# Patient Record
Sex: FEMALE | Race: BLACK OR AFRICAN AMERICAN | Employment: OTHER | URBAN - METROPOLITAN AREA
[De-identification: names, ages, dates, MRNs, and addresses within clinical notes are randomized per-mention and may not be internally consistent; named-entity substitution may affect disease eponyms.]

---

## 2019-01-11 ENCOUNTER — APPOINTMENT (OUTPATIENT)
Dept: GENERAL RADIOLOGY | Age: 49
DRG: 134 | End: 2019-01-11
Attending: EMERGENCY MEDICINE
Payer: MEDICAID

## 2019-01-11 ENCOUNTER — APPOINTMENT (OUTPATIENT)
Dept: CT IMAGING | Age: 49
DRG: 134 | End: 2019-01-11
Attending: EMERGENCY MEDICINE
Payer: MEDICAID

## 2019-01-11 ENCOUNTER — HOSPITAL ENCOUNTER (INPATIENT)
Age: 49
LOS: 6 days | Discharge: HOME HEALTH | DRG: 134 | End: 2019-01-20
Attending: EMERGENCY MEDICINE | Admitting: HOSPITALIST
Payer: MEDICAID

## 2019-01-11 DIAGNOSIS — T81.31XA DEHISCENCE OF INCISION, INITIAL ENCOUNTER: ICD-10-CM

## 2019-01-11 DIAGNOSIS — I26.99 OTHER ACUTE PULMONARY EMBOLISM WITHOUT ACUTE COR PULMONALE (HCC): Primary | ICD-10-CM

## 2019-01-11 DIAGNOSIS — Z87.81 STATUS POST OPEN REDUCTION WITH INTERNAL FIXATION (ORIF) OF FRACTURE OF ANKLE: ICD-10-CM

## 2019-01-11 DIAGNOSIS — Z98.890 STATUS POST OPEN REDUCTION WITH INTERNAL FIXATION (ORIF) OF FRACTURE OF ANKLE: ICD-10-CM

## 2019-01-11 LAB
ALBUMIN SERPL-MCNC: 3.3 G/DL (ref 3.4–5)
ALBUMIN/GLOB SERPL: 0.7 {RATIO} (ref 0.8–1.7)
ALP SERPL-CCNC: 85 U/L (ref 45–117)
ALT SERPL-CCNC: 13 U/L (ref 13–56)
ANION GAP SERPL CALC-SCNC: 7 MMOL/L (ref 3–18)
AST SERPL-CCNC: 7 U/L (ref 15–37)
BASOPHILS # BLD: 0 K/UL (ref 0–0.1)
BASOPHILS NFR BLD: 0 % (ref 0–2)
BILIRUB SERPL-MCNC: 0.3 MG/DL (ref 0.2–1)
BUN SERPL-MCNC: 18 MG/DL (ref 7–18)
BUN/CREAT SERPL: 15 (ref 12–20)
CALCIUM SERPL-MCNC: 8.6 MG/DL (ref 8.5–10.1)
CHLORIDE SERPL-SCNC: 102 MMOL/L (ref 100–108)
CK MB CFR SERPL CALC: ABNORMAL % (ref 0–4)
CK MB SERPL-MCNC: <1 NG/ML (ref 5–25)
CK SERPL-CCNC: 21 U/L (ref 26–192)
CO2 SERPL-SCNC: 27 MMOL/L (ref 21–32)
CREAT SERPL-MCNC: 1.22 MG/DL (ref 0.6–1.3)
D DIMER PPP FEU-MCNC: 1.94 UG/ML(FEU)
DIFFERENTIAL METHOD BLD: ABNORMAL
EOSINOPHIL # BLD: 0 K/UL (ref 0–0.4)
EOSINOPHIL NFR BLD: 0 % (ref 0–5)
ERYTHROCYTE [DISTWIDTH] IN BLOOD BY AUTOMATED COUNT: 19.1 % (ref 11.6–14.5)
GLOBULIN SER CALC-MCNC: 4.9 G/DL (ref 2–4)
GLUCOSE SERPL-MCNC: 447 MG/DL (ref 74–99)
HCT VFR BLD AUTO: 39.1 % (ref 35–45)
HGB BLD-MCNC: 12.5 G/DL (ref 12–16)
INR PPP: 1 (ref 0.8–1.2)
LYMPHOCYTES # BLD: 0.7 K/UL (ref 0.9–3.6)
LYMPHOCYTES NFR BLD: 13 % (ref 21–52)
MAGNESIUM SERPL-MCNC: 2.3 MG/DL (ref 1.6–2.6)
MCH RBC QN AUTO: 27.3 PG (ref 24–34)
MCHC RBC AUTO-ENTMCNC: 32 G/DL (ref 31–37)
MCV RBC AUTO: 85.4 FL (ref 74–97)
MONOCYTES # BLD: 0.1 K/UL (ref 0.05–1.2)
MONOCYTES NFR BLD: 1 % (ref 3–10)
NEUTS SEG # BLD: 4.6 K/UL (ref 1.8–8)
NEUTS SEG NFR BLD: 86 % (ref 40–73)
PLATELET # BLD AUTO: 292 K/UL (ref 135–420)
PMV BLD AUTO: 11.2 FL (ref 9.2–11.8)
POTASSIUM SERPL-SCNC: 4.5 MMOL/L (ref 3.5–5.5)
PROT SERPL-MCNC: 8.2 G/DL (ref 6.4–8.2)
PROTHROMBIN TIME: 13 SEC (ref 11.5–15.2)
RBC # BLD AUTO: 4.58 M/UL (ref 4.2–5.3)
SODIUM SERPL-SCNC: 136 MMOL/L (ref 136–145)
TROPONIN I SERPL-MCNC: <0.02 NG/ML (ref 0–0.04)
WBC # BLD AUTO: 5.4 K/UL (ref 4.6–13.2)

## 2019-01-11 PROCEDURE — 71275 CT ANGIOGRAPHY CHEST: CPT

## 2019-01-11 PROCEDURE — 85610 PROTHROMBIN TIME: CPT

## 2019-01-11 PROCEDURE — 85379 FIBRIN DEGRADATION QUANT: CPT

## 2019-01-11 PROCEDURE — 80053 COMPREHEN METABOLIC PANEL: CPT

## 2019-01-11 PROCEDURE — 85025 COMPLETE CBC W/AUTO DIFF WBC: CPT

## 2019-01-11 PROCEDURE — 74011250636 HC RX REV CODE- 250/636: Performed by: EMERGENCY MEDICINE

## 2019-01-11 PROCEDURE — 93005 ELECTROCARDIOGRAM TRACING: CPT

## 2019-01-11 PROCEDURE — 96374 THER/PROPH/DIAG INJ IV PUSH: CPT

## 2019-01-11 PROCEDURE — 83735 ASSAY OF MAGNESIUM: CPT

## 2019-01-11 PROCEDURE — 82550 ASSAY OF CK (CPK): CPT

## 2019-01-11 PROCEDURE — 99285 EMERGENCY DEPT VISIT HI MDM: CPT

## 2019-01-11 PROCEDURE — 82962 GLUCOSE BLOOD TEST: CPT

## 2019-01-11 PROCEDURE — 71045 X-RAY EXAM CHEST 1 VIEW: CPT

## 2019-01-11 PROCEDURE — 83036 HEMOGLOBIN GLYCOSYLATED A1C: CPT

## 2019-01-11 RX ORDER — MORPHINE SULFATE 4 MG/ML
4 INJECTION INTRAVENOUS
Status: COMPLETED | OUTPATIENT
Start: 2019-01-11 | End: 2019-01-12

## 2019-01-11 RX ORDER — MORPHINE SULFATE 4 MG/ML
4 INJECTION INTRAVENOUS
Status: COMPLETED | OUTPATIENT
Start: 2019-01-11 | End: 2019-01-11

## 2019-01-11 RX ADMIN — MORPHINE SULFATE 4 MG: 4 INJECTION INTRAVENOUS at 22:40

## 2019-01-12 ENCOUNTER — APPOINTMENT (OUTPATIENT)
Dept: GENERAL RADIOLOGY | Age: 49
DRG: 134 | End: 2019-01-12
Attending: FAMILY MEDICINE
Payer: MEDICAID

## 2019-01-12 PROBLEM — K50.10 CROHN'S COLITIS (HCC): Status: ACTIVE | Noted: 2019-01-12

## 2019-01-12 PROBLEM — Z98.890 STATUS POST OPEN REDUCTION WITH INTERNAL FIXATION (ORIF) OF FRACTURE OF ANKLE: Status: ACTIVE | Noted: 2019-01-12

## 2019-01-12 PROBLEM — S91.002A WOUND OF LEFT ANKLE: Status: ACTIVE | Noted: 2019-01-12

## 2019-01-12 PROBLEM — I26.99 PULMONARY EMBOLI (HCC): Status: ACTIVE | Noted: 2019-01-12

## 2019-01-12 PROBLEM — Z87.81 STATUS POST OPEN REDUCTION WITH INTERNAL FIXATION (ORIF) OF FRACTURE OF ANKLE: Status: ACTIVE | Noted: 2019-01-12

## 2019-01-12 PROBLEM — I10 ACCELERATED HYPERTENSION: Status: ACTIVE | Noted: 2019-01-12

## 2019-01-12 LAB
ATRIAL RATE: 92 BPM
CALCULATED P AXIS, ECG09: 63 DEGREES
CALCULATED R AXIS, ECG10: -11 DEGREES
CALCULATED T AXIS, ECG11: 40 DEGREES
DIAGNOSIS, 93000: NORMAL
EST. AVERAGE GLUCOSE BLD GHB EST-MCNC: 183 MG/DL
GLUCOSE BLD STRIP.AUTO-MCNC: 159 MG/DL (ref 70–110)
GLUCOSE BLD STRIP.AUTO-MCNC: 197 MG/DL (ref 70–110)
GLUCOSE BLD STRIP.AUTO-MCNC: 207 MG/DL (ref 70–110)
GLUCOSE BLD STRIP.AUTO-MCNC: 213 MG/DL (ref 70–110)
GLUCOSE BLD STRIP.AUTO-MCNC: 226 MG/DL (ref 70–110)
GLUCOSE BLD STRIP.AUTO-MCNC: 365 MG/DL (ref 70–110)
HBA1C MFR BLD: 8 % (ref 4.2–5.6)
P-R INTERVAL, ECG05: 136 MS
Q-T INTERVAL, ECG07: 380 MS
QRS DURATION, ECG06: 94 MS
QTC CALCULATION (BEZET), ECG08: 469 MS
VENTRICULAR RATE, ECG03: 92 BPM

## 2019-01-12 PROCEDURE — 74011250637 HC RX REV CODE- 250/637: Performed by: HOSPITALIST

## 2019-01-12 PROCEDURE — 99218 HC RM OBSERVATION: CPT

## 2019-01-12 PROCEDURE — 74011636637 HC RX REV CODE- 636/637: Performed by: HOSPITALIST

## 2019-01-12 PROCEDURE — 96374 THER/PROPH/DIAG INJ IV PUSH: CPT

## 2019-01-12 PROCEDURE — 96375 TX/PRO/DX INJ NEW DRUG ADDON: CPT

## 2019-01-12 PROCEDURE — 74011636320 HC RX REV CODE- 636/320: Performed by: EMERGENCY MEDICINE

## 2019-01-12 PROCEDURE — 74011250636 HC RX REV CODE- 250/636: Performed by: HOSPITALIST

## 2019-01-12 PROCEDURE — 96376 TX/PRO/DX INJ SAME DRUG ADON: CPT

## 2019-01-12 PROCEDURE — 73600 X-RAY EXAM OF ANKLE: CPT

## 2019-01-12 PROCEDURE — 74011636637 HC RX REV CODE- 636/637: Performed by: EMERGENCY MEDICINE

## 2019-01-12 PROCEDURE — 74011250637 HC RX REV CODE- 250/637: Performed by: INTERNAL MEDICINE

## 2019-01-12 PROCEDURE — 74011250637 HC RX REV CODE- 250/637: Performed by: FAMILY MEDICINE

## 2019-01-12 PROCEDURE — 74011250636 HC RX REV CODE- 250/636: Performed by: FAMILY MEDICINE

## 2019-01-12 PROCEDURE — 74011250636 HC RX REV CODE- 250/636

## 2019-01-12 PROCEDURE — 74011250636 HC RX REV CODE- 250/636: Performed by: EMERGENCY MEDICINE

## 2019-01-12 PROCEDURE — 74011636637 HC RX REV CODE- 636/637: Performed by: INTERNAL MEDICINE

## 2019-01-12 RX ORDER — MORPHINE SULFATE 10 MG/ML
2 INJECTION, SOLUTION INTRAMUSCULAR; INTRAVENOUS
Status: DISCONTINUED | OUTPATIENT
Start: 2019-01-12 | End: 2019-01-12 | Stop reason: SDUPTHER

## 2019-01-12 RX ORDER — CLONIDINE HYDROCHLORIDE 0.1 MG/1
0.1 TABLET ORAL
Status: DISCONTINUED | OUTPATIENT
Start: 2019-01-12 | End: 2019-01-20 | Stop reason: HOSPADM

## 2019-01-12 RX ORDER — ACETAMINOPHEN 325 MG/1
650 TABLET ORAL
Status: DISCONTINUED | OUTPATIENT
Start: 2019-01-12 | End: 2019-01-20 | Stop reason: HOSPADM

## 2019-01-12 RX ORDER — LOSARTAN POTASSIUM 50 MG/1
100 TABLET ORAL DAILY
Status: DISCONTINUED | OUTPATIENT
Start: 2019-01-13 | End: 2019-01-20 | Stop reason: HOSPADM

## 2019-01-12 RX ORDER — TRAMADOL HYDROCHLORIDE AND ACETAMINOPHEN 37.5; 325 MG/1; MG/1
1 TABLET ORAL
Status: DISCONTINUED | OUTPATIENT
Start: 2019-01-12 | End: 2019-01-17

## 2019-01-12 RX ORDER — INSULIN GLARGINE 100 [IU]/ML
20 INJECTION, SOLUTION SUBCUTANEOUS DAILY
Status: DISCONTINUED | OUTPATIENT
Start: 2019-01-12 | End: 2019-01-12

## 2019-01-12 RX ORDER — HYDROCHLOROTHIAZIDE 25 MG/1
25 TABLET ORAL DAILY
Status: DISCONTINUED | OUTPATIENT
Start: 2019-01-13 | End: 2019-01-20 | Stop reason: HOSPADM

## 2019-01-12 RX ORDER — HYDRALAZINE HYDROCHLORIDE 25 MG/1
25 TABLET, FILM COATED ORAL 3 TIMES DAILY
Status: DISCONTINUED | OUTPATIENT
Start: 2019-01-12 | End: 2019-01-12

## 2019-01-12 RX ORDER — DEXTROSE 50 % IN WATER (D50W) INTRAVENOUS SYRINGE
25-50 AS NEEDED
Status: DISCONTINUED | OUTPATIENT
Start: 2019-01-12 | End: 2019-01-20 | Stop reason: HOSPADM

## 2019-01-12 RX ORDER — METOPROLOL TARTRATE 25 MG/1
25 TABLET, FILM COATED ORAL 2 TIMES DAILY
Status: DISCONTINUED | OUTPATIENT
Start: 2019-01-12 | End: 2019-01-15

## 2019-01-12 RX ORDER — INSULIN GLARGINE 100 [IU]/ML
25 INJECTION, SOLUTION SUBCUTANEOUS DAILY
Status: DISCONTINUED | OUTPATIENT
Start: 2019-01-13 | End: 2019-01-15

## 2019-01-12 RX ORDER — PREDNISONE 20 MG/1
40 TABLET ORAL
Status: DISCONTINUED | OUTPATIENT
Start: 2019-01-13 | End: 2019-01-13

## 2019-01-12 RX ORDER — MORPHINE SULFATE 2 MG/ML
2 INJECTION, SOLUTION INTRAMUSCULAR; INTRAVENOUS
Status: DISCONTINUED | OUTPATIENT
Start: 2019-01-12 | End: 2019-01-12

## 2019-01-12 RX ORDER — ONDANSETRON HYDROCHLORIDE 4 MG/2ML
4 INJECTION, SOLUTION INTRAMUSCULAR; INTRAVENOUS
Status: COMPLETED | OUTPATIENT
Start: 2019-01-12 | End: 2019-01-12

## 2019-01-12 RX ORDER — INSULIN LISPRO 100 [IU]/ML
4 INJECTION, SOLUTION INTRAVENOUS; SUBCUTANEOUS
Status: DISCONTINUED | OUTPATIENT
Start: 2019-01-12 | End: 2019-01-17

## 2019-01-12 RX ORDER — HYDRALAZINE HYDROCHLORIDE 50 MG/1
50 TABLET, FILM COATED ORAL 3 TIMES DAILY
Status: DISCONTINUED | OUTPATIENT
Start: 2019-01-12 | End: 2019-01-13

## 2019-01-12 RX ORDER — ENOXAPARIN SODIUM 150 MG/ML
120 INJECTION SUBCUTANEOUS EVERY 12 HOURS
Status: DISCONTINUED | OUTPATIENT
Start: 2019-01-12 | End: 2019-01-12

## 2019-01-12 RX ORDER — PANTOPRAZOLE SODIUM 40 MG/1
40 TABLET, DELAYED RELEASE ORAL
Status: DISCONTINUED | OUTPATIENT
Start: 2019-01-12 | End: 2019-01-20 | Stop reason: HOSPADM

## 2019-01-12 RX ORDER — IPRATROPIUM BROMIDE AND ALBUTEROL SULFATE 2.5; .5 MG/3ML; MG/3ML
3 SOLUTION RESPIRATORY (INHALATION)
Status: DISCONTINUED | OUTPATIENT
Start: 2019-01-12 | End: 2019-01-20 | Stop reason: HOSPADM

## 2019-01-12 RX ORDER — MAGNESIUM SULFATE 100 %
4 CRYSTALS MISCELLANEOUS AS NEEDED
Status: DISCONTINUED | OUTPATIENT
Start: 2019-01-12 | End: 2019-01-20 | Stop reason: HOSPADM

## 2019-01-12 RX ORDER — ONDANSETRON 2 MG/ML
4 INJECTION INTRAMUSCULAR; INTRAVENOUS
Status: DISCONTINUED | OUTPATIENT
Start: 2019-01-12 | End: 2019-01-20 | Stop reason: HOSPADM

## 2019-01-12 RX ORDER — MORPHINE SULFATE 2 MG/ML
2 INJECTION, SOLUTION INTRAMUSCULAR; INTRAVENOUS
Status: DISCONTINUED | OUTPATIENT
Start: 2019-01-12 | End: 2019-01-13

## 2019-01-12 RX ORDER — INSULIN GLARGINE 100 [IU]/ML
5 INJECTION, SOLUTION SUBCUTANEOUS
Status: COMPLETED | OUTPATIENT
Start: 2019-01-12 | End: 2019-01-12

## 2019-01-12 RX ORDER — ONDANSETRON 2 MG/ML
INJECTION INTRAMUSCULAR; INTRAVENOUS
Status: COMPLETED
Start: 2019-01-12 | End: 2019-01-12

## 2019-01-12 RX ORDER — MORPHINE SULFATE 4 MG/ML
4 INJECTION INTRAVENOUS
Status: COMPLETED | OUTPATIENT
Start: 2019-01-12 | End: 2019-01-12

## 2019-01-12 RX ORDER — INSULIN LISPRO 100 [IU]/ML
INJECTION, SOLUTION INTRAVENOUS; SUBCUTANEOUS
Status: DISCONTINUED | OUTPATIENT
Start: 2019-01-12 | End: 2019-01-15

## 2019-01-12 RX ORDER — MORPHINE SULFATE 4 MG/ML
INJECTION INTRAVENOUS
Status: COMPLETED
Start: 2019-01-12 | End: 2019-01-12

## 2019-01-12 RX ADMIN — ONDANSETRON 4 MG: 2 INJECTION INTRAMUSCULAR; INTRAVENOUS at 04:21

## 2019-01-12 RX ADMIN — ONDANSETRON 4 MG: 2 INJECTION INTRAMUSCULAR; INTRAVENOUS at 22:36

## 2019-01-12 RX ADMIN — INSULIN LISPRO 4 UNITS: 100 INJECTION, SOLUTION INTRAVENOUS; SUBCUTANEOUS at 22:35

## 2019-01-12 RX ADMIN — HYDRALAZINE HYDROCHLORIDE 25 MG: 25 TABLET, FILM COATED ORAL at 15:03

## 2019-01-12 RX ADMIN — MORPHINE SULFATE 2 MG: 2 INJECTION, SOLUTION INTRAMUSCULAR; INTRAVENOUS at 13:05

## 2019-01-12 RX ADMIN — INSULIN LISPRO 2 UNITS: 100 INJECTION, SOLUTION INTRAVENOUS; SUBCUTANEOUS at 09:17

## 2019-01-12 RX ADMIN — ONDANSETRON HYDROCHLORIDE 4 MG: 4 INJECTION, SOLUTION INTRAMUSCULAR; INTRAVENOUS at 04:21

## 2019-01-12 RX ADMIN — INSULIN LISPRO 4 UNITS: 100 INJECTION, SOLUTION INTRAVENOUS; SUBCUTANEOUS at 17:13

## 2019-01-12 RX ADMIN — MORPHINE SULFATE 2 MG: 2 INJECTION, SOLUTION INTRAMUSCULAR; INTRAVENOUS at 21:52

## 2019-01-12 RX ADMIN — INSULIN GLARGINE 20 UNITS: 100 INJECTION, SOLUTION SUBCUTANEOUS at 09:16

## 2019-01-12 RX ADMIN — HYDRALAZINE HYDROCHLORIDE 25 MG: 25 TABLET, FILM COATED ORAL at 09:16

## 2019-01-12 RX ADMIN — INSULIN LISPRO 4 UNITS: 100 INJECTION, SOLUTION INTRAVENOUS; SUBCUTANEOUS at 17:14

## 2019-01-12 RX ADMIN — HUMAN INSULIN 6 UNITS: 100 INJECTION, SOLUTION SUBCUTANEOUS at 00:06

## 2019-01-12 RX ADMIN — PANTOPRAZOLE SODIUM 40 MG: 40 TABLET, DELAYED RELEASE ORAL at 17:13

## 2019-01-12 RX ADMIN — INSULIN LISPRO 4 UNITS: 100 INJECTION, SOLUTION INTRAVENOUS; SUBCUTANEOUS at 12:10

## 2019-01-12 RX ADMIN — TRAMADOL HYDROCHLORIDE AND ACETAMINOPHEN 1 TABLET: 37.5; 325 TABLET, FILM COATED ORAL at 16:26

## 2019-01-12 RX ADMIN — ENOXAPARIN SODIUM 120 MG: 120 INJECTION SUBCUTANEOUS at 09:16

## 2019-01-12 RX ADMIN — MORPHINE SULFATE 4 MG: 4 INJECTION INTRAVENOUS at 04:20

## 2019-01-12 RX ADMIN — INSULIN LISPRO 4 UNITS: 100 INJECTION, SOLUTION INTRAVENOUS; SUBCUTANEOUS at 09:17

## 2019-01-12 RX ADMIN — CLONIDINE HYDROCHLORIDE 0.1 MG: 0.1 TABLET ORAL at 20:49

## 2019-01-12 RX ADMIN — HYDRALAZINE HYDROCHLORIDE 50 MG: 50 TABLET, FILM COATED ORAL at 21:51

## 2019-01-12 RX ADMIN — INSULIN GLARGINE 5 UNITS: 100 INJECTION, SOLUTION SUBCUTANEOUS at 14:59

## 2019-01-12 RX ADMIN — APIXABAN 10 MG: 2.5 TABLET, FILM COATED ORAL at 20:34

## 2019-01-12 RX ADMIN — MORPHINE SULFATE 2 MG: 2 INJECTION, SOLUTION INTRAMUSCULAR; INTRAVENOUS at 09:16

## 2019-01-12 RX ADMIN — METOPROLOL TARTRATE 25 MG: 25 TABLET ORAL at 17:13

## 2019-01-12 RX ADMIN — ONDANSETRON 4 MG: 2 INJECTION INTRAMUSCULAR; INTRAVENOUS at 16:25

## 2019-01-12 RX ADMIN — MORPHINE SULFATE 4 MG: 4 INJECTION INTRAVENOUS at 00:13

## 2019-01-12 RX ADMIN — METOPROLOL TARTRATE 25 MG: 25 TABLET ORAL at 14:59

## 2019-01-12 RX ADMIN — IOPAMIDOL 85 ML: 755 INJECTION, SOLUTION INTRAVENOUS at 00:57

## 2019-01-12 NOTE — ED PROVIDER NOTES
EMERGENCY DEPARTMENT HISTORY AND PHYSICAL EXAM    9:26 PM      Date: 1/11/2019  Patient Name: Landon Khanna    History of Presenting Illness     Chief Complaint   Patient presents with    Chest Pain         History Provided By: Patient    Chief Complaint: CP  Duration:  PTA  Timing:  Acute  Location: Generalized   Quality: Not obtained at this time   Severity: Moderate  Modifying Factors: Not obtained at this time   Associated Symptoms: Not obtained at this time       Additional History (Context): 9:27 PM Landon Khanna is a 46 y.o. female with h/o diabetes, HTN and Crohn's colitis who presents to ED complaining of acute, moderate generalized CP onset PTA. The pt indicated that when she is at rest, she still experiences CP. She also stated that on 12/3 she had an ORF of the left ankle and is currently experiencing intense pain of that ankle. PCP: No primary care provider on file. Past History         Review of Systems       Review of Systems   Cardiovascular: Positive for chest pain (Generalized). Musculoskeletal:        Left ankle pain     All other systems reviewed and are negative. Physical Exam     Physical Exam   Constitutional: She is oriented to person, place, and time. She appears well-developed. HENT:   Head: Normocephalic and atraumatic. Eyes: EOM are normal. Pupils are equal, round, and reactive to light. Neck: Normal range of motion. Neck supple. Cardiovascular: Normal rate, regular rhythm and normal heart sounds. Exam reveals no friction rub. No murmur heard. Pulmonary/Chest: Effort normal and breath sounds normal. No respiratory distress. She has no wheezes. Abdominal: Soft. She exhibits no distension. There is no tenderness. There is no rebound and no guarding. Musculoskeletal: Normal range of motion. Neurological: She is alert and oriented to person, place, and time.    Left foot swelling  Slight would dehiscence laterally ~ 1cm  No erythema or induration Skin: Skin is warm and dry. Psychiatric: She has a normal mood and affect. Her behavior is normal. Thought content normal.         Diagnostic Study Results     cta + PE   EKG shows sinus at 92 with a normal axis. There is no ST elevation or depression and no hypertrophy        Medical Decision Making   I am the first provider for this patient. I reviewed the vital signs, available nursing notes, past medical history, past surgical history, family history and social history. Vital Signs-Reviewed the patient's vital signs. ED Course: Progress Notes, Reevaluation, and Consults:      Provider Notes (Medical Decision Making):    this is a 78-year-old female who had an open reduction and  And internal fixation of left ankle fracture. She recently drove up from [place] where the surgery was done. She presents today with chest pain and some concern of left leg swelling. They are unable to obtain a duplex on the right. A CTA that shows mild to moderate PE. Given her pain and the need for duplex will admit to the hospitalist service    Diagnosis     Clinical Impression: No diagnosis found. _______________________________    Attestations:  Scribe Attestation     I-Mitzy Landry acting as a scribe for and in the presence of Asa Mcgee MD      January 11, 2019 at 9:26 PM       Provider Attestation:      I personally performed the services described in the documentation, reviewed the documentation, as recorded by the scribe in my presence, and it accurately and completely records my words and actions.  January 11, 2019 at 9:26 PM - Asa Mcgee MD    _______________________________

## 2019-01-12 NOTE — PROGRESS NOTES
8715- Bedside and Verbal shift change report given to Lou Fink RN (oncoming nurse) by Yury Molina RN (offgoing nurse). Report included the following information SBAR, Kardex, Intake/Output, MAR and Recent Results.

## 2019-01-12 NOTE — ED NOTES
Pt arrived via EMS, c/o sudden onset chest pain today 7/10, pt was seen recently at [facility] and diagnosed with high d dimer, received dose of lovonox and followed up next day for doppler study, unsure of weather pt has CTA chest, pt did not know results of either study. Pt has diabetes, HTN and crohns disease.  Pt recently had surgery on her left  ankle

## 2019-01-12 NOTE — H&P
History & Physical    Patient: Thuy Angulo MRN: [id number]  CSN: [id number]    YOB: 1970  Age: 46 y.o. Sex: female      DOA: 1/11/2019    Chief Complaint   Patient presents with    Chest Pain          HPI:     Thuy Angulo is a 46 y.o. female who has a PMH of DM, Crohn's disease and HTN. She recently had ORIF of a left ankle # in [place] after sustaining a fall. Of note, patient states she will be staying in the area and is searching for a PCP and orthopedic surgeon for outpatient follow up. She presented to the hospital today for chest pain and SOB on exertion. She had been having left calf pain for which she went to another facility where she states she had an elevated D-Dimer. She was given one dose of lovenox and an appointment for a doppler US the next day. She has had the US done and has not been informed of the results. Meanwhile, the new symptoms developed and she came to ER here. CTA shows evidence of bilateral PE with small to moderate clot burden. LLE US from another facility showed:    Conclusions: No evidence of deep venous thrombosis in the left common femoral, femoral (proximal and mid), deep femoral and popliteal veins. Technically compromised study therefore non-occlusive deep venous thrombosis cannot be excluded in the femoral (distal), posterior tibial and peroneal veins as described in the findings.    Venous valvular reflux in the left as described. Past Medical History:   Diagnosis Date    [facility] [address]     Diabetes ([facility] [address]     HTN (hypertension)        No past surgical history on file. No family history on file.     Social History     Socioeconomic History    Marital status:      Spouse name: Not on file    Number of children: Not on file    Years of education: Not on file    Highest education level: Not on file       Prior to Admission medications    Not on File       No Known Allergies    Review of Systems    Refer to HPI for positive findings. All other systems reviewed and are negative    Physical Exam:     Physical Exam:  Visit Vitals  BP (!) 179/109   Pulse 86   Temp 97.9 °F (36.6 °C)   Resp 19   Wt 144.2 kg (318 lb)   SpO2 97%      O2 Device: Room air    Temp (24hrs), Av.6 °F (36.4 °C), Min:97.2 °F (36.2 °C), Max:97.9 °F (36.6 °C)       No intake/output data recorded. No intake/output data recorded. General:  Alert, cooperative, no distress, appears stated age. obese    Head:  Normocephalic, without obvious abnormality, atraumatic. Eyes:  Conjunctivae/corneas clear. PERRL, EOMs intact. Nose: Nares normal. No drainage or sinus tenderness. Throat: Lips, mucosa, and tongue normal. Teeth and gums normal.   Neck: Supple, symmetrical, trachea midline, no adenopathy, thyroid: no enlargement/tenderness/nodules, no carotid bruit and no JVD. Back:   ROM normal. No CVA tenderness. Lungs:   Clear to auscultation bilaterally. Chest wall:  No tenderness or deformity. Heart:  Regular rate and rhythm, S1, S2 normal, no murmur, click, rub or gallop. Abdomen: Soft, non-tender. Bowel sounds normal. No masses,  No organomegaly. Extremities:  left ankle has surgical scars over both malleoli, small area of open wound with granulation tissue and scant oozing over the lateral malleolus, atraumatic, no cyanosis or edema. Pulses: 2+ and symmetric all extremities. Skin: Skin color, texture, turgor normal. No rashes or lesions   Neurologic: CNII-XII intact. No focal motor or sensory deficit.        Labs Reviewed:    Recent Results (from the past 24 hour(s))   CBC WITH AUTOMATED DIFF    Collection Time: 19 10:03 PM   Result Value Ref Range    WBC 5.4 4.6 - 13.2 K/uL    RBC 4.58 4.20 - 5.30 M/uL    HGB 12.5 12.0 - 16.0 g/dL    HCT 39.1 35.0 - 45.0 %    MCV 85.4 74.0 - 97.0 FL    MCH 27.3 24.0 - 34.0 PG    MCHC 32.0 31.0 - 37.0 g/dL    RDW 19.1 (H) 11.6 - 14.5 %    PLATELET 130 509 - 169 K/uL MPV 11.2 9.2 - 11.8 FL    NEUTROPHILS 86 (H) 40 - 73 %    LYMPHOCYTES 13 (L) 21 - 52 %    MONOCYTES 1 (L) 3 - 10 %    EOSINOPHILS 0 0 - 5 %    BASOPHILS 0 0 - 2 %    ABS. NEUTROPHILS 4.6 1.8 - 8.0 K/UL    ABS. LYMPHOCYTES 0.7 (L) 0.9 - 3.6 K/UL    ABS. MONOCYTES 0.1 0.05 - 1.2 K/UL    ABS. EOSINOPHILS 0.0 0.0 - 0.4 K/UL    ABS. BASOPHILS 0.0 0.0 - 0.1 K/UL    DF AUTOMATED     METABOLIC PANEL, COMPREHENSIVE    Collection Time: 01/11/19 10:03 PM   Result Value Ref Range    Sodium 136 136 - 145 mmol/L    Potassium 4.5 3.5 - 5.5 mmol/L    Chloride 102 100 - 108 mmol/L    CO2 27 21 - 32 mmol/L    Anion gap 7 3.0 - 18 mmol/L    Glucose 447 (HH) 74 - 99 mg/dL    BUN 18 7.0 - 18 MG/DL    Creatinine 1.22 0.6 - 1.3 MG/DL    BUN/Creatinine ratio 15 12 - 20      GFR est AA 57 (L) >60 ml/min/1.73m2    GFR est non-AA 47 (L) >60 ml/min/1.73m2    Calcium 8.6 8.5 - 10.1 MG/DL    Bilirubin, total 0.3 0.2 - 1.0 MG/DL    ALT (SGPT) 13 13 - 56 U/L    AST (SGOT) 7 (L) 15 - 37 U/L    Alk.  phosphatase 85 45 - 117 U/L    Protein, total 8.2 6.4 - 8.2 g/dL    Albumin 3.3 (L) 3.4 - 5.0 g/dL    Globulin 4.9 (H) 2.0 - 4.0 g/dL    A-G Ratio 0.7 (L) 0.8 - 1.7     PROTHROMBIN TIME + INR    Collection Time: 01/11/19 10:03 PM   Result Value Ref Range    Prothrombin time 13.0 11.5 - 15.2 sec    INR 1.0 0.8 - 1.2     MAGNESIUM    Collection Time: 01/11/19 10:03 PM   Result Value Ref Range    Magnesium 2.3 1.6 - 2.6 mg/dL   D DIMER    Collection Time: 01/11/19 10:03 PM   Result Value Ref Range    D DIMER 1.94 (H) <0.46 ug/ml(FEU)   CARDIAC PANEL,(CK, CKMB & TROPONIN)    Collection Time: 01/11/19 10:03 PM   Result Value Ref Range    CK 21 (L) 26 - 192 U/L    CK - MB <1.0 <3.6 ng/ml    CK-MB Index  0.0 - 4.0 %     CALCULATION NOT PERFORMED WHEN RESULT IS BELOW LINEAR LIMIT    Troponin-I, QT <0.02 0.0 - 0.045 NG/ML   EKG, 12 LEAD, INITIAL    Collection Time: 01/11/19 10:58 PM   Result Value Ref Range    Ventricular Rate 92 BPM    Atrial Rate 92 BPM    P-R Interval 136 ms    QRS Duration 94 ms    Q-T Interval 380 ms    QTC Calculation (Bezet) 469 ms    Calculated P Axis 63 degrees    Calculated R Axis -11 degrees    Calculated T Axis 40 degrees    Diagnosis       Normal sinus rhythm  Possible Left atrial enlargement  Left ventricular hypertrophy  Abnormal ECG  No previous ECGs available     GLUCOSE, POC    Collection Time: 01/12/19  4:51 AM   Result Value Ref Range    Glucose (POC) 197 (H) 70 - 110 mg/dL       Procedures/imaging: see electronic medical records for all procedures/Xrays and details which were not copied into this note but were reviewed prior to creation of Plan        Assessment/Plan     1. Bilateral Pulmonary Emboli (R>L)  2. Possible LLE DVT  3. S/p ORIF of left ankle #  4. Diabetes Mellitus with Hyperglycemia  5. Hypertension  6. Crohn's disease    Plan    - Start on Lovenox 1mg/kg SC BID now  - Pulmonary consult regarding choice of oral anticoagulant  - will not pursue further LE imaging as she already had one done a few days ago and it will not change the dose, duration or choice of anticoagulation  - last fingerstick in ED is 197 down from 440+, will put on basal bolus regimen.    - continue hydralazine for HTN  - pain control    DVT ppx - therapeutic anticoagulation    Active Problems:    Pulmonary emboli ([address] (1/12/2019)        Brea Guidry MD  January 12, 2019

## 2019-01-12 NOTE — PROGRESS NOTES
SUBJECTIVE:    Patient states she has some ankle pain since surgery. No abdominal pain currently. Some chest pressure on right side. No cough. No SOB. No nausea or vomiting. Patient went to [facility] ED on 1/7/19 with abdominal pain and was given steroid [for crohn's disease] and Percocet for pain. Patient states she has been in this area for last 2 weeks and moved here from [facility] [place]:    BP (!) 195/94 (BP 1 Location: Right arm, BP Patient Position: At rest)   Pulse 84   Temp 98 °F (36.7 °C)   Resp 16   Wt 144.2 kg (318 lb)   SpO2 98%     CVS: RRR  RS: CTA Bilaterally  GI: NT, BS +  Extremities: no pedal edema  CNS: moves all extremities well  General: NAD, Awake     ASSESSMENT:    1. Bilateral PE  2. Left ankle pain sec to old fracture  3. DM [uncontrolled due to steroid]   4. HTN  5. Crohn's disease    PLAN:    Patient had a thorough work up done at [facility] including doppler of LLE, CT of LLE with iv contrast  And CT abd pelvis. ECHO pending  D/w patient about coumadin vs Eliquis - prefers eliquis  Possible discharge home in 1-2 days and patient is looking for a PCP and agrees to follow with dr. Andres Jeffers. I spoke to dr. Betsy Hoyt  And he will resume care from now onwards. Increase lantus and add BB    CT LLE:  Expected postsurgical changes of the ankle with satisfactory alignment of the known distal left fibular fracture. No acute osseous abnormality. Degenerative arthropathy of the medial femoroacetabular joint.     BMP:   Lab Results   Component Value Date/Time     01/11/2019 10:03 PM    K 4.5 01/11/2019 10:03 PM     01/11/2019 10:03 PM    CO2 27 01/11/2019 10:03 PM    AGAP 7 01/11/2019 10:03 PM     (HH) 01/11/2019 10:03 PM    BUN 18 01/11/2019 10:03 PM    CREA 1.22 01/11/2019 10:03 PM    GFRAA 57 (L) 01/11/2019 10:03 PM    GFRNA 47 (L) 01/11/2019 10:03 PM     CBC:   Lab Results   Component Value Date/Time    WBC 5.4 01/11/2019 10:03 PM    HGB 12.5 01/11/2019 10:03 PM    HCT 39.1 01/11/2019 10:03 PM     01/11/2019 10:03 PM

## 2019-01-12 NOTE — ED NOTES
TRANSFER - OUT REPORT:    Verbal report given to Linn Forman RN (name) on Sophia Alberts  being transferred to  (unit) for routine progression of care       Report consisted of patients Situation, Background, Assessment and   Recommendations(SBAR). Information from the following report(s) SBAR, ED Summary and MAR was reviewed with the receiving nurse. Lines:   Peripheral IV 01/11/19 Right Forearm (Active)   Site Assessment Clean, dry, & intact 1/11/2019 10:36 PM   Phlebitis Assessment 0 1/11/2019 10:36 PM   Infiltration Assessment 0 1/11/2019 10:36 PM   Dressing Status Clean, dry, & intact 1/11/2019 10:36 PM   Dressing Type Transparent 1/11/2019 10:36 PM   Hub Color/Line Status Blue;Patent; Flushed 1/11/2019 10:36 PM   Action Taken Blood drawn 1/11/2019 10:36 PM       Peripheral IV 01/11/19 Left Antecubital (Active)   Site Assessment Clean, dry, & intact 1/11/2019 10:38 PM   Phlebitis Assessment 0 1/11/2019 10:38 PM   Infiltration Assessment 0 1/11/2019 10:38 PM   Dressing Status Clean, dry, & intact 1/11/2019 10:38 PM   Dressing Type Transparent 1/11/2019 10:38 PM   Hub Color/Line Status Pink;Flushed;Patent 1/11/2019 10:38 PM        Opportunity for questions and clarification was provided.       Patient transported with:   Registered Nurse

## 2019-01-12 NOTE — H&P
History & Physical    Patient: Del Castillo MRN: [id number]  CSN: [id number]    YOB: 1970  Age: 46 y.o. Sex: female      DOA: 1/11/2019    Chief Complaint:   Chief Complaint   Patient presents with    Chest Pain          HPI:     Sorin Perez is a 46 y.o. female with h/o diabetes, HTN and Crohn's colitis who presents to ED complaining of acute, moderate generalized CP onset PTA. The pt indicated that when she is at rest, she still experiences CP. She also stated that on 12/3 she had an ORFI of the left ankle after fall and fracture at [facility]. Pt had external fixator at first and then had ORIF later . Pt had open wound lateral Lt ankle for 1-2 weeks had some drainage . Pt had h/o peripheral neuropathy  But she still feeling the pain. Pt was seen at [facility] before admission to [facility] pt had venous duplex ultrasound leg and she was told it was negative     Pt also had CT scan of abdomen at [facility]  And she was told that she had crohn's flare up and started on prednisone. Pt usually she is on Imuran 50 mg daily and Humara 40 mg injection weekly      Pt initially was admitted to the hospitalist  service and switched to me since pt will need PCP in the area . CXR  B/L atelactasis      CTA chest  Positive for pulmonary emboli with a small to moderate overall clot burden which  is more significant in the right lung. Mildly dilated main pulmonary artery, but  no other CT evidence for significant right heart strain.    Left adrenal nodule is low-density and most likely a benign adenoma. Ct abdomen and pelvis at [facility]    1. Mild, long segment thickening of the descending colonic wall suggestive of inflammatory or infectious colitis. 2. Fibroid uterus. 3. Stable left adrenal nodule is indeterminate by imaging.  This can be subsequently evaluated with adrenal protocol CT or MRI on a nonemergent basis.     Past Medical History:   Diagnosis Date    [facility]'s [facility] ([facility]     [facility]     HTN (hypertension)         past surgical history  ORIF Lt ankle     family history : both parents has diabetes , hypertension. Father had multiple heart attacks     Social History     Socioeconomic History    Marital status:      Spouse name: Not on file    Number of children: Not on file    Years of education: Not on file    Highest education level: Not on file     Pt denied smoking , alcohol abuse and drug abuse  Prior to Admission medications    Not on File       No Known Allergies    Review of Systems  GENERAL: Patient alert, awake and oriented times 3, able to communicate full sentences and not in distress. HEENT: No change in vision, no earache, tinnitus, sore throat or sinus congestion. NECK: No pain or stiffness. CARDIOVASCULAR:  chest pain or pressure improving . No palpitations. PULMONARY: shortness of breath improving ,  No cough or wheeze. GASTROINTESTINAL: positive abdominal pain,  Positive nausea, no omiting or diarrhea, melena or       bright red blood per rectum. GENITOURINARY: No urinary frequency, urgency, hesitancy or dysuria. MUSCULOSKELETAL:  Chronic arthritis pain and peripeheral neuropathy and lower back pain   DERMATOLOGIC:  Discoloration Lt ankle open wound Lateral ankle   ENDOCRINE: No polyuria, polydipsia, no heat or cold intolerance. No recent change in    weight. HEMATOLOGICAL: H/O  anemia  No  easy bruising or bleeding. NEUROLOGIC: No headache, seizures, tingling and numbness lower extremities  PSYCHIATRIC: No depression, anxiety, mood disorder, no loss of interest in normal       activity or change in sleep pattern.         Physical Exam:     Physical Exam:  Visit Vitals  BP (!) 195/94 (BP 1 Location: Right arm, BP Patient Position: At rest)   Pulse 84   Temp 98 °F (36.7 °C)   Resp 16   Wt 144.2 kg (318 lb)   SpO2 98%      O2 Device: Room air    Temp (24hrs), Av.8 °F (36.6 °C), Min:97.2 °F (36.2 °C), Max:98 °F (36.7 °C) No intake/output data recorded. No intake/output data recorded. General:  Alert, cooperative, no distress,  Morbid obesity   Head:  Normocephalic, without obvious abnormality, atraumatic. Eyes:  Conjunctivae/corneas clear. PERRL, EOMs intact. Nose: Nares normal. No drainage or sinus tenderness. Throat: Lips, mucosa, and tongue normal.    Neck: Supple, symmetrical, trachea midline, no adenopathy, thyroid: no enlargement/tenderness/nodules, no carotid bruit and no JVD. Back:   ROM normal. No CVA tenderness. Lungs:   Clear to auscultation bilaterally. Chest wall:  No tenderness or deformity. Heart:  Regular rate and rhythm, S1, S2 normal, no murmur, click, rub or gallop. Abdomen: Soft,  Obese tender Lt lower abdomen. No masses,  No organomegaly. Extremities: Extremities normal, atraumatic, no cyanosis or edema. Pulses: 2+ and symmetric all extremities. Skin: Skin color, texture, turgor normal.  Lt lateral ankle open wound no significant draiange   Neurologic: CNII-XII intact. No focal motor or sensory deficit. Labs Reviewed: All lab results for the last 24 hours reviewed. Recent Results (from the past 24 hour(s))   CBC WITH AUTOMATED DIFF    Collection Time: 01/11/19 10:03 PM   Result Value Ref Range    WBC 5.4 4.6 - 13.2 K/uL    RBC 4.58 4.20 - 5.30 M/uL    HGB 12.5 12.0 - 16.0 g/dL    HCT 39.1 35.0 - 45.0 %    MCV 85.4 74.0 - 97.0 FL    MCH 27.3 24.0 - 34.0 PG    MCHC 32.0 31.0 - 37.0 g/dL    RDW 19.1 (H) 11.6 - 14.5 %    PLATELET 796 980 - 710 K/uL    MPV 11.2 9.2 - 11.8 FL    NEUTROPHILS 86 (H) 40 - 73 %    LYMPHOCYTES 13 (L) 21 - 52 %    MONOCYTES 1 (L) 3 - 10 %    EOSINOPHILS 0 0 - 5 %    BASOPHILS 0 0 - 2 %    ABS. NEUTROPHILS 4.6 1.8 - 8.0 K/UL    ABS. LYMPHOCYTES 0.7 (L) 0.9 - 3.6 K/UL    ABS. MONOCYTES 0.1 0.05 - 1.2 K/UL    ABS. EOSINOPHILS 0.0 0.0 - 0.4 K/UL    ABS.  BASOPHILS 0.0 0.0 - 0.1 K/UL    DF AUTOMATED     METABOLIC PANEL, COMPREHENSIVE    Collection Time: 01/11/19 10:03 PM   Result Value Ref Range    Sodium 136 136 - 145 mmol/L    Potassium 4.5 3.5 - 5.5 mmol/L    Chloride 102 100 - 108 mmol/L    CO2 27 21 - 32 mmol/L    Anion gap 7 3.0 - 18 mmol/L    Glucose 447 (HH) 74 - 99 mg/dL    BUN 18 7.0 - 18 MG/DL    Creatinine 1.22 0.6 - 1.3 MG/DL    BUN/Creatinine ratio 15 12 - 20      GFR est AA 57 (L) >60 ml/min/1.73m2    GFR est non-AA 47 (L) >60 ml/min/1.73m2    Calcium 8.6 8.5 - 10.1 MG/DL    Bilirubin, total 0.3 0.2 - 1.0 MG/DL    ALT (SGPT) 13 13 - 56 U/L    AST (SGOT) 7 (L) 15 - 37 U/L    Alk.  phosphatase 85 45 - 117 U/L    Protein, total 8.2 6.4 - 8.2 g/dL    Albumin 3.3 (L) 3.4 - 5.0 g/dL    Globulin 4.9 (H) 2.0 - 4.0 g/dL    A-G Ratio 0.7 (L) 0.8 - 1.7     PROTHROMBIN TIME + INR    Collection Time: 01/11/19 10:03 PM   Result Value Ref Range    Prothrombin time 13.0 11.5 - 15.2 sec    INR 1.0 0.8 - 1.2     MAGNESIUM    Collection Time: 01/11/19 10:03 PM   Result Value Ref Range    Magnesium 2.3 1.6 - 2.6 mg/dL   D DIMER    Collection Time: 01/11/19 10:03 PM   Result Value Ref Range    D DIMER 1.94 (H) <0.46 ug/ml(FEU)   CARDIAC PANEL,(CK, CKMB & TROPONIN)    Collection Time: 01/11/19 10:03 PM   Result Value Ref Range    CK 21 (L) 26 - 192 U/L    CK - MB <1.0 <3.6 ng/ml    CK-MB Index  0.0 - 4.0 %     CALCULATION NOT PERFORMED WHEN RESULT IS BELOW LINEAR LIMIT    Troponin-I, QT <0.02 0.0 - 0.045 NG/ML   HEMOGLOBIN A1C WITH EAG    Collection Time: 01/11/19 10:03 PM   Result Value Ref Range    Hemoglobin A1c 8.0 (H) 4.2 - 5.6 %    Est. average glucose 183 mg/dL   EKG, 12 LEAD, INITIAL    Collection Time: 01/11/19 10:58 PM   Result Value Ref Range    Ventricular Rate 92 BPM    Atrial Rate 92 BPM    P-R Interval 136 ms    QRS Duration 94 ms    Q-T Interval 380 ms    QTC Calculation (Bezet) 469 ms    Calculated P Axis 63 degrees    Calculated R Axis -11 degrees    Calculated T Axis 40 degrees    Diagnosis       Normal sinus rhythm  Possible Left atrial enlargement  Left ventricular hypertrophy  Abnormal ECG  No previous ECGs available     GLUCOSE, POC    Collection Time: 01/11/19 11:51 PM   Result Value Ref Range    Glucose (POC) 365 (H) 70 - 110 mg/dL   GLUCOSE, POC    Collection Time: 01/12/19  4:51 AM   Result Value Ref Range    Glucose (POC) 197 (H) 70 - 110 mg/dL   GLUCOSE, POC    Collection Time: 01/12/19  8:21 AM   Result Value Ref Range    Glucose (POC) 159 (H) 70 - 110 mg/dL   GLUCOSE, POC    Collection Time: 01/12/19 11:54 AM   Result Value Ref Range    Glucose (POC) 213 (H) 70 - 110 mg/dL     CT, CXR and EKG    Procedures/imaging: see electronic medical records for all procedures/Xrays and details which were not copied into this note but were reviewed prior to creation of Plan        Assessment/Plan     Active Problems:    Pulmonary emboli ([facility] (1/12/2019)      Accelerated hypertension (1/12/2019)      Crohn's colitis ([facility] (1/12/2019)         Plan    Pulmonary Embolism/ S/P travel from [place] and S/p surgery Lt ankle  Pt had pulmonary consult with Dr Ana Gonzales switch to Eliquis  Monitor H&H with H/O crohn's  Continue to monitor sx and o2 sat    S/P ORIF  Lt lateral ankle wound  Wound care consult  Check x-ray Lt ankle  Pt will need ortho consult   Pt at higher risk for infection with her immune suppression status    Crohn's disease  Continue prednisone tapering dose   Restart regular med  Protonix 40 mg daily  Continue Zofran prn nausea    Diabetes Mellitus type 2  Check HG A1c  Lipid profile continue Lantus and humalog sliding scle    Uncontrolled / accelerated Hypertension  Increase hydralazine to 50 mg TID  Losartan 100 mg daily   HCTZ 25 mg daily  Pt on clonidine prn  F/u cbc, cmp    DVT/GI Prophylaxis: H2B/PPI and Eliquis      Ingrid Bullard MD  1/12/2019  3:32 PM

## 2019-01-12 NOTE — CONSULTS
[facility]. Pulmonary, Critical Care, and Sleep Medicine    Initial Patient Consult    Name: Arlene Pearce MRN: [id number]   : 1970 Hospital: [address] [place] [address]   Date: 2019        IMPRESSION:   · Small to moderate bilateral PE  · Left ankle fracture with recent surgery    · Chron's disease  · HTN  · DM      RECOMMENDATIONS:   · Agree with therapeutic lovenox  · Eliquis 3-6 months with f/u PVL prior to stopping  · Will cont to follow while in house and f/u in Dr. Hatfield Manual office in three months  · Do not think patient is a candidate for tPa at this time. Subjective: This patient has been seen and evaluated at the request of Dr. Milton Adrian for PE. Patient is a 46 y.o. female hx of chron's disease HTN DM was hospitalized  for approx 28 days in [place] for ankle fracture s/p surgery, patient moved from [place] to  to be closer to family 2 weeks ago. Patient drove from [place] Patient was seen at [facility] c/o diffuse abd pain \"Chron's flair\" left leg swelling pain. No DVT on LE PVL. Last night patient c/p sharp chest pain and sudden onset of SOB COTTRELL and ongoing left calf pain. CT shows bilateral PE. Have been request to consult for PE management     Patient currently c/o COTTRELL and right sharp chest pain decreased since admission no cough fever chills N/V abd pain dizziness HA. + left ankle swelling. Past Medical History:   Diagnosis Date    [facility]'s colitis ([facility]     Diabetes ([facility]     HTN (hypertension)       No past surgical history on file. Prior to Admission medications    Not on File     No Known Allergies   Social History     Tobacco Use    Smoking status: Not on file   Substance Use Topics    Alcohol use: Not on file      No family history on file.      Current Facility-Administered Medications   Medication Dose Route Frequency    hydrALAZINE (APRESOLINE) tablet 25 mg  25 mg Oral TID    insulin lispro (HUMALOG) injection 4 Units  4 Units SubCUTAneous TIDAC    insulin lispro (HUMALOG) injection   SubCUTAneous AC&HS    [START ON 2019] insulin glargine (LANTUS) injection 25 Units  25 Units SubCUTAneous DAILY    insulin glargine (LANTUS) injection 5 Units  5 Units SubCUTAneous NOW    apixaban (ELIQUIS) tablet 10 mg  10 mg Oral BID    metoprolol tartrate (LOPRESSOR) tablet 25 mg  25 mg Oral BID       Review of Systems:  A comprehensive review of systems was negative except for that written in the HPI. Objective:   Vital Signs:    Visit Vitals  BP (!) 195/94 (BP 1 Location: Right arm, BP Patient Position: At rest)   Pulse 84   Temp 98 °F (36.7 °C)   Resp 16   Wt 144.2 kg (318 lb)   SpO2 98%       O2 Device: Room air       Temp (24hrs), Av.8 °F (36.6 °C), Min:97.2 °F (36.2 °C), Max:98 °F (36.7 °C)       Intake/Output:   Last shift:      No intake/output data recorded. Last 3 shifts: No intake/output data recorded. No intake or output data in the 24 hours ending 19 1352     Physical Exam:   General:  Alert, cooperative, no distress, appears stated age. Head:  Normocephalic, without obvious abnormality, atraumatic. Eyes:  Conjunctivae/corneas clear. PERRL, ANicteric   Nose: Nares normal. Mucosa normal. No drainage or sinus tenderness. Throat: Lips, mucosa dry. NO thrush; TEETH normal.   Neck: Supple, symmetrical, trachea midline, no adenopathy, thyroid: no enlargment/tenderness/nodules, no carotid bruit and no JVD. No crepitus   Back:   Symmetric, no curvature, no spine tenderness or flank pain   Lungs:   Bilateral auscultation clear no rhonchi rales wheezing normal resp effort   Chest wall:  No tenderness or deformity. NO CREPITUS   Heart:  Regular rate and rhythm, S1, S2 normal, no murmur, click, rub or gallop. Abdomen:   Obese Soft, non-tender. PROTUBERANT; Bowel sounds normal. No masses,  No organomegaly. No paradox   Extremities: Left ankle with surgical incision small amount of oozing no edema   Pulses: 1-2+ and symmetric all extremities. Skin: Skin color, texture, turgor normal. No rashes or lesions   Lymph nodes: Cervical, supraclavicular, and axillary nodes normal.   Neurologic: Grossly nonfocal          Data review:   Labs:  Recent Results (from the past 24 hour(s))   CBC WITH AUTOMATED DIFF    Collection Time: 01/11/19 10:03 PM   Result Value Ref Range    WBC 5.4 4.6 - 13.2 K/uL    RBC 4.58 4.20 - 5.30 M/uL    HGB 12.5 12.0 - 16.0 g/dL    HCT 39.1 35.0 - 45.0 %    MCV 85.4 74.0 - 97.0 FL    MCH 27.3 24.0 - 34.0 PG    MCHC 32.0 31.0 - 37.0 g/dL    RDW 19.1 (H) 11.6 - 14.5 %    PLATELET 742 126 - 236 K/uL    MPV 11.2 9.2 - 11.8 FL    NEUTROPHILS 86 (H) 40 - 73 %    LYMPHOCYTES 13 (L) 21 - 52 %    MONOCYTES 1 (L) 3 - 10 %    EOSINOPHILS 0 0 - 5 %    BASOPHILS 0 0 - 2 %    ABS. NEUTROPHILS 4.6 1.8 - 8.0 K/UL    ABS. LYMPHOCYTES 0.7 (L) 0.9 - 3.6 K/UL    ABS. MONOCYTES 0.1 0.05 - 1.2 K/UL    ABS. EOSINOPHILS 0.0 0.0 - 0.4 K/UL    ABS. BASOPHILS 0.0 0.0 - 0.1 K/UL    DF AUTOMATED     METABOLIC PANEL, COMPREHENSIVE    Collection Time: 01/11/19 10:03 PM   Result Value Ref Range    Sodium 136 136 - 145 mmol/L    Potassium 4.5 3.5 - 5.5 mmol/L    Chloride 102 100 - 108 mmol/L    CO2 27 21 - 32 mmol/L    Anion gap 7 3.0 - 18 mmol/L    Glucose 447 (HH) 74 - 99 mg/dL    BUN 18 7.0 - 18 MG/DL    Creatinine 1.22 0.6 - 1.3 MG/DL    BUN/Creatinine ratio 15 12 - 20      GFR est AA 57 (L) >60 ml/min/1.73m2    GFR est non-AA 47 (L) >60 ml/min/1.73m2    Calcium 8.6 8.5 - 10.1 MG/DL    Bilirubin, total 0.3 0.2 - 1.0 MG/DL    ALT (SGPT) 13 13 - 56 U/L    AST (SGOT) 7 (L) 15 - 37 U/L    Alk.  phosphatase 85 45 - 117 U/L    Protein, total 8.2 6.4 - 8.2 g/dL    Albumin 3.3 (L) 3.4 - 5.0 g/dL    Globulin 4.9 (H) 2.0 - 4.0 g/dL    A-G Ratio 0.7 (L) 0.8 - 1.7     PROTHROMBIN TIME + INR    Collection Time: 01/11/19 10:03 PM   Result Value Ref Range    Prothrombin time 13.0 11.5 - 15.2 sec    INR 1.0 0.8 - 1.2     MAGNESIUM    Collection Time: 01/11/19 10:03 PM   Result Value Ref Range    Magnesium 2.3 1.6 - 2.6 mg/dL   D DIMER    Collection Time: 01/11/19 10:03 PM   Result Value Ref Range    D DIMER 1.94 (H) <0.46 ug/ml(FEU)   CARDIAC PANEL,(CK, CKMB & TROPONIN)    Collection Time: 01/11/19 10:03 PM   Result Value Ref Range    CK 21 (L) 26 - 192 U/L    CK - MB <1.0 <3.6 ng/ml    CK-MB Index  0.0 - 4.0 %     CALCULATION NOT PERFORMED WHEN RESULT IS BELOW LINEAR LIMIT    Troponin-I, QT <0.02 0.0 - 0.045 NG/ML   HEMOGLOBIN A1C WITH EAG    Collection Time: 01/11/19 10:03 PM   Result Value Ref Range    Hemoglobin A1c 8.0 (H) 4.2 - 5.6 %    Est. average glucose 183 mg/dL   EKG, 12 LEAD, INITIAL    Collection Time: 01/11/19 10:58 PM   Result Value Ref Range    Ventricular Rate 92 BPM    Atrial Rate 92 BPM    P-R Interval 136 ms    QRS Duration 94 ms    Q-T Interval 380 ms    QTC Calculation (Bezet) 469 ms    Calculated P Axis 63 degrees    Calculated R Axis -11 degrees    Calculated T Axis 40 degrees    Diagnosis       Normal sinus rhythm  Possible Left atrial enlargement  Left ventricular hypertrophy  Abnormal ECG  No previous ECGs available     GLUCOSE, POC    Collection Time: 01/11/19 11:51 PM   Result Value Ref Range    Glucose (POC) 365 (H) 70 - 110 mg/dL   GLUCOSE, POC    Collection Time: 01/12/19  4:51 AM   Result Value Ref Range    Glucose (POC) 197 (H) 70 - 110 mg/dL   GLUCOSE, POC    Collection Time: 01/12/19  8:21 AM   Result Value Ref Range    Glucose (POC) 159 (H) 70 - 110 mg/dL   GLUCOSE, POC    Collection Time: 01/12/19 11:54 AM   Result Value Ref Range    Glucose (POC) 213 (H) 70 - 110 mg/dL       Imaging:  I have personally reviewed the patients radiographs and have reviewed the reports:  CT 1/12  IMPRESSION:  Positive for pulmonary emboli with a small to moderate overall clot burden which  is more significant in the right lung.  Mildly dilated main pulmonary artery, but  no other CT evidence for significant right heart strain.       Left adrenal nodule is low-density and most likely a benign adenoma     High complexity decision making was performed in this consultation and evaluation of this patient who is at high risk for decompensation with multiple organ involvement  Total critical care time spent rendering care exclusive of procedures: 55 minutes     Armin Carter PA-C

## 2019-01-12 NOTE — PROGRESS NOTES
Problem: Falls - Risk of  Goal: *Absence of Falls  Document Jill Fall Risk and appropriate interventions in the flowsheet.   Outcome: Progressing Towards Goal  Fall Risk Interventions:            Medication Interventions: Patient to call before getting OOB

## 2019-01-13 LAB
ANION GAP SERPL CALC-SCNC: 9 MMOL/L (ref 3–18)
BUN SERPL-MCNC: 15 MG/DL (ref 7–18)
BUN/CREAT SERPL: 20 (ref 12–20)
CALCIUM SERPL-MCNC: 8.3 MG/DL (ref 8.5–10.1)
CHLORIDE SERPL-SCNC: 105 MMOL/L (ref 100–108)
CHOLEST SERPL-MCNC: 259 MG/DL
CO2 SERPL-SCNC: 26 MMOL/L (ref 21–32)
CREAT SERPL-MCNC: 0.75 MG/DL (ref 0.6–1.3)
GLUCOSE BLD STRIP.AUTO-MCNC: 167 MG/DL (ref 70–110)
GLUCOSE BLD STRIP.AUTO-MCNC: 187 MG/DL (ref 70–110)
GLUCOSE BLD STRIP.AUTO-MCNC: 241 MG/DL (ref 70–110)
GLUCOSE BLD STRIP.AUTO-MCNC: 246 MG/DL (ref 70–110)
GLUCOSE SERPL-MCNC: 184 MG/DL (ref 74–99)
HDLC SERPL-MCNC: 53 MG/DL (ref 40–60)
HDLC SERPL: 4.9 {RATIO} (ref 0–5)
LDLC SERPL CALC-MCNC: 140.2 MG/DL (ref 0–100)
LIPID PROFILE,FLP: ABNORMAL
MAGNESIUM SERPL-MCNC: 2.2 MG/DL (ref 1.6–2.6)
POTASSIUM SERPL-SCNC: 3.6 MMOL/L (ref 3.5–5.5)
SODIUM SERPL-SCNC: 140 MMOL/L (ref 136–145)
TRIGL SERPL-MCNC: 329 MG/DL (ref ?–150)
VLDLC SERPL CALC-MCNC: 65.8 MG/DL

## 2019-01-13 PROCEDURE — 80048 BASIC METABOLIC PNL TOTAL CA: CPT

## 2019-01-13 PROCEDURE — 74011250637 HC RX REV CODE- 250/637: Performed by: ORTHOPAEDIC SURGERY

## 2019-01-13 PROCEDURE — 99218 HC RM OBSERVATION: CPT

## 2019-01-13 PROCEDURE — 74011636637 HC RX REV CODE- 636/637: Performed by: INTERNAL MEDICINE

## 2019-01-13 PROCEDURE — 82962 GLUCOSE BLOOD TEST: CPT

## 2019-01-13 PROCEDURE — 74011250636 HC RX REV CODE- 250/636: Performed by: FAMILY MEDICINE

## 2019-01-13 PROCEDURE — 74011250637 HC RX REV CODE- 250/637: Performed by: INTERNAL MEDICINE

## 2019-01-13 PROCEDURE — 83735 ASSAY OF MAGNESIUM: CPT

## 2019-01-13 PROCEDURE — 74011250637 HC RX REV CODE- 250/637: Performed by: FAMILY MEDICINE

## 2019-01-13 PROCEDURE — 36415 COLL VENOUS BLD VENIPUNCTURE: CPT

## 2019-01-13 PROCEDURE — 74011636637 HC RX REV CODE- 636/637: Performed by: HOSPITALIST

## 2019-01-13 PROCEDURE — 74011636637 HC RX REV CODE- 636/637: Performed by: FAMILY MEDICINE

## 2019-01-13 PROCEDURE — 77030011256 HC DRSG MEPILEX <16IN NO BORD MOLN -A

## 2019-01-13 PROCEDURE — 80061 LIPID PANEL: CPT

## 2019-01-13 RX ORDER — HYDRALAZINE HYDROCHLORIDE 50 MG/1
100 TABLET, FILM COATED ORAL 3 TIMES DAILY
Status: DISCONTINUED | OUTPATIENT
Start: 2019-01-13 | End: 2019-01-20 | Stop reason: HOSPADM

## 2019-01-13 RX ORDER — ATORVASTATIN CALCIUM 20 MG/1
20 TABLET, FILM COATED ORAL
Status: DISCONTINUED | OUTPATIENT
Start: 2019-01-13 | End: 2019-01-20 | Stop reason: HOSPADM

## 2019-01-13 RX ORDER — GABAPENTIN 300 MG/1
300 CAPSULE ORAL 3 TIMES DAILY
Status: DISCONTINUED | OUTPATIENT
Start: 2019-01-13 | End: 2019-01-20 | Stop reason: HOSPADM

## 2019-01-13 RX ORDER — MORPHINE SULFATE 2 MG/ML
2 INJECTION, SOLUTION INTRAMUSCULAR; INTRAVENOUS
Status: DISCONTINUED | OUTPATIENT
Start: 2019-01-13 | End: 2019-01-17

## 2019-01-13 RX ORDER — PREDNISONE 20 MG/1
20 TABLET ORAL
Status: DISCONTINUED | OUTPATIENT
Start: 2019-01-14 | End: 2019-01-14

## 2019-01-13 RX ADMIN — ONDANSETRON 4 MG: 2 INJECTION INTRAMUSCULAR; INTRAVENOUS at 12:33

## 2019-01-13 RX ADMIN — INSULIN LISPRO 4 UNITS: 100 INJECTION, SOLUTION INTRAVENOUS; SUBCUTANEOUS at 08:24

## 2019-01-13 RX ADMIN — INSULIN LISPRO 2 UNITS: 100 INJECTION, SOLUTION INTRAVENOUS; SUBCUTANEOUS at 08:24

## 2019-01-13 RX ADMIN — INSULIN LISPRO 2 UNITS: 100 INJECTION, SOLUTION INTRAVENOUS; SUBCUTANEOUS at 12:34

## 2019-01-13 RX ADMIN — GABAPENTIN 300 MG: 300 CAPSULE ORAL at 12:33

## 2019-01-13 RX ADMIN — CLONIDINE HYDROCHLORIDE 0.1 MG: 0.1 TABLET ORAL at 05:26

## 2019-01-13 RX ADMIN — PANTOPRAZOLE SODIUM 40 MG: 40 TABLET, DELAYED RELEASE ORAL at 17:12

## 2019-01-13 RX ADMIN — ATORVASTATIN CALCIUM 20 MG: 20 TABLET, FILM COATED ORAL at 21:27

## 2019-01-13 RX ADMIN — HYDRALAZINE HYDROCHLORIDE 100 MG: 50 TABLET, FILM COATED ORAL at 17:36

## 2019-01-13 RX ADMIN — GABAPENTIN 300 MG: 300 CAPSULE ORAL at 17:12

## 2019-01-13 RX ADMIN — CLONIDINE HYDROCHLORIDE 0.1 MG: 0.1 TABLET ORAL at 17:12

## 2019-01-13 RX ADMIN — MORPHINE SULFATE 2 MG: 2 INJECTION, SOLUTION INTRAMUSCULAR; INTRAVENOUS at 13:57

## 2019-01-13 RX ADMIN — HYDRALAZINE HYDROCHLORIDE 50 MG: 50 TABLET, FILM COATED ORAL at 08:25

## 2019-01-13 RX ADMIN — PREDNISONE 40 MG: 20 TABLET ORAL at 08:25

## 2019-01-13 RX ADMIN — INSULIN LISPRO 4 UNITS: 100 INJECTION, SOLUTION INTRAVENOUS; SUBCUTANEOUS at 12:33

## 2019-01-13 RX ADMIN — APIXABAN 10 MG: 2.5 TABLET, FILM COATED ORAL at 20:20

## 2019-01-13 RX ADMIN — TRAMADOL HYDROCHLORIDE AND ACETAMINOPHEN 1 TABLET: 37.5; 325 TABLET, FILM COATED ORAL at 06:32

## 2019-01-13 RX ADMIN — MORPHINE SULFATE 2 MG: 2 INJECTION, SOLUTION INTRAMUSCULAR; INTRAVENOUS at 23:18

## 2019-01-13 RX ADMIN — ONDANSETRON 4 MG: 2 INJECTION INTRAMUSCULAR; INTRAVENOUS at 06:20

## 2019-01-13 RX ADMIN — METOPROLOL TARTRATE 25 MG: 25 TABLET ORAL at 08:25

## 2019-01-13 RX ADMIN — MORPHINE SULFATE 2 MG: 2 INJECTION, SOLUTION INTRAMUSCULAR; INTRAVENOUS at 04:03

## 2019-01-13 RX ADMIN — INSULIN LISPRO 4 UNITS: 100 INJECTION, SOLUTION INTRAVENOUS; SUBCUTANEOUS at 21:57

## 2019-01-13 RX ADMIN — COLLAGENASE SANTYL: 250 OINTMENT TOPICAL at 17:11

## 2019-01-13 RX ADMIN — HYDROCHLOROTHIAZIDE 25 MG: 25 TABLET ORAL at 08:25

## 2019-01-13 RX ADMIN — MORPHINE SULFATE 2 MG: 2 INJECTION, SOLUTION INTRAMUSCULAR; INTRAVENOUS at 09:20

## 2019-01-13 RX ADMIN — PANTOPRAZOLE SODIUM 40 MG: 40 TABLET, DELAYED RELEASE ORAL at 08:25

## 2019-01-13 RX ADMIN — LOSARTAN POTASSIUM 100 MG: 50 TABLET, FILM COATED ORAL at 08:25

## 2019-01-13 RX ADMIN — INSULIN LISPRO 4 UNITS: 100 INJECTION, SOLUTION INTRAVENOUS; SUBCUTANEOUS at 17:12

## 2019-01-13 RX ADMIN — ONDANSETRON 4 MG: 2 INJECTION INTRAMUSCULAR; INTRAVENOUS at 23:15

## 2019-01-13 RX ADMIN — INSULIN GLARGINE 25 UNITS: 100 INJECTION, SOLUTION SUBCUTANEOUS at 08:24

## 2019-01-13 RX ADMIN — MORPHINE SULFATE 2 MG: 2 INJECTION, SOLUTION INTRAMUSCULAR; INTRAVENOUS at 18:57

## 2019-01-13 RX ADMIN — METOPROLOL TARTRATE 25 MG: 25 TABLET ORAL at 17:12

## 2019-01-13 RX ADMIN — GABAPENTIN 300 MG: 300 CAPSULE ORAL at 21:26

## 2019-01-13 RX ADMIN — APIXABAN 10 MG: 2.5 TABLET, FILM COATED ORAL at 08:25

## 2019-01-13 NOTE — CONSULTS
[facility]    Consult Note    Patient: Navi Uriarte               Sex: female          DOA: 1/11/2019         YOB: 1970      Age:  46 y.o.        LOS:  LOS: 0 days              HPI:     Navi Uriarte is a 46 y.o. female who has been seen for left leg pain. She states she has moved here from [place] where she had an ORIF of her left ankle on 12/3/18 after she slipped and fell. She notes that she presented to the hospital complaining of calf discomfort and was worked up for DVT and PE. She has constant throbbing pain. Was in a posterior splint and now has nothing on. Past Medical History:   Diagnosis Date    [facility]'s [facility] ([facility]     Diabetes ([facility]     HTN (hypertension)        No past surgical history on file. No family history on file. Social History     Socioeconomic History    Marital status:      Spouse name: Not on file    Number of children: Not on file    Years of education: Not on file    Highest education level: Not on file       Prior to Admission medications    Not on File       No Known Allergies    Review of Systems  Negative for any fever, chills, sweats, headache, blurred vision, cough, congestion, SOB, abdominal pain, bleeding, bruising, numbness, or tingling. 12 point ROS otherwise negative other than noted in the HPI      Physical Exam:      Visit Vitals  BP (!) 179/103 (BP 1 Location: Right arm, BP Patient Position: At rest)   Pulse 84   Temp 98.2 °F (36.8 °C)   Resp 18   Wt 318 lb (144.2 kg)   SpO2 93%       Physical Exam:  General: Well developed, well nourished, 46 y.o. female in  NAD   Vitals: Blood pressure (!) 179/103, pulse 84, temperature 98.2 °F (36.8 °C), resp. rate 18, weight 318 lb (144.2 kg), SpO2 93 %. Skin: No rashs, ulcers, icterus, or other obvious lesions/ lacerations  Eyes: EOM intact, PERRLA   ENM: Without obvious lesions. Nares patent. Moist mucous membranes.    Neck: Supple, FROM   Lungs: CTAB   Heart: RRR, normal S1, S2. No murmurs, rubs, or gallops  Abdomen: Soft, NT, bowel sounds present all 4 quadrants   Musculoskeletal:   Left ankle  Swelling noted  ttp medial and lateral malleoulus  Skin well healed on medial malleolus, slight area of dehiscence noted on lateral incision. Small suture noted. Not deep    Neuro: AAOx3. Without obvious deficits     Labs Reviewed:  BMP:   Lab Results   Component Value Date/Time     01/13/2019 04:19 AM    K 3.6 01/13/2019 04:19 AM     01/13/2019 04:19 AM    CO2 26 01/13/2019 04:19 AM    AGAP 9 01/13/2019 04:19 AM     (H) 01/13/2019 04:19 AM    BUN 15 01/13/2019 04:19 AM    CREA 0.75 01/13/2019 04:19 AM    GFRAA >60 01/13/2019 04:19 AM    GFRNA >60 01/13/2019 04:19 AM     CMP:   Lab Results   Component Value Date/Time     01/13/2019 04:19 AM    K 3.6 01/13/2019 04:19 AM     01/13/2019 04:19 AM    CO2 26 01/13/2019 04:19 AM    AGAP 9 01/13/2019 04:19 AM     (H) 01/13/2019 04:19 AM    BUN 15 01/13/2019 04:19 AM    CREA 0.75 01/13/2019 04:19 AM    GFRAA >60 01/13/2019 04:19 AM    GFRNA >60 01/13/2019 04:19 AM    CA 8.3 (L) 01/13/2019 04:19 AM    MG 2.2 01/13/2019 04:19 AM     CBC: No results found for: WBC, HGB, HGBEXT, HCT, HCTEXT, PLT, PLTEXT, HGBEXT, HCTEXT, PLTEXT     IMAGING: FINDINGS: Frontal and lateral views of the left ankle. Side plate and fixation  screws of the distal left fibula. Hardware appears intact. There are 2 longer  corticated screws traversing the distal fibula and tibia. There are old screw  tracts in the mid left tibia. Distal left fibular fracture minimally displaced  on the lateral view. Diffuse soft tissue swelling. No gross dislocation of the  ankle mortise. Overlying bandaging in the lateral distal soft tissues.      Assessment/Plan   [unfilled]  Active Problems:    Pulmonary emboli ([address] (1/12/2019)      Accelerated hypertension (1/12/2019)      Crohn's colitis ([address] (1/12/2019)      Wound of left ankle (1/12/2019)      Status post open reduction with internal fixation (ORIF) of fracture of ankle (1/12/2019)        Pt. Stable    1. H/o ORIF left ankle - wound superficial at this time. Wound healing compromised by diabetes and location of wound. Daily peroxide to wound and Meriplex dressing. CAM walker ordered. She is NWB at this time. 2. PE and likely DVT - cont anticoagulation per medicine team    3.  No surgical indications at this time from ortho standpoint - will cont to follow      KATHRINE [phone]   [phone]

## 2019-01-13 NOTE — ROUTINE PROCESS
Bedside and Verbal shift change report given to Loly Scott RN (oncoming nurse) by Georgia Jacinto RN (offgoing nurse). Report included the following information SBAR, Kardex, MAR and Recent Results.

## 2019-01-13 NOTE — PROGRESS NOTES
conducted an initial consultation and Spiritual Assessment for Twan Garrett, who is a 46 y.o.,female. Patients Primary Language is: [place]. According to the patients EMR Quaker Affiliation is: Non Jew.     The reason the Patient came to the hospital is:   Patient Active Problem List    Diagnosis Date Noted    Pulmonary emboli ([facility] [facility] 01/12/2019    Accelerated hypertension 01/12/2019    Crohn's colitis ([facility] [address] [facility] 01/12/2019    Wound of left ankle 01/12/2019    Status post open reduction with internal fixation (ORIF) of fracture of ankle 01/12/2019        The  provided the following Interventions:  Initiated a relationship of care and support. Explored issues of nathanael, belief, spirituality and Sikh/ritual needs while hospitalized. Listened empathically. Provided chaplaincy education. Provided information about Spiritual Care Services. Offered assurance of continued prayers on patient's behalf. Chart reviewed. The following outcomes where achieved:  Patient shared limited information about both their medical narrative and spiritual journey/beliefs. Patient processed feeling about current hospitalization. Patient expressed gratitude for 's visit. Assessment:  Patient does not have any Sikh/cultural needs that will affect patients preferences in health care. There are no spiritual or Sikh issues which require intervention at this time. Plan:  Chaplains will continue to follow and will provide pastoral care on an as needed/requested basis.  recommends bedside caregivers page  on duty if patient shows signs of acute spiritual or emotional distress.         [address] [facility].   [phone]

## 2019-01-13 NOTE — PROGRESS NOTES
Progress Note  Pulmonary, Critical Care, and Sleep Medicine    Name: Twan Garrett MRN: [id number]   : 1970 Hospital: [facility]   Date: 2019        IMPRESSION:   · Acute pulmonary embolism moderate burden without hemodynamic instability. Risk factors include L ankle fracture, prolonged travel  · History of Crohn's disease, currently stable  · HTN uncontrolled with possible rebound after missing B blockers  · Obesity  · DM uncontrolled probably aggravated by systemic steroids  · Chronic pain due to peripheral neuropathy      PLAN:   · Continue Eliquis  · Taper steroids rapidly  · Management of fracture per Ortho  · Resume anti hypertensive medications  · Glycemic monitoring and control  · Pt is not a TPA candidate  · Will follow with you     Subjective/Interval History:   I have reviewed the flowsheet and previous days notes. Reviewed interval history. Discussed management with nursing staff. Pt denies chest pain or SOB. Complains of severe frontal HA. BP increased singificantly    ROS:Pertinent items are noted in HPI. Orders reviewed including medications. Changes made if indicated. Telemetry monitor reviewed at the bedside. Objective:   Vital Signs:    Visit Vitals  BP (!) 169/103 (BP 1 Location: Right arm, BP Patient Position: At rest)   Pulse 75   Temp 97.5 °F (36.4 °C)   Resp 16   Wt 144.2 kg (318 lb)   SpO2 96%       O2 Device: Room air       Temp (24hrs), Av °F (36.7 °C), Min:97.5 °F (36.4 °C), Max:98.3 °F (36.8 °C)       Intake/Output:   Last shift:      No intake/output data recorded. Last 3 shifts:  190 -  0700  In: 450 [P.O.:450]  Out: 900 [Urine:900]    Intake/Output Summary (Last 24 hours) at 2019 1158  Last data filed at 2019 0533  Gross per 24 hour   Intake 450 ml   Output 900 ml   Net -450 ml        Physical Exam:    General:  Alert, cooperative, in no distress, appears stated age.    Head:  Normocephalic, without obvious abnormality, atraumatic. Eyes:  ANicteric, PERRL,   Nose: Nares normal.  Mucosa normal. No drainage or sinus tenderness. Throat: Lips, mucosa, and tongue normal. Teeth and gums normal. NO Thrush   Neck: Supple, symmetrical, trachea midline, no adenopathy, thyroid: no enlargment/tenderness/nodules    Back:   Symmetric    Lungs:   Bilateral auscultation fair air entry, no rales or wheezes   Chest wall:  No tenderness or deformity. NO intercostal retractions   Heart:  Regular rate and rhythm, S1, S2 normal, no murmur, click, rub or gallop. Abdomen:   Soft, non-tender. obese; Bowel sounds normal. No masses,  No organomegaly. NO paradoxical motion   Extremities: normal, atraumatic, no cyanosis or edema. Pulses: 2+ and symmetric all extremities. Skin: Skin color, texture, turgor normal. No rashes or lesions. NO clubbing   Lymph nodes: Cervical, supraclavicular nodes normal.   Neurologic: Grossly nonfocal      :        Devices:             Drips:    DATA:  Labs:  Recent Labs     01/11/19  2203   WBC 5.4   HGB 12.5   HCT 39.1        Recent Labs     01/13/19  0419 01/11/19  2203    136   K 3.6 4.5    102   CO2 26 27   * 447*   BUN 15 18   CREA 0.75 1.22   CA 8.3* 8.6   MG 2.2 2.3   ALB  --  3.3*   SGOT  --  7*   ALT  --  13   INR  --  1.0     No results for input(s): PH, PCO2, PO2, HCO3, FIO2 in the last 72 hours. Imaging:  []        I have personally reviewed the patients radiographs and reports  []         []        No change from prior, tubes and lines in adequate position  []        Improved   []        Worsening  High complexity decision making was performed during this consultation and evaluation.      Sobeida Gramajo MD

## 2019-01-13 NOTE — PROGRESS NOTES
Progress Note    Patient: Gabriel Diaz MRN: [id number]  CSN: [id number]    YOB: 1970  Age: 46 y.o. Sex: female    DOA: 1/11/2019 LOS:  LOS: 0 days                    Subjective:   Pt has been c/o pain lower extremities at place of Lt ankle surgery . Pt has h/o peripheral neuropathy she takes  No chest pain no SOB . Pt has been c/o high bp she started on her regular med at home cozaar and HCTZ. Discussed with ortho this morning wound Lt ankle clean might be stiches stuck there . She will continue wound care and f/u as outpatine Cam boat    X-ray Lt ankle  Status post distal left fibular fracture ORIF.     Distal left fibular fracture is in gross anatomic alignment.     Marked soft tissue swelling. DAVID Sánchez is a 46 y.o. female with h/o diabetes, HTN and Crohn's colitis who presents to ED complaining of acute, moderate generalized CP onset PTA. The pt indicated that when she is at rest, she still experiences CP. She also stated that on 12/3 she had an ORFI of the left ankle after fall and fracture at [facility]. Pt had external fixator at first and then had ORIF later . Pt had open wound lateral Lt ankle for 1-2 weeks had some drainage . Pt had h/o peripheral neuropathy  But she still feeling the pain.     Pt was seen at [facility] ER before admission to [facility] pt had venous duplex ultrasound leg and she was told it was negative      Pt also had CT scan of abdomen at [facility]  And she was told that she had crohn's flare up and started on prednisone. Pt usually she is on Imuran 50 mg daily and Humara 40 mg injection weekly       Pt initially was admitted to the hospitalist  service and switched to me since pt will need PCP in the area .     CXR  B/L atelactasis        CTA chest  Positive for pulmonary emboli with a small to moderate overall clot burden which  is more significant in the right lung.  Mildly dilated main pulmonary artery, but  no other CT evidence for significant right heart strain.    Left adrenal nodule is low-density and most likely a benign adenoma.      Ct abdomen and pelvis at [facility]    1. Mild, long segment thickening of the descending colonic wall suggestive of inflammatory or infectious colitis. 2. Fibroid uterus. 3. Stable left adrenal nodule is indeterminate by imaging. This can be subsequently evaluated with adrenal protocol CT or MRI on a nonemergent basis.           Chief Complaint:   Chief Complaint   Patient presents with    Chest Pain       Review of systems  General: No fevers or chills. Cardiovascular: No chest pain or pressure. No palpitations. Pulmonary: No shortness of breath, cough or wheeze. Gastrointestinal: No abdominal pain, nausea, vomiting or diarrhea. Genitourinary: No urinary frequency, urgency, hesitancy or dysuria. Musculoskeletal:  Lt ankle pain and swelling , peripheral neuropathy  Neurologic: No headache,  generalized weakness    Objective:     Physical Exam:  Visit Vitals  BP (!) 179/103 (BP 1 Location: Right arm, BP Patient Position: At rest)   Pulse 84   Temp 98.2 °F (36.8 °C)   Resp 18   Wt 144.2 kg (318 lb)   SpO2 93%        General:         Alert, no acute distress    HEENT: NC, Atraumatic. PERRLA, anicteric sclerae. Lungs: CTA Bilaterally. No Wheezing/Rhonchi/Rales. Heart:  Regular  rhythm,  No murmur, No Rubs, No Gallops  Abdomen: Soft, Non distended, tender Lt lower quadrant  Extremities: No c/c/e  Psych:    Not anxious or agitated. Neurologic:  CN 2-12 grossly intact, Alert and oriented X 3. No acute neurological                                 Deficits,     Intake and Output:  Current Shift:  No intake/output data recorded.   Last three shifts:  01/11 1901 - 01/13 0700  In: 450 [P.O.:450]  Out: 900 [Urine:900]    Labs: Results:       Chemistry Recent Labs     01/13/19  0419 01/11/19  2203   * 447*    136   K 3.6 4.5    102   CO2 26 27   BUN 15 18   CREA 0.75 1.22   CA 8.3* 8.6 AGAP 9 7   BUCR 20 15   AP  --  85   TP  --  8.2   ALB  --  3.3*   GLOB  --  4.9*   AGRAT  --  0.7*      CBC w/Diff Recent Labs     01/11/19 2203   WBC 5.4   RBC 4.58   HGB 12.5   HCT 39.1      GRANS 86*   LYMPH 13*   EOS 0      Cardiac Enzymes Recent Labs     01/11/19 2203   CPK 21*   CKND1 CALCULATION NOT PERFORMED WHEN RESULT IS BELOW LINEAR LIMIT      Coagulation Recent Labs     01/11/19 2203   PTP 13.0   INR 1.0       Lipid Panel Lab Results   Component Value Date/Time    Cholesterol, total PENDING 01/13/2019 04:19 AM    HDL Cholesterol PENDING 01/13/2019 04:19 AM    LDL, calculated PENDING 01/13/2019 04:19 AM    VLDL, calculated PENDING 01/13/2019 04:19 AM    Triglyceride PENDING 01/13/2019 04:19 AM    CHOL/HDL Ratio PENDING 01/13/2019 04:19 AM      BNP No results for input(s): BNPP in the last 72 hours.    Liver Enzymes Recent Labs     01/11/19 2203   TP 8.2   ALB 3.3*   AP 85   SGOT 7*      Thyroid Studies No results found for: T4, T3U, TSH, TSHEXT       Procedures/imaging: see electronic medical records for all procedures/Xrays and details which were not copied into this note but were reviewed prior to creation of Plan    Medications:   Current Facility-Administered Medications   Medication Dose Route Frequency    morphine injection 2 mg  2 mg IntraVENous Q4H PRN    gabapentin (NEURONTIN) capsule 300 mg  300 mg Oral TID    acetaminophen (TYLENOL) tablet 650 mg  650 mg Oral Q4H PRN    insulin lispro (HUMALOG) injection 4 Units  4 Units SubCUTAneous TIDAC    insulin lispro (HUMALOG) injection   SubCUTAneous AC&HS    glucose chewable tablet 16 g  4 Tab Oral PRN    glucagon (GLUCAGEN) injection 1 mg  1 mg IntraMUSCular PRN    dextrose (D50W) injection syrg 12.5-25 g  25-50 mL IntraVENous PRN    traMADol-acetaminophen (ULTRACET) per tablet 1 Tab  1 Tab Oral Q4H PRN    insulin glargine (LANTUS) injection 25 Units  25 Units SubCUTAneous DAILY    apixaban (ELIQUIS) tablet 10 mg  10 mg Oral BID    albuterol-ipratropium (DUO-NEB) 2.5 MG-0.5 MG/3 ML  3 mL Nebulization Q4H PRN    metoprolol tartrate (LOPRESSOR) tablet 25 mg  25 mg Oral BID    cloNIDine HCl (CATAPRES) tablet 0.1 mg  0.1 mg Oral Q8H PRN    ondansetron (ZOFRAN) injection 4 mg  4 mg IntraVENous Q6H PRN    pantoprazole (PROTONIX) tablet 40 mg  40 mg Oral ACB&D    hydrALAZINE (APRESOLINE) tablet 50 mg  50 mg Oral TID    losartan (COZAAR) tablet 100 mg  100 mg Oral DAILY    hydroCHLOROthiazide (HYDRODIURIL) tablet 25 mg  25 mg Oral DAILY    predniSONE (DELTASONE) tablet 40 mg  40 mg Oral DAILY WITH BREAKFAST       Assessment/Plan     Active Problems:    Pulmonary emboli (HCC) (1/12/2019)      Accelerated hypertension (1/12/2019)      Crohn's colitis ([address] (1/12/2019)      Wound of left ankle (1/12/2019)      Status post open reduction with internal fixation (ORIF) of fracture of ankle (1/12/2019)      Plan    Pulmonary Embolism/ S/P travel from [place] and S/p surgery Lt ankle  Pt had pulmonary consult with Dr Pinky Renteria switch to Eliquis  Monitor H&H with H/O crohn's  Continue to monitor sx and o2 sat       S/P ORIF  Lt lateral ankle wound  Wound care consult  Check x-ray Lt ankle stable  Pt seen by ortho  Pt at higher risk for infection with her immune suppression status   Morphine 2 mg IV q 4h prn   Neurontin 300 mg q8h     Crohn's disease  Taper steroids to 20 mg daily  Restart regular med  Protonix 40 mg daily  Continue Zofran prn nausea     Diabetes Mellitus type 2  Check HG A1c 8     Start Lipitor 20 mg qhs     Uncontrolled / accelerated Hypertension  Increase hydralazine to 100  mg TID  Losartan 100 mg daily   HCTZ 25 mg daily  Pt on clonidine prn    Continue to monitor lab   Pt and ot evaluation and treatment  DVT/GI Prophylaxis: H2B/PPI and Eliquis         Viktor Carl MD  1/13/2019 9:53 AM

## 2019-01-13 NOTE — WOUND CARE
Physical Exam   Musculoskeletal:        Legs:  Seen by wound care per consult tissue injury assessment performed at this time. Tissue injury listed above noted during skin assessment. Treatment plan explained to Julieth Mar RN and Pt agreeable to continue current treatment. Nursing to apply Santyl ointment dressings to area daily and use ace wrap compression to assist in incision contraction. Wound care education provided to pt at this time. Plan of care reviewed with nursing. Will turn over care to nursing staff at this time  Quita Miranda, Wound Care Department

## 2019-01-13 NOTE — PROGRESS NOTES
Patient c/o 9/10 pain on her Left ankle s/p surgery and mild pain on her chest. Patient states tramadol is not helping to manage pain. Notified Dr. Deirdre Melendez and orders morphine 2mg iv q6h prn for severe pain.

## 2019-01-14 ENCOUNTER — APPOINTMENT (OUTPATIENT)
Dept: GENERAL RADIOLOGY | Age: 49
DRG: 134 | End: 2019-01-14
Attending: ORTHOPAEDIC SURGERY
Payer: MEDICAID

## 2019-01-14 ENCOUNTER — APPOINTMENT (OUTPATIENT)
Dept: NON INVASIVE DIAGNOSTICS | Age: 49
DRG: 134 | End: 2019-01-14
Attending: INTERNAL MEDICINE
Payer: MEDICAID

## 2019-01-14 PROBLEM — T81.31XA DEHISCENCE OF INCISION, INITIAL ENCOUNTER: Status: ACTIVE | Noted: 2019-01-14

## 2019-01-14 LAB
ALBUMIN SERPL-MCNC: 3.1 G/DL (ref 3.4–5)
ALBUMIN/GLOB SERPL: 0.7 {RATIO} (ref 0.8–1.7)
ALP SERPL-CCNC: 82 U/L (ref 45–117)
ALT SERPL-CCNC: 11 U/L (ref 13–56)
ANION GAP SERPL CALC-SCNC: 7 MMOL/L (ref 3–18)
AST SERPL-CCNC: 6 U/L (ref 15–37)
BASOPHILS # BLD: 0 K/UL (ref 0–0.1)
BASOPHILS NFR BLD: 0 % (ref 0–2)
BILIRUB SERPL-MCNC: 0.7 MG/DL (ref 0.2–1)
BUN SERPL-MCNC: 19 MG/DL (ref 7–18)
BUN/CREAT SERPL: 17 (ref 12–20)
CALCIUM SERPL-MCNC: 8.4 MG/DL (ref 8.5–10.1)
CHLORIDE SERPL-SCNC: 103 MMOL/L (ref 100–108)
CO2 SERPL-SCNC: 27 MMOL/L (ref 21–32)
CREAT SERPL-MCNC: 1.15 MG/DL (ref 0.6–1.3)
DIFFERENTIAL METHOD BLD: ABNORMAL
ECHO AO ROOT DIAM: 3.48 CM
ECHO LA AREA 4C: 19.2 CM2
ECHO LA VOL 2C: 33.68 ML (ref 22–52)
ECHO LA VOL 4C: 54.72 ML (ref 22–52)
ECHO LA VOL BP: 46.74 ML (ref 22–52)
ECHO LA VOL/BSA BIPLANE: 18.02 ML/M2 (ref 16–28)
ECHO LA VOLUME INDEX A2C: 12.98 ML/M2 (ref 16–28)
ECHO LA VOLUME INDEX A4C: 21.09 ML/M2 (ref 16–28)
ECHO LV E' LATERAL VELOCITY: 5.66 CM/S
ECHO LV E' SEPTAL VELOCITY: 6.77 CM/S
ECHO LV INTERNAL DIMENSION DIASTOLIC: 5.28 CM (ref 3.9–5.3)
ECHO LV INTERNAL DIMENSION SYSTOLIC: 3.42 CM
ECHO LV IVSD: 1.19 CM (ref 0.6–0.9)
ECHO LV MASS 2D: 311.1 G (ref 67–162)
ECHO LV MASS INDEX 2D: 119.9 G/M2 (ref 43–95)
ECHO LV POSTERIOR WALL DIASTOLIC: 1.24 CM (ref 0.6–0.9)
ECHO LVOT DIAM: 1.97 CM
ECHO LVOT PEAK GRADIENT: 3.5 MMHG
ECHO LVOT PEAK VELOCITY: 93.54 CM/S
ECHO LVOT VTI: 16.46 CM
ECHO MV A VELOCITY: 59.99 CM/S
ECHO MV E DECELERATION TIME (DT): 164.3 MS
ECHO MV E VELOCITY: 0.67 CM/S
ECHO MV E/A RATIO: 0.01
ECHO MV E/E' LATERAL: 0.12
ECHO MV E/E' RATIO (AVERAGED): 0.11
ECHO MV E/E' SEPTAL: 0.1
ECHO PULMONARY ARTERY SYSTOLIC PRESSURE (PASP): 25 MMHG
ECHO RV TAPSE: 2.51 CM (ref 1.5–2)
ECHO TV REGURGITANT MAX VELOCITY: 204.08 CM/S
ECHO TV REGURGITANT PEAK GRADIENT: 16.7 MMHG
EOSINOPHIL # BLD: 0 K/UL (ref 0–0.4)
EOSINOPHIL NFR BLD: 1 % (ref 0–5)
ERYTHROCYTE [DISTWIDTH] IN BLOOD BY AUTOMATED COUNT: 19.6 % (ref 11.6–14.5)
GLOBULIN SER CALC-MCNC: 4.7 G/DL (ref 2–4)
GLUCOSE BLD STRIP.AUTO-MCNC: 218 MG/DL (ref 70–110)
GLUCOSE BLD STRIP.AUTO-MCNC: 240 MG/DL (ref 70–110)
GLUCOSE BLD STRIP.AUTO-MCNC: 245 MG/DL (ref 70–110)
GLUCOSE BLD STRIP.AUTO-MCNC: 277 MG/DL (ref 70–110)
GLUCOSE SERPL-MCNC: 270 MG/DL (ref 74–99)
HCT VFR BLD AUTO: 39.5 % (ref 35–45)
HGB BLD-MCNC: 12.6 G/DL (ref 12–16)
LYMPHOCYTES # BLD: 2.4 K/UL (ref 0.9–3.6)
LYMPHOCYTES NFR BLD: 28 % (ref 21–52)
MAGNESIUM SERPL-MCNC: 2.4 MG/DL (ref 1.6–2.6)
MCH RBC QN AUTO: 26.8 PG (ref 24–34)
MCHC RBC AUTO-ENTMCNC: 31.9 G/DL (ref 31–37)
MCV RBC AUTO: 83.9 FL (ref 74–97)
MONOCYTES # BLD: 0.7 K/UL (ref 0.05–1.2)
MONOCYTES NFR BLD: 8 % (ref 3–10)
NEUTS SEG # BLD: 5.4 K/UL (ref 1.8–8)
NEUTS SEG NFR BLD: 63 % (ref 40–73)
PLATELET # BLD AUTO: 379 K/UL (ref 135–420)
PMV BLD AUTO: 11.3 FL (ref 9.2–11.8)
POTASSIUM SERPL-SCNC: 3.5 MMOL/L (ref 3.5–5.5)
PROT SERPL-MCNC: 7.8 G/DL (ref 6.4–8.2)
RBC # BLD AUTO: 4.71 M/UL (ref 4.2–5.3)
SODIUM SERPL-SCNC: 137 MMOL/L (ref 136–145)
WBC # BLD AUTO: 8.6 K/UL (ref 4.6–13.2)

## 2019-01-14 PROCEDURE — 87186 SC STD MICRODIL/AGAR DIL: CPT

## 2019-01-14 PROCEDURE — 74011250636 HC RX REV CODE- 250/636: Performed by: FAMILY MEDICINE

## 2019-01-14 PROCEDURE — 74011636637 HC RX REV CODE- 636/637: Performed by: INTERNAL MEDICINE

## 2019-01-14 PROCEDURE — 65660000000 HC RM CCU STEPDOWN

## 2019-01-14 PROCEDURE — 74011636637 HC RX REV CODE- 636/637: Performed by: HOSPITALIST

## 2019-01-14 PROCEDURE — 99218 HC RM OBSERVATION: CPT

## 2019-01-14 PROCEDURE — 87077 CULTURE AEROBIC IDENTIFY: CPT

## 2019-01-14 PROCEDURE — 74011636637 HC RX REV CODE- 636/637: Performed by: FAMILY MEDICINE

## 2019-01-14 PROCEDURE — 82962 GLUCOSE BLOOD TEST: CPT

## 2019-01-14 PROCEDURE — 97530 THERAPEUTIC ACTIVITIES: CPT

## 2019-01-14 PROCEDURE — 74011250637 HC RX REV CODE- 250/637: Performed by: HOSPITALIST

## 2019-01-14 PROCEDURE — 93306 TTE W/DOPPLER COMPLETE: CPT

## 2019-01-14 PROCEDURE — 83735 ASSAY OF MAGNESIUM: CPT

## 2019-01-14 PROCEDURE — 97162 PT EVAL MOD COMPLEX 30 MIN: CPT

## 2019-01-14 PROCEDURE — 36415 COLL VENOUS BLD VENIPUNCTURE: CPT

## 2019-01-14 PROCEDURE — 87075 CULTR BACTERIA EXCEPT BLOOD: CPT

## 2019-01-14 PROCEDURE — 87070 CULTURE OTHR SPECIMN AEROBIC: CPT

## 2019-01-14 PROCEDURE — 80053 COMPREHEN METABOLIC PANEL: CPT

## 2019-01-14 PROCEDURE — 74011250637 HC RX REV CODE- 250/637: Performed by: INTERNAL MEDICINE

## 2019-01-14 PROCEDURE — 74011250637 HC RX REV CODE- 250/637: Performed by: FAMILY MEDICINE

## 2019-01-14 PROCEDURE — 85025 COMPLETE CBC W/AUTO DIFF WBC: CPT

## 2019-01-14 RX ORDER — AZATHIOPRINE 50 MG/1
200 TABLET ORAL
Status: DISCONTINUED | OUTPATIENT
Start: 2019-01-15 | End: 2019-01-20 | Stop reason: HOSPADM

## 2019-01-14 RX ORDER — PREDNISONE 10 MG/1
10 TABLET ORAL
Status: DISCONTINUED | OUTPATIENT
Start: 2019-01-15 | End: 2019-01-16

## 2019-01-14 RX ADMIN — HYDRALAZINE HYDROCHLORIDE 100 MG: 50 TABLET, FILM COATED ORAL at 12:01

## 2019-01-14 RX ADMIN — GABAPENTIN 300 MG: 300 CAPSULE ORAL at 09:46

## 2019-01-14 RX ADMIN — INSULIN LISPRO 4 UNITS: 100 INJECTION, SOLUTION INTRAVENOUS; SUBCUTANEOUS at 17:32

## 2019-01-14 RX ADMIN — PANTOPRAZOLE SODIUM 40 MG: 40 TABLET, DELAYED RELEASE ORAL at 09:46

## 2019-01-14 RX ADMIN — GABAPENTIN 300 MG: 300 CAPSULE ORAL at 22:28

## 2019-01-14 RX ADMIN — ONDANSETRON 4 MG: 2 INJECTION INTRAMUSCULAR; INTRAVENOUS at 06:40

## 2019-01-14 RX ADMIN — INSULIN LISPRO 6 UNITS: 100 INJECTION, SOLUTION INTRAVENOUS; SUBCUTANEOUS at 22:36

## 2019-01-14 RX ADMIN — INSULIN LISPRO 4 UNITS: 100 INJECTION, SOLUTION INTRAVENOUS; SUBCUTANEOUS at 12:49

## 2019-01-14 RX ADMIN — GABAPENTIN 300 MG: 300 CAPSULE ORAL at 17:27

## 2019-01-14 RX ADMIN — HYDRALAZINE HYDROCHLORIDE 100 MG: 50 TABLET, FILM COATED ORAL at 17:26

## 2019-01-14 RX ADMIN — INSULIN LISPRO 4 UNITS: 100 INJECTION, SOLUTION INTRAVENOUS; SUBCUTANEOUS at 10:01

## 2019-01-14 RX ADMIN — APIXABAN 10 MG: 2.5 TABLET, FILM COATED ORAL at 09:46

## 2019-01-14 RX ADMIN — MORPHINE SULFATE 2 MG: 2 INJECTION, SOLUTION INTRAMUSCULAR; INTRAVENOUS at 17:15

## 2019-01-14 RX ADMIN — LOSARTAN POTASSIUM 100 MG: 50 TABLET, FILM COATED ORAL at 09:46

## 2019-01-14 RX ADMIN — HYDROCHLOROTHIAZIDE 25 MG: 25 TABLET ORAL at 09:46

## 2019-01-14 RX ADMIN — METOPROLOL TARTRATE 25 MG: 25 TABLET ORAL at 09:46

## 2019-01-14 RX ADMIN — INSULIN LISPRO 4 UNITS: 100 INJECTION, SOLUTION INTRAVENOUS; SUBCUTANEOUS at 10:04

## 2019-01-14 RX ADMIN — METOPROLOL TARTRATE 25 MG: 25 TABLET ORAL at 17:27

## 2019-01-14 RX ADMIN — MORPHINE SULFATE 2 MG: 2 INJECTION, SOLUTION INTRAMUSCULAR; INTRAVENOUS at 06:44

## 2019-01-14 RX ADMIN — APIXABAN 10 MG: 2.5 TABLET, FILM COATED ORAL at 17:26

## 2019-01-14 RX ADMIN — ATORVASTATIN CALCIUM 20 MG: 20 TABLET, FILM COATED ORAL at 22:28

## 2019-01-14 RX ADMIN — PREDNISONE 20 MG: 20 TABLET ORAL at 09:46

## 2019-01-14 RX ADMIN — PANTOPRAZOLE SODIUM 40 MG: 40 TABLET, DELAYED RELEASE ORAL at 17:27

## 2019-01-14 RX ADMIN — ACETAMINOPHEN 650 MG: 325 TABLET ORAL at 19:52

## 2019-01-14 RX ADMIN — HYDRALAZINE HYDROCHLORIDE 100 MG: 50 TABLET, FILM COATED ORAL at 02:29

## 2019-01-14 RX ADMIN — INSULIN GLARGINE 25 UNITS: 100 INJECTION, SOLUTION SUBCUTANEOUS at 10:00

## 2019-01-14 RX ADMIN — MORPHINE SULFATE 2 MG: 2 INJECTION, SOLUTION INTRAMUSCULAR; INTRAVENOUS at 22:27

## 2019-01-14 RX ADMIN — MORPHINE SULFATE 2 MG: 2 INJECTION, SOLUTION INTRAMUSCULAR; INTRAVENOUS at 11:56

## 2019-01-14 NOTE — WOUND CARE
Spoke with Austen Oneil RN regarding treatment. Patient not sure if Santyl dressing changes are what she feels, appropriate. Nursing to call if orthopedics wishes for wound care to follow. Instructed bedside to use wet to dry dressing if patient refuses Santyl.

## 2019-01-14 NOTE — PROGRESS NOTES
Attempted to meet with patient. Patient is off unit at this time.     Mei Santana [facility]  Care Management  [phone]

## 2019-01-14 NOTE — PROGRESS NOTES
Progress Note  Pulmonary, Critical Care, and Sleep Medicine    Name: Franco Kelly MRN: [id number]   : 1970 Hospital: [address] [place] [address] Dr   Date: 2019        IMPRESSION:   · Acute pulmonary embolism moderate burden without hemodynamic instability. Risk factors include L ankle fracture, prolonged travel. · History of Crohn's disease, currently stable  · HTN uncontrolled with possible rebound after missing B blockers  · Obesity  · DM uncontrolled probably aggravated by systemic steroids  · Chronic pain due to peripheral neuropathy      PLAN:   · Currently on Eliquis  · Rapid steroid taper  · Management of fracture per Ortho  · Anti hypertensive medications resumed  · Glycemic monitoring and control   · Will follow with you     Subjective/Interval History:   I have reviewed the flowsheet and previous days notes. Reviewed interval history. Discussed management with nursing staff. Pt denies chest pain or SOB. Headache has resolved  BP in better control after medications resolved    ROS:Pertinent items are noted in HPI. Orders reviewed including medications. Changes made if indicated. Telemetry monitor reviewed at the bedside. Objective:   Vital Signs:    Visit Vitals  /71 (BP 1 Location: Right arm, BP Patient Position: At rest)   Pulse 74   Temp 97 °F (36.1 °C)   Resp 19   Ht 6' (1.829 m)   Wt 144.2 kg (318 lb)   SpO2 97%   BMI 43.13 kg/m²       O2 Device: Room air       Temp (24hrs), Av.8 °F (36.6 °C), Min:97 °F (36.1 °C), Max:98.4 °F (36.9 °C)       Intake/Output:   Last shift:      No intake/output data recorded. Last 3 shifts:  1901 -  0700  In: 450 [P.O.:450]  Out: 900 [Urine:900]  No intake or output data in the 24 hours ending 19 1557     Physical Exam:    General:  Alert, cooperative, in no distress, appears stated age. Head:  Normocephalic, without obvious abnormality, atraumatic.    Eyes:  ANicteric, PERRL,   Nose: Nares normal.  Mucosa normal. No drainage or sinus tenderness. Throat: Lips, mucosa, and tongue normal. Teeth and gums normal. NO Thrush   Neck: Supple, symmetrical, trachea midline, no adenopathy, thyroid: no enlargment/tenderness/nodules    Back:   Symmetric    Lungs:   Bilateral auscultation fair air entry, no rales or wheezes   Chest wall:  No tenderness or deformity. NO intercostal retractions   Heart:  Regular rate and rhythm, S1, S2 normal, no murmur, click, rub or gallop. Abdomen:   Soft, non-tender. obese; Bowel sounds normal. No masses,  No organomegaly. NO paradoxical motion   Extremities: normal, left ankle post op with mild edema and healing wound, no cyanosis   Pulses: 2+ and symmetric all extremities. Skin: Skin color, texture, turgor normal. No rashes or lesions. NO clubbing   Lymph nodes: Cervical, supraclavicular nodes normal.   Neurologic: Grossly nonfocal      :        Devices:             Drips:    DATA:  Labs:  Recent Labs     01/14/19  0155 01/11/19  2203   WBC 8.6 5.4   HGB 12.6 12.5   HCT 39.5 39.1    292     Recent Labs     01/14/19  0155 01/13/19  0419 01/11/19  2203    140 136   K 3.5 3.6 4.5    105 102   CO2 27 26 27   * 184* 447*   BUN 19* 15 18   CREA 1.15 0.75 1.22   CA 8.4* 8.3* 8.6   MG 2.4 2.2 2.3   ALB 3.1*  --  3.3*   SGOT 6*  --  7*   ALT 11*  --  13   INR  --   --  1.0     No results for input(s): PH, PCO2, PO2, HCO3, FIO2 in the last 72 hours. Imaging:  []        I have personally reviewed the patients radiographs and reports  []         []        No change from prior, tubes and lines in adequate position  []        Improved   []        Worsening  High complexity decision making was performed during this consultation and evaluation.      Marie Reilly MD

## 2019-01-14 NOTE — PROGRESS NOTES
Patient: Maria De Jesus Moreno                MRN:@           SSN: [id number]   YOB: 1970         AGE: 46 y.o. SEX: female        PLAN  HPI //  [address] DICTATED    1. LEFT ANKLE FX: WOUND DEHISCENCE (2CM REGION AT LATERAL ANKLE INCISION) FROM SURGERY Y [facility] 12/3 TO 12/8/18. RECOMMEND: I AND D OF LEFT ANKLE WOUND WOUND// ID CONSULT   LEAVE CAM BOOT OFF FOR NOW    2. IM/HTN/CROHN'S: INTERNAL MEDICINE    3. DVT/PE: ON ELIQUIS        Past Medical History:   Diagnosis Date    Crohn's colitis ([facility]     Diabetes ([facility]     HTN (hypertension)       No past surgical history on file. Social History     Socioeconomic History    Marital status:      Spouse name: Not on file    Number of children: Not on file    Years of education: Not on file    Highest education level: Not on file   Social Needs    Financial resource strain: Not on file    Food insecurity - worry: Not on file    Food insecurity - inability: Not on file    Transportation needs - medical: Not on file   [employer] needs - non-medical: Not on file   Occupational History    Not on file   Tobacco Use    Smoking status: Not on file   Substance and Sexual Activity    Alcohol use: Not on file    Drug use: Not on file    Sexual activity: Not on file   Other Topics Concern    Not on file   Social History Narrative    Not on file      No family history on file.   Prior to Admission medications    Not on File     Current Facility-Administered Medications   Medication Dose Route Frequency    [START ON 1/15/2019] azaTHIOprine (IMURAN) tablet 200 mg  200 mg Oral DAILY AFTER BREAKFAST    [START ON 1/15/2019] predniSONE (DELTASONE) tablet 10 mg  10 mg Oral DAILY WITH BREAKFAST    morphine injection 2 mg  2 mg IntraVENous Q4H PRN    gabapentin (NEURONTIN) capsule 300 mg  300 mg Oral TID    collagenase (SANTYL) 250 unit/gram ointment   Topical DAILY    hydrALAZINE (APRESOLINE) tablet 100 mg  100 mg Oral TID    atorvastatin (LIPITOR) tablet 20 mg  20 mg Oral QHS    acetaminophen (TYLENOL) tablet 650 mg  650 mg Oral Q4H PRN    insulin lispro (HUMALOG) injection 4 Units  4 Units SubCUTAneous TIDAC    insulin lispro (HUMALOG) injection   SubCUTAneous AC&HS    glucose chewable tablet 16 g  4 Tab Oral PRN    glucagon (GLUCAGEN) injection 1 mg  1 mg IntraMUSCular PRN    dextrose (D50W) injection syrg 12.5-25 g  25-50 mL IntraVENous PRN    traMADol-acetaminophen (ULTRACET) per tablet 1 Tab  1 Tab Oral Q4H PRN    insulin glargine (LANTUS) injection 25 Units  25 Units SubCUTAneous DAILY    apixaban (ELIQUIS) tablet 10 mg  10 mg Oral BID    albuterol-ipratropium (DUO-NEB) 2.5 MG-0.5 MG/3 ML  3 mL Nebulization Q4H PRN    metoprolol tartrate (LOPRESSOR) tablet 25 mg  25 mg Oral BID    cloNIDine HCl (CATAPRES) tablet 0.1 mg  0.1 mg Oral Q8H PRN    ondansetron (ZOFRAN) injection 4 mg  4 mg IntraVENous Q6H PRN    pantoprazole (PROTONIX) tablet 40 mg  40 mg Oral ACB&D    losartan (COZAAR) tablet 100 mg  100 mg Oral DAILY    hydroCHLOROthiazide (HYDRODIURIL) tablet 25 mg  25 mg Oral DAILY     No Known Allergies       EXAMINATION        Visit Vitals  /71 (BP 1 Location: Right arm, BP Patient Position: At rest)   Pulse 74   Temp 97 °F (36.1 °C)   Resp 19   Ht 6' (1.829 m)   Wt 318 lb (144.2 kg)   SpO2 97%   BMI 43.13 kg/m²         Kehinde Wylie MD  1/14/2019  6:20 PM

## 2019-01-14 NOTE — PROGRESS NOTES
Problem: Mobility Impaired (Adult and Pediatric)  Goal: *Acute Goals and Plan of Care (Insert Text)  Physical Therapy Goals  Initiated 1/14/2019 and to be accomplished within 7 day(s)  1. Patient will move from supine to sit and sit to supine , scoot up and down and roll side to side in bed with modified independence. 2.  Patient will transfer from bed to chair and chair to bed with minimal assistance/contact guard assist using the least restrictive device. 3.  Patient will perform sit to stand with supervision/set-up. 4.  Patient will ambulate with standby/contact guard assistanct for 10-25 feet with the least restrictive device while maintaining WB precautions. Outcome: Progressing Towards Goal  physical Therapy EVALUATION    Patient: Wally Montoya (46 y.o. female)  Date: 1/14/2019  Primary Diagnosis: Pulmonary emboli (HCC)  Pulmonary emboli (HCC)       Precautions:   Fall, NWB(NEEDS CAM BOOT)    ASSESSMENT :  PT orders received and patient cleared by nursing to participate with therapy. Patient is a 46 y.o. female admitted to the hospital due to chest pain and L ankle pain. Pt is s/p L ankle ORIF 12/3/19 performed in [place]. Pt recently moved from [place] back to [place]. Patient consents to PT evaluation and treatment. Pt has been NWB for L LE since surgery and continues to be NWB. Orthopedics have ordered a CAM boot with nursing informed of need for CAM boot. Pt will continue to be NWB even in CAM boot until changed by orthopedics. Pt has been using a RW but recently purchased a knee scooter (she has not used it yet). Educated pt on WB status and need for CAM boot. Educated pt on use of walker and standing/gait with NWB status. Pt has been using the [facility] with nursing since being in the hospital per pt. Pt educated on elevation of L LE and therex for knee and hip strength. Pt performs bed mobility SBA. She performs sit to stands min/CGA. She ambulates 2 feet bed to [facility] 2 feet with walker CGA/Ca. Pt requires constant cues for NWB L LE with pt attempting to place weight on L LE with transfers. With cues, pt was able to keep L LE off the floor but needs constant cues. Pt will benefit from bariatric WC due to difficulty with WB status. Pt also will benefit from a bariatric BSC for safety at home. Pt ended therapy supine in bed with L LE elevated and all needs met. Recommending Move with Me. Patient will benefit from skilled intervention to address the above impairments and increase functional independence. Patients rehabilitation potential is considered to be Good  Factors which may influence rehabilitation potential include:   []         None noted  []         Mental ability/status  [x]         Medical condition  []         Home/family situation and support systems  []         Safety awareness  []         Pain tolerance/management  []         Other:      PLAN :  Recommendations and Planned Interventions:  [x]           Bed Mobility Training             [x]    Neuromuscular Re-Education  [x]           Transfer Training                   []    Orthotic/Prosthetic Training  [x]           Gait Training                          []    Modalities  [x]           Therapeutic Exercises          []    Edema Management/Control  [x]           Therapeutic Activities            [x]    Patient and Family Training/Education  []           Other (comment):    Frequency/Duration: Patient will be followed by physical therapy 1-2 times per day to address goals. Discharge Recommendations: Home Health  Further Equipment Recommendations for Discharge: bariatric BSC and bariatric WC     SUBJECTIVE:   Patient stated I've been using the commode.     OBJECTIVE DATA SUMMARY:     Past Medical History:   Diagnosis Date    [facility]'s colitis ([facility]     Diabetes ([facility]     HTN (hypertension)    No past surgical history on file.   Barriers to Learning/Limitations: None  Compensate with: N/A  Prior Level of Function/Home Situation: Prior to 12/3/18, pt was independent with all mobility with no AD. After 12/3/18, pt has been NWB L LE and using RW. Home Situation  Home Environment: Private residence  # Steps to Enter: 0  One/Two Story Residence: One story  Living Alone: No  Support Systems: Parent  Patient Expects to be Discharged to[de-identified] Private residence  Current DME Used/Available at Home: [employer], rolling(has knee scooter)  Critical Behavior:  Neurologic State: Alert  Psychosocial  Patient Behaviors: Calm; Cooperative  Strength:    Strength: Generally decreased, functional(B LE)  Tone & Sensation:   Tone: Normal(B LE)  Sensation: Intact(B LE)   Range Of Motion:  AROM: Within functional limits(B LE)  Functional Mobility:  Bed Mobility:  Supine to Sit: Supervision  Sit to Supine: Supervision  Scooting: Supervision  Transfers:  Sit to Stand: Minimum assistance  Stand to Sit: Contact guard assistance  Balance:   Sitting: Intact  Standing: Impaired; With support  Standing - Static: Fair  Standing - Dynamic : Poor  Ambulation/Gait Training:  Distance (ft): 2 Feet (ft)  Assistive Device: Walker  Ambulation - Level of Assistance: Minimal assistance(needs constant cues for WB)  Left Side Weight Bearing: Non-weight bearing  Base of Support: Shift to right  Speed/Blanca: Shuffled; Slow  Therapeutic Exercises:   Reviewed hip flexion, LAQ, and hip abduction in sitting. Pain:  Pre: mild/moderate L ankle  Post: mild/moderate L ankle  Activity Tolerance:   fair  Please refer to the flowsheet for vital signs taken during this treatment.   After treatment:   [] Patient left in no apparent distress sitting up in chair  [] Patient left sitting on EOB  [x] Patient left in no apparent distress in bed  [] Patient declined to be OOB at this time due to    [x] Call bell left within reach  [x] Nursing notified(Lynsey)  [] Caregiver present  [] Bed alarm activated  [x] Personal items in reach     COMMUNICATION/EDUCATION:   [x]         Fall prevention education was provided and the patient/caregiver indicated understanding. [x]         Patient/family have participated as able in goal setting and plan of care. [x]         Patient/family agree to work toward stated goals and plan of care. []         Patient understands intent and goals of therapy, but is neutral about his/her participation. []         Patient is unable to participate in goal setting and plan of care. [x]         Role of physical therapy. [x]         Fall prevention and out of bed with nursing assistance 3-5 times a day.     Thank you for this referral.  Miranda Serrato, PT, DPT   Time Calculation: 28 mins        Eval Complexity: History: MEDIUM  Complexity : 1-2 comorbidities / personal factors will impact the outcome/ POC Exam:HIGH Complexity : 4+ Standardized tests and measures addressing body structure, function, activity limitation and / or participation in recreation  Presentation: MEDIUM Complexity : Evolving with changing characteristics  Clinical Decision Making:Medium Complexity   Overall Complexity:MEDIUM

## 2019-01-14 NOTE — PHYSICIAN ADVISORY
Letter of Admission Status Determination: Upgrade to Inpatient     Samantha Garcia was originally hospitalized as Outpatient Observation status on 1/11/2019. Ongoing hospitalization is warranted for this patient with pulmonary embolism, uncontrolled HTN, pain and nausea requiring IV analgesics, IV antiemetics, further evaluaiton and close clinical monitoring. Based on the documented clinical condition and care plan, we recommend upgrading patient's hospitalization status to INPATIENT.      The final decision regarding the patient's hospitalization status depends on the attending physician's judgment.       Jua nPablo Hickman MD, [place], [address] DE[facility]. [facility], [address] [place] [address]  [phone]

## 2019-01-14 NOTE — PROGRESS NOTES
Discharge Planning:  Reason for Admission:   PLUMONARY EMBOLLISM                RRAT Score:    12              Do you (patient/family) have any concerns for transition/discharge? NONE              Plan for utilizing home health:    IF ORDERED     Likelihood of readmission? MEDIUM            Transition of Care Plan:  [address] AND PERSONAL CARE AIDES          Care Management Interventions  PCP Verified by CM: Yes(DR. GRAHAM)  Mode of Transport at Discharge: ALS  Transition of Care Consult (CM Consult): Discharge Planning  Current Support Network: Relative's Home  Confirm Follow Up Transport: Family  Plan discussed with Pt/Family/Caregiver: Yes(PT WILL RETURN HOME WITH FAMILY SUPPORT, HOME HEALTH, [address] [facility])  [place] of Choice Offered: Yes  Discharge Location  Discharge Placement: Home with home health    Discharge Planning: This pt's treating physician, Dr. Andres Jeffers M.D. Requested assessment of SNF placement. This writer also offered UAI to assist with placement and discharge in the way of support services. This writer spoke with pt who declines SNF placement, who wants to return home with home health and personal care aides, this writer will complete a UAI for this pt prior to discharge to assist with personal care aides. Pt reports that she lives with her elderly parents and a care aide would be very helpful at her discharge.    UAI completed filed in the [facility] portal          Ravindra Kratf  [phone]

## 2019-01-14 NOTE — PROGRESS NOTES
Progress Note    Patient: Ellie Cerrato MRN: [id number]  CSN: [id number]    YOB: 1970  Age: 46 y.o. Sex: female    DOA: 1/11/2019 LOS:  LOS: 0 days                    Subjective:     Pt is feeling better Bp improved no chest pain no SOB . Seen by ortho for Lt ankle pain and wound S/P ORIF in [place]. Wound care recommended santyl for wound care Lt ankle . She would like to discuss with ortho before discharge    Discussed with case manger , will discuss with pt SNF VS home health and personal care at home    Pt has h/o crohn's slight flare up on admission ct scan done at [facility] before admission started  . prednisone usually pt on Imuran 200 mg daily and Humara  Injection q Sunday. Pt had Echo done this morning    HPI  Mimi Campbell is a 46 y.o. female with h/o diabetes, HTN and Crohn's colitis who presents to ED complaining of acute, moderate generalized CP onset PTA. The pt indicated that when she is at rest, she still experiences CP. She also stated that on 12/3 she had an ORFI of the left ankle after fall and fracture at [facility]. Pt had external fixator at first and then had ORIF later . Pt had open wound lateral Lt ankle for 1-2 weeks had some drainage . Pt had h/o peripheral neuropathy  But she still feeling the pain.     Pt was seen at [facility] ER before admission to [facility] pt had venous duplex ultrasound leg and she was told it was negative      Pt also had CT scan of abdomen at [facility]  And she was told that she had crohn's flare up and started on prednisone. Pt usually she is on Imuran 50 mg daily and Humara 40 mg injection weekly       Pt initially was admitted to the hospitalist  service and switched to me since pt will need PCP in the area .     CXR  B/L atelactasis        CTA chest  Positive for pulmonary emboli with a small to moderate overall clot burden which  is more significant in the right lung.  Mildly dilated main pulmonary artery, but  no other CT evidence for significant right heart strain.    Left adrenal nodule is low-density and most likely a benign adenoma.      Ct abdomen and pelvis at [facility]    1. Mild, long segment thickening of the descending colonic wall suggestive of inflammatory or infectious colitis. 2. Fibroid uterus. 3. Stable left adrenal nodule is indeterminate by imaging. This can be subsequently evaluated with adrenal protocol CT or MRI on a nonemergent basis.           Chief Complaint:   Chief Complaint   Patient presents with    Chest Pain       Review of systems  General: No fevers or chills. Cardiovascular: No chest pain or pressure. No palpitations. Pulmonary: No shortness of breath, cough or wheeze. Gastrointestinal: No abdominal pain, nausea, vomiting or diarrhea. Genitourinary: No urinary frequency, urgency, hesitancy or dysuria. Musculoskeletal:  Lt ankle pain and swelling , peripheral neuropathy  Neurologic: No headache,  no seizure peripheral neuropathy        Objective:     Physical Exam:  Visit Vitals  /74 (BP 1 Location: Right arm, BP Patient Position: At rest)   Pulse 68   Temp 97.4 °F (36.3 °C)   Resp 18   Ht 6' (1.829 m)   Wt 144.2 kg (318 lb)   SpO2 92%   BMI 43.13 kg/m²        General:   Alert, no acute distress    HEENT: NC, Atraumatic. PERRLA, anicteric sclerae. Lungs: CTA Bilaterally. No Wheezing/Rhonchi/Rales. Heart: Regular  rhythm,  No murmur, No Rubs, No Gallops  Abdomen:   Soft, Non distended, tender Lt lower quadrant  Extremities:    swelling Lt ankle and discoloration improving dressing Lt lateral ankle  Psych:  Not anxious or agitated. Neurologic:    CN 2-12 grossly intact, Alert and oriented X 3. No acute neurological                                 Deficits,          Intake and Output:  Current Shift:  No intake/output data recorded.   Last three shifts:  01/12 1901 - 01/14 0700  In: 450 [P.O.:450]  Out: 900 [Urine:900]    Labs: Results:       Chemistry Recent Labs     01/14/19  3225 01/13/19  0419 01/11/19 2203   * 184* 447*    140 136   K 3.5 3.6 4.5    105 102   CO2 27 26 27   BUN 19* 15 18   CREA 1.15 0.75 1.22   CA 8.4* 8.3* 8.6   AGAP 7 9 7   BUCR 17 20 15   AP 82  --  85   TP 7.8  --  8.2   ALB 3.1*  --  3.3*   GLOB 4.7*  --  4.9*   AGRAT 0.7*  --  0.7*      CBC w/Diff Recent Labs     01/14/19 0155 01/11/19 2203   WBC 8.6 5.4   RBC 4.71 4.58   HGB 12.6 12.5   HCT 39.5 39.1    292   GRANS 63 86*   LYMPH 28 13*   EOS 1 0      Cardiac Enzymes Recent Labs     01/11/19 2203   CPK 21*   CKND1 CALCULATION NOT PERFORMED WHEN RESULT IS BELOW LINEAR LIMIT      Coagulation Recent Labs     01/11/19 2203   PTP 13.0   INR 1.0       Lipid Panel Lab Results   Component Value Date/Time    Cholesterol, total 259 (H) 01/13/2019 04:19 AM    HDL Cholesterol 53 01/13/2019 04:19 AM    LDL, calculated 140.2 (H) 01/13/2019 04:19 AM    VLDL, calculated 65.8 01/13/2019 04:19 AM    Triglyceride 329 (H) 01/13/2019 04:19 AM    CHOL/HDL Ratio 4.9 01/13/2019 04:19 AM      BNP No results for input(s): BNPP in the last 72 hours. Liver Enzymes Recent Labs     01/14/19 0155   TP 7.8   ALB 3.1*   AP 82   SGOT 6*      Thyroid Studies No results found for: T4, T3U, TSH, TSHEXT       Procedures/imaging: see electronic medical records for all procedures/Xrays and details which were not copied into this note but were reviewed prior to creation of Plan    Medications:   Current Facility-Administered Medications   Medication Dose Route Frequency    . Height Documentation Required  1 Each Other ONCE    morphine injection 2 mg  2 mg IntraVENous Q4H PRN    gabapentin (NEURONTIN) capsule 300 mg  300 mg Oral TID    collagenase (SANTYL) 250 unit/gram ointment   Topical DAILY    hydrALAZINE (APRESOLINE) tablet 100 mg  100 mg Oral TID    predniSONE (DELTASONE) tablet 20 mg  20 mg Oral DAILY WITH BREAKFAST    atorvastatin (LIPITOR) tablet 20 mg  20 mg Oral QHS    acetaminophen (TYLENOL) tablet 650 mg 650 mg Oral Q4H PRN    insulin lispro (HUMALOG) injection 4 Units  4 Units SubCUTAneous TIDAC    insulin lispro (HUMALOG) injection   SubCUTAneous AC&HS    glucose chewable tablet 16 g  4 Tab Oral PRN    glucagon (GLUCAGEN) injection 1 mg  1 mg IntraMUSCular PRN    dextrose (D50W) injection syrg 12.5-25 g  25-50 mL IntraVENous PRN    traMADol-acetaminophen (ULTRACET) per tablet 1 Tab  1 Tab Oral Q4H PRN    insulin glargine (LANTUS) injection 25 Units  25 Units SubCUTAneous DAILY    apixaban (ELIQUIS) tablet 10 mg  10 mg Oral BID    albuterol-ipratropium (DUO-NEB) 2.5 MG-0.5 MG/3 ML  3 mL Nebulization Q4H PRN    metoprolol tartrate (LOPRESSOR) tablet 25 mg  25 mg Oral BID    cloNIDine HCl (CATAPRES) tablet 0.1 mg  0.1 mg Oral Q8H PRN    ondansetron (ZOFRAN) injection 4 mg  4 mg IntraVENous Q6H PRN    pantoprazole (PROTONIX) tablet 40 mg  40 mg Oral ACB&D    losartan (COZAAR) tablet 100 mg  100 mg Oral DAILY    hydroCHLOROthiazide (HYDRODIURIL) tablet 25 mg  25 mg Oral DAILY       Assessment/Plan     Active Problems:    Pulmonary emboli (HCC) (1/12/2019)      Accelerated hypertension (1/12/2019)      Crohn's colitis ([address] (1/12/2019)      Wound of left ankle (1/12/2019)      Status post open reduction with internal fixation (ORIF) of fracture of ankle (1/12/2019)        Plan     Pulmonary Embolism/ S/P travel from [place] and S/p surgery Lt ankle  Pt had pulmonary consult with Dr Dayday Costa switch  Anticoagulation top  Eliquis  H&H stable  Continue to monitor sx and o2 sat        S/P ORIF  Lt lateral ankle wound  Wound care consult , wound care recommended Santyl . Pt would like to see ortho before start treatment discussed with Dr Josefina Wilkinson office .   Check x-ray Lt ankle stable  Pt at higher risk for infection with her immune suppression status   Morphine 2 mg IV q 4h prn   Neurontin 300 mg q8h  Helping her pain     Crohn's disease  Taper steroids to 10 mg daily  Restart Imuran 200 mg daily in AM  Restart regular med  Pt on Imuran and humara she will need GI   Protonix 40 mg daily  Continue Zofran prn nausea     Diabetes Mellitus type 2  Check HG A1c 8.       Start Lipitor 20 mg qhs     Uncontrolled / accelerated Hypertension  Increase hydralazine to 100  mg TID  Losartan 100 mg daily   HCTZ 25 mg daily  Monitor cr level  Pt on clonidine prn     Continue to monitor lab   Monitor cr level  F/u Dr Ayde Melendrez evaluation  F/u echo    Pt and ot evaluation and treatment    DVT/GI Prophylaxis: H2B/PPI and Eliquis         Marsah Gayle MD  1/14/2019 12:29 PM

## 2019-01-14 NOTE — ROUTINE PROCESS
Bedside and Verbal shift change report given to Nikolai Porter RN (oncoming nurse) by Jerald Ramachandran RN (offgoing nurse). Report included the following information SBAR, Kardex, MAR and Recent Results.

## 2019-01-14 NOTE — PROGRESS NOTES
Echocardiogram completed. Patient returned to room with armband in place. Report to follow.     [address] [facility]

## 2019-01-15 ENCOUNTER — APPOINTMENT (OUTPATIENT)
Dept: GENERAL RADIOLOGY | Age: 49
DRG: 134 | End: 2019-01-15
Attending: ORTHOPAEDIC SURGERY
Payer: MEDICAID

## 2019-01-15 LAB
ALBUMIN SERPL-MCNC: 3.1 G/DL (ref 3.4–5)
ALBUMIN/GLOB SERPL: 0.7 {RATIO} (ref 0.8–1.7)
ALP SERPL-CCNC: 81 U/L (ref 45–117)
ALT SERPL-CCNC: 12 U/L (ref 13–56)
ANION GAP SERPL CALC-SCNC: 9 MMOL/L (ref 3–18)
APTT PPP: 29.6 SEC (ref 23–36.4)
APTT PPP: 30.6 SEC (ref 23–36.4)
AST SERPL-CCNC: 4 U/L (ref 15–37)
BASOPHILS # BLD: 0 K/UL (ref 0–0.1)
BASOPHILS # BLD: 0 K/UL (ref 0–0.1)
BASOPHILS NFR BLD: 0 % (ref 0–2)
BASOPHILS NFR BLD: 0 % (ref 0–2)
BILIRUB SERPL-MCNC: 0.3 MG/DL (ref 0.2–1)
BUN SERPL-MCNC: 31 MG/DL (ref 7–18)
BUN/CREAT SERPL: 30 (ref 12–20)
CALCIUM SERPL-MCNC: 8.3 MG/DL (ref 8.5–10.1)
CHLORIDE SERPL-SCNC: 105 MMOL/L (ref 100–108)
CO2 SERPL-SCNC: 25 MMOL/L (ref 21–32)
CREAT SERPL-MCNC: 1.04 MG/DL (ref 0.6–1.3)
DIFFERENTIAL METHOD BLD: ABNORMAL
DIFFERENTIAL METHOD BLD: ABNORMAL
EOSINOPHIL # BLD: 0.1 K/UL (ref 0–0.4)
EOSINOPHIL # BLD: 0.1 K/UL (ref 0–0.4)
EOSINOPHIL NFR BLD: 1 % (ref 0–5)
EOSINOPHIL NFR BLD: 2 % (ref 0–5)
ERYTHROCYTE [DISTWIDTH] IN BLOOD BY AUTOMATED COUNT: 19.7 % (ref 11.6–14.5)
ERYTHROCYTE [DISTWIDTH] IN BLOOD BY AUTOMATED COUNT: 19.9 % (ref 11.6–14.5)
GLOBULIN SER CALC-MCNC: 4.6 G/DL (ref 2–4)
GLUCOSE BLD STRIP.AUTO-MCNC: 184 MG/DL (ref 70–110)
GLUCOSE BLD STRIP.AUTO-MCNC: 213 MG/DL (ref 70–110)
GLUCOSE BLD STRIP.AUTO-MCNC: 242 MG/DL (ref 70–110)
GLUCOSE BLD STRIP.AUTO-MCNC: 283 MG/DL (ref 70–110)
GLUCOSE SERPL-MCNC: 215 MG/DL (ref 74–99)
HCT VFR BLD AUTO: 38.8 % (ref 35–45)
HCT VFR BLD AUTO: 40.9 % (ref 35–45)
HGB BLD-MCNC: 12.5 G/DL (ref 12–16)
HGB BLD-MCNC: 13 G/DL (ref 12–16)
LYMPHOCYTES # BLD: 3 K/UL (ref 0.9–3.6)
LYMPHOCYTES # BLD: 3 K/UL (ref 0.9–3.6)
LYMPHOCYTES NFR BLD: 32 % (ref 21–52)
LYMPHOCYTES NFR BLD: 34 % (ref 21–52)
MAGNESIUM SERPL-MCNC: 2.3 MG/DL (ref 1.6–2.6)
MCH RBC QN AUTO: 26.6 PG (ref 24–34)
MCH RBC QN AUTO: 27 PG (ref 24–34)
MCHC RBC AUTO-ENTMCNC: 31.8 G/DL (ref 31–37)
MCHC RBC AUTO-ENTMCNC: 32.2 G/DL (ref 31–37)
MCV RBC AUTO: 83.6 FL (ref 74–97)
MCV RBC AUTO: 83.8 FL (ref 74–97)
MONOCYTES # BLD: 0.9 K/UL (ref 0.05–1.2)
MONOCYTES # BLD: 0.9 K/UL (ref 0.05–1.2)
MONOCYTES NFR BLD: 10 % (ref 3–10)
MONOCYTES NFR BLD: 9 % (ref 3–10)
NEUTS SEG # BLD: 4.8 K/UL (ref 1.8–8)
NEUTS SEG # BLD: 5.5 K/UL (ref 1.8–8)
NEUTS SEG NFR BLD: 54 % (ref 40–73)
NEUTS SEG NFR BLD: 58 % (ref 40–73)
PLATELET # BLD AUTO: 375 K/UL (ref 135–420)
PLATELET # BLD AUTO: 375 K/UL (ref 135–420)
PMV BLD AUTO: 11.1 FL (ref 9.2–11.8)
PMV BLD AUTO: 11.6 FL (ref 9.2–11.8)
POTASSIUM SERPL-SCNC: 3 MMOL/L (ref 3.5–5.5)
PROT SERPL-MCNC: 7.7 G/DL (ref 6.4–8.2)
RBC # BLD AUTO: 4.63 M/UL (ref 4.2–5.3)
RBC # BLD AUTO: 4.89 M/UL (ref 4.2–5.3)
SODIUM SERPL-SCNC: 139 MMOL/L (ref 136–145)
WBC # BLD AUTO: 8.8 K/UL (ref 4.6–13.2)
WBC # BLD AUTO: 9.4 K/UL (ref 4.6–13.2)

## 2019-01-15 PROCEDURE — 74011636637 HC RX REV CODE- 636/637: Performed by: HOSPITALIST

## 2019-01-15 PROCEDURE — 73610 X-RAY EXAM OF ANKLE: CPT

## 2019-01-15 PROCEDURE — 74011250636 HC RX REV CODE- 250/636: Performed by: INTERNAL MEDICINE

## 2019-01-15 PROCEDURE — 74011250636 HC RX REV CODE- 250/636: Performed by: FAMILY MEDICINE

## 2019-01-15 PROCEDURE — 77030012935 HC DRSG AQUACEL BMS -B

## 2019-01-15 PROCEDURE — 82962 GLUCOSE BLOOD TEST: CPT

## 2019-01-15 PROCEDURE — 85730 THROMBOPLASTIN TIME PARTIAL: CPT

## 2019-01-15 PROCEDURE — 65660000000 HC RM CCU STEPDOWN

## 2019-01-15 PROCEDURE — 85025 COMPLETE CBC W/AUTO DIFF WBC: CPT

## 2019-01-15 PROCEDURE — 74011250637 HC RX REV CODE- 250/637: Performed by: FAMILY MEDICINE

## 2019-01-15 PROCEDURE — 80053 COMPREHEN METABOLIC PANEL: CPT

## 2019-01-15 PROCEDURE — 74011250637 HC RX REV CODE- 250/637: Performed by: INTERNAL MEDICINE

## 2019-01-15 PROCEDURE — 36415 COLL VENOUS BLD VENIPUNCTURE: CPT

## 2019-01-15 PROCEDURE — 83735 ASSAY OF MAGNESIUM: CPT

## 2019-01-15 PROCEDURE — 74011636637 HC RX REV CODE- 636/637: Performed by: FAMILY MEDICINE

## 2019-01-15 PROCEDURE — 77030011256 HC DRSG MEPILEX <16IN NO BORD MOLN -A

## 2019-01-15 RX ORDER — INSULIN GLARGINE 100 [IU]/ML
30 INJECTION, SOLUTION SUBCUTANEOUS DAILY
Status: DISCONTINUED | OUTPATIENT
Start: 2019-01-16 | End: 2019-01-15

## 2019-01-15 RX ORDER — METOPROLOL TARTRATE 25 MG/1
TABLET, FILM COATED ORAL 2 TIMES DAILY
COMMUNITY
End: 2019-01-20

## 2019-01-15 RX ORDER — LOSARTAN POTASSIUM 100 MG/1
100 TABLET ORAL DAILY
COMMUNITY

## 2019-01-15 RX ORDER — METOPROLOL SUCCINATE 25 MG/1
25 TABLET, EXTENDED RELEASE ORAL DAILY
COMMUNITY
End: 2019-01-20

## 2019-01-15 RX ORDER — METOPROLOL TARTRATE 50 MG/1
50 TABLET ORAL 2 TIMES DAILY
Status: DISCONTINUED | OUTPATIENT
Start: 2019-01-15 | End: 2019-01-20 | Stop reason: HOSPADM

## 2019-01-15 RX ORDER — INSULIN LISPRO 100 [IU]/ML
INJECTION, SOLUTION INTRAVENOUS; SUBCUTANEOUS
Status: DISCONTINUED | OUTPATIENT
Start: 2019-01-15 | End: 2019-01-20 | Stop reason: HOSPADM

## 2019-01-15 RX ORDER — INSULIN LISPRO 100 [IU]/ML
INJECTION, SOLUTION INTRAVENOUS; SUBCUTANEOUS
COMMUNITY
End: 2019-09-13

## 2019-01-15 RX ORDER — AZATHIOPRINE 50 MG/1
250 TABLET ORAL DAILY
COMMUNITY

## 2019-01-15 RX ORDER — HEPARIN SODIUM 10000 [USP'U]/100ML
18-36 INJECTION, SOLUTION INTRAVENOUS
Status: DISPENSED | OUTPATIENT
Start: 2019-01-15 | End: 2019-01-19

## 2019-01-15 RX ORDER — POTASSIUM CHLORIDE 20 MEQ/1
40 TABLET, EXTENDED RELEASE ORAL
Status: COMPLETED | OUTPATIENT
Start: 2019-01-15 | End: 2019-01-15

## 2019-01-15 RX ORDER — HYDRALAZINE HYDROCHLORIDE 25 MG/1
25 TABLET, FILM COATED ORAL 4 TIMES DAILY
COMMUNITY
End: 2019-01-20

## 2019-01-15 RX ORDER — INSULIN GLARGINE 100 [IU]/ML
30 INJECTION, SOLUTION SUBCUTANEOUS DAILY
Status: DISCONTINUED | OUTPATIENT
Start: 2019-01-15 | End: 2019-01-17

## 2019-01-15 RX ADMIN — POTASSIUM CHLORIDE 40 MEQ: 20 TABLET, EXTENDED RELEASE ORAL at 10:02

## 2019-01-15 RX ADMIN — PANTOPRAZOLE SODIUM 40 MG: 40 TABLET, DELAYED RELEASE ORAL at 16:45

## 2019-01-15 RX ADMIN — HYDRALAZINE HYDROCHLORIDE 100 MG: 50 TABLET, FILM COATED ORAL at 10:04

## 2019-01-15 RX ADMIN — SITAGLIPTIN 50 MG: 50 TABLET, FILM COATED ORAL at 13:49

## 2019-01-15 RX ADMIN — PANTOPRAZOLE SODIUM 40 MG: 40 TABLET, DELAYED RELEASE ORAL at 10:03

## 2019-01-15 RX ADMIN — TRAMADOL HYDROCHLORIDE AND ACETAMINOPHEN 1 TABLET: 37.5; 325 TABLET, FILM COATED ORAL at 12:58

## 2019-01-15 RX ADMIN — HYDRALAZINE HYDROCHLORIDE 100 MG: 50 TABLET, FILM COATED ORAL at 17:47

## 2019-01-15 RX ADMIN — INSULIN LISPRO 3 UNITS: 100 INJECTION, SOLUTION INTRAVENOUS; SUBCUTANEOUS at 10:08

## 2019-01-15 RX ADMIN — MORPHINE SULFATE 2 MG: 2 INJECTION, SOLUTION INTRAMUSCULAR; INTRAVENOUS at 17:53

## 2019-01-15 RX ADMIN — MORPHINE SULFATE 2 MG: 2 INJECTION, SOLUTION INTRAMUSCULAR; INTRAVENOUS at 10:04

## 2019-01-15 RX ADMIN — METOPROLOL TARTRATE 25 MG: 25 TABLET ORAL at 10:03

## 2019-01-15 RX ADMIN — ONDANSETRON 4 MG: 2 INJECTION INTRAMUSCULAR; INTRAVENOUS at 10:04

## 2019-01-15 RX ADMIN — INSULIN GLARGINE 30 UNITS: 100 INJECTION, SOLUTION SUBCUTANEOUS at 10:06

## 2019-01-15 RX ADMIN — PREDNISONE 10 MG: 10 TABLET ORAL at 10:04

## 2019-01-15 RX ADMIN — MORPHINE SULFATE 2 MG: 2 INJECTION, SOLUTION INTRAMUSCULAR; INTRAVENOUS at 05:19

## 2019-01-15 RX ADMIN — ATORVASTATIN CALCIUM 20 MG: 20 TABLET, FILM COATED ORAL at 23:14

## 2019-01-15 RX ADMIN — AZATHIOPRINE 200 MG: 50 TABLET ORAL at 10:01

## 2019-01-15 RX ADMIN — INSULIN LISPRO 6 UNITS: 100 INJECTION, SOLUTION INTRAVENOUS; SUBCUTANEOUS at 11:30

## 2019-01-15 RX ADMIN — ONDANSETRON 4 MG: 2 INJECTION INTRAMUSCULAR; INTRAVENOUS at 02:21

## 2019-01-15 RX ADMIN — GABAPENTIN 300 MG: 300 CAPSULE ORAL at 23:14

## 2019-01-15 RX ADMIN — INSULIN LISPRO 6 UNITS: 100 INJECTION, SOLUTION INTRAVENOUS; SUBCUTANEOUS at 23:15

## 2019-01-15 RX ADMIN — GABAPENTIN 300 MG: 300 CAPSULE ORAL at 16:45

## 2019-01-15 RX ADMIN — INSULIN LISPRO 9 UNITS: 100 INJECTION, SOLUTION INTRAVENOUS; SUBCUTANEOUS at 16:55

## 2019-01-15 RX ADMIN — INSULIN LISPRO 4 UNITS: 100 INJECTION, SOLUTION INTRAVENOUS; SUBCUTANEOUS at 10:09

## 2019-01-15 RX ADMIN — MORPHINE SULFATE 2 MG: 2 INJECTION, SOLUTION INTRAMUSCULAR; INTRAVENOUS at 13:52

## 2019-01-15 RX ADMIN — INSULIN LISPRO 4 UNITS: 100 INJECTION, SOLUTION INTRAVENOUS; SUBCUTANEOUS at 12:47

## 2019-01-15 RX ADMIN — METOPROLOL TARTRATE 50 MG: 50 TABLET ORAL at 17:46

## 2019-01-15 RX ADMIN — HEPARIN SODIUM AND DEXTROSE 4 UNITS/KG/HR: 10000; 5 INJECTION INTRAVENOUS at 13:31

## 2019-01-15 RX ADMIN — INSULIN LISPRO 4 UNITS: 100 INJECTION, SOLUTION INTRAVENOUS; SUBCUTANEOUS at 16:55

## 2019-01-15 RX ADMIN — GABAPENTIN 300 MG: 300 CAPSULE ORAL at 10:04

## 2019-01-15 RX ADMIN — HYDRALAZINE HYDROCHLORIDE 100 MG: 50 TABLET, FILM COATED ORAL at 02:20

## 2019-01-15 RX ADMIN — LOSARTAN POTASSIUM 100 MG: 50 TABLET, FILM COATED ORAL at 10:02

## 2019-01-15 RX ADMIN — HYDROCHLOROTHIAZIDE 25 MG: 25 TABLET ORAL at 10:02

## 2019-01-15 NOTE — CONSULTS
Infectious Disease Consultation Note    Requested by: dr. Leila Yates    Reason: left ankle wound infection    Current abx Prior abx         Lines:       Assessment :    61-year-old female with h/o type 2 DM (last hgbA1C 8.0 on 1/11/19) admitted to [facility] on 1/14/19 for chest pain, left ankle wound. Clinical picture c/w left ankle wound dehiscence. After detailed history and exam, I don't see definitive evidence of infection of the left ankle ulcer. Dehiscence likely due to mechanical disruption. Patient is at risk for complicated infection due to h/o multiple surgeries, DM, hardware. Hence, recommend periop antibiotics. Recommendations:    1. Hold off antibiotics at this time  2. Recommend cefazolin perioperatively  3. Obtain surgical wound cultures        Thank you for consultation request. Above plan was discussed in details with patient, and dr Julio Robert. Please call me if any further questions or concerns. Will continue to participate in the care of this patient. HPI:    61-year-old female with h/o type 2 DM (last hgbA1C 8.0 on 1/11/19) admitted to [facility] on 1/14/19 for chest pain, left ankle wound. She was seen on 01/11/2019 here at [facility] because she was having chest pain. She had a workup, here, that showed a DVT and PE. Patient drove from [facility] Patient was seen at [facility] about a week ago c/o diffuse abd pain \"Chron's flair\" left leg swelling pain. No DVT on LE PVL. Patient came to [facility] ed on 1/11 and admitted after diagnosed to have PE. Was started on eliquis. She was noted to have left ankle ulcer. Ortho consulted. Plans for I&D in am. Wound cultures sent last pm. I have been consulted for further recommendations. Patient reports slipping and twisting her left ankle, dislocating her left ankle resulting in fractured ankle dislocation which she describes in [place], 12/03/2018. She has had 4 surgeries, since while in [place].   Her first surgery, she describes is for closed reduction under general anesthesia. Her second surgery, she describes was a closed reduction with a temporizing external fixator placement. Her third surgery described was ORIF for the severe left ankle fracture dislocation with a long fibular plate and multiple syndesmotic screws. Her fourth surgery was removal of external fixator, and retention of the external fixator. She denies any h/o infection throughout this time. Hasn't received any antibiotics. She had pain and discomfort in her ankle last week and hence she went to [facility] facility for an assessment of her ankle. She states they did not take her Steri-Strips off, they did ct scan and sent her home after an assessment of her left ankle. While at home, her daughter took off the Steri-Strips and taking out the Steri-Strips her daughter discovered a whole or wound on the lateral portion of her ankle. Patient denies any redness or left ankle, no purulent drainage from left ankle ulcer. Patient denies headaches, visual disturbances, sore throat, runny nose, earaches, sob, chills, cough, abdominal pain, diarrhea, burning micturition, or weakness in extremities. denies back pain/flank pain. denies recent sick contacts. No h/o recent travel. No known h/o MRSA colonization or infection in the past.                   Past Medical History:   Diagnosis Date    Cro[facility] colitis ([facility] [address]     Diabetes ([facility] [address]     HTN (hypertension)        No past surgical history on file.        Medication List      ASK your doctor about these medications    HumaLOG U-100 Insulin 100 unit/mL injection  Generic drug:  insulin lispro     HUMIRA 40 mg/0.8 mL injection  Generic drug:  adalimumab     hydrALAZINE 25 mg tablet  Commonly known as:  APRESOLINE     IMURAN 50 mg tablet  Generic drug:  azaTHIOprine     LEVEMIR U-100 INSULIN 100 unit/mL injection  Generic drug:  insulin detemir U-100     losartan 100 mg tablet  Commonly known as:  COZAAR     metoprolol succinate 25 mg XL tablet  Commonly known as:  TOPROL-XL     metoprolol tartrate 25 mg tablet  Commonly known as:  LOPRESSOR            Current Facility-Administered Medications   Medication Dose Route Frequency    insulin lispro (HUMALOG) injection   SubCUTAneous AC&HS    heparin 25,000 units in D5W 250 ml infusion  18-36 Units/kg/hr IntraVENous TITRATE    SITagliptin (JANUVIA) tablet 50 mg  50 mg Oral DAILY    insulin glargine (LANTUS) injection 30 Units  30 Units SubCUTAneous DAILY    metoprolol tartrate (LOPRESSOR) tablet 50 mg  50 mg Oral BID    azaTHIOprine (IMURAN) tablet 200 mg  200 mg Oral DAILY AFTER BREAKFAST    predniSONE (DELTASONE) tablet 10 mg  10 mg Oral DAILY WITH BREAKFAST    morphine injection 2 mg  2 mg IntraVENous Q4H PRN    gabapentin (NEURONTIN) capsule 300 mg  300 mg Oral TID    hydrALAZINE (APRESOLINE) tablet 100 mg  100 mg Oral TID    atorvastatin (LIPITOR) tablet 20 mg  20 mg Oral QHS    acetaminophen (TYLENOL) tablet 650 mg  650 mg Oral Q4H PRN    insulin lispro (HUMALOG) injection 4 Units  4 Units SubCUTAneous TIDAC    glucose chewable tablet 16 g  4 Tab Oral PRN    glucagon (GLUCAGEN) injection 1 mg  1 mg IntraMUSCular PRN    dextrose (D50W) injection syrg 12.5-25 g  25-50 mL IntraVENous PRN    traMADol-acetaminophen (ULTRACET) per tablet 1 Tab  1 Tab Oral Q4H PRN    albuterol-ipratropium (DUO-NEB) 2.5 MG-0.5 MG/3 ML  3 mL Nebulization Q4H PRN    cloNIDine HCl (CATAPRES) tablet 0.1 mg  0.1 mg Oral Q8H PRN    ondansetron (ZOFRAN) injection 4 mg  4 mg IntraVENous Q6H PRN    pantoprazole (PROTONIX) tablet 40 mg  40 mg Oral ACB&D    losartan (COZAAR) tablet 100 mg  100 mg Oral DAILY    hydroCHLOROthiazide (HYDRODIURIL) tablet 25 mg  25 mg Oral DAILY       Allergies: Patient has no known allergies. No family history on file.   Social History     Socioeconomic History    Marital status:      Spouse name: Not on file    Number of children: Not on file    Years of education: Not on file    Highest education level: Not on file   Social Needs    Financial resource strain: Not on file    Food insecurity - worry: Not on file    Food insecurity - inability: Not on file    Transportation needs - medical: Not on file   [employer] needs - non-medical: Not on file   Occupational History    Not on file   Tobacco Use    Smoking status: Not on file   Substance and Sexual Activity    Alcohol use: Not on file    Drug use: Not on file    Sexual activity: Not on file   Other Topics Concern    Not on file   Social History Narrative    Not on file     Social History     Tobacco Use   Smoking Status Not on file        Temp (24hrs), Av °F (36.7 °C), Min:97 °F (36.1 °C), Max:98.6 °F (37 °C)    Visit Vitals  /78   Pulse 75   Temp 98.4 °F (36.9 °C)   Resp 18   Ht 6' (1.829 m)   Wt (!) 163.5 kg (360 lb 7.2 oz)   SpO2 97%   BMI 48.89 kg/m²       ROS: 12 point ROS obtained in details. Pertinent positives as mentioned in HPI,   otherwise negative    Physical Exam:    General:  Alert, cooperative, no distress, appears stated age. Head:  Normocephalic, without obvious abnormality, atraumatic. Eyes:  Conjunctivae/corneas clear. PERRL, ANicteric   Nose: Nares normal. Mucosa normal. No drainage or sinus tenderness. Throat: Lips, mucosa dry. NO thrush; TEETH normal.   Neck: Supple, symmetrical, trachea midline, no adenopathy, thyroid: no enlargment/tenderness/nodules, no carotid bruit and no JVD. No crepitus   Back:   Symmetric, no curvature, no spine tenderness or flank pain   Lungs:   Bilateral auscultation clear no rhonchi rales wheezing normal resp effort   Chest wall:  No tenderness or deformity. NO CREPITUS   Heart:  Regular rate and rhythm, S1, S2 normal, no murmur, click, rub or gallop. Abdomen:   Obese Soft, non-tender. PROTUBERANT; Bowel sounds normal. No masses,  No organomegaly.  No paradox   Extremities: Left ankle with surgical incision, minimal serous drainage on dressing, no edema/erythema   Pulses: 1-2+ and symmetric all extremities.    Skin: Skin color, texture, turgor normal. No rashes or lesions   Lymph nodes: Cervical, supraclavicular, and axillary nodes normal.   Neurologic: Grossly nonfocal                                      Labs: Results:   Chemistry Recent Labs     01/15/19  0345 01/14/19  0155 01/13/19  0419   * 270* 184*    137 140   K 3.0* 3.5 3.6    103 105   CO2 25 27 26   BUN 31* 19* 15   CREA 1.04 1.15 0.75   CA 8.3* 8.4* 8.3*   AGAP 9 7 9   BUCR 30* 17 20   AP 81 82  --    TP 7.7 7.8  --    ALB 3.1* 3.1*  --    GLOB 4.6* 4.7*  --    AGRAT 0.7* 0.7*  --       CBC w/Diff Recent Labs     01/15/19  0345 01/14/19  0155   WBC 9.4 8.6   RBC 4.89 4.71   HGB 13.0 12.6   HCT 40.9 39.5    379   GRANS 58 63   LYMPH 32 28   EOS 1 1      Microbiology Recent Labs     01/14/19  1900   CULT PENDING          RADIOLOGY:    All available imaging studies/reports in [facility] for this admission were reviewed    Dr. Bunny Castro, Infectious Disease Specialist  [phone]  January 15, 2019  12:20 PM

## 2019-01-15 NOTE — PROGRESS NOTES
[facility]  Pulmonary, Critical Care, and Sleep Medicine    Name: Sophia Alberts MRN: [id number]   : 1970 Hospital: [facility]   Date: 1/15/2019  Room: [facility]     Subjective:   Seen & examined at the bedside. Feels okay today but a little discouraged that she is going to need another surgery-- understands the importance of the procedure. Anticoagulation transitioned in preparation for surgery. Las dose of eliquis 19 @ 1726.        Past Medical History:   Diagnosis Date    Cro[facility]'s colitis ([facility] [address]     Diabetes ([facility] [address]     HTN (hypertension)        Current Facility-Administered Medications   Medication Dose Route Frequency    insulin lispro (HUMALOG) injection   SubCUTAneous AC&HS    potassium chloride (K-DUR, KLOR-CON) SR tablet 40 mEq  40 mEq Oral NOW    heparin 25,000 units in D5W 250 ml infusion  18-36 Units/kg/hr IntraVENous TITRATE    SITagliptin (JANUVIA) tablet 50 mg  50 mg Oral DAILY    insulin glargine (LANTUS) injection 30 Units  30 Units SubCUTAneous DAILY    metoprolol tartrate (LOPRESSOR) tablet 50 mg  50 mg Oral BID    azaTHIOprine (IMURAN) tablet 200 mg  200 mg Oral DAILY AFTER BREAKFAST    predniSONE (DELTASONE) tablet 10 mg  10 mg Oral DAILY WITH BREAKFAST    morphine injection 2 mg  2 mg IntraVENous Q4H PRN    gabapentin (NEURONTIN) capsule 300 mg  300 mg Oral TID    hydrALAZINE (APRESOLINE) tablet 100 mg  100 mg Oral TID    atorvastatin (LIPITOR) tablet 20 mg  20 mg Oral QHS    acetaminophen (TYLENOL) tablet 650 mg  650 mg Oral Q4H PRN    insulin lispro (HUMALOG) injection 4 Units  4 Units SubCUTAneous TIDAC    glucose chewable tablet 16 g  4 Tab Oral PRN    glucagon (GLUCAGEN) injection 1 mg  1 mg IntraMUSCular PRN    dextrose (D50W) injection syrg 12.5-25 g  25-50 mL IntraVENous PRN    traMADol-acetaminophen (ULTRACET) per tablet 1 Tab  1 Tab Oral Q4H PRN    albuterol-ipratropium (DUO-NEB) 2.5 MG-0.5 MG/3 ML  3 mL Nebulization Q4H PRN    cloNIDine HCl (CATAPRES) tablet 0.1 mg  0.1 mg Oral Q8H PRN    ondansetron (ZOFRAN) injection 4 mg  4 mg IntraVENous Q6H PRN    pantoprazole (PROTONIX) tablet 40 mg  40 mg Oral ACB&D    losartan (COZAAR) tablet 100 mg  100 mg Oral DAILY    hydroCHLOROthiazide (HYDRODIURIL) tablet 25 mg  25 mg Oral DAILY       No Known Allergies      Review of Systems:  HEENT: No epistaxis, no nasal drainage, no difficulty in swallowing  Respiratory: as above  Cardiovascular: no chest pain, no palpitations, no chronic leg edema, no syncope  Gastrointestinal: no abd pain, no vomiting, no diarrhea, no bleeding symptoms  Genitourinary: No urinary symptoms  Integument/breast: No ulcers  Musculoskeletal:Neg  Neurological: No focal weakness, no seizures, no headaches  Behvioral/Psych: No anxiety, no depression  Constitutional: No fever, no chills, no weight loss, no night sweats  SLEEP: No snoring, no witnessed apneas, no daytime somnolence, no morning headaches. Sleep refreshed. Objective:   Vital Signs:    Visit Vitals  /81   Pulse 80   Temp 97.9 °F (36.6 °C)   Resp 18   Ht 6' (1.829 m)   Wt (!) 163.5 kg (360 lb 7.2 oz)   SpO2 97%   BMI 48.89 kg/m²       O2 Device: Room air       Temp (24hrs), Av.8 °F (36.6 °C), Min:97 °F (36.1 °C), Max:98.6 °F (37 °C)       Intake/Output:     Intake/Output Summary (Last 24 hours) at 1/15/2019 0952  Last data filed at 2019 1834  Gross per 24 hour   Intake 360 ml   Output --   Net 360 ml           Physical Exam:   General: middle-aged black female, appears younger than stated age. Laying in bed. NAD. Left ankle wrapped with ACE bandage. HEENT: normocephalic, atraumatic, EOM intact. Trachea midline. CVS: S1S2 no murmurs  RS: Clear throughout, no wheezing, rales, or rhonchi. No conversational dyspnea. Abd: soft, non tender, + bowel sounds. Neuro: alert & oriented. CN 2-12 intact.   Speech is clear, spontaneous  Extrm: left ankle with ACE wrap in place  Skin: no rash    Data review:   Labs:  Recent Results (from the past 12 hour(s))   GLUCOSE, POC    Collection Time: 01/14/19 10:25 PM   Result Value Ref Range    Glucose (POC) 277 (H) 70 - 110 mg/dL   CBC WITH AUTOMATED DIFF    Collection Time: 01/15/19  3:45 AM   Result Value Ref Range    WBC 9.4 4.6 - 13.2 K/uL    RBC 4.89 4.20 - 5.30 M/uL    HGB 13.0 12.0 - 16.0 g/dL    HCT 40.9 35.0 - 45.0 %    MCV 83.6 74.0 - 97.0 FL    MCH 26.6 24.0 - 34.0 PG    MCHC 31.8 31.0 - 37.0 g/dL    RDW 19.9 (H) 11.6 - 14.5 %    PLATELET 078 552 - 482 K/uL    MPV 11.6 9.2 - 11.8 FL    NEUTROPHILS 58 40 - 73 %    LYMPHOCYTES 32 21 - 52 %    MONOCYTES 9 3 - 10 %    EOSINOPHILS 1 0 - 5 %    BASOPHILS 0 0 - 2 %    ABS. NEUTROPHILS 5.5 1.8 - 8.0 K/UL    ABS. LYMPHOCYTES 3.0 0.9 - 3.6 K/UL    ABS. MONOCYTES 0.9 0.05 - 1.2 K/UL    ABS. EOSINOPHILS 0.1 0.0 - 0.4 K/UL    ABS. BASOPHILS 0.0 0.0 - 0.1 K/UL    DF AUTOMATED     METABOLIC PANEL, COMPREHENSIVE    Collection Time: 01/15/19  3:45 AM   Result Value Ref Range    Sodium 139 136 - 145 mmol/L    Potassium 3.0 (L) 3.5 - 5.5 mmol/L    Chloride 105 100 - 108 mmol/L    CO2 25 21 - 32 mmol/L    Anion gap 9 3.0 - 18 mmol/L    Glucose 215 (H) 74 - 99 mg/dL    BUN 31 (H) 7.0 - 18 MG/DL    Creatinine 1.04 0.6 - 1.3 MG/DL    BUN/Creatinine ratio 30 (H) 12 - 20      GFR est AA >60 >60 ml/min/1.73m2    GFR est non-AA 56 (L) >60 ml/min/1.73m2    Calcium 8.3 (L) 8.5 - 10.1 MG/DL    Bilirubin, total 0.3 0.2 - 1.0 MG/DL    ALT (SGPT) 12 (L) 13 - 56 U/L    AST (SGOT) 4 (L) 15 - 37 U/L    Alk.  phosphatase 81 45 - 117 U/L    Protein, total 7.7 6.4 - 8.2 g/dL    Albumin 3.1 (L) 3.4 - 5.0 g/dL    Globulin 4.6 (H) 2.0 - 4.0 g/dL    A-G Ratio 0.7 (L) 0.8 - 1.7     MAGNESIUM    Collection Time: 01/15/19  3:45 AM   Result Value Ref Range    Magnesium 2.3 1.6 - 2.6 mg/dL   GLUCOSE, POC    Collection Time: 01/15/19  8:41 AM   Result Value Ref Range    Glucose (POC) 184 (H) 70 - 110 mg/dL     Imaging:  I have personally reviewed the patients radiographs and have reviewed the reports:    IMPRESSION:   · Provoked Submassive Pulmonary Embolism in the Setting of Prolonged Travel & Left Ankle Fracture  · Medical Comorbidities: HTN, DM complicated by peripheral neuropathy, obesity, Crohn's disease      RECOMMENDATIONS:   · Discontinue eliquis given plans for surgical procedure (last dose 1/14/19 @ 1726)  · Start heparin infusion (no initial bolus given recent anticoagulation)   · She will need a 48 hour eliquis washout period prior to proceeding with surgical intervention  · Management of fracture per orthopedics  · Ongoing glycemic control to promote wound healing  · Thank you for allowing us to participate in the care of this patient. We will continue to follow.    · DVT prophylaxis: therapeutic heparin   Thank you for the consultation   Total time spent 50 mins with more than 50% done face to face interview / exam / counseling       Enrique Hess DO

## 2019-01-15 NOTE — PROGRESS NOTES
[facility]    I spoke with Dr Sylvester Peterson this morning. Gabriel Diaz will require I and D of her fibula wound. .     1. Will for I and D and wound re approximation on Friday: 1/18/19  2.  Anticoagulation: management per Pulmonary service (respectfully)      Milo Gallardo MD  1/15/2019  2:11 PM

## 2019-01-15 NOTE — ROUTINE PROCESS
Bedside and Verbal shift change report given to Berhane Zarco RN (oncoming nurse) by JACQUELINE Lamar (offgoing nurse). Report included the following information SBAR, Kardex, ED Summary, Intake/Output, MAR and Recent Results.

## 2019-01-15 NOTE — H&P (VIEW-ONLY)
[facility] Cb Tian 
MR#: [id number] : 1970 ACCOUNT #: [de-identified] DATE OF SERVICE: 2019 REASON FOR CONSULTATION:  Wound, lateral portion of her left ankle, status post ORIF left ankle fracture, surgery done in [place]. IMPRESSION: 
1. Wound dehiscence to a 1.5-2 cm region of her surgical incision with exposed subcutaneous tissue, the wound is about a centimeter deep, is to 2 cm long, with some granulation tissue peripherally, no gross pus, no malodor, no visible hardware at this time. There is visible suture that I can see, small strands that looks like suture. Left ankle Fx dislocation/noncompliance with non weight bearing. 2.  History of Crohn disease, immunosuppression, on steroids. 3.  Immunosuppression on steroids due to above Crohn's disease. 4.  Hypertension. 5.  Obesity. 6.  Diabetes with peripheral neuropathy. 7.  Chronic pain approximately due to peripheral neuropathy. PLAN: 
1.  PE, patient is on Eliquis for PE.   
 
2.  Crohn's disease, she is on a steroid taper. 3.  Left ankle fracture, with a postop wound:  Recommendation in this individual with high risk for wound complications, diabetes and on steroids (immunosuppression). I am recommending a formal I and D of this surgical incision, there with re approximation of the incision. I had a long discussion with her regarding her condition. She has a neuropathy. She has been instructed to be nonweightbearing, but she admits to be weightbearing. This patient has had at least 4 surgeries on her ankle, since 2018 for her severe ankle fracture dislocation. I will need to talk to medical team to see when I can take her to OR. Hope for Wed 19. 
 
4.  Her medical problems, hypertension, diabetes will be followed by the internal medicine doctors.  
 
HISTORY OF PRESENT ILLNESS:  The patient is a 61-year-old female who describes slipping and twisting her left ankle, dislocating her left ankle. So she had a fractured ankle dislocation which she describes in [place], 12/03/2018. She has had 4 surgeries, since while in [place]. Her first surgery, she describes is for closed reduction under general anesthesia. Her second surgery, she describes was a closed reduction with a temporizing external fixator placement. Her third surgery described was ORIF for the severe left ankle fracture dislocation with a long fibular plate and multiple syndesmotic screws, typical construct that we use for diabetics obese patients, but also diabetics of peripheral neuropathy, knowing that it takes twice as long to heal these type of fractures, and knowing that there is a high risk for wound complications and high risk for Charcot neuroarthropathic changes. Her fourth surgery was removal of external fixator, and retention of the external fixator. She arrives here, apparently last week, Tuesday, after a long car ride, with her daughter who is a nurse. 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 She had pain and discomfort in her ankle, as such she went to [facility] facility for an assessment of her ankle. She states they did not take her Steri-Strips off, they have sent her home after an assessment of her left ankle. While at home, her daughter took off the Steri-Strips and taking out the Steri-Strips her daughter discovered a whole or wound on the lateral portion of her ankle. . 
 
So as such, the patient was seen at [facility] 01/07/2019 and records indicate that she had received a prescription for Zofran, prednisone, Percocet was discharged to home. She was admitted on 01/11/2019 here at [facility] because she is having pain and discomfort. She had a workup, here, that showed a DVT and PE.   She was in a posterior splint, at that time, but when I saw her today, she was no longer in any type of splint. She was sitting at the bedside with both feet down. She admits to walking on her ankle, despite having instructions from her physician, in [place] to be nonweightbearing for this type of fracture. She is denying any fever, shakes or chills. PAST MEDICAL HISTORY:  Diabetes, hypertension, Crohn's colitis. ALLERGIES:  NONE. REVIEW OF SYSTEMS:  Denies any fevers, shakes, chills, night sweats, lightheadedness, dizziness, shortness of breath at this time, abdominal pain, wheezing or bruising:  But she does mention having a wound to the lateral portion of her ankle. FAMILY HISTORY:  She has had a daughter and a son. PHYSICAL EXAMINATION:  Exam today, 01/14/2019 at 5:45 p.m. GENERAL:  Alert, follows commands, in no acute distress. EXTREMITIES:  Her left ankle is examined. She has a dressing on her left ankle that was removed. On examining her left ankle, she has to 1-2 cm wound along the lateral portion of her upper fibula surgical incision. This area has not healed. Has pink and yellowish granulation tissue. No gross pus. With verbal consent, I just gently looked at this. I gently probed this with a sterile Q-tip, probe was about a centimeter deep. It is about 2.5 cm long. It looks that there I could see some absorbable sutures, that is visible there. I see no metal. 
 
She can dorsiflex and plantarflexion her ankle quite well. She has good peripheral pulses. She has intact sensation to light touch. I did not perform monofilament testing on her at this current time. I do not have monofilament testing that is available for me currently on my person. Her calf is soft, non tender. She has numerous varicose veins to her left lower extremity. Left calf, foot and ankle has numerous varicose veins. She is non tender to her foot. She can flex and extend her toes quite well.   This is functional range of motion of her ankle as well as a dorsal to plantar flexion. I did not stress with inversion, eversion I did not test for instability. HEENT:  Normocephalic. Eyes are not icteric. NECK:  Non tender to her neck. Normal range of motion. LUNGS:  Clear with no audible wheezing from her mouth. ABDOMEN:  Abdomen is obese, non tender. LABORATORY DATA:  From 01/14/2019, her white count is 8.6, hemoglobin is 12.6, hematocrit is 39.5, platelet count is 670. Chemistry:  Sodium 137, potassium 3.5, chloride 103, bicarb 27, glucose 115 at 1:55 a.m. on 01/14/2019. BUN 19, creatinine 1.5, calcium low at 8.4, albumin low at 3.1. Liver function tests normal. 
 
X-rays of her left ankle, three views of the ankle, AP and oblique obtained on 01/12/2019: These films show postop changes of her left ankle fracture. She has a fibular plate, with 2 tricortical purchase screws syndesmotic screws. The ankle appears to be anatomically aligned. A CTA, 01/11/2019:  Patient has a positive pulmonary emboli, small to moderate overall clot burden which is more significant in the right lung. There is mildly dilated main pulmonary artery, but no other CT evidence of heart strain. I had an at length discussion with her. She is a 18-year-old female who has diabetic neuropathy in her feet, although she did not test for monofilament testing. She described to me that this is what she and has a diagnosis of diabetic neuropathy. She has had a complex ankle fracture dislocation, having had 4 ankle surgeries since 12/03/2018. Her last surgery was a roughly she states on 12/08/2018. So a little over a month from her most recent surgery. She has a wound, incisional dehiscence to a portion of her incision. I see no metal at this point in probing the wound. I did take cultures, aerobic and anaerobic cultures. Sterile dressings were placed consisting of Telfa, 4 x 4s, 4-inch Kerlix and Ace wrap.  
 
Because of this wound, and because it is in close proximity to the plate, I will discuss with the internal medicine team, cardiology team to see whether she can go to the OR for at least a quick I and D or an I and D of this wound and then with re approximation of this wound with some monofilament suture. She understands she must remain nonweightbearing. She is high risk for limb amputation, and by virtue of having diabetes, by virtue of having neuropathy and having complex ankle fracture, fracture dislocation, although the alignment appears to be anatomic, the construct is not stable for her to weightbearing, should she lose her  alignment and then she may having ankle fusion and worst case scenario a limb amputation if she developed severe infection recalcitrant to all surgical measures. I will also request for infectious disease, Dr. My Gil to see her of infectious disease on consultation. MD SUZANNA Bass / KAVYA 
D: 01/14/2019 18:19    
T: 01/14/2019 19:30 
JOB #: [id number]

## 2019-01-15 NOTE — DIABETES MGMT
Glycemic Control Plan of Care    T2DM with current A1c of 8.0% (1/11/2019). See separate notes, 01/15/2019, for partial assessment of home diabetes management. 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 She has no issues, concerns, or questions about diabetes management. Home diabetes medications: Lantus insulin 30 units daily at bedtime and humalog sliding scale insulin . POC BG range on 01/14/2019: 218-277 mg/dL. POC BG report on 01/15/2019 at time of review: 184, 242 mg/dL. Patient is currently on prednisone 10 mg daily. Recommendation(s):  1.) Continue inpatient glycemic monitoring and  orders: basal lantus, prandial insulin TIDAC, very resistant dose of correctional lispro insulin, and Januvia. 2.) Modified diabetic diet order by adding no concentrated sweets. Assessment:  Patient is 46year old with past medical history including type 2 diabetes mellitus, hypertension, Crohn's disease, and recent ORIF left ankle in [place] after sustaining a fall - was admitted on 01/14/2019 with report of chest pain and shortness of breath. Noted:  Left ankle wound infection. Acute pulmonary embolism. Patient travelled from [place] after recent left ankle surgery. Status post ORIF left lateral ankle wound. Crohn's disease. T2DM with current A1c of 8.0% (1/11/2019). Most recent blood glucose values:    Results for Judythe Daily (MRN [id number]) as of 1/15/2019 16:20   Ref. Range 1/14/2019 08:59 1/14/2019 12:21 1/14/2019 17:25 1/14/2019 22:25   GLUCOSE,FAST - POC Latest Ref Range: 70 - 110 mg/dL 245 (H) 218 (H) 240 (H) 277 (H)     Results for Judythe Daily (MRN [id number]) as of 1/15/2019 16:20   Ref. Range 1/15/2019 08:41 1/15/2019 11:13   GLUCOSE,FAST - POC Latest Ref Range: 70 - 110 mg/dL 184 (H) 242 (H)     Current A1C: 8.0% (1/24/2019) which is equivalent to estimated average blood glucose of 183 mg/dL during the past 2-3 months.     Current hospital diabetes medications:  Basal lantus insulin 30 units daily. Prandial lispro insulin 4 units TID AC. Correctional lispro insulin ACHS. Very resistant dose. Januvia 50 mg daily, first dose ordered 01/15/2019. Total daily dose insulin requirement previous day: 01/14/2019:  Lantus: 25 units  Lispro: 30 units  TDD insulin: 55 units     Home diabetes medications: Patient reported on 01/15./2019:  Lantus insulin 30 units daily at bedtime. Humalog sliding scale insulin. Diet: Diabetic consistent carb regular; no concentrated sweets. Goals:  Blood glucose will be within target range of  mg/dL by 01/18/2019    Education:  _X__  Refer to Diabetes Education Record: 01/15/2019.  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             ___  Education not indicated at this time    Haily Kent RN [facility]  Pager: [phone]

## 2019-01-15 NOTE — DIABETES MGMT
Diabetes Patient/Family Education Record  Factors That  May Influence Patients Ability  to Learn or  Comply with Recommendations   []   Language barrier    []   Cultural needs   []   Motivation    []   Cognitive limitation    []   Physical   [x]   Education    []   Physiological factors   []   Hearing/vision/speaking impairment   []   Zoroastrianism beliefs    []   Financial factors   []  Other:   []  No factors identified at this time. Person Instructed:   [x]   Patient   [x]   Family:  Daughter visiting at bedside and received info that she's an LPN. 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 She has no issues, concerns or questions. [x]  Medication management, including how to administer insulin (if appropriate) and potential medication interactions: Yes. Patient reported home diabetes medications:  Lantus insulin 30 units daily at bedtime. Humalog sliding scale insulin. []  Nutritional management -obtain usual meal pattern   []  Exercise   [x]  Signs, symptoms, and treatment of hyperglycemia and hypoglycemia: Yes. 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 [x] Prevention, recognition and treatment of hyperglycemia and hypoglycemia: Yes. 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 [x]  Importance of blood glucose monitoring and how to obtain a blood glucose meter: Yes. Patient stated that she has BG meter and testing supplies at home. She is monitoring her blood sugar at home as recommended by her medical provider.     [] Instruction on use of the blood glucose meter   [x]  Discuss the importance of HbA1C monitoring: Yes. Discussed with patient that her current A1c level is 8.0% (1/11/2019) which is equivalent to estimated average blood glucose of 183 mg/dL. Patient reported that her prior A1c was also 8.0% which is actually down from 11% previously.     []  Sick day guidelines   []  Proper use and disposal of lancets, needles, syringes or insulin pens (if appropriate)   []  Potential long-term complications (retinopathy, kidney disease, neuropathy, foot care)   [] Information about whom to contact in case of emergency or for more information    [x]  Goal:  Patient/family will demonstrate understanding of Diabetes Self Management Skills by: 01/22/2019  Plan for post-discharge education or self-management support:    [x] Outpatient class schedule provided            [] Patient Declined    [] Scheduled for outpatient classes (date) _______  Verify:  Does patient understand how diabetes medications work? ____________________________  Does patient know what their most recent A1c is? ___________________________________  Does patient monitor glucose at home? ___________________________________________  Does patient have a glucometer, testing supplies or difficulty obtaining diabetes medications? _________________     Bharat Singh RN  pgr [phone]

## 2019-01-15 NOTE — ROUTINE PROCESS
Bedside and Verbal shift change report given to Anali Sanchez RN (oncoming nurse) by Harshal Chopra RN (offgoing nurse). Report included the following information SBAR, Kardex, MAR and Recent Results.     SITUATION:  Code Status: Full Code  Reason for Admission: Pulmonary emboli [facility])  Pulmonary emboli [facility])  Hospital day: 1  Problem List:       Hospital Problems  Date Reviewed: 1/12/2019          Codes Class Noted POA    Dehiscence of incision, initial encounter ICD-10-CM: T81.31XA  ICD-9-CM: 998.32  1/14/2019 Unknown        Pulmonary emboli ([facility] [address] ICD-10-CM: I26.99  ICD-9-CM: 415.19  1/12/2019 Unknown        Accelerated hypertension ICD-10-CM: I10  ICD-9-CM: 401.0  1/12/2019 Yes        Crohn's colitis ([facility] [address] ICD-10-CM: K50.10  ICD-9-CM: 555.1  1/12/2019 Yes        Wound of left ankle ICD-10-CM: Z37.743Q  ICD-9-CM: 891.0  1/12/2019 Yes        Status post open reduction with internal fixation (ORIF) of fracture of ankle ICD-10-CM: Z96.7, Z87.81  ICD-9-CM: V45.89, V15.51  1/12/2019 Yes              BACKGROUND:   Past Medical History:   Past Medical History:   Diagnosis Date    Crohn's colitis ([facility] [address]     Diabetes ([facility] [address]     HTN (hypertension)       Patient taking anticoagulants yes    Patient has a defibrillator: no    History of shots YES for example, flu, pneumonia, tetanus   Isolation History NO for example, MRSA, CDiff    ASSESSMENT:  Changes in Assessment Throughout Shift: None  Significant Changes in 24 hours (for example, RR/code, fall)  Patient has Central Line: no Reasons if yes: N/A  Patient has Doss Cath: no Reasons if yes: N/A   Mobility Issues  PT  IV Patency  OR Checklist  Pending Tests    Last Vitals:  Vitals w/ MEWS Score (last day)     Date/Time MEWS Score Pulse Resp Temp BP Level of Consciousness SpO2    01/15/19 0851  1  80  18  97.9 °F (36.6 °C)  140/81  Alert  97 %    01/15/19 0415  1  87  18  97.6 °F (36.4 °C)  125/79  Alert  97 %    01/15/19 0018  1  96  18  98.6 °F (37 °C)  148/103  (Abnormal) Alert  96 %    01/14/19 2025  1  87  20  98.2 °F (36.8 °C)  126/70  Alert  97 %    01/14/19 1540  1  74  19  97 °F (36.1 °C)  119/71  Alert  97 %    01/14/19 1200  1  68  18  97.2 °F (36.2 °C)  118/74  Alert  98 %    01/14/19 1040  --  --  --  --  136/88  --  --    01/14/19 0900  1  81  18  97.4 °F (36.3 °C)  136/88  Alert  92 %    01/14/19 0435  1  79  18  98.2 °F (36.8 °C)  151/85  Alert  97 %    01/14/19 0002  1  78  18  98.1 °F (36.7 °C)  110/72  Alert  95 %            PAIN    Pain Assessment    Pain Intensity 1: 0 (01/15/19 0740)    Pain Location 1: Ankle    Pain Intervention(s) 1: Medication (see MAR)    Patient Stated Pain Goal: 0  Intervention effective: yes  Time of last intervention: 0519 Reassessment Completed: yes   Other actions taken for pain: None    Last 3 Weights:  Last 3 Recorded Weights in this Encounter    01/12/19 0520 01/14/19 1040 01/15/19 0646   Weight: 144.2 kg (318 lb) 144.2 kg (318 lb) (!) 163.5 kg (360 lb 7.2 oz)   Weight change:     INTAKE/OUPUT    Current Shift: No intake/output data recorded. Last three shifts: 01/13 1901 - 01/15 0700  In: 600 [P.O.:600]  Out: -     RECOMMENDATIONS AND DISCHARGE PLANNING  Patient needs and requests: Pain control    Pending tests/procedures: I&D of L ankle surgical wound     Discharge plan for patient: Home    Discharge planning Needs or Barriers: None    Estimated Discharge Date: TBD Posted on Whiteboard in Patients Room: no       \"HEALS\" SAFETY CHECK  A safety check occurred in the patient's room between off going nurse and oncoming nurse listed above. The safety check included the below items:    H  High Alert Medications Verify all high alert medication drips (heparin, PCA, etc.)  E  Equipment Suction is set up for ALL patients (with elliotker)  Red plugs utilized for all equipment (IV pumps, etc.)  WOWs wiped down at end of shift.   Room stocked with oxygen, suction, and other unit-specific supplies  A  Alarms Bed alarm is set for fall risk patients  Ensure chair alarm is in place and activated if patient is up in a chair  L  Lines Check IV for any infiltration  Doss bag is empty if patient has a Doss   Tubing and IV bags are labeled  S  Safety  Room is clean, patient is clean, and equipment is clean. Hallways are clear from equipment besides carts. Fall bracelet on for fall risk patients  Ensure room is clear and free of clutter  Suction is set up for ALL patients (with yanker)  Hallways are clear from equipment besides carts.    Isolation precautions followed, supplies available outside room, sign posted    Naga Power RN

## 2019-01-15 NOTE — PROGRESS NOTES
1230 Received report and assumed pt care from Rl Cali RN.    1300 ID MD Mackay assessment of R ankle. Pain level 7 of 10 to R ankle, prn Ultracet given as ordered, dressing changes as ordered with Aquacel ribbon to 1 cm wound.

## 2019-01-15 NOTE — PROGRESS NOTES
[facility]    Progress Note      Patient: Wally Montoya               Sex: female          DOA: 1/11/2019         YOB: 1970      Age:  46 y.o.        LOS:  LOS: 1 day         S/P  * No surgery found *               Subjective: Moderate pain upset she needs to go back to surgery    Denies cp, sob, abd pain   Objective:      Blood pressure 125/79, pulse 87, temperature 97.6 °F (36.4 °C), resp. rate 18, height 6' (1.829 m), weight (!) 360 lb 7.2 oz (163.5 kg), SpO2 97 %.    Well developed, Well Nourished alert, cooperative, no distress  Incision with 3cm wound, another suture noted  Sensory is intact   Motor is intact  nv intact  2+distal pulses  Neg calf tenderness    Labs:  CBC  @  CBC:   Lab Results   Component Value Date/Time    WBC 9.4 01/15/2019 03:45 AM    RBC 4.89 01/15/2019 03:45 AM    HGB 13.0 01/15/2019 03:45 AM    HCT 40.9 01/15/2019 03:45 AM    PLATELET 867 51/33/0185 03:45 AM     BMP:   Lab Results   Component Value Date/Time    Glucose 215 (H) 01/15/2019 03:45 AM    Sodium 139 01/15/2019 03:45 AM    Potassium 3.0 (L) 01/15/2019 03:45 AM    Chloride 105 01/15/2019 03:45 AM    CO2 25 01/15/2019 03:45 AM    BUN 31 (H) 01/15/2019 03:45 AM    Creatinine 1.04 01/15/2019 03:45 AM    Calcium 8.3 (L) 01/15/2019 03:45 AM   @  Coagulation  Lab Results   Component Value Date    INR 1.0 01/11/2019      Basic Metabolic Profile  Lab Results   Component Value Date     01/15/2019    CO2 25 01/15/2019    BUN 31 (H) 01/15/2019       Medications Reviewed      Assessment/Plan     Active Problems:    Pulmonary emboli ([address] (1/12/2019)      Accelerated hypertension (1/12/2019)      Crohn's colitis ([address] (1/12/2019)      Wound of left ankle (1/12/2019)      Status post open reduction with internal fixation (ORIF) of fracture of ankle (1/12/2019)      Dehiscence of incision, initial encounter (1/14/2019)        * No surgery found * :     1. Dehiscence worsening - will need I&D and wound closure. Patient to be taken to OR by Dr. Precious Rodriguez  2. Case complicated by PE - will need to be off Eliquis will ask Dr. Florin Long when she can be taken to OR  3. Cont NWB with CAM boot LLE    4.  DISCHARGE PLANNING     Intervention : [address] [facility], KATHRINE [address] [facility]  [phone]

## 2019-01-15 NOTE — PROGRESS NOTES
Pulmonary:     Team spoke with orthopedic surgery-- noted plans for I&D with wound closure. Will discontinue eliquis & transition to a heparin infusion (no initial bolus given recent anticoagulation with eliquis). Would recommend a 48 hour eliquis washout period prior to preceding with surgery.       Dandre García  [facility] Fellow  1/15/18 @ 0771

## 2019-01-15 NOTE — PROGRESS NOTES
Progress Note    Patient: Gabriela Carter MRN: [id number]  CSN: [id number]    YOB: 1970  Age: 46 y.o. Sex: female    DOA: 1/11/2019 LOS:  LOS: 1 day                    Subjective:     Pt is feeling better Bp improved no chest pain no SOB . Seen by ortho for Lt ankle pain and wound S/P ORIF in [place]. Wound care recommended santyl for wound care Lt ankle . She would like to discuss with ortho before discharge. Ortho eval, Dr. Lorraine Adrian recommending OR eval for I&D with possible re-approximation of wound. Patient on eliquis for acute DVT with PE. Transitioned to IV heparin drip, Pulmonology recommending 48hr washout from eliquis prior to OR. Discussed with RN, eliquis discontinued today. Discussed with case jared , will discuss with pt SNF VS home health and personal care at home    Pt has h/o crohn's slight flare up on admission ct scan done at [facility] before admission started  . prednisone usually pt on Imuran 200 mg daily and Humara  Injection q Sunday. Still having some abdominal discomfort, no diarrhea or blood in stool. Echo 1/14/19  · Estimated left ventricular ejection fraction is 56 - 60%. Visually measured ejection fraction. Left ventricular mild concentric hypertrophy. Normal left ventricular wall motion, no regional wall motion abnormality noted. · Mild aortic root dilatation. · There is no evidence of pulmonary hypertension. [facility] Pearl Campbell is a 46 y.o. female with h/o diabetes, HTN and Crohn's colitis who presents to ED complaining of acute, moderate generalized CP onset PTA. The pt indicated that when she is at rest, she still experiences CP. She also stated that on 12/3 she had an ORFI of the left ankle after fall and fracture at [facility]. Pt had external fixator at first and then had ORIF later . Pt had open wound lateral Lt ankle for 1-2 weeks had some drainage .  Pt had h/o peripheral neuropathy  But she still feeling the pain.     Pt was seen at [facility] ER before admission to [facility] pt had venous duplex ultrasound leg and she was told it was negative      Pt also had CT scan of abdomen at [facility]  And she was told that she had crohn's flare up and started on prednisone. Pt usually she is on Imuran 50 mg daily and Humara 40 mg injection weekly       Pt initially was admitted to the hospitalist  service and switched to me since pt will need PCP in the area .     CXR  B/L atelactasis        CTA chest  Positive for pulmonary emboli with a small to moderate overall clot burden which  is more significant in the right lung. Mildly dilated main pulmonary artery, but  no other CT evidence for significant right heart strain.    Left adrenal nodule is low-density and most likely a benign adenoma.      Ct abdomen and pelvis at [facility]    1. Mild, long segment thickening of the descending colonic wall suggestive of inflammatory or infectious colitis. 2. Fibroid uterus. 3. Stable left adrenal nodule is indeterminate by imaging. This can be subsequently evaluated with adrenal protocol CT or MRI on a nonemergent basis.           Chief Complaint:   Chief Complaint   Patient presents with    Chest Pain       Review of systems  General: No fevers or chills. Cardiovascular: No chest pain or pressure. No palpitations. Pulmonary: No shortness of breath, cough or wheeze. Gastrointestinal: Mild abdominal pain improving. No nausea, vomiting or diarrhea or blood in stool   Genitourinary: No urinary frequency, urgency, hesitancy or dysuria. Musculoskeletal:  Lt ankle pain and swelling , peripheral neuropathy  Neurologic: No headache,  no seizure peripheral neuropathy        Objective:     Physical Exam:  Visit Vitals  /81   Pulse 80   Temp 97.9 °F (36.6 °C)   Resp 18   Ht 6' (1.829 m)   Wt (!) 163.5 kg (360 lb 7.2 oz)   SpO2 97%   BMI 48.89 kg/m²        General:   Alert, no acute distress    HEENT: NC, Atraumatic. PERRLA, anicteric sclerae.   Lungs: CTA Bilaterally. No Wheezing/Rhonchi/Rales. Heart: Regular  rhythm,  No murmur, No Rubs, No Gallops  Abdomen:   Soft, Non distended, tender bilateral lower quadrant and left upper quadrant mild  Extremities:    swelling Lt ankle and discoloration improving dressing Lt lateral ankle  Psych:  Not anxious or agitated. Neurologic:    CN 2-12 grossly intact, Alert and oriented X 3. No acute neurological                                 Deficits,          Intake and Output:  Current Shift:  No intake/output data recorded. Last three shifts:  01/13 1901 - 01/15 0700  In: 600 [P.O.:600]  Out: -     Labs: Results:       Chemistry Recent Labs     01/15/19  0345 01/14/19  0155 01/13/19  0419   * 270* 184*    137 140   K 3.0* 3.5 3.6    103 105   CO2 25 27 26   BUN 31* 19* 15   CREA 1.04 1.15 0.75   CA 8.3* 8.4* 8.3*   AGAP 9 7 9   BUCR 30* 17 20   AP 81 82  --    TP 7.7 7.8  --    ALB 3.1* 3.1*  --    GLOB 4.6* 4.7*  --    AGRAT 0.7* 0.7*  --       CBC w/Diff Recent Labs     01/15/19  0345 01/14/19  0155   WBC 9.4 8.6   RBC 4.89 4.71   HGB 13.0 12.6   HCT 40.9 39.5    379   GRANS 58 63   LYMPH 32 28   EOS 1 1      Cardiac Enzymes No results for input(s): CPK, CKND1, ALLEN in the last 72 hours. No lab exists for component: CKRMB, TROIP   Coagulation No results for input(s): PTP, INR, APTT in the last 72 hours. No lab exists for component: INREXT, INREXT    Lipid Panel Lab Results   Component Value Date/Time    Cholesterol, total 259 (H) 01/13/2019 04:19 AM    HDL Cholesterol 53 01/13/2019 04:19 AM    LDL, calculated 140.2 (H) 01/13/2019 04:19 AM    VLDL, calculated 65.8 01/13/2019 04:19 AM    Triglyceride 329 (H) 01/13/2019 04:19 AM    CHOL/HDL Ratio 4.9 01/13/2019 04:19 AM      BNP No results for input(s): BNPP in the last 72 hours.    Liver Enzymes Recent Labs     01/15/19  0345   TP 7.7   ALB 3.1*   AP 81   SGOT 4*      Thyroid Studies No results found for: T4, T3U, TSH, TSHEXT, TSHEXT Procedures/imaging: see electronic medical records for all procedures/Xrays and details which were not copied into this note but were reviewed prior to creation of Plan    Medications:   Current Facility-Administered Medications   Medication Dose Route Frequency    insulin lispro (HUMALOG) injection   SubCUTAneous AC&HS    potassium chloride (K-DUR, KLOR-CON) SR tablet 40 mEq  40 mEq Oral NOW    heparin 25,000 units in D5W 250 ml infusion  18-36 Units/kg/hr IntraVENous TITRATE    SITagliptin (JANUVIA) tablet 50 mg  50 mg Oral DAILY    [START ON 1/16/2019] insulin glargine (LANTUS) injection 30 Units  30 Units SubCUTAneous DAILY    azaTHIOprine (IMURAN) tablet 200 mg  200 mg Oral DAILY AFTER BREAKFAST    predniSONE (DELTASONE) tablet 10 mg  10 mg Oral DAILY WITH BREAKFAST    morphine injection 2 mg  2 mg IntraVENous Q4H PRN    gabapentin (NEURONTIN) capsule 300 mg  300 mg Oral TID    hydrALAZINE (APRESOLINE) tablet 100 mg  100 mg Oral TID    atorvastatin (LIPITOR) tablet 20 mg  20 mg Oral QHS    acetaminophen (TYLENOL) tablet 650 mg  650 mg Oral Q4H PRN    insulin lispro (HUMALOG) injection 4 Units  4 Units SubCUTAneous TIDAC    glucose chewable tablet 16 g  4 Tab Oral PRN    glucagon (GLUCAGEN) injection 1 mg  1 mg IntraMUSCular PRN    dextrose (D50W) injection syrg 12.5-25 g  25-50 mL IntraVENous PRN    traMADol-acetaminophen (ULTRACET) per tablet 1 Tab  1 Tab Oral Q4H PRN    albuterol-ipratropium (DUO-NEB) 2.5 MG-0.5 MG/3 ML  3 mL Nebulization Q4H PRN    metoprolol tartrate (LOPRESSOR) tablet 25 mg  25 mg Oral BID    cloNIDine HCl (CATAPRES) tablet 0.1 mg  0.1 mg Oral Q8H PRN    ondansetron (ZOFRAN) injection 4 mg  4 mg IntraVENous Q6H PRN    pantoprazole (PROTONIX) tablet 40 mg  40 mg Oral ACB&D    losartan (COZAAR) tablet 100 mg  100 mg Oral DAILY    hydroCHLOROthiazide (HYDRODIURIL) tablet 25 mg  25 mg Oral DAILY       Assessment/Plan     Active Problems:    Pulmonary emboli (HCC) (1/12/2019)      Accelerated hypertension (1/12/2019)      Crohn's colitis ([address] (1/12/2019)      Wound of left ankle (1/12/2019)      Status post open reduction with internal fixation (ORIF) of fracture of ankle (1/12/2019)      Dehiscence of incision, initial encounter (1/14/2019)        Plan     Acute Pulmonary Embolism/ S/P travel from [place] and S/p surgery Lt ankle  Has been tolerating Eliquis, will hold and transition to heparin drip for OR planning for left ankle, pulmonology consult recommending 48hrs off eliquis prior to procedure  H&H stable  Continue to monitor sx and o2 sat  Resume eliquis when safe from post-op standpoint for anticoagulation        S/P ORIF  Lt lateral ankle wound  Ortho consult, Dr. Sobeida Steele, recommending OR intervention with I&D and possible wound re-approximation, awaiting eliquis washout  Wound cultures pending from 1/14/19  Consult placed to Dr. Jhony Nixon, ID, per Dr. Sobeida Steele for this non-healing wound, not currently on Antibiotics  x-ray Lt ankle stable 1/12/19  Pt at higher risk for infection with her immune suppression status from Chron's on steroid, imuran, and humara   Morphine 2 mg IV q 4h prn   Neurontin 300 mg q8h  Helping her pain     Crohn's disease  Taper steroids to 10 mg daily today, taper off as able, still having some abdominal pain reporting overall improving  Restarted Imuran 200 mg daily in AM  Pt on Imuran and humara she will need GI follow up  Protonix 40 mg daily  Continue Zofran prn nausea     Diabetes Mellitus type 2  HG A1c 8.0 on 1/11/19  Takes levemir 30units at home, will increase lantus to 30 units daily, continue high dose SSI, continue novolog 4units AC, add januvia. Consider metformin on discharge if no further imaging studies planned.    - Started on Lipitor 20 mg qhs     Uncontrolled / accelerated Hypertension  Increased hydralazine to 100  mg TID  Losartan 100 mg daily   HCTZ 25 mg daily  Lopressor increase to 50mg bid  Pt on clonidine 0.1mg q8 prn sbp >150  Monitor cr level  Echo with normal EF    Hypokalemia  40meq po kclx1 now.   Pt reports history of hypokalemia with acute illness, monitor, check K+ and mg in am.      Pt and ot evaluation and treatment    DVT/GI Prophylaxis: H2B/PPI and heparin drip         Bry Briceño MD  1/15/2019 [id number]

## 2019-01-15 NOTE — CONSULTS
[address] [place] [address]  MR#: [id number]  : 1970  ACCOUNT #: [de-identified]   DATE OF SERVICE: 2019    REASON FOR CONSULTATION:  Wound, lateral portion of her left ankle, status post ORIF left ankle fracture, surgery done in [place]. IMPRESSION:  1. Wound dehiscence to a 1.5-2 cm region of her surgical incision with exposed subcutaneous tissue, the wound is about a centimeter deep, is to 2 cm long, with some granulation tissue peripherally, no gross pus, no malodor, no visible hardware at this time. There is visible suture that I can see, small strands that looks like suture. Left ankle Fx dislocation/noncompliance with non weight bearing. 2.  History of Crohn disease, immunosuppression, on steroids. 3.  Immunosuppression on steroids due to above Crohn's disease. 4.  Hypertension. 5.  Obesity. 6.  Diabetes with peripheral neuropathy. 7.  Chronic pain approximately due to peripheral neuropathy. PLAN:  1.  PE, patient is on Eliquis for PE.      2.  Crohn's disease, she is on a steroid taper. 3.  Left ankle fracture, with a postop wound:  Recommendation in this individual with high risk for wound complications, diabetes and on steroids (immunosuppression). I am recommending a formal I and D of this surgical incision, there with re approximation of the incision. I had a long discussion with her regarding her condition. She has a neuropathy. She has been instructed to be nonweightbearing, but she admits to be weightbearing. This patient has had at least 4 surgeries on her ankle, since 2018 for her severe ankle fracture dislocation. I will need to talk to medical team to see when I can take her to OR. Hope for Wed 19.    4.  Her medical problems, hypertension, diabetes will be followed by the internal medicine doctors.     HISTORY OF PRESENT ILLNESS:  The patient is a 57-year-old female who describes slipping and twisting her left ankle, dislocating her left ankle. So she had a fractured ankle dislocation which she describes in [place], 12/03/2018. She has had 4 surgeries, since while in [place]. Her first surgery, she describes is for closed reduction under general anesthesia. Her second surgery, she describes was a closed reduction with a temporizing external fixator placement. Her third surgery described was ORIF for the severe left ankle fracture dislocation with a long fibular plate and multiple syndesmotic screws, typical construct that we use for diabetics obese patients, but also diabetics of peripheral neuropathy, knowing that it takes twice as long to heal these type of fractures, and knowing that there is a high risk for wound complications and high risk for Charcot neuroarthropathic changes. Her fourth surgery was removal of external fixator, and retention of the external fixator. She arrives here, apparently last week, Tuesday, after a long car ride, with her daughter who is a nurse. 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 She had pain and discomfort in her ankle, as such she went to [facility] for an assessment of her ankle. She states they did not take her Steri-Strips off, they have sent her home after an assessment of her left ankle. While at home, her daughter took off the Steri-Strips and taking out the Steri-Strips her daughter discovered a whole or wound on the lateral portion of her ankle. .    So as such, the patient was seen at [facility] 01/07/2019 and records indicate that she had received a prescription for Zofran, prednisone, Percocet was discharged to home. She was admitted on 01/11/2019 here at [facility] because she is having pain and discomfort. She had a workup, here, that showed a DVT and PE. She was in a posterior splint, at that time, but when I saw her today, she was no longer in any type of splint.   She was sitting at the bedside with both feet down. She admits to walking on her ankle, despite having instructions from her physician, in [place] to be nonweightbearing for this type of fracture. She is denying any fever, shakes or chills. PAST MEDICAL HISTORY:  Diabetes, hypertension, Crohn's colitis. ALLERGIES:  NONE. REVIEW OF SYSTEMS:  Denies any fevers, shakes, chills, night sweats, lightheadedness, dizziness, shortness of breath at this time, abdominal pain, wheezing or bruising:  But she does mention having a wound to the lateral portion of her ankle. FAMILY HISTORY:  She has had a daughter and a son. PHYSICAL EXAMINATION:  Exam today, 01/14/2019 at 5:45 p.m. GENERAL:  Alert, follows commands, in no acute distress. EXTREMITIES:  Her left ankle is examined. She has a dressing on her left ankle that was removed. On examining her left ankle, she has to 1-2 cm wound along the lateral portion of her upper fibula surgical incision. This area has not healed. Has pink and yellowish granulation tissue. No gross pus. With verbal consent, I just gently looked at this. I gently probed this with a sterile Q-tip, probe was about a centimeter deep. It is about 2.5 cm long. It looks that there I could see some absorbable sutures, that is visible there. I see no metal.    She can dorsiflex and plantarflexion her ankle quite well. She has good peripheral pulses. She has intact sensation to light touch. I did not perform monofilament testing on her at this current time. I do not have monofilament testing that is available for me currently on my person. Her calf is soft, non tender. She has numerous varicose veins to her left lower extremity. Left calf, foot and ankle has numerous varicose veins. She is non tender to her foot. She can flex and extend her toes quite well. This is functional range of motion of her ankle as well as a dorsal to plantar flexion.   I did not stress with inversion, eversion I did not test for instability. HEENT:  Normocephalic. Eyes are not icteric. NECK:  Non tender to her neck. Normal range of motion. LUNGS:  Clear with no audible wheezing from her mouth. ABDOMEN:  Abdomen is obese, non tender. LABORATORY DATA:  From 01/14/2019, her white count is 8.6, hemoglobin is 12.6, hematocrit is 39.5, platelet count is 176. Chemistry:  Sodium 137, potassium 3.5, chloride 103, bicarb 27, glucose 115 at 1:55 a.m. on 01/14/2019. BUN 19, creatinine 1.5, calcium low at 8.4, albumin low at 3.1. Liver function tests normal.    X-rays of her left ankle, three views of the ankle, AP and oblique obtained on 01/12/2019: These films show postop changes of her left ankle fracture. She has a fibular plate, with 2 tricortical purchase screws syndesmotic screws. The ankle appears to be anatomically aligned. A CTA, 01/11/2019:  Patient has a positive pulmonary emboli, small to moderate overall clot burden which is more significant in the right lung. There is mildly dilated main pulmonary artery, but no other CT evidence of heart strain. I had an at length discussion with her. She is a 57-year-old female who has diabetic neuropathy in her feet, although she did not test for monofilament testing. She described to me that this is what she and has a diagnosis of diabetic neuropathy. She has had a complex ankle fracture dislocation, having had 4 ankle surgeries since 12/03/2018. Her last surgery was a roughly she states on 12/08/2018. So a little over a month from her most recent surgery. She has a wound, incisional dehiscence to a portion of her incision. I see no metal at this point in probing the wound. I did take cultures, aerobic and anaerobic cultures. Sterile dressings were placed consisting of Telfa, 4 x 4s, 4-inch Kerlix and Ace wrap.     Because of this wound, and because it is in close proximity to the plate, I will discuss with the internal medicine team, cardiology team to see whether she can go to the OR for at least a quick I and D or an I and D of this wound and then with re approximation of this wound with some monofilament suture. She understands she must remain nonweightbearing. She is high risk for limb amputation, and by virtue of having diabetes, by virtue of having neuropathy and having complex ankle fracture, fracture dislocation, although the alignment appears to be anatomic, the construct is not stable for her to weightbearing, should she lose her  alignment and then she may having ankle fusion and worst case scenario a limb amputation if she developed severe infection recalcitrant to all surgical measures. I will also request for infectious disease, Dr. Jerald Romero to see her of infectious disease on consultation.       MD SUZANNA Box / KAVYA  D: 01/14/2019 18:19     T: 01/14/2019 19:30  JOB #: [id number]

## 2019-01-16 LAB
ALBUMIN SERPL-MCNC: 2.9 G/DL (ref 3.4–5)
ALBUMIN/GLOB SERPL: 0.7 {RATIO} (ref 0.8–1.7)
ALP SERPL-CCNC: 73 U/L (ref 45–117)
ALT SERPL-CCNC: 11 U/L (ref 13–56)
ANION GAP SERPL CALC-SCNC: 8 MMOL/L (ref 3–18)
APTT PPP: 69 SEC (ref 23–36.4)
APTT PPP: >180 SEC (ref 23–36.4)
AST SERPL-CCNC: 4 U/L (ref 15–37)
BACTERIA SPEC CULT: ABNORMAL
BASOPHILS # BLD: 0 K/UL (ref 0–0.1)
BASOPHILS NFR BLD: 0 % (ref 0–2)
BILIRUB SERPL-MCNC: 0.4 MG/DL (ref 0.2–1)
BUN SERPL-MCNC: 25 MG/DL (ref 7–18)
BUN/CREAT SERPL: 23 (ref 12–20)
CALCIUM SERPL-MCNC: 8.1 MG/DL (ref 8.5–10.1)
CHLORIDE SERPL-SCNC: 105 MMOL/L (ref 100–108)
CO2 SERPL-SCNC: 27 MMOL/L (ref 21–32)
CREAT SERPL-MCNC: 1.1 MG/DL (ref 0.6–1.3)
DIFFERENTIAL METHOD BLD: ABNORMAL
EOSINOPHIL # BLD: 0.1 K/UL (ref 0–0.4)
EOSINOPHIL NFR BLD: 1 % (ref 0–5)
ERYTHROCYTE [DISTWIDTH] IN BLOOD BY AUTOMATED COUNT: 19.9 % (ref 11.6–14.5)
GLOBULIN SER CALC-MCNC: 4.1 G/DL (ref 2–4)
GLUCOSE BLD STRIP.AUTO-MCNC: 195 MG/DL (ref 70–110)
GLUCOSE BLD STRIP.AUTO-MCNC: 198 MG/DL (ref 70–110)
GLUCOSE BLD STRIP.AUTO-MCNC: 211 MG/DL (ref 70–110)
GLUCOSE BLD STRIP.AUTO-MCNC: 262 MG/DL (ref 70–110)
GLUCOSE SERPL-MCNC: 200 MG/DL (ref 74–99)
GRAM STN SPEC: ABNORMAL
GRAM STN SPEC: ABNORMAL
HCT VFR BLD AUTO: 37.4 % (ref 35–45)
HGB BLD-MCNC: 11.6 G/DL (ref 12–16)
LYMPHOCYTES # BLD: 3.2 K/UL (ref 0.9–3.6)
LYMPHOCYTES NFR BLD: 34 % (ref 21–52)
MAGNESIUM SERPL-MCNC: 2.2 MG/DL (ref 1.6–2.6)
MCH RBC QN AUTO: 26.2 PG (ref 24–34)
MCHC RBC AUTO-ENTMCNC: 31 G/DL (ref 31–37)
MCV RBC AUTO: 84.4 FL (ref 74–97)
MONOCYTES # BLD: 1 K/UL (ref 0.05–1.2)
MONOCYTES NFR BLD: 11 % (ref 3–10)
NEUTS SEG # BLD: 5 K/UL (ref 1.8–8)
NEUTS SEG NFR BLD: 54 % (ref 40–73)
PLATELET # BLD AUTO: 386 K/UL (ref 135–420)
PMV BLD AUTO: 11 FL (ref 9.2–11.8)
POTASSIUM SERPL-SCNC: 3.5 MMOL/L (ref 3.5–5.5)
POTASSIUM SERPL-SCNC: 3.9 MMOL/L (ref 3.5–5.5)
PROT SERPL-MCNC: 7 G/DL (ref 6.4–8.2)
RBC # BLD AUTO: 4.43 M/UL (ref 4.2–5.3)
SERVICE CMNT-IMP: ABNORMAL
SODIUM SERPL-SCNC: 140 MMOL/L (ref 136–145)
WBC # BLD AUTO: 9.3 K/UL (ref 4.6–13.2)

## 2019-01-16 PROCEDURE — 74011636637 HC RX REV CODE- 636/637: Performed by: FAMILY MEDICINE

## 2019-01-16 PROCEDURE — 74011250636 HC RX REV CODE- 250/636: Performed by: INTERNAL MEDICINE

## 2019-01-16 PROCEDURE — 97165 OT EVAL LOW COMPLEX 30 MIN: CPT

## 2019-01-16 PROCEDURE — 36415 COLL VENOUS BLD VENIPUNCTURE: CPT

## 2019-01-16 PROCEDURE — 74011250637 HC RX REV CODE- 250/637: Performed by: FAMILY MEDICINE

## 2019-01-16 PROCEDURE — 85730 THROMBOPLASTIN TIME PARTIAL: CPT

## 2019-01-16 PROCEDURE — 97110 THERAPEUTIC EXERCISES: CPT

## 2019-01-16 PROCEDURE — 97535 SELF CARE MNGMENT TRAINING: CPT

## 2019-01-16 PROCEDURE — 74011250636 HC RX REV CODE- 250/636: Performed by: FAMILY MEDICINE

## 2019-01-16 PROCEDURE — 85025 COMPLETE CBC W/AUTO DIFF WBC: CPT

## 2019-01-16 PROCEDURE — 97530 THERAPEUTIC ACTIVITIES: CPT

## 2019-01-16 PROCEDURE — 74011636637 HC RX REV CODE- 636/637: Performed by: HOSPITALIST

## 2019-01-16 PROCEDURE — 82962 GLUCOSE BLOOD TEST: CPT

## 2019-01-16 PROCEDURE — 83735 ASSAY OF MAGNESIUM: CPT

## 2019-01-16 PROCEDURE — 65660000000 HC RM CCU STEPDOWN

## 2019-01-16 PROCEDURE — 74011250636 HC RX REV CODE- 250/636

## 2019-01-16 PROCEDURE — 84132 ASSAY OF SERUM POTASSIUM: CPT

## 2019-01-16 RX ORDER — VANCOMYCIN 2 GRAM/500 ML IN 0.9 % SODIUM CHLORIDE INTRAVENOUS
2000
Status: DISCONTINUED | OUTPATIENT
Start: 2019-01-17 | End: 2019-01-17

## 2019-01-16 RX ORDER — HEPARIN SODIUM 1000 [USP'U]/ML
40 INJECTION, SOLUTION INTRAVENOUS; SUBCUTANEOUS ONCE
Status: COMPLETED | OUTPATIENT
Start: 2019-01-16 | End: 2019-01-16

## 2019-01-16 RX ORDER — HEPARIN SODIUM 1000 [USP'U]/ML
INJECTION, SOLUTION INTRAVENOUS; SUBCUTANEOUS
Status: COMPLETED
Start: 2019-01-16 | End: 2019-01-16

## 2019-01-16 RX ORDER — POTASSIUM CHLORIDE 1.5 G/1.77G
40 POWDER, FOR SOLUTION ORAL DAILY
Status: DISCONTINUED | OUTPATIENT
Start: 2019-01-16 | End: 2019-01-18

## 2019-01-16 RX ORDER — POTASSIUM CHLORIDE 1.5 G/1.77G
20 POWDER, FOR SOLUTION ORAL ONCE
Status: COMPLETED | OUTPATIENT
Start: 2019-01-16 | End: 2019-01-16

## 2019-01-16 RX ORDER — POTASSIUM CHLORIDE 1.5 G/1.77G
40 POWDER, FOR SOLUTION ORAL DAILY
Status: DISCONTINUED | OUTPATIENT
Start: 2019-01-17 | End: 2019-01-16

## 2019-01-16 RX ADMIN — PREDNISONE 10 MG: 10 TABLET ORAL at 08:49

## 2019-01-16 RX ADMIN — INSULIN LISPRO 4 UNITS: 100 INJECTION, SOLUTION INTRAVENOUS; SUBCUTANEOUS at 15:53

## 2019-01-16 RX ADMIN — MORPHINE SULFATE 2 MG: 2 INJECTION, SOLUTION INTRAMUSCULAR; INTRAVENOUS at 10:06

## 2019-01-16 RX ADMIN — POTASSIUM CHLORIDE 20 MEQ: 1.5 POWDER, FOR SOLUTION ORAL at 12:08

## 2019-01-16 RX ADMIN — MORPHINE SULFATE 2 MG: 2 INJECTION, SOLUTION INTRAMUSCULAR; INTRAVENOUS at 23:03

## 2019-01-16 RX ADMIN — HEPARIN SODIUM 10000 UNITS: 1000 INJECTION INTRAVENOUS; SUBCUTANEOUS at 00:46

## 2019-01-16 RX ADMIN — MORPHINE SULFATE 2 MG: 2 INJECTION, SOLUTION INTRAMUSCULAR; INTRAVENOUS at 06:23

## 2019-01-16 RX ADMIN — GABAPENTIN 300 MG: 300 CAPSULE ORAL at 15:52

## 2019-01-16 RX ADMIN — GABAPENTIN 300 MG: 300 CAPSULE ORAL at 08:49

## 2019-01-16 RX ADMIN — HEPARIN SODIUM AND DEXTROSE 5 UNITS/KG/HR: 10000; 5 INJECTION INTRAVENOUS at 12:13

## 2019-01-16 RX ADMIN — HEPARIN SODIUM 3080 UNITS: 1000 INJECTION, SOLUTION INTRAVENOUS; SUBCUTANEOUS at 00:47

## 2019-01-16 RX ADMIN — LOSARTAN POTASSIUM 100 MG: 50 TABLET, FILM COATED ORAL at 08:49

## 2019-01-16 RX ADMIN — POTASSIUM CHLORIDE 40 MEQ: 1.5 POWDER, FOR SOLUTION ORAL at 10:12

## 2019-01-16 RX ADMIN — HYDROCHLOROTHIAZIDE 25 MG: 25 TABLET ORAL at 08:49

## 2019-01-16 RX ADMIN — HEPARIN SODIUM AND DEXTROSE 7 UNITS/KG/HR: 10000; 5 INJECTION INTRAVENOUS at 19:34

## 2019-01-16 RX ADMIN — INSULIN GLARGINE 30 UNITS: 100 INJECTION, SOLUTION SUBCUTANEOUS at 09:00

## 2019-01-16 RX ADMIN — INSULIN LISPRO 4 UNITS: 100 INJECTION, SOLUTION INTRAVENOUS; SUBCUTANEOUS at 12:08

## 2019-01-16 RX ADMIN — AZATHIOPRINE 200 MG: 50 TABLET ORAL at 12:08

## 2019-01-16 RX ADMIN — MORPHINE SULFATE 2 MG: 2 INJECTION, SOLUTION INTRAMUSCULAR; INTRAVENOUS at 00:43

## 2019-01-16 RX ADMIN — INSULIN LISPRO 6 UNITS: 100 INJECTION, SOLUTION INTRAVENOUS; SUBCUTANEOUS at 12:08

## 2019-01-16 RX ADMIN — INSULIN LISPRO 9 UNITS: 100 INJECTION, SOLUTION INTRAVENOUS; SUBCUTANEOUS at 23:04

## 2019-01-16 RX ADMIN — INSULIN LISPRO 3 UNITS: 100 INJECTION, SOLUTION INTRAVENOUS; SUBCUTANEOUS at 08:50

## 2019-01-16 RX ADMIN — SITAGLIPTIN 50 MG: 50 TABLET, FILM COATED ORAL at 08:49

## 2019-01-16 RX ADMIN — HYDRALAZINE HYDROCHLORIDE 100 MG: 50 TABLET, FILM COATED ORAL at 10:02

## 2019-01-16 RX ADMIN — METOPROLOL TARTRATE 50 MG: 50 TABLET ORAL at 17:15

## 2019-01-16 RX ADMIN — ATORVASTATIN CALCIUM 20 MG: 20 TABLET, FILM COATED ORAL at 23:02

## 2019-01-16 RX ADMIN — INSULIN LISPRO 4 UNITS: 100 INJECTION, SOLUTION INTRAVENOUS; SUBCUTANEOUS at 08:50

## 2019-01-16 RX ADMIN — MORPHINE SULFATE 2 MG: 2 INJECTION, SOLUTION INTRAMUSCULAR; INTRAVENOUS at 13:52

## 2019-01-16 RX ADMIN — METOPROLOL TARTRATE 50 MG: 50 TABLET ORAL at 08:49

## 2019-01-16 RX ADMIN — INSULIN LISPRO 3 UNITS: 100 INJECTION, SOLUTION INTRAVENOUS; SUBCUTANEOUS at 15:54

## 2019-01-16 RX ADMIN — PANTOPRAZOLE SODIUM 40 MG: 40 TABLET, DELAYED RELEASE ORAL at 08:49

## 2019-01-16 RX ADMIN — GABAPENTIN 300 MG: 300 CAPSULE ORAL at 23:02

## 2019-01-16 RX ADMIN — HEPARIN SODIUM 6540 UNITS: 1000 INJECTION, SOLUTION INTRAVENOUS; SUBCUTANEOUS at 19:49

## 2019-01-16 RX ADMIN — ONDANSETRON 4 MG: 2 INJECTION INTRAMUSCULAR; INTRAVENOUS at 10:12

## 2019-01-16 RX ADMIN — PANTOPRAZOLE SODIUM 40 MG: 40 TABLET, DELAYED RELEASE ORAL at 15:52

## 2019-01-16 RX ADMIN — HYDRALAZINE HYDROCHLORIDE 100 MG: 50 TABLET, FILM COATED ORAL at 17:14

## 2019-01-16 RX ADMIN — MORPHINE SULFATE 2 MG: 2 INJECTION, SOLUTION INTRAMUSCULAR; INTRAVENOUS at 17:51

## 2019-01-16 NOTE — PROGRESS NOTES
Infectious Disease Consultation Note    Requested by: dr. Rebekah Caicedo    Reason: left ankle wound infection    Current abx Prior abx         Lines:       Assessment :    28-year-old female with h/o type 2 DM (last hgbA1C 8.0 on 1/11/19) admitted to [facility] on 1/14/19 for chest pain, left ankle wound. Clinical picture c/w left ankle wound dehiscence. After detailed history and exam, I don't see definitive evidence of infection of the left ankle ulcer. Wound cultures: staph aureus likely superficial colonizer. Dehiscence likely due to mechanical disruption. Patient is at risk for complicated infection due to h/o multiple surgeries, DM, hardware. Hence, recommend periop antibiotics. Recommendations:    1. Start vancomycin  2. F/u wound cultures  3. D/c prednisone  4. Await left ankle surgery        Above plan was discussed in details with patient, and dr Chelsey Patel. Please call me if any further questions or concerns. Will continue to participate in the care of this patient. HPI:    No new complaints  Patient denies headaches, visual disturbances, sore throat, runny nose, earaches, sob, chills, cough, abdominal pain, diarrhea, burning micturition, or weakness in extremities. denies back pain/flank pain.          Medication List      ASK your doctor about these medications    HumaLOG U-100 Insulin 100 unit/mL injection  Generic drug:  insulin lispro     HUMIRA 40 mg/0.8 mL injection  Generic drug:  adalimumab     hydrALAZINE 25 mg tablet  Commonly known as:  APRESOLINE     IMURAN 50 mg tablet  Generic drug:  azaTHIOprine     LEVEMIR U-100 INSULIN 100 unit/mL injection  Generic drug:  insulin detemir U-100     losartan 100 mg tablet  Commonly known as:  COZAAR     metoprolol succinate 25 mg XL tablet  Commonly known as:  TOPROL-XL     metoprolol tartrate 25 mg tablet  Commonly known as:  LOPRESSOR            Current Facility-Administered Medications   Medication Dose Route Frequency    potassium chloride (KLOR-CON) packet for solution 40 mEq  40 mEq Oral DAILY    insulin lispro (HUMALOG) injection   SubCUTAneous AC&HS    heparin 25,000 units in D5W 250 ml infusion  18-36 Units/kg/hr IntraVENous TITRATE    SITagliptin (JANUVIA) tablet 50 mg  50 mg Oral DAILY    insulin glargine (LANTUS) injection 30 Units  30 Units SubCUTAneous DAILY    metoprolol tartrate (LOPRESSOR) tablet 50 mg  50 mg Oral BID    azaTHIOprine (IMURAN) tablet 200 mg  200 mg Oral DAILY AFTER BREAKFAST    predniSONE (DELTASONE) tablet 10 mg  10 mg Oral DAILY WITH BREAKFAST    morphine injection 2 mg  2 mg IntraVENous Q4H PRN    gabapentin (NEURONTIN) capsule 300 mg  300 mg Oral TID    hydrALAZINE (APRESOLINE) tablet 100 mg  100 mg Oral TID    atorvastatin (LIPITOR) tablet 20 mg  20 mg Oral QHS    acetaminophen (TYLENOL) tablet 650 mg  650 mg Oral Q4H PRN    insulin lispro (HUMALOG) injection 4 Units  4 Units SubCUTAneous TIDAC    glucose chewable tablet 16 g  4 Tab Oral PRN    glucagon (GLUCAGEN) injection 1 mg  1 mg IntraMUSCular PRN    dextrose (D50W) injection syrg 12.5-25 g  25-50 mL IntraVENous PRN    traMADol-acetaminophen (ULTRACET) per tablet 1 Tab  1 Tab Oral Q4H PRN    albuterol-ipratropium (DUO-NEB) 2.5 MG-0.5 MG/3 ML  3 mL Nebulization Q4H PRN    cloNIDine HCl (CATAPRES) tablet 0.1 mg  0.1 mg Oral Q8H PRN    ondansetron (ZOFRAN) injection 4 mg  4 mg IntraVENous Q6H PRN    pantoprazole (PROTONIX) tablet 40 mg  40 mg Oral ACB&D    losartan (COZAAR) tablet 100 mg  100 mg Oral DAILY    hydroCHLOROthiazide (HYDRODIURIL) tablet 25 mg  25 mg Oral DAILY       Allergies: Patient has no known allergies.     Temp (24hrs), Av.1 °F (36.7 °C), Min:97.7 °F (36.5 °C), Max:98.5 °F (36.9 °C)    Visit Vitals  /70 (BP 1 Location: Right arm, BP Patient Position: At rest)   Pulse 64   Temp 98.4 °F (36.9 °C)   Resp 16   Ht 6' (1.829 m)   Wt (!) 163.5 kg (360 lb 7.2 oz)   SpO2 96%   BMI 48.89 kg/m²       ROS: 12 point ROS obtained in details. Pertinent positives as mentioned in HPI,   otherwise negative    Physical Exam:    General:  Alert, cooperative, no distress, appears stated age. Head:  Normocephalic, without obvious abnormality, atraumatic. Eyes:  Conjunctivae/corneas clear. PERRL, ANicteric   Nose: Nares normal. Mucosa normal. No drainage or sinus tenderness. Throat: Lips, mucosa dry. NO thrush; TEETH normal.   Neck: Supple, symmetrical, trachea midline, no adenopathy, thyroid: no enlargment/tenderness/nodules, no carotid bruit and no JVD. No crepitus   Back:   Symmetric, no curvature, no spine tenderness or flank pain   Lungs:   Bilateral auscultation clear no rhonchi rales wheezing normal resp effort   Chest wall:  No tenderness or deformity. NO CREPITUS   Heart:  Regular rate and rhythm, S1, S2 normal, no murmur, click, rub or gallop. Abdomen:   Obese Soft, non-tender. PROTUBERANT; Bowel sounds normal. No masses,  No organomegaly. No paradox   Extremities: Left ankle with surgical incision, minimal serous drainage on dressing, no edema/erythema   Pulses: 1-2+ and symmetric all extremities.    Skin: Skin color, texture, turgor normal. No rashes or lesions   Lymph nodes: Cervical, supraclavicular, and axillary nodes normal.   Neurologic: Grossly nonfocal                                      Labs: Results:   Chemistry Recent Labs     01/16/19  0908 01/15/19  0345 01/14/19  0155   * 215* 270*    139 137   K 3.5 3.0* 3.5    105 103   CO2 27 25 27   BUN 25* 31* 19*   CREA 1.10 1.04 1.15   CA 8.1* 8.3* 8.4*   AGAP 8 9 7   BUCR 23* 30* 17   AP 73 81 82   TP 7.0 7.7 7.8   ALB 2.9* 3.1* 3.1*   GLOB 4.1* 4.6* 4.7*   AGRAT 0.7* 0.7* 0.7*      CBC w/Diff Recent Labs     01/16/19  0908 01/15/19  1143 01/15/19  0345   WBC 9.3 8.8 9.4   RBC 4.43 4.63 4.89   HGB 11.6* 12.5 13.0   HCT 37.4 38.8 40.9    375 375   GRANS 54 54 58   LYMPH 34 34 32   EOS 1 2 1      Microbiology Recent Labs 01/14/19  1900   CULT MODERATE STAPHYLOCOCCUS AUREUS*  NO ANAEROBES ISOLATED 2 DAYS          RADIOLOGY:    All available imaging studies/reports in [facility] for this admission were reviewed    Dr. Angel Luis Waldrop, Infectious Disease Specialist  [phone]  January 16, 2019  12:20 PM

## 2019-01-16 NOTE — DIABETES MGMT
Glycemic Control Plan of Care    T2DM with current A1c of 8.0% (1/11/2019). See separate notes, 01/15/2019, for partial assessment of home diabetes management. 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 She has no issues, concerns, or questions about diabetes management. Home diabetes medications: Lantus insulin 30 units daily at bedtime and humalog sliding scale insulin . POC BG range on 01/15/2019:  184-283 mg/dL. POC BG report on 01/16/2019 at time of review: 198, 211, 195 mg/dL. Noted prednisone discontinued. Current hospital diabetes medications:  Basal lantus insulin 30 units daily. Prandial lispro insulin 4 units TID AC. Correctional lispro insulin ACHS. Very resistant dose. Januvia 50 mg daily, first dose ordered 01/15/2019. Recommendation(s):  1.) Consider increasing basal lantus insulin dose from 30 to 35 units daily. Assessment:  Patient is 46year old with past medical history including type 2 diabetes mellitus, hypertension, Crohn's disease, and recent ORIF left ankle in [place] after sustaining a fall - was admitted on 01/14/2019 with report of chest pain and shortness of breath. Noted:  Left ankle wound infection. Acute pulmonary embolism. Patient travelled from [place] after recent left ankle surgery. Status post ORIF left lateral ankle wound. Crohn's disease. T2DM with current A1c of 8.0% (1/11/2019). Most recent blood glucose values:    Results for Prasad Jaimes (MRN [id number]) as of 1/16/2019 17:03   Ref. Range 1/15/2019 08:41 1/15/2019 11:13 1/15/2019 16:54 1/15/2019 22:08   GLUCOSE,FAST - POC Latest Ref Range: 70 - 110 mg/dL 184 (H) 242 (H) 283 (H) 213 (H)     Results for Prasad Jaimes (MRN [id number]) as of 1/16/2019 17:03   Ref.  Range 1/16/2019 07:47 1/16/2019 11:48 1/16/2019 15:12   GLUCOSE,FAST - POC Latest Ref Range: 70 - 110 mg/dL 198 (H) 211 (H) 195 (H)     Current A1C: 8.0% (1/24/2019) which is equivalent to estimated average blood glucose of 183 mg/dL during the past 2-3 months. Current hospital diabetes medications:  Basal lantus insulin 30 units daily. Prandial lispro insulin 4 units TID AC. Correctional lispro insulin ACHS. Very resistant dose. Januvia 50 mg daily, first dose ordered 01/15/2019. Total daily dose insulin requirement previous day: 01/15/2019:  Lantus: 30 units  Lispro: 36 units  TDD insulin: 66 units     Home diabetes medications: Patient reported on 01/15/2019:  Lantus insulin 30 units daily at bedtime. Humalog sliding scale insulin. Diet: Diabetic consistent carb regular; no concentrated sweets. Goals:  Blood glucose will be within target range of  mg/dL by 01/19/2019    Education:  _X__  Refer to Diabetes Education Record: 01/15/2019.  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             ___  Education not indicated at this time    Cynthia Gordon RN [facility]  Pager: [phone]

## 2019-01-16 NOTE — ROUTINE PROCESS
Bedside and Verbal shift change report given to Adonis Lopez RN (oncoming nurse) by Elisabeth Zuleta RN (offgoing nurse). Report included the following information SBAR, Kardex, MAR and Recent Results.     SITUATION:  Code Status: Full Code  Reason for Admission: Pulmonary emboli [facility])  Pulmonary emboli [facility])  Hospital day: 2  Problem List:       Hospital Problems  Date Reviewed: 1/12/2019          Codes Class Noted POA    Dehiscence of incision, initial encounter ICD-10-CM: T81.31XA  ICD-9-CM: 998.32  1/14/2019 Unknown        Pulmonary emboli ([facility] [address] ICD-10-CM: I26.99  ICD-9-CM: 415.19  1/12/2019 Unknown        Accelerated hypertension ICD-10-CM: I10  ICD-9-CM: 401.0  1/12/2019 Yes        Crohn's colitis ([facility] [address] ICD-10-CM: K50.10  ICD-9-CM: 555.1  1/12/2019 Yes        Wound of left ankle ICD-10-CM: D19.009B  ICD-9-CM: 891.0  1/12/2019 Yes        Status post open reduction with internal fixation (ORIF) of fracture of ankle ICD-10-CM: Z96.7, Z87.81  ICD-9-CM: V45.89, V15.51  1/12/2019 Yes              BACKGROUND:   Past Medical History:   Past Medical History:   Diagnosis Date    Crohn's colitis ([facility] [address]     Diabetes ([facility] [address]     HTN (hypertension)       Patient taking anticoagulants yes    Patient has a defibrillator: no    History of shots YES for example, flu, pneumonia, tetanus   Isolation History NO for example, MRSA, CDiff    ASSESSMENT:  Changes in Assessment Throughout Shift: None  Significant Changes in 24 hours (for example, RR/code, fall)  Patient has Central Line: no Reasons if yes: N/A  Patient has Doss Cath: no Reasons if yes: N/A   Mobility Issues  PT  IV Patency  OR Checklist  Pending Tests    Last Vitals:  Vitals w/ MEWS Score (last day)     Date/Time MEWS Score Pulse Resp Temp BP Level of Consciousness SpO2    01/16/19 0407  1  89  18  98.2 °F (36.8 °C)  131/76  Alert  96 %    01/16/19 0001  1  73  18  98.1 °F (36.7 °C)  103/66  Alert  97 %    01/15/19 2023  2  75  18  97.9 °F (36.6 °C)  96/61  Alert  97 % 01/15/19 1657  1  73  18  97.7 °F (36.5 °C)  111/69  Alert  95 %    01/15/19 1313  --  --  --  --  --  --  96 %    01/15/19 1109  1  75  18  98.4 °F (36.9 °C)  125/78  Alert  97 %    01/15/19 0851  1  80  18  97.9 °F (36.6 °C)  140/81  Alert  97 %    01/15/19 0415  1  87  18  97.6 °F (36.4 °C)  125/79  Alert  97 %    01/15/19 0018  1  96  18  98.6 °F (37 °C)  148/103  (Abnormal)   Alert  96 %            PAIN    Pain Assessment    Pain Intensity 1: 10 (01/16/19 0623)    Pain Location 1: Ankle    Pain Intervention(s) 1: Medication (see MAR)    Patient Stated Pain Goal: 0  Intervention effective: yes  Time of last intervention: 0623 Reassessment Completed: yes   Other actions taken for pain: None    Last 3 Weights:  Last 3 Recorded Weights in this Encounter    01/12/19 0520 01/14/19 1040 01/15/19 0646   Weight: 144.2 kg (318 lb) 144.2 kg (318 lb) (!) 163.5 kg (360 lb 7.2 oz)   Weight change:     INTAKE/OUPUT    Current Shift: No intake/output data recorded. Last three shifts: 01/14 0701 - 01/15 1900  In: 600 [P.O.:600]  Out: -     RECOMMENDATIONS AND DISCHARGE PLANNING  Patient needs and requests: Pain control    Pending tests/procedures: I&D L Ankle 1/18     Discharge plan for patient: TBD    Discharge planning Needs or Barriers: TBD    Estimated Discharge Date: TBD Posted on Whiteboard in Patients Room: no       \"HEALS\" SAFETY CHECK  A safety check occurred in the patient's room between off going nurse and oncoming nurse listed above. The safety check included the below items:    H  High Alert Medications Verify all high alert medication drips (heparin, PCA, etc.)  E  Equipment Suction is set up for ALL patients (with yanker)  Red plugs utilized for all equipment (IV pumps, etc.)  WOWs wiped down at end of shift.   Room stocked with oxygen, suction, and other unit-specific supplies  A  Alarms Bed alarm is set for fall risk patients  Ensure chair alarm is in place and activated if patient is up in a chair  L  Lines Check IV for any infiltration  Doss bag is empty if patient has a Doss   Tubing and IV bags are labeled  S  Safety  Room is clean, patient is clean, and equipment is clean. Hallways are clear from equipment besides carts. Fall bracelet on for fall risk patients  Ensure room is clear and free of clutter  Suction is set up for ALL patients (with flip)  Hallways are clear from equipment besides carts.    Isolation precautions followed, supplies available outside room, sign posted    Donnella Ahumada, RN

## 2019-01-16 NOTE — PROGRESS NOTES
Progress Note    Patient: Ellie Cerrato MRN: [id number]  CSN: [id number]    YOB: 1970  Age: 46 y.o. Sex: female    DOA: 1/11/2019 LOS:  LOS: 2 days                    Subjective:   Pt is feeling better seen by Dr Sophia roy d/c cam boot she is goinf for Lt ankle I&D debridement  Eliquis on hold pt on IV heparin drip. No chest pain no SOB . Pain lower extremities improved with Neurontin. Pt has been none weight bearing after her Lt ankle surgery in [place]      Echo 1/14/19  · Estimated left ventricular ejection fraction is 56 - 60%. Visually measured ejection fraction. Left ventricular mild concentric hypertrophy. Normal left ventricular wall motion, no regional wall motion abnormality noted. · Mild aortic root dilatation. · There is no evidence of pulmonary hypertension. BP improved  Cr stable potassium is low today. Glucose running in 200-300 range  Pt started on Imuran will D/C prednisone pt has no GI sx at this time    Chief Complaint:   Chief Complaint   Patient presents with    Chest Pain       Review of systems  General: No fevers or chills. Cardiovascular: No chest pain or pressure. No palpitations. Pulmonary: No shortness of breath, cough or wheeze. Gastrointestinal: No abdominal pain, positive  Nausea, on and off but no  vomiting or diarrhea. Genitourinary: No urinary frequency, urgency, hesitancy or dysuria. Musculoskeletal:  Lt ankle pain stable with current med     Neurologic: No headache, no seizure no weakness    Objective:     Physical Exam:  Visit Vitals  /75 (BP 1 Location: Right arm, BP Patient Position: At rest)   Pulse 86   Temp 98.5 °F (36.9 °C)   Resp 18   Ht 6' (1.829 m)   Wt (!) 163.5 kg (360 lb 7.2 oz)   SpO2 95%   BMI 48.89 kg/m²        General:         Alert, , no acute distress    HEENT: NC, Atraumatic. PERRLA, anicteric sclerae. Lungs: CTA Bilaterally. No Wheezing/Rhonchi/Rales.   Heart:  Regular  rhythm,  No murmur, No Rubs, No Gallops  Abdomen: Soft, obese, Non tender. no HSM  Extremities:  swelling Lt ankle trace edema B/l  Psych:   Not anxious or agitated. Neurologic:  CN 2-12 grossly intact, Alert and oriented X 3. No acute neurological                                 Deficits,     Intake and Output:  Current Shift:  No intake/output data recorded. Last three shifts:  No intake/output data recorded. Labs: Results:       Chemistry Recent Labs     01/15/19  0345 01/14/19  0155   * 270*    137   K 3.0* 3.5    103   CO2 25 27   BUN 31* 19*   CREA 1.04 1.15   CA 8.3* 8.4*   AGAP 9 7   BUCR 30* 17   AP 81 82   TP 7.7 7.8   ALB 3.1* 3.1*   GLOB 4.6* 4.7*   AGRAT 0.7* 0.7*      CBC w/Diff Recent Labs     01/15/19  1143 01/15/19  0345 01/14/19  0155   WBC 8.8 9.4 8.6   RBC 4.63 4.89 4.71   HGB 12.5 13.0 12.6   HCT 38.8 40.9 39.5    375 379   GRANS 54 58 63   LYMPH 34 32 28   EOS 2 1 1      Cardiac Enzymes No results for input(s): CPK, CKND1, ALLEN in the last 72 hours. No lab exists for component: CKRMB, TROIP   Coagulation Recent Labs     01/15/19  1910 01/15/19  1143   APTT 29.6 30.6       Lipid Panel Lab Results   Component Value Date/Time    Cholesterol, total 259 (H) 01/13/2019 04:19 AM    HDL Cholesterol 53 01/13/2019 04:19 AM    LDL, calculated 140.2 (H) 01/13/2019 04:19 AM    VLDL, calculated 65.8 01/13/2019 04:19 AM    Triglyceride 329 (H) 01/13/2019 04:19 AM    CHOL/HDL Ratio 4.9 01/13/2019 04:19 AM      BNP No results for input(s): BNPP in the last 72 hours.    Liver Enzymes Recent Labs     01/15/19  0345   TP 7.7   ALB 3.1*   AP 81   SGOT 4*      Thyroid Studies No results found for: T4, T3U, TSH, TSHEXT       Procedures/imaging: see electronic medical records for all procedures/Xrays and details which were not copied into this note but were reviewed prior to creation of Plan    Medications:   Current Facility-Administered Medications   Medication Dose Route Frequency    insulin lispro (HUMALOG) injection   SubCUTAneous AC&HS    heparin 25,000 units in D5W 250 ml infusion  18-36 Units/kg/hr IntraVENous TITRATE    SITagliptin (JANUVIA) tablet 50 mg  50 mg Oral DAILY    insulin glargine (LANTUS) injection 30 Units  30 Units SubCUTAneous DAILY    metoprolol tartrate (LOPRESSOR) tablet 50 mg  50 mg Oral BID    azaTHIOprine (IMURAN) tablet 200 mg  200 mg Oral DAILY AFTER BREAKFAST    predniSONE (DELTASONE) tablet 10 mg  10 mg Oral DAILY WITH BREAKFAST    morphine injection 2 mg  2 mg IntraVENous Q4H PRN    gabapentin (NEURONTIN) capsule 300 mg  300 mg Oral TID    hydrALAZINE (APRESOLINE) tablet 100 mg  100 mg Oral TID    atorvastatin (LIPITOR) tablet 20 mg  20 mg Oral QHS    acetaminophen (TYLENOL) tablet 650 mg  650 mg Oral Q4H PRN    insulin lispro (HUMALOG) injection 4 Units  4 Units SubCUTAneous TIDAC    glucose chewable tablet 16 g  4 Tab Oral PRN    glucagon (GLUCAGEN) injection 1 mg  1 mg IntraMUSCular PRN    dextrose (D50W) injection syrg 12.5-25 g  25-50 mL IntraVENous PRN    traMADol-acetaminophen (ULTRACET) per tablet 1 Tab  1 Tab Oral Q4H PRN    albuterol-ipratropium (DUO-NEB) 2.5 MG-0.5 MG/3 ML  3 mL Nebulization Q4H PRN    cloNIDine HCl (CATAPRES) tablet 0.1 mg  0.1 mg Oral Q8H PRN    ondansetron (ZOFRAN) injection 4 mg  4 mg IntraVENous Q6H PRN    pantoprazole (PROTONIX) tablet 40 mg  40 mg Oral ACB&D    losartan (COZAAR) tablet 100 mg  100 mg Oral DAILY    hydroCHLOROthiazide (HYDRODIURIL) tablet 25 mg  25 mg Oral DAILY       Assessment/Plan     Active Problems:    Pulmonary emboli (HCC) (1/12/2019)      Accelerated hypertension (1/12/2019)      Crohn's colitis ([address] (1/12/2019)      Wound of left ankle (1/12/2019)      Status post open reduction with internal fixation (ORIF) of fracture of ankle (1/12/2019)      Dehiscence of incision, initial encounter (1/14/2019)      Plan  Hypokalemia  Give potassium chloride 40 enriqueta now and 20 enriqueta after 2 hours  Recheck potassium level Acute Pulmonary Embolism/ S/P travel from [place] and S/p surgery Lt ankle  Has been tolerating Eliquis, will hold and transition to heparin drip for OR planning for left ankle, pulmonology consult recommending 48hrs off eliquis prior to procedure  H&H stable  Continue to monitor sx and o2 sat  Resume eliquis when safe from post-op standpoint for anticoagulation        S/P ORIF  Lt lateral ankle wound  Ortho consult, Dr. Courtney Willis, recommending OR intervention with I&D and possible wound re-approximation, awaiting eliquis washout  Wound cultures moderate staph   Will f/u  ID consult  Consult placed to Dr. Arvin Denver, ID, per Dr. Courtney Willis for this non-healing wound, x-ray Lt ankle stable 1/12/19  Pt at higher risk for infection with her immune suppression status from Chron's on steroid, imuran, and humara   Morphine 2 mg IV q 4h prn   Neurontin 300 mg q8h  Helping her pain     Crohn's disease   D/c prednisone 10 mg daily   continue muran 200 mg daily in AM  Pt on Imuran and humara she will need GI follow up  Protonix 40 mg daily  Continue Zofran prn nausea     Diabetes Mellitus type 2  HG A1c 8.0 on 1/11/19  Takes levemir 30units at home,  Currently on  lantus to 30 units daily, continue high dose SSI, continue novolog 4units AC, add [place]. Consider metformin on discharge if no further imaging studies planned.    - Started on Lipitor 20 mg qhs     Uncontrolled / accelerated Hypertension  Increased hydralazine to 100  mg TID  Losartan 100 mg daily   HCTZ 25 mg daily  Lopressor increase to 50mg bid  Pt on clonidine 0.1mg q8 prn sbp >150  Monitor cr level  Echo with normal EF     Recheck potassium this afternoon  Lab in AM cbc, cmp, mag  F/u ID recommendations  Monitor glucose level after D/c steroids           Chantelle Robbins MD  1/16/2019 9:19 AM

## 2019-01-16 NOTE — PROGRESS NOTES
Problem: Self Care Deficits Care Plan (Adult)  Goal: *Acute Goals and Plan of Care (Insert Text)  Occupational Therapy Goals  Initiated 1/16/2019 within 7 day(s). 1.  Patient will perform toileting with supervision/set-up   2. Patient will perform toilet transfer with supervision/set-up. 3.  Patient will participate in a functional activity of choice while standing with supervision for 3-5 minutes. 4.  Patient will participate in upper extremity therapeutic exercise/activities with supervision/set-up for 5-7 minutes. Outcome: Progressing Towards Goal  Occupational Therapy EVALUATION    Patient: Manolo Marie (46 y.o. female)  Date: 1/16/2019  Primary Diagnosis: Pulmonary emboli (HCC)  Pulmonary emboli (HCC)  Procedure(s) (LRB):  INCISION AND DRAINAGE LEFT ANKLE (Left)     Precautions:  NWB(L ankles)    ASSESSMENT :  Upon entering room, pt supine with HOB elevated, alert, and agreeable to participate in OT evaluation. Based on the objective data described below, the patient presents with decreased functional balance and decreased functional mobility affecting her performance in basic self-care/ADL tasks. Pt is Mod(I)  for bed mobility and requires supervision during sit<>stand with use of a RW, along with verbal cues for pt to follow NWB precautions in preparation for pt to participate in basic self-care. Educated pt on the role of therapy, evaluation process, and goals for pt while in therapy with pt demonstrating good understanding. The pt has a supportive parent and child to assist her PRN. Patient will benefit from skilled intervention to address the above impairments.   Patients rehabilitation potential is considered to be Good  Factors which may influence rehabilitation potential include:   [x]             None noted  []             Mental ability/status  []             Medical condition  []             Home/family situation and support systems  []             Safety awareness  [] Pain tolerance/management  []             Other:      PLAN :  Recommendations and Planned Interventions:  []               Self Care Training                  [x]        Therapeutic Activities  [x]               Functional Mobility Training    []        Cognitive Retraining  [x]               Therapeutic Exercises           [x]        Endurance Activities  [x]               Balance Training                   []        Neuromuscular Re-Education  []               Visual/Perceptual Training     [x]   Home Safety Training  [x]               Patient Education                 [x]        Family Training/Education  []               Other (comment):    Frequency/Duration: Patient will be followed by occupational therapy 1-2 times per day/3-5 days per week to address goals. Discharge Recommendations: HH to work on IADLs  Further Equipment Recommendations for Discharge: Grab bars to decrease the risk of falls     Barriers to Learning/Limitations: None  Compensate with: visual, verbal, tactile, kinesthetic cues/model     PATIENT COMPLEXITY      Eval Complexity: History: MEDIUM Complexity : Expanded review of history including physical, cognitive and psychosocial  history ; Examination: LOW Complexity : 1-3 performance deficits relating to physical, cognitive , or psychosocial skils that result in activity limitations and / or participation restrictions ; Decision Making:LOW Complexity : No comorbidities that affect functional and no verbal or physical assistance needed to complete eval tasks  Assessment: Low Complexity       SUBJECTIVE:   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 Prior Level of Function/Home Situation: Pt lives with her parent and child and was (I) with ADLs and IADLs PTA.   Home Situation  Home Environment: Private residence  # Steps to Enter: 0  One/Two Story Residence: One story  Living Alone: No  Support Systems: Parent, Child(williams)  Patient Expects to be Discharged to[de-identified] Private residence  Current DME Used/Available at Home: Walker, rolling, Wheelchair, [address] chair, Commode, bedside  Tub or Shower Type: Tub/Shower combination  [x]  Right hand dominant   []  Left hand dominant  Cognitive/Behavioral Status:  Neurologic State: Alert  Orientation Level: Oriented X4  Cognition: Follows commands; Appropriate decision making  Safety/Judgement: Awareness of environment    Skin: Visible skin appeared intact    Edema: None noted    Vision/Perceptual:    Acuity: Able to read clock/calendar on wall without difficulty      Coordination:  Coordination: Within functional limits(BUEs)  Fine Motor Skills-Upper: Left Intact; Right Intact    Gross Motor Skills-Upper: Left Intact; Right Intact    Balance:  Sitting: Intact  Standing: Impaired; With support  Standing - Static: Good  Standing - Dynamic : Fair    Strength:  Strength: Generally decreased, functional(BUEs 4/5)    Tone & Sensation:  Tone: Normal(BUEs)  Sensation: Intact(BUEs)    Range of Motion:  AROM: Within functional limits(BUEs)    Functional Mobility and Transfers for ADLs:  Bed Mobility:  Supine to Sit: Modified independent  Sit to Supine: Modified independent  Transfers:  Sit to Stand: Supervision    ADL Assessment:  Feeding: Independent    Oral Facial Hygiene/Grooming: Independent(sitting EOB)    Bathing: Supervision    Upper Body Dressing: Independent    Lower Body Dressing: Stand-by assistance (with increased time)    Toileting: Stand by assistance    ADL Intervention:  Lower Body Dressing Assistance  Socks: Stand-by assistance  Position Performed: Seated edge of bed    Cognitive Retraining  Safety/Judgement: Awareness of environment      Pain:  Pt reports 6/10 pain or discomfort prior to treatment. (L foot)  Pt reports 6/10 pain or discomfort post treatment.      Activity Tolerance:   Good    Please refer to the flowsheet for vital signs taken during this treatment. After treatment:   [] Patient left in no apparent distress sitting up in chair  [x] Patient left in no apparent distress in bed  [x] Call bell left within reach  [] Nursing notified  [] Caregiver present  [] Bed alarm activated    COMMUNICATION/EDUCATION:   [] Home safety education was provided and the patient/caregiver indicated understanding. [x] Patient/family have participated as able in goal setting and plan of care. [x] Patient/family agree to work toward stated goals and plan of care. [] Patient understands intent and goals of therapy, but is neutral about his/her participation. [] Patient is unable to participate in goal setting and plan of care.     Thank you for this referral.  Karan Granados, OT  Time Calculation: 30 mins

## 2019-01-16 NOTE — PROGRESS NOTES
[facility]  Pulmonary, Critical Care, and Sleep Medicine    Name: Lisa Villela MRN: [id number]   : 1970 Hospital: [address] [place] [address] Dr   Date: 2019  Room: [facility]     Subjective:   Seen & examined at the bedside. No complaints this morning. Slept well. Breathing is stable. Wound cultures grew staph aureus-- sensitiviities pending. Plans to return to the [address] [place] [address] on . Remains hemodynamically stable.       Past Medical History:   Diagnosis Date    Cro[facility]'s colitis ([facility]     Diabetes ([facility]     HTN (hypertension)        Current Facility-Administered Medications   Medication Dose Route Frequency    insulin lispro (HUMALOG) injection   SubCUTAneous AC&HS    heparin 25,000 units in D5W 250 ml infusion  18-36 Units/kg/hr IntraVENous TITRATE    SITagliptin (JANUVIA) tablet 50 mg  50 mg Oral DAILY    insulin glargine (LANTUS) injection 30 Units  30 Units SubCUTAneous DAILY    metoprolol tartrate (LOPRESSOR) tablet 50 mg  50 mg Oral BID    azaTHIOprine (IMURAN) tablet 200 mg  200 mg Oral DAILY AFTER BREAKFAST    predniSONE (DELTASONE) tablet 10 mg  10 mg Oral DAILY WITH BREAKFAST    morphine injection 2 mg  2 mg IntraVENous Q4H PRN    gabapentin (NEURONTIN) capsule 300 mg  300 mg Oral TID    hydrALAZINE (APRESOLINE) tablet 100 mg  100 mg Oral TID    atorvastatin (LIPITOR) tablet 20 mg  20 mg Oral QHS    acetaminophen (TYLENOL) tablet 650 mg  650 mg Oral Q4H PRN    insulin lispro (HUMALOG) injection 4 Units  4 Units SubCUTAneous TIDAC    glucose chewable tablet 16 g  4 Tab Oral PRN    glucagon (GLUCAGEN) injection 1 mg  1 mg IntraMUSCular PRN    dextrose (D50W) injection syrg 12.5-25 g  25-50 mL IntraVENous PRN    traMADol-acetaminophen (ULTRACET) per tablet 1 Tab  1 Tab Oral Q4H PRN    albuterol-ipratropium (DUO-NEB) 2.5 MG-0.5 MG/3 ML  3 mL Nebulization Q4H PRN    cloNIDine HCl (CATAPRES) tablet 0.1 mg  0.1 mg Oral Q8H PRN    ondansetron (ZOFRAN) injection 4 mg  4 mg IntraVENous Q6H PRN    pantoprazole (PROTONIX) tablet 40 mg  40 mg Oral ACB&D    losartan (COZAAR) tablet 100 mg  100 mg Oral DAILY    hydroCHLOROthiazide (HYDRODIURIL) tablet 25 mg  25 mg Oral DAILY       No Known Allergies      Review of Systems:  HEENT: No epistaxis, no nasal drainage, no difficulty in swallowing  Respiratory: as above  Cardiovascular: no chest pain, no palpitations, no chronic leg edema, no syncope  Gastrointestinal: no abd pain, no vomiting, no diarrhea, no bleeding symptoms  Genitourinary: No urinary symptoms  Integument/breast: No ulcers  Musculoskeletal:Neg  Neurological: No focal weakness, no seizures, no headaches  Behvioral/Psych: No anxiety, no depression  Constitutional: No fever, no chills, no weight loss, no night sweats  SLEEP: No snoring, no witnessed apneas, no daytime somnolence, no morning headaches. Sleep refreshed. Objective:   Vital Signs:    Visit Vitals  /75 (BP 1 Location: Right arm, BP Patient Position: At rest)   Pulse 86   Temp 98.5 °F (36.9 °C)   Resp 18   Ht 6' (1.829 m)   Wt (!) 163.5 kg (360 lb 7.2 oz)   SpO2 95%   BMI 48.89 kg/m²       O2 Device: Room air       Temp (24hrs), Av.1 °F (36.7 °C), Min:97.7 °F (36.5 °C), Max:98.5 °F (36.9 °C)       Intake/Output:   No intake or output data in the 24 hours ending 19 0817        Physical Exam:   General: middle-aged black female, appears younger than stated age. Laying in bed. NAD. [place] Loose HEENT: normocephalic, atraumatic, EOM intact. Trachea midline. CVS: S1S2 no murmurs  RS: Clear throughout, no wheezing, rales, or rhonchi. No conversational dyspnea. Abd: soft, non tender, + bowel sounds. Neuro: alert & oriented. CN 2-12 intact.   Speech is clear, spontaneous  Extrm: left ankle with ACE wrap in place  Skin: no rash    Data review:   Labs:  Recent Results (from the past 12 hour(s))   GLUCOSE, POC    Collection Time: 01/15/19 10:08 PM   Result Value Ref Range    Glucose (POC) 213 (H) 70 - 110 mg/dL   GLUCOSE, POC    Collection Time: 01/16/19  7:47 AM   Result Value Ref Range    Glucose (POC) 198 (H) 70 - 110 mg/dL     Imaging:  I have personally reviewed the patients radiographs and have reviewed the reports:    IMPRESSION:   · Provoked Submassive Pulmonary Embolism in the Setting of Prolonged Travel & Left Ankle Fracture  · Medical Comorbidities: HTN, DM complicated by peripheral neuropathy, obesity, Crohn's disease        RECOMMENDATIONS:   · Last dose of eliquis on 1/14/19 @ 1726  · Continue heparin infusion. · Post-operatively, resume heparin infusion 12 hours after surgery with plans to restart eliquis 2-3 days after  · Start incentive spirometry to prevent atelectasis. · Management of fracture per orthopedics  · Ongoing glycemic control to promote wound healing  · Thank you for allowing us to participate in the care of this patient. We will continue to follow.    · DVT prophylaxis: therapeutic heparin     Total time spent 50 mins with more than 50% done face to face interview / exam / counseling       Kiran Mayers, DO

## 2019-01-16 NOTE — PROGRESS NOTES
Problem: Mobility Impaired (Adult and Pediatric)  Goal: *Acute Goals and Plan of Care (Insert Text)  Physical Therapy Goals  Initiated 1/14/2019 and to be accomplished within 7 day(s)  1. Patient will move from supine to sit and sit to supine , scoot up and down and roll side to side in bed with modified independence. 2.  Patient will transfer from bed to chair and chair to bed with minimal assistance/contact guard assist using the least restrictive device. 3.  Patient will perform sit to stand with supervision/set-up. 4.  Patient will ambulate with standby/contact guard assistanct for 10-25 feet with the least restrictive device while maintaining WB precautions. Outcome: Progressing Towards Goal  physical Therapy TREATMENT    Patient: Jeanine Taylor (46 y.o. female)  Date: 1/16/2019  Diagnosis: Pulmonary emboli (HCC)  Pulmonary emboli (HCC) <principal problem not specified>  Procedure(s) (LRB):  INCISION AND DRAINAGE LEFT ANKLE (Left)    Precautions: NWB(L ankle)   Chart, physical therapy assessment, plan of care and goals were reviewed. OBJECTIVE/ASSESSMENT:  Pt received supine in bed, agreeable to physical therapy treatment. Pt performed supine therapeutic exercise for B LEs and then transitioned to edge of bed with supervision. Pt displayed intact sitting balance. Pt aware and compliant of NWB status of L ankle. Pt stood from slightly elevated edge of bed with supervison/set-up (bed elevated slightly and walker brought to pt). In standing, pt displayed fair standing balance and ability to maintain NWB on L ankle. Did not feel safe attempting to hop forward though, due to unsure if pt can perform safely and without WB through L LE. Pt returned to sitting with supervision and to supine independently.   Education:   [x]         Bed mobility  [x]         Transfers  []         Ambulation  [x]         Assistive device management  []         Stairs  [x]         Body mechanics  [x] Position change  [x]         Activity pacing/energy conservation  []         Other:  Progression toward goals:  []      Improving appropriately and progressing toward goals  [x]      Improving slowly and progressing toward goals  []      Not making progress toward goals and plan of care will be adjusted     PLAN:  Patient continues to benefit from skilled intervention to address the above impairments. Continue treatment per established plan of care. Discharge Recommendations:  Home Health  Further Equipment Recommendations for Discharge:  N/A     SUBJECTIVE:   Patient stated I can get over to the bedside commode.     OBJECTIVE DATA SUMMARY:   Critical Behavior:  Neurologic State: Alert  Orientation Level: Oriented X4  Cognition: Appropriate decision making, Appropriate for age attention/concentration, Appropriate safety awareness  Safety/Judgement: Awareness of environment  Functional Mobility Training:  Bed Mobility:  Supine to Sit: Modified independent  Sit to Supine: Modified independent  Transfers:  Sit to Stand: Supervision;Setup  Stand to Sit: Supervision  Balance:  Sitting: Intact  Standing: Impaired; With support  Standing - Static: Good  Standing - Dynamic : Fair  Ambulation/Gait Training:  Left Side Weight Bearing: Non-weight bearing    Therapeutic Exercises:   Supine SLR x 10 on each LE, Seated LAQ, hip flexion x 15 on each side  Pain:  Pre session: 5 ( L ankle)  Post session: 5 ( L ankle)  Activity Tolerance:   Good  Please refer to the flowsheet for vital signs taken during this treatment.   After treatment:   [] Patient left in no apparent distress sitting up in chair  [x] Patient left in no apparent distress in bed  [x] Call bell left within reach  [x] Nursing notified  [] Caregiver present  [] Bed alarm activated      Justin Mehta, PT   Time Calculation: 30 mins

## 2019-01-16 NOTE — ROUTINE PROCESS
Bedside and Verbal shift change report given to JACQUELINE Sanchez (oncoming nurse) by Malka Tello RN   (offgoing nurse). Report included the following information SBAR, Kardex, ED Summary, Procedure Summary, Intake/Output, MAR, Accordion, Recent Results and Med Rec Status.

## 2019-01-16 NOTE — PROGRESS NOTES
Kinetic Dosing- Initial Progress Note    Pharmacy Consult ordered by Dr. Lara. Thompson     Indication: SSTI    Patient clinical status and labs ordered/reviewed. Pt Weight Weight: (!) 163.5 kg (360 lb 7.2 oz)     Serum Creatinine Lab Results   Component Value Date/Time    Creatinine 1.10 01/16/2019 09:08 AM       Creatinine Clearance Estimated Creatinine Clearance: 105.6 mL/min (based on SCr of 1.1 mg/dL). BUN Lab Results   Component Value Date/Time    BUN 25 (H) 01/16/2019 09:08 AM       WBC Lab Results   Component Value Date/Time    WBC 9.3 01/16/2019 09:08 AM      Temperature Temp: 98.3 °F (36.8 °C)   HR Pulse (Heart Rate): 65     BP BP: 135/86           Kinetic Dosing Parameters:   Vd = 93L     K = .048 hr-1              t ½ = 15h    Drug Levels:   Vancomycin    No results for input(s): VANCP, VANCT, VANCR, VANRA in the last 72 hours. Gentamicin   No results for input(s): GENP, GENT in the last 72 hours. No lab exists for component:  GENR   Tobramycin   No results for input(s): TOBP, TOBT, TOBR in the last 72 hours. Amikacin   No results for input(s): Cheryal Luna in the last 72 hours.     No lab exists for component:  BURTON Hughes DAMIKR     Dose for naïve patient was initiated at: Vancomycin 2500mg ivpb x1 then 2000mg q18h     Continue to monitor    Sign: GAYLE Brady [facility]  Date: 1/16/2019  Time: 3:51 PM

## 2019-01-17 ENCOUNTER — ANESTHESIA EVENT (OUTPATIENT)
Dept: SURGERY | Age: 49
DRG: 134 | End: 2019-01-17
Payer: MEDICAID

## 2019-01-17 ENCOUNTER — APPOINTMENT (OUTPATIENT)
Dept: INTERVENTIONAL RADIOLOGY/VASCULAR | Age: 49
DRG: 134 | End: 2019-01-17
Attending: FAMILY MEDICINE
Payer: MEDICAID

## 2019-01-17 LAB
ALBUMIN SERPL-MCNC: 2.8 G/DL (ref 3.4–5)
ALBUMIN/GLOB SERPL: 0.7 {RATIO} (ref 0.8–1.7)
ALP SERPL-CCNC: 73 U/L (ref 45–117)
ALT SERPL-CCNC: 12 U/L (ref 13–56)
ANION GAP SERPL CALC-SCNC: 10 MMOL/L (ref 3–18)
APTT PPP: 95.9 SEC (ref 23–36.4)
AST SERPL-CCNC: 5 U/L (ref 15–37)
BASOPHILS # BLD: 0 K/UL (ref 0–0.1)
BASOPHILS NFR BLD: 0 % (ref 0–2)
BILIRUB SERPL-MCNC: 0.4 MG/DL (ref 0.2–1)
BUN SERPL-MCNC: 19 MG/DL (ref 7–18)
BUN/CREAT SERPL: 20 (ref 12–20)
CALCIUM SERPL-MCNC: 8.1 MG/DL (ref 8.5–10.1)
CHLORIDE SERPL-SCNC: 102 MMOL/L (ref 100–108)
CO2 SERPL-SCNC: 26 MMOL/L (ref 21–32)
CREAT SERPL-MCNC: 0.94 MG/DL (ref 0.6–1.3)
DIFFERENTIAL METHOD BLD: ABNORMAL
EOSINOPHIL # BLD: 0.2 K/UL (ref 0–0.4)
EOSINOPHIL NFR BLD: 2 % (ref 0–5)
ERYTHROCYTE [DISTWIDTH] IN BLOOD BY AUTOMATED COUNT: 20 % (ref 11.6–14.5)
GLOBULIN SER CALC-MCNC: 4.1 G/DL (ref 2–4)
GLUCOSE BLD STRIP.AUTO-MCNC: 150 MG/DL (ref 70–110)
GLUCOSE BLD STRIP.AUTO-MCNC: 210 MG/DL (ref 70–110)
GLUCOSE BLD STRIP.AUTO-MCNC: 227 MG/DL (ref 70–110)
GLUCOSE BLD STRIP.AUTO-MCNC: 280 MG/DL (ref 70–110)
GLUCOSE SERPL-MCNC: 241 MG/DL (ref 74–99)
HCT VFR BLD AUTO: 37.9 % (ref 35–45)
HGB BLD-MCNC: 11.7 G/DL (ref 12–16)
LYMPHOCYTES # BLD: 2.8 K/UL (ref 0.9–3.6)
LYMPHOCYTES NFR BLD: 27 % (ref 21–52)
MAGNESIUM SERPL-MCNC: 1.9 MG/DL (ref 1.6–2.6)
MCH RBC QN AUTO: 26.3 PG (ref 24–34)
MCHC RBC AUTO-ENTMCNC: 30.9 G/DL (ref 31–37)
MCV RBC AUTO: 85.2 FL (ref 74–97)
MONOCYTES # BLD: 1.1 K/UL (ref 0.05–1.2)
MONOCYTES NFR BLD: 11 % (ref 3–10)
NEUTS SEG # BLD: 6 K/UL (ref 1.8–8)
NEUTS SEG NFR BLD: 60 % (ref 40–73)
PLATELET # BLD AUTO: 377 K/UL (ref 135–420)
PMV BLD AUTO: 10.9 FL (ref 9.2–11.8)
POTASSIUM SERPL-SCNC: 3.6 MMOL/L (ref 3.5–5.5)
PROT SERPL-MCNC: 6.9 G/DL (ref 6.4–8.2)
RBC # BLD AUTO: 4.45 M/UL (ref 4.2–5.3)
SODIUM SERPL-SCNC: 138 MMOL/L (ref 136–145)
WBC # BLD AUTO: 10.1 K/UL (ref 4.6–13.2)

## 2019-01-17 PROCEDURE — 85730 THROMBOPLASTIN TIME PARTIAL: CPT

## 2019-01-17 PROCEDURE — 74011250636 HC RX REV CODE- 250/636: Performed by: INTERNAL MEDICINE

## 2019-01-17 PROCEDURE — 36415 COLL VENOUS BLD VENIPUNCTURE: CPT

## 2019-01-17 PROCEDURE — 74011636637 HC RX REV CODE- 636/637: Performed by: FAMILY MEDICINE

## 2019-01-17 PROCEDURE — 74011250637 HC RX REV CODE- 250/637: Performed by: FAMILY MEDICINE

## 2019-01-17 PROCEDURE — 65660000000 HC RM CCU STEPDOWN

## 2019-01-17 PROCEDURE — 82962 GLUCOSE BLOOD TEST: CPT

## 2019-01-17 PROCEDURE — 74011636637 HC RX REV CODE- 636/637: Performed by: HOSPITALIST

## 2019-01-17 PROCEDURE — 74011250636 HC RX REV CODE- 250/636: Performed by: FAMILY MEDICINE

## 2019-01-17 PROCEDURE — 85025 COMPLETE CBC W/AUTO DIFF WBC: CPT

## 2019-01-17 PROCEDURE — 83735 ASSAY OF MAGNESIUM: CPT

## 2019-01-17 PROCEDURE — 80053 COMPREHEN METABOLIC PANEL: CPT

## 2019-01-17 PROCEDURE — 02HV33Z INSERTION OF INFUSION DEVICE INTO SUPERIOR VENA CAVA, PERCUTANEOUS APPROACH: ICD-10-PCS | Performed by: RADIOLOGY

## 2019-01-17 PROCEDURE — 36569 INSJ PICC 5 YR+ W/O IMAGING: CPT

## 2019-01-17 RX ORDER — INSULIN LISPRO 100 [IU]/ML
6 INJECTION, SOLUTION INTRAVENOUS; SUBCUTANEOUS
Status: DISCONTINUED | OUTPATIENT
Start: 2019-01-17 | End: 2019-01-20 | Stop reason: HOSPADM

## 2019-01-17 RX ORDER — HYDROCODONE BITARTRATE AND ACETAMINOPHEN 10; 325 MG/1; MG/1
1 TABLET ORAL
Status: DISCONTINUED | OUTPATIENT
Start: 2019-01-17 | End: 2019-01-20

## 2019-01-17 RX ORDER — CEFAZOLIN SODIUM 2 G/50ML
2 SOLUTION INTRAVENOUS EVERY 8 HOURS
Status: DISCONTINUED | OUTPATIENT
Start: 2019-01-18 | End: 2019-01-20 | Stop reason: HOSPADM

## 2019-01-17 RX ORDER — INSULIN GLARGINE 100 [IU]/ML
35 INJECTION, SOLUTION SUBCUTANEOUS DAILY
Status: DISCONTINUED | OUTPATIENT
Start: 2019-01-17 | End: 2019-01-20 | Stop reason: HOSPADM

## 2019-01-17 RX ORDER — MORPHINE SULFATE 2 MG/ML
4 INJECTION, SOLUTION INTRAMUSCULAR; INTRAVENOUS
Status: DISCONTINUED | OUTPATIENT
Start: 2019-01-17 | End: 2019-01-20 | Stop reason: HOSPADM

## 2019-01-17 RX ADMIN — PANTOPRAZOLE SODIUM 40 MG: 40 TABLET, DELAYED RELEASE ORAL at 17:09

## 2019-01-17 RX ADMIN — INSULIN LISPRO 6 UNITS: 100 INJECTION, SOLUTION INTRAVENOUS; SUBCUTANEOUS at 11:04

## 2019-01-17 RX ADMIN — ATORVASTATIN CALCIUM 20 MG: 20 TABLET, FILM COATED ORAL at 22:47

## 2019-01-17 RX ADMIN — HYDROCHLOROTHIAZIDE 25 MG: 25 TABLET ORAL at 08:56

## 2019-01-17 RX ADMIN — HYDRALAZINE HYDROCHLORIDE 100 MG: 50 TABLET, FILM COATED ORAL at 02:14

## 2019-01-17 RX ADMIN — MORPHINE SULFATE 4 MG: 2 INJECTION, SOLUTION INTRAMUSCULAR; INTRAVENOUS at 22:48

## 2019-01-17 RX ADMIN — MORPHINE SULFATE 2 MG: 2 INJECTION, SOLUTION INTRAMUSCULAR; INTRAVENOUS at 08:50

## 2019-01-17 RX ADMIN — INSULIN LISPRO 6 UNITS: 100 INJECTION, SOLUTION INTRAVENOUS; SUBCUTANEOUS at 17:08

## 2019-01-17 RX ADMIN — HYDRALAZINE HYDROCHLORIDE 100 MG: 50 TABLET, FILM COATED ORAL at 11:05

## 2019-01-17 RX ADMIN — INSULIN LISPRO 6 UNITS: 100 INJECTION, SOLUTION INTRAVENOUS; SUBCUTANEOUS at 22:47

## 2019-01-17 RX ADMIN — INSULIN LISPRO 6 UNITS: 100 INJECTION, SOLUTION INTRAVENOUS; SUBCUTANEOUS at 17:07

## 2019-01-17 RX ADMIN — PANTOPRAZOLE SODIUM 40 MG: 40 TABLET, DELAYED RELEASE ORAL at 08:56

## 2019-01-17 RX ADMIN — ONDANSETRON 4 MG: 2 INJECTION INTRAMUSCULAR; INTRAVENOUS at 00:39

## 2019-01-17 RX ADMIN — GABAPENTIN 300 MG: 300 CAPSULE ORAL at 22:47

## 2019-01-17 RX ADMIN — SITAGLIPTIN 50 MG: 50 TABLET, FILM COATED ORAL at 08:56

## 2019-01-17 RX ADMIN — GABAPENTIN 300 MG: 300 CAPSULE ORAL at 17:09

## 2019-01-17 RX ADMIN — ONDANSETRON 4 MG: 2 INJECTION INTRAMUSCULAR; INTRAVENOUS at 22:48

## 2019-01-17 RX ADMIN — ONDANSETRON 4 MG: 2 INJECTION INTRAMUSCULAR; INTRAVENOUS at 08:50

## 2019-01-17 RX ADMIN — MORPHINE SULFATE 4 MG: 2 INJECTION, SOLUTION INTRAMUSCULAR; INTRAVENOUS at 17:09

## 2019-01-17 RX ADMIN — POTASSIUM CHLORIDE 40 MEQ: 1.5 POWDER, FOR SOLUTION ORAL at 08:56

## 2019-01-17 RX ADMIN — HEPARIN SODIUM AND DEXTROSE 7 UNITS/KG/HR: 10000; 5 INJECTION INTRAVENOUS at 15:14

## 2019-01-17 RX ADMIN — INSULIN GLARGINE 35 UNITS: 100 INJECTION, SOLUTION SUBCUTANEOUS at 09:02

## 2019-01-17 RX ADMIN — VANCOMYCIN HYDROCHLORIDE 2000 MG: 10 INJECTION, POWDER, LYOPHILIZED, FOR SOLUTION INTRAVENOUS at 06:25

## 2019-01-17 RX ADMIN — INSULIN LISPRO 4 UNITS: 100 INJECTION, SOLUTION INTRAVENOUS; SUBCUTANEOUS at 07:30

## 2019-01-17 RX ADMIN — MORPHINE SULFATE 4 MG: 2 INJECTION, SOLUTION INTRAMUSCULAR; INTRAVENOUS at 12:00

## 2019-01-17 RX ADMIN — HYDRALAZINE HYDROCHLORIDE 100 MG: 50 TABLET, FILM COATED ORAL at 17:09

## 2019-01-17 RX ADMIN — GABAPENTIN 300 MG: 300 CAPSULE ORAL at 08:56

## 2019-01-17 RX ADMIN — LOSARTAN POTASSIUM 100 MG: 50 TABLET, FILM COATED ORAL at 08:56

## 2019-01-17 RX ADMIN — MORPHINE SULFATE 2 MG: 2 INJECTION, SOLUTION INTRAMUSCULAR; INTRAVENOUS at 03:04

## 2019-01-17 RX ADMIN — METOPROLOL TARTRATE 50 MG: 50 TABLET ORAL at 08:56

## 2019-01-17 RX ADMIN — METOPROLOL TARTRATE 50 MG: 50 TABLET ORAL at 17:09

## 2019-01-17 RX ADMIN — ONDANSETRON 4 MG: 2 INJECTION INTRAMUSCULAR; INTRAVENOUS at 17:09

## 2019-01-17 RX ADMIN — INSULIN LISPRO 3 UNITS: 100 INJECTION, SOLUTION INTRAVENOUS; SUBCUTANEOUS at 13:55

## 2019-01-17 RX ADMIN — AZATHIOPRINE 200 MG: 50 TABLET ORAL at 11:04

## 2019-01-17 NOTE — PROGRESS NOTES
Progress Note    Patient: Geoffrey Ortiz MRN: [id number]  CSN: [id number]    YOB: 1970  Age: 46 y.o. Sex: female    DOA: 1/11/2019 LOS:  LOS: 3 days                    Subjective:   Pt is feeling better seen by Dr Ashley Scott will d/c cam boot she is goinf for Lt ankle I&D debridement tomorrow 1/18/19. Eliquis on hold pt on IV heparin drip. No chest pain no SOB . Reports increased ankle pain  Pt stressed importance of remaining non-weight bearing     Wound culture growing staph species, thought to be superficial colonizer, but due to high risk pt started on vancomycin per ID yesterday. Culture showing staph aureus oxacillin sensitive, follow up ID consult. K+ running low, started daily supplement  Prednisone was discontinued yesterday  Still having some nausea, no emesis, having regular bowel movement  Patient with difficulty with blood draws, IV not working well, will require frequent blood draws as she remains on heparin drip and is receiving IV ABT, will request IR for picc line      Echo 1/14/19  · Estimated left ventricular ejection fraction is 56 - 60%. Visually measured ejection fraction. Left ventricular mild concentric hypertrophy. Normal left ventricular wall motion, no regional wall motion abnormality noted. · Mild aortic root dilatation. · There is no evidence of pulmonary hypertension. Chief Complaint:   Chief Complaint   Patient presents with    Chest Pain       Review of systems  General: No fevers or chills. Cardiovascular: No chest pain or pressure. No palpitations. Pulmonary: No shortness of breath, cough or wheeze. Gastrointestinal: No abdominal pain, positive  Nausea, on and off but no  vomiting or diarrhea. Genitourinary: No urinary frequency, urgency, hesitancy or dysuria.    Musculoskeletal:  Lt ankle pain increased today     Neurologic: No headache, no seizure no weakness    Objective:     Physical Exam:  Visit Vitals  /80 (BP 1 Location: Right arm, BP Patient Position: At rest)   Pulse 82   Temp 98.8 °F (37.1 °C)   Resp 18   Ht 6' (1.829 m)   Wt (!) 163.5 kg (360 lb 7.2 oz)   SpO2 98%   BMI 48.89 kg/m²        General:         Alert, , no acute distress    HEENT: NC, Atraumatic. PERRLA, anicteric sclerae. Lungs: CTA Bilaterally. No Wheezing/Rhonchi/Rales. Heart:  Regular  rhythm,  No murmur, No Rubs, No Gallops  Abdomen: Soft, obese, Non tender. no HSM  Extremities:  swelling Lt ankle trace edema B/l  Psych:   Not anxious or agitated. Neurologic:  CN 2-12 grossly intact, Alert and oriented X 3. No acute neurological                                 Deficits,     Intake and Output:  Current Shift:  No intake/output data recorded. Last three shifts:  No intake/output data recorded. Labs: Results:       Chemistry Recent Labs     01/17/19  0435 01/16/19  1353 01/16/19  0908 01/15/19  0345   *  --  200* 215*     --  140 139   K 3.6 3.9 3.5 3.0*     --  105 105   CO2 26  --  27 25   BUN 19*  --  25* 31*   CREA 0.94  --  1.10 1.04   CA 8.1*  --  8.1* 8.3*   AGAP 10  --  8 9   BUCR 20  --  23* 30*   AP 73  --  73 81   TP 6.9  --  7.0 7.7   ALB 2.8*  --  2.9* 3.1*   GLOB 4.1*  --  4.1* 4.6*   AGRAT 0.7*  --  0.7* 0.7*      CBC w/Diff Recent Labs     01/17/19  0435 01/16/19  0908 01/15/19  1143   WBC 10.1 9.3 8.8   RBC 4.45 4.43 4.63   HGB 11.7* 11.6* 12.5   HCT 37.9 37.4 38.8    386 375   GRANS 60 54 54   LYMPH 27 34 34   EOS 2 1 2      Cardiac Enzymes No results for input(s): CPK, CKND1, ALLEN in the last 72 hours.     No lab exists for component: CKRMB, TROIP   Coagulation Recent Labs     01/17/19  0435 01/16/19  1650   APTT 95.9* 69.0*       Lipid Panel Lab Results   Component Value Date/Time    Cholesterol, total 259 (H) 01/13/2019 04:19 AM    HDL Cholesterol 53 01/13/2019 04:19 AM    LDL, calculated 140.2 (H) 01/13/2019 04:19 AM    VLDL, calculated 65.8 01/13/2019 04:19 AM    Triglyceride 329 (H) 01/13/2019 04:19 AM    CHOL/HDL Ratio 4.9 01/13/2019 04:19 AM      BNP No results for input(s): BNPP in the last 72 hours.    Liver Enzymes Recent Labs     01/17/19  0435   TP 6.9   ALB 2.8*   AP 73   SGOT 5*      Thyroid Studies No results found for: T4, T3U, TSH, TSHEXT, TSHEXT       Procedures/imaging: see electronic medical records for all procedures/Xrays and details which were not copied into this note but were reviewed prior to creation of Plan    Medications:   Current Facility-Administered Medications   Medication Dose Route Frequency    insulin lispro (HUMALOG) injection 6 Units  6 Units SubCUTAneous TIDAC    insulin glargine (LANTUS) injection 35 Units  35 Units SubCUTAneous DAILY    potassium chloride (KLOR-CON) packet for solution 40 mEq  40 mEq Oral DAILY    vancomycin (VANCOCIN) 2000 mg in  ml infusion  2,000 mg IntraVENous Q18H    insulin lispro (HUMALOG) injection   SubCUTAneous AC&HS    heparin 25,000 units in D5W 250 ml infusion  18-36 Units/kg/hr IntraVENous TITRATE    SITagliptin (JANUVIA) tablet 50 mg  50 mg Oral DAILY    metoprolol tartrate (LOPRESSOR) tablet 50 mg  50 mg Oral BID    azaTHIOprine (IMURAN) tablet 200 mg  200 mg Oral DAILY AFTER BREAKFAST    morphine injection 2 mg  2 mg IntraVENous Q4H PRN    gabapentin (NEURONTIN) capsule 300 mg  300 mg Oral TID    hydrALAZINE (APRESOLINE) tablet 100 mg  100 mg Oral TID    atorvastatin (LIPITOR) tablet 20 mg  20 mg Oral QHS    acetaminophen (TYLENOL) tablet 650 mg  650 mg Oral Q4H PRN    glucose chewable tablet 16 g  4 Tab Oral PRN    glucagon (GLUCAGEN) injection 1 mg  1 mg IntraMUSCular PRN    dextrose (D50W) injection syrg 12.5-25 g  25-50 mL IntraVENous PRN    traMADol-acetaminophen (ULTRACET) per tablet 1 Tab  1 Tab Oral Q4H PRN    albuterol-ipratropium (DUO-NEB) 2.5 MG-0.5 MG/3 ML  3 mL Nebulization Q4H PRN    cloNIDine HCl (CATAPRES) tablet 0.1 mg  0.1 mg Oral Q8H PRN    ondansetron (ZOFRAN) injection 4 mg  4 mg IntraVENous Q6H PRN    pantoprazole (PROTONIX) tablet 40 mg  40 mg Oral ACB&D    losartan (COZAAR) tablet 100 mg  100 mg Oral DAILY    hydroCHLOROthiazide (HYDRODIURIL) tablet 25 mg  25 mg Oral DAILY       Assessment/Plan     Active Problems:    Pulmonary emboli (HCC) (1/12/2019)      Accelerated hypertension (1/12/2019)      Crohn's colitis ([address] (1/12/2019)      Wound of left ankle (1/12/2019)      Status post open reduction with internal fixation (ORIF) of fracture of ankle (1/12/2019)      Dehiscence of incision, initial encounter (1/14/2019)      Plan  Hypokalemia resolved  Continue potassium chloride 40 mg daily  Monitor potassium level       Acute Pulmonary Embolism/ S/P travel from [place] and S/p surgery Lt ankle  Has been tolerating Eliquis, now on heparin drip for OR planning for left ankle, plan for OR tomorrow with ortho 1/18/19, will plan to resume heparin drip 12hrs after surgery and resume eliquis 2-3 days after surgery per pulm consult  H&H stable  Continue to monitor sx and o2 sat       S/P ORIF  Lt lateral ankle wound  Ortho consult, Dr. Jeanine Martin, recommending OR intervention with I&D and possible wound re-approximation, planning for tomorrow 1/18/19. Pt with no known history of CAD/ACS/MI/CVA, has risk factors of HTN, DM2. Chronic medical conditions with fair control. She is non-smoker and CXR 1/11/19 only remarkable for bibasilar atelectasis. EKG 1/11/19 with NSR, possible left atrial enlargement, LVH. Echo obtained 1/14/19 normal EF 56-60%, mild LV mild concentric hypertrophy with normal wall motion, mild aortic root dilation and no pulmonary HTN. CTA did not show annerysm or dissection of aorta. Denies cardiopulmonary symptoms of chest pain or pressure, shortness of breath or cough. Pt has no known adverse reactions to anesthesia.   By RCRI patient at 0.9% risk for perioperative cardiac events which is considered low however given fair chronic disease control and recent acute PE would consider her to be at moderate risk for MACE. F/u  ID consult regarding MSSA wound  x-ray Lt ankle stable 1/12/19  Pt at higher risk for infection with her immune suppression status from Chron's on steroid, imuran, and humara. Prednisone was discontinued yesterday.   Increase Morphine 4 mg IV q 4h prn, change tramadol to norco to see if can achieve longer acting pain relief   Neurontin 300 mg q8h  Helping her pain     Crohn's disease - stable   prednisone discontinued 1/16/19   continues imuran 200 mg daily in AM  Pt on Imuran and humara she will need GI follow up as outpatient  Protonix 40 mg daily  Continue Zofran prn nausea     Diabetes Mellitus type 2  HG A1c 8.0 on 1/11/19  Takes levemir 30units at home,  Blood sugars still elevated, increase lantus to 35 units daily, continue high dose SSI, increase novolog to 6units AC, continue [place] and [place]. Consider metformin on discharge if no further imaging studies planned.   Monitor glucose levels   - Started on Lipitor 20 mg qhs     Uncontrolled / accelerated Hypertension   - BP improved control today 866/81, variable systolic 972-450  - continue hydralazine 100  mg TID, Losartan 100 mg daily, HCTZ 25 mg daily, Lopressor 50mg bid  - continue clonidine 0.1mg q8 prn sbp >150  Monitor cr level  Echo with normal EF         Usha Rosenthal MD  1/17/2019 9:35 AM

## 2019-01-17 NOTE — PROGRESS NOTES
Arlene Pearce seen and evaluated    Admission Diagnoses: Pulmonary emboli ([address] Pulmonary emboli (HCC)  Dx:      Plan fort I and D LEFT ANKLE wound    1. NPO: yes after Midnight  2. Preoperative  Antibiotics Ancef on call to OR  3. Consents: [x] Operative preop consents signed and witnessed.      [x] Operative Consent signed     []  Anesthesia Consent      [x]Surgery location marked            [] Consents for Blood       Walt Wolf MD  1/17/2019  10:34 AM

## 2019-01-17 NOTE — PROGRESS NOTES
Pt not seen for skilled PT due to:  []  Nausea/vomiting  []  Eating  []  Pain  []  Pt lethargic  [x]  Refused, stating she has been exercising on her own and is in too much pain. Will f/u later as schedule allows. Thank you.   Lee Ann Castro, PT, DPT

## 2019-01-17 NOTE — PROGRESS NOTES
Infectious Disease progress Note    Requested by: dr. Saira Maria    Reason: left ankle wound infection    Current abx Prior abx   Vancomycin since 1/16      Lines:       Assessment :    78-year-old female with h/o type 2 DM (last hgbA1C 8.0 on 1/11/19) admitted to [facility] on 1/14/19 for chest pain, left ankle wound. Clinical picture c/w left ankle wound dehiscence. After detailed history and exam, I don't see definitive evidence of infection of the left ankle ulcer. Concurrent immunosuppression d/t azathioprine can mask the full presentation. Also, patient was on prednisone for few days prior to admission which would mask inflammation. Wound cultures: methicillin susceptible staph aureus likely superficial colonizer. Dehiscence likely due to mechanical disruption. Patient is at risk for complicated infection due to h/o multiple surgeries, DM, hardware. Hence, recommend periop antibiotics. Recommendations:    1. D/c vancomycin. Start cefazolin  2. Await left ankle surgery  3. F/u intra op findings        Above plan was discussed in details with patient. Please call me if any further questions or concerns. Will continue to participate in the care of this patient. HPI:    No new complaints  Patient denies headaches, visual disturbances, sore throat, runny nose, earaches, sob, chills, cough, abdominal pain, diarrhea, burning micturition, or weakness in extremities. denies back pain/flank pain.          Medication List      ASK your doctor about these medications    HumaLOG U-100 Insulin 100 unit/mL injection  Generic drug:  insulin lispro     HUMIRA 40 mg/0.8 mL injection  Generic drug:  adalimumab     hydrALAZINE 25 mg tablet  Commonly known as:  APRESOLINE     IMURAN 50 mg tablet  Generic drug:  azaTHIOprine     LEVEMIR U-100 INSULIN 100 unit/mL injection  Generic drug:  insulin detemir U-100     losartan 100 mg tablet  Commonly known as:  COZAAR     metoprolol succinate 25 mg XL tablet  Commonly known as:  TOPROL-XL     metoprolol tartrate 25 mg tablet  Commonly known as:  LOPRESSOR            Current Facility-Administered Medications   Medication Dose Route Frequency    insulin lispro (HUMALOG) injection 6 Units  6 Units SubCUTAneous TIDAC    insulin glargine (LANTUS) injection 35 Units  35 Units SubCUTAneous DAILY    [START ON 1/19/2019] VANCOMYCIN TROUGH DUE   Other Daily    morphine injection 4 mg  4 mg IntraVENous Q4H PRN    HYDROcodone-acetaminophen (NORCO)  mg tablet 1 Tab  1 Tab Oral Q4H PRN    [START ON 1/18/2019] ceFAZolin (ANCEF) 3 g in 0.9%  ml IVPB  3 g IntraVENous ONCE    [START ON 1/18/2019] ceFAZolin (ANCEF) 2g IVPB in 50 mL D5W  2 g IntraVENous Q8H    potassium chloride (KLOR-CON) packet for solution 40 mEq  40 mEq Oral DAILY    insulin lispro (HUMALOG) injection   SubCUTAneous AC&HS    heparin 25,000 units in D5W 250 ml infusion  18-36 Units/kg/hr IntraVENous TITRATE    SITagliptin (JANUVIA) tablet 50 mg  50 mg Oral DAILY    metoprolol tartrate (LOPRESSOR) tablet 50 mg  50 mg Oral BID    azaTHIOprine (IMURAN) tablet 200 mg  200 mg Oral DAILY AFTER BREAKFAST    gabapentin (NEURONTIN) capsule 300 mg  300 mg Oral TID    hydrALAZINE (APRESOLINE) tablet 100 mg  100 mg Oral TID    atorvastatin (LIPITOR) tablet 20 mg  20 mg Oral QHS    acetaminophen (TYLENOL) tablet 650 mg  650 mg Oral Q4H PRN    glucose chewable tablet 16 g  4 Tab Oral PRN    glucagon (GLUCAGEN) injection 1 mg  1 mg IntraMUSCular PRN    dextrose (D50W) injection syrg 12.5-25 g  25-50 mL IntraVENous PRN    albuterol-ipratropium (DUO-NEB) 2.5 MG-0.5 MG/3 ML  3 mL Nebulization Q4H PRN    cloNIDine HCl (CATAPRES) tablet 0.1 mg  0.1 mg Oral Q8H PRN    ondansetron (ZOFRAN) injection 4 mg  4 mg IntraVENous Q6H PRN    pantoprazole (PROTONIX) tablet 40 mg  40 mg Oral ACB&D    losartan (COZAAR) tablet 100 mg  100 mg Oral DAILY    hydroCHLOROthiazide (HYDRODIURIL) tablet 25 mg  25 mg Oral DAILY       Allergies: Patient has no known allergies. Temp (24hrs), Av.2 °F (36.8 °C), Min:97.8 °F (36.6 °C), Max:98.8 °F (37.1 °C)    Visit Vitals  /86 (BP 1 Location: Right arm, BP Patient Position: At rest)   Pulse 78   Temp 98 °F (36.7 °C)   Resp 17   Ht 6' (1.829 m)   Wt (!) 163.5 kg (360 lb 7.2 oz)   SpO2 99%   BMI 48.89 kg/m²       ROS: 12 point ROS obtained in details. Pertinent positives as mentioned in HPI,   otherwise negative    Physical Exam:    General:  Alert, cooperative, no distress, appears stated age. Head:  Normocephalic, without obvious abnormality, atraumatic. Eyes:  Conjunctivae/corneas clear. PERRL, ANicteric   Nose: Nares normal. Mucosa normal. No drainage or sinus tenderness. Throat: Lips, mucosa dry. NO thrush; TEETH normal.   Neck: Supple, symmetrical, trachea midline, no adenopathy, thyroid: no enlargment/tenderness/nodules, no carotid bruit and no JVD. No crepitus   Back:   Symmetric, no curvature, no spine tenderness or flank pain   Lungs:   Bilateral auscultation clear no rhonchi rales wheezing normal resp effort   Chest wall:  No tenderness or deformity. NO CREPITUS   Heart:  Regular rate and rhythm, S1, S2 normal, no murmur, click, rub or gallop. Abdomen:   Obese Soft, non-tender. PROTUBERANT; Bowel sounds normal. No masses,  No organomegaly. No paradox   Extremities: Left ankle with surgical incision, minimal serous drainage on dressing, no edema/erythema   Pulses: 1-2+ and symmetric all extremities.    Skin: Skin color, texture, turgor normal. No rashes or lesions   Lymph nodes: Cervical, supraclavicular, and axillary nodes normal.   Neurologic: Grossly nonfocal                                      Labs: Results:   Chemistry Recent Labs     19  0435 19  1353 19  0908 01/15/19  0345   *  --  200* 215*     --  140 139   K 3.6 3.9 3.5 3.0*     --  105 105   CO2 26  --  27 25   BUN 19*  --  25* 31*   CREA 0.94  --  1.10 1.04   CA 8.1*  --  8.1* 8.3*   AGAP 10  --  8 9   BUCR 20  --  23* 30*   AP 73  --  73 81   TP 6.9  --  7.0 7.7   ALB 2.8*  --  2.9* 3.1*   GLOB 4.1*  --  4.1* 4.6*   AGRAT 0.7*  --  0.7* 0.7*      CBC w/Diff Recent Labs     01/17/19  0435 01/16/19  0908 01/15/19  1143   WBC 10.1 9.3 8.8   RBC 4.45 4.43 4.63   HGB 11.7* 11.6* 12.5   HCT 37.9 37.4 38.8    386 375   GRANS 60 54 54   LYMPH 27 34 34   EOS 2 1 2      Microbiology Recent Labs     01/14/19  1900   CULT NO ANAEROBES ISOLATED 3 DAYS  MODERATE STAPHYLOCOCCUS AUREUS*          RADIOLOGY:    All available imaging studies/reports in [facility] for this admission were reviewed    Dr. Aline Dasilva, Infectious Disease Specialist  [phone]  January 17, 2019  12:20 PM

## 2019-01-17 NOTE — PROGRESS NOTES
[facility]  Pulmonary, Critical Care, and Sleep Medicine    Name: Samantha Garcia MRN: [id number]   : 1970 Hospital: [address] [place] [address] Dr   Date: 2019  Room: [facility]     Subjective:   Seen & examined at the bedside. C/o ongoing foot pain. Started on vancomycin per infectious disease yesterday when wound cultures returned with MSSA. No chest pain, shortness of breath, or bleeding issues. Plans to return to the [address] [place] [address] on . Remains hemodynamically stable.       Past Medical History:   Diagnosis Date    Cro[facility]'s colitis ([facility]     Diabetes ([facility]     HTN (hypertension)        Current Facility-Administered Medications   Medication Dose Route Frequency    potassium chloride (KLOR-CON) packet for solution 40 mEq  40 mEq Oral DAILY    vancomycin (VANCOCIN) 2000 mg in  ml infusion  2,000 mg IntraVENous Q18H    insulin lispro (HUMALOG) injection   SubCUTAneous AC&HS    heparin 25,000 units in D5W 250 ml infusion  18-36 Units/kg/hr IntraVENous TITRATE    SITagliptin (JANUVIA) tablet 50 mg  50 mg Oral DAILY    insulin glargine (LANTUS) injection 30 Units  30 Units SubCUTAneous DAILY    metoprolol tartrate (LOPRESSOR) tablet 50 mg  50 mg Oral BID    azaTHIOprine (IMURAN) tablet 200 mg  200 mg Oral DAILY AFTER BREAKFAST    morphine injection 2 mg  2 mg IntraVENous Q4H PRN    gabapentin (NEURONTIN) capsule 300 mg  300 mg Oral TID    hydrALAZINE (APRESOLINE) tablet 100 mg  100 mg Oral TID    atorvastatin (LIPITOR) tablet 20 mg  20 mg Oral QHS    acetaminophen (TYLENOL) tablet 650 mg  650 mg Oral Q4H PRN    insulin lispro (HUMALOG) injection 4 Units  4 Units SubCUTAneous TIDAC    glucose chewable tablet 16 g  4 Tab Oral PRN    glucagon (GLUCAGEN) injection 1 mg  1 mg IntraMUSCular PRN    dextrose (D50W) injection syrg 12.5-25 g  25-50 mL IntraVENous PRN    traMADol-acetaminophen (ULTRACET) per tablet 1 Tab  1 Tab Oral Q4H PRN    albuterol-ipratropium (DUO-NEB) 2.5 MG-0.5 MG/3 ML  3 mL Nebulization Q4H PRN    cloNIDine HCl (CATAPRES) tablet 0.1 mg  0.1 mg Oral Q8H PRN    ondansetron (ZOFRAN) injection 4 mg  4 mg IntraVENous Q6H PRN    pantoprazole (PROTONIX) tablet 40 mg  40 mg Oral ACB&D    losartan (COZAAR) tablet 100 mg  100 mg Oral DAILY    hydroCHLOROthiazide (HYDRODIURIL) tablet 25 mg  25 mg Oral DAILY       No Known Allergies      Review of Systems:  HEENT: No epistaxis, no nasal drainage, no difficulty in swallowing  Respiratory: as above  Cardiovascular: no chest pain, no palpitations, no chronic leg edema, no syncope  Gastrointestinal: no abd pain, no vomiting, no diarrhea, no bleeding symptoms  Genitourinary: No urinary symptoms  Integument/breast: No ulcers  Musculoskeletal:Neg  Neurological: No focal weakness, no seizures, no headaches  Behvioral/Psych: No anxiety, no depression  Constitutional: No fever, no chills, no weight loss, no night sweats  SLEEP: No snoring, no witnessed apneas, no daytime somnolence, no morning headaches. Sleep refreshed. Objective:   Vital Signs:    Visit Vitals  /73 (BP 1 Location: Right arm, BP Patient Position: At rest)   Pulse 86   Temp 97.8 °F (36.6 °C)   Resp 17   Ht 6' (1.829 m)   Wt (!) 163.5 kg (360 lb 7.2 oz)   SpO2 96%   BMI 48.89 kg/m²       O2 Device: Room air       Temp (24hrs), Av.2 °F (36.8 °C), Min:97.8 °F (36.6 °C), Max:98.4 °F (36.9 °C)       Intake/Output:     Intake/Output Summary (Last 24 hours) at 2019 0757  Last data filed at 2019 0906  Gross per 24 hour   Intake 0 ml   Output --   Net 0 ml           Physical Exam:   General: middle-aged black female, appears younger than stated age. Laying in bed. NAD. HEENT: normocephalic, atraumatic, EOM intact. Trachea midline. CVS: S1S2 no murmurs  RS: Clear throughout, no wheezing, rales, or rhonchi. No conversational dyspnea. Abd: soft, non tender, + bowel sounds. Neuro: alert & oriented. CN 2-12 intact.   Speech is clear, spontaneous  Extrm: left ankle with ACE wrap in place  Skin: no rash    Data review:   Labs:  Recent Results (from the past 12 hour(s))   GLUCOSE, POC    Collection Time: 01/16/19 10:18 PM   Result Value Ref Range    Glucose (POC) 262 (H) 70 - 110 mg/dL   CBC WITH AUTOMATED DIFF    Collection Time: 01/17/19  4:35 AM   Result Value Ref Range    WBC 10.1 4.6 - 13.2 K/uL    RBC 4.45 4.20 - 5.30 M/uL    HGB 11.7 (L) 12.0 - 16.0 g/dL    HCT 37.9 35.0 - 45.0 %    MCV 85.2 74.0 - 97.0 FL    MCH 26.3 24.0 - 34.0 PG    MCHC 30.9 (L) 31.0 - 37.0 g/dL    RDW 20.0 (H) 11.6 - 14.5 %    PLATELET 289 935 - 815 K/uL    MPV 10.9 9.2 - 11.8 FL    NEUTROPHILS 60 40 - 73 %    LYMPHOCYTES 27 21 - 52 %    MONOCYTES 11 (H) 3 - 10 %    EOSINOPHILS 2 0 - 5 %    BASOPHILS 0 0 - 2 %    ABS. NEUTROPHILS 6.0 1.8 - 8.0 K/UL    ABS. LYMPHOCYTES 2.8 0.9 - 3.6 K/UL    ABS. MONOCYTES 1.1 0.05 - 1.2 K/UL    ABS. EOSINOPHILS 0.2 0.0 - 0.4 K/UL    ABS. BASOPHILS 0.0 0.0 - 0.1 K/UL    DF AUTOMATED     PTT    Collection Time: 01/17/19  4:35 AM   Result Value Ref Range    aPTT 95.9 (H) 23.0 - 37.5 SEC   METABOLIC PANEL, COMPREHENSIVE    Collection Time: 01/17/19  4:35 AM   Result Value Ref Range    Sodium 138 136 - 145 mmol/L    Potassium 3.6 3.5 - 5.5 mmol/L    Chloride 102 100 - 108 mmol/L    CO2 26 21 - 32 mmol/L    Anion gap 10 3.0 - 18 mmol/L    Glucose 241 (H) 74 - 99 mg/dL    BUN 19 (H) 7.0 - 18 MG/DL    Creatinine 0.94 0.6 - 1.3 MG/DL    BUN/Creatinine ratio 20 12 - 20      GFR est AA >60 >60 ml/min/1.73m2    GFR est non-AA >60 >60 ml/min/1.73m2    Calcium 8.1 (L) 8.5 - 10.1 MG/DL    Bilirubin, total 0.4 0.2 - 1.0 MG/DL    ALT (SGPT) 12 (L) 13 - 56 U/L    AST (SGOT) 5 (L) 15 - 37 U/L    Alk.  phosphatase 73 45 - 117 U/L    Protein, total 6.9 6.4 - 8.2 g/dL    Albumin 2.8 (L) 3.4 - 5.0 g/dL    Globulin 4.1 (H) 2.0 - 4.0 g/dL    A-G Ratio 0.7 (L) 0.8 - 1.7     MAGNESIUM    Collection Time: 01/17/19  4:35 AM   Result Value Ref Range    Magnesium 1.9 1.6 - 2.6 mg/dL     Imaging:  I have personally reviewed the patients radiographs and have reviewed the reports:    IMPRESSION:   · Provoked Submassive Pulmonary Embolism in the Setting of Prolonged Travel & Left Ankle Fracture  · Medical Comorbidities: HTN, DM complicated by peripheral neuropathy, obesity, Crohn's disease        RECOMMENDATIONS:   · Last dose of eliquis on 1/14/19 @ 1726  · Continue heparin infusion. · Post-operatively, resume heparin infusion 12 hours after surgery with plans to restart eliquis 2-3 days after  · Continue incentive spirometry, mobilize as per orthopedics to prevent atelectasis. · Ongoing glycemic control to promote wound healing  · Thank you for allowing us to participate in the care of this patient. We will continue to follow.    · DVT prophylaxis: therapeutic heparin     Total time spent 50 mins with more than 50% done face to face interview / exam / counseling       Lorel Duverney, DO

## 2019-01-17 NOTE — PROGRESS NOTES
attempted to visit patient and was not able to do a spiritual assessment. Will follow up with patient.   Colletta Plume, [address] [place] [address]  Spiritual Care Department  [phone]

## 2019-01-17 NOTE — ROUTINE PROCESS
Bedside and Verbal shift change report given to Yanelis Echeverria RN (oncoming nurse) by Madai Calix (offgoing nurse). Report included the following information SBAR, Kardex, MAR and Recent Results.     SITUATION:  Code Status: Full Code  Reason for Admission: Pulmonary emboli [facility])  Pulmonary emboli [facility])  Hospital day: 3  Problem List:       Hospital Problems  Date Reviewed: 1/12/2019          Codes Class Noted POA    Dehiscence of incision, initial encounter ICD-10-CM: T81.31XA  ICD-9-CM: 998.32  1/14/2019 Unknown        Pulmonary emboli ([facility] [address] ICD-10-CM: I26.99  ICD-9-CM: 415.19  1/12/2019 Unknown        Accelerated hypertension ICD-10-CM: I10  ICD-9-CM: 401.0  1/12/2019 Yes        Crohn's colitis ([facility] [address] ICD-10-CM: K50.10  ICD-9-CM: 555.1  1/12/2019 Yes        Wound of left ankle ICD-10-CM: W73.096W  ICD-9-CM: 891.0  1/12/2019 Yes        Status post open reduction with internal fixation (ORIF) of fracture of ankle ICD-10-CM: Z96.7, Z87.81  ICD-9-CM: V45.89, V15.51  1/12/2019 Yes              BACKGROUND:   Past Medical History:   Past Medical History:   Diagnosis Date    Crohn's colitis ([facility] [address]     Diabetes ([facility] [address]     HTN (hypertension)       Patient taking anticoagulants yes    Patient has a defibrillator: no    History of shots YES for example, flu, pneumonia, tetanus   Isolation History NO for example, MRSA, CDiff    ASSESSMENT:  Changes in Assessment Throughout Shift: None  Significant Changes in 24 hours (for example, RR/code, fall)  Patient has Central Line: no Reasons if yes: n/a  Patient has Doss Cath: no Reasons if yes: n/a   Mobility Issues  PT  IV Patency  OR Checklist  Pending Tests    Last Vitals:  Vitals w/ MEWS Score (last day)     Date/Time MEWS Score Pulse Resp Temp BP Level of Consciousness SpO2    01/17/19 0410  --  86  17  97.8 °F (36.6 °C)  127/73  --  96 %    01/17/19 0105  --  67  18  98.3 °F (36.8 °C)  136/84  --  97 %    01/16/19 1514  1  65  16  98.3 °F (36.8 °C)  135/86  Alert  98 % 01/16/19 1151  1  64  16  98.4 °F (36.9 °C)  115/70  Alert  96 %    01/16/19 0749  1  86  18  98.5 °F (36.9 °C)  172/75  Alert  95 %    01/16/19 0407  1  89  18  98.2 °F (36.8 °C)  131/76  Alert  96 %    01/16/19 0001  1  73  18  98.1 °F (36.7 °C)  103/66  Alert  97 %            PAIN    Pain Assessment    Pain Intensity 1: 6 (01/17/19 0304)    Pain Location 1: Ankle    Pain Intervention(s) 1: Medication (see MAR)    Patient Stated Pain Goal: 0  Intervention effective: yes  Time of last intervention: 0302 Reassessment Completed: yes   Other actions taken for pain: none treatment effective    Last 3 Weights:  Last 3 Recorded Weights in this Encounter    01/12/19 0520 01/14/19 1040 01/15/19 0646   Weight: 144.2 kg (318 lb) 144.2 kg (318 lb) (!) 163.5 kg (360 lb 7.2 oz)   Weight change:     INTAKE/OUPUT    Current Shift: No intake/output data recorded. Last three shifts: No intake/output data recorded. RECOMMENDATIONS AND DISCHARGE PLANNING  Patient needs and requests: Pain Control, ABX    Pending tests/procedures: Ankle open reduction surgery 1/18     Discharge plan for patient: Home    Discharge planning Needs or Barriers: reassessment after procedure     Estimated Discharge Date: 1/19 Posted on Whiteboard in [facility]: no       \"HEALS\" SAFETY CHECK  A safety check occurred in the patient's room between off going nurse and oncoming nurse listed above. The safety check included the below items:    H  High Alert Medications Verify all high alert medication drips (heparin, PCA, etc.)  E  Equipment Suction is set up for ALL patients (with yanker)  Red plugs utilized for all equipment (IV pumps, etc.)  WOWs wiped down at end of shift.   Room stocked with oxygen, suction, and other unit-specific supplies  A  Alarms Bed alarm is set for fall risk patients  Ensure chair alarm is in place and activated if patient is up in a chair  L  Lines Check IV for any infiltration  Doss bag is empty if patient has a Doss Tubing and IV bags are labeled  S  Safety  Room is clean, patient is clean, and equipment is clean. Hallways are clear from equipment besides carts. Fall bracelet on for fall risk patients  Ensure room is clear and free of clutter  Suction is set up for ALL patients (with flip)  Hallways are clear from equipment besides carts.    Isolation precautions followed, supplies available outside room, sign posted    Lisa Johnson

## 2019-01-17 NOTE — DIABETES MGMT
NUTRITIONAL ASSESSMENT GLYCEMIC CONTROL/ PLAN OF CARE     Ce Madison           46 y.o.           1/11/2019                 1. Other acute pulmonary embolism without acute cor pulmonale (HCC)       INTERVENTIONS/PLAN:   Prandial Lispro insulin should be held if pt is consuming less than 50% of meal. Continue correctional Lispro insulin coverage     ASSESSMENT:   Pt is a 46year old female with a past medical history significant for Crohn's colitis, diabetes, and hypertension. Blood glucose elevated but improving. Noted increase in Lantus and prandial Lispro insulin. Pt reports having nausea and did not eat much at lunch meal.     Diabetes Management:   Recent blood glucose:    1/16/2019 15:12 1/16/2019 22:18 1/17/2019 08:34 1/17/2019 12:25   195 (H) 262 (H) 280 (H) 150 (H)   Within target range (non-ICU: <140; ICU<180): [] Yes   [x]  No    Current Insulin regimen:   Basal lantus insulin 35 units daily. Prandial lispro insulin 6 units TID AC. Correctional lispro insulin ACHS. Very resistant dose. Januvia 50 mg daily, first dose ordered 01/15/2019. Home medication/insulin regimen:  Lantus insulin 30 units daily at bedtime. Humalog sliding scale insulin.    HbA1c: 8.0% (estimated average glucose of 183 mg/dL)  Adequate glycemic control PTA:  [] Yes  [x] No     SUBJECTIVE/OBJECTIVE:   Information obtained from: patient, chart review     Diet: Diabetic consistent carbohydrate, no concentrated sweets    Patient Vitals for the past 100 hrs:   % Diet Eaten   01/16/19 0906 0 %   01/14/19 1834 25 %   01/14/19 1300 15 %   01/14/19 0800 0 %     Medications: [x] Reviewed     Most Recent POC Glucose:   Recent Labs     01/17/19  0435 01/16/19  0908 01/15/19  0345   * 200* 215*      Labs:   Lab Results   Component Value Date/Time    Hemoglobin A1c 8.0 (H) 01/11/2019 10:03 PM     Lab Results   Component Value Date/Time    Sodium 138 01/17/2019 04:35 AM    Potassium 3.6 01/17/2019 04:35 AM    Chloride 102 01/17/2019 04:35 AM    CO2 26 01/17/2019 04:35 AM    Anion gap 10 01/17/2019 04:35 AM    Glucose 241 (H) 01/17/2019 04:35 AM    BUN 19 (H) 01/17/2019 04:35 AM    Creatinine 0.94 01/17/2019 04:35 AM    Calcium 8.1 (L) 01/17/2019 04:35 AM    Magnesium 1.9 01/17/2019 04:35 AM    Albumin 2.8 (L) 01/17/2019 04:35 AM     Anthropometrics:   BMI (calculated): 48.9  Wt Readings from Last 1 Encounters:   01/15/19 (!) 163.5 kg (360 lb 7.2 oz)      Ht Readings from Last 1 Encounters:   01/14/19 6' (1.829 m)     Estimated Nutrition Needs:  0981-9023 Kcal/day, 76-95 grams protein/day   Based on:   []  Actual BW    []  IBW   [x]   Adjusted BW (95 kg)    Nutrition Diagnoses:    Altered nutrition related lab value related to diabetes as evidenced by Hemoglobin A1c of 8.0%  Nutrition Interventions: diabetic diet, assessment of home management  Goal: Blood glucose will be within target range of  mg/dL by 1/18/19     Nutrition Monitoring and Evaluation    []     Monitor po intake on meal rounds  [x]     Continue inpatient monitoring and intervention  []     Other:    Larry Wakefield RD, CDE  pgr [phone]

## 2019-01-17 NOTE — PROCEDURES
INTERVENTIONAL RADIOLOGY POST PICC LINE NOTE       January 17, 2019       4:25 PM     Preoperative Diagnosis:   IV Access for long-term antibiotics    Postoperative Diagnosis:  Same. :  Lili Mcconnell PA-C    Assistant:  None. Attending Physician: Dr. Dejan Kilgore    Type of Anesthesia:  1% Lidocaine local    Procedure/Description: right basilic upper extremity PICC Line. Findings:  right basilic 5 [place] catheter placed. Tip at SVC/RA junction. OK to use. Length 48 cm. Estimated blood Loss: Minimal    Specimen Removed: None    Drains: None     Complications:  None. Condition:  Stable.     Discharge Plan:  Continue present therapy

## 2019-01-17 NOTE — PROGRESS NOTES
Problem: Falls - Risk of  Goal: *Absence of Falls  Document Jill Fall Risk and appropriate interventions in the flowsheet.   Outcome: Progressing Towards Goal  Fall Risk Interventions:            Medication Interventions: Evaluate medications/consider consulting pharmacy, Patient to call before getting OOB    Elimination Interventions: Patient to call for help with toileting needs

## 2019-01-18 ENCOUNTER — ANESTHESIA (OUTPATIENT)
Dept: SURGERY | Age: 49
DRG: 134 | End: 2019-01-18
Payer: MEDICAID

## 2019-01-18 LAB
ANION GAP SERPL CALC-SCNC: 7 MMOL/L (ref 3–18)
APTT PPP: 28.6 SEC (ref 23–36.4)
APTT PPP: >180 SEC (ref 23–36.4)
BASOPHILS # BLD: 0 K/UL (ref 0–0.1)
BASOPHILS NFR BLD: 0 % (ref 0–2)
BUN SERPL-MCNC: 13 MG/DL (ref 7–18)
BUN/CREAT SERPL: 15 (ref 12–20)
CALCIUM SERPL-MCNC: 8.4 MG/DL (ref 8.5–10.1)
CHLORIDE SERPL-SCNC: 101 MMOL/L (ref 100–108)
CO2 SERPL-SCNC: 29 MMOL/L (ref 21–32)
CREAT SERPL-MCNC: 0.86 MG/DL (ref 0.6–1.3)
DIFFERENTIAL METHOD BLD: ABNORMAL
EOSINOPHIL # BLD: 0.2 K/UL (ref 0–0.4)
EOSINOPHIL NFR BLD: 2 % (ref 0–5)
ERYTHROCYTE [DISTWIDTH] IN BLOOD BY AUTOMATED COUNT: 19.7 % (ref 11.6–14.5)
GLUCOSE BLD STRIP.AUTO-MCNC: 183 MG/DL (ref 70–110)
GLUCOSE BLD STRIP.AUTO-MCNC: 200 MG/DL (ref 70–110)
GLUCOSE BLD STRIP.AUTO-MCNC: 219 MG/DL (ref 70–110)
GLUCOSE BLD STRIP.AUTO-MCNC: 372 MG/DL (ref 70–110)
GLUCOSE SERPL-MCNC: 128 MG/DL (ref 74–99)
HCG SERPL QL: NEGATIVE
HCT VFR BLD AUTO: 37.2 % (ref 35–45)
HGB BLD-MCNC: 11.7 G/DL (ref 12–16)
LYMPHOCYTES # BLD: 2.1 K/UL (ref 0.9–3.6)
LYMPHOCYTES NFR BLD: 24 % (ref 21–52)
MCH RBC QN AUTO: 26.8 PG (ref 24–34)
MCHC RBC AUTO-ENTMCNC: 31.5 G/DL (ref 31–37)
MCV RBC AUTO: 85.3 FL (ref 74–97)
MONOCYTES # BLD: 1.1 K/UL (ref 0.05–1.2)
MONOCYTES NFR BLD: 12 % (ref 3–10)
NEUTS SEG # BLD: 5.3 K/UL (ref 1.8–8)
NEUTS SEG NFR BLD: 62 % (ref 40–73)
PLATELET # BLD AUTO: 352 K/UL (ref 135–420)
PMV BLD AUTO: 10.9 FL (ref 9.2–11.8)
POTASSIUM SERPL-SCNC: 3.7 MMOL/L (ref 3.5–5.5)
RBC # BLD AUTO: 4.36 M/UL (ref 4.2–5.3)
SODIUM SERPL-SCNC: 137 MMOL/L (ref 136–145)
WBC # BLD AUTO: 8.8 K/UL (ref 4.6–13.2)

## 2019-01-18 PROCEDURE — 87186 SC STD MICRODIL/AGAR DIL: CPT

## 2019-01-18 PROCEDURE — 74011250636 HC RX REV CODE- 250/636: Performed by: INTERNAL MEDICINE

## 2019-01-18 PROCEDURE — 74011000250 HC RX REV CODE- 250

## 2019-01-18 PROCEDURE — 87077 CULTURE AEROBIC IDENTIFY: CPT

## 2019-01-18 PROCEDURE — 80048 BASIC METABOLIC PNL TOTAL CA: CPT

## 2019-01-18 PROCEDURE — 85730 THROMBOPLASTIN TIME PARTIAL: CPT

## 2019-01-18 PROCEDURE — 77030020782 HC GWN BAIR PAWS FLX 3M -B: Performed by: ORTHOPAEDIC SURGERY

## 2019-01-18 PROCEDURE — 77030032490 HC SLV COMPR SCD KNE COVD -B: Performed by: ORTHOPAEDIC SURGERY

## 2019-01-18 PROCEDURE — 82962 GLUCOSE BLOOD TEST: CPT

## 2019-01-18 PROCEDURE — 74011636637 HC RX REV CODE- 636/637: Performed by: FAMILY MEDICINE

## 2019-01-18 PROCEDURE — 74011250636 HC RX REV CODE- 250/636

## 2019-01-18 PROCEDURE — 65660000000 HC RM CCU STEPDOWN

## 2019-01-18 PROCEDURE — 74011250636 HC RX REV CODE- 250/636: Performed by: PHYSICIAN ASSISTANT

## 2019-01-18 PROCEDURE — 87070 CULTURE OTHR SPECIMN AEROBIC: CPT

## 2019-01-18 PROCEDURE — 74011636637 HC RX REV CODE- 636/637: Performed by: NURSE ANESTHETIST, CERTIFIED REGISTERED

## 2019-01-18 PROCEDURE — 76210000016 HC OR PH I REC 1 TO 1.5 HR: Performed by: ORTHOPAEDIC SURGERY

## 2019-01-18 PROCEDURE — 74011250636 HC RX REV CODE- 250/636: Performed by: NURSE ANESTHETIST, CERTIFIED REGISTERED

## 2019-01-18 PROCEDURE — 77030008683 HC TU ET CUF COVD -A: Performed by: NURSE ANESTHETIST, CERTIFIED REGISTERED

## 2019-01-18 PROCEDURE — 74011250636 HC RX REV CODE- 250/636: Performed by: FAMILY MEDICINE

## 2019-01-18 PROCEDURE — 74011250637 HC RX REV CODE- 250/637: Performed by: FAMILY MEDICINE

## 2019-01-18 PROCEDURE — 84703 CHORIONIC GONADOTROPIN ASSAY: CPT

## 2019-01-18 PROCEDURE — 76060000033 HC ANESTHESIA 1 TO 1.5 HR: Performed by: ORTHOPAEDIC SURGERY

## 2019-01-18 PROCEDURE — 85025 COMPLETE CBC W/AUTO DIFF WBC: CPT

## 2019-01-18 PROCEDURE — 76010000149 HC OR TIME 1 TO 1.5 HR: Performed by: ORTHOPAEDIC SURGERY

## 2019-01-18 PROCEDURE — 77030037875 HC DRSG MEPILEX <16IN BORD MOLN -A

## 2019-01-18 PROCEDURE — 74011250637 HC RX REV CODE- 250/637: Performed by: NURSE ANESTHETIST, CERTIFIED REGISTERED

## 2019-01-18 PROCEDURE — 74011000250 HC RX REV CODE- 250: Performed by: ORTHOPAEDIC SURGERY

## 2019-01-18 PROCEDURE — 77030018836 HC SOL IRR NACL ICUM -A: Performed by: ORTHOPAEDIC SURGERY

## 2019-01-18 PROCEDURE — 87075 CULTR BACTERIA EXCEPT BLOOD: CPT

## 2019-01-18 PROCEDURE — 77030002916 HC SUT ETHLN J&J -A: Performed by: ORTHOPAEDIC SURGERY

## 2019-01-18 PROCEDURE — 0JBR0ZZ EXCISION OF LEFT FOOT SUBCUTANEOUS TISSUE AND FASCIA, OPEN APPROACH: ICD-10-PCS | Performed by: ORTHOPAEDIC SURGERY

## 2019-01-18 RX ORDER — LIDOCAINE HYDROCHLORIDE 20 MG/ML
INJECTION, SOLUTION EPIDURAL; INFILTRATION; INTRACAUDAL; PERINEURAL AS NEEDED
Status: DISCONTINUED | OUTPATIENT
Start: 2019-01-18 | End: 2019-01-18 | Stop reason: HOSPADM

## 2019-01-18 RX ORDER — SODIUM CHLORIDE, SODIUM LACTATE, POTASSIUM CHLORIDE, CALCIUM CHLORIDE 600; 310; 30; 20 MG/100ML; MG/100ML; MG/100ML; MG/100ML
50 INJECTION, SOLUTION INTRAVENOUS CONTINUOUS
Status: DISCONTINUED | OUTPATIENT
Start: 2019-01-18 | End: 2019-01-18 | Stop reason: HOSPADM

## 2019-01-18 RX ORDER — ONDANSETRON 2 MG/ML
4 INJECTION INTRAMUSCULAR; INTRAVENOUS ONCE
Status: DISCONTINUED | OUTPATIENT
Start: 2019-01-18 | End: 2019-01-18 | Stop reason: HOSPADM

## 2019-01-18 RX ORDER — GLYCOPYRROLATE 0.2 MG/ML
INJECTION INTRAMUSCULAR; INTRAVENOUS AS NEEDED
Status: DISCONTINUED | OUTPATIENT
Start: 2019-01-18 | End: 2019-01-18 | Stop reason: HOSPADM

## 2019-01-18 RX ORDER — SODIUM CHLORIDE 0.9 % (FLUSH) 0.9 %
5-40 SYRINGE (ML) INJECTION AS NEEDED
Status: DISCONTINUED | OUTPATIENT
Start: 2019-01-18 | End: 2019-01-18 | Stop reason: HOSPADM

## 2019-01-18 RX ORDER — SODIUM CHLORIDE 0.9 % (FLUSH) 0.9 %
5-40 SYRINGE (ML) INJECTION EVERY 8 HOURS
Status: DISCONTINUED | OUTPATIENT
Start: 2019-01-18 | End: 2019-01-18 | Stop reason: HOSPADM

## 2019-01-18 RX ORDER — NALOXONE HYDROCHLORIDE 0.4 MG/ML
0.04 INJECTION, SOLUTION INTRAMUSCULAR; INTRAVENOUS; SUBCUTANEOUS AS NEEDED
Status: DISCONTINUED | OUTPATIENT
Start: 2019-01-18 | End: 2019-01-18 | Stop reason: HOSPADM

## 2019-01-18 RX ORDER — MAGNESIUM SULFATE 100 %
4 CRYSTALS MISCELLANEOUS AS NEEDED
Status: DISCONTINUED | OUTPATIENT
Start: 2019-01-18 | End: 2019-01-18 | Stop reason: HOSPADM

## 2019-01-18 RX ORDER — POTASSIUM CHLORIDE 20 MEQ/1
40 TABLET, EXTENDED RELEASE ORAL DAILY
Status: DISCONTINUED | OUTPATIENT
Start: 2019-01-18 | End: 2019-01-20 | Stop reason: HOSPADM

## 2019-01-18 RX ORDER — HYDROMORPHONE HYDROCHLORIDE 2 MG/ML
INJECTION, SOLUTION INTRAMUSCULAR; INTRAVENOUS; SUBCUTANEOUS AS NEEDED
Status: DISCONTINUED | OUTPATIENT
Start: 2019-01-18 | End: 2019-01-18 | Stop reason: HOSPADM

## 2019-01-18 RX ORDER — ALBUTEROL SULFATE 0.83 MG/ML
2.5 SOLUTION RESPIRATORY (INHALATION) AS NEEDED
Status: DISCONTINUED | OUTPATIENT
Start: 2019-01-18 | End: 2019-01-18 | Stop reason: HOSPADM

## 2019-01-18 RX ORDER — SODIUM CHLORIDE, SODIUM LACTATE, POTASSIUM CHLORIDE, CALCIUM CHLORIDE 600; 310; 30; 20 MG/100ML; MG/100ML; MG/100ML; MG/100ML
100 INJECTION, SOLUTION INTRAVENOUS CONTINUOUS
Status: DISCONTINUED | OUTPATIENT
Start: 2019-01-18 | End: 2019-01-18 | Stop reason: HOSPADM

## 2019-01-18 RX ORDER — LIDOCAINE HYDROCHLORIDE 10 MG/ML
0.1 INJECTION, SOLUTION EPIDURAL; INFILTRATION; INTRACAUDAL; PERINEURAL AS NEEDED
Status: DISCONTINUED | OUTPATIENT
Start: 2019-01-18 | End: 2019-01-18 | Stop reason: HOSPADM

## 2019-01-18 RX ORDER — INSULIN LISPRO 100 [IU]/ML
INJECTION, SOLUTION INTRAVENOUS; SUBCUTANEOUS ONCE
Status: COMPLETED | OUTPATIENT
Start: 2019-01-18 | End: 2019-01-18

## 2019-01-18 RX ORDER — DEXTROSE 50 % IN WATER (D50W) INTRAVENOUS SYRINGE
25-50 AS NEEDED
Status: DISCONTINUED | OUTPATIENT
Start: 2019-01-18 | End: 2019-01-18 | Stop reason: HOSPADM

## 2019-01-18 RX ORDER — SODIUM CHLORIDE, SODIUM LACTATE, POTASSIUM CHLORIDE, CALCIUM CHLORIDE 600; 310; 30; 20 MG/100ML; MG/100ML; MG/100ML; MG/100ML
75 INJECTION, SOLUTION INTRAVENOUS CONTINUOUS
Status: DISCONTINUED | OUTPATIENT
Start: 2019-01-18 | End: 2019-01-18 | Stop reason: HOSPADM

## 2019-01-18 RX ORDER — HEPARIN SODIUM 1000 [USP'U]/ML
INJECTION, SOLUTION INTRAVENOUS; SUBCUTANEOUS
Status: COMPLETED
Start: 2019-01-18 | End: 2019-01-18

## 2019-01-18 RX ORDER — FAMOTIDINE 20 MG/1
20 TABLET, FILM COATED ORAL ONCE
Status: COMPLETED | OUTPATIENT
Start: 2019-01-18 | End: 2019-01-18

## 2019-01-18 RX ORDER — PHENYLEPHRINE HCL IN 0.9% NACL 1 MG/10 ML
SYRINGE (ML) INTRAVENOUS AS NEEDED
Status: DISCONTINUED | OUTPATIENT
Start: 2019-01-18 | End: 2019-01-18 | Stop reason: HOSPADM

## 2019-01-18 RX ORDER — ROCURONIUM BROMIDE 10 MG/ML
INJECTION, SOLUTION INTRAVENOUS AS NEEDED
Status: DISCONTINUED | OUTPATIENT
Start: 2019-01-18 | End: 2019-01-18 | Stop reason: HOSPADM

## 2019-01-18 RX ORDER — FENTANYL CITRATE 50 UG/ML
INJECTION, SOLUTION INTRAMUSCULAR; INTRAVENOUS AS NEEDED
Status: DISCONTINUED | OUTPATIENT
Start: 2019-01-18 | End: 2019-01-18 | Stop reason: HOSPADM

## 2019-01-18 RX ORDER — SUCCINYLCHOLINE CHLORIDE 20 MG/ML
INJECTION INTRAMUSCULAR; INTRAVENOUS AS NEEDED
Status: DISCONTINUED | OUTPATIENT
Start: 2019-01-18 | End: 2019-01-18 | Stop reason: HOSPADM

## 2019-01-18 RX ORDER — HEPARIN SODIUM 5000 [USP'U]/ML
INJECTION, SOLUTION INTRAVENOUS; SUBCUTANEOUS
Status: COMPLETED
Start: 2019-01-18 | End: 2019-01-18

## 2019-01-18 RX ORDER — PROPOFOL 10 MG/ML
INJECTION, EMULSION INTRAVENOUS AS NEEDED
Status: DISCONTINUED | OUTPATIENT
Start: 2019-01-18 | End: 2019-01-18 | Stop reason: HOSPADM

## 2019-01-18 RX ORDER — MORPHINE SULFATE 10 MG/ML
INJECTION, SOLUTION INTRAMUSCULAR; INTRAVENOUS
Status: DISPENSED
Start: 2019-01-18 | End: 2019-01-18

## 2019-01-18 RX ORDER — NEOMYCIN AND POLYMYXIN B SULFATES 40; 200000 MG/ML; [USP'U]/ML
SOLUTION IRRIGATION AS NEEDED
Status: DISCONTINUED | OUTPATIENT
Start: 2019-01-18 | End: 2019-01-18 | Stop reason: HOSPADM

## 2019-01-18 RX ORDER — MORPHINE SULFATE 2 MG/ML
4 INJECTION, SOLUTION INTRAMUSCULAR; INTRAVENOUS
Status: DISCONTINUED | OUTPATIENT
Start: 2019-01-18 | End: 2019-01-18 | Stop reason: HOSPADM

## 2019-01-18 RX ORDER — ONDANSETRON 2 MG/ML
INJECTION INTRAMUSCULAR; INTRAVENOUS AS NEEDED
Status: DISCONTINUED | OUTPATIENT
Start: 2019-01-18 | End: 2019-01-18 | Stop reason: HOSPADM

## 2019-01-18 RX ORDER — DIPHENHYDRAMINE HYDROCHLORIDE 50 MG/ML
12.5 INJECTION, SOLUTION INTRAMUSCULAR; INTRAVENOUS
Status: DISCONTINUED | OUTPATIENT
Start: 2019-01-18 | End: 2019-01-18 | Stop reason: HOSPADM

## 2019-01-18 RX ORDER — DEXAMETHASONE SODIUM PHOSPHATE 4 MG/ML
INJECTION, SOLUTION INTRA-ARTICULAR; INTRALESIONAL; INTRAMUSCULAR; INTRAVENOUS; SOFT TISSUE AS NEEDED
Status: DISCONTINUED | OUTPATIENT
Start: 2019-01-18 | End: 2019-01-18 | Stop reason: HOSPADM

## 2019-01-18 RX ADMIN — ONDANSETRON 4 MG: 2 INJECTION INTRAMUSCULAR; INTRAVENOUS at 06:47

## 2019-01-18 RX ADMIN — MORPHINE SULFATE 4 MG: 2 INJECTION, SOLUTION INTRAMUSCULAR; INTRAVENOUS at 10:49

## 2019-01-18 RX ADMIN — PROPOFOL 200 MG: 10 INJECTION, EMULSION INTRAVENOUS at 09:27

## 2019-01-18 RX ADMIN — MORPHINE SULFATE 4 MG: 2 INJECTION, SOLUTION INTRAMUSCULAR; INTRAVENOUS at 06:47

## 2019-01-18 RX ADMIN — GABAPENTIN 300 MG: 300 CAPSULE ORAL at 17:55

## 2019-01-18 RX ADMIN — FENTANYL CITRATE 50 MCG: 50 INJECTION, SOLUTION INTRAMUSCULAR; INTRAVENOUS at 10:11

## 2019-01-18 RX ADMIN — MORPHINE SULFATE 4 MG: 2 INJECTION, SOLUTION INTRAMUSCULAR; INTRAVENOUS at 02:44

## 2019-01-18 RX ADMIN — MORPHINE SULFATE 4 MG: 2 INJECTION, SOLUTION INTRAMUSCULAR; INTRAVENOUS at 10:34

## 2019-01-18 RX ADMIN — ONDANSETRON 4 MG: 2 INJECTION INTRAMUSCULAR; INTRAVENOUS at 22:00

## 2019-01-18 RX ADMIN — LOSARTAN POTASSIUM 100 MG: 50 TABLET, FILM COATED ORAL at 13:19

## 2019-01-18 RX ADMIN — FENTANYL CITRATE 50 MCG: 50 INJECTION, SOLUTION INTRAMUSCULAR; INTRAVENOUS at 09:46

## 2019-01-18 RX ADMIN — METOPROLOL TARTRATE 50 MG: 50 TABLET ORAL at 17:55

## 2019-01-18 RX ADMIN — PANTOPRAZOLE SODIUM 40 MG: 40 TABLET, DELAYED RELEASE ORAL at 18:39

## 2019-01-18 RX ADMIN — SUCCINYLCHOLINE CHLORIDE 160 MG: 20 INJECTION INTRAMUSCULAR; INTRAVENOUS at 09:27

## 2019-01-18 RX ADMIN — ROCURONIUM BROMIDE 5 MG: 10 INJECTION, SOLUTION INTRAVENOUS at 09:27

## 2019-01-18 RX ADMIN — GLYCOPYRROLATE 0.2 MG: 0.2 INJECTION INTRAMUSCULAR; INTRAVENOUS at 09:20

## 2019-01-18 RX ADMIN — SITAGLIPTIN 50 MG: 50 TABLET, FILM COATED ORAL at 13:18

## 2019-01-18 RX ADMIN — HEPARIN SODIUM 5000 UNITS: 5000 INJECTION, SOLUTION INTRAVENOUS; SUBCUTANEOUS at 11:17

## 2019-01-18 RX ADMIN — INSULIN LISPRO 15 UNITS: 100 INJECTION, SOLUTION INTRAVENOUS; SUBCUTANEOUS at 17:57

## 2019-01-18 RX ADMIN — CEFAZOLIN SODIUM IN SODIUM CHLORIDE 0.9% IV SOLN 3 GM/100ML 3 G: 3-0.9/1 SOLUTION at 09:20

## 2019-01-18 RX ADMIN — MORPHINE SULFATE 4 MG: 2 INJECTION, SOLUTION INTRAMUSCULAR; INTRAVENOUS at 13:56

## 2019-01-18 RX ADMIN — INSULIN LISPRO 6 UNITS: 100 INJECTION, SOLUTION INTRAVENOUS; SUBCUTANEOUS at 17:56

## 2019-01-18 RX ADMIN — LIDOCAINE HYDROCHLORIDE 100 MG: 20 INJECTION, SOLUTION EPIDURAL; INFILTRATION; INTRACAUDAL; PERINEURAL at 09:27

## 2019-01-18 RX ADMIN — DEXAMETHASONE SODIUM PHOSPHATE 4 MG: 4 INJECTION, SOLUTION INTRA-ARTICULAR; INTRALESIONAL; INTRAMUSCULAR; INTRAVENOUS; SOFT TISSUE at 09:27

## 2019-01-18 RX ADMIN — HYDROMORPHONE HYDROCHLORIDE 1 MG: 2 INJECTION, SOLUTION INTRAMUSCULAR; INTRAVENOUS; SUBCUTANEOUS at 10:23

## 2019-01-18 RX ADMIN — FAMOTIDINE 20 MG: 20 TABLET, FILM COATED ORAL at 07:49

## 2019-01-18 RX ADMIN — INSULIN LISPRO 6 UNITS: 100 INJECTION, SOLUTION INTRAVENOUS; SUBCUTANEOUS at 11:42

## 2019-01-18 RX ADMIN — HYDROMORPHONE HYDROCHLORIDE 1 MG: 2 INJECTION, SOLUTION INTRAMUSCULAR; INTRAVENOUS; SUBCUTANEOUS at 10:19

## 2019-01-18 RX ADMIN — AZATHIOPRINE 200 MG: 50 TABLET ORAL at 13:19

## 2019-01-18 RX ADMIN — POTASSIUM CHLORIDE 40 MEQ: 20 TABLET, EXTENDED RELEASE ORAL at 13:54

## 2019-01-18 RX ADMIN — MORPHINE SULFATE 4 MG: 2 INJECTION, SOLUTION INTRAMUSCULAR; INTRAVENOUS at 22:00

## 2019-01-18 RX ADMIN — SODIUM CHLORIDE, SODIUM LACTATE, POTASSIUM CHLORIDE, AND CALCIUM CHLORIDE 75 ML/HR: 600; 310; 30; 20 INJECTION, SOLUTION INTRAVENOUS at 06:47

## 2019-01-18 RX ADMIN — INSULIN LISPRO 3 UNITS: 100 INJECTION, SOLUTION INTRAVENOUS; SUBCUTANEOUS at 08:21

## 2019-01-18 RX ADMIN — SODIUM CHLORIDE, SODIUM LACTATE, POTASSIUM CHLORIDE, AND CALCIUM CHLORIDE 100 ML/HR: 600; 310; 30; 20 INJECTION, SOLUTION INTRAVENOUS at 08:20

## 2019-01-18 RX ADMIN — INSULIN LISPRO 6 UNITS: 100 INJECTION, SOLUTION INTRAVENOUS; SUBCUTANEOUS at 11:48

## 2019-01-18 RX ADMIN — FENTANYL CITRATE 50 MCG: 50 INJECTION, SOLUTION INTRAMUSCULAR; INTRAVENOUS at 10:17

## 2019-01-18 RX ADMIN — HEPARIN SODIUM 3000 UNITS: 1000 INJECTION, SOLUTION INTRAVENOUS; SUBCUTANEOUS at 11:17

## 2019-01-18 RX ADMIN — Medication 100 MCG: at 09:50

## 2019-01-18 RX ADMIN — HEPARIN SODIUM AND DEXTROSE 11 UNITS/KG/HR: 10000; 5 INJECTION INTRAVENOUS at 11:18

## 2019-01-18 RX ADMIN — GABAPENTIN 300 MG: 300 CAPSULE ORAL at 21:59

## 2019-01-18 RX ADMIN — GABAPENTIN 300 MG: 300 CAPSULE ORAL at 13:19

## 2019-01-18 RX ADMIN — CEFAZOLIN SODIUM 2 G: 2 SOLUTION INTRAVENOUS at 13:20

## 2019-01-18 RX ADMIN — MORPHINE SULFATE 4 MG: 2 INJECTION, SOLUTION INTRAMUSCULAR; INTRAVENOUS at 17:54

## 2019-01-18 RX ADMIN — INSULIN LISPRO 6 UNITS: 100 INJECTION, SOLUTION INTRAVENOUS; SUBCUTANEOUS at 22:06

## 2019-01-18 RX ADMIN — PANTOPRAZOLE SODIUM 40 MG: 40 TABLET, DELAYED RELEASE ORAL at 13:18

## 2019-01-18 RX ADMIN — ONDANSETRON 4 MG: 2 INJECTION INTRAMUSCULAR; INTRAVENOUS at 13:56

## 2019-01-18 RX ADMIN — ONDANSETRON 4 MG: 2 INJECTION INTRAMUSCULAR; INTRAVENOUS at 09:20

## 2019-01-18 RX ADMIN — HEPARIN SODIUM AND DEXTROSE 8 UNITS/KG/HR: 10000; 5 INJECTION INTRAVENOUS at 20:15

## 2019-01-18 RX ADMIN — SODIUM CHLORIDE, SODIUM LACTATE, POTASSIUM CHLORIDE, AND CALCIUM CHLORIDE: 600; 310; 30; 20 INJECTION, SOLUTION INTRAVENOUS at 09:20

## 2019-01-18 RX ADMIN — MORPHINE SULFATE 2 MG: 2 INJECTION, SOLUTION INTRAMUSCULAR; INTRAVENOUS at 11:15

## 2019-01-18 RX ADMIN — FENTANYL CITRATE 50 MCG: 50 INJECTION, SOLUTION INTRAMUSCULAR; INTRAVENOUS at 09:34

## 2019-01-18 RX ADMIN — FENTANYL CITRATE 100 MCG: 50 INJECTION, SOLUTION INTRAMUSCULAR; INTRAVENOUS at 09:27

## 2019-01-18 RX ADMIN — HYDRALAZINE HYDROCHLORIDE 100 MG: 50 TABLET, FILM COATED ORAL at 17:55

## 2019-01-18 RX ADMIN — HYDROCODONE BITARTRATE AND ACETAMINOPHEN 1 TABLET: 10; 325 TABLET ORAL at 16:03

## 2019-01-18 RX ADMIN — ATORVASTATIN CALCIUM 20 MG: 20 TABLET, FILM COATED ORAL at 21:59

## 2019-01-18 RX ADMIN — CEFAZOLIN SODIUM 2 G: 2 SOLUTION INTRAVENOUS at 01:33

## 2019-01-18 RX ADMIN — INSULIN GLARGINE 35 UNITS: 100 INJECTION, SOLUTION SUBCUTANEOUS at 13:19

## 2019-01-18 NOTE — ANESTHESIA PREPROCEDURE EVALUATION
Anesthetic History No history of anesthetic complications Review of Systems / Medical History Patient summary reviewed and pertinent labs reviewed Pulmonary Within defined limits Neuro/Psych Within defined limits Cardiovascular Hypertension: well controlled Exercise tolerance: >4 METS 
  
GI/Hepatic/Renal 
  
 
 
 
 
 
 Endo/Other Diabetes: well controlled, using insulin Other Findings Physical Exam 
 
Airway Mallampati: III 
TM Distance: 4 - 6 cm Neck ROM: normal range of motion Mouth opening: Normal 
 
 Cardiovascular Regular rate and rhythm,  S1 and S2 normal,  no murmur, click, rub, or gallop Dental 
No notable dental hx Pulmonary Breath sounds clear to auscultation Abdominal 
GI exam deferred Other Findings Anesthetic Plan ASA: 3 Anesthesia type: general 
 
 
 
 
Induction: Intravenous Anesthetic plan and risks discussed with: Patient

## 2019-01-18 NOTE — BRIEF OP NOTE
BRIEF OPERATIVE NOTE    Date of Procedure: 1/18/2019   Preoperative Diagnosis: WOUND LEFT ANKLE, POSTOP LATERAL FIBULA INCISION DEHISCENCE  Postoperative Diagnosis:  As above  Procedure(s):  INCISION AND DRAINAGE LEFT ANKLE  Surgeon(s) and Role:     * Leslie Munguia MD - Primary         Surgical Assistant:  Luisana ZEPEDA    Surgical Staff:  Circ-1: Yas Escobar RN  Scrub Tech-1: Tori Zayas  Surg Asst-1: Bartolo Abbott  No case tracking events are documented in the log.   Anesthesia: General Anesthesia    Estimated Blood Loss: 5 ml  IV FLUIDS:  300 ml IVF  Tourniquet Time:  Non used   Specimens: * No specimens in log *   Findings:  1.5 cm long, 1 cm wide, 1 cm deep wound/granulation tissue, no gross pus   Complications:  None  Implants: * No implants in log *

## 2019-01-18 NOTE — OP NOTES
Patient: Yoanna Munson                MRN:[id number]           SSN: [id number]   YOB: 1970         AGE: 46 y.o. SEX: female       OPERATIVE REPORT    Date of Procedure: 1/18/2019   Preoperative Diagnosis: WOUND LEFT ANKLE, POSTOP LATERAL FIBULA INCISION DEHISCENCE  Postoperative Diagnosis:  As above  Procedure(s):  INCISION AND DRAINAGE LEFT ANKLE  Surgeon(s) and Role:     * Kelvin Munguia MD - Primary         Surgical Assistant:  Stephan ZEPEDA    Surgical Staff:  Circ-1: James Guerrero RN  Scrub Tech-1: Radha Pate  Surg Asst-1: Eliseo Jc  No case tracking events are documented in the log. Anesthesia: General Anesthesia    Estimated Blood Loss: 5 ml  IV FLUIDS:  300 ml IVF  Tourniquet Time:  Non used   Specimens: * No specimens in log *   Findings:  1.5 cm long, 1 cm wide, 1 cm deep wound/granulation tissue, no gross pus, unhealthy fibrinoid tissue  Complications:  None  Implants: * No implants in log *        OPERATIVE REPORT         Yoanna Munson is taken to the operating room on 1/18/2019 . Regional block was not already performed to the left prior to taking to the   operating room. Yoanna Munson was positioned lying supine. general anesthesia body was conducted. A tourniquet was NOT placed to left  Thigh upper thigh, well protected. Entire LEFT LOWER EXTREMITY was fully prepped with  BETA DIENE SOAP SCRUB AND PAIN STERILE PREP. A formal verbal  time out was conducted: identifying the patient, identifying the surgical location, reading the informed written signed consent for procedure, confirmation that  the patient did receive preoperative antibiotics and that my signature on the patient was visible at the surgical field. All members of the surgical team agreed to the consent process. I made a 2 cm incision in order to extend and open up her existing 1.5 cm long elliptical  wound. on the  Lateral   left ankle along her current and prior incision. I encountered fibrinoid moist granulation tissue (pale un healthy tissue). My surgical incision conducted down thru skin and into subcutaneous fat layer. I several proline sutures and these were removed. A sharp incisional and excisional debridement was conduced with an 15 blade. An excisional debridement of subcutaneous fat and fibrinoid tissue was conducted. I cultured this region after a  thoroughly lavaged this wound with manual antibiotic saline  YES with antibiotic laden/containing irrigation fluids. I loosely re approximated the skin with 3/0 Nylon suture. Tape, Needle, sponge counts were correct times two. I did not see any hardware as this tissue was superficial tot he hardware. Dressings placed were: sterile 4 X 4 cm, 4 in Kerlix, well padded posterior fiberglass splint placed. Lisa Bee  extubated and transferred to recovery room.        Monica Flores MD  1/18/2019  10:31 AM

## 2019-01-18 NOTE — ROUTINE PROCESS
Bedside and Verbal shift change report given to JACQUELINE Elizabeth (oncoming nurse) by Darek Carbajal (offgoing nurse). Report included the following information SBAR, Kardex, MAR and Recent Results.     SITUATION:  Code Status: Full Code  Reason for Admission: Pulmonary emboli [facility])  Pulmonary emboli [facility])  Hospital day: 4  Problem List:       Hospital Problems  Date Reviewed: 1/12/2019          Codes Class Noted POA    Dehiscence of incision, initial encounter ICD-10-CM: T81.31XA  ICD-9-CM: 998.32  1/14/2019 Unknown        Pulmonary emboli ([facility] [address] ICD-10-CM: I26.99  ICD-9-CM: 415.19  1/12/2019 Unknown        Accelerated hypertension ICD-10-CM: I10  ICD-9-CM: 401.0  1/12/2019 Yes        Crohn's colitis ([facility] [address] ICD-10-CM: K50.10  ICD-9-CM: 555.1  1/12/2019 Yes        Wound of left ankle ICD-10-CM: V99.762Q  ICD-9-CM: 891.0  1/12/2019 Yes        Status post open reduction with internal fixation (ORIF) of fracture of ankle ICD-10-CM: Z96.7, Z87.81  ICD-9-CM: V45.89, V15.51  1/12/2019 Yes              BACKGROUND:   Past Medical History:   Past Medical History:   Diagnosis Date    Crohn's colitis ([facility] [address]     Diabetes ([facility] [address]     HTN (hypertension)       Patient taking anticoagulants yes    Patient has a defibrillator: no    History of shots YES for example, flu, pneumonia, tetanus   Isolation History NO for example, MRSA, CDiff    ASSESSMENT:  Changes in Assessment Throughout Shift: None  Significant Changes in 24 hours (for example, RR/code, fall)  Patient has Central Line: yes Reasons if yes: long term ABX treatment   Patient has Doss Cath: no Reasons if yes: n/a   Mobility Issues  PT  IV Patency  OR Checklist  Pending Tests    Last Vitals:  Vitals w/ MEWS Score (last day)     Date/Time MEWS Score Pulse Resp Temp BP Level of Consciousness SpO2    01/18/19 0436  1  70  18  97.6 °F (36.4 °C)  122/78  Alert  95 %    01/18/19 0041  1  65  18  98.3 °F (36.8 °C)  107/63  Alert  95 %    01/17/19 2122  1  79  18  97.9 °F (36.6 °C) 105/57  Alert  98 %    01/17/19 1514  1  70  18  97.8 °F (36.6 °C)  152/84  Alert  96 %    01/17/19 1207  1  78  17  98 °F (36.7 °C)  142/86  Alert  99 %    01/17/19 0829  1  82  18  98.8 °F (37.1 °C)  134/80  Alert  98 %    01/17/19 0410  --  86  17  97.8 °F (36.6 °C)  127/73  --  96 %    01/17/19 0105  --  67  18  98.3 °F (36.8 °C)  136/84  --  97 %            PAIN    Pain Assessment    Pain Intensity 1: 8 (01/18/19 0244)    Pain Location 1: Ankle    Pain Intervention(s) 1: Medication (see MAR)    Patient Stated Pain Goal: 0  Intervention effective: yes  Time of last intervention: 0655 Reassessment Completed: yes   Other actions taken for pain: none treatment effective     Last 3 Weights:  Last 3 Recorded Weights in this Encounter    01/12/19 0520 01/14/19 1040 01/15/19 0646   Weight: 144.2 kg (318 lb) 144.2 kg (318 lb) (!) 163.5 kg (360 lb 7.2 oz)   Weight change:     INTAKE/OUPUT    Current Shift: No intake/output data recorded. Last three shifts: No intake/output data recorded. RECOMMENDATIONS AND DISCHARGE PLANNING  Patient needs and requests: ABX, Pain control, wound care (post op)     Pending tests/procedures: Incisiton and drain, L Ankle      Discharge plan for patient: Home with home care    Discharge planning Needs or Barriers: Ressessment after surgery     Estimated Discharge Date: 1/19 Posted on Whiteboard in Patients Room: no       \"HEALS\" SAFETY CHECK  A safety check occurred in the patient's room between off going nurse and oncoming nurse listed above. The safety check included the below items:    H  High Alert Medications Verify all high alert medication drips (heparin, PCA, etc.)  E  Equipment Suction is set up for ALL patients (with yanker)  Red plugs utilized for all equipment (IV pumps, etc.)  WOWs wiped down at end of shift.   Room stocked with oxygen, suction, and other unit-specific supplies  A  Alarms Bed alarm is set for fall risk patients  Ensure chair alarm is in place and activated if patient is up in a chair  L  Lines Check IV for any infiltration  Doss bag is empty if patient has a Doss   Tubing and IV bags are labeled  S  Safety  Room is clean, patient is clean, and equipment is clean. Hallways are clear from equipment besides carts. Fall bracelet on for fall risk patients  Ensure room is clear and free of clutter  Suction is set up for ALL patients (with flip)  Hallways are clear from equipment besides carts.    Isolation precautions followed, supplies available outside room, sign posted    Marcie Wallingford

## 2019-01-18 NOTE — PROGRESS NOTES
Problem: Mobility Impaired (Adult and Pediatric)  Goal: *Acute Goals and Plan of Care (Insert Text)  Physical Therapy Goals  Initiated 1/14/2019 and to be accomplished within 7 day(s)  1. Patient will move from supine to sit and sit to supine , scoot up and down and roll side to side in bed with modified independence. 2.  Patient will transfer from bed to chair and chair to bed with minimal assistance/contact guard assist using the least restrictive device. 3.  Patient will perform sit to stand with supervision/set-up. 4.  Patient will ambulate with standby/contact guard assistanct for 10-25 feet with the least restrictive device while maintaining WB precautions. 6969 - Pt off floor at this time. Will f/u later in day. 1016 - Pt still off floor. Will f/u later in day. 1145 - Pt still off floor will f/u at later date.

## 2019-01-18 NOTE — PROGRESS NOTES
Patient: Earl Stallings                MRN:@           SSN: [id number]   YOB: 1970         AGE: 46 y.o. SEX: female        PLAN  HPI //  EXAMINATION     S/p I and D left fibula wound:      1. Serve left ankle Fx dislocation with postop partial incision dehiscence:   Recommend continue antibiotics due to close proximity to fibula hardware plate region (aNCEG 3 GRAMS IVPB Q 8 HOURS) . AWAIT CULTURES FROM 1/18/2019      NWB left leg/PT/SS    2. DVT/PE:  Restart Heparin ASAP (in PACU)    Past Medical History:   Diagnosis Date    Crohn's colitis ([facility] [address]     Diabetes ([facility] [address]     HTN (hypertension)       History reviewed. No pertinent surgical history. Social History     Socioeconomic History    Marital status:      Spouse name: Not on file    Number of children: Not on file    Years of education: Not on file    Highest education level: Not on file   Social Needs    Financial resource strain: Not on file    Food insecurity - worry: Not on file    Food insecurity - inability: Not on file    Transportation needs - medical: Not on file   [employer] needs - non-medical: Not on file   Occupational History    Not on file   Tobacco Use    Smoking status: Light Tobacco Smoker    Smokeless tobacco: Never Used   Substance and Sexual Activity    Alcohol use: Not on file    Drug use: Not on file    Sexual activity: Not on file   Other Topics Concern    Not on file   Social History Narrative    Not on file      History reviewed. No pertinent family history. Prior to Admission medications    Medication Sig Start Date End Date Taking? Authorizing Provider   losartan (COZAAR) 100 mg tablet Take 100 mg by mouth daily. Yes Provider, Historical   hydrALAZINE (APRESOLINE) 25 mg tablet Take 25 mg by mouth four (4) times daily. Yes Provider, Historical   metoprolol tartrate (LOPRESSOR) 25 mg tablet Take  by mouth two (2) times a day.    Yes Provider, Historical metoprolol succinate (TOPROL-XL) 25 mg XL tablet Take 25 mg by mouth daily. Yes Provider, Historical   insulin lispro (HUMALOG U-100 INSULIN) 100 unit/mL injection by SubCUTAneous route Before breakfast, lunch, dinner and at bedtime. Yes Provider, Historical   insulin detemir U-100 (LEVEMIR U-100 INSULIN) 100 unit/mL injection 30 Units by SubCUTAneous route nightly. Yes Provider, Historical   azaTHIOprine (IMURAN) 50 mg tablet Take 200 mg by mouth daily. Yes Provider, Historical   adalimumab (HUMIRA) 40 mg/0.8 mL injection 40 mg by SubCUTAneous route daily.    Yes Provider, Historical     Current Facility-Administered Medications   Medication Dose Route Frequency    morphine 10 mg/ml injection        lactated Ringers infusion  50 mL/hr IntraVENous CONTINUOUS    albuterol (PROVENTIL VENTOLIN) nebulizer solution 2.5 mg  2.5 mg Inhalation PRN    naloxone (NARCAN) injection 0.04 mg  0.04 mg IntraVENous PRN    ondansetron (ZOFRAN) injection 4 mg  4 mg IntraVENous ONCE    diphenhydrAMINE (BENADRYL) injection 12.5 mg  12.5 mg IntraVENous Multiple    insulin lispro (HUMALOG) injection   SubCUTAneous ONCE    glucose chewable tablet 16 g  4 Tab Oral PRN    glucagon (GLUCAGEN) injection 1 mg  1 mg IntraMUSCular PRN    dextrose (D50W) injection syrg 12.5-25 g  25-50 mL IntraVENous PRN    morphine injection 4 mg  4 mg IntraVENous Q15MIN PRN    insulin lispro (HUMALOG) injection 6 Units  6 Units SubCUTAneous TIDAC    insulin glargine (LANTUS) injection 35 Units  35 Units SubCUTAneous DAILY    morphine injection 4 mg  4 mg IntraVENous Q4H PRN    HYDROcodone-acetaminophen (NORCO)  mg tablet 1 Tab  1 Tab Oral Q4H PRN    ceFAZolin (ANCEF) 2g IVPB in 50 mL D5W  2 g IntraVENous Q8H    potassium chloride (KLOR-CON) packet for solution 40 mEq  40 mEq Oral DAILY    insulin lispro (HUMALOG) injection   SubCUTAneous AC&HS    heparin 25,000 units in D5W 250 ml infusion  18-36 Units/kg/hr IntraVENous TITRATE  SITagliptin (JANUVIA) tablet 50 mg  50 mg Oral DAILY    metoprolol tartrate (LOPRESSOR) tablet 50 mg  50 mg Oral BID    azaTHIOprine (IMURAN) tablet 200 mg  200 mg Oral DAILY AFTER BREAKFAST    gabapentin (NEURONTIN) capsule 300 mg  300 mg Oral TID    hydrALAZINE (APRESOLINE) tablet 100 mg  100 mg Oral TID    atorvastatin (LIPITOR) tablet 20 mg  20 mg Oral QHS    acetaminophen (TYLENOL) tablet 650 mg  650 mg Oral Q4H PRN    glucose chewable tablet 16 g  4 Tab Oral PRN    glucagon (GLUCAGEN) injection 1 mg  1 mg IntraMUSCular PRN    dextrose (D50W) injection syrg 12.5-25 g  25-50 mL IntraVENous PRN    albuterol-ipratropium (DUO-NEB) 2.5 MG-0.5 MG/3 ML  3 mL Nebulization Q4H PRN    cloNIDine HCl (CATAPRES) tablet 0.1 mg  0.1 mg Oral Q8H PRN    ondansetron (ZOFRAN) injection 4 mg  4 mg IntraVENous Q6H PRN    pantoprazole (PROTONIX) tablet 40 mg  40 mg Oral ACB&D    losartan (COZAAR) tablet 100 mg  100 mg Oral DAILY    hydroCHLOROthiazide (HYDRODIURIL) tablet 25 mg  25 mg Oral DAILY     No Known Allergies       EXAMINATION        Visit Vitals  BP (!) 118/92   Pulse 76   Temp 99.3 °F (37.4 °C)   Resp 24   Ht 6' (1.829 m)   Wt (!) 360 lb 7.2 oz (163.5 kg)   SpO2 100%   BMI 48.89 kg/m²         Bijan Zurita MD  1/18/2019  10:38 AM

## 2019-01-18 NOTE — ANESTHESIA POSTPROCEDURE EVALUATION
Procedure(s): 
INCISION AND DRAINAGE LEFT ANKLE. Anesthesia Post Evaluation Multimodal analgesia: multimodal analgesia used between 6 hours prior to anesthesia start to PACU discharge Patient location during evaluation: bedside Patient participation: complete - patient participated Level of consciousness: awake Pain score: 0 Pain management: adequate Airway patency: patent Anesthetic complications: no 
Cardiovascular status: stable Respiratory status: acceptable Hydration status: acceptable Post anesthesia nausea and vomiting:  controlled Visit Vitals BP 99/61 (BP 1 Location: Right arm, BP Patient Position: At rest) Pulse 86 Temp 36.7 °C (98 °F) Resp 14 Ht 6' (1.829 m) Wt (!) 163.5 kg (360 lb 7.2 oz) SpO2 95% BMI 48.89 kg/m²

## 2019-01-18 NOTE — PROGRESS NOTES
Progress Note    Patient: Shaniqua Bennett MRN: [id number]  CSN: [id number]    YOB: 1970  Age: 46 y.o. Sex: female    DOA: 1/11/2019 LOS:  LOS: 4 days                    Subjective:   Pt to go to OR with Dr Leonel Quinteros today 1/18/19 for Lt ankle I&D debridement. Eliquis on hold pt on IV heparin drip was held last night for OR today. No chest pain no SOB . Reports ankle pain better controlled today  Pt understands importance of remaining non-weight bearing. Does not desire to go to rehab or SNF after hospitalization, says she has plenty of family in area will help her and she can guy home PT/OT. Wound culture growing staph aureus oxacillin sensitive, ID consult transitioned off vancomycin to cefazolin yesterday 1/17/19. Nausea improved, no emesis, having regular bowel movement no blood  Pt had PICC line placed yesterday 1/17/19. Echo 1/14/19  · Estimated left ventricular ejection fraction is 56 - 60%. Visually measured ejection fraction. Left ventricular mild concentric hypertrophy. Normal left ventricular wall motion, no regional wall motion abnormality noted. · Mild aortic root dilatation. · There is no evidence of pulmonary hypertension. Chief Complaint:   Chief Complaint   Patient presents with    Chest Pain       Review of systems  General: No fevers or chills. Cardiovascular: No chest pain or pressure. No palpitations. Pulmonary: No shortness of breath, cough or wheeze. Gastrointestinal: No abdominal pain, nausea improved, no  vomiting or diarrhea. Genitourinary: No urinary frequency, urgency, hesitancy or dysuria.    Musculoskeletal:  Lt ankle pain increased today     Neurologic: No headache, no seizure no weakness    Objective:     Physical Exam:  Visit Vitals  /78 (BP 1 Location: Right arm, BP Patient Position: At rest)   Pulse 70   Temp 97.6 °F (36.4 °C)   Resp 18   Ht 6' (1.829 m)   Wt (!) 163.5 kg (360 lb 7.2 oz)   SpO2 95%   BMI 48.89 kg/m² General:         Alert, , no acute distress    HEENT: NC, Atraumatic. PERRLA, anicteric sclerae. Lungs: CTA Bilaterally. No Wheezing/Rhonchi/Rales. Heart:  Regular  rhythm,  No murmur, No Rubs, No Gallops  Abdomen: Soft, obese, Non tender. no HSM  Extremities:  swelling Lt ankle trace edema B/l  Psych:   Not anxious or agitated. Neurologic:  CN 2-12 grossly intact, Alert and oriented X 3. No acute neurological                                 Deficits,     Intake and Output:  Current Shift:  No intake/output data recorded. Last three shifts:  No intake/output data recorded. Labs: Results:       Chemistry Recent Labs     01/18/19  0438 01/17/19  0435 01/16/19  1353 01/16/19  0908   * 241*  --  200*    138  --  140   K 3.7 3.6 3.9 3.5    102  --  105   CO2 29 26  --  27   BUN 13 19*  --  25*   CREA 0.86 0.94  --  1.10   CA 8.4* 8.1*  --  8.1*   AGAP 7 10  --  8   BUCR 15 20  --  23*   AP  --  73  --  73   TP  --  6.9  --  7.0   ALB  --  2.8*  --  2.9*   GLOB  --  4.1*  --  4.1*   AGRAT  --  0.7*  --  0.7*      CBC w/Diff Recent Labs     01/18/19 0438 01/17/19  0435 01/16/19  0908   WBC 8.8 10.1 9.3   RBC 4.36 4.45 4.43   HGB 11.7* 11.7* 11.6*   HCT 37.2 37.9 37.4    377 386   GRANS 62 60 54   LYMPH 24 27 34   EOS 2 2 1      Cardiac Enzymes No results for input(s): CPK, CKND1, ALLEN in the last 72 hours. No lab exists for component: CKRMB, TROIP   Coagulation Recent Labs     01/18/19 0438 01/17/19  0435   APTT 28.6 95.9*       Lipid Panel Lab Results   Component Value Date/Time    Cholesterol, total 259 (H) 01/13/2019 04:19 AM    HDL Cholesterol 53 01/13/2019 04:19 AM    LDL, calculated 140.2 (H) 01/13/2019 04:19 AM    VLDL, calculated 65.8 01/13/2019 04:19 AM    Triglyceride 329 (H) 01/13/2019 04:19 AM    CHOL/HDL Ratio 4.9 01/13/2019 04:19 AM      BNP No results for input(s): BNPP in the last 72 hours.    Liver Enzymes Recent Labs     01/17/19  0435   TP 6.9   ALB 2.8*   AP 73 SGOT 5*      Thyroid Studies No results found for: T4, T3U, TSH, TSHEXT, TSHEXT       Procedures/imaging: see electronic medical records for all procedures/Xrays and details which were not copied into this note but were reviewed prior to creation of Plan    Medications:   Current Facility-Administered Medications   Medication Dose Route Frequency    lactated Ringers infusion  75 mL/hr IntraVENous CONTINUOUS    famotidine (PEPCID) tablet 20 mg  20 mg Oral ONCE    insulin lispro (HUMALOG) injection 6 Units  6 Units SubCUTAneous TIDAC    insulin glargine (LANTUS) injection 35 Units  35 Units SubCUTAneous DAILY    morphine injection 4 mg  4 mg IntraVENous Q4H PRN    HYDROcodone-acetaminophen (NORCO)  mg tablet 1 Tab  1 Tab Oral Q4H PRN    ceFAZolin (ANCEF) 3 g in 0.9%  ml IVPB  3 g IntraVENous ONCE    ceFAZolin (ANCEF) 2g IVPB in 50 mL D5W  2 g IntraVENous Q8H    potassium chloride (KLOR-CON) packet for solution 40 mEq  40 mEq Oral DAILY    insulin lispro (HUMALOG) injection   SubCUTAneous AC&HS    heparin 25,000 units in D5W 250 ml infusion  18-36 Units/kg/hr IntraVENous TITRATE    SITagliptin (JANUVIA) tablet 50 mg  50 mg Oral DAILY    metoprolol tartrate (LOPRESSOR) tablet 50 mg  50 mg Oral BID    azaTHIOprine (IMURAN) tablet 200 mg  200 mg Oral DAILY AFTER BREAKFAST    gabapentin (NEURONTIN) capsule 300 mg  300 mg Oral TID    hydrALAZINE (APRESOLINE) tablet 100 mg  100 mg Oral TID    atorvastatin (LIPITOR) tablet 20 mg  20 mg Oral QHS    acetaminophen (TYLENOL) tablet 650 mg  650 mg Oral Q4H PRN    glucose chewable tablet 16 g  4 Tab Oral PRN    glucagon (GLUCAGEN) injection 1 mg  1 mg IntraMUSCular PRN    dextrose (D50W) injection syrg 12.5-25 g  25-50 mL IntraVENous PRN    albuterol-ipratropium (DUO-NEB) 2.5 MG-0.5 MG/3 ML  3 mL Nebulization Q4H PRN    cloNIDine HCl (CATAPRES) tablet 0.1 mg  0.1 mg Oral Q8H PRN    ondansetron (ZOFRAN) injection 4 mg  4 mg IntraVENous Q6H PRN  pantoprazole (PROTONIX) tablet 40 mg  40 mg Oral ACB&D    losartan (COZAAR) tablet 100 mg  100 mg Oral DAILY    hydroCHLOROthiazide (HYDRODIURIL) tablet 25 mg  25 mg Oral DAILY       Assessment/Plan     Active Problems:    Pulmonary emboli (HCC) (1/12/2019)      Accelerated hypertension (1/12/2019)      Crohn's colitis ([address] (1/12/2019)      Wound of left ankle (1/12/2019)      Status post open reduction with internal fixation (ORIF) of fracture of ankle (1/12/2019)      Dehiscence of incision, initial encounter (1/14/2019)      Plan  Hypokalemia resolved  Continue potassium chloride 40 mg daily  Monitor potassium level       Acute Pulmonary Embolism/ S/P travel from [place] and S/p surgery Lt ankle  Has been tolerating Eliquis, now on heparin drip, held for OR planning for left ankle today with ortho 1/18/19, will plan to resume heparin drip 12hrs after surgery and resume eliquis 2-3 days after surgery per pulm consult  H&H stable  Continue to monitor sx and o2 sat       S/P ORIF  Lt lateral ankle wound  Ortho consult, Dr. Ree Calloway, recommending OR intervention with I&D and possible wound re-approximation, planning for tomorrow 1/18/19. Pt with no known history of CAD/ACS/MI/CVA, has risk factors of HTN, DM2. Chronic medical conditions with fair control. She is non-smoker and CXR 1/11/19 only remarkable for bibasilar atelectasis. EKG 1/11/19 with NSR, possible left atrial enlargement, LVH. Echo obtained 1/14/19 normal EF 56-60%, mild LV mild concentric hypertrophy with normal wall motion, mild aortic root dilation and no pulmonary HTN. CTA did not show annerysm or dissection of aorta. Denies cardiopulmonary symptoms of chest pain or pressure, shortness of breath or cough. Pt has no known adverse reactions to anesthesia.   By RCRI patient at 0.9% risk for perioperative cardiac events which is considered low however given fair chronic disease control and recent acute PE would consider her to be at moderate risk for MACE. On cefazolin IV for MSSA, follow up ID consult recs  x-ray Lt ankle stable 1/12/19  Pt at higher risk for infection with her immune suppression status from Chron's on steroid, imuran, and humara. Prednisone was discontinued 1/16/19. Continue Morphine 4 mg IV q 4h prn, norco prn   Neurontin 300 mg q8h       Crohn's disease - stable   prednisone discontinued 1/16/19   continues imuran 200 mg daily in AM  Pt on Imuran and humara she will need GI follow up as outpatient  Protonix 40 mg daily  Continue Zofran prn nausea     Diabetes Mellitus type 2  HG A1c 8.0 on 1/11/19, Takes levemir 30units at home  Continue lantus 35 units daily, continue high dose SSI, novolog to 6 units AC, continue [place] and [place]. Consider metformin on discharge if no further imaging studies planned.   Monitor glucose levels   - Started on Lipitor 20 mg qhs     Uncontrolled / accelerated Hypertension   - BP improving  - continue hydralazine 100  mg TID, Losartan 100 mg daily, HCTZ 25 mg daily, Lopressor 50mg bid  - continue clonidine 0.1mg q8 prn sbp >150  Monitor cr level  Echo with normal EF         Riki Mujica MD  1/18/2019 [id number]

## 2019-01-18 NOTE — PROGRESS NOTES
PROGRESS NOTE       Patient: Samantha Garcia  MRN: [id number]  SSN: [id number]   YOB: 1970  Age: 46 y.o. Sex: female     Admit Date: 1/11/2019 LOS:  LOS: 4 days      POD # 1 S/P Procedure(s):  INCISION AND DRAINAGE LEFT ANKLE    Consultants following patients: Infectious Disease and Orthopedics    Case discussed with:  [x]Patient  []Family  []Nursing  []Case Management       ASSESSMENT        Samantha Garcia IS A 46 y.o. old who is resting at time. Her pain is controlled. Denied any fevers        PLAN      1) Orthopaedics Dx: left ankle fx: with partial postop incision dehiscence (wound):  1.5 cm x 1 cm width x 1 cm deep wound   A. Pain management:  Narcotics    B. Antibiotics: Antibiotics: Ancef yes for postop fibbula incision dehiscence     culttures from 1/18/19 pending      1/18/2019 11:55 AM - [facility], Lab In [employer]     Specimen Information: Surgical Specimen        Component Value Ref Range & Units Status   Special Requests: ANKLE   LEFT    Preliminary   GRAM STAIN FEW WBC'S    Preliminary   GRAM STAIN NO ORGANISMS SEEN    Preliminary   Culture result: PENDING   Preliminary   Result History     CULTURE, SURGICAL WOUND W GRAM STAIN on 1/18/2019        C. DVT Prophylaxis: SCD's, Encourage mobilization, Encourage active ankle                DF/PF motion for endogenous fibrinolysis                 Chemo Prophylaxis:  Heparin for DVT AND PE                      [x]SCDs    [x]On Heparin   [] Eliquis [] Aspirin   D. Weight Bear status: NWB left leg              E. Consults: PT/OT/ Intervention/ Consults:    PT/OT : [x]PT / [] OT ordered // NWB     DC disposition: TBD,      []  []  [] [address] : Consultations//Orthopaedic Services following Samantha Garcia include   Consultants following patients:      Samantha Garcia is on medicine service with Ortho consultation   G.   Indwelling Catheters: IV : present, Drains: not present, Doss: NONE     2) Medical Diagnosis :     ICD-10-CM ICD-9-CM    1. Other acute pulmonary embolism without acute cor pulmonale (HCC) I26.99 415.19           Patient Active Problem List   Diagnosis Code    Pulmonary emboli (HCC) I26.99    Accelerated hypertension I10    Crohn's colitis ([facility] [address] K50.10    Wound of left ankle S91.002A    Status post open reduction with internal fixation (ORIF) of fracture of ankle Z96.7, Z87.81    Dehiscence of incision, initial encounter T81.31XA        Code Status: Full Code         SUBJECTIVE       Patient seen and evaluated. Doing well in NAD      Denies: CP, SOB, ABD PAIN, Calf pain     OBJECTIVE EXAMINATION        Visit Vitals  BP (!) 118/92   Pulse 76   Temp 99.3 °F (37.4 °C)   Resp 24   Ht 6' (1.829 m)   Wt (!) 360 lb 7.2 oz (163.5 kg)   SpO2 100%   BMI 48.89 kg/m²       Intake/Output Summary (Last 24 hours) at 1/18/2019 1041  Last data filed at 1/18/2019 1015  Gross per 24 hour   Intake 350 ml   Output 5 ml   Net 345 ml       Patient has stable Vital signs and is comfortable. Pain is currently controlled  alert, cooperative, no distress    Intake and Output:  Current Shift:  01/18 0701 - 01/18 1900  In: 350 [I.V.:350]  Out: 5   Last three shifts:  No intake/output data recorded. Chest/Abdomen: No audible wheezing. No accessory use of chest muscles             during breathing. Non tender abdomen    Incision/Dressings:    Clean,dry , intact, Mild soilage present    Incision looks good, No erythema  healing well    Wound:  healin well at this point// i reomed the xeroform, placed a new sterile 4 x 4 gauze to thje incision    Extremities:        No embolic phenomena to the toes                No significant edema to the foot and or toes.                 Pulses in tact to the left and right foot             Lower extremities are warm and appear well perfused         DVT: No evidence of DVT seen on examination at this time            Moves lower extremities well (ankle DF/PF RIGHT ANKLE)/// LEFT ANKLE IN SPLINT         Moves Upper extremities well        LABS AND MEDICATION REVIEW        CMP: No results found for: NA, K, CL, CO2, AGAP, GLU, BUN, CREA, GFRAA, GFRNA, CA, MG, PHOS, ALB, TBIL, TP, ALB, GLOB, AGRAT, SGOT, ALT, GPT  CBC: No results found for: WBC, HGB, HGBEXT, HCT, HCTEXT, PLT, PLTEXT, HGBEXT, HCTEXT, PLTEXT  COAGS:   Lab Results   Component Value Date/Time    APTT 57.3 (H) 01/19/2019 02:00 AM        Current Facility-Administered Medications   Medication Dose Route Frequency    morphine 10 mg/ml injection        lactated Ringers infusion  50 mL/hr IntraVENous CONTINUOUS    albuterol (PROVENTIL VENTOLIN) nebulizer solution 2.5 mg  2.5 mg Inhalation PRN    naloxone (NARCAN) injection 0.04 mg  0.04 mg IntraVENous PRN    ondansetron (ZOFRAN) injection 4 mg  4 mg IntraVENous ONCE    diphenhydrAMINE (BENADRYL) injection 12.5 mg  12.5 mg IntraVENous Multiple    insulin lispro (HUMALOG) injection   SubCUTAneous ONCE    glucose chewable tablet 16 g  4 Tab Oral PRN    glucagon (GLUCAGEN) injection 1 mg  1 mg IntraMUSCular PRN    dextrose (D50W) injection syrg 12.5-25 g  25-50 mL IntraVENous PRN    morphine injection 4 mg  4 mg IntraVENous Q15MIN PRN    insulin lispro (HUMALOG) injection 6 Units  6 Units SubCUTAneous TIDAC    insulin glargine (LANTUS) injection 35 Units  35 Units SubCUTAneous DAILY    morphine injection 4 mg  4 mg IntraVENous Q4H PRN    HYDROcodone-acetaminophen (NORCO)  mg tablet 1 Tab  1 Tab Oral Q4H PRN    ceFAZolin (ANCEF) 2g IVPB in 50 mL D5W  2 g IntraVENous Q8H    potassium chloride (KLOR-CON) packet for solution 40 mEq  40 mEq Oral DAILY    insulin lispro (HUMALOG) injection   SubCUTAneous AC&HS    heparin 25,000 units in D5W 250 ml infusion  18-36 Units/kg/hr IntraVENous TITRATE    SITagliptin (JANUVIA) tablet 50 mg  50 mg Oral DAILY    metoprolol tartrate (LOPRESSOR) tablet 50 mg  50 mg Oral BID    azaTHIOprine (IMURAN) tablet 200 mg  200 mg Oral DAILY AFTER BREAKFAST    gabapentin (NEURONTIN) capsule 300 mg  300 mg Oral TID    hydrALAZINE (APRESOLINE) tablet 100 mg  100 mg Oral TID    atorvastatin (LIPITOR) tablet 20 mg  20 mg Oral QHS    acetaminophen (TYLENOL) tablet 650 mg  650 mg Oral Q4H PRN    glucose chewable tablet 16 g  4 Tab Oral PRN    glucagon (GLUCAGEN) injection 1 mg  1 mg IntraMUSCular PRN    dextrose (D50W) injection syrg 12.5-25 g  25-50 mL IntraVENous PRN    albuterol-ipratropium (DUO-NEB) 2.5 MG-0.5 MG/3 ML  3 mL Nebulization Q4H PRN    cloNIDine HCl (CATAPRES) tablet 0.1 mg  0.1 mg Oral Q8H PRN    ondansetron (ZOFRAN) injection 4 mg  4 mg IntraVENous Q6H PRN    pantoprazole (PROTONIX) tablet 40 mg  40 mg Oral ACB&D    losartan (COZAAR) tablet 100 mg  100 mg Oral DAILY    hydroCHLOROthiazide (HYDRODIURIL) tablet 25 mg  25 mg Oral DAILY        REVIEW OF SYSTEMS : 1/18/2019  ALL BELOW ARE Negative except : SEE HPI        Past Medical History:   Diagnosis Date    Crohn's colitis ([facility] [address]     Diabetes ([facility] [address]     HTN (hypertension)       History reviewed. No pertinent surgical history. Social History     Socioeconomic History    Marital status:      Spouse name: Not on file    Number of children: Not on file    Years of education: Not on file    Highest education level: Not on file   Social Needs    Financial resource strain: Not on file    Food insecurity - worry: Not on file    Food insecurity - inability: Not on file    Transportation needs - medical: Not on file   EARTHNET needs - non-medical: Not on file   Occupational History    Not on file   Tobacco Use    Smoking status: Light Tobacco Smoker    Smokeless tobacco: Never Used   Substance and Sexual Activity    Alcohol use: Not on file    Drug use: Not on file    Sexual activity: Not on file   Other Topics Concern    Not on file   Social History Narrative    Not on file      History reviewed.  No pertinent family history. Prior to Admission medications    Medication Sig Start Date End Date Taking? Authorizing Provider   losartan (COZAAR) 100 mg tablet Take 100 mg by mouth daily. Yes Provider, Historical   hydrALAZINE (APRESOLINE) 25 mg tablet Take 25 mg by mouth four (4) times daily. Yes Provider, Historical   metoprolol tartrate (LOPRESSOR) 25 mg tablet Take  by mouth two (2) times a day. Yes Provider, Historical   metoprolol succinate (TOPROL-XL) 25 mg XL tablet Take 25 mg by mouth daily. Yes Provider, Historical   insulin lispro (HUMALOG U-100 INSULIN) 100 unit/mL injection by SubCUTAneous route Before breakfast, lunch, dinner and at bedtime. Yes Provider, Historical   insulin detemir U-100 (LEVEMIR U-100 INSULIN) 100 unit/mL injection 30 Units by SubCUTAneous route nightly. Yes Provider, Historical   azaTHIOprine (IMURAN) 50 mg tablet Take 200 mg by mouth daily. Yes Provider, Historical   adalimumab (HUMIRA) 40 mg/0.8 mL injection 40 mg by SubCUTAneous route daily.    Yes Provider, Historical     Current Facility-Administered Medications   Medication Dose Route Frequency    morphine 10 mg/ml injection        lactated Ringers infusion  50 mL/hr IntraVENous CONTINUOUS    albuterol (PROVENTIL VENTOLIN) nebulizer solution 2.5 mg  2.5 mg Inhalation PRN    naloxone (NARCAN) injection 0.04 mg  0.04 mg IntraVENous PRN    ondansetron (ZOFRAN) injection 4 mg  4 mg IntraVENous ONCE    diphenhydrAMINE (BENADRYL) injection 12.5 mg  12.5 mg IntraVENous Multiple    insulin lispro (HUMALOG) injection   SubCUTAneous ONCE    glucose chewable tablet 16 g  4 Tab Oral PRN    glucagon (GLUCAGEN) injection 1 mg  1 mg IntraMUSCular PRN    dextrose (D50W) injection syrg 12.5-25 g  25-50 mL IntraVENous PRN    morphine injection 4 mg  4 mg IntraVENous Q15MIN PRN    insulin lispro (HUMALOG) injection 6 Units  6 Units SubCUTAneous TIDAC    insulin glargine (LANTUS) injection 35 Units  35 Units SubCUTAneous DAILY    morphine injection 4 mg  4 mg IntraVENous Q4H PRN    HYDROcodone-acetaminophen (NORCO)  mg tablet 1 Tab  1 Tab Oral Q4H PRN    ceFAZolin (ANCEF) 2g IVPB in 50 mL D5W  2 g IntraVENous Q8H    potassium chloride (KLOR-CON) packet for solution 40 mEq  40 mEq Oral DAILY    insulin lispro (HUMALOG) injection   SubCUTAneous AC&HS    heparin 25,000 units in D5W 250 ml infusion  18-36 Units/kg/hr IntraVENous TITRATE    SITagliptin (JANUVIA) tablet 50 mg  50 mg Oral DAILY    metoprolol tartrate (LOPRESSOR) tablet 50 mg  50 mg Oral BID    azaTHIOprine (IMURAN) tablet 200 mg  200 mg Oral DAILY AFTER BREAKFAST    gabapentin (NEURONTIN) capsule 300 mg  300 mg Oral TID    hydrALAZINE (APRESOLINE) tablet 100 mg  100 mg Oral TID    atorvastatin (LIPITOR) tablet 20 mg  20 mg Oral QHS    acetaminophen (TYLENOL) tablet 650 mg  650 mg Oral Q4H PRN    glucose chewable tablet 16 g  4 Tab Oral PRN    glucagon (GLUCAGEN) injection 1 mg  1 mg IntraMUSCular PRN    dextrose (D50W) injection syrg 12.5-25 g  25-50 mL IntraVENous PRN    albuterol-ipratropium (DUO-NEB) 2.5 MG-0.5 MG/3 ML  3 mL Nebulization Q4H PRN    cloNIDine HCl (CATAPRES) tablet 0.1 mg  0.1 mg Oral Q8H PRN    ondansetron (ZOFRAN) injection 4 mg  4 mg IntraVENous Q6H PRN    pantoprazole (PROTONIX) tablet 40 mg  40 mg Oral ACB&D    losartan (COZAAR) tablet 100 mg  100 mg Oral DAILY    hydroCHLOROthiazide (HYDRODIURIL) tablet 25 mg  25 mg Oral DAILY     No Known Allergies         DIAGNOSTIC IMAGING            Prasanth Bartlett MD  1/19/2019  7:50 AM

## 2019-01-18 NOTE — PROGRESS NOTES
Infectious Disease progress Note    Requested by: dr. Vadim Nava    Reason: left ankle wound infection    Current abx Prior abx   Vancomycin since 1/16      Lines:       Assessment :    49-year-old female with h/o type 2 DM (last hgbA1C 8.0 on 1/11/19) admitted to [facility] on 1/14/19 for chest pain, left ankle wound. Clinical picture c/w left ankle wound dehiscence. After detailed history and exam, I don't see definitive evidence of infection of the left ankle ulcer. Concurrent immunosuppression d/t azathioprine can mask the full presentation. Also, patient was on prednisone for few days prior to admission which would mask inflammation. S/p I&D left ankle 1/18/19. No evidence of deeper infection noted. Intra op cultures 1/18- pending. Wound cultures: methicillin susceptible staph aureus likely superficial colonizer. Dehiscence likely due to mechanical disruption. Patient is at risk for complicated infection due to h/o multiple surgeries, DM, hardware. Hence, recommend periop antibiotics. Recommendations:    1. cont cefazolin  2. F/u intra op cultures & modify abx as outpatient if needed  3. Switch to po cephalexin 500 mg po q 6 hours in am till 1/31/19. Above plan was discussed in details with patient. Please call me if any further questions or concerns. Will continue to participate in the care of this patient. HPI:    No new complaints  Patient denies headaches, visual disturbances, sore throat, runny nose, earaches, sob, chills, cough, abdominal pain, diarrhea, burning micturition, or weakness in extremities. denies back pain/flank pain.          Medication List      ASK your doctor about these medications    HumaLOG U-100 Insulin 100 unit/mL injection  Generic drug:  insulin lispro     HUMIRA 40 mg/0.8 mL injection  Generic drug:  adalimumab     hydrALAZINE 25 mg tablet  Commonly known as:  APRESOLINE     IMURAN 50 mg tablet  Generic drug:  azaTHIOprine     LEVEMIR U-100 INSULIN 100 unit/mL injection  Generic drug:  insulin detemir U-100     losartan 100 mg tablet  Commonly known as:  COZAAR     metoprolol succinate 25 mg XL tablet  Commonly known as:  TOPROL-XL     metoprolol tartrate 25 mg tablet  Commonly known as:  LOPRESSOR            Current Facility-Administered Medications   Medication Dose Route Frequency    morphine 10 mg/ml injection        insulin lispro (HUMALOG) injection 6 Units  6 Units SubCUTAneous TIDAC    insulin glargine (LANTUS) injection 35 Units  35 Units SubCUTAneous DAILY    morphine injection 4 mg  4 mg IntraVENous Q4H PRN    HYDROcodone-acetaminophen (NORCO)  mg tablet 1 Tab  1 Tab Oral Q4H PRN    ceFAZolin (ANCEF) 2g IVPB in 50 mL D5W  2 g IntraVENous Q8H    potassium chloride (KLOR-CON) packet for solution 40 mEq  40 mEq Oral DAILY    insulin lispro (HUMALOG) injection   SubCUTAneous AC&HS    heparin 25,000 units in D5W 250 ml infusion  18-36 Units/kg/hr IntraVENous TITRATE    SITagliptin (JANUVIA) tablet 50 mg  50 mg Oral DAILY    metoprolol tartrate (LOPRESSOR) tablet 50 mg  50 mg Oral BID    azaTHIOprine (IMURAN) tablet 200 mg  200 mg Oral DAILY AFTER BREAKFAST    gabapentin (NEURONTIN) capsule 300 mg  300 mg Oral TID    hydrALAZINE (APRESOLINE) tablet 100 mg  100 mg Oral TID    atorvastatin (LIPITOR) tablet 20 mg  20 mg Oral QHS    acetaminophen (TYLENOL) tablet 650 mg  650 mg Oral Q4H PRN    glucose chewable tablet 16 g  4 Tab Oral PRN    glucagon (GLUCAGEN) injection 1 mg  1 mg IntraMUSCular PRN    dextrose (D50W) injection syrg 12.5-25 g  25-50 mL IntraVENous PRN    albuterol-ipratropium (DUO-NEB) 2.5 MG-0.5 MG/3 ML  3 mL Nebulization Q4H PRN    cloNIDine HCl (CATAPRES) tablet 0.1 mg  0.1 mg Oral Q8H PRN    ondansetron (ZOFRAN) injection 4 mg  4 mg IntraVENous Q6H PRN    pantoprazole (PROTONIX) tablet 40 mg  40 mg Oral ACB&D    losartan (COZAAR) tablet 100 mg  100 mg Oral DAILY    hydroCHLOROthiazide (HYDRODIURIL) tablet 25 mg  25 mg Oral DAILY       Allergies: Patient has no known allergies. Temp (24hrs), Av.1 °F (36.7 °C), Min:97.6 °F (36.4 °C), Max:99.3 °F (37.4 °C)    Visit Vitals  BP 99/61 (BP 1 Location: Right arm, BP Patient Position: At rest)   Pulse 86   Temp 98 °F (36.7 °C)   Resp 14   Ht 6' (1.829 m)   Wt (!) 163.5 kg (360 lb 7.2 oz)   LMP 10/18/2017   SpO2 95%   BMI 48.89 kg/m²       ROS: 12 point ROS obtained in details. Pertinent positives as mentioned in HPI,   otherwise negative    Physical Exam:    General:  Alert, cooperative, no distress, appears stated age. Head:  Normocephalic, without obvious abnormality, atraumatic. Eyes:  Conjunctivae/corneas clear. PERRL, ANicteric   Nose: Nares normal. Mucosa normal. No drainage or sinus tenderness. Throat: Lips, mucosa dry. NO thrush; TEETH normal.   Neck: Supple, symmetrical, trachea midline, no adenopathy, thyroid: no enlargment/tenderness/nodules, no carotid bruit and no JVD. No crepitus   Back:   Symmetric, no curvature, no spine tenderness or flank pain   Lungs:   Bilateral auscultation clear no rhonchi rales wheezing normal resp effort   Chest wall:  No tenderness or deformity. NO CREPITUS   Heart:  Regular rate and rhythm, S1, S2 normal, no murmur, click, rub or gallop. Abdomen:   Obese Soft, non-tender. PROTUBERANT; Bowel sounds normal. No masses,  No organomegaly. No paradox   Extremities: Left ankle with surgical incision, minimal serous drainage on dressing, no edema/erythema   Pulses: 1-2+ and symmetric all extremities.    Skin: Skin color, texture, turgor normal. No rashes or lesions   Lymph nodes: Cervical, supraclavicular, and axillary nodes normal.   Neurologic: Grossly nonfocal                                      Labs: Results:   Chemistry Recent Labs     19  0438 19  0435 19  1353 19  0908   * 241*  --  200*    138  --  140   K 3.7 3.6 3.9 3.5    102  --  105   CO2 29 26  --  27   BUN 13 19* --  25*   CREA 0.86 0.94  --  1.10   CA 8.4* 8.1*  --  8.1*   AGAP 7 10  --  8   BUCR 15 20  --  23*   AP  --  73  --  73   TP  --  6.9  --  7.0   ALB  --  2.8*  --  2.9*   GLOB  --  4.1*  --  4.1*   AGRAT  --  0.7*  --  0.7*      CBC w/Diff Recent Labs     01/18/19  0438 01/17/19  0435 01/16/19  0908   WBC 8.8 10.1 9.3   RBC 4.36 4.45 4.43   HGB 11.7* 11.7* 11.6*   HCT 37.2 37.9 37.4    377 386   GRANS 62 60 54   LYMPH 24 27 34   EOS 2 2 1      Microbiology Recent Labs     01/18/19  1005   CULT PENDING          RADIOLOGY:    All available imaging studies/reports in [facility] for this admission were reviewed    Dr. Jerald Romero, Infectious Disease Specialist  [phone]  January 18, 2019  12:20 PM

## 2019-01-18 NOTE — INTERVAL H&P NOTE
H&P Update:  Thuy Angulo was seen and examined. History and physical has been reviewed. The patient has been examined. There have been no significant clinical changes since the completion of the originally dated History and Physical.  Patient identified by surgeon; surgical site was confirmed by patient and surgeon.     Signed By: Sergio Quinteros MD     January 18, 2019 8:59 AM

## 2019-01-18 NOTE — PROGRESS NOTES
Problem: Falls - Risk of  Goal: *Absence of Falls  Document Jill Fall Risk and appropriate interventions in the flowsheet.   Outcome: Progressing Towards Goal  Fall Risk Interventions:            Medication Interventions: Patient to call before getting OOB, Teach patient to arise slowly    Elimination Interventions: Call light in reach, Patient to call for help with toileting needs

## 2019-01-18 NOTE — PROGRESS NOTES
Dr. Valeri Malhotra was called to ask when to stop and restart  heparin drip for patient due to patient surgery at 9 AM Dr. Nagi Radford was on call and stated its up to surgeon but recommend that  patient be placed back on heparin drip as soon as possible.  Vasquez León physician on call for ortho and verbal order to stop drip at 2330 PM .

## 2019-01-18 NOTE — PROGRESS NOTES
conducted an initial consultation and Spiritual Assessment for Ordr.in Fernando, who is a 46 y.o.,female. Patients Primary Language is: [place]. According to the patients EMR Shinto Affiliation is: Non Gnosticism.     The reason the Patient came to the hospital is:   Patient Active Problem List    Diagnosis Date Noted    Dehiscence of incision, initial encounter 01/14/2019    Pulmonary emboli ([facility] [address] 01/12/2019    Accelerated hypertension 01/12/2019    Crohn's colitis ([facility] [address] 01/12/2019    Wound of left ankle 01/12/2019    Status post open reduction with internal fixation (ORIF) of fracture of ankle 01/12/2019        The  provided the following Interventions:  Initiated a relationship of care and support. Explored issues of nathanael, belief, spirituality and Moravian/ritual needs while hospitalized. Listened empathically. Provided chaplaincy education. Provided information about Spiritual Care Services. Offered prayer and assurance of continued prayers on patient's behalf. Chart reviewed. The following outcomes where achieved:  Patient shared limited information about both their medical narrative and spiritual journey/beliefs.  confirmed Patient's Shinto Affiliation. Patient processed feeling about current hospitalization. Patient expressed gratitude for 's visit. Assessment:  Patient does not have any Moravian/cultural needs that will affect patients preferences in health care. There are no spiritual or Moravian issues which require intervention at this time. Plan:  Chaplains will continue to follow and will provide pastoral care on an as needed/requested basis.  recommends bedside caregivers page  on duty if patient shows signs of acute spiritual or emotional distress.     [address]   [phone]

## 2019-01-18 NOTE — PROGRESS NOTES
Problem: Falls - Risk of  Goal: *Absence of Falls  Document Jill Fall Risk and appropriate interventions in the flowsheet.   Outcome: Progressing Towards Goal  Fall Risk Interventions:            Medication Interventions: Teach patient to arise slowly, Patient to call before getting OOB    Elimination Interventions: Call light in reach, Toilet paper/wipes in reach, Patient to call for help with toileting needs

## 2019-01-18 NOTE — PERIOP NOTES
TRANSFER - OUT REPORT:    Verbal report given to Dilcia PHLILIPS(name) on Del Castillo  being transferred to [address] [place] [address](unit) for routine post - op       Report consisted of patients Situation, Background, Assessment and   Recommendations(SBAR). Information from the following report(s) SBAR, OR Summary, Procedure Summary, Intake/Output and MAR was reviewed with the receiving nurse. Lines:   PICC Double Lumen 97/63/49 Right;Basilic (Active)   Central Line Being Utilized Yes 1/17/2019 10:47 PM   Criteria for Appropriate Use Long term IV/antibiotic administration 1/17/2019 10:47 PM   Site Assessment Clean, dry, & intact 1/17/2019 10:47 PM   Phlebitis Assessment 0 1/17/2019 10:47 PM   Infiltration Assessment 0 1/17/2019 10:47 PM   Date of Last Dressing Change 01/17/19 1/17/2019  6:00 PM   Dressing Status Clean, dry, & intact 1/17/2019 10:47 PM   External Catheter Length (cm) 0 centimeters 1/17/2019  4:00 PM   Dressing Type Transparent 1/17/2019 10:47 PM   Hub Color/Line Status Red 1/17/2019 10:47 PM   Positive Blood Return (Site #1) Yes 1/17/2019 10:47 PM   Hub Color/Line Status Purple 1/17/2019 10:47 PM   Positive Blood Return (Site #2) Yes 1/17/2019 10:47 PM   Alcohol Cap Used Yes 1/17/2019 10:47 PM       Peripheral IV 01/16/19 Left; Outer Antecubital (Active)   Site Assessment Clean, dry, & intact 1/17/2019 10:47 PM   Phlebitis Assessment 0 1/17/2019 10:47 PM   Infiltration Assessment 0 1/17/2019 10:47 PM   Dressing Status Clean, dry, & intact 1/17/2019 10:47 PM   Dressing Type Transparent 1/17/2019 10:47 PM   Hub Color/Line Status Pink;Flushed 1/17/2019 10:47 PM   Action Taken Open ports on tubing capped 1/17/2019  7:57 AM   Alcohol Cap Used Yes 1/17/2019 10:47 PM        Opportunity for questions and clarification was provided.       Patient transported with:   O2 @ 2 liters  Registered Nurse

## 2019-01-18 NOTE — PROGRESS NOTES
[facility]  Pulmonary, Critical Care, and Sleep Medicine    Name: Hayes Mendenhall MRN: [id number]   : 1970 Hospital: [address] [place] [address] Dr   Date: 2019  Room: [facility]     Subjective:   Seen & examined at the bedside. Underwent I&D this morning with orthopedics. Reports that she feels well after the procedure. Heparin has been restarted.   Questioning when she can transition back to eliquis-- eager to get out of the hospital.      Past Medical History:   Diagnosis Date    Crohn's colitis ([facility] [facility]     Diabetes ([facility] [address] [facility]     HTN (hypertension)        Current Facility-Administered Medications   Medication Dose Route Frequency    morphine 10 mg/ml injection        potassium chloride (K-DUR, KLOR-CON) SR tablet 40 mEq  40 mEq Oral DAILY    insulin lispro (HUMALOG) injection 6 Units  6 Units SubCUTAneous TIDAC    insulin glargine (LANTUS) injection 35 Units  35 Units SubCUTAneous DAILY    morphine injection 4 mg  4 mg IntraVENous Q4H PRN    HYDROcodone-acetaminophen (NORCO)  mg tablet 1 Tab  1 Tab Oral Q4H PRN    ceFAZolin (ANCEF) 2g IVPB in 50 mL D5W  2 g IntraVENous Q8H    insulin lispro (HUMALOG) injection   SubCUTAneous AC&HS    heparin 25,000 units in D5W 250 ml infusion  18-36 Units/kg/hr IntraVENous TITRATE    SITagliptin (JANUVIA) tablet 50 mg  50 mg Oral DAILY    metoprolol tartrate (LOPRESSOR) tablet 50 mg  50 mg Oral BID    azaTHIOprine (IMURAN) tablet 200 mg  200 mg Oral DAILY AFTER BREAKFAST    gabapentin (NEURONTIN) capsule 300 mg  300 mg Oral TID    hydrALAZINE (APRESOLINE) tablet 100 mg  100 mg Oral TID    atorvastatin (LIPITOR) tablet 20 mg  20 mg Oral QHS    acetaminophen (TYLENOL) tablet 650 mg  650 mg Oral Q4H PRN    glucose chewable tablet 16 g  4 Tab Oral PRN    glucagon (GLUCAGEN) injection 1 mg  1 mg IntraMUSCular PRN    dextrose (D50W) injection syrg 12.5-25 g  25-50 mL IntraVENous PRN    albuterol-ipratropium (DUO-NEB) 2.5 MG-0.5 MG/3 ML  3 mL Nebulization Q4H PRN    cloNIDine HCl (CATAPRES) tablet 0.1 mg  0.1 mg Oral Q8H PRN    ondansetron (ZOFRAN) injection 4 mg  4 mg IntraVENous Q6H PRN    pantoprazole (PROTONIX) tablet 40 mg  40 mg Oral ACB&D    losartan (COZAAR) tablet 100 mg  100 mg Oral DAILY    hydroCHLOROthiazide (HYDRODIURIL) tablet 25 mg  25 mg Oral DAILY       No Known Allergies      Review of Systems:  HEENT: No epistaxis, no nasal drainage, no difficulty in swallowing  Respiratory: as above  Cardiovascular: no chest pain, no palpitations, no chronic leg edema, no syncope  Gastrointestinal: no abd pain, no vomiting, no diarrhea, no bleeding symptoms  Genitourinary: No urinary symptoms  Integument/breast: No ulcers  Musculoskeletal:Neg  Neurological: No focal weakness, no seizures, no headaches  Behvioral/Psych: No anxiety, no depression  Constitutional: No fever, no chills, no weight loss, no night sweats  SLEEP: No snoring, no witnessed apneas, no daytime somnolence, no morning headaches. Sleep refreshed. Objective:   Vital Signs:    Visit Vitals  BP 99/61 (BP 1 Location: Right arm, BP Patient Position: At rest)   Pulse 86   Temp 98 °F (36.7 °C)   Resp 14   Ht 6' (1.829 m)   Wt (!) 163.5 kg (360 lb 7.2 oz)   LMP 10/18/2017   SpO2 95%   BMI 48.89 kg/m²       O2 Device: Nasal cannula   O2 Flow Rate (L/min): 3 l/min   Temp (24hrs), Av.1 °F (36.7 °C), Min:97.6 °F (36.4 °C), Max:99.3 °F (37.4 °C)       Intake/Output:     Intake/Output Summary (Last 24 hours) at 2019 1453  Last data filed at 2019 1015  Gross per 24 hour   Intake 350 ml   Output 5 ml   Net 345 ml           Physical Exam:   General: middle-aged black female, appears younger than stated age. Laying in bed. NAD. HEENT: normocephalic, atraumatic, EOM intact. Trachea midline. CVS: S1S2 no murmurs  RS: Clear throughout, no wheezing, rales, or rhonchi. No conversational dyspnea. Abd: soft, non tender, + bowel sounds. Neuro: alert & oriented. CN 2-12 intact. Speech is clear, spontaneous  Extrm: left leg with CDI dressing  Skin: no rash    Data review:   Labs:  Recent Results (from the past 12 hour(s))   CBC WITH AUTOMATED DIFF    Collection Time: 01/18/19  4:38 AM   Result Value Ref Range    WBC 8.8 4.6 - 13.2 K/uL    RBC 4.36 4.20 - 5.30 M/uL    HGB 11.7 (L) 12.0 - 16.0 g/dL    HCT 37.2 35.0 - 45.0 %    MCV 85.3 74.0 - 97.0 FL    MCH 26.8 24.0 - 34.0 PG    MCHC 31.5 31.0 - 37.0 g/dL    RDW 19.7 (H) 11.6 - 14.5 %    PLATELET 630 519 - 210 K/uL    MPV 10.9 9.2 - 11.8 FL    NEUTROPHILS 62 40 - 73 %    LYMPHOCYTES 24 21 - 52 %    MONOCYTES 12 (H) 3 - 10 %    EOSINOPHILS 2 0 - 5 %    BASOPHILS 0 0 - 2 %    ABS. NEUTROPHILS 5.3 1.8 - 8.0 K/UL    ABS. LYMPHOCYTES 2.1 0.9 - 3.6 K/UL    ABS. MONOCYTES 1.1 0.05 - 1.2 K/UL    ABS. EOSINOPHILS 0.2 0.0 - 0.4 K/UL    ABS.  BASOPHILS 0.0 0.0 - 0.1 K/UL    DF AUTOMATED     PTT    Collection Time: 01/18/19  4:38 AM   Result Value Ref Range    aPTT 28.6 23.0 - 53.5 SEC   METABOLIC PANEL, BASIC    Collection Time: 01/18/19  4:38 AM   Result Value Ref Range    Sodium 137 136 - 145 mmol/L    Potassium 3.7 3.5 - 5.5 mmol/L    Chloride 101 100 - 108 mmol/L    CO2 29 21 - 32 mmol/L    Anion gap 7 3.0 - 18 mmol/L    Glucose 128 (H) 74 - 99 mg/dL    BUN 13 7.0 - 18 MG/DL    Creatinine 0.86 0.6 - 1.3 MG/DL    BUN/Creatinine ratio 15 12 - 20      GFR est AA >60 >60 ml/min/1.73m2    GFR est non-AA >60 >60 ml/min/1.73m2    Calcium 8.4 (L) 8.5 - 10.1 MG/DL   GLUCOSE, POC    Collection Time: 01/18/19  8:17 AM   Result Value Ref Range    Glucose (POC) 183 (H) 70 - 110 mg/dL   HCG QL SERUM    Collection Time: 01/18/19  8:59 AM   Result Value Ref Range    HCG, Ql. NEGATIVE  NEG     CULTURE, SURGICAL WOUND W GRAM STAIN    Collection Time: 01/18/19 10:05 AM   Result Value Ref Range    Special Requests: ANKLE  LEFT        GRAM STAIN FEW WBC'S      GRAM STAIN NO ORGANISMS SEEN      Culture result: PENDING    GLUCOSE, POC    Collection Time: 01/18/19 11:42 AM   Result Value Ref Range    Glucose (POC) 200 (H) 70 - 110 mg/dL     Imaging:  I have personally reviewed the patients radiographs and have reviewed the reports:    IMPRESSION:   · Provoked Submassive Pulmonary Embolism in the Setting of Prolonged Travel & Left Ankle Fracture  · Medical Comorbidities: HTN, DM complicated by peripheral neuropathy, obesity, Crohn's disease        RECOMMENDATIONS:   · Heparin has been restarted post-operatively. · Restart eliquis ~ 2 days if there is no significant bleeding & okay with orthopedics. · Continue incentive spirometry, mobilize as per orthopedics to prevent atelectasis. · Ongoing glycemic control to promote wound healing  · Per ID planning on transitioning to keflex for 2 weeks of therapy. · Thank you for allowing us to participate in the care of this patient. We will continue to follow.    · DVT prophylaxis: therapeutic heparin     Total time spent 50 mins with more than 50% done face to face interview / exam / counseling       Arlene Florentino DO

## 2019-01-19 LAB
ALBUMIN SERPL-MCNC: 2.8 G/DL (ref 3.4–5)
ALBUMIN/GLOB SERPL: 0.7 {RATIO} (ref 0.8–1.7)
ALP SERPL-CCNC: 68 U/L (ref 45–117)
ALT SERPL-CCNC: 11 U/L (ref 13–56)
ANION GAP SERPL CALC-SCNC: 9 MMOL/L (ref 3–18)
APTT PPP: 57.3 SEC (ref 23–36.4)
APTT PPP: >180 SEC (ref 23–36.4)
AST SERPL-CCNC: 8 U/L (ref 15–37)
BACTERIA SPEC CULT: NORMAL
BILIRUB SERPL-MCNC: 0.3 MG/DL (ref 0.2–1)
BUN SERPL-MCNC: 11 MG/DL (ref 7–18)
BUN/CREAT SERPL: 13 (ref 12–20)
CALCIUM SERPL-MCNC: 8.5 MG/DL (ref 8.5–10.1)
CHLORIDE SERPL-SCNC: 102 MMOL/L (ref 100–108)
CO2 SERPL-SCNC: 27 MMOL/L (ref 21–32)
CREAT SERPL-MCNC: 0.83 MG/DL (ref 0.6–1.3)
GLOBULIN SER CALC-MCNC: 4 G/DL (ref 2–4)
GLUCOSE BLD STRIP.AUTO-MCNC: 135 MG/DL (ref 70–110)
GLUCOSE BLD STRIP.AUTO-MCNC: 158 MG/DL (ref 70–110)
GLUCOSE BLD STRIP.AUTO-MCNC: 180 MG/DL (ref 70–110)
GLUCOSE BLD STRIP.AUTO-MCNC: 198 MG/DL (ref 70–110)
GLUCOSE SERPL-MCNC: 193 MG/DL (ref 74–99)
MAGNESIUM SERPL-MCNC: 2.2 MG/DL (ref 1.6–2.6)
POTASSIUM SERPL-SCNC: 4.2 MMOL/L (ref 3.5–5.5)
PROT SERPL-MCNC: 6.8 G/DL (ref 6.4–8.2)
SERVICE CMNT-IMP: NORMAL
SODIUM SERPL-SCNC: 138 MMOL/L (ref 136–145)

## 2019-01-19 PROCEDURE — 74011250636 HC RX REV CODE- 250/636: Performed by: INTERNAL MEDICINE

## 2019-01-19 PROCEDURE — 85730 THROMBOPLASTIN TIME PARTIAL: CPT

## 2019-01-19 PROCEDURE — 74011636637 HC RX REV CODE- 636/637: Performed by: FAMILY MEDICINE

## 2019-01-19 PROCEDURE — 65660000000 HC RM CCU STEPDOWN

## 2019-01-19 PROCEDURE — 83735 ASSAY OF MAGNESIUM: CPT

## 2019-01-19 PROCEDURE — 74011250636 HC RX REV CODE- 250/636: Performed by: ORTHOPAEDIC SURGERY

## 2019-01-19 PROCEDURE — 74011250637 HC RX REV CODE- 250/637: Performed by: FAMILY MEDICINE

## 2019-01-19 PROCEDURE — 80053 COMPREHEN METABOLIC PANEL: CPT

## 2019-01-19 PROCEDURE — 74011250637 HC RX REV CODE- 250/637: Performed by: INTERNAL MEDICINE

## 2019-01-19 PROCEDURE — 74011250636 HC RX REV CODE- 250/636: Performed by: FAMILY MEDICINE

## 2019-01-19 PROCEDURE — 82962 GLUCOSE BLOOD TEST: CPT

## 2019-01-19 RX ORDER — HEPARIN SODIUM 1000 [USP'U]/ML
40 INJECTION, SOLUTION INTRAVENOUS; SUBCUTANEOUS ONCE
Status: COMPLETED | OUTPATIENT
Start: 2019-01-19 | End: 2019-01-19

## 2019-01-19 RX ADMIN — MORPHINE SULFATE 4 MG: 2 INJECTION, SOLUTION INTRAMUSCULAR; INTRAVENOUS at 02:01

## 2019-01-19 RX ADMIN — INSULIN LISPRO 6 UNITS: 100 INJECTION, SOLUTION INTRAVENOUS; SUBCUTANEOUS at 08:49

## 2019-01-19 RX ADMIN — INSULIN LISPRO 3 UNITS: 100 INJECTION, SOLUTION INTRAVENOUS; SUBCUTANEOUS at 11:33

## 2019-01-19 RX ADMIN — METOPROLOL TARTRATE 50 MG: 50 TABLET ORAL at 08:24

## 2019-01-19 RX ADMIN — ATORVASTATIN CALCIUM 20 MG: 20 TABLET, FILM COATED ORAL at 22:18

## 2019-01-19 RX ADMIN — LOSARTAN POTASSIUM 100 MG: 50 TABLET, FILM COATED ORAL at 08:26

## 2019-01-19 RX ADMIN — MORPHINE SULFATE 4 MG: 2 INJECTION, SOLUTION INTRAMUSCULAR; INTRAVENOUS at 15:23

## 2019-01-19 RX ADMIN — ONDANSETRON 4 MG: 2 INJECTION INTRAMUSCULAR; INTRAVENOUS at 16:18

## 2019-01-19 RX ADMIN — INSULIN LISPRO 3 UNITS: 100 INJECTION, SOLUTION INTRAVENOUS; SUBCUTANEOUS at 22:18

## 2019-01-19 RX ADMIN — ONDANSETRON 4 MG: 2 INJECTION INTRAMUSCULAR; INTRAVENOUS at 21:24

## 2019-01-19 RX ADMIN — POTASSIUM CHLORIDE 40 MEQ: 20 TABLET, EXTENDED RELEASE ORAL at 08:26

## 2019-01-19 RX ADMIN — CEFAZOLIN SODIUM 2 G: 2 SOLUTION INTRAVENOUS at 16:20

## 2019-01-19 RX ADMIN — MORPHINE SULFATE 4 MG: 2 INJECTION, SOLUTION INTRAMUSCULAR; INTRAVENOUS at 06:05

## 2019-01-19 RX ADMIN — GABAPENTIN 300 MG: 300 CAPSULE ORAL at 16:23

## 2019-01-19 RX ADMIN — HYDRALAZINE HYDROCHLORIDE 100 MG: 50 TABLET, FILM COATED ORAL at 10:05

## 2019-01-19 RX ADMIN — APIXABAN 5 MG: 5 TABLET, FILM COATED ORAL at 19:42

## 2019-01-19 RX ADMIN — PANTOPRAZOLE SODIUM 40 MG: 40 TABLET, DELAYED RELEASE ORAL at 16:23

## 2019-01-19 RX ADMIN — HYDROCODONE BITARTRATE AND ACETAMINOPHEN 1 TABLET: 10; 325 TABLET ORAL at 18:46

## 2019-01-19 RX ADMIN — INSULIN LISPRO 6 UNITS: 100 INJECTION, SOLUTION INTRAVENOUS; SUBCUTANEOUS at 16:25

## 2019-01-19 RX ADMIN — INSULIN GLARGINE 35 UNITS: 100 INJECTION, SOLUTION SUBCUTANEOUS at 08:23

## 2019-01-19 RX ADMIN — AZATHIOPRINE 200 MG: 50 TABLET ORAL at 08:26

## 2019-01-19 RX ADMIN — CEFAZOLIN SODIUM 2 G: 2 SOLUTION INTRAVENOUS at 01:04

## 2019-01-19 RX ADMIN — MORPHINE SULFATE 4 MG: 2 INJECTION, SOLUTION INTRAMUSCULAR; INTRAVENOUS at 10:06

## 2019-01-19 RX ADMIN — HYDROCODONE BITARTRATE AND ACETAMINOPHEN 1 TABLET: 10; 325 TABLET ORAL at 08:26

## 2019-01-19 RX ADMIN — CEFAZOLIN SODIUM 2 G: 2 SOLUTION INTRAVENOUS at 10:05

## 2019-01-19 RX ADMIN — MORPHINE SULFATE 4 MG: 2 INJECTION, SOLUTION INTRAMUSCULAR; INTRAVENOUS at 21:24

## 2019-01-19 RX ADMIN — INSULIN LISPRO 6 UNITS: 100 INJECTION, SOLUTION INTRAVENOUS; SUBCUTANEOUS at 11:32

## 2019-01-19 RX ADMIN — INSULIN LISPRO 3 UNITS: 100 INJECTION, SOLUTION INTRAVENOUS; SUBCUTANEOUS at 08:49

## 2019-01-19 RX ADMIN — HEPARIN SODIUM 6540 UNITS: 1000 INJECTION, SOLUTION INTRAVENOUS; SUBCUTANEOUS at 04:56

## 2019-01-19 RX ADMIN — GABAPENTIN 300 MG: 300 CAPSULE ORAL at 22:18

## 2019-01-19 RX ADMIN — SITAGLIPTIN 50 MG: 50 TABLET, FILM COATED ORAL at 08:26

## 2019-01-19 RX ADMIN — APIXABAN 5 MG: 5 TABLET, FILM COATED ORAL at 10:05

## 2019-01-19 RX ADMIN — HYDROCHLOROTHIAZIDE 25 MG: 25 TABLET ORAL at 08:26

## 2019-01-19 RX ADMIN — PANTOPRAZOLE SODIUM 40 MG: 40 TABLET, DELAYED RELEASE ORAL at 08:26

## 2019-01-19 RX ADMIN — HEPARIN SODIUM AND DEXTROSE 10 UNITS/KG/HR: 10000; 5 INJECTION INTRAVENOUS at 04:55

## 2019-01-19 RX ADMIN — GABAPENTIN 300 MG: 300 CAPSULE ORAL at 08:26

## 2019-01-19 RX ADMIN — HYDROCODONE BITARTRATE AND ACETAMINOPHEN 1 TABLET: 10; 325 TABLET ORAL at 01:02

## 2019-01-19 NOTE — ROUTINE PROCESS
Bedside shift change report given to [facility] (oncoming nurse) by Ruby Mcdonough (offgoing nurse). Report included the following information SBAR, Kardex, Intake/Output and Recent Results.

## 2019-01-19 NOTE — PROGRESS NOTES
Progress Note    Patient: Sarah Rodriges MRN: [id number]  CSN: [id number]    YOB: 1970  Age: 46 y.o. Sex: female    DOA: 1/11/2019 LOS:  LOS: 5 days                    Subjective:   Pt s/p I&D debridement by Dr Rach Galloway . Pt started on Eliquis. Pt has splint dressing and bandage Lt ankle . Pt is not weight bearing Lt ankle . Pt seen  by physical therapy. [facility] Pt started on Eliquis will D/C heparin in 2 hours      Chief Complaint:   Chief Complaint   Patient presents with    Chest Pain       Review of systems  General: No fevers or chills. Cardiovascular: No chest pain or pressure. No palpitations. Pulmonary: No shortness of breath, cough or wheeze. Gastrointestinal: No abdominal pain, nausea, vomiting or diarrhea. Genitourinary: No urinary frequency, urgency, hesitancy or dysuria. Musculoskeletal:  Positive ankle pain   Neurologic: No headache, numbness, no generalized  weakness    Objective:     Physical Exam:  Visit Vitals  /66   Pulse 82   Temp 98.2 °F (36.8 °C)   Resp 18   Ht 6' (1.829 m)   Wt (!) 163.5 kg (360 lb 7.2 oz)   SpO2 95%   BMI 48.89 kg/m²        General:         Alert,  no acute distress    HEENT: NC,  anicteric sclerae. Lungs: CTA Bilaterally. No Wheezing/Rhonchi/Rales. Heart:  Regular  rhythm,  No murmur, No Rubs, No Gallops  Abdomen: Soft, Non distended, Non tender. Extremities: Splint dressing Lt ankle are clean  Psych:    Not anxious or agitated. Neurologic:  CN 2-12 grossly intact, Alert and oriented X 3. No acute neurological                                 Deficits,     Intake and Output:  Current Shift:  No intake/output data recorded.   Last three shifts:  01/17 1901 - 01/19 0700  In: 1530 [P.O.:1080; I.V.:450]  Out: 1955 [[id number]]    Labs: Results:       Chemistry Recent Labs     01/19/19  0635 01/18/19  0438 01/17/19  0435   * 128* 241*    137 138   K 4.2 3.7 3.6    101 102   CO2 27 29 26   BUN 11 13 19*   CREA 0.83 0.86 0.94   CA 8.5 8.4* 8.1*   AGAP 9 7 10   BUCR 13 15 20   AP 68  --  73   TP 6.8  --  6.9   ALB 2.8*  --  2.8*   GLOB 4.0  --  4.1*   AGRAT 0.7*  --  0.7*      CBC w/Diff Recent Labs     01/18/19  0438 01/17/19  0435   WBC 8.8 10.1   RBC 4.36 4.45   HGB 11.7* 11.7*   HCT 37.2 37.9    377   GRANS 62 60   LYMPH 24 27   EOS 2 2      Cardiac Enzymes No results for input(s): CPK, CKND1, ALLEN in the last 72 hours. No lab exists for component: CKRMB, TROIP   Coagulation Recent Labs     01/19/19  0635 01/19/19  0200   APTT >180.0* 57.3*       Lipid Panel Lab Results   Component Value Date/Time    Cholesterol, total 259 (H) 01/13/2019 04:19 AM    HDL Cholesterol 53 01/13/2019 04:19 AM    LDL, calculated 140.2 (H) 01/13/2019 04:19 AM    VLDL, calculated 65.8 01/13/2019 04:19 AM    Triglyceride 329 (H) 01/13/2019 04:19 AM    CHOL/HDL Ratio 4.9 01/13/2019 04:19 AM      BNP No results for input(s): BNPP in the last 72 hours.    Liver Enzymes Recent Labs     01/19/19  0635   TP 6.8   ALB 2.8*   AP 68   SGOT 8*      Thyroid Studies No results found for: T4, T3U, TSH, TSHEXT       Procedures/imaging: see electronic medical records for all procedures/Xrays and details which were not copied into this note but were reviewed prior to creation of Plan    Medications:   Current Facility-Administered Medications   Medication Dose Route Frequency    apixaban (ELIQUIS) tablet 5 mg  5 mg Oral BID    potassium chloride (K-DUR, KLOR-CON) SR tablet 40 mEq  40 mEq Oral DAILY    insulin lispro (HUMALOG) injection 6 Units  6 Units SubCUTAneous TIDAC    insulin glargine (LANTUS) injection 35 Units  35 Units SubCUTAneous DAILY    morphine injection 4 mg  4 mg IntraVENous Q4H PRN    HYDROcodone-acetaminophen (NORCO)  mg tablet 1 Tab  1 Tab Oral Q4H PRN    ceFAZolin (ANCEF) 2g IVPB in 50 mL D5W  2 g IntraVENous Q8H    insulin lispro (HUMALOG) injection SubCUTAneous AC&HS    SITagliptin (JANUVIA) tablet 50 mg  50 mg Oral DAILY    metoprolol tartrate (LOPRESSOR) tablet 50 mg  50 mg Oral BID    azaTHIOprine (IMURAN) tablet 200 mg  200 mg Oral DAILY AFTER BREAKFAST    gabapentin (NEURONTIN) capsule 300 mg  300 mg Oral TID    hydrALAZINE (APRESOLINE) tablet 100 mg  100 mg Oral TID    atorvastatin (LIPITOR) tablet 20 mg  20 mg Oral QHS    acetaminophen (TYLENOL) tablet 650 mg  650 mg Oral Q4H PRN    glucose chewable tablet 16 g  4 Tab Oral PRN    glucagon (GLUCAGEN) injection 1 mg  1 mg IntraMUSCular PRN    dextrose (D50W) injection syrg 12.5-25 g  25-50 mL IntraVENous PRN    albuterol-ipratropium (DUO-NEB) 2.5 MG-0.5 MG/3 ML  3 mL Nebulization Q4H PRN    cloNIDine HCl (CATAPRES) tablet 0.1 mg  0.1 mg Oral Q8H PRN    ondansetron (ZOFRAN) injection 4 mg  4 mg IntraVENous Q6H PRN    pantoprazole (PROTONIX) tablet 40 mg  40 mg Oral ACB&D    losartan (COZAAR) tablet 100 mg  100 mg Oral DAILY    hydroCHLOROthiazide (HYDRODIURIL) tablet 25 mg  25 mg Oral DAILY       Assessment/Plan     Active Problems:    Pulmonary emboli (HCC) (1/12/2019)      Accelerated hypertension (1/12/2019)      Crohn's colitis ([address] (1/12/2019)      Wound of left ankle (1/12/2019)      Status post open reduction with internal fixation (ORIF) of fracture of ankle (1/12/2019)      Dehiscence of incision, initial encounter (1/14/2019)      Plan  Wound care   Pt and ot  F/u surgical culture result   Monitor glucose level , increase januvia to 100 mg daily  Cbc, cmp, mag in AM    Silver Fox MD  1/19/2019 12:20 PM

## 2019-01-19 NOTE — PROGRESS NOTES
[facility]  Pulmonary, Critical Care, and Sleep Medicine    Name: Dora Rizo MRN: [id number]   : 1970 Hospital: [address] [place] [address] Dr   Date: 2019  Room: [facility]     Subjective:   Seen & examined at the bedside. Underwent I&D  with orthopedics. Reports that she feels well after the procedure. Heparin has been restarted. Questioning when she can transition back to eliquis-- eager to get out of the hospital.    No new complaints.     Past Medical History:   Diagnosis Date    Crohn's colitis ([facility] [address]     Diabetes ([facility] [address]     HTN (hypertension)        Current Facility-Administered Medications   Medication Dose Route Frequency    apixaban (ELIQUIS) tablet 5 mg  5 mg Oral BID    potassium chloride (K-DUR, KLOR-CON) SR tablet 40 mEq  40 mEq Oral DAILY    insulin lispro (HUMALOG) injection 6 Units  6 Units SubCUTAneous TIDAC    insulin glargine (LANTUS) injection 35 Units  35 Units SubCUTAneous DAILY    morphine injection 4 mg  4 mg IntraVENous Q4H PRN    HYDROcodone-acetaminophen (NORCO)  mg tablet 1 Tab  1 Tab Oral Q4H PRN    ceFAZolin (ANCEF) 2g IVPB in 50 mL D5W  2 g IntraVENous Q8H    insulin lispro (HUMALOG) injection   SubCUTAneous AC&HS    heparin 25,000 units in D5W 250 ml infusion  18-36 Units/kg/hr IntraVENous TITRATE    SITagliptin (JANUVIA) tablet 50 mg  50 mg Oral DAILY    metoprolol tartrate (LOPRESSOR) tablet 50 mg  50 mg Oral BID    azaTHIOprine (IMURAN) tablet 200 mg  200 mg Oral DAILY AFTER BREAKFAST    gabapentin (NEURONTIN) capsule 300 mg  300 mg Oral TID    hydrALAZINE (APRESOLINE) tablet 100 mg  100 mg Oral TID    atorvastatin (LIPITOR) tablet 20 mg  20 mg Oral QHS    acetaminophen (TYLENOL) tablet 650 mg  650 mg Oral Q4H PRN    glucose chewable tablet 16 g  4 Tab Oral PRN    glucagon (GLUCAGEN) injection 1 mg  1 mg IntraMUSCular PRN    dextrose (D50W) injection syrg 12.5-25 g  25-50 mL IntraVENous PRN    albuterol-ipratropium (DUO-NEB) 2.5 MG-0.5 MG/3 ML  3 mL Nebulization Q4H PRN    cloNIDine HCl (CATAPRES) tablet 0.1 mg  0.1 mg Oral Q8H PRN    ondansetron (ZOFRAN) injection 4 mg  4 mg IntraVENous Q6H PRN    pantoprazole (PROTONIX) tablet 40 mg  40 mg Oral ACB&D    losartan (COZAAR) tablet 100 mg  100 mg Oral DAILY    hydroCHLOROthiazide (HYDRODIURIL) tablet 25 mg  25 mg Oral DAILY       No Known Allergies      Review of Systems:  HEENT: No epistaxis, no nasal drainage, no difficulty in swallowing  Respiratory: as above  Cardiovascular: no chest pain, no palpitations, no chronic leg edema, no syncope  Gastrointestinal: no abd pain, no vomiting, no diarrhea, no bleeding symptoms  Genitourinary: No urinary symptoms  Integument/breast: No ulcers  Musculoskeletal:Neg  Neurological: No focal weakness, no seizures, no headaches  Behvioral/Psych: No anxiety, no depression  Constitutional: No fever, no chills, no weight loss, no night sweats  SLEEP: No snoring, no witnessed apneas, no daytime somnolence, no morning headaches. Sleep refreshed. Objective:   Vital Signs:    Visit Vitals  /73 (BP 1 Location: Right arm, BP Patient Position: At rest)   Pulse 71   Temp 98.7 °F (37.1 °C)   Resp 18   Ht 6' (1.829 m)   Wt (!) 163.5 kg (360 lb 7.2 oz)   LMP 10/18/2017   SpO2 94%   BMI 48.89 kg/m²       O2 Device: Nasal cannula   O2 Flow Rate (L/min): 3 l/min   Temp (24hrs), Av.1 °F (36.7 °C), Min:97.1 °F (36.2 °C), Max:99.3 °F (37.4 °C)       Intake/Output:     Intake/Output Summary (Last 24 hours) at 2019 0814  Last data filed at 2019 0414  Gross per 24 hour   Intake 1480 ml   Output 1955 ml   Net -475 ml           Physical Exam:   General: middle-aged black female, appears younger than stated age. Laying in bed. NAD. HEENT: normocephalic, atraumatic, EOM intact. Trachea midline. CVS: S1S2 no murmurs  RS: Clear throughout, no wheezing, rales, or rhonchi. No conversational dyspnea.     Abd: soft, non tender, + bowel sounds. Neuro: alert & oriented. CN 2-12 intact. Speech is clear, spontaneous  Extrm: left leg with CDI dressing  Skin: no rash    Data review:   Labs:  Recent Results (from the past 12 hour(s))   GLUCOSE, POC    Collection Time: 01/18/19  9:25 PM   Result Value Ref Range    Glucose (POC) 219 (H) 70 - 110 mg/dL   PTT    Collection Time: 01/19/19  2:00 AM   Result Value Ref Range    aPTT 57.3 (H) 23.0 - 96.7 SEC   METABOLIC PANEL, COMPREHENSIVE    Collection Time: 01/19/19  6:35 AM   Result Value Ref Range    Sodium 138 136 - 145 mmol/L    Potassium 4.2 3.5 - 5.5 mmol/L    Chloride 102 100 - 108 mmol/L    CO2 27 21 - 32 mmol/L    Anion gap 9 3.0 - 18 mmol/L    Glucose 193 (H) 74 - 99 mg/dL    BUN 11 7.0 - 18 MG/DL    Creatinine 0.83 0.6 - 1.3 MG/DL    BUN/Creatinine ratio 13 12 - 20      GFR est AA >60 >60 ml/min/1.73m2    GFR est non-AA >60 >60 ml/min/1.73m2    Calcium 8.5 8.5 - 10.1 MG/DL    Bilirubin, total 0.3 0.2 - 1.0 MG/DL    ALT (SGPT) 11 (L) 13 - 56 U/L    AST (SGOT) 8 (L) 15 - 37 U/L    Alk. phosphatase 68 45 - 117 U/L    Protein, total 6.8 6.4 - 8.2 g/dL    Albumin 2.8 (L) 3.4 - 5.0 g/dL    Globulin 4.0 2.0 - 4.0 g/dL    A-G Ratio 0.7 (L) 0.8 - 1.7     MAGNESIUM    Collection Time: 01/19/19  6:35 AM   Result Value Ref Range    Magnesium 2.2 1.6 - 2.6 mg/dL     Imaging:  I have personally reviewed the patients radiographs and have reviewed the reports:    IMPRESSION:   · Provoked Submassive Pulmonary Embolism in the Setting of Prolonged Travel & Left Ankle Fracture  · Medical Comorbidities: HTN, DM complicated by peripheral neuropathy, obesity, Crohn's disease        RECOMMENDATIONS:   · Heparin has been restarted post-operatively. Will discontinue after Eliquis started  · Restart eliquis ~ today okay with orthopedics. · Continue incentive spirometry, mobilize as per orthopedics to prevent atelectasis.   · Ongoing glycemic control to promote wound healing  · Per ID planning on transitioning to keflex for 2 weeks of therapy. · PICC line- judicious removal due to anticoagulants on board  · Thank you for allowing us to participate in the care of this patient. We will continue to follow. · DVT prophylaxis: therapeutic heparin     High complexity decision making was performed during the evaluation of this patient at high risk for decompensation with multiple organ involvement     Above mentioned total time spent on reviewing the case/medical record/data/notes/EMR/patient examination/documentation/coordinating care with nurse/consultants, exclusive of procedures with complex decision making performed and > 50% time spent in face to face evaluation.        Burak Hinkle MD

## 2019-01-19 NOTE — ROUTINE PROCESS
Bedside and Verbal shift change report given to JACQUELINE Elizabeth (oncoming nurse) by Shelby Vargas RN (offgoing nurse). Report included the following information SBAR, Kardex, OR Summary and Recent Results.

## 2019-01-19 NOTE — PROGRESS NOTES
Problem: Falls - Risk of  Goal: *Absence of Falls  Document Jill Fall Risk and appropriate interventions in the flowsheet. Outcome: Progressing Towards Goal  Fall Risk Interventions:  Mobility Interventions: Communicate number of staff needed for ambulation/transfer, Patient to call before getting OOB, Utilize walker, cane, or other assistive device, PT Consult for mobility concerns         Medication Interventions: Evaluate medications/consider consulting pharmacy, Patient to call before getting OOB, Teach patient to arise slowly    Elimination Interventions: Call light in reach, Patient to call for help with toileting needs, Toileting schedule/hourly rounds             Problem: Pressure Injury - Risk of  Goal: *Prevention of pressure injury  Document Steve Scale and appropriate interventions in the flowsheet. Outcome: Progressing Towards Goal  Pressure Injury Interventions: Activity Interventions: Pressure redistribution bed/mattress(bed type), Increase time out of bed, PT/OT evaluation    Mobility Interventions: Pressure redistribution bed/mattress (bed type), PT/OT evaluation, Turn and reposition approx.  every two hours(pillow and wedges)    Nutrition Interventions: Document food/fluid/supplement intake, Offer support with meals,snacks and hydration

## 2019-01-19 NOTE — ROUTINE PROCESS
Bedside and Verbal shift change report given to Mavis Hook (oncoming nurse) by Amado Viera (offgoing nurse). Report included the following information SBAR, Kardex, Intake/Output and MAR.

## 2019-01-20 ENCOUNTER — HOME HEALTH ADMISSION (OUTPATIENT)
Dept: HOME HEALTH SERVICES | Facility: HOME HEALTH | Age: 49
End: 2019-01-20

## 2019-01-20 VITALS
OXYGEN SATURATION: 97 % | BODY MASS INDEX: 39.68 KG/M2 | WEIGHT: 293 LBS | DIASTOLIC BLOOD PRESSURE: 72 MMHG | SYSTOLIC BLOOD PRESSURE: 121 MMHG | TEMPERATURE: 98.1 F | HEIGHT: 72 IN | HEART RATE: 74 BPM | RESPIRATION RATE: 18 BRPM

## 2019-01-20 LAB
ALBUMIN SERPL-MCNC: 2.9 G/DL (ref 3.4–5)
ALBUMIN/GLOB SERPL: 0.7 {RATIO} (ref 0.8–1.7)
ALP SERPL-CCNC: 66 U/L (ref 45–117)
ALT SERPL-CCNC: 13 U/L (ref 13–56)
ANION GAP SERPL CALC-SCNC: 7 MMOL/L (ref 3–18)
APTT PPP: 29.6 SEC (ref 23–36.4)
AST SERPL-CCNC: 11 U/L (ref 15–37)
BACTERIA SPEC CULT: ABNORMAL
BASOPHILS # BLD: 0 K/UL (ref 0–0.1)
BASOPHILS NFR BLD: 1 % (ref 0–2)
BILIRUB SERPL-MCNC: 0.3 MG/DL (ref 0.2–1)
BUN SERPL-MCNC: 8 MG/DL (ref 7–18)
BUN/CREAT SERPL: 10 (ref 12–20)
CALCIUM SERPL-MCNC: 8.6 MG/DL (ref 8.5–10.1)
CHLORIDE SERPL-SCNC: 102 MMOL/L (ref 100–108)
CO2 SERPL-SCNC: 29 MMOL/L (ref 21–32)
CREAT SERPL-MCNC: 0.83 MG/DL (ref 0.6–1.3)
DIFFERENTIAL METHOD BLD: ABNORMAL
EOSINOPHIL # BLD: 0.2 K/UL (ref 0–0.4)
EOSINOPHIL NFR BLD: 2 % (ref 0–5)
ERYTHROCYTE [DISTWIDTH] IN BLOOD BY AUTOMATED COUNT: 20.1 % (ref 11.6–14.5)
GLOBULIN SER CALC-MCNC: 4 G/DL (ref 2–4)
GLUCOSE BLD STRIP.AUTO-MCNC: 193 MG/DL (ref 70–110)
GLUCOSE BLD STRIP.AUTO-MCNC: 211 MG/DL (ref 70–110)
GLUCOSE SERPL-MCNC: 182 MG/DL (ref 74–99)
GRAM STN SPEC: ABNORMAL
GRAM STN SPEC: ABNORMAL
HCT VFR BLD AUTO: 36.6 % (ref 35–45)
HGB BLD-MCNC: 11.1 G/DL (ref 12–16)
LYMPHOCYTES # BLD: 2 K/UL (ref 0.9–3.6)
LYMPHOCYTES NFR BLD: 26 % (ref 21–52)
MAGNESIUM SERPL-MCNC: 2.1 MG/DL (ref 1.6–2.6)
MCH RBC QN AUTO: 26.6 PG (ref 24–34)
MCHC RBC AUTO-ENTMCNC: 30.3 G/DL (ref 31–37)
MCV RBC AUTO: 87.6 FL (ref 74–97)
MONOCYTES # BLD: 0.5 K/UL (ref 0.05–1.2)
MONOCYTES NFR BLD: 7 % (ref 3–10)
NEUTS SEG # BLD: 4.8 K/UL (ref 1.8–8)
NEUTS SEG NFR BLD: 64 % (ref 40–73)
PLATELET # BLD AUTO: 304 K/UL (ref 135–420)
PMV BLD AUTO: 11.4 FL (ref 9.2–11.8)
POTASSIUM SERPL-SCNC: 4.4 MMOL/L (ref 3.5–5.5)
PROT SERPL-MCNC: 6.9 G/DL (ref 6.4–8.2)
RBC # BLD AUTO: 4.18 M/UL (ref 4.2–5.3)
SERVICE CMNT-IMP: ABNORMAL
SODIUM SERPL-SCNC: 138 MMOL/L (ref 136–145)
WBC # BLD AUTO: 7.4 K/UL (ref 4.6–13.2)

## 2019-01-20 PROCEDURE — 74011250636 HC RX REV CODE- 250/636: Performed by: FAMILY MEDICINE

## 2019-01-20 PROCEDURE — 85730 THROMBOPLASTIN TIME PARTIAL: CPT

## 2019-01-20 PROCEDURE — 74011250637 HC RX REV CODE- 250/637: Performed by: FAMILY MEDICINE

## 2019-01-20 PROCEDURE — 74011636637 HC RX REV CODE- 636/637: Performed by: FAMILY MEDICINE

## 2019-01-20 PROCEDURE — 74011250636 HC RX REV CODE- 250/636: Performed by: INTERNAL MEDICINE

## 2019-01-20 PROCEDURE — 74011250637 HC RX REV CODE- 250/637: Performed by: INTERNAL MEDICINE

## 2019-01-20 PROCEDURE — 83735 ASSAY OF MAGNESIUM: CPT

## 2019-01-20 PROCEDURE — 80053 COMPREHEN METABOLIC PANEL: CPT

## 2019-01-20 PROCEDURE — 85025 COMPLETE CBC W/AUTO DIFF WBC: CPT

## 2019-01-20 PROCEDURE — 82962 GLUCOSE BLOOD TEST: CPT

## 2019-01-20 RX ORDER — HYDRALAZINE HYDROCHLORIDE 100 MG/1
100 TABLET, FILM COATED ORAL 3 TIMES DAILY
Qty: 90 TAB | Refills: 0 | Status: SHIPPED | OUTPATIENT
Start: 2019-01-20

## 2019-01-20 RX ORDER — HYDROCHLOROTHIAZIDE 25 MG/1
25 TABLET ORAL DAILY
Qty: 30 TAB | Refills: 0 | Status: SHIPPED | OUTPATIENT
Start: 2019-01-21

## 2019-01-20 RX ORDER — SODIUM CHLORIDE 0.9 % (FLUSH) 0.9 %
5-40 SYRINGE (ML) INJECTION AS NEEDED
Status: DISCONTINUED | OUTPATIENT
Start: 2019-01-20 | End: 2019-01-20 | Stop reason: HOSPADM

## 2019-01-20 RX ORDER — SODIUM CHLORIDE 0.9 % (FLUSH) 0.9 %
5-40 SYRINGE (ML) INJECTION EVERY 8 HOURS
Status: DISCONTINUED | OUTPATIENT
Start: 2019-01-20 | End: 2019-01-20 | Stop reason: HOSPADM

## 2019-01-20 RX ORDER — HYDROCODONE BITARTRATE AND ACETAMINOPHEN 10; 325 MG/1; MG/1
1 TABLET ORAL
Status: DISCONTINUED | OUTPATIENT
Start: 2019-01-20 | End: 2019-01-20 | Stop reason: HOSPADM

## 2019-01-20 RX ORDER — METOPROLOL TARTRATE 50 MG/1
50 TABLET ORAL 2 TIMES DAILY
Qty: 60 TAB | Refills: 0 | Status: SHIPPED | OUTPATIENT
Start: 2019-01-20

## 2019-01-20 RX ORDER — ONDANSETRON 4 MG/1
4 TABLET, FILM COATED ORAL
Qty: 30 TAB | Refills: 0 | Status: SHIPPED | OUTPATIENT
Start: 2019-01-20

## 2019-01-20 RX ORDER — POTASSIUM CHLORIDE 20 MEQ/1
40 TABLET, EXTENDED RELEASE ORAL DAILY
Qty: 60 TAB | Refills: 0 | Status: SHIPPED | OUTPATIENT
Start: 2019-01-21 | End: 2019-05-16

## 2019-01-20 RX ORDER — GABAPENTIN 300 MG/1
300 CAPSULE ORAL 3 TIMES DAILY
Qty: 90 CAP | Refills: 0 | Status: SHIPPED | OUTPATIENT
Start: 2019-01-20 | End: 2020-08-12

## 2019-01-20 RX ORDER — PANTOPRAZOLE SODIUM 40 MG/1
40 TABLET, DELAYED RELEASE ORAL
Qty: 30 TAB | Refills: 0 | Status: SHIPPED | OUTPATIENT
Start: 2019-01-20 | End: 2022-08-29

## 2019-01-20 RX ORDER — ATORVASTATIN CALCIUM 20 MG/1
20 TABLET, FILM COATED ORAL
Qty: 30 TAB | Refills: 0 | Status: SHIPPED | OUTPATIENT
Start: 2019-01-20

## 2019-01-20 RX ORDER — HYDROCODONE BITARTRATE AND ACETAMINOPHEN 7.5; 325 MG/1; MG/1
1 TABLET ORAL
Status: DISCONTINUED | OUTPATIENT
Start: 2019-01-20 | End: 2019-01-20 | Stop reason: HOSPADM

## 2019-01-20 RX ORDER — CEPHALEXIN 500 MG/1
500 CAPSULE ORAL 4 TIMES DAILY
Qty: 40 CAP | Refills: 0 | Status: SHIPPED | OUTPATIENT
Start: 2019-01-20 | End: 2019-05-16

## 2019-01-20 RX ORDER — HYDROCODONE BITARTRATE AND ACETAMINOPHEN 10; 325 MG/1; MG/1
1 TABLET ORAL
Qty: 30 TAB | Refills: 0 | Status: SHIPPED | OUTPATIENT
Start: 2019-01-20 | End: 2019-01-28 | Stop reason: SDUPTHER

## 2019-01-20 RX ORDER — CLONIDINE HYDROCHLORIDE 0.1 MG/1
0.1 TABLET ORAL
Qty: 90 TAB | Refills: 0 | Status: SHIPPED | OUTPATIENT
Start: 2019-01-20

## 2019-01-20 RX ORDER — NALOXONE HYDROCHLORIDE 0.4 MG/ML
0.4 INJECTION, SOLUTION INTRAMUSCULAR; INTRAVENOUS; SUBCUTANEOUS AS NEEDED
Status: DISCONTINUED | OUTPATIENT
Start: 2019-01-20 | End: 2019-01-20 | Stop reason: HOSPADM

## 2019-01-20 RX ADMIN — APIXABAN 5 MG: 5 TABLET, FILM COATED ORAL at 10:25

## 2019-01-20 RX ADMIN — POTASSIUM CHLORIDE 40 MEQ: 20 TABLET, EXTENDED RELEASE ORAL at 10:25

## 2019-01-20 RX ADMIN — INSULIN LISPRO 6 UNITS: 100 INJECTION, SOLUTION INTRAVENOUS; SUBCUTANEOUS at 14:17

## 2019-01-20 RX ADMIN — CEFAZOLIN SODIUM 2 G: 2 SOLUTION INTRAVENOUS at 10:22

## 2019-01-20 RX ADMIN — HYDRALAZINE HYDROCHLORIDE 100 MG: 50 TABLET, FILM COATED ORAL at 10:24

## 2019-01-20 RX ADMIN — CEFAZOLIN SODIUM 2 G: 2 SOLUTION INTRAVENOUS at 01:20

## 2019-01-20 RX ADMIN — Medication 10 ML: at 06:30

## 2019-01-20 RX ADMIN — MORPHINE SULFATE 4 MG: 2 INJECTION, SOLUTION INTRAMUSCULAR; INTRAVENOUS at 09:18

## 2019-01-20 RX ADMIN — INSULIN LISPRO 3 UNITS: 100 INJECTION, SOLUTION INTRAVENOUS; SUBCUTANEOUS at 14:18

## 2019-01-20 RX ADMIN — HYDROCODONE BITARTRATE AND ACETAMINOPHEN 1 TABLET: 10; 325 TABLET ORAL at 01:30

## 2019-01-20 RX ADMIN — INSULIN GLARGINE 35 UNITS: 100 INJECTION, SOLUTION SUBCUTANEOUS at 10:22

## 2019-01-20 RX ADMIN — HYDROCODONE BITARTRATE AND ACETAMINOPHEN 1 TABLET: 10; 325 TABLET ORAL at 06:29

## 2019-01-20 RX ADMIN — GABAPENTIN 300 MG: 300 CAPSULE ORAL at 10:24

## 2019-01-20 RX ADMIN — HYDROCHLOROTHIAZIDE 25 MG: 25 TABLET ORAL at 10:24

## 2019-01-20 RX ADMIN — MORPHINE SULFATE 4 MG: 2 INJECTION, SOLUTION INTRAMUSCULAR; INTRAVENOUS at 14:17

## 2019-01-20 RX ADMIN — METOPROLOL TARTRATE 50 MG: 50 TABLET ORAL at 10:25

## 2019-01-20 RX ADMIN — PANTOPRAZOLE SODIUM 40 MG: 40 TABLET, DELAYED RELEASE ORAL at 10:24

## 2019-01-20 RX ADMIN — INSULIN LISPRO 6 UNITS: 100 INJECTION, SOLUTION INTRAVENOUS; SUBCUTANEOUS at 10:23

## 2019-01-20 RX ADMIN — HYDRALAZINE HYDROCHLORIDE 100 MG: 50 TABLET, FILM COATED ORAL at 01:30

## 2019-01-20 RX ADMIN — Medication 10 ML: at 01:47

## 2019-01-20 RX ADMIN — SITAGLIPTIN 100 MG: 50 TABLET, FILM COATED ORAL at 10:38

## 2019-01-20 RX ADMIN — MORPHINE SULFATE 4 MG: 2 INJECTION, SOLUTION INTRAMUSCULAR; INTRAVENOUS at 02:42

## 2019-01-20 RX ADMIN — LOSARTAN POTASSIUM 100 MG: 50 TABLET, FILM COATED ORAL at 10:24

## 2019-01-20 RX ADMIN — AZATHIOPRINE 200 MG: 50 TABLET ORAL at 10:24

## 2019-01-20 NOTE — PROGRESS NOTES
I have reviewed discharge instructions with the patient. The patient verbalized understanding. Patient transported to main entrance.

## 2019-01-20 NOTE — ROUTINE PROCESS
Bedside and Verbal shift change report given to JACQUELINE Ruiz (oncoming nurse) by Ronald danielson. Report included the following information SBAR, Kardex and MAR.

## 2019-01-20 NOTE — DISCHARGE SUMMARY
Discharge Summary     Patient: Dora Rizo MRN: [id number]  SSN: [id number]    YOB: 1970  Age: 46 y.o. Sex: female       Admit Date: 1/11/2019    Discharge Date: 1/20/2019      Admission Diagnoses: Pulmonary emboli [facility])  Pulmonary emboli ([facility]    Discharge Diagnoses:   Problem List as of 1/20/2019 Date Reviewed: 1/12/2019          Codes Class Noted - Resolved    Dehiscence of incision, initial encounter ICD-10-CM: T81.31XA  ICD-9-CM: 998.32  1/14/2019 - Present        Pulmonary emboli ([facility] ICD-10-CM: I26.99  ICD-9-CM: 415.19  1/12/2019 - Present        Accelerated hypertension ICD-10-CM: I10  ICD-9-CM: 401.0  1/12/2019 - Present        Crohn's colitis ([facility] ICD-10-CM: K50.10  ICD-9-CM: 555.1  1/12/2019 - Present        Wound of left ankle ICD-10-CM: S91.002A  ICD-9-CM: 891.0  1/12/2019 - Present        Status post open reduction with internal fixation (ORIF) of fracture of ankle ICD-10-CM: Z96.7, Z87.81  ICD-9-CM: V45.89, V15.51  1/12/2019 - Present               Discharge Condition: Good    Hospital Course: Dagoberto Campbell is a 46 y.o. female with h/o diabetes, HTN and Crohn's colitis who presents to ED complaining of acute, moderate generalized CP onset PTA. The pt indicated that when she is at rest, she still experiences CP. She also stated that on 12/3 she had an ORFI of the left ankle after fall and fracture at [facility]. Pt had external fixator at first and then had ORIF later . Pt had open wound lateral Lt ankle for 1-2 weeks had some drainage . Pt had h/o peripheral neuropathy  But she still feeling the pain.     Pt was seen at [facility] before admission to [facility] pt had venous duplex ultrasound leg and she was told it was negative      Pt also had CT scan of abdomen at [facility]  And she was told that she had crohn's flare up and started on prednisone.  Pt usually she is on Imuran 50 mg daily and Humara 40 mg injection weekly       Pt initially was admitted to the hospitalist  service and switched to me since pt will need PCP in the area .     CXR  B/L atelactasis        CTA chest  Positive for pulmonary emboli with a small to moderate overall clot burden which  is more significant in the right lung. Mildly dilated main pulmonary artery, but  no other CT evidence for significant right heart strain.    Left adrenal nodule is low-density and most likely a benign adenoma.      Ct abdomen and pelvis at [facility]    1. Mild, long segment thickening of the descending colonic wall suggestive of inflammatory or infectious colitis. 2. Fibroid uterus. 3. Stable left adrenal nodule is indeterminate by imaging. This can be subsequently evaluated with adrenal protocol CT or MRI on a nonemergent basis.      Seen by ortho for Lt ankle pain and wound S/P ORIF in [place]. Wound care recommended santyl for wound care Lt ankle . She would like to discuss with ortho before discharge. Ortho danielle, Dr. Slava Jean-Baptiste recommending OR eval for I&D with possible re-approximation of wound. Patient on eliquis for acute DVT with PE. Transitioned to IV heparin drip, Pulmonology recommending 48hr washout from eliquis prior to OR.       Discussed with case manger , [facility]      Pt has h/o crohn's slight flare up on admission ct scan done at [facility] before admission started  . prednisone usually pt on Imuran 200 mg daily and Humara  Injection q Sunday. Still having some abdominal discomfort initially started on prednisone with fast tapering , no diarrhea or blood in stool. Sx resolved restarted on imuran    Pt had surgical wound culture wound super facial not affected prosthesis per operative note . Will start pt on keflex and f/u ortho s out patinet . Pain med was given by ortho will add Narcan prn and ABT       Echo 1/14/19  · Estimated left ventricular ejection fraction is 56 - 60%. Visually measured ejection fraction.  Left ventricular mild concentric hypertrophy. Normal left ventricular wall motion, no regional wall motion abnormality noted. · Mild aortic root dilatation.   · There is no evidence of pulmonary hypertension.       All Micro Results     Procedure Component Value Units Date/Time    CULTURE, SURGICAL WOUND [place] STAIN [[id number]]  (Abnormal)  (Susceptibility) Collected:  01/18/19 1005    Order Status:  Completed Specimen:  Surgical Specimen Updated:  01/20/19 1014     Special Requests: --        ANKLE  LEFT       GRAM STAIN FEW WBC'S         NO ORGANISMS SEEN        Culture result:       RARE STAPHYLOCOCCUS AUREUS          CULTURE, ANAEROBIC [[id number]] Collected:  01/18/19 1005    Order Status:  Completed Specimen:  Surgical Specimen Updated:  01/20/19 0725     Special Requests: --        ANKLE  LEFT       Culture result:       NO ANAEROBES ISOLATED 2 DAYS          CULTURE, ANAEROBIC [[id number]] Collected:  01/14/19 1900    Order Status:  Completed Specimen:  Wound Drainage Updated:  01/19/19 0649     Special Requests: NO SPECIAL REQUESTS        Culture result:       NO ANAEROBES ISOLATED 5 DAYS          CULTURE, WOUND [place] STAIN [[id number]]  (Abnormal)  (Susceptibility) Collected:  01/14/19 1900    Order Status:  Completed Specimen:  Wound Drainage Updated:  01/16/19 1035     Special Requests: NO SPECIAL REQUESTS        GRAM STAIN NO ORGANISMS SEEN         NO WBC'S SEEN        Culture result:       MODERATE STAPHYLOCOCCUS AUREUS          CULTURE, SURGICAL Linell Elder STAIN [[id number]] Collected:  01/14/19 1845    Order Status:  Canceled Specimen:  Wound Drainage     CULTURE, Linell Elder STAIN [[id number]] Collected:  01/14/19 1815    Order Status:  Canceled Specimen:  Wound from Ankle     CULTURE, SURGICAL WOUND Seymour Aviles [[id number]]     Order Status:  Canceled Specimen:  Leg, Left            Recent Results (from the past 12 hour(s))   CBC WITH AUTOMATED DIFF    Collection Time: 01/20/19  5:30 AM   Result Value Ref Range    WBC 7.4 4.6 - 13.2 K/uL    RBC 4.18 (L) 4.20 - 5.30 M/uL    HGB 11.1 (L) 12.0 - 16.0 g/dL    HCT 36.6 35.0 - 45.0 %    MCV 87.6 74.0 - 97.0 FL    MCH 26.6 24.0 - 34.0 PG    MCHC 30.3 (L) 31.0 - 37.0 g/dL    RDW 20.1 (H) 11.6 - 14.5 %    PLATELET 648 003 - 777 K/uL    MPV 11.4 9.2 - 11.8 FL    NEUTROPHILS 64 40 - 73 %    LYMPHOCYTES 26 21 - 52 %    MONOCYTES 7 3 - 10 %    EOSINOPHILS 2 0 - 5 %    BASOPHILS 1 0 - 2 %    ABS. NEUTROPHILS 4.8 1.8 - 8.0 K/UL    ABS. LYMPHOCYTES 2.0 0.9 - 3.6 K/UL    ABS. MONOCYTES 0.5 0.05 - 1.2 K/UL    ABS. EOSINOPHILS 0.2 0.0 - 0.4 K/UL    ABS. BASOPHILS 0.0 0.0 - 0.1 K/UL    DF AUTOMATED     PTT    Collection Time: 01/20/19  5:30 AM   Result Value Ref Range    aPTT 29.6 23.0 - 69.8 SEC   METABOLIC PANEL, COMPREHENSIVE    Collection Time: 01/20/19  5:30 AM   Result Value Ref Range    Sodium 138 136 - 145 mmol/L    Potassium 4.4 3.5 - 5.5 mmol/L    Chloride 102 100 - 108 mmol/L    CO2 29 21 - 32 mmol/L    Anion gap 7 3.0 - 18 mmol/L    Glucose 182 (H) 74 - 99 mg/dL    BUN 8 7.0 - 18 MG/DL    Creatinine 0.83 0.6 - 1.3 MG/DL    BUN/Creatinine ratio 10 (L) 12 - 20      GFR est AA >60 >60 ml/min/1.73m2    GFR est non-AA >60 >60 ml/min/1.73m2    Calcium 8.6 8.5 - 10.1 MG/DL    Bilirubin, total 0.3 0.2 - 1.0 MG/DL    ALT (SGPT) 13 13 - 56 U/L    AST (SGOT) 11 (L) 15 - 37 U/L    Alk. phosphatase 66 45 - 117 U/L    Protein, total 6.9 6.4 - 8.2 g/dL    Albumin 2.9 (L) 3.4 - 5.0 g/dL    Globulin 4.0 2.0 - 4.0 g/dL    A-G Ratio 0.7 (L) 0.8 - 1.7     MAGNESIUM    Collection Time: 01/20/19  5:30 AM   Result Value Ref Range    Magnesium 2.1 1.6 - 2.6 mg/dL   GLUCOSE, POC    Collection Time: 01/20/19  8:27 AM   Result Value Ref Range    Glucose (POC) 211 (H) 70 - 110 mg/dL   GLUCOSE, POC    Collection Time: 01/20/19 11:57 AM   Result Value Ref Range    Glucose (POC) 193 (H) 70 - 110 mg/dL       X-ray Lt ankle  Moderate circumferential soft tissue swelling. Orthopedic hardware appears intact. .    Consults: Orthopedics and Pulmonary/Critical Care        Physical Examination:       General:         Alert,  no acute distress    HEENT:           NC,  anicteric sclerae. Lungs:            CTA Bilaterally. No Wheezing/Rhonchi/Rales. Heart:              Regular  rhythm,  No murmur, No Rubs, No Gallops  Abdomen:      Soft, Non distended, Non tender. Extremities:   Splint dressing Lt ankle are clean  Psych:             Not anxious or agitated. Neurologic:    CN 2-12 grossly intact, Alert and oriented X 3. No acute neurological                                 Deficits      Disposition: home with home health    Discharge Medications:   Current Discharge Medication List      START taking these medications    Details   HYDROcodone-acetaminophen (NORCO)  mg tablet Take 1 Tab by mouth every six (6) hours as needed. Max Daily Amount: 4 Tabs. Qty: 30 Tab, Refills: 0    Comments: Post op pain  Associated Diagnoses: Status post open reduction with internal fixation (ORIF) of fracture of ankle; Dehiscence of incision, initial encounter      ondansetron hcl (ZOFRAN) 4 mg tablet Take 1 Tab by mouth every eight (8) hours as needed for Nausea. Qty: 30 Tab, Refills: 0      SITagliptin (JANUVIA) 100 mg tablet Take 1 Tab by mouth daily. Qty: 30 Tab, Refills: 0      apixaban (ELIQUIS) 5 mg tablet Take 1 Tab by mouth two (2) times a day. Qty: 60 Tab, Refills: 2      atorvastatin (LIPITOR) 20 mg tablet Take 1 Tab by mouth nightly. Qty: 30 Tab, Refills: 0      cloNIDine HCl (CATAPRES) 0.1 mg tablet Take 1 Tab by mouth every eight (8) hours as needed. Qty: 90 Tab, Refills: 0      gabapentin (NEURONTIN) 300 mg capsule Take 1 Cap by mouth three (3) times daily. Qty: 90 Cap, Refills: 0      hydroCHLOROthiazide (HYDRODIURIL) 25 mg tablet Take 1 Tab by mouth daily. Qty: 30 Tab, Refills: 0      pantoprazole (PROTONIX) 40 mg tablet Take 1 Tab by mouth Before breakfast and dinner.   Qty: 30 Tab, Refills: 0      potassium chloride (K-DUR, KLOR-CON) 20 mEq tablet Take 2 Tabs by mouth daily. Qty: 60 Tab, Refills: 0      cephALEXin (KEFLEX) 500 mg capsule Take 1 Cap by mouth four (4) times daily. Qty: 40 Cap, Refills: 0      naloxone (NARCAN) 2 mg/actuation spry Use 1 spray intranasally, then discard. Repeat with new spray every 2 min as needed for opioid overdose symptoms, alternating nostrils. Qty: 1 Inhaler, Refills: 0         CONTINUE these medications which have CHANGED    Details   hydrALAZINE (APRESOLINE) 100 mg tablet Take 1 Tab by mouth three (3) times daily. Qty: 90 Tab, Refills: 0      metoprolol tartrate (LOPRESSOR) 50 mg tablet Take 1 Tab by mouth two (2) times a day. Qty: 60 Tab, Refills: 0         CONTINUE these medications which have NOT CHANGED    Details   losartan (COZAAR) 100 mg tablet Take 100 mg by mouth daily. insulin lispro (HUMALOG U-100 INSULIN) 100 unit/mL injection by SubCUTAneous route Before breakfast, lunch, dinner and at bedtime. insulin detemir U-100 (LEVEMIR U-100 INSULIN) 100 unit/mL injection 30 Units by SubCUTAneous route nightly. azaTHIOprine (IMURAN) 50 mg tablet Take 200 mg by mouth daily. adalimumab (HUMIRA) 40 mg/0.8 mL injection 40 mg by SubCUTAneous route daily. STOP taking these medications       metoprolol succinate (TOPROL-XL) 25 mg XL tablet Comments:   Reason for Stopping:               Activity: Activity as tolerated, none weight bearing Lt ankle   Diet: Cardiac Diet and Diabetic Diet  Wound Care: As directed    Follow-up Appointments   Procedures    FOLLOW UP VISIT Appointment in: One Week With Dr. Pavan Jones or Trenton Washburn PA-C     With Dr. Pavan Jones or Trenton Washburn PA-C     Standing Status:   Standing     Number of Occurrences:   1     Order Specific Question:   Appointment in     Answer: One Week   discussed with case manger and nursing staff before discharge case manger will give pt Eliquis card to make sure pt has med  .  Time spent for discharge this pt more than 45 minutes    Signed By: Beth Orantes MD     January 20, 2019

## 2019-01-20 NOTE — PROGRESS NOTES
Pt signed Freedom of [employer] form for [facility]. HH orders placed in queue to [facility] and they were notified. Eliquis card given to pt's nurse for pt. Per pt, her family member will be picking her up.

## 2019-01-20 NOTE — PROGRESS NOTES
Problem: Falls - Risk of  Goal: *Absence of Falls  Document Jill Fall Risk and appropriate interventions in the flowsheet.   Outcome: Progressing Towards Goal  Fall Risk Interventions:  Mobility Interventions: Communicate number of staff needed for ambulation/transfer, Patient to call before getting OOB, Utilize walker, cane, or other assistive device         Medication Interventions: Patient to call before getting OOB, Teach patient to arise slowly    Elimination Interventions: Call light in reach, Patient to call for help with toileting needs, Toilet paper/wipes in reach    History of Falls Interventions: Door open when patient unattended, Investigate reason for fall

## 2019-01-20 NOTE — PROGRESS NOTES
PROGRESS NOTE       Patient: Del Castillo  MRN: [id number]  SSN: [id number]   YOB: 1970   Age: 46 y.o. Sex: female     Admit Date: 1/11/2019 LOS:  LOS: 6 days      POD # 2 S/P Procedure(s):  INCISION AND DRAINAGE LEFT ANKLE    Consultants following patients: Infectious Disease and Orthopedics    Case discussed with:  [x]Patient  []Family  []Nursing  []Case Management       ASSESSMENT        Del Castillo IS A 46 y.o. old who is resting at time. Her pain is controlled. Denied any fevers        PLAN      1) Orthopaedics Dx: left ankle fx: with partial postop incision dehiscence (wound):  1.5 cm x 1 cm width x 1 cm deep wound   A. Pain management:  Narcotics    B. Antibiotics: Antibiotics: Ancef. Patient will be discharged on PO Keflex. culttures from 1/18/19 pending      1/18/2019 11:55 AM - Rasheed, [facility] [employer]     Specimen Information: Surgical Specimen        Component Value Ref Range & Units Status   Special Requests: ANKLE   LEFT    Preliminary   GRAM STAIN FEW WBC'S    Preliminary   GRAM STAIN NO ORGANISMS SEEN    Preliminary   Culture result: PENDING   Preliminary   Result History     CULTURE, SURGICAL WOUND W GRAM STAIN on 1/18/2019        C. DVT Prophylaxis: SCD's, Encourage mobilization, Encourage active ankle                DF/PF motion for endogenous fibrinolysis                 Chemo Prophylaxis:  Eliquis for DVT AND PE                      [x]SCDs    []On Heparin   [x] Eliquis [] Aspirin   D.  Weight Bear status: NWB left leg              E. Consults: PT/OT/ Intervention/ Consults:    PT/OT : [x]PT / [] OT ordered // NWB     DC disposition: Patient states she will be discharged to home with home health    []  []  [x] [address] : Consultations//Orthopaedic Services following Del Castillo include   Consultants following patients:      Del Castillo is on medicine service with Ortho consultation   G. Indwelling Catheters: IV : present, Drains: not present, Doss: NONE     2) Medical Diagnosis :     ICD-10-CM ICD-9-CM    1. Other acute pulmonary embolism without acute cor pulmonale (HCC) I26.99 415.19           Patient Active Problem List   Diagnosis Code    Pulmonary emboli (HCC) I26.99    Accelerated hypertension I10    Crohn's colitis ([facility] [address] K50.10    Wound of left ankle S91.002A    Status post open reduction with internal fixation (ORIF) of fracture of ankle Z96.7, Z87.81    Dehiscence of incision, initial encounter T81.31XA        Code Status: Full Code         SUBJECTIVE       Patient seen and evaluated. Doing well in NAD. Patient plans to be discharged to home and states she has parents and several other family members in the area that have agreed to take care of her at home. She has a knee roller already. Her prescriptions have been placed on her chart.      Denies: CP, SOB, ABD PAIN, Calf pain     OBJECTIVE EXAMINATION        Visit Vitals  /75 (BP 1 Location: Left arm, BP Patient Position: At rest)   Pulse 82   Temp 98.3 °F (36.8 °C)   Resp 18   Ht 6' (1.829 m)   Wt (!) 360 lb 7.2 oz (163.5 kg)   SpO2 94%   BMI 48.89 kg/m²       Intake/Output Summary (Last 24 hours) at 1/20/2019 0805  Last data filed at 1/19/2019 1801  Gross per 24 hour   Intake 120 ml   Output 2000 ml   Net -1880 ml       Patient has stable Vital signs and is comfortable. Pain is currently controlled  alert, cooperative, no distress    Intake and Output:  Current Shift:  No intake/output data recorded. Last three shifts:  01/18 1901 - 01/20 0700  In: 120 [P.O.:120]  Out: 2700 [Urine:2700]    Chest/Abdomen: No audible wheezing. No accessory use of chest muscles             during breathing. Non tender abdomen    Incision/Dressings:    Clean,dry , intact, Mild soilage present    Incision looks good, No erythema  healing well    Wound:  Not visualized.  Dressing was reinforced    Extremities:        No embolic phenomena to the toes                No significant edema to the foot and or toes.                 Pulses in tact to the left and right foot             Lower extremities are warm and appear well perfused         DVT: No evidence of DVT seen on examination at this time            Moves lower extremities well (ankle DF/PF RIGHT ANKLE)/// LEFT ANKLE IN SPLINT         Moves Upper extremities well        LABS AND MEDICATION REVIEW        CMP:   Lab Results   Component Value Date/Time     01/20/2019 05:30 AM    K 4.4 01/20/2019 05:30 AM     01/20/2019 05:30 AM    CO2 29 01/20/2019 05:30 AM    AGAP 7 01/20/2019 05:30 AM     (H) 01/20/2019 05:30 AM    BUN 8 01/20/2019 05:30 AM    CREA 0.83 01/20/2019 05:30 AM    GFRAA >60 01/20/2019 05:30 AM    GFRNA >60 01/20/2019 05:30 AM    CA 8.6 01/20/2019 05:30 AM    MG 2.1 01/20/2019 05:30 AM    ALB 2.9 (L) 01/20/2019 05:30 AM    TP 6.9 01/20/2019 05:30 AM    GLOB 4.0 01/20/2019 05:30 AM    AGRAT 0.7 (L) 01/20/2019 05:30 AM    SGOT 11 (L) 01/20/2019 05:30 AM    ALT 13 01/20/2019 05:30 AM     CBC:   Lab Results   Component Value Date/Time    WBC 7.4 01/20/2019 05:30 AM    HGB 11.1 (L) 01/20/2019 05:30 AM    HCT 36.6 01/20/2019 05:30 AM     01/20/2019 05:30 AM     COAGS:   Lab Results   Component Value Date/Time    APTT 29.6 01/20/2019 05:30 AM        Current Facility-Administered Medications   Medication Dose Route Frequency    sodium chloride (NS) flush 5-40 mL  5-40 mL IntraVENous Q8H    sodium chloride (NS) flush 5-40 mL  5-40 mL IntraVENous PRN    naloxone (NARCAN) injection 0.4 mg  0.4 mg IntraVENous PRN    HYDROcodone-acetaminophen (NORCO) 7.5-325 mg per tablet 1 Tab  1 Tab Oral Q4H PRN    HYDROcodone-acetaminophen (NORCO)  mg tablet 1 Tab  1 Tab Oral Q4H PRN    apixaban (ELIQUIS) tablet 5 mg  5 mg Oral BID    SITagliptin (JANUVIA) tablet 100 mg  100 mg Oral DAILY    potassium chloride (K-DUR, KLOR-CON) SR tablet 40 mEq  40 mEq Oral DAILY  insulin lispro (HUMALOG) injection 6 Units  6 Units SubCUTAneous TIDAC    insulin glargine (LANTUS) injection 35 Units  35 Units SubCUTAneous DAILY    morphine injection 4 mg  4 mg IntraVENous Q4H PRN    ceFAZolin (ANCEF) 2g IVPB in 50 mL D5W  2 g IntraVENous Q8H    insulin lispro (HUMALOG) injection   SubCUTAneous AC&HS    metoprolol tartrate (LOPRESSOR) tablet 50 mg  50 mg Oral BID    azaTHIOprine (IMURAN) tablet 200 mg  200 mg Oral DAILY AFTER BREAKFAST    gabapentin (NEURONTIN) capsule 300 mg  300 mg Oral TID    hydrALAZINE (APRESOLINE) tablet 100 mg  100 mg Oral TID    atorvastatin (LIPITOR) tablet 20 mg  20 mg Oral QHS    acetaminophen (TYLENOL) tablet 650 mg  650 mg Oral Q4H PRN    glucose chewable tablet 16 g  4 Tab Oral PRN    glucagon (GLUCAGEN) injection 1 mg  1 mg IntraMUSCular PRN    dextrose (D50W) injection syrg 12.5-25 g  25-50 mL IntraVENous PRN    albuterol-ipratropium (DUO-NEB) 2.5 MG-0.5 MG/3 ML  3 mL Nebulization Q4H PRN    cloNIDine HCl (CATAPRES) tablet 0.1 mg  0.1 mg Oral Q8H PRN    ondansetron (ZOFRAN) injection 4 mg  4 mg IntraVENous Q6H PRN    pantoprazole (PROTONIX) tablet 40 mg  40 mg Oral ACB&D    losartan (COZAAR) tablet 100 mg  100 mg Oral DAILY    hydroCHLOROthiazide (HYDRODIURIL) tablet 25 mg  25 mg Oral DAILY        REVIEW OF SYSTEMS : 1/20/2019  ALL BELOW ARE Negative except : SEE HPI        Past Medical History:   Diagnosis Date    Crohn's colitis ([facility] [address]     Diabetes ([facility] [address]     HTN (hypertension)       History reviewed. No pertinent surgical history.    Social History     Socioeconomic History    Marital status:      Spouse name: Not on file    Number of children: Not on file    Years of education: Not on file    Highest education level: Not on file   Social Needs    Financial resource strain: Not on file    Food insecurity - worry: Not on file    Food insecurity - inability: Not on file    Transportation needs - medical: Not on file   Connie Transportation needs - non-medical: Not on file   Occupational History    Not on file   Tobacco Use    Smoking status: Light Tobacco Smoker    Smokeless tobacco: Never Used   Substance and Sexual Activity    Alcohol use: Not on file    Drug use: Not on file    Sexual activity: Not on file   Other Topics Concern    Not on file   Social History Narrative    Not on file      History reviewed. No pertinent family history. Prior to Admission medications    Medication Sig Start Date End Date Taking? Authorizing Provider   losartan (COZAAR) 100 mg tablet Take 100 mg by mouth daily. Yes Provider, Historical   hydrALAZINE (APRESOLINE) 25 mg tablet Take 25 mg by mouth four (4) times daily. Yes Provider, Historical   metoprolol tartrate (LOPRESSOR) 25 mg tablet Take  by mouth two (2) times a day. Yes Provider, Historical   metoprolol succinate (TOPROL-XL) 25 mg XL tablet Take 25 mg by mouth daily. Yes Provider, Historical   insulin lispro (HUMALOG U-100 INSULIN) 100 unit/mL injection by SubCUTAneous route Before breakfast, lunch, dinner and at bedtime. Yes Provider, Historical   insulin detemir U-100 (LEVEMIR U-100 INSULIN) 100 unit/mL injection 30 Units by SubCUTAneous route nightly. Yes Provider, Historical   azaTHIOprine (IMURAN) 50 mg tablet Take 200 mg by mouth daily. Yes Provider, Historical   adalimumab (HUMIRA) 40 mg/0.8 mL injection 40 mg by SubCUTAneous route daily.    Yes Provider, Historical     Current Facility-Administered Medications   Medication Dose Route Frequency    sodium chloride (NS) flush 5-40 mL  5-40 mL IntraVENous Q8H    sodium chloride (NS) flush 5-40 mL  5-40 mL IntraVENous PRN    naloxone (NARCAN) injection 0.4 mg  0.4 mg IntraVENous PRN    HYDROcodone-acetaminophen (NORCO) 7.5-325 mg per tablet 1 Tab  1 Tab Oral Q4H PRN    HYDROcodone-acetaminophen (NORCO)  mg tablet 1 Tab  1 Tab Oral Q4H PRN    apixaban (ELIQUIS) tablet 5 mg  5 mg Oral BID    SITagliptin (JANUVIA) tablet 100 mg  100 mg Oral DAILY    potassium chloride (K-DUR, KLOR-CON) SR tablet 40 mEq  40 mEq Oral DAILY    insulin lispro (HUMALOG) injection 6 Units  6 Units SubCUTAneous TIDAC    insulin glargine (LANTUS) injection 35 Units  35 Units SubCUTAneous DAILY    morphine injection 4 mg  4 mg IntraVENous Q4H PRN    ceFAZolin (ANCEF) 2g IVPB in 50 mL D5W  2 g IntraVENous Q8H    insulin lispro (HUMALOG) injection   SubCUTAneous AC&HS    metoprolol tartrate (LOPRESSOR) tablet 50 mg  50 mg Oral BID    azaTHIOprine (IMURAN) tablet 200 mg  200 mg Oral DAILY AFTER BREAKFAST    gabapentin (NEURONTIN) capsule 300 mg  300 mg Oral TID    hydrALAZINE (APRESOLINE) tablet 100 mg  100 mg Oral TID    atorvastatin (LIPITOR) tablet 20 mg  20 mg Oral QHS    acetaminophen (TYLENOL) tablet 650 mg  650 mg Oral Q4H PRN    glucose chewable tablet 16 g  4 Tab Oral PRN    glucagon (GLUCAGEN) injection 1 mg  1 mg IntraMUSCular PRN    dextrose (D50W) injection syrg 12.5-25 g  25-50 mL IntraVENous PRN    albuterol-ipratropium (DUO-NEB) 2.5 MG-0.5 MG/3 ML  3 mL Nebulization Q4H PRN    cloNIDine HCl (CATAPRES) tablet 0.1 mg  0.1 mg Oral Q8H PRN    ondansetron (ZOFRAN) injection 4 mg  4 mg IntraVENous Q6H PRN    pantoprazole (PROTONIX) tablet 40 mg  40 mg Oral ACB&D    losartan (COZAAR) tablet 100 mg  100 mg Oral DAILY    hydroCHLOROthiazide (HYDRODIURIL) tablet 25 mg  25 mg Oral DAILY     No Known Allergies         DIAGNOSTIC IMAGING            Donny Nair PA-C  1/20/2019  7:50 AM

## 2019-01-20 NOTE — DISCHARGE INSTRUCTIONS
Patient Education        8 Things You Can Do to Help Prevent Blood Clots  A blood clot is a clump of blood that forms in a blood vessel, such as a vein or an artery. If a clot gets stuck, it can stop blood flow and cause serious health problems, even death. That's why it's important to take steps to prevent a clot. Take a blood-thinning medicine (called an anticoagulant), if prescribed. If your doctor prescribes medicine, take it as directed. Exercise your lower leg muscles. This helps keep the blood moving through your legs. Point your toes up toward your head so the calves of your legs are stretched, then relax. Repeat. This is a good exercise to do when you are sitting for long periods of time. Get up out of bed as soon as your doctor says it's okay. Being active is one of the best things you can do to prevent clots. Take plenty of breaks when you travel. On long car trips, stop the car and walk around every hour or so. On the bus, plane, or train, get out of your seat and walk up and down the aisle every hour, if you can. Be active. Try to get 30 minutes or more of activity on most days of the week. Don't smoke. Smoking can increase your risk of blood clots. If you need help quitting, talk to your doctor about stop-smoking programs and medicines. Check with your doctor about whether you should use hormone forms of birth control or hormone therapy. These may increase your risk of blood clots. Use compression stockings if your doctor prescribes them. These are specially fitted stockings that may prevent blood clots by keeping blood from pooling in your legs. Current as of: March 28, 2018  Content Version: 11.9  © 0559-4872 [email or web address]. Care instructions adapted under license by [employer] (which disclaims liability or warranty for this information).  If you have questions about a medical condition or this instruction, always ask your healthcare professional. "[employer] [facility] disclaims any warranty or liability for your use of this information. Patient {ARMBANDS:17747}    MyChart Activation    Thank you for requesting access to [facility]. Please follow the instructions below to securely access and download your online medical record. [facility] allows you to send messages to your doctor, view your test results, renew your prescriptions, schedule appointments, and more. How Do I Sign Up? 1. In your internet browser, go to [email or web address]  2. Click on the First Time User? Click Here link in the Sign In box. You will be redirect to the New Member Sign Up page. 3. Enter your [facility] Access Code exactly as it appears below. You will not need to use this code after youve completed the sign-up process. If you do not sign up before the expiration date, you must request a new code. [facility] Access Code: [email or web address]  Expires: 2019  9:23 PM (This is the date your [facility] access code will )    4. Enter the last four digits of your Social Security Number (xxxx) and Date of Birth (mm/dd/yyyy) as indicated and click Submit. You will be taken to the next sign-up page. 5. Create a [facility] ID. This will be your [facility] login ID and cannot be changed, so think of one that is secure and easy to remember. 6. Create a [facility] password. You can change your password at any time. 7. Enter your Password Reset Question and Answer. This can be used at a later time if you forget your password. 8. Enter your e-mail address. You will receive e-mail notification when new information is available in [address]. 9. Click Sign Up. You can now view and download portions of your medical record. 10. Click the Download Summary menu link to download a portable copy of your medical information. Additional Information    If you have questions, please visit the Frequently Asked Questions section of the [facility] website at [email or web address] Remember, MyChart is NOT to be used for urgent needs. For medical emergencies, dial 911. Patient Education        Home Blood Pressure Test: About This Test  What is it? A home blood pressure test allows you to keep track of your blood pressure at home. Blood pressure is a measure of the force of blood against the walls of your arteries. Blood pressure readings include two numbers, such as 130/80 (say \"130 over 80\"). The first number is the systolic pressure. The second number is the diastolic pressure. Why is this test done? You may do this test at home to:  · Find out if you have high blood pressure. · Track your blood pressure if you have high blood pressure. · Track how well medicine is working to reduce high blood pressure. · Check how lifestyle changes, such as weight loss and exercise, are affecting blood pressure. How can you prepare for the test?  · Do not use caffeine, tobacco, or medicines known to raise blood pressure (such as nasal decongestant sprays) for at least 30 minutes before taking your blood pressure. · Do not exercise for at least 30 minutes before taking your blood pressure. What happens before the test?  Take your blood pressure while you feel comfortable and relaxed. Sit quietly with both feet on the floor for at least 5 minutes before the test.  What happens during the test?  · Sit with your arm slightly bent and resting on a table so that your upper arm is at the same level as your heart. · Roll up your sleeve or take off your shirt to expose your upper arm. · Wrap the blood pressure cuff around your upper arm so that the lower edge of the cuff is about 1 inch above the bend of your elbow. Proceed with the following steps depending on if you are using an automatic or manual pressure monitor.   Automatic blood pressure monitors  · Press the on/off button on the automatic monitor and wait until the ready-to-measure \"heart\" symbol appears next to zero in the display window. · Press the start button. The cuff will inflate and deflate by itself. · Your blood pressure numbers will appear on the screen. · Write your numbers in your log book, along with the date and time. Manual blood pressure monitors  · Place the earpieces of a stethoscope in your ears, and place the bell of the stethoscope over the artery, just below the cuff. · Close the valve on the rubber inflating bulb. · Squeeze the bulb rapidly with your opposite hand to inflate the cuff until the dial or column of mercury reads about 30 mm Hg higher than your usual systolic pressure. If you do not know your usual pressure, inflate the cuff to 210 mm Hg or until the pulse at your wrist disappears. · Open the pressure valve just slightly by twisting or pressing the valve on the bulb. · As you watch the pressure slowly fall, note the level on the dial at which you first start to hear a pulsing or tapping sound through the stethoscope. This is your systolic blood pressure. · Continue letting the air out slowly. The sounds will become muffled and will finally disappear. Note the pressure when the sounds completely disappear. This is your diastolic blood pressure. Let out all the remaining air. · Write your numbers in your log book, along with the date and time. What else should you know about the test?  It is more accurate to take the average of several readings made throughout the day than to rely on a single reading. It's normal for blood pressure to go up and down throughout the day. Follow-up care is a key part of your treatment and safety. Be sure to make and go to all appointments, and call your doctor if you are having problems. It's also a good idea to keep a list of the medicines you take. Where can you learn more? Go to http://nasreen-mónica.info/.   Enter C427 in the search box to learn more about \"Home Blood Pressure Test: About This Test.\"  Current as of: July 22, 2018  Content Version: 11.9  © 9437-8048 [employer]. Care instructions adapted under license by [employer] (which disclaims liability or warranty for this information). If you have questions about a medical condition or this instruction, always ask your healthcare professional. [address] any warranty or liability for your use of this information. Patient Education        Ankle Fusion: What to Expect at [address] [place] [address] had ankle fusion surgery to treat a painful, unstable ankle. When you leave the hospital, you will wear a cast or walking boot. And you will use crutches or a walker to keep your weight off your ankle. After surgery, you can expect your ankle to feel stiff and sore around the area where your doctor made the cut (incision). Your doctor will give you medicine for the pain. Your doctor may give you specific instructions on when you can do your normal activities again, such as driving and going back to work. This care sheet gives you a general idea about how long it will take for you to recover. But each person recovers at a different pace. Follow the steps below to get better as quickly as possible. How can you care for yourself at home? Activity    · Rest when you feel tired.     · Avoid putting weight on your ankle until your doctor says it is okay.     · You may shower 24 to 48 hours after surgery, if your doctor okays it. When you shower, keep your dressing and incisions dry. If you have a cast, tape a sheet of plastic to cover it so that it does not get wet. It may help to sit on a shower stool.     · If you have a removable splint, ask your doctor if it is okay to take it off to bathe. Your doctor may want you to keep it on as much as possible. Be careful not to put the splint on too tight.     · You will probably need to take at least 2 to 4 weeks off from work. It depends on the type of work you do and how you feel.    Diet    · You can eat your normal diet. If your stomach is upset, try bland, low-fat foods like plain rice, broiled chicken, toast, and yogurt. Medicines    · Your doctor will tell you if and when you can restart your medicines. He or she will also give you instructions about taking any new medicines.     · If you take blood thinners, such as warfarin (Coumadin), clopidogrel (Plavix), or aspirin, be sure to talk to your doctor. He or she will tell you if and when to start taking those medicines again. Make sure that you understand exactly what your doctor wants you to do.     · Be safe with medicines. Read and follow all instructions on the label. ? If the doctor gave you a prescription medicine for pain, take it as prescribed. ? If you are not taking a prescription pain medicine, ask your doctor if you can take an over-the-counter medicine.     · If your doctor prescribed antibiotics, take them as directed. Do not stop taking them just because you feel better. You need to take the full course of antibiotics. Incision care    · If you do not have a cast, wash the incision daily with warm, soapy water, and pat it dry. Don't use hydrogen peroxide or alcohol. They can slow healing.     · If you have strips of tape on the incision, leave the tape on for a week or until it falls off.     · If you had stitches, your doctor will tell you when to come back to have them removed. Exercise    · Ankle rehabilitation is a series of exercises you do after your surgery. This helps you improve your ankle's range of motion and strength. You will work with your doctor and physical therapist to plan this exercise program. To get the best results, you need to do the exercises correctly and as often and as long as your doctor tells you. Ice and elevation    · Prop up the sore leg on a pillow when you ice it or anytime you sit or lie down during the next 3 days. Try to keep it above the level of your heart. This will help reduce swelling.    Other instructions    · You will need to use crutches or a walker after surgery for about 6 to 8 weeks. Your doctor will tell you when you can put weight on the ankle. It may help to use a backpack or wear clothes with a lot of pockets to carry items. Follow-up care is a key part of your treatment and safety. Be sure to make and go to all appointments, and call your doctor if you are having problems. It's also a good idea to know your test results and keep a list of the medicines you take. When should you call for help? Call 911 anytime you think you may need emergency care. For example, call if:    · You passed out (lost consciousness).     · You have chest pain, are short of breath, or you cough up blood.    Call your doctor now or seek immediate medical care if:    · You have pain that does not get better after you take pain medicine.     · You have tingling, weakness, or numbness in your foot or toes.     · Your cast or splint feels too tight.     · Your foot is cool or pale or changes color.     · You are sick to your stomach or cannot drink fluids.     · You have loose stitches, or your incision comes open.     · You have signs of a blood clot in your leg (called a deep vein thrombosis), such as:  ? Pain in your calf, back of the knee, thigh, or groin. ? Redness or swelling in your leg.     · You have signs of infection, such as:  ? Increased pain, swelling, warmth, or redness. ? Red streaks leading from the incision. ? Pus draining from the incision. ? A fever.     · You bleed through your bandage.    Watch closely for any changes in your health, and be sure to contact your doctor if:    · You have a problem with your cast or splint.     · You are not getting better as expected. Where can you learn more? Go to http://nasreen-mónica.info/. Enter E582 in the search box to learn more about \"Ankle Fusion: What to Expect at Home. \"  Current as of: September 20, 2018  Content Version: 11.9  © 2453-4565 [employer]. Care instructions adapted under license by [employer] (which disclaims liability or warranty for this information). If you have questions about a medical condition or this instruction, always ask your healthcare professional. [place] [address] any warranty or liability for your use of this information.

## 2019-01-20 NOTE — PROGRESS NOTES
[facility]  Pulmonary, Critical Care, and Sleep Medicine    Name: Gabriela Carter MRN: [id number]   : 1970 Hospital: [facility]   Date: 2019  Room: [facility]     Subjective:   Seen & examined at the bedside. Underwent I&D  with orthopedics. Reports that she feels well after the procedure. Heparin stopped   On eliquis-- eager to get out of the hospital.    No new complaints.     Past Medical History:   Diagnosis Date    [facility] [facility]     Diabetes ([facility] [address] [facility]     HTN (hypertension)        Current Facility-Administered Medications   Medication Dose Route Frequency    sodium chloride (NS) flush 5-40 mL  5-40 mL IntraVENous Q8H    sodium chloride (NS) flush 5-40 mL  5-40 mL IntraVENous PRN    naloxone (NARCAN) injection 0.4 mg  0.4 mg IntraVENous PRN    HYDROcodone-acetaminophen (NORCO) 7.5-325 mg per tablet 1 Tab  1 Tab Oral Q4H PRN    HYDROcodone-acetaminophen (NORCO)  mg tablet 1 Tab  1 Tab Oral Q4H PRN    apixaban (ELIQUIS) tablet 5 mg  5 mg Oral BID    SITagliptin (JANUVIA) tablet 100 mg  100 mg Oral DAILY    potassium chloride (K-DUR, KLOR-CON) SR tablet 40 mEq  40 mEq Oral DAILY    insulin lispro (HUMALOG) injection 6 Units  6 Units SubCUTAneous TIDAC    insulin glargine (LANTUS) injection 35 Units  35 Units SubCUTAneous DAILY    morphine injection 4 mg  4 mg IntraVENous Q4H PRN    ceFAZolin (ANCEF) 2g IVPB in 50 mL D5W  2 g IntraVENous Q8H    insulin lispro (HUMALOG) injection   SubCUTAneous AC&HS    metoprolol tartrate (LOPRESSOR) tablet 50 mg  50 mg Oral BID    azaTHIOprine (IMURAN) tablet 200 mg  200 mg Oral DAILY AFTER BREAKFAST    gabapentin (NEURONTIN) capsule 300 mg  300 mg Oral TID    hydrALAZINE (APRESOLINE) tablet 100 mg  100 mg Oral TID    atorvastatin (LIPITOR) tablet 20 mg  20 mg Oral QHS    acetaminophen (TYLENOL) tablet 650 mg  650 mg Oral Q4H PRN    glucose chewable tablet 16 g  4 Tab Oral PRN    glucagon (GLUCAGEN) injection 1 mg  1 mg IntraMUSCular PRN    dextrose (D50W) injection syrg 12.5-25 g  25-50 mL IntraVENous PRN    albuterol-ipratropium (DUO-NEB) 2.5 MG-0.5 MG/3 ML  3 mL Nebulization Q4H PRN    cloNIDine HCl (CATAPRES) tablet 0.1 mg  0.1 mg Oral Q8H PRN    ondansetron (ZOFRAN) injection 4 mg  4 mg IntraVENous Q6H PRN    pantoprazole (PROTONIX) tablet 40 mg  40 mg Oral ACB&D    losartan (COZAAR) tablet 100 mg  100 mg Oral DAILY    hydroCHLOROthiazide (HYDRODIURIL) tablet 25 mg  25 mg Oral DAILY       No Known Allergies      Review of Systems:  HEENT: No epistaxis, no nasal drainage, no difficulty in swallowing  Respiratory: as above  Cardiovascular: no chest pain, no palpitations, no chronic leg edema, no syncope  Gastrointestinal: no abd pain, no vomiting, no diarrhea, no bleeding symptoms  Genitourinary: No urinary symptoms  Integument/breast: No ulcers  Musculoskeletal:Neg  Neurological: No focal weakness, no seizures, no headaches  Behvioral/Psych: No anxiety, no depression  Constitutional: No fever, no chills, no weight loss, no night sweats  SLEEP: No snoring, no witnessed apneas, no daytime somnolence, no morning headaches. Sleep refreshed. Objective:   Vital Signs:    Visit Vitals  /72 (BP 1 Location: Left arm, BP Patient Position: At rest)   Pulse 74   Temp 98.1 °F (36.7 °C)   Resp 18   Ht 6' (1.829 m)   Wt (!) 163.5 kg (360 lb 7.2 oz)   LMP 10/18/2017   SpO2 97%   BMI 48.89 kg/m²       O2 Device: Room air   O2 Flow Rate (L/min): 3 l/min   Temp (24hrs), Av °F (36.7 °C), Min:97.7 °F (36.5 °C), Max:98.3 °F (36.8 °C)       Intake/Output:     Intake/Output Summary (Last 24 hours) at 2019 1344  Last data filed at 2019 1801  Gross per 24 hour   Intake 170 ml   Output 2000 ml   Net -1830 ml           Physical Exam:   General: middle-aged black female, appears younger than stated age. Laying in bed. NAD. HEENT: normocephalic, atraumatic, EOM intact.   Trachea midline. CVS: S1S2 no murmurs  RS: Clear throughout, no wheezing, rales, or rhonchi. No conversational dyspnea. Abd: soft, non tender, + bowel sounds. Neuro: alert & oriented. CN 2-12 intact. Speech is clear, spontaneous  Extrm: left leg with CDI dressing  Skin: no rash    Data review:   Labs:  Recent Results (from the past 12 hour(s))   CBC WITH AUTOMATED DIFF    Collection Time: 01/20/19  5:30 AM   Result Value Ref Range    WBC 7.4 4.6 - 13.2 K/uL    RBC 4.18 (L) 4.20 - 5.30 M/uL    HGB 11.1 (L) 12.0 - 16.0 g/dL    HCT 36.6 35.0 - 45.0 %    MCV 87.6 74.0 - 97.0 FL    MCH 26.6 24.0 - 34.0 PG    MCHC 30.3 (L) 31.0 - 37.0 g/dL    RDW 20.1 (H) 11.6 - 14.5 %    PLATELET 582 142 - 599 K/uL    MPV 11.4 9.2 - 11.8 FL    NEUTROPHILS 64 40 - 73 %    LYMPHOCYTES 26 21 - 52 %    MONOCYTES 7 3 - 10 %    EOSINOPHILS 2 0 - 5 %    BASOPHILS 1 0 - 2 %    ABS. NEUTROPHILS 4.8 1.8 - 8.0 K/UL    ABS. LYMPHOCYTES 2.0 0.9 - 3.6 K/UL    ABS. MONOCYTES 0.5 0.05 - 1.2 K/UL    ABS. EOSINOPHILS 0.2 0.0 - 0.4 K/UL    ABS. BASOPHILS 0.0 0.0 - 0.1 K/UL    DF AUTOMATED     PTT    Collection Time: 01/20/19  5:30 AM   Result Value Ref Range    aPTT 29.6 23.0 - 60.9 SEC   METABOLIC PANEL, COMPREHENSIVE    Collection Time: 01/20/19  5:30 AM   Result Value Ref Range    Sodium 138 136 - 145 mmol/L    Potassium 4.4 3.5 - 5.5 mmol/L    Chloride 102 100 - 108 mmol/L    CO2 29 21 - 32 mmol/L    Anion gap 7 3.0 - 18 mmol/L    Glucose 182 (H) 74 - 99 mg/dL    BUN 8 7.0 - 18 MG/DL    Creatinine 0.83 0.6 - 1.3 MG/DL    BUN/Creatinine ratio 10 (L) 12 - 20      GFR est AA >60 >60 ml/min/1.73m2    GFR est non-AA >60 >60 ml/min/1.73m2    Calcium 8.6 8.5 - 10.1 MG/DL    Bilirubin, total 0.3 0.2 - 1.0 MG/DL    ALT (SGPT) 13 13 - 56 U/L    AST (SGOT) 11 (L) 15 - 37 U/L    Alk.  phosphatase 66 45 - 117 U/L    Protein, total 6.9 6.4 - 8.2 g/dL    Albumin 2.9 (L) 3.4 - 5.0 g/dL    Globulin 4.0 2.0 - 4.0 g/dL    A-G Ratio 0.7 (L) 0.8 - 1.7     MAGNESIUM Collection Time: 01/20/19  5:30 AM   Result Value Ref Range    Magnesium 2.1 1.6 - 2.6 mg/dL   GLUCOSE, POC    Collection Time: 01/20/19  8:27 AM   Result Value Ref Range    Glucose (POC) 211 (H) 70 - 110 mg/dL   GLUCOSE, POC    Collection Time: 01/20/19 11:57 AM   Result Value Ref Range    Glucose (POC) 193 (H) 70 - 110 mg/dL     Imaging:  I have personally reviewed the patients radiographs and have reviewed the reports:    IMPRESSION:   · Provoked Submassive Pulmonary Embolism in the Setting of Prolonged Travel & Left Ankle Fracture  · Medical Comorbidities: HTN, 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 · Ongoing glycemic control to promote wound healing  · Per ID planning on transitioning to keflex for 2 weeks of therapy. · PICC line- judicious removal due to anticoagulants on board  · Thank you for allowing us to participate in the care of this patient. We will continue to follow as outpatient  · DVT prophylaxis: therapeutic heparin     Moderate complexity decision making was performed during the evaluation of this patient at high risk for decompensation with multiple organ involvement     Above mentioned total time spent on reviewing the case/medical record/data/notes/EMR/patient examination/documentation/coordinating care with nurse/consultants, exclusive of procedures with complex decision making performed and > 50% time spent in face to face evaluation.        Eliseo Hernandez MD

## 2019-01-20 NOTE — ROUTINE PROCESS
Bedside and Verbal shift change report given to Meg Paiz (oncoming nurse) by Isabel Garcia RN (offgoing nurse). Report included the following information SBAR, Kardex, MAR and Recent Results.     SITUATION:  Code Status: Full Code  Reason for Admission: Pulmonary emboli [facility])  Pulmonary emboli [facility])  Hospital day: 6  Problem List:       Hospital Problems  Date Reviewed: 1/12/2019          Codes Class Noted POA    Dehiscence of incision, initial encounter ICD-10-CM: T81.31XA  ICD-9-CM: 998.32  1/14/2019 Unknown        Pulmonary emboli ([facility] [address] ICD-10-CM: I26.99  ICD-9-CM: 415.19  1/12/2019 Unknown        Accelerated hypertension ICD-10-CM: I10  ICD-9-CM: 401.0  1/12/2019 Yes        Crohn's colitis ([facility] [address] ICD-10-CM: K50.10  ICD-9-CM: 555.1  1/12/2019 Yes        Wound of left ankle ICD-10-CM: J92.897P  ICD-9-CM: 891.0  1/12/2019 Yes        Status post open reduction with internal fixation (ORIF) of fracture of ankle ICD-10-CM: Z96.7, Z87.81  ICD-9-CM: V45.89, V15.51  1/12/2019 Yes              BACKGROUND:   Past Medical History:   Past Medical History:   Diagnosis Date    Crohn's colitis ([facility] [address]     Diabetes ([facility] [address]     HTN (hypertension)       Patient taking anticoagulants yes    Patient has a defibrillator: no    History of shots YES for example, flu, pneumonia, tetanus   Isolation History NO for example, MRSA, CDiff    ASSESSMENT:  Changes in Assessment Throughout Shift: None  Significant Changes in 24 hours (for example, RR/code, fall)  Patient has Central Line: yes Reasons if yes: Long term antibiotics  Patient has Doss Cath: no Reasons if yes: N/A   Mobility Issues  PT  IV Patency  OR Checklist  Pending Tests    Last Vitals:  Vitals w/ MEWS Score (last day)     Date/Time MEWS Score Pulse Resp Temp BP Level of Consciousness SpO2    01/20/19 0417  1  82  18  98.3 °F (36.8 °C)  118/75  Alert  94 %    01/20/19 0030  1  67  18  97.7 °F (36.5 °C)  126/80  Alert  98 %    01/19/19 2217  --  73  --  --  105/61  --  -- 01/19/19 2052  1  70  18  98 °F (36.7 °C)  120/76  Alert  98 %    01/19/19 1841  --  70  --  --  110/67  --  --    01/19/19 1542  1  79  17  97.8 °F (36.6 °C)  118/72  Alert  98 %    01/19/19 0814  1  82  18  98.2 °F (36.8 °C)  122/66  Alert  95 %    01/19/19 0414  1  71  18  98.7 °F (37.1 °C)  137/73  Alert  94 %    01/19/19 0201  --  --  --  --  --  Alert  --    01/19/19 0102  --  --  --  --  --  Alert  --    01/19/19 0045  1  70  16  97.8 °F (36.6 °C)  107/71  Alert  99 %            PAIN    Pain Assessment    Pain Intensity 1: 8 (01/20/19 0629)    Pain Location 1: Ankle    Pain Intervention(s) 1: Medication (see MAR)    Patient Stated Pain Goal: 0  Intervention effective: yes  Time of last intervention: 0629 Reassessment Completed: no   Other actions taken for pain: None    Last 3 Weights:  Last 3 Recorded Weights in this Encounter    01/12/19 0520 01/14/19 1040 01/15/19 0646   Weight: 144.2 kg (318 lb) 144.2 kg (318 lb) (!) 163.5 kg (360 lb 7.2 oz)   Weight change:     INTAKE/OUPUT    Current Shift: No intake/output data recorded. Last three shifts: 01/18 1901 - 01/20 0700  In: 120 [P.O.:120]  Out: 2700 [Urine:2700]    RECOMMENDATIONS AND DISCHARGE PLANNING  Patient needs and requests: Pain control, wound care    Pending tests/procedures: None     Discharge plan for patient: Home with home health    Discharge planning Needs or Barriers: None    Estimated Discharge Date: 1/20/19 Posted on Whiteboard in Patients Room: no       \"HEALS\" SAFETY CHECK  A safety check occurred in the patient's room between off going nurse and oncoming nurse listed above. The safety check included the below items:    H  High Alert Medications Verify all high alert medication drips (heparin, PCA, etc.)  E  Equipment Suction is set up for ALL patients (with elliotker)  Red plugs utilized for all equipment (IV pumps, etc.)  WOWs wiped down at end of shift.   Room stocked with oxygen, suction, and other unit-specific supplies  A  Alarms Bed alarm is set for fall risk patients  Ensure chair alarm is in place and activated if patient is up in a chair  L  Lines Check IV for any infiltration  Doss bag is empty if patient has a Doss   Tubing and IV bags are labeled  S  Safety  Room is clean, patient is clean, and equipment is clean. Hallways are clear from equipment besides carts. Fall bracelet on for fall risk patients  Ensure room is clear and free of clutter  Suction is set up for ALL patients (with flip)  Hallways are clear from equipment besides carts.    Isolation precautions followed, supplies available outside room, sign posted    Darius Cooper RN

## 2019-01-23 ENCOUNTER — HOME CARE VISIT (OUTPATIENT)
Dept: HOME HEALTH SERVICES | Facility: HOME HEALTH | Age: 49
End: 2019-01-23

## 2019-01-23 ENCOUNTER — HOME CARE VISIT (OUTPATIENT)
Dept: SCHEDULING | Facility: HOME HEALTH | Age: 49
End: 2019-01-23

## 2019-01-23 ENCOUNTER — TELEPHONE (OUTPATIENT)
Dept: ORTHOPEDIC SURGERY | Age: 49
End: 2019-01-23

## 2019-01-23 LAB
BACTERIA SPEC CULT: NORMAL
SERVICE CMNT-IMP: NORMAL

## 2019-01-23 NOTE — TELEPHONE ENCOUNTER
Felicita King from [facility] for [address]. Felicita King said that [address] saw this patient at [facility]. That he had her leg all wrapped up. Felicita King would like to know if they should leave the patient left  leg wrapped up or should they do wound care. Kasie tel. [phone].

## 2019-01-23 NOTE — TELEPHONE ENCOUNTER
LMOVM notifying Joss Mathur that patient has an appointment on 1-28-19. Home health may look at incision by pulling dressing slightly away from incision. Dressing may be reinforced if needed.

## 2019-01-23 NOTE — TELEPHONE ENCOUNTER
Patient has an appointment in the office on Monday (1/28). Home health can do a \"peek-a-baptiste\" at the incision by moving the dressings aside (if possible) to check the incision and then reinforce the dressing if the incision looks okay.     Tammy Waldrop PA-C  1/23/2019   4:42 PM

## 2019-01-28 ENCOUNTER — OFFICE VISIT (OUTPATIENT)
Dept: ORTHOPEDIC SURGERY | Age: 49
End: 2019-01-28

## 2019-01-28 VITALS
DIASTOLIC BLOOD PRESSURE: 86 MMHG | BODY MASS INDEX: 39.68 KG/M2 | HEART RATE: 82 BPM | TEMPERATURE: 98.1 F | WEIGHT: 293 LBS | RESPIRATION RATE: 16 BRPM | HEIGHT: 72 IN | SYSTOLIC BLOOD PRESSURE: 153 MMHG | OXYGEN SATURATION: 98 %

## 2019-01-28 DIAGNOSIS — Z98.890 STATUS POST OPEN REDUCTION WITH INTERNAL FIXATION (ORIF) OF FRACTURE OF ANKLE: ICD-10-CM

## 2019-01-28 DIAGNOSIS — M25.571 ACUTE RIGHT ANKLE PAIN: ICD-10-CM

## 2019-01-28 DIAGNOSIS — M25.572 LEFT ANKLE PAIN, UNSPECIFIED CHRONICITY: ICD-10-CM

## 2019-01-28 DIAGNOSIS — R93.7 ABNORMAL X-RAY OF BONE: ICD-10-CM

## 2019-01-28 DIAGNOSIS — T81.31XD DEHISCENCE OF INCISION, SUBSEQUENT ENCOUNTER: ICD-10-CM

## 2019-01-28 DIAGNOSIS — S91.002D WOUND OF LEFT ANKLE, SUBSEQUENT ENCOUNTER: Primary | ICD-10-CM

## 2019-01-28 DIAGNOSIS — Z87.81 STATUS POST OPEN REDUCTION WITH INTERNAL FIXATION (ORIF) OF FRACTURE OF ANKLE: ICD-10-CM

## 2019-01-28 RX ORDER — HYDROCODONE BITARTRATE AND ACETAMINOPHEN 10; 325 MG/1; MG/1
1 TABLET ORAL
Qty: 30 TAB | Refills: 0 | Status: SHIPPED | OUTPATIENT
Start: 2019-01-28 | End: 2019-02-12 | Stop reason: SDUPTHER

## 2019-01-28 NOTE — PROCEDURES
X-rays, 6 views of the left ankle with comparison right ankle films reveal post op changes s/p I&D left ankle. Hardware from prior ORIF left ankle with syndesmotic stabilization in good position with no indication of failure or breakage. However, there is medial space widening noted to the left ankle on comparison to the right. Holes from previous external fixator present. Moderate soft tissue swelling noted. Right ankle films reveal an irregularity in the area of the talar neck. CT scan of left and right foot and ankle needed for better evaluation.

## 2019-01-28 NOTE — PROGRESS NOTES
Patient: Sarah Rodriges                MRN: [id number]       SSN: [id number]  YOB: 1970                   AGE: 46 y.o. SEX: female    Jimi Oakley MD    POST OP OFFICE NOTE  DOS: 1/18/19    Chief Complaint:   Chief Complaint   Patient presents with    Ankle Pain     Left ankle pain        HPI:     The patient is a 46 y.o. female who presents today for follow up 10 days s/p:  I&D left ankle, cultures. Patient has been NWB to the left lower extremity. Patient reports doing well other than post op pain. Patient denies any fever, chills, chest pain, shortness of breath or calf pain. There are no new illness or injuries to report since last seen in the office. Patient is on Eliquis for DVT and PT. Patients initial surgery for ORIF left ankle was performed by an orthopedic surgeon in [place] on 12/3/18. Patient reports that she had 4 surgeries, while in [place] (1. closed reduction; 2. external fixator; 3) revision to ex-fix and ORIF left fibula; 4. Removal of ex-fix and syndesmotic stabilization). She states her daughter (who is a nurse) brought her to [place] via car. She was seen at [facility] for pain/discomfort to her left ankle and she states her steri strips were not removed during assessment and she was sent home. Her daughter removed the steri strips at home and discovered dehiscence of her lateral left ankle incision. The patient was seen at [facility] 01/07/2019. She was admitted to  on 01/11/2019 due to a DVT and PE. Patient underwent an I&D of the left ankle incision on 1/181/9. She was followed by Dr. Francisco Boyd in Infectious Disease and was discharged on PO Keflex.        PHYSICAL EXAM:     Visit Vitals  /86   Pulse 82   Temp 98.1 °F (36.7 °C) (Oral)   Resp 16   Ht 6' (1.829 m)   Wt 318 lb (144.2 kg)   SpO2 98%   BMI 43.13 kg/m²         Pain Scale: 6/10      GEN:  Alert, well developed, well nourished, well appearing 46 y.o. female in no acute distress. PSYCH:  Normal affect, mood, and conduct. alert, oriented x 3 alert, oriented x 3, no dementia  M/S EXAMINATION OF: left foot/ankle  DRESSINGS: CDI other than mild soilage on dressing   DRAINAGE: none  INCISION: Incision looks good, skin well approximated, no dehiscence, nylon sutures in place without disruption. SKIN: moderate edema , no erythema, no  ecchymosis, no warmth    TENDERNESS:  mild tenderness to palpation   NEUROVASCULAR:  grossly intact. Positive distal pulses and capillary refill. DVT ASSESSMENT:  The calf is not tender to palpation. No evidence of DVT seen on physical exam.  ROM: not tested       RADIOGRAPHS & DIAGNOSTIC STUDIES     Results for orders placed or performed in visit on 01/28/19   [facility] POC XRAY, ANKLE; COMPLETE, 3+ VIE    Narrative    Marvine Mcburney, PA-C     1/28/2019 10:48 AM  X-rays, 6 views of the left ankle with comparison right ankle   films reveal post op changes s/p I&D left ankle. Hardware from   prior ORIF left ankle with syndesmotic stabilization in good   position with no indication of failure or breakage. However,   there is medial space widening noted to the left ankle on   comparison to the right. Holes from previous external fixator   present. Moderate soft tissue swelling noted. Right ankle films   reveal an irregularity in the area of the talar neck. CT scan of   left and right foot and ankle needed for better evaluation. AMB POC XRAY, ANKLE; COMPLETE, 3+ VIE    Narrative    Marvine Mcburney, PA-C     1/28/2019 10:48 AM  X-rays, 6 views of the left ankle with comparison right ankle   films reveal post op changes s/p I&D left ankle. Hardware from   prior ORIF left ankle with syndesmotic stabilization in good   position with no indication of failure or breakage. However,   there is medial space widening noted to the left ankle on   comparison to the right. Holes from previous external fixator   present. Moderate soft tissue swelling noted.  Right ankle films   reveal an irregularity in the area of the talar neck. CT scan of   left and right foot and ankle needed for better evaluation. IMPRESSION:     Encounter Diagnoses     ICD-10-CM ICD-9-CM   1. Wound of left ankle, subsequent encounter S91.002D V58.89     891.0   2. Status post open reduction with internal fixation (ORIF) of fracture of ankle Z96.7 V45.89    Z87.81 V15.51   3. Dehiscence of incision, subsequent encounter T81. 31XD V58.89     998.32   4. Left ankle pain, unspecified chronicity M25.572 719.47   5. Acute right ankle pain M25.571 719.47     338.19   6. Abnormal x-ray of bone R93.7 793.7       PLAN:         Orders Placed This Encounter    AMB POC XRAY, ANKLE; COMPLETE, 3+ VIE    [19659] Ankle Min 3V    CT FOOT LT WO CONT    CT FOOT RT WO CONT    CT ANKLE RT WO  CONT    CT ANKLE LT WO CONT    HYDROcodone-acetaminophen (NORCO)  mg tablet                      · Keep the current dressings on and in place. You need to keep this incision clean and dry. If you have a splint or cast on please keep this clean and dry as well. · You should expect swelling and bruising in the area over the next several days. You may elevate the surgical extremity on 1 pillow. Place the pillow horizontal so that no pressure is on the back of your heel. You may apply an icepack on top of the dressing to help minimize the swelling. · Keep all pets away from  any wound present in order to prevent infection. · Continue activity modification    · Weight bearing status:  non weight bearing to the surgical extremity    · No lifting, twisting, squatting, deep bending, driving or strenuous activity.     PLEASE SEEK IMMEDIATE ASSESSMENT BY ER PHYSICIAN IF ANY OF THE FOLLOWING EXIST:     Excessive pain, swelling, redness or odor of or around the surgical area   Temperature over 100.5   Nausea and vomiting lasting longer than 4 hours or if unable to take medications   Any signs of decreased circulation or nerve impairment to extremity: change in color, persistent numbness, tingling, coldness or increase pain   If any calf pain, calf tightness, shortness of breath, chest pain   Any difficulty breathing at rest or with ambulation, any chest tightness/soreness  Severe intractable pain, persistent swelling or drainage, development of a wound, incisional redness, finger/toe swelling or color changes, or CALF PAIN    · Perform ankle pumps with non-surgical/non-injured extremity to help reduce the risk of blood clots  · Continue to hold PT at this time    · Please follow up in 2 weeks     · Continue taking Keflex as directed    · Continue taking Eliquis as directed      · Prescription for Norco 10/325 has been provided. Please take medication as directed    · Dr. Lan Wallace would like to see her notes and images from her surgeon in [place]. Patient will request her records from [place] and have them sent to our office    · CT Scan of left and right foot and ankle has been ordered as it appears patient has medial space widening on the left and appears that she may have a talar neck fracture on the right          Patient has been examined and evaluated by Dr. Lan Wallace during this visit and he agrees with the assessment and plan    Patient expresses understanding of the plan. Patient education provided on post surgical care. REVIEW OF SYSTEMS:     Otherwise as noted in HPI      PAST MEDICAL HISTORY:     Past Medical History:   Diagnosis Date    Crohn's colitis ([facility] [address]     Diabetes ([facility] [address]     HTN (hypertension)        MEDICATIONS:     Current Outpatient Medications   Medication Sig    HYDROcodone-acetaminophen (NORCO)  mg tablet Take 1 Tab by mouth every six (6) hours as needed. Max Daily Amount: 4 Tabs.  insulin glargine (LANTUS,BASAGLAR) 100 unit/mL (3 mL) inpn 30 Units by SubCUTAneous route nightly.  tiZANidine (ZANAFLEX) 4 mg tablet Take 4 mg by mouth three (3) times daily as needed for Other (muscle spasms).  ondansetron hcl (ZOFRAN) 4 mg tablet Take 1 Tab by mouth every eight (8) hours as needed for Nausea.  hydrALAZINE (APRESOLINE) 100 mg tablet Take 1 Tab by mouth three (3) times daily.  SITagliptin (JANUVIA) 100 mg tablet Take 1 Tab by mouth daily.  metoprolol tartrate (LOPRESSOR) 50 mg tablet Take 1 Tab by mouth two (2) times a day.  apixaban (ELIQUIS) 5 mg tablet Take 1 Tab by mouth two (2) times a day.  atorvastatin (LIPITOR) 20 mg tablet Take 1 Tab by mouth nightly.  cloNIDine HCl (CATAPRES) 0.1 mg tablet Take 1 Tab by mouth every eight (8) hours as needed.  gabapentin (NEURONTIN) 300 mg capsule Take 1 Cap by mouth three (3) times daily.  hydroCHLOROthiazide (HYDRODIURIL) 25 mg tablet Take 1 Tab by mouth daily.  pantoprazole (PROTONIX) 40 mg tablet Take 1 Tab by mouth Before breakfast and dinner.  potassium chloride (K-DUR, KLOR-CON) 20 mEq tablet Take 2 Tabs by mouth daily.  cephALEXin (KEFLEX) 500 mg capsule Take 1 Cap by mouth four (4) times daily.  naloxone (NARCAN) 2 mg/actuation spry Use 1 spray intranasally, then discard. Repeat with new spray every 2 min as needed for opioid overdose symptoms, alternating nostrils.  losartan (COZAAR) 100 mg tablet Take 100 mg by mouth daily.  insulin lispro (HUMALOG U-100 INSULIN) 100 unit/mL injection by SubCUTAneous route Before breakfast, lunch, dinner and at bedtime.  insulin detemir U-100 (LEVEMIR U-100 INSULIN) 100 unit/mL injection 30 Units by SubCUTAneous route nightly.  azaTHIOprine (IMURAN) 50 mg tablet Take 200 mg by mouth daily.  adalimumab (HUMIRA) 40 mg/0.8 mL injection 40 mg by SubCUTAneous route every seven (7) days. No current facility-administered medications for this visit. ALLERGIES:     No Known Allergies      PAST SURGICAL HISTORY:     History reviewed. No pertinent surgical history.     SOCIAL HISTORY:     Social History     Socioeconomic History    Marital status: UNKNOWN     Spouse name: Not on file    Number of children: Not on file    Years of education: Not on file    Highest education level: Not on file   Social Needs    Financial resource strain: Not on file    Food insecurity - worry: Not on file    Food insecurity - inability: Not on file    Transportation needs - medical: Not on file   wutabout needs - non-medical: Not on file   Occupational History    Not on file   Tobacco Use    Smoking status: Light Tobacco Smoker    Smokeless tobacco: Never Used   Substance and Sexual Activity    Alcohol use: Not on file    Drug use: Not on file    Sexual activity: Not on file   Other Topics Concern    Not on file   Social History Narrative    Not on file       FAMILY HISTORY:     History reviewed. No pertinent family history.         Curlene Essex, PA-C  1/28/2019

## 2019-01-28 NOTE — PROGRESS NOTES
1. Have you been to the ER, urgent care clinic since your last visit? Hospitalized since your last visit? No    2. Have you seen or consulted any other health care providers outside of the [address] [place] [address] since your last visit? Include any pap smears or colon screening.  No

## 2019-01-28 NOTE — PATIENT INSTRUCTIONS
· Keep the current dressings on and in place. You need to keep this incision clean and dry. If you have a splint or cast on please keep this clean and dry as well. · You should expect swelling and bruising in the area over the next several days. You may elevate the surgical extremity on 1 pillow. Place the pillow horizontal so that no pressure is on the back of your heel. You may apply an icepack on top of the dressing to help minimize the swelling. · Keep all pets away from  any wound present in order to prevent infection. · Continue activity modification    · Weight bearing status:  non weight bearing to the surgical extremity    · No lifting, twisting, squatting, deep bending, driving or strenuous activity. PLEASE SEEK IMMEDIATE ASSESSMENT BY ER PHYSICIAN IF ANY OF THE FOLLOWING EXIST:     Excessive pain, swelling, redness or odor of or around the surgical area   Temperature over 100.5   Nausea and vomiting lasting longer than 4 hours or if unable to take medications   Any signs of decreased circulation or nerve impairment to extremity: change in color, persistent numbness, tingling, coldness or increase pain   If any calf pain, calf tightness, shortness of breath, chest pain   Any difficulty breathing at rest or with ambulation, any chest tightness/soreness  Severe intractable pain, persistent swelling or drainage, development of a wound, incisional redness, finger/toe swelling or color changes, or CALF PAIN    · Perform ankle pumps with non-surgical/non-injured extremity to help reduce the risk of blood clots    · Please follow up in 2 weeks     · Continue taking Keflex as directed    · Continue taking Eliquis as directed      · Prescription for Norco 10/325 has been provided. Please take medication as directed           Acute Pain After Surgery: Care Instructions  Your Care Instructions    It's common to have some pain after surgery.  Pain doesn't mean that something is wrong or that the surgery didn't go well. But when the pain is severe, it's important to work with your doctor to manage it. It's also important to be aware of a few facts about pain and pain medicine. · You are the only person who knows what your pain feels like. So be sure to tell your doctor when you are in pain or when the pain changes. Then he or she will know how to adjust your medicines. · Pain is often easier to control right after it starts. So it may be better to take regular doses of pain medicine and not wait until the pain gets bad. · Medicine can help control pain. But this doesn't mean you'll have no pain. Medicine works to keep the pain at a level you can live with. With time, you will feel better. Follow-up care is a key part of your treatment and safety. Be sure to make and go to all appointments, and call your doctor if you are having problems. It's also a good idea to know your test results and keep a list of the medicines you take. How can you care for yourself at home? · Be safe with medicines. Read and follow all instructions on the label. ? If the doctor gave you a prescription medicine for pain, take it as prescribed. ? If you are not taking a prescription pain medicine, ask your doctor if you can take an over-the-counter medicine. · If you take an over-the-counter pain medicine, such as acetaminophen (Tylenol), ibuprofen (Advil, Motrin), or naproxen (Aleve), read and follow all instructions on the label. · Do not take two or more pain medicines at the same time unless the doctor told you to. · Do not drink alcohol while you are taking pain medicines. · Try to walk each day if your doctor recommends it. Start by walking a little more than you did the day before. Bit by bit, increase the amount you walk. Walking increases blood flow. It also helps prevent pneumonia and constipation. · To prevent constipation from opioid pain medicines:  ? Talk to your doctor about a laxative.   ? Include fruits, vegetables, beans, and whole grains in your diet each day. These foods are high in fiber. ? Drink plenty of fluids, enough so that your urine is light yellow or clear like water. Drink water, fruit juice, or other drinks that do not contain caffeine or alcohol. If you have kidney, heart, or liver disease and have to limit fluids, talk with your doctor before you increase the amount of fluids you drink. ? Take a fiber supplement, such as Citrucel or Metamucil, every day if needed. Read and follow all instructions on the label. If you take pain medicine for more than a few days, talk to your doctor before you take fiber. When should you call for help? Call your doctor now or seek immediate medical care if:    · Your pain gets worse.     · Your pain is not controlled by medicine.    Watch closely for changes in your health, and be sure to contact your doctor if you have any problems. Where can you learn more? Go to http://nasreen-mónica.info/. Enter (96) 639-275 in the search box to learn more about \"Acute Pain After Surgery: Care Instructions. \"  Current as of: September 23, 2018  Content Version: 11.9  © 8376-4728 [employer]. Care instructions adapted under license by [employer] (which disclaims liability or warranty for this information). If you have questions about a medical condition or this instruction, always ask your healthcare professional. [address] any warranty or liability for your use of this information.

## 2019-02-04 ENCOUNTER — TELEPHONE (OUTPATIENT)
Dept: ORTHOPEDIC SURGERY | Age: 49
End: 2019-02-04

## 2019-02-04 NOTE — TELEPHONE ENCOUNTER
Patient called in states that she has her CT scans for 02/06/19 but that she also had them done on 01/2/19 @ [facility]. She would like to know if she is still supposed to have them done. Please advise patient at [phone].

## 2019-02-05 NOTE — TELEPHONE ENCOUNTER
Yes, the repeat CT is necessary because since the time of the last CT of the left ankle completed on 1/2/2019, patient had been applying weight against medical advise to the LLE and there has been a change in her alignment noted on the last ankle x-rays completed in the office on 1/28/19. The repeat CT scan is warranted as the x-rays of the left ankle taken in the office on 1/28/19 with comparison right ankle films revealed medial space widening noted to the left ankle on comparison to the right. Right ankle films reveal an irregularity in the area of the talar neck. CT scan of left and right foot and ankle needed for better evaluation. Also, Dr. Julio Robert has requested that patient provide her treatment records from [place] on several occasions and we are still awaiting the documents for review.     Pete Feliz PA-C  2/5/2019   8:59 AM

## 2019-02-12 ENCOUNTER — OFFICE VISIT (OUTPATIENT)
Dept: ORTHOPEDIC SURGERY | Age: 49
End: 2019-02-12

## 2019-02-12 VITALS
DIASTOLIC BLOOD PRESSURE: 77 MMHG | RESPIRATION RATE: 16 BRPM | HEART RATE: 73 BPM | HEIGHT: 72 IN | WEIGHT: 293 LBS | OXYGEN SATURATION: 94 % | TEMPERATURE: 98.2 F | BODY MASS INDEX: 39.68 KG/M2 | SYSTOLIC BLOOD PRESSURE: 129 MMHG

## 2019-02-12 DIAGNOSIS — S82.892G CLOSED LEFT ANKLE FRACTURE, WITH DELAYED HEALING, SUBSEQUENT ENCOUNTER: Primary | ICD-10-CM

## 2019-02-12 DIAGNOSIS — Z87.81 STATUS POST OPEN REDUCTION WITH INTERNAL FIXATION (ORIF) OF FRACTURE OF ANKLE: ICD-10-CM

## 2019-02-12 DIAGNOSIS — M25.572 ACUTE LEFT ANKLE PAIN: ICD-10-CM

## 2019-02-12 DIAGNOSIS — R93.7 ABNORMAL X-RAY OF BONE: ICD-10-CM

## 2019-02-12 DIAGNOSIS — Z98.890 STATUS POST OPEN REDUCTION WITH INTERNAL FIXATION (ORIF) OF FRACTURE OF ANKLE: ICD-10-CM

## 2019-02-12 RX ORDER — HYDROCODONE BITARTRATE AND ACETAMINOPHEN 10; 325 MG/1; MG/1
1 TABLET ORAL
Qty: 30 TAB | Refills: 0 | Status: SHIPPED | OUTPATIENT
Start: 2019-02-12 | End: 2019-04-01

## 2019-02-12 NOTE — PATIENT INSTRUCTIONS
· Repeat CT Scan of the left ankle has been requested due to medial joint space widening since last CT scan as well as a CT Scan of the right ankle due to an irregularity in the talar region (identified on x-ray). · Keep the current dressings on and in place. You need to keep this incision clean and dry. If you have a splint or cast on please keep this clean and dry as well. · You should expect swelling and bruising in the area over the next several days. You may elevate the surgical extremity on 1 pillow. Place the pillow horizontal so that no pressure is on the back of your heel. You may apply an icepack on top of the dressing to help minimize the swelling. · Keep all pets away from  any wound present in order to prevent infection. · Continue activity modification    · Weight bearing status:  non weight bearing to the surgical extremity    · No lifting, twisting, squatting, deep bending, driving or strenuous activity.     PLEASE SEEK IMMEDIATE ASSESSMENT BY ER PHYSICIAN IF ANY OF THE FOLLOWING EXIST:     Excessive pain, swelling, redness or odor of or around the surgical area   Temperature over 100.5   Nausea and vomiting lasting longer than 4 hours or if unable to take medications   Any signs of decreased circulation or nerve impairment to extremity: change in color, persistent numbness, tingling, coldness or increase pain   If any calf pain, calf tightness, shortness of breath, chest pain   Any difficulty breathing at rest or with ambulation, any chest tightness/soreness  Severe intractable pain, persistent swelling or drainage, development of a wound, incisional redness, finger/toe swelling or color changes, or CALF PAIN    · Perform ankle pumps with non-surgical/non-injured extremity to help reduce the risk of blood clots    · Please follow up in 3 weeks     · Continue taking Eliquis as directed             Acute Pain After Surgery: Care Instructions  Your Care Instructions    It's common to have some pain after surgery. Pain doesn't mean that something is wrong or that the surgery didn't go well. But when the pain is severe, it's important to work with your doctor to manage it. It's also important to be aware of a few facts about pain and pain medicine. · You are the only person who knows what your pain feels like. So be sure to tell your doctor when you are in pain or when the pain changes. Then he or she will know how to adjust your medicines. · Pain is often easier to control right after it starts. So it may be better to take regular doses of pain medicine and not wait until the pain gets bad. · Medicine can help control pain. But this doesn't mean you'll have no pain. Medicine works to keep the pain at a level you can live with. With time, you will feel better. Follow-up care is a key part of your treatment and safety. Be sure to make and go to all appointments, and call your doctor if you are having problems. It's also a good idea to know your test results and keep a list of the medicines you take. How can you care for yourself at home? · Be safe with medicines. Read and follow all instructions on the label. ? If the doctor gave you a prescription medicine for pain, take it as prescribed. ? If you are not taking a prescription pain medicine, ask your doctor if you can take an over-the-counter medicine. · If you take an over-the-counter pain medicine, such as acetaminophen (Tylenol), ibuprofen (Advil, Motrin), or naproxen (Aleve), read and follow all instructions on the label. · Do not take two or more pain medicines at the same time unless the doctor told you to. · Do not drink alcohol while you are taking pain medicines. · Try to walk each day if your doctor recommends it. Start by walking a little more than you did the day before. Bit by bit, increase the amount you walk. Walking increases blood flow. It also helps prevent pneumonia and constipation.   · To prevent constipation from opioid pain medicines:  ? Talk to your doctor about a laxative. ? Include fruits, vegetables, beans, and whole grains in your diet each day. These foods are high in fiber. ? Drink plenty of fluids, enough so that your urine is light yellow or clear like water. Drink water, fruit juice, or other drinks that do not contain caffeine or alcohol. If you have kidney, heart, or liver disease and have to limit fluids, talk with your doctor before you increase the amount of fluids you drink. ? Take a fiber supplement, such as Citrucel or Metamucil, every day if needed. Read and follow all instructions on the label. If you take pain medicine for more than a few days, talk to your doctor before you take fiber. When should you call for help? Call your doctor now or seek immediate medical care if:    · Your pain gets worse.     · Your pain is not controlled by medicine.    Watch closely for changes in your health, and be sure to contact your doctor if you have any problems. Where can you learn more? Go to http://nasreen-mónica.info/. Enter [phone] 876-121 in the search box to learn more about \"Acute Pain After Surgery: Care Instructions. \"  Current as of: September 23, 2018  Content Version: 11.9  © 4438-8830 [employer]. Care instructions adapted under license by [employer] (which disclaims liability or warranty for this information). If you have questions about a medical condition or this instruction, always ask your healthcare professional. [place] [address] any warranty or liability for your use of this information.

## 2019-02-12 NOTE — PROCEDURES
X-rays of the left ankle reveal post op changes s/p I&D left ankle. Hardware from prior ORIF left ankle with syndesmotic stabilization in good position with no indication of failure or breakage. However, there is medial space widening noted to the left ankle on comparison to the right. There is no significant healing noted on comparison to prior left ankle x-rays. Holes from previous external fixator present. Moderate soft tissue swelling noted.

## 2019-02-12 NOTE — PROGRESS NOTES
Patient: Maria De Jesus Moreno                MRN: [id number]       SSN: [id number]  YOB: 1970                   AGE: 46 y.o. SEX: female    Maxime Shepard MD    POST OP OFFICE NOTE  DOS: 1/18/19    Chief Complaint:   Chief Complaint   Patient presents with    Ankle Pain     Left ankle post op        HPI:     The patient is a 46 y.o. female who presents today for follow up 25 days s/p:  I&D left ankle, cultures. Patient has been primarily WB to the left lower extremity. Patient reports doing well other than post op pain. Patient denies any fever, chills, chest pain, shortness of breath or calf pain. There are no new illness or injuries to report since last seen in the office. Patient is on Eliquis for DVT and PT. Patients initial surgery for ORIF left ankle was performed by an orthopedic surgeon in [place] [address] on 12/3/18. Patient reports that she had 4 surgeries, while in [place] (1. closed reduction; 2. external fixator; 3) revision to ex-fix and ORIF left fibula; 4. Removal of ex-fix and syndesmotic stabilization). She states her daughter (who is a nurse) brought her to [place] via car. She was seen at [facility] for pain/discomfort to her left ankle and she states her steri strips were not removed during assessment and she was sent home. Her daughter removed the steri strips at home and discovered dehiscence of her lateral left ankle incision. The patient was seen at [facility] 01/07/2019. She was admitted to  on 01/11/2019 due to a DVT and PE. Patient underwent an I&D of the left ankle incision on 1/181/9. She was followed by Dr. Maria Dolores Long in Infectious Disease and was discharged on PO Keflex which she has completed. She continues to take Eliquis. At [address] [place] [address], Dr. Minor Tanner ordered CT of the left and right ankle due to change in alignment since last CT and CT of the right ankle was requested due to an irregularity of the talar neck identified on right ankle x-rays. Patient advised that that CT scans were denied by her insurer since a CT scan was previously completed on 1/2/2019. We will resubmit the requests for CT scans since the repeat CT of the left ankle is necessary because since the time of the last CT of the left ankle completed on 1/2/2019, patient had been applying weight to the LLE and there has been a change in her alignment with medial space widening noted on the last ankle x-rays completed in the office. Also, the right ankle films reveal an irregularity in the area of the talar neck. CT scan of left and right foot and ankle needed for better evaluation. The patient states that she has requested her records from [place]. I located the documents in the media file. We have received 132 pages of documents and I will review the file to pull out the operative notes for Dr. Kelly Vazquez to review. PHYSICAL EXAM:     Visit Vitals  /77   Pulse 73   Temp 98.2 °F (36.8 °C) (Oral)   Resp 16   Ht 6' (1.829 m)   Wt 318 lb (144.2 kg)   SpO2 94%   BMI 43.13 kg/m²         Pain Scale: 6/10      GEN:  Alert, well developed, well nourished, well appearing 46 y.o. female in no acute distress. PSYCH:  Normal affect, mood, and conduct. alert, oriented x 3 alert, oriented x 3, no dementia  M/S EXAMINATION OF: left foot/ankle  DRESSINGS: CDI other than mild soilage on dressing   DRAINAGE: none  INCISION: Incision looks good, skin well approximated, no dehiscence, nylon sutures in place without disruption. SKIN: moderate edema , no erythema, no  ecchymosis, no warmth    TENDERNESS:  mild tenderness to palpation   NEUROVASCULAR:  grossly intact. Positive distal pulses and capillary refill. DVT ASSESSMENT:  The calf is not tender to palpation.  No evidence of DVT seen on physical exam.  ROM: not tested       RADIOGRAPHS & DIAGNOSTIC STUDIES     Results for orders placed or performed in visit on 02/12/19   AMB POC XRAY, ANKLE; COMPLETE, 3+ VIE    Narrative    Keiko Laurent, KATHRINE     2/12/2019 11:09 AM  X-rays of the left ankle reveal post op changes s/p I&D left   ankle. Hardware from prior ORIF left ankle with syndesmotic   stabilization in good position with no indication of failure or   breakage. However, there is medial space widening noted to the   left ankle on comparison to the right. There is no significant   healing noted on comparison to prior left ankle x-rays. Holes   from previous external fixator present. Moderate soft tissue   swelling noted. IMPRESSION:     Encounter Diagnoses     ICD-10-CM ICD-9-CM   1. Closed left ankle fracture, with delayed healing, subsequent encounter S82.892G V54.19   2. Acute left ankle pain M25.572 719.47   3. Status post open reduction with internal fixation (ORIF) of fracture of ankle Z96.7 V45.89    Z87.81 V15.51   4. Abnormal x-ray of bone R93.7 793.7       PLAN:         Orders Placed This Encounter    Generic Supply Order    [50921] Ankle Min 3V    HYDROcodone-acetaminophen (NORCO)  mg tablet                · Repeat CT Scan of the left ankle has been requested due to medial joint space widening since last CT scan as well as a CT Scan of the right ankle due to an irregularity in the talar region (identified on x-ray). · Keep the current dressings on and in place. You need to keep this incision clean and dry. If you have a splint or cast on please keep this clean and dry as well. · You should expect swelling and bruising in the area over the next several days. You may elevate the surgical extremity on 1 pillow. Place the pillow horizontal so that no pressure is on the back of your heel. You may apply an icepack on top of the dressing to help minimize the swelling. · Keep all pets away from  any wound present in order to prevent infection.     · Continue activity modification    · Weight bearing status:  non weight bearing to the surgical extremity    · No lifting, twisting, squatting, deep bending, driving or strenuous activity. PLEASE SEEK IMMEDIATE ASSESSMENT BY ER PHYSICIAN IF ANY OF THE FOLLOWING EXIST:     Excessive pain, swelling, redness or odor of or around the surgical area   Temperature over 100.5   Nausea and vomiting lasting longer than 4 hours or if unable to take medications   Any signs of decreased circulation or nerve impairment to extremity: change in color, persistent numbness, tingling, coldness or increase pain   If any calf pain, calf tightness, shortness of breath, chest pain   Any difficulty breathing at rest or with ambulation, any chest tightness/soreness  Severe intractable pain, persistent swelling or drainage, development of a wound, incisional redness, finger/toe swelling or color changes, or CALF PAIN    · Perform ankle pumps with non-surgical/non-injured extremity to help reduce the risk of blood clots    · Please follow up in 3 weeks     · Bone stimulator has been requested    · Continue taking Eliquis as directed        Patient has been discussed with Dr. Sobeida Steele during this visit and he agrees with the assessment and plan    Patient expresses understanding of the plan. Patient education provided on post surgical care. REVIEW OF SYSTEMS:     Otherwise as noted in HPI      PAST MEDICAL HISTORY:     Past Medical History:   Diagnosis Date    Cro[facility]'s colitis ([facility]     Diabetes ([facility]     HTN (hypertension)        MEDICATIONS:     Current Outpatient Medications   Medication Sig    HYDROcodone-acetaminophen (NORCO)  mg tablet Take 1 Tab by mouth every eight (8) hours as needed. Max Daily Amount: 3 Tabs.  insulin glargine (LANTUS,BASAGLAR) 100 unit/mL (3 mL) inpn 30 Units by SubCUTAneous route nightly.  tiZANidine (ZANAFLEX) 4 mg tablet Take 4 mg by mouth three (3) times daily as needed for Other (muscle spasms).  ondansetron hcl (ZOFRAN) 4 mg tablet Take 1 Tab by mouth every eight (8) hours as needed for Nausea.     hydrALAZINE (APRESOLINE) 100 mg tablet Take 1 Tab by mouth three (3) times daily.  SITagliptin (JANUVIA) 100 mg tablet Take 1 Tab by mouth daily.  metoprolol tartrate (LOPRESSOR) 50 mg tablet Take 1 Tab by mouth two (2) times a day.  apixaban (ELIQUIS) 5 mg tablet Take 1 Tab by mouth two (2) times a day.  atorvastatin (LIPITOR) 20 mg tablet Take 1 Tab by mouth nightly.  cloNIDine HCl (CATAPRES) 0.1 mg tablet Take 1 Tab by mouth every eight (8) hours as needed.  gabapentin (NEURONTIN) 300 mg capsule Take 1 Cap by mouth three (3) times daily.  hydroCHLOROthiazide (HYDRODIURIL) 25 mg tablet Take 1 Tab by mouth daily.  pantoprazole (PROTONIX) 40 mg tablet Take 1 Tab by mouth Before breakfast and dinner.  potassium chloride (K-DUR, KLOR-CON) 20 mEq tablet Take 2 Tabs by mouth daily.  cephALEXin (KEFLEX) 500 mg capsule Take 1 Cap by mouth four (4) times daily.  naloxone (NARCAN) 2 mg/actuation spry Use 1 spray intranasally, then discard. Repeat with new spray every 2 min as needed for opioid overdose symptoms, alternating nostrils.  losartan (COZAAR) 100 mg tablet Take 100 mg by mouth daily.  insulin lispro (HUMALOG U-100 INSULIN) 100 unit/mL injection by SubCUTAneous route Before breakfast, lunch, dinner and at bedtime.  insulin detemir U-100 (LEVEMIR U-100 INSULIN) 100 unit/mL injection 30 Units by SubCUTAneous route nightly.  azaTHIOprine (IMURAN) 50 mg tablet Take 200 mg by mouth daily.  adalimumab (HUMIRA) 40 mg/0.8 mL injection 40 mg by SubCUTAneous route every seven (7) days. No current facility-administered medications for this visit. ALLERGIES:     No Known Allergies      PAST SURGICAL HISTORY:     History reviewed. No pertinent surgical history.     SOCIAL HISTORY:     Social History     Socioeconomic History    Marital status: UNKNOWN     Spouse name: Not on file    Number of children: Not on file    Years of education: Not on file    Highest education level: Not on file   Social Needs    Financial resource strain: Not on file    Food insecurity - worry: Not on file    Food insecurity - inability: Not on file    Transportation needs - medical: Not on file   [employer] needs - non-medical: Not on file   Occupational History    Not on file   Tobacco Use    Smoking status: Light Tobacco Smoker    Smokeless tobacco: Never Used   Substance and Sexual Activity    Alcohol use: Not on file    Drug use: Not on file    Sexual activity: Not on file   Other Topics Concern    Not on file   Social History Narrative    Not on file       FAMILY HISTORY:     History reviewed. No pertinent family history.         Tammy Waldrop PA-C  2/12/2019

## 2019-02-21 ENCOUNTER — TELEPHONE (OUTPATIENT)
Dept: ORTHOPEDIC SURGERY | Age: 49
End: 2019-02-21

## 2019-02-21 NOTE — TELEPHONE ENCOUNTER
We have submitted for the patient's CTs for left and right foot and ankle to the patient's insurance several times and they have only authorized the left foot CT. Per office note, all the CTs need to be done for comparison issues. The last denial was yesterday and the ordering provider can call [address] within 2 business days (by 2/22/19) for a clinical review. [facility] [phone]  Choose prompts for Provider, Diagnostic Imaging, and Ordering physician calling to speak to a clinical reviewer. Patients member ID number is [id number].

## 2019-02-22 NOTE — TELEPHONE ENCOUNTER
Called for Peer to Peer. CT of left foot/ankle approved from 2/12/19 - 4/12/19 - authorization number [id number]. CT for right foot/ankle approved from 2/18/19 - 4/18/19 - authorization number [id number].     Suyapa Villanueva PA-C  2/22/2019   2:03 PM

## 2019-02-22 NOTE — TELEPHONE ENCOUNTER
Called patient to notify her the the CT of left foot/ankle approved from 2/21/19 - 4/12/19: Authorization number [id number]. CT of right foot/ankle approved from 2/18/19 - 4/18/19: Authorization number [id number]. Unable to leave message because voice mail box is full.

## 2019-02-25 NOTE — TELEPHONE ENCOUNTER
The authorizations given for RT foot/ankle and LT foot/ankle are for foot OR ankle, not both. The CT orders are 4 different procedures for RT and LT foot AND ankle. Which orders would you like performed since authorization does not cover all?

## 2019-03-11 ENCOUNTER — OFFICE VISIT (OUTPATIENT)
Dept: ORTHOPEDIC SURGERY | Age: 49
End: 2019-03-11

## 2019-03-11 VITALS
BODY MASS INDEX: 39.68 KG/M2 | HEIGHT: 72 IN | OXYGEN SATURATION: 94 % | SYSTOLIC BLOOD PRESSURE: 156 MMHG | HEART RATE: 94 BPM | TEMPERATURE: 98.2 F | RESPIRATION RATE: 16 BRPM | DIASTOLIC BLOOD PRESSURE: 104 MMHG | WEIGHT: 293 LBS

## 2019-03-11 DIAGNOSIS — M25.572 ACUTE LEFT ANKLE PAIN: ICD-10-CM

## 2019-03-11 DIAGNOSIS — S82.892G CLOSED LEFT ANKLE FRACTURE, WITH DELAYED HEALING, SUBSEQUENT ENCOUNTER: ICD-10-CM

## 2019-03-11 DIAGNOSIS — Z87.81 STATUS POST OPEN REDUCTION WITH INTERNAL FIXATION (ORIF) OF FRACTURE OF ANKLE: ICD-10-CM

## 2019-03-11 DIAGNOSIS — Z98.890 STATUS POST OPEN REDUCTION WITH INTERNAL FIXATION (ORIF) OF FRACTURE OF ANKLE: ICD-10-CM

## 2019-03-11 DIAGNOSIS — R93.7 ABNORMAL X-RAY OF BONE: ICD-10-CM

## 2019-03-11 DIAGNOSIS — M25.572 ACUTE LEFT ANKLE PAIN: Primary | ICD-10-CM

## 2019-03-11 PROBLEM — E66.01 OBESITY, MORBID (HCC): Status: ACTIVE | Noted: 2019-03-11

## 2019-03-11 RX ORDER — TRAMADOL HYDROCHLORIDE 50 MG/1
50 TABLET ORAL
Qty: 40 TAB | Refills: 0 | Status: SHIPPED | OUTPATIENT
Start: 2019-03-11 | End: 2019-03-25

## 2019-03-11 NOTE — PATIENT INSTRUCTIONS
· Vitamin D labs ordered    · Repeat CT Scan of the left ankle has been requested due to medial joint space widening since last CT scan as well as a CT Scan of the right ankle due to an irregularity in the talar region (identified on x-ray). · Keep the current dressings on and in place. You need to keep this incision clean and dry. If you have a splint or cast on please keep this clean and dry as well. · You should expect swelling and bruising in the area over the next several days. You may elevate the surgical extremity on 1 pillow. Place the pillow horizontal so that no pressure is on the back of your heel. You may apply an icepack on top of the dressing to help minimize the swelling. · Keep all pets away from  any wound present in order to prevent infection. · Continue activity modification    · Weight bearing status:  non weight bearing to the surgical extremity    · No lifting, twisting, squatting, deep bending, driving or strenuous activity.     PLEASE SEEK IMMEDIATE ASSESSMENT BY ER PHYSICIAN IF ANY OF THE FOLLOWING EXIST:     Excessive pain, swelling, redness or odor of or around the surgical area   Temperature over 100.5   Nausea and vomiting lasting longer than 4 hours or if unable to take medications   Any signs of decreased circulation or nerve impairment to extremity: change in color, persistent numbness, tingling, coldness or increase pain   If any calf pain, calf tightness, shortness of breath, chest pain   Any difficulty breathing at rest or with ambulation, any chest tightness/soreness  Severe intractable pain, persistent swelling or drainage, development of a wound, incisional redness, finger/toe swelling or color changes, or CALF PAIN    · Perform ankle pumps with non-surgical/non-injured extremity to help reduce the risk of blood clots    · Please follow up in 3 weeks     · Continue taking Eliquis as directed             Acute Pain After Surgery: Care Instructions  Your Care Instructions    It's common to have some pain after surgery. Pain doesn't mean that something is wrong or that the surgery didn't go well. But when the pain is severe, it's important to work with your doctor to manage it. It's also important to be aware of a few facts about pain and pain medicine. · You are the only person who knows what your pain feels like. So be sure to tell your doctor when you are in pain or when the pain changes. Then he or she will know how to adjust your medicines. · Pain is often easier to control right after it starts. So it may be better to take regular doses of pain medicine and not wait until the pain gets bad. · Medicine can help control pain. But this doesn't mean you'll have no pain. Medicine works to keep the pain at a level you can live with. With time, you will feel better. Follow-up care is a key part of your treatment and safety. Be sure to make and go to all appointments, and call your doctor if you are having problems. It's also a good idea to know your test results and keep a list of the medicines you take. How can you care for yourself at home? · Be safe with medicines. Read and follow all instructions on the label. ? If the doctor gave you a prescription medicine for pain, take it as prescribed. ? If you are not taking a prescription pain medicine, ask your doctor if you can take an over-the-counter medicine. · If you take an over-the-counter pain medicine, such as acetaminophen (Tylenol), ibuprofen (Advil, Motrin), or naproxen (Aleve), read and follow all instructions on the label. · Do not take two or more pain medicines at the same time unless the doctor told you to. · Do not drink alcohol while you are taking pain medicines. · Try to walk each day if your doctor recommends it. Start by walking a little more than you did the day before. Bit by bit, increase the amount you walk. Walking increases blood flow.  It also helps prevent pneumonia and constipation. · To prevent constipation from opioid pain medicines:  ? Talk to your doctor about a laxative. ? Include fruits, vegetables, beans, and whole grains in your diet each day. These foods are high in fiber. ? Drink plenty of fluids, enough so that your urine is light yellow or clear like water. Drink water, fruit juice, or other drinks that do not contain caffeine or alcohol. If you have kidney, heart, or liver disease and have to limit fluids, talk with your doctor before you increase the amount of fluids you drink. ? Take a fiber supplement, such as Citrucel or Metamucil, every day if needed. Read and follow all instructions on the label. If you take pain medicine for more than a few days, talk to your doctor before you take fiber. When should you call for help? Call your doctor now or seek immediate medical care if:    · Your pain gets worse.     · Your pain is not controlled by medicine.    Watch closely for changes in your health, and be sure to contact your doctor if you have any problems. Where can you learn more? Go to http://nasreen-mónica.info/. Enter ([phone] [phone] in the search box to learn more about \"Acute Pain After Surgery: Care Instructions. \"  Current as of: September 23, 2018  Content Version: 11.9  © 5353-5200 [employer]. Care instructions adapted under license by [employer] (which disclaims liability or warranty for this information). If you have questions about a medical condition or this instruction, always ask your healthcare professional. [place] [address] any warranty or liability for your use of this information.

## 2019-03-11 NOTE — PROCEDURES
X-rays of the left ankle reveal post op changes s/p I&D left ankle. Hardware from prior ORIF left ankle with syndesmotic stabilization in good position with no indication of failure or breakage. However, there is medial space widening noted to the left ankle on comparison to the right. There is no significant healing noted on comparison to prior left ankle x-rays. The fibula fracture line is visible on lateral view. Moderate soft tissue swelling noted.

## 2019-03-11 NOTE — PROGRESS NOTES
Patient: Earl Stallings                MRN: [id number]       SSN: [id number]  YOB: 1970                   AGE: 46 y.o. SEX: female    Rosario Burleson MD    POST OP OFFICE NOTE  DOS: 1/18/19    Chief Complaint:   Chief Complaint   Patient presents with    Foot Pain     left foot post op        HPI:     The patient is a 46 y.o. female who presents today for follow up 52 days s/p:  I&D left ankle, cultures. Patient has been mostly NWB to the left lower extremity. Patient reports doing well other than post op pain. Patient denies any fever, chills, chest pain, shortness of breath or calf pain. There are no new illness or injuries to report since last seen in the office. Patient is on Eliquis for DVT/PE. Patients initial surgery for ORIF left ankle was performed by an orthopedic surgeon in [place] [address] on 12/3/18. She states that her daughter drover her to [place] in January 2019. She states that her daughter discovered dehiscence of her lateral left ankle incision. She was admitted to  on 01/11/2019 due to a DVT and PE. Patient underwent an I&D of the left ankle incision on 1/181/9 by Dr. Steffany Hidalgo and was followed by Dr. Amara Harrison in Infectious Disease. She completed the PO Keflex and she continues to take Eliquis. CT Scan of left and right ankle ordered and I completed a peer to peer for approval of both studies. However, patient states that she was never contacted to have the CT scans completed. Marco A will follow up in scheduling the CT scans as they are still medically necessary. Eventually patient will require WB x-rays of the left ankle (after the CT scan is completed to determine the status of the deltoid and syndesmosis). Fibula fracture line is still quite visible on lateral x-ray. Vitamin D labs will be ordered.     PHYSICAL EXAM:     Visit Vitals  BP (!) 156/104   Pulse 94   Temp 98.2 °F (36.8 °C) (Oral)   Resp 16   Ht 6' (1.829 m)   Wt 318 lb (144.2 kg)   SpO2 94% BMI 43.13 kg/m²         Pain Scale: 5/10      GEN:  Alert, well developed, well nourished, well appearing 46 y.o. female in no acute distress. PSYCH:  Normal affect, mood, and conduct. alert, oriented x 3 alert, oriented x 3, no dementia  M/S EXAMINATION OF: left foot/ankle  DRESSINGS: CDI   DRAINAGE: none  INCISION: Incision looks good, skin well approximated, no dehiscence. SKIN: moderate edema and adipose tissue , no erythema, no  ecchymosis, no warmth    TENDERNESS:  mild tenderness to palpation   NEUROVASCULAR:  grossly intact. Positive distal pulses and capillary refill. DVT ASSESSMENT:  The calf is not tender to palpation. No evidence of DVT seen on physical exam.  ROM: not tested       RADIOGRAPHS & DIAGNOSTIC STUDIES     Results for orders placed or performed in visit on 03/11/19   AMB POC XRAY, ANKLE; COMPLETE, 3+ VIE    Narrative    Carson Hayward PA-C     3/11/2019 10:13 AM  X-rays of the left ankle reveal post op changes s/p I&D left   ankle. Hardware from prior ORIF left ankle with syndesmotic   stabilization in good position with no indication of failure or   breakage. However, there is medial space widening noted to the   left ankle on comparison to the right. There is no significant   healing noted on comparison to prior left ankle x-rays. The   fibula fracture line is visible on lateral view. Moderate soft   tissue swelling noted. IMPRESSION:     Encounter Diagnoses     ICD-10-CM ICD-9-CM   1. Acute left ankle pain M25.572 719.47   2. Status post open reduction with internal fixation (ORIF) of fracture of ankle Z96.7 V45.89    Z87.81 V15.51   3. Abnormal x-ray of bone R93.7 793.7   4.  Closed left ankle fracture, with delayed healing, subsequent encounter S82.892G V54.19       PLAN:         Orders Placed This Encounter    [81371] Ankle Min 3V    VITAMIN D, 25 HYROXY PANEL              · Vitamin D labs ordered    · Repeat CT Scan of the left ankle has been requested due to medial joint space widening since last CT scan as well as a CT Scan of the right ankle due to an irregularity in the talar region (identified on x-ray). · Keep the current dressings on and in place. You need to keep this incision clean and dry. If you have a splint or cast on please keep this clean and dry as well. · You should expect swelling and bruising in the area over the next several days. You may elevate the surgical extremity on 1 pillow. Place the pillow horizontal so that no pressure is on the back of your heel. You may apply an icepack on top of the dressing to help minimize the swelling. · Keep all pets away from  any wound present in order to prevent infection. · Continue activity modification    · Weight bearing status:  non weight bearing to the surgical extremity    · No lifting, twisting, squatting, deep bending, driving or strenuous activity. PLEASE SEEK IMMEDIATE ASSESSMENT BY ER PHYSICIAN IF ANY OF THE FOLLOWING EXIST:     Excessive pain, swelling, redness or odor of or around the surgical area   Temperature over 100.5   Nausea and vomiting lasting longer than 4 hours or if unable to take medications   Any signs of decreased circulation or nerve impairment to extremity: change in color, persistent numbness, tingling, coldness or increase pain   If any calf pain, calf tightness, shortness of breath, chest pain   Any difficulty breathing at rest or with ambulation, any chest tightness/soreness  Severe intractable pain, persistent swelling or drainage, development of a wound, incisional redness, finger/toe swelling or color changes, or CALF PAIN    · Perform ankle pumps with non-surgical/non-injured extremity to help reduce the risk of blood clots    · Please follow up in 3 weeks     · Continue taking Eliquis as directed          Patient has been discussed with Dr. Caden Alexis during this visit and he agrees with the assessment and plan    Patient expresses understanding of the plan.  Patient education provided on post surgical care. REVIEW OF SYSTEMS:     Otherwise as noted in HPI      PAST MEDICAL HISTORY:     Past Medical History:   Diagnosis Date    [facility] [address]     Diabetes ([facility] [address]     HTN (hypertension)        MEDICATIONS:     Current Outpatient Medications   Medication Sig    HYDROcodone-acetaminophen (NORCO)  mg tablet Take 1 Tab by mouth every eight (8) hours as needed. Max Daily Amount: 3 Tabs.  insulin glargine (LANTUS,BASAGLAR) 100 unit/mL (3 mL) inpn 30 Units by SubCUTAneous route nightly.  tiZANidine (ZANAFLEX) 4 mg tablet Take 4 mg by mouth three (3) times daily as needed for Other (muscle spasms).  ondansetron hcl (ZOFRAN) 4 mg tablet Take 1 Tab by mouth every eight (8) hours as needed for Nausea.  hydrALAZINE (APRESOLINE) 100 mg tablet Take 1 Tab by mouth three (3) times daily.  SITagliptin (JANUVIA) 100 mg tablet Take 1 Tab by mouth daily.  metoprolol tartrate (LOPRESSOR) 50 mg tablet Take 1 Tab by mouth two (2) times a day.  apixaban (ELIQUIS) 5 mg tablet Take 1 Tab by mouth two (2) times a day.  atorvastatin (LIPITOR) 20 mg tablet Take 1 Tab by mouth nightly.  cloNIDine HCl (CATAPRES) 0.1 mg tablet Take 1 Tab by mouth every eight (8) hours as needed.  gabapentin (NEURONTIN) 300 mg capsule Take 1 Cap by mouth three (3) times daily.  hydroCHLOROthiazide (HYDRODIURIL) 25 mg tablet Take 1 Tab by mouth daily.  pantoprazole (PROTONIX) 40 mg tablet Take 1 Tab by mouth Before breakfast and dinner.  potassium chloride (K-DUR, KLOR-CON) 20 mEq tablet Take 2 Tabs by mouth daily.  cephALEXin (KEFLEX) 500 mg capsule Take 1 Cap by mouth four (4) times daily.  naloxone (NARCAN) 2 mg/actuation spry Use 1 spray intranasally, then discard. Repeat with new spray every 2 min as needed for opioid overdose symptoms, alternating nostrils.  losartan (COZAAR) 100 mg tablet Take 100 mg by mouth daily.     insulin lispro (HUMALOG U-100 INSULIN) 100 unit/mL injection by SubCUTAneous route Before breakfast, lunch, dinner and at bedtime.  insulin detemir U-100 (LEVEMIR U-100 INSULIN) 100 unit/mL injection 30 Units by SubCUTAneous route nightly.  azaTHIOprine (IMURAN) 50 mg tablet Take 200 mg by mouth daily.  adalimumab (HUMIRA) 40 mg/0.8 mL injection 40 mg by SubCUTAneous route every seven (7) days. No current facility-administered medications for this visit. ALLERGIES:     No Known Allergies      PAST SURGICAL HISTORY:     History reviewed. No pertinent surgical history. SOCIAL HISTORY:     Social History     Socioeconomic History    Marital status: UNKNOWN     Spouse name: Not on file    Number of children: Not on file    Years of education: Not on file    Highest education level: Not on file   Social Needs    Financial resource strain: Not on file    Food insecurity - worry: Not on file    Food insecurity - inability: Not on file   [employer] needs - medical: Not on file   [employer] needs - non-medical: Not on file   Occupational History    Not on file   Tobacco Use    Smoking status: Light Tobacco Smoker    Smokeless tobacco: Never Used   Substance and Sexual Activity    Alcohol use: Not on file    Drug use: Not on file    Sexual activity: Not on file   Other Topics Concern    Not on file   Social History Narrative    Not on file       FAMILY HISTORY:     History reviewed. No pertinent family history.         Josse Elias PA-C  3/11/2019

## 2019-03-14 ENCOUNTER — HOSPITAL ENCOUNTER (OUTPATIENT)
Dept: LAB | Age: 49
Discharge: HOME | End: 2019-03-14

## 2019-03-14 ENCOUNTER — HOSPITAL ENCOUNTER (OUTPATIENT)
Dept: CT IMAGING | Age: 49
Discharge: HOME | End: 2019-03-14
Attending: PHYSICIAN ASSISTANT
Payer: MEDICAID

## 2019-03-14 DIAGNOSIS — Z87.81 STATUS POST OPEN REDUCTION WITH INTERNAL FIXATION (ORIF) OF FRACTURE OF ANKLE: ICD-10-CM

## 2019-03-14 DIAGNOSIS — M25.571 ACUTE RIGHT ANKLE PAIN: ICD-10-CM

## 2019-03-14 DIAGNOSIS — Z98.890 STATUS POST OPEN REDUCTION WITH INTERNAL FIXATION (ORIF) OF FRACTURE OF ANKLE: ICD-10-CM

## 2019-03-14 DIAGNOSIS — R93.7 ABNORMAL X-RAY OF BONE: ICD-10-CM

## 2019-03-14 DIAGNOSIS — M25.572 LEFT ANKLE PAIN, UNSPECIFIED CHRONICITY: ICD-10-CM

## 2019-03-14 LAB
XX-LABCORP SPECIMEN COL,LCBCF: NORMAL
XX-LABCORP SPECIMEN COL,LCBCF: NORMAL

## 2019-03-14 PROCEDURE — 73700 CT LOWER EXTREMITY W/O DYE: CPT

## 2019-03-14 PROCEDURE — 99001 SPECIMEN HANDLING PT-LAB: CPT

## 2019-04-01 ENCOUNTER — OFFICE VISIT (OUTPATIENT)
Dept: ORTHOPEDIC SURGERY | Age: 49
End: 2019-04-01

## 2019-04-01 VITALS
SYSTOLIC BLOOD PRESSURE: 164 MMHG | DIASTOLIC BLOOD PRESSURE: 93 MMHG | OXYGEN SATURATION: 97 % | BODY MASS INDEX: 43.13 KG/M2 | HEIGHT: 72 IN | RESPIRATION RATE: 16 BRPM | HEART RATE: 88 BPM | TEMPERATURE: 97.9 F

## 2019-04-01 DIAGNOSIS — Z87.81 STATUS POST OPEN REDUCTION WITH INTERNAL FIXATION (ORIF) OF FRACTURE OF ANKLE: ICD-10-CM

## 2019-04-01 DIAGNOSIS — M25.572 ACUTE LEFT ANKLE PAIN: Primary | ICD-10-CM

## 2019-04-01 DIAGNOSIS — S82.892G CLOSED LEFT ANKLE FRACTURE, WITH DELAYED HEALING, SUBSEQUENT ENCOUNTER: ICD-10-CM

## 2019-04-01 DIAGNOSIS — Z98.890 STATUS POST OPEN REDUCTION WITH INTERNAL FIXATION (ORIF) OF FRACTURE OF ANKLE: ICD-10-CM

## 2019-04-01 RX ORDER — TRAMADOL HYDROCHLORIDE 50 MG/1
50 TABLET ORAL
Qty: 30 TAB | Refills: 0 | Status: SHIPPED | OUTPATIENT
Start: 2019-04-01 | End: 2019-06-05 | Stop reason: SDUPTHER

## 2019-04-01 RX ORDER — TRAMADOL HYDROCHLORIDE 50 MG/1
50 TABLET ORAL
COMMUNITY
End: 2019-04-01 | Stop reason: SDUPTHER

## 2019-04-01 NOTE — PROGRESS NOTES
1. Have you been to the ER, urgent care clinic since your last visit? Hospitalized since your last visit? Yes, [address]     2. Have you seen or consulted any other health care providers outside of the [address] [place] [address] since your last visit? Include any pap smears or colon screening.  Yes, [address] in [facility]

## 2019-04-01 NOTE — PATIENT INSTRUCTIONS
· Continue activity modification    · Weight bearing status:  partial weight bearing to the surgical extremity in CAM boot using walker    · Continue range of motion exercises at home    · No lifting, twisting, squatting, deep bending, driving or strenuous activity.     · Please follow up in 4 weeks     · Continue taking Eliquis as directed

## 2019-04-01 NOTE — PROGRESS NOTES
Patient: Lili Gu                MRN: [id number]       SSN: [id number]  YOB: 1970                   AGE: 46 y.o. SEX: female    Mary Marte MD    POST OP OFFICE NOTE  DOS: 1/18/19    Chief Complaint:   Chief Complaint   Patient presents with    Foot Pain     left foot pain, f/u appt. HPI:     The patient is a 46 y.o. female who presents today for follow up 68 days s/p:  I&D left ankle, cultures. 118 days (17 weeks) s/p ORIF left ankle. Patient has been mostly NWB to the left lower extremity. Patient reports doing well other than post op pain. Patient denies any fever, chills, chest pain, shortness of breath or calf pain. There are no new illness or injuries to report since last seen in the office. Patient is on Eliquis for DVT/PE. Patients initial surgery for ORIF left ankle was performed by an orthopedic surgeon in [place] on 12/3/18. Repeat CT Scan of left ankle and initial right ankle completed. Patient presents today for scan review. Images were reviewed with Dr. Akil Jeff and the results shared with the patient. Eventually patient will require WB x-rays of the left ankle at [facility]. The CT scan confirms that the fibula fracture line is still visible (anteriorly). PHYSICAL EXAM:     Visit Vitals  BP (!) 164/93 (BP 1 Location: Right arm, BP Patient Position: Sitting)   Pulse 88   Temp 97.9 °F (36.6 °C) (Oral)   Resp 16   Ht 6' (1.829 m)   SpO2 97%   BMI 43.13 kg/m²         Pain Scale: 3/10      GEN:  Alert, well developed, well nourished, well appearing 46 y.o. female in no acute distress. PSYCH:  Normal affect, mood, and conduct. alert, oriented x 3 alert, oriented x 3, no dementia  M/S EXAMINATION OF: left foot/ankle  INCISION: Incision looks good, skin well approximated, no dehiscence.   SKIN: moderate edema and adipose tissue , no erythema, no  ecchymosis, no warmth    TENDERNESS:  mild tenderness to palpation   NEUROVASCULAR:  grossly intact. Positive distal pulses and capillary refill. DVT ASSESSMENT:  The calf is not tender to palpation. No evidence of DVT seen on physical exam.  ROM: improved       RADIOGRAPHS & DIAGNOSTIC STUDIES     No results found for any visits on 04/01/19. IMPRESSION:     Encounter Diagnoses     ICD-10-CM ICD-9-CM   1. Acute left ankle pain M25.572 719.47   2. Status post open reduction with internal fixation (ORIF) of fracture of ankle Z96.7 V45.89    Z87.81 V15.51   3. Closed left ankle fracture, with delayed healing, subsequent encounter S82.892G V54.19       PLAN:         Orders Placed This Encounter    DISCONTD: traMADol (ULTRAM) 50 mg tablet    traMADol (ULTRAM) 50 mg tablet              · Continue activity modification    · Weight bearing status:  partial weight bearing to the surgical extremity in CAM boot using walker    · Continue range of motion exercises at home    · No lifting, twisting, squatting, deep bending, driving or strenuous activity. · Please follow up in 4 weeks. X-rays of the left ankle at [facility]. Plan to start PT at that time. · Continue taking Eliquis as directed     · Rx for Ultram provided for severe pain        Patient has been discussed with Dr. Sai Watters during this visit and he agrees with the assessment and plan    Patient expresses understanding of the plan. Patient education provided on post surgical care. REVIEW OF SYSTEMS:     Otherwise as noted in HPI      PAST MEDICAL HISTORY:     Past Medical History:   Diagnosis Date    Cro[facility]'s colitis ([facility] [address]     Diabetes ([facility] [address]     HTN (hypertension)        MEDICATIONS:     Current Outpatient Medications   Medication Sig    traMADol (ULTRAM) 50 mg tablet Take 1 Tab by mouth every six (6) hours as needed for Pain for up to 7 days. Max Daily Amount: 200 mg.    insulin glargine (LANTUS,BASAGLAR) 100 unit/mL (3 mL) inpn 30 Units by SubCUTAneous route nightly.     tiZANidine (ZANAFLEX) 4 mg tablet Take 4 mg by mouth three (3) times daily as needed for Other (muscle spasms).  ondansetron hcl (ZOFRAN) 4 mg tablet Take 1 Tab by mouth every eight (8) hours as needed for Nausea.  hydrALAZINE (APRESOLINE) 100 mg tablet Take 1 Tab by mouth three (3) times daily.  SITagliptin (JANUVIA) 100 mg tablet Take 1 Tab by mouth daily.  metoprolol tartrate (LOPRESSOR) 50 mg tablet Take 1 Tab by mouth two (2) times a day.  apixaban (ELIQUIS) 5 mg tablet Take 1 Tab by mouth two (2) times a day.  atorvastatin (LIPITOR) 20 mg tablet Take 1 Tab by mouth nightly.  cloNIDine HCl (CATAPRES) 0.1 mg tablet Take 1 Tab by mouth every eight (8) hours as needed.  gabapentin (NEURONTIN) 300 mg capsule Take 1 Cap by mouth three (3) times daily.  hydroCHLOROthiazide (HYDRODIURIL) 25 mg tablet Take 1 Tab by mouth daily.  pantoprazole (PROTONIX) 40 mg tablet Take 1 Tab by mouth Before breakfast and dinner.  potassium chloride (K-DUR, KLOR-CON) 20 mEq tablet Take 2 Tabs by mouth daily.  naloxone (NARCAN) 2 mg/actuation spry Use 1 spray intranasally, then discard. Repeat with new spray every 2 min as needed for opioid overdose symptoms, alternating nostrils.  losartan (COZAAR) 100 mg tablet Take 100 mg by mouth daily.  insulin lispro (HUMALOG U-100 INSULIN) 100 unit/mL injection by SubCUTAneous route Before breakfast, lunch, dinner and at bedtime.  insulin detemir U-100 (LEVEMIR U-100 INSULIN) 100 unit/mL injection 30 Units by SubCUTAneous route nightly.  azaTHIOprine (IMURAN) 50 mg tablet Take 200 mg by mouth daily.  adalimumab (HUMIRA) 40 mg/0.8 mL injection 40 mg by SubCUTAneous route every seven (7) days.  cephALEXin (KEFLEX) 500 mg capsule Take 1 Cap by mouth four (4) times daily. No current facility-administered medications for this visit. ALLERGIES:     No Known Allergies      PAST SURGICAL HISTORY:     History reviewed. No pertinent surgical history.     SOCIAL HISTORY:     Social History     Socioeconomic History    Marital status: UNKNOWN     Spouse name: Not on file    Number of children: Not on file    Years of education: Not on file    Highest education level: Not on file   Occupational History    Not on file   Social Needs    Financial resource strain: Not on file    Food insecurity:     Worry: Not on file     Inability: Not on file    Transportation needs:     Medical: Not on file     Non-medical: Not on file   Tobacco Use    Smoking status: Never Smoker    Smokeless tobacco: Never Used   Substance and Sexual Activity    Alcohol use: Not on file    Drug use: Not on file    Sexual activity: Not on file   Lifestyle    Physical activity:     Days per week: Not on file     Minutes per session: Not on file    Stress: Not on file   Relationships    Social connections:     Talks on phone: Not on file     Gets together: Not on file     Attends [employer] service: Not on file     Active member of club or organization: Not on file     Attends meetings of clubs or organizations: Not on file     Relationship status: Not on file    Intimate partner violence:     Fear of current or ex partner: Not on file     Emotionally abused: Not on file     Physically abused: Not on file     Forced sexual activity: Not on file   Other Topics Concern    Not on file   Social History Narrative    Not on file       FAMILY HISTORY:     History reviewed. No pertinent family history.         Brian Cosby PA-C  4/1/2019

## 2019-04-29 ENCOUNTER — OFFICE VISIT (OUTPATIENT)
Dept: ORTHOPEDIC SURGERY | Age: 49
End: 2019-04-29

## 2019-04-29 VITALS
DIASTOLIC BLOOD PRESSURE: 114 MMHG | HEIGHT: 72 IN | RESPIRATION RATE: 16 BRPM | SYSTOLIC BLOOD PRESSURE: 181 MMHG | HEART RATE: 77 BPM | BODY MASS INDEX: 39.68 KG/M2 | OXYGEN SATURATION: 96 % | WEIGHT: 293 LBS | TEMPERATURE: 97.8 F

## 2019-04-29 DIAGNOSIS — S82.892G CLOSED LEFT ANKLE FRACTURE, WITH DELAYED HEALING, SUBSEQUENT ENCOUNTER: ICD-10-CM

## 2019-04-29 DIAGNOSIS — Z87.81 STATUS POST OPEN REDUCTION WITH INTERNAL FIXATION (ORIF) OF FRACTURE OF ANKLE: ICD-10-CM

## 2019-04-29 DIAGNOSIS — M25.572 ACUTE LEFT ANKLE PAIN: Primary | ICD-10-CM

## 2019-04-29 DIAGNOSIS — Z98.890 STATUS POST OPEN REDUCTION WITH INTERNAL FIXATION (ORIF) OF FRACTURE OF ANKLE: ICD-10-CM

## 2019-04-29 NOTE — PROGRESS NOTES
Patient: Claudy Sosa                MRN: [id number]      `  SSN: [id number]  YOB: 1970                    AGE: 46 y.o. SEX: female    Laurie Mccarthy MD    POST OP OFFICE NOTE  DOS: 1/18/19    Chief Complaint:   Chief Complaint   Patient presents with    Foot Pain     Left foot pain, f/u appt. HPI:     The patient is a 46 y.o. female who presents today for follow up s/p: ORIF left ankle completed in [place] on 12/3/18 and subsequent I&D left ankle on 1/18/19. Patient has been mostly NWB to the left lower extremity. Patient reports doing well other than post op pain. Patient denies any fever, chills, chest pain, shortness of breath or calf pain. There are no new illness or injuries to report since last seen in the office. Patient is on Eliquis for DVT/PE. Patient reports some neuropathic pain along her left foot/ankle. She states that she is taking Gabapentin 600 mg tabs TID which is no longer helping. Patient states that when she was in [facility] she was taking Lyrica which worked best for her. She has an appointment with Dr. Jayden Hooper tomorrow and will discuss possible medication change. PHYSICAL EXAM:     Visit Vitals  BP (!) 181/114 (BP 1 Location: Left arm, BP Patient Position: Sitting)   Pulse 77   Temp 97.8 °F (36.6 °C) (Oral)   Resp 16   Ht 6' (1.829 m)   Wt (!) 358 lb (162.4 kg)   SpO2 96%   BMI 48.55 kg/m²         Pain Scale: 3/10      GEN:  Alert, well developed, well nourished, well appearing 46 y.o. female in no acute distress. PSYCH:  Normal affect, mood, and conduct. alert, oriented x 3 alert, oriented x 3, no dementia  M/S EXAMINATION OF: left foot/ankle  INCISION: Incision looks good, skin well healed  SKIN: moderate edema and adipose tissue , no erythema, no  ecchymosis, no warmth    TENDERNESS:  mild tenderness to palpation   NEUROVASCULAR:  grossly intact. Positive distal pulses and capillary refill.    DVT ASSESSMENT:  The calf is not tender to palpation. No evidence of DVT seen on physical exam.  ROM: improved       RADIOGRAPHS & DIAGNOSTIC STUDIES     X-rays of the left ankle reveal post op changes s/p I&D left ankle. Hardware from prior ORIF left ankle with syndesmotic stabilization in good position with no indication of failure or breakage. There is slight healing noted. Moderate soft tissue swelling noted    IMPRESSION:     Encounter Diagnoses     ICD-10-CM ICD-9-CM   1. Acute left ankle pain M25.572 719.47   2. Closed left ankle fracture, with delayed healing, subsequent encounter S82.892G V54.19   3. Status post open reduction with internal fixation (ORIF) of fracture of ankle Z96.7 V45.89    Z87.81 V15.51       PLAN:         Orders Placed This Encounter    Generic Supply Order    [[id number]] Ankle Min 3V    REFERRAL TO PHYSICAL THERAPY            · Continue activity modification    · Order provided for ankle brace    · Weight bearing status:  weight bearing to the surgical extremity in CAM boot using walker. You may wean  CAM boot to supportive sneaker once brace has been received. · Continue range of motion exercises at home    · Order provided for formal PT    · No lifting, twisting, squatting, deep bending, driving or strenuous activity. · Please follow up in 4 weeks     · Continue taking Eliquis as directed          Patient has been discussed with Dr. Inna Hernandez during this visit and he agrees with the assessment and plan    Patient expresses understanding of the plan. Patient education provided on post surgical care. REVIEW OF SYSTEMS:     Otherwise as noted in HPI      PAST MEDICAL HISTORY:     Past Medical History:   Diagnosis Date    [facility]     Diabetes ([facility]     HTN (hypertension)        MEDICATIONS:     Current Outpatient Medications   Medication Sig    insulin glargine (LANTUS,BASAGLAR) 100 unit/mL (3 mL) inpn 30 Units by SubCUTAneous route nightly.     tiZANidine (ZANAFLEX) 4 mg tablet Take 4 mg by mouth three (3) times daily as needed for Other (muscle spasms).  ondansetron hcl (ZOFRAN) 4 mg tablet Take 1 Tab by mouth every eight (8) hours as needed for Nausea.  hydrALAZINE (APRESOLINE) 100 mg tablet Take 1 Tab by mouth three (3) times daily.  SITagliptin (JANUVIA) 100 mg tablet Take 1 Tab by mouth daily.  metoprolol tartrate (LOPRESSOR) 50 mg tablet Take 1 Tab by mouth two (2) times a day.  apixaban (ELIQUIS) 5 mg tablet Take 1 Tab by mouth two (2) times a day.  cloNIDine HCl (CATAPRES) 0.1 mg tablet Take 1 Tab by mouth every eight (8) hours as needed.  gabapentin (NEURONTIN) 300 mg capsule Take 1 Cap by mouth three (3) times daily.  hydroCHLOROthiazide (HYDRODIURIL) 25 mg tablet Take 1 Tab by mouth daily.  pantoprazole (PROTONIX) 40 mg tablet Take 1 Tab by mouth Before breakfast and dinner.  potassium chloride (K-DUR, KLOR-CON) 20 mEq tablet Take 2 Tabs by mouth daily.  naloxone (NARCAN) 2 mg/actuation spry Use 1 spray intranasally, then discard. Repeat with new spray every 2 min as needed for opioid overdose symptoms, alternating nostrils.  losartan (COZAAR) 100 mg tablet Take 100 mg by mouth daily.  insulin lispro (HUMALOG U-100 INSULIN) 100 unit/mL injection by SubCUTAneous route Before breakfast, lunch, dinner and at bedtime.  insulin detemir U-100 (LEVEMIR U-100 INSULIN) 100 unit/mL injection 30 Units by SubCUTAneous route nightly.  azaTHIOprine (IMURAN) 50 mg tablet Take 200 mg by mouth daily.  adalimumab (HUMIRA) 40 mg/0.8 mL injection 40 mg by SubCUTAneous route every seven (7) days.  atorvastatin (LIPITOR) 20 mg tablet Take 1 Tab by mouth nightly.  cephALEXin (KEFLEX) 500 mg capsule Take 1 Cap by mouth four (4) times daily. No current facility-administered medications for this visit. ALLERGIES:     No Known Allergies      PAST SURGICAL HISTORY:     History reviewed. No pertinent surgical history.     SOCIAL HISTORY:     Social History Socioeconomic History    Marital status: UNKNOWN     Spouse name: Not on file    Number of children: Not on file    Years of education: Not on file    Highest education level: Not on file   Occupational History    Not on file   Social Needs    Financial resource strain: Not on file    Food insecurity:     Worry: Not on file     Inability: Not on file    Transportation needs:     Medical: Not on file     Non-medical: Not on file   Tobacco Use    Smoking status: Never Smoker    Smokeless tobacco: Never Used   Substance and Sexual Activity    Alcohol use: Not on file    Drug use: Not on file    Sexual activity: Not on file   Lifestyle    Physical activity:     Days per week: Not on file     Minutes per session: Not on file    Stress: Not on file   Relationships    Social connections:     Talks on phone: Not on file     Gets together: Not on file     Attends Faith service: Not on file     Active member of club or organization: Not on file     Attends meetings of clubs or organizations: Not on file     Relationship status: Not on file    Intimate partner violence:     Fear of current or ex partner: Not on file     Emotionally abused: Not on file     Physically abused: Not on file     Forced sexual activity: Not on file   Other Topics Concern    Not on file   Social History Narrative    Not on file       FAMILY HISTORY:     History reviewed. No pertinent family history.         Ernst Arevalo PA-C  4/29/2019

## 2019-04-29 NOTE — PATIENT INSTRUCTIONS
· Continue activity modification    · Order provided for ankle brace    · Weight bearing status:  weight bearing to the surgical extremity in CAM boot using walker. You may wean  CAM boot to supportive sneaker once brace has been received. · Continue range of motion exercises at home    · Order provided for formal PT    · No lifting, twisting, squatting, deep bending, driving or strenuous activity.     · Please follow up in 4 weeks     · Continue taking Eliquis as directed

## 2019-04-30 ENCOUNTER — APPOINTMENT (OUTPATIENT)
Dept: PHYSICAL THERAPY | Age: 49
End: 2019-04-30

## 2019-05-16 ENCOUNTER — OFFICE VISIT (OUTPATIENT)
Dept: SURGERY | Age: 49
End: 2019-05-16

## 2019-05-16 VITALS
TEMPERATURE: 97.3 F | WEIGHT: 293 LBS | OXYGEN SATURATION: 96 % | HEART RATE: 80 BPM | BODY MASS INDEX: 41.95 KG/M2 | RESPIRATION RATE: 20 BRPM | HEIGHT: 70 IN

## 2019-05-16 DIAGNOSIS — I10 ESSENTIAL HYPERTENSION: ICD-10-CM

## 2019-05-16 DIAGNOSIS — I26.99 OTHER ACUTE PULMONARY EMBOLISM WITHOUT ACUTE COR PULMONALE (HCC): ICD-10-CM

## 2019-05-16 DIAGNOSIS — E66.01 OBESITY, MORBID (HCC): ICD-10-CM

## 2019-05-16 DIAGNOSIS — E11.9 CONTROLLED TYPE 2 DIABETES MELLITUS WITHOUT COMPLICATION, WITH LONG-TERM CURRENT USE OF INSULIN (HCC): ICD-10-CM

## 2019-05-16 DIAGNOSIS — Z79.4 CONTROLLED TYPE 2 DIABETES MELLITUS WITHOUT COMPLICATION, WITH LONG-TERM CURRENT USE OF INSULIN (HCC): ICD-10-CM

## 2019-05-16 DIAGNOSIS — K50.119 CROHN'S DISEASE OF COLON WITH COMPLICATION (HCC): ICD-10-CM

## 2019-05-16 DIAGNOSIS — E66.01 OBESITY, MORBID (HCC): Primary | ICD-10-CM

## 2019-05-16 RX ORDER — CITALOPRAM 20 MG/1
1 TABLET, FILM COATED ORAL
COMMUNITY
Start: 2019-02-12 | End: 2019-06-27 | Stop reason: ALTCHOICE

## 2019-05-16 NOTE — PROGRESS NOTES
Initial Consultation for Bariatric Surgery Template (Gastric Sleeve)    Marsha Grande is a 46 y.o. female who comes into the office today for initial consultation for the surgical options for the treatment of morbid obesity. The patient initially identified obesity at the age of 13 and at age 23 weighed 235lbs. She has tried a variety of unsupervised weight-loss attempts including self imposed, Weight Watchers, registered dietician, Rod[address], Olaf Carlin, Nutrisystem and Atkins, but has yet to meet with lasting success. Maximum weight lost on a diet is about 35 lbs, but that the weight loss always seems to return. She drinks sugared beverages primarily, fruit juice, sweet tea and sodas, eats starches and sugars. Today, the patient is  Height: 5' 10\" (177.8 cm) tall, Weight: (!) 160.1 kg (353 lb) lbs for a Body mass index is 50.65 kg/m². It is due to the patient's severe obesity, which is further complicated by adult onset diabetes mellitus, hypertension, hyperlipidemia and pulmonary embolus  that the patient is now seeking out bariatric surgery, specifically, sleeve gastrectomy. Past Medical History:   Diagnosis Date    Crohn's colitis ([address]     Diabetes ([address]     HTN (hypertension)     Hypercholesterolemia     Pulmonary embolism (HCC)     Stool color black     crohns       Past Surgical History:   Procedure Laterality Date    HX ORTHOPAEDIC      fx left ankle    HX TUBAL LIGATION      btl       Current Outpatient Medications   Medication Sig Dispense Refill    insulin glargine (LANTUS,BASAGLAR) 100 unit/mL (3 mL) inpn 30 Units by SubCUTAneous route nightly.  ondansetron hcl (ZOFRAN) 4 mg tablet Take 1 Tab by mouth every eight (8) hours as needed for Nausea. 30 Tab 0    hydrALAZINE (APRESOLINE) 100 mg tablet Take 1 Tab by mouth three (3) times daily. 90 Tab 0    SITagliptin (JANUVIA) 100 mg tablet Take 1 Tab by mouth daily.  30 Tab 0    metoprolol tartrate (LOPRESSOR) 50 mg tablet Take 1 Tab by mouth two (2) times a day. 60 Tab 0    apixaban (ELIQUIS) 5 mg tablet Take 1 Tab by mouth two (2) times a day. 60 Tab 2    atorvastatin (LIPITOR) 20 mg tablet Take 1 Tab by mouth nightly. 30 Tab 0    cloNIDine HCl (CATAPRES) 0.1 mg tablet Take 1 Tab by mouth every eight (8) hours as needed. 90 Tab 0    gabapentin (NEURONTIN) 300 mg capsule Take 1 Cap by mouth three (3) times daily. 90 Cap 0    hydroCHLOROthiazide (HYDRODIURIL) 25 mg tablet Take 1 Tab by mouth daily. 30 Tab 0    pantoprazole (PROTONIX) 40 mg tablet Take 1 Tab by mouth Before breakfast and dinner. 30 Tab 0    losartan (COZAAR) 100 mg tablet Take 100 mg by mouth daily.  insulin lispro (HUMALOG U-100 INSULIN) 100 unit/mL injection by SubCUTAneous route Before breakfast, lunch, dinner and at bedtime.  azaTHIOprine (IMURAN) 50 mg tablet Take 200 mg by mouth daily.  adalimumab (HUMIRA) 40 mg/0.8 mL injection 40 mg by SubCUTAneous route every seven (7) days.          No Known Allergies    Social History     Tobacco Use    Smoking status: Never Smoker    Smokeless tobacco: Never Used   Substance Use Topics    Alcohol use: Never     Frequency: Never    Drug use: Never       Family History   Problem Relation Age of Onset    Diabetes Mother     Hypertension Mother     Diabetes Father     Hypertension Father     Heart Disease Father        Family Status   Relation Name Status    Mother  [de-identified]    Father  Alive       Review of Systems:  Positive in BOLD    CONST: Fever, weight loss, fatigue or chills  GI: Nausea, vomiting, abdominal pain, change in bowel habits, hematochezia, melena, and GERD   INTEG: Dermatitis, abnormal moles  HEENT: Recent changes in vision, vertigo, epistaxis, dysphagia and hoarseness  CV: Chest pain, palpitations, HTN, edema and varicosities  RESP: Cough, shortness of breath, wheezing, hemoptysis, snoring and reactive airway disease  : Hematuria, dysuria, frequency, urgency, nocturia and stress urinary incontinence   MS: Weakness, joint pain and arthritis, knees  ENDO: Diabetes, thyroid disease, polyuria, polydipsia, polyphagia, poor wound healing, heat intolerance, cold intolerance  LYMPH/HEME: Anemia, bruising and history of blood transfusions  NEURO: Dizziness, headache, fainting, seizures and stroke  PSYCH: Anxiety and depression      Physical Exam    Visit Vitals  Pulse 80   Temp 97.3 °F (36.3 °C)   Resp 20   Ht 5' 10\" (1.778 m)   Wt (!) 160.1 kg (353 lb)   SpO2 96%   BMI 50.65 kg/m²       Pre op weight: 353  EBW: 204  Wt loss to date: 0       General: 46 y.o.) female in no acute distress. Morbidly obese in abdomen, hips, buttocks - gynecoid pattern  HEENT: Normocephalic, atraumatic, Pupils equal and reactive, nasopharynx clear, oropharynx clear and moist without lesions  NECK: Supple, no lymphadenopathy, thyromegaly, carotid bruits or jugular venous distension. trachea midline  RESP: Clear to auscultation bilaterally, no wheezes, rhonchi, or rales, normal respiratory excursion  CV: Regular rate and rhythm, no murmurs, rubs or gallops. 3+/4 pulses in bilateral dorsalis pedis and posterior tibialis. No distal edema or varicosities. ABD: Soft, nontender, nondistended, normoactive bowel sounds, no hernias, no hepatosplenomegaly, easily palpable costal margins, gynecoid distribution  Extremities: Warm, well perfused, no tenderness or swelling, normal gait/station, healed left ankle scar  Neuro: Sensation and strength grossly intact and symmetrical  Psych: Alert and oriented to person, place, and time. Impression:    Ramon Quiñones is a 46 y.o. female who is suffering from morbid obesity with a BMI of 51  and comorbidities including adult onset diabetes mellitus, hypertension, hyperlipidemia, GERD and weight related arthopathies  who would benefit from bariatric surgery. We have had an extensive discussion with regard to the risks, benefits and likely outcomes of the operation.  We've discussed the restrictive and malabsorptive nature of the gastric bypass and compared and contrasted with the sleeve gastrectomy. The patient understands the likelihood of losing approximately 80% of their excess weight in 12 to 18 months. She also understands the risks including but not limited to bleeding, infection, need for reoperation, ulcers, leaks and strictures, bowel obstruction secondary to adhesions and internal hernias, DVT, PE, heart attack, stroke, and death. Patient also understands risks of inadequate weight loss, excess weight loss, vitamin insufficiency, protein malnutrition, excess skin, and loss of hair. We have reviewed the components of a successful postoperative course including requirement for a high protein, low carbohydrate diet, 60 oz a day of zero calorie liquids, daily vitamin supplementation, daily exercise, regular follow-up, and participation in support groups. At this time we will enroll the patient in our bariatric program, undertake routine laboratory evaluation, chest X-ray, EKG, possible UGI and evaluation by  nutritionist as well as psychologist and pending their satisfactory completion of the preop evaluation, plan to perform a sleeve gastrectomy. She will need careful GI clearance specifically regarding ability to hold immune modulators and that there is no evidence of upper intestinal Crohn's at the time of surgery. She will also need a hypercoag work-up as well.

## 2019-05-16 NOTE — LETTER
5/16/19 Patient: Brenda Rodriguez YOB: 1970 Date of Visit: 5/16/2019 Sudarshan Tobias MD 
[address] [place] [id number] VIA Facsimile: [phone] Dear Sudarshan Tobias MD, Thank you for referring Ms. Adele Sandhu to [place] [address] for evaluation. My notes for this consultation are attached. If you have questions, please do not hesitate to call me. I look forward to following your patient along with you.  
 
 
Sincerely, 
 
Azalia Shelton MD

## 2019-05-16 NOTE — PROGRESS NOTES
Chief Complaint   Patient presents with    Advice Only     confirmed video     Pt ID confirmed    Weight Loss Metrics 5/16/2019 5/16/2019 4/29/2019 4/1/2019 3/11/2019 2/12/2019 1/28/2019   Pre op / Initial Wt 353 - - - - - -   Today's Wt - 353 lb 358 lb - 318 lb 318 lb 318 lb   BMI - 50.65 kg/m2 48.55 kg/m2 43.13 kg/m2 43.13 kg/m2 43.13 kg/m2 43.13 kg/m2   Ideal Body Wt 149 - - - - - -   Excess Body Wt 204 - - - - - -   Goal Wt 190 - - - - - -   Wt loss to date 0 - - - - - -   % Wt Loss 0 - - - - - -   80% .2 - - - - - -       Body mass index is 50.65 kg/m².

## 2019-05-23 ENCOUNTER — DOCUMENTATION ONLY (OUTPATIENT)
Dept: BARIATRICS/WEIGHT MGMT | Age: 49
End: 2019-05-23

## 2019-05-23 ENCOUNTER — HOSPITAL ENCOUNTER (OUTPATIENT)
Dept: BARIATRICS/WEIGHT MGMT | Age: 49
Discharge: HOME | End: 2019-05-23

## 2019-05-23 NOTE — PROGRESS NOTES
[address] [facility] [address], [address]    Patient's Name: Leroy Fischer   Age: 46 y.o. YOB: 1970   Sex: female    Date:   5/23/2019    Insurance:              Session: 1 of 6  Revision:     Surgeon:  Dr. Elroy Stern    Height: 5 f 10   Weight:    348      Lbs. BMI: 50   Pounds Lost since last month: 5                 Pounds Gained since last month: 0    Starting Weight: 353     Previous Months Weight: 353  Overall Pounds Lost: 5   Overall Pounds Gained: 0    Do you smoke? None    Alcohol intake:  Number of drinks at a time:  None  Number of times a week: None    Class Guidelines    Guidelines are reviewed with patient at the start of every class. 1. Patient understands that weight loss trial classes must be consecutive. Patient understands if they miss a class, it is their responsibility to contact me to reschedule class. I will reach out to patient after their first no show. 2.  Patient understands the expectations that weight maintenance/weight loss is expected during the classes. Failure to demonstrate changes may result in one extra month of weight loss trial, followed by going back to see the surgeon. Patient understands that they CAN NOT gain any weight during the weight loss trial.  Gaining weight will result in extra classes. 3. Patient is also instructed to be doing their labs, blood work, psych visit, support group and any other test that the surgeon has used while they are working on their weight loss trial.  4.  Patient was instructed to bring their blue binder to every class and appointment. Other Pertinent Information:     Changes Made Since Last Class: Less soda. Less sugar. Eating Habits and Behaviors      Today in class we talked about the key diet principles.   We start off each class talking about these principles, which include cutting out liquid calories and focusing on water or other non-calorie, non-carbonated drinks. We also spent time talking about carbohydrates, including foods that have carbohydrates and the goal to keep daily carbohydrates under 100 grams per day. Patient was given ideas of meal and snack choices that are lower in carbohydrates and focus more on protein. Patient was encouraged to start trying protein shakes and was given a list of suggestions. The main topic of class today was: Portion Control. We reviewed in class a power point filled with tips on ways to control portions, including using smaller plates, boxing up portions at a restaurant before starting to eat, and not eating from the container, but rather portioning snacks into smaller bags. Patient's were encouraged to food journal, which helps increase awareness of what and how much they are eating. It was emphasized to patient the importance of reading labels and portion sizes, but also applying these portion sizes. Patient was given a list of items that can help to make portion control easier. For example, a deck of cards or a palm of a hand is a proper portion of meat, a fist is a cup or a proper serving of vegetables. Patient was given 10 tips to help with the portion control. Patient's current diet habits include: 3 meals per day. Patient is eating eggs and barr for breakfast.  Lunch is sandwich or a small salad. [place] is some type of meat and vegetable. She is snacking on chips or ice cream, which I have addressed with her. She is drinking 40 ounces of water, 16 ounces of soda and understands the need to stop liquid calories. Physical Activity/Exercise    Comments:     Currently for exercise, patient is not doing anything. Goals were reviewed. Patient was given a list of ideas for activity and was encouraged to incorporate 30 minutes a day into their daily routine. Behavior Modification       Comments: In class, we also focused on the behavior aspects of weight management. This includes being a mindful eater and not eating in front of the TV. Patient is also encouraged to take 20 minutes to eat a meal and eat at a table. One goal for next month includes:  1. Cut out late night snacking. 2. Exercise more.       Mary Alice King [address]  5/23/2019

## 2019-06-05 ENCOUNTER — OFFICE VISIT (OUTPATIENT)
Dept: ORTHOPEDIC SURGERY | Age: 49
End: 2019-06-05

## 2019-06-05 VITALS
BODY MASS INDEX: 41.95 KG/M2 | WEIGHT: 293 LBS | HEIGHT: 70 IN | TEMPERATURE: 98.1 F | DIASTOLIC BLOOD PRESSURE: 84 MMHG | SYSTOLIC BLOOD PRESSURE: 143 MMHG | RESPIRATION RATE: 16 BRPM | OXYGEN SATURATION: 94 % | HEART RATE: 61 BPM

## 2019-06-05 DIAGNOSIS — Z87.81 STATUS POST OPEN REDUCTION WITH INTERNAL FIXATION (ORIF) OF FRACTURE OF ANKLE: ICD-10-CM

## 2019-06-05 DIAGNOSIS — S82.892G CLOSED LEFT ANKLE FRACTURE, WITH DELAYED HEALING, SUBSEQUENT ENCOUNTER: ICD-10-CM

## 2019-06-05 DIAGNOSIS — Z98.890 STATUS POST OPEN REDUCTION WITH INTERNAL FIXATION (ORIF) OF FRACTURE OF ANKLE: ICD-10-CM

## 2019-06-05 DIAGNOSIS — M25.572 ACUTE LEFT ANKLE PAIN: Primary | ICD-10-CM

## 2019-06-05 RX ORDER — TRAMADOL HYDROCHLORIDE 50 MG/1
50 TABLET ORAL
Qty: 30 TAB | Refills: 0 | Status: SHIPPED | OUTPATIENT
Start: 2019-06-05 | End: 2019-06-12

## 2019-06-05 NOTE — PROGRESS NOTES
Patient: Guido Enamorado                MRN: [id number]        SSN: [id number]  YOB: 1970                    AGE: 46 y.o. SEX: female    Ricardo Taylor MD    POST OP OFFICE NOTE  DOS: 1/18/19    Chief Complaint:   Chief Complaint   Patient presents with    Foot Pain     left foot pain, f/u appt. HPI:     The patient is a 46 y.o. female who presents today for follow up s/p: ORIF left ankle completed in [place] on 12/3/18 and subsequent I&D left ankle on 1/18/19. Patient has been mostly WBAT to the left lower extremity. Patient states that she has not yet started PT due to a recent Crohn's flare. She called our office for a new PT order and was advised that she needed to be seen for follow up first. She states that she has been completing HEP from info obtained on line. She obtained the ankle splint, but it does not help. She has off/on pain in her ankle and heel. She states that she contacted Fani Song 1949, but the could not see her until July. Patient denies any fever, chills, chest pain, shortness of breath or calf pain. There are no new illness or injuries to report since last seen in the office. She has been wearing OTC compression socks to assist with swelling. PHYSICAL EXAM:     Visit Vitals  /84 (BP 1 Location: Left arm, BP Patient Position: Sitting)   Pulse 61   Temp 98.1 °F (36.7 °C) (Oral)   Resp 16   Ht 5' 10\" (1.778 m)   Wt (!) 355 lb (161 kg)   SpO2 94%   BMI 50.94 kg/m²       Pain Scale: 6/10      GEN:  Alert, well developed, well nourished, well appearing 46 y.o. female in no acute distress. PSYCH:  Normal affect, mood, and conduct. alert, oriented x 3 alert, oriented x 3, no dementia  M/S EXAMINATION OF: left foot/ankle  INCISION: Incision looks good, skin well healed  SKIN: moderate edema and adipose tissue , no erythema, no  ecchymosis, no warmth    TENDERNESS:  mild tenderness to palpation   NEUROVASCULAR:  grossly intact.  Positive distal pulses and capillary refill. DVT ASSESSMENT:  The calf is not tender to palpation. No evidence of DVT seen on physical exam.  ROM: improved       RADIOGRAPHS & DIAGNOSTIC STUDIES     X-rays of the left ankle reveal hardware from prior ORIF left ankle with syndesmotic stabilization in good   position with no indication of failure or breakage. Healing noted. Moderate soft tissue swelling. IMPRESSION:     Encounter Diagnoses     ICD-10-CM ICD-9-CM   1. Acute left ankle pain M25.572 719.47   2. Closed left ankle fracture, with delayed healing, subsequent encounter S82.892G V54.19   3. Status post open reduction with internal fixation (ORIF) of fracture of ankle Z96.7 V45.89    Z87.81 V15.51       PLAN:         Orders Placed This Encounter    Generic Supply Order    AMB POC XRAY, ANKLE; COMPLETE, 3+ VIE    REFERRAL TO PHYSICAL THERAPY    traMADol (ULTRAM) 50 mg tablet           · Continue activity modification    · Order provided for custom orthotic or brace, left    · Weight bearing status:  weight bearing to the surgical extremity in supportive sneaker     · Continue range of motion exercises at home    · Order provided for formal PT    · No lifting, twisting, squatting, deep bending, driving or strenuous activity. · Please follow up in 6 weeks       Patient has been discussed with Dr. Kiana Gabriel during this visit and he agrees with the assessment and plan    Patient expresses understanding of the plan. Patient education provided on post surgical care.       REVIEW OF SYSTEMS:     Otherwise as noted in HPI      PAST MEDICAL HISTORY:     Past Medical History:   Diagnosis Date    [facility] [address]     Diabetes ([facility] [address]     HTN (hypertension)     Hypercholesterolemia     Pulmonary embolism (HCC)     Stool color black     crohns       MEDICATIONS:     Current Outpatient Medications   Medication Sig    traMADol (ULTRAM) 50 mg tablet Take 1 Tab by mouth every six (6) hours as needed for Pain for up to 7 days. Max Daily Amount: 200 mg.  citalopram (CELEXA) 20 mg tablet Take 1 Tab by mouth.  insulin glargine (LANTUS,BASAGLAR) 100 unit/mL (3 mL) inpn 30 Units by SubCUTAneous route nightly.  ondansetron hcl (ZOFRAN) 4 mg tablet Take 1 Tab by mouth every eight (8) hours as needed for Nausea.  hydrALAZINE (APRESOLINE) 100 mg tablet Take 1 Tab by mouth three (3) times daily.  SITagliptin (JANUVIA) 100 mg tablet Take 1 Tab by mouth daily.  metoprolol tartrate (LOPRESSOR) 50 mg tablet Take 1 Tab by mouth two (2) times a day.  apixaban (ELIQUIS) 5 mg tablet Take 1 Tab by mouth two (2) times a day.  atorvastatin (LIPITOR) 20 mg tablet Take 1 Tab by mouth nightly.  cloNIDine HCl (CATAPRES) 0.1 mg tablet Take 1 Tab by mouth every eight (8) hours as needed.  gabapentin (NEURONTIN) 300 mg capsule Take 1 Cap by mouth three (3) times daily.  hydroCHLOROthiazide (HYDRODIURIL) 25 mg tablet Take 1 Tab by mouth daily.  pantoprazole (PROTONIX) 40 mg tablet Take 1 Tab by mouth Before breakfast and dinner.  losartan (COZAAR) 100 mg tablet Take 100 mg by mouth daily.  insulin lispro (HUMALOG U-100 INSULIN) 100 unit/mL injection by SubCUTAneous route Before breakfast, lunch, dinner and at bedtime.  azaTHIOprine (IMURAN) 50 mg tablet Take 200 mg by mouth daily.  adalimumab (HUMIRA) 40 mg/0.8 mL injection 40 mg by SubCUTAneous route every seven (7) days. No current facility-administered medications for this visit.         ALLERGIES:     No Known Allergies      PAST SURGICAL HISTORY:     Past Surgical History:   Procedure Laterality Date    HX ORTHOPAEDIC      fx left ankle    HX TUBAL LIGATION      btl       SOCIAL HISTORY:     Social History     Socioeconomic History    Marital status: SINGLE     Spouse name: Not on file    Number of children: Not on file    Years of education: Not on file    Highest education level: Not on file   Occupational History    Not on file   Social Needs    Financial resource strain: Not on file    Food insecurity:     Worry: Not on file     Inability: Not on file    Transportation needs:     Medical: Not on file     Non-medical: Not on file   Tobacco Use    Smoking status: Never Smoker    Smokeless tobacco: Never Used   Substance and Sexual Activity    Alcohol use: Never     Frequency: Never    Drug use: Never    Sexual activity: Not on file   Lifestyle    Physical activity:     Days per week: Not on file     Minutes per session: Not on file    Stress: Not on file   Relationships    Social connections:     Talks on phone: Not on file     Gets together: Not on file     Attends Islam service: Not on file     Active member of club or organization: Not on file     Attends meetings of clubs or organizations: Not on file     Relationship status: Not on file    Intimate partner violence:     Fear of current or ex partner: Not on file     Emotionally abused: Not on file     Physically abused: Not on file     Forced sexual activity: Not on file   Other Topics Concern    Not on file   Social History Narrative    Not on file       FAMILY HISTORY:     Family History   Problem Relation Age of Onset    Diabetes Mother     Hypertension Mother     Diabetes Father     Hypertension Father     Heart Disease Father            Sarah Brenda Guevara  6/5/2019

## 2019-06-05 NOTE — PATIENT INSTRUCTIONS
· Continue activity modification    · Order provided for custom orthotic or brace, left    · Weight bearing status:  weight bearing to the surgical extremity in supportive sneaker     · Continue range of motion exercises at home    · Order provided for formal PT    · No lifting, twisting, squatting, deep bending, driving or strenuous activity.     · Please follow up in 6 weeks

## 2019-06-05 NOTE — PROGRESS NOTES
1. Have you been to the ER, urgent care clinic since your last visit? Hospitalized since your last visit? NO    2. Have you seen or consulted any other health care providers outside of the [address] [place] [address] since your last visit? Include any pap smears or colon screening.  no

## 2019-06-11 ENCOUNTER — HOSPITAL ENCOUNTER (OUTPATIENT)
Dept: PHYSICAL THERAPY | Age: 49
Discharge: HOME | End: 2019-06-11
Payer: MEDICAID

## 2019-06-11 PROCEDURE — 97110 THERAPEUTIC EXERCISES: CPT

## 2019-06-11 PROCEDURE — 97535 SELF CARE MNGMENT TRAINING: CPT

## 2019-06-11 PROCEDURE — 97161 PT EVAL LOW COMPLEX 20 MIN: CPT

## 2019-06-11 NOTE — PROGRESS NOTES
[facility] [address]  [place], [address]  [phone] fax    Plan of Care/ Statement of Necessity for Physical Therapy Services    Patient name: Ariela Spence Start of Care: 2019   Referral source: Noy Gonzalez : 1970    Medical Diagnosis: Left ankle pain [M25.572]  Payor: [employer]UE CROSS MEDICAID / Plan: [place] / Product Type: Managed Care Medicaid /  Onset Date: 2018    Treatment Diagnosis: left ankle pain and weakness   Prior Hospitalization: see medical history Provider#: [id number]   Medications: Verified on Patient summary List    Comorbidities: diabetes, HTN, crohn's disease, peripheral neuropathy bilaterally from thighs down   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 Assessment/ key information: Pt is a 46year old female who reports she fell and fractured her left ankle in 2018. She since has had 4 surgeries to include initial external fixation, revision, removal with ORIF, and then final surgery 2/2 failure of her wound to close. She reports delayed healing with use of a bone stimulator, but reports most recent x-ray this past week looked good. She has a brace for her ankle upcoming. She c/o impaired balance and heel pain and just began FWB 1 month ago. She presents with mild  Deficits in left ankle ROM, weakness particularly with PF and inv, impaired balance, circumferential ankle edema, and mildly antalgic gait pattern with limited ambulatory activity tolerance 2/2 pain. Pt will benefit from skilled PT management to address their impairments and improve their level of function.     Evaluation Complexity History MEDIUM  Complexity : 1-2 comorbidities / personal factors will impact the outcome/ POC ; Examination MEDIUM Complexity : 3 Standardized tests and measures addressing body structure, function, activity limitation and / or participation in recreation  ;Presentation LOW Complexity : Stable, uncomplicated  ;Clinical Decision Making MEDIUM Complexity : FOTO score of 26-74  Overall Complexity Rating: LOW   Problem List: pain affecting function, decrease ROM, decrease strength, edema affecting function, impaired gait/ balance, decrease ADL/ functional abilitiies, decrease activity tolerance, decrease flexibility/ joint mobility and decrease transfer abilities   Treatment Plan may include any combination of the following: Therapeutic exercise, Therapeutic activities, Neuromuscular re-education, Physical agent/modality, Gait/balance training, Manual therapy, Aquatic therapy, Patient education and Self Care training  Patient / Family readiness to learn indicated by: asking questions, trying to perform skills and interest  Persons(s) to be included in education: patient (P)  Barriers to Learning/Limitations: None  Patient Goal (s): Less pain/balance  Patient Self Reported Health Status: fair  Rehabilitation Potential: fair    Short Term Goals: To be accomplished in 2 weeks:   1. Patient will report performance of HEP at least 2 times per day to facilitate improved outcomes and improved self management. Long Term Goals: To be accomplished in 4 weeks:   1. Patient will report FOTO score of 47 or better to indicate significant improvement in functional status. 2. Pt will demonstrate left ankle DF PROM of 8 degrees or better to improve gait mechanics. 3. Pt will demonstrate left ankle MMTs of 4+/5 or better to improve stability during ambulatory activity. 4. Pt will demonstrate non-antalgic gait mechanics to improve ease of community ambulation. 5. Pt will report little difficulty walking 2 blocks to improve ease of community ambulation. Frequency / Duration: Patient to be seen 2 times per week for 4 weeks.     Patient/ Caregiver education and instruction: Diagnosis, prognosis, self care, activity modification and exercises   [x]  Plan of care has been reviewed with ANABEL Ross PT, DPT, ATC 6/11/2019 5:58 PM    ________________________________________________________________________    I certify that the above Therapy Services are being furnished while the patient is under my care. I agree with the treatment plan and certify that this therapy is necessary.     Physician's Signature:____________Date:_________TIME:________    ** Signature, Date and Time must be completed for valid certification **    Please sign and return to [facility] [address] [place] [address] & [facility]  [address] [place] [address], [facility] [address]  [phone] fax

## 2019-06-11 NOTE — PROGRESS NOTES
PT DAILY TREATMENT NOTE/FOOT AND ANKLE EVAL 10-18    Patient Name: Lili Gu  Date:2019  : 1970  [x]  Patient  Verified  Payor: BLUE CROSS MEDICAID / Plan: Keisharoshnidannie Corral / Product Type: Managed Care Medicaid /    In time: 4:35pm  Out time:5:05pm  Total Treatment Time (min): 30  Visit #: 1 of 8    Medicare/BCBS Only   Total Timed Codes (min):  20 1:1 Treatment Time:  30     Treatment Area: Left ankle pain [M25.572]    SUBJECTIVE  Pain Level (0-10 scale): 4  []constant []intermittent []improving []worsening []no change since onset    Any medication changes, allergies to medications, adverse drug reactions, diagnosis change, or new procedure performed?: [x] No    [] Yes (see summary sheet for update)  Subjective functional status/changes:     Pt reports she fell and fractured her left ankle in 2018. She since has had 4 surgeries to include initial external fixation, revision, removal with ORIF, and then final surgery 2/2 failure of her wound to close. She reports delayed healing with use of a bone stimulator, but reports most recent x-ray this past week looked good. She has a brace for her ankle upcoming. She c/o impaired balance and heel pain and just began FWB 1 month ago.       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  Limitations to PLOF: limited ambulation tolerance 2/2 start up heel pain after prolonged sitting  Mechanism of Injury: see above  Current symptoms/Complaints: see above  Previous Treatment/Compliance: see above  PMHx/Surgical Hx: diabetes, HTN, crohn's disease, peripheral neuropathy bilaterally from thighs down  Work Hx: see intake  Living Situation:   Pt Goals: see intake  Barriers: []pain []financial []time []transportation []other  Motivation: appears motivated and cooperative  Substance use: []Alcohol []Tobacco []other:   FABQ Score: []low []elevate  Cognition: A & O x 4    Other:    OBJECTIVE/EXAMINATION  Domestic Life: Activity/Recreational Limitations:   Mobility:   Self Care:     10 min [x]Eval                  []Re-Eval     12 min Therapeutic Exercise:  [] See flow sheet :   Rationale: increase ROM and increase strength to improve the patients ability to improve ease of daily activity and ambulation    Self Care: 8 minutes pt education regarding relevant anatomy, diagnosis, prognosis, plan of care, activity modification          With   [] TE   [] TA   [] neuro   [] other: Patient Education: [x] Review HEP    [] Progressed/Changed HEP based on:   [] positioning   [] body mechanics   [] transfers   [] heat/ice application    [] other:      Other Objective/Functional Measures:    Physical Therapy Evaluation  - Foot and Ankle    Gait: [] Normal    [] Abnormal    [x] Antalgic    [] NWB    Device:  Describe: mild decreased left stance, bilat pez planus, decreased juhi, decreased terminal ankle DF bilat    ROM/Strength  [] Unable to assess at this time      AROM        PROM            Strength (1-5)   Left Right Left Right Left  Right   Dorsiflexion 0 5 2 6 5 5   Plantarflexion 32 37 34 40 3 4   Inversion 28 40 32 42 4 5   Eversion 20 22 22 24 4+ 5   Great Toe Ext         Great Toe Flex           Flexibility: [] Unable to assess at this time  Gastroc:    (L) Tightness [] WNL   [x] Min   [] Mod   [] Severe    (R) Tightness [] WNL   [x] Min   [] Mod   [] Severe  Soleus:    (L) Tightness [] WNL   [x] Min   [] Mod   [] Severe    (R) Tightness [] WNL   [x] Min   [] Mod   [] Severe  Other:      (L) Tightness [] WNL   [] Min   [] Mod   [] Severe    (R) Tightness [] WNL   [] Min   [] Mod   [] Severe    Palpation:   Location: left plantar heel & lateral ankle  Patient's Pain Response: [x] Min   [] Mod   [] Severe  Location:  Patient's Pain Response: [] Min   [] Mod   [] Severe    Optional Tests:  Balance/Stork Test: touches/60sec (L): (R):    Single Leg Hop:   (L) Distance(ft):  (R) Distance(ft):  (L)/(R)%:   (L) Time(sec):  (R) Time(sec): (L)/(R)%:      Sub-talor alignment: [] Neutral     [] Pronation      [] Supination    Forefoot alignment:  [] Neutral     [] Varus            [] Valgus    Swelling:   Left (cm) Right (cm)   Figure 8: 63 60   Midfoot:      Malleoli Level:     MTH:        Anterior Drawer: [] Neg    [] Pos  Posterior Drawer:  [] Neg    [] Pos  Inversion Stress:  [] Neg    [] Pos  Talar Tilt:   [] Neg    [] Pos  Eversion Stress:  [] Neg    [] Pos  Arturo's Sign:  [] Neg    [] Pos  Maxwell Test: [] Neg    [] Pos    Other tests/ comments:  Tandem: multiple LOB in 30 seconds requiring UE assist for balance recovery  SLS: unable 2/2 poor balance    Diminished sensation to light touch throughout  BLEs, burning discomfort with palpation bilat    Pain Level (0-10 scale) post treatment: 4    ASSESSMENT/Changes in Function: PER POC    Patient will continue to benefit from skilled PT services to modify and progress therapeutic interventions, address functional mobility deficits, address ROM deficits, address strength deficits, analyze and address soft tissue restrictions, analyze and cue movement patterns and instruct in home and community integration to attain remaining goals. [x]  See Plan of Care   []  See progress note/recertification  []  See Discharge Summary         Progress towards goals / Updated goals:  Per POC. PLAN   [x]  Upgrade activities as tolerated     [x]  Continue plan of care  []  Update interventions per flow sheet       []  Discharge due to:_  [x]  Other:_ Ankle ROM, Strength, and balance as tolerated.      Awilda Phan, PT, DPT, ATC 6/11/2019  4:38 PM

## 2019-06-14 ENCOUNTER — DOCUMENTATION ONLY (OUTPATIENT)
Dept: ORTHOPEDIC SURGERY | Age: 49
End: 2019-06-14

## 2019-06-14 DIAGNOSIS — S82.892G CLOSED LEFT ANKLE FRACTURE, WITH DELAYED HEALING, SUBSEQUENT ENCOUNTER: Primary | ICD-10-CM

## 2019-06-14 DIAGNOSIS — Z87.81 STATUS POST OPEN REDUCTION WITH INTERNAL FIXATION (ORIF) OF FRACTURE OF ANKLE: ICD-10-CM

## 2019-06-14 DIAGNOSIS — Z98.890 STATUS POST OPEN REDUCTION WITH INTERNAL FIXATION (ORIF) OF FRACTURE OF ANKLE: ICD-10-CM

## 2019-06-14 NOTE — PROGRESS NOTES
Ordering provider for the physical therapy order had to be changed from [place] to Dr. Alvy Blizzard since she has Medicaid.  Order changed

## 2019-06-18 ENCOUNTER — APPOINTMENT (OUTPATIENT)
Dept: PHYSICAL THERAPY | Age: 49
End: 2019-06-18
Payer: MEDICAID

## 2019-06-19 ENCOUNTER — HOSPITAL ENCOUNTER (OUTPATIENT)
Dept: PHYSICAL THERAPY | Age: 49
Discharge: HOME | End: 2019-06-19
Payer: MEDICAID

## 2019-06-19 PROCEDURE — 97140 MANUAL THERAPY 1/> REGIONS: CPT

## 2019-06-19 PROCEDURE — 97110 THERAPEUTIC EXERCISES: CPT

## 2019-06-19 PROCEDURE — 97016 VASOPNEUMATIC DEVICE THERAPY: CPT

## 2019-06-19 NOTE — PROGRESS NOTES
PT DAILY TREATMENT NOTE 10-18    Patient Name: Kamla Smith  Date:2019  : 1970  [x]  Patient  Verified  Payor: BLUE CROSS MEDICAID / Plan: Hayley Marco A / Product Type: Managed Care Medicaid /    In time:3:35  Out time:4:22  Total Treatment Time (min): 47  Visit #: 2 of 8    Medicare/BCBS Only   Total Timed Codes (min):  37 1:1 Treatment Time:  37       Treatment Area: Left ankle pain [M25.572]    SUBJECTIVE  Pain Level (0-10 scale): 5/10  Any medication changes, allergies to medications, adverse drug reactions, diagnosis change, or new procedure performed?: [x] No    [] Yes (see summary sheet for update)  Subjective functional status/changes:   [] No changes reported  Pt reports no new complaints of pain. Pt reports compliance with HEP. OBJECTIVE    Modality rationale: decrease inflammation and decrease pain to improve the patients ability to tolerate ADLs.     Min Type Additional Details    [] Estim:  []Unatt       []IFC  []Premod                        []Other:  []w/ice   []w/heat  Position:  Location:    [] Estim: []Att    []TENS instruct  []NMES                    []Other:  []w/US   []w/ice   []w/heat  Position:  Location:    []  Traction: [] Cervical       []Lumbar                       [] Prone          []Supine                       []Intermittent   []Continuous Lbs:  [] before manual  [] after manual    []  Ultrasound: []Continuous   [] Pulsed                           []1MHz   []3MHz W/cm2:  Location:    []  Iontophoresis with dexamethasone         Location: [] Take home patch   [] In clinic    []  Ice     []  heat  []  Ice massage  []  Laser   []  Anodyne Position:  Location:    []  Laser with stim  []  Other:  Position:  Location:   10 [x]  Vasopneumatic Device Pressure:       [x] lo [] med [] hi   Temperature: [x] lo [] med [] hi   [] Skin assessment post-treatment:  []intact []redness- no adverse reaction    []redness - adverse reaction:       29 min Therapeutic Exercise:  [x] See flow sheet :   Rationale: increase ROM and increase strength to improve the patients ability to tolerate ADLs. 8 min Manual Therapy:  PROM to left ankle   Rationale: decrease pain, increase ROM and increase tissue extensibility to improve functional mobility. With   [] TE   [] TA   [] neuro   [] other: Patient Education: [x] Review HEP    [] Progressed/Changed HEP based on:   [] positioning   [] body mechanics   [] transfers   [] heat/ice application    [] other:      Other Objective/Functional Measures: initiated exercises as per flow sheet. Pain Level (0-10 scale) post treatment: 0/10    ASSESSMENT/Changes in Function: Pt has no adverse reaction to therapeutic exercises. Pt has decreased effusion to left ankle post treatment. Patient will continue to benefit from skilled PT services to modify and progress therapeutic interventions, address functional mobility deficits, address ROM deficits, address strength deficits, analyze and address soft tissue restrictions, analyze and cue movement patterns and analyze and modify body mechanics/ergonomics to attain remaining goals. []  See Plan of Care  []  See progress note/recertification  []  See Discharge Summary         Progress towards goals / Updated goals:  Short Term Goals: To be accomplished in 2 weeks:               1. Patient will report performance of HEP at least 2 times per day to facilitate improved outcomes and improved self management. Long Term Goals: To be accomplished in 4 weeks:               1. Patient will report FOTO score of 47 or better to indicate significant improvement in functional status. 2. Pt will demonstrate left ankle DF PROM of 8 degrees or better to improve gait mechanics. 3. Pt will demonstrate left ankle MMTs of 4+/5 or better to improve stability during ambulatory activity.                4. Pt will demonstrate non-antalgic gait mechanics to improve ease of community ambulation.                5. Pt will report little difficulty     PLAN  []  Upgrade activities as tolerated     [x]  Continue plan of care  []  Update interventions per flow sheet       []  Discharge due to:_  []  Other:_      Nirmala AlamoANABEL 6/19/2019  4:12 PM    Future Appointments   Date Time Provider Janet Minor   6/25/2019  3:30 PM [facility]   6/27/2019  8:30 AM Cara Juan MD [address]   6/27/2019  3:15 PM [facility]ET[facility]   6/27/2019  5:30 PM [address] [facility] [address] [facility]   7/1/2019  9:15 AM Demetrius Hogan MD [address]   7/1/2019  2:15 PM [facility] [address] [address]   7/2/2019  4:00 PM [facility]   7/5/2019  4:00 PM [facility]   7/17/2019  1:45 PM KATHRINE [address]   8/15/2019 10:30 AM Eros Sam, NP [address] [facility] [place]

## 2019-06-20 ENCOUNTER — APPOINTMENT (OUTPATIENT)
Dept: PHYSICAL THERAPY | Age: 49
End: 2019-06-20
Payer: MEDICAID

## 2019-06-25 ENCOUNTER — APPOINTMENT (OUTPATIENT)
Dept: PHYSICAL THERAPY | Age: 49
End: 2019-06-25
Payer: MEDICAID

## 2019-06-26 ENCOUNTER — HOSPITAL ENCOUNTER (OUTPATIENT)
Dept: PHYSICAL THERAPY | Age: 49
Discharge: HOME | End: 2019-06-26
Payer: MEDICAID

## 2019-06-26 PROCEDURE — 97140 MANUAL THERAPY 1/> REGIONS: CPT

## 2019-06-26 PROCEDURE — 97016 VASOPNEUMATIC DEVICE THERAPY: CPT

## 2019-06-26 PROCEDURE — 97110 THERAPEUTIC EXERCISES: CPT

## 2019-06-26 PROCEDURE — 97112 NEUROMUSCULAR REEDUCATION: CPT

## 2019-06-26 NOTE — PROGRESS NOTES
PT DAILY TREATMENT NOTE 10-18    Patient Name: Tg Sylvester  Date:2019  : 1970  [x]  Patient  Verified  Payor: [employer] MEDICAID / Plan: [place] / Product Type: Managed Care Medicaid /    In time:3:29  Out time:4:18  Total Treatment Time (min): 49  Visit #: 3 of 8    Medicare/BCBS Only   Total Timed Codes (min):  39 1:1 Treatment Time:  39       Treatment Area: Left ankle pain [M25.572]    SUBJECTIVE  Pain Level (0-10 scale): 3  Any medication changes, allergies to medications, adverse drug reactions, diagnosis change, or new procedure performed?: [x] No    [] Yes (see summary sheet for update)  Subjective functional status/changes:   [] No changes reported  Pt reports no difficulty after last visit    OBJECTIVE    Modality rationale: decrease inflammation and decrease pain to improve the patients ability to perform daily tasks   Min Type Additional Details    [] Estim:  []Unatt       []IFC  []Premod                        []Other:  []w/ice   []w/heat  Position:  Location:    [] Estim: []Att    []TENS instruct  []NMES                    []Other:  []w/US   []w/ice   []w/heat  Position:  Location:    []  Traction: [] Cervical       []Lumbar                       [] Prone          []Supine                       []Intermittent   []Continuous Lbs:  [] before manual  [] after manual    []  Ultrasound: []Continuous   [] Pulsed                           []1MHz   []3MHz W/cm2:  Location:    []  Iontophoresis with dexamethasone         Location: [] Take home patch   [] In clinic    []  Ice     []  heat  []  Ice massage  []  Laser   []  Anodyne Position:  Location:    []  Laser with stim  []  Other:  Position:  Location:   10 [x]  Vasopneumatic Device Pressure:       [x] lo [] med [] hi   Temperature: [x] lo [] med [] hi   [] Skin assessment post-treatment:  []intact []redness- no adverse reaction    []redness - adverse reaction:       23 min Therapeutic Exercise:  [] See flow sheet :   Rationale: increase ROM and increase strength to improve the patients ability to perform ADLs    8 min Neuromuscular Re-education:  []  See flow sheet :   Rationale: increase strength, improve balance and increase proprioception  to improve the patients ability to perform functional tasks    8 min Manual Therapy:  Left ankle PROM, manual gastroc str   Rationale: decrease pain, increase ROM and increase tissue extensibility to improve functional mobility            With   [] TE   [] TA   [] neuro   [] other: Patient Education: [x] Review HEP    [] Progressed/Changed HEP based on:   [] positioning   [] body mechanics   [] transfers   [] heat/ice application    [] other:      Other Objective/Functional Measures:   Challenged with tandem stance and tandem amb     Pain Level (0-10 scale) post treatment: 0    ASSESSMENT/Changes in Function: Pt req's extra time to steady self with tandem amb with several half steps. Also, several self-corrected LOB with tandem balance. Pt is apprehensive about performing dynamic balance activities due to fear of falling. No pain following treatment today but cont's to amb with antalgic gait. Patient will continue to benefit from skilled PT services to modify and progress therapeutic interventions, address functional mobility deficits, address ROM deficits, address strength deficits, analyze and address soft tissue restrictions, analyze and cue movement patterns and address imbalance/dizziness to attain remaining goals. []  See Plan of Care  []  See progress note/recertification  []  See Discharge Summary         Progress towards goals / Updated goals:  Short Term Goals: To be accomplished in 2 weeks:               1. Patient will report performance of HEP at least 2 times per day to facilitate improved outcomes and improved self management.     Long Term Goals: To be accomplished in 4 weeks:               1.  Patient will report FOTO score of 47 or better to indicate significant improvement in functional status.              2. Pt will demonstrate left ankle DF PROM of 8 degrees or better to improve gait mechanics.              3. Pt will demonstrate left ankle MMTs of 4+/5 or better to improve stability during ambulatory activity.              4. Pt will demonstrate non-antalgic gait mechanics to improve ease of community ambulation.               5.  Pt will report little difficulty walking 2 blocks to improve ease of community ambulation             PLAN  []  Upgrade activities as tolerated     [x]  Continue plan of care  []  Update interventions per flow sheet       []  Discharge due to:_  []  Other:_      Ivan Curry, PTA 6/26/2019  3:48 PM    Future Appointments   Date Time Provider Janet Cartyi   6/27/2019  8:30 AM Farhad Saravia MD [address]   6/27/2019  3:15 PM [facility]   6/27/2019  5:30 PM [facility]   7/1/2019  9:15 AM Joaquin Abraham MD [address]   7/1/2019  2:15 PM [place]na Elizondo [place] [id number] [facility]   7/2/2019  4:00 PM [facility]   7/5/2019  4:00 PM Martha Palafox PTA [facility]   7/17/2019  1:45 PM Vlad Gregg PA-C [id number]   8/15/2019 10:30 AM Latanya Sam, KARISHMA ABRAMS SCHED

## 2019-06-27 ENCOUNTER — DOCUMENTATION ONLY (OUTPATIENT)
Dept: BARIATRICS/WEIGHT MGMT | Age: 49
End: 2019-06-27

## 2019-06-27 ENCOUNTER — OFFICE VISIT (OUTPATIENT)
Dept: NEUROLOGY | Age: 49
End: 2019-06-27

## 2019-06-27 ENCOUNTER — HOSPITAL ENCOUNTER (OUTPATIENT)
Dept: BARIATRICS/WEIGHT MGMT | Age: 49
Discharge: HOME | End: 2019-06-27

## 2019-06-27 VITALS
HEIGHT: 70 IN | OXYGEN SATURATION: 95 % | WEIGHT: 293 LBS | SYSTOLIC BLOOD PRESSURE: 172 MMHG | TEMPERATURE: 98.6 F | HEART RATE: 68 BPM | DIASTOLIC BLOOD PRESSURE: 104 MMHG | RESPIRATION RATE: 16 BRPM | BODY MASS INDEX: 41.95 KG/M2

## 2019-06-27 DIAGNOSIS — E66.01 MORBID OBESITY (HCC): ICD-10-CM

## 2019-06-27 DIAGNOSIS — G47.10 HYPERSOMNIA: ICD-10-CM

## 2019-06-27 DIAGNOSIS — M72.2 PLANTAR FASCIITIS, BILATERAL: ICD-10-CM

## 2019-06-27 DIAGNOSIS — Z79.4 TYPE 2 DIABETES MELLITUS WITH DIABETIC NEUROPATHY, WITH LONG-TERM CURRENT USE OF INSULIN (HCC): Primary | ICD-10-CM

## 2019-06-27 DIAGNOSIS — E11.40 TYPE 2 DIABETES MELLITUS WITH DIABETIC NEUROPATHY, WITH LONG-TERM CURRENT USE OF INSULIN (HCC): Primary | ICD-10-CM

## 2019-06-27 DIAGNOSIS — G47.8 UPPER AIRWAY RESISTANCE SYNDROME: ICD-10-CM

## 2019-06-27 RX ORDER — DULOXETIN HYDROCHLORIDE 60 MG/1
60 CAPSULE, DELAYED RELEASE ORAL DAILY
Qty: 60 CAP | Refills: 3 | Status: SHIPPED | OUTPATIENT
Start: 2019-06-27 | End: 2020-08-12

## 2019-06-27 NOTE — PROGRESS NOTES
HPI:  73-year-old female referred by Dr. Alexis Campa for evaluation of left foot pain. She needed for surgeries including an initial external fixation, a revision, a removal of hardware with open reduction and internal fixation, and then a surgery on February 2 because of failure of a wound to close. She has been wearing an ankle splint which has not helped her much. She reports before moving from [place] she had severe pain in the legs, from both knees down. In [place] she feels like she is walking on fire. She was taking Lyrica 300mg bid along with percocet at a pain clinic in [place], but now is not taking neither med and is on gabapentin at 800mg tid (was at one point at 3200mg every day), but it is not helping. She had a prescription for Cymbalta at one point that she never started. In addition to the pain she has been below the knees bilaterally, she describes this pain which is quite severe in the sole of her feet which is worse upon first stepping out of bed in the morning. She saw her podiatrist who told her that this was from neuropathy. She has a history of insulin requiring diabetes. 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 She takes steroids sometimes for Chron's disease. She recently had a consultation for consideration of surgical options for treatment of morbid obesity. She does not know if she snores, but she is tired, fatigued, sleepy all the time.      Social History     Socioeconomic History    Marital status: SINGLE     Spouse name: Not on file    Number of children: Not on file    Years of education: Not on file    Highest education level: Not on file   Occupational History    Not on file   Social Needs    Financial resource strain: Not on file    Food insecurity:     Worry: Not on file     Inability: Not on file    Transportation needs: Medical: Not on file     Non-medical: Not on file   Tobacco Use    Smoking status: Never Smoker    Smokeless tobacco: Never Used   Substance and Sexual Activity    Alcohol use: Never     Frequency: Never    Drug use: Never    Sexual activity: Not on file   Lifestyle    Physical activity:     Days per week: Not on file     Minutes per session: Not on file    Stress: Not on file   Relationships    Social connections:     Talks on phone: Not on file     Gets together: Not on file     Attends [employer]ism service: Not on file     Active member of club or organization: Not on file     Attends meetings of clubs or organizations: Not on file     Relationship status: Not on file    Intimate partner violence:     Fear of current or ex partner: Not on file     Emotionally abused: Not on file     Physically abused: Not on file     Forced sexual activity: Not on file   Other Topics Concern    Not on file   Social History Narrative    Not on file       Family History   Problem Relation Age of Onset    Diabetes Mother     Hypertension Mother     Diabetes Father     Hypertension Father     Heart Disease Father        Current Outpatient Medications   Medication Sig Dispense Refill    citalopram (CELEXA) 20 mg tablet Take 1 Tab by mouth.  insulin glargine (LANTUS,BASAGLAR) 100 unit/mL (3 mL) inpn 30 Units by SubCUTAneous route nightly.  ondansetron hcl (ZOFRAN) 4 mg tablet Take 1 Tab by mouth every eight (8) hours as needed for Nausea. 30 Tab 0    hydrALAZINE (APRESOLINE) 100 mg tablet Take 1 Tab by mouth three (3) times daily. 90 Tab 0    SITagliptin (JANUVIA) 100 mg tablet Take 1 Tab by mouth daily. 30 Tab 0    metoprolol tartrate (LOPRESSOR) 50 mg tablet Take 1 Tab by mouth two (2) times a day. 60 Tab 0    apixaban (ELIQUIS) 5 mg tablet Take 1 Tab by mouth two (2) times a day. 60 Tab 2    atorvastatin (LIPITOR) 20 mg tablet Take 1 Tab by mouth nightly.  30 Tab 0    cloNIDine HCl (CATAPRES) 0.1 mg tablet Take 1 Tab by mouth every eight (8) hours as needed. 90 Tab 0    gabapentin (NEURONTIN) 300 mg capsule Take 1 Cap by mouth three (3) times daily. 90 Cap 0    pantoprazole (PROTONIX) 40 mg tablet Take 1 Tab by mouth Before breakfast and dinner. 30 Tab 0    losartan (COZAAR) 100 mg tablet Take 100 mg by mouth daily.  insulin lispro (HUMALOG U-100 INSULIN) 100 unit/mL injection by SubCUTAneous route Before breakfast, lunch, dinner and at bedtime.  azaTHIOprine (IMURAN) 50 mg tablet Take 200 mg by mouth daily.  adalimumab (HUMIRA) 40 mg/0.8 mL injection 40 mg by SubCUTAneous route every seven (7) days.  hydroCHLOROthiazide (HYDRODIURIL) 25 mg tablet Take 1 Tab by mouth daily.  27 Tab 0       Past Medical History:   Diagnosis Date    [facility]     Diabetes [facility]     HTN (hypertension)     Hypercholesterolemia     Pulmonary embolism (HCC)     Stool color black     crohns       Past Surgical History:   Procedure Laterality Date    HX ORTHOPAEDIC      fx left ankle    HX TUBAL LIGATION      btl       No Known Allergies    Patient Active Problem List   Diagnosis Code    Pulmonary emboli (HCC) I26.99    Accelerated hypertension I10    Crohn's colitis ([address] K50.10    Wound of left ankle S91.002A    Status post open reduction with internal fixation (ORIF) of fracture of ankle Z96.7, Z87.81    Dehiscence of incision, initial encounter T81.31XA    Obesity, morbid ([address] E66.01    Controlled type 2 diabetes mellitus without complication, with long-term current use of insulin ([address] E11.9, Z79.4    Essential hypertension I10    BMI 50.0-59.9, adult ([address] Z68.43         Review of Systems:   Constitutional: no fever or chills, reports fatigue  Skin denies rash or itching  HEENT:  Denies tinnitus, hearing loss, or visual changes  Respiratory: denies shortness of breath  Cardiovascular: denies chest pain, dyspnea on exertion  Gastrointestinal: does not report nausea or vomiting  Genitourinary: does not report dysuria or incontinence  Musculoskeletal: Reports joint pain  Endocrine: denies weight change  Hematology: denies easy bruising or bleeding   Neurological: as above in HPI      PHYSICAL EXAMINATION:         Vital signs:    Visit Vitals  BP (!) 172/104 (BP 1 Location: Left arm, BP Patient Position: Sitting)   Pulse 68   Temp 98.6 °F (37 °C) (Oral)   Resp 16   Ht 5' 10\" (1.778 m)   Wt 156.9 kg (346 lb)   SpO2 95%   BMI 49.65 kg/m²         GENERAL:                  Well developed, obese, in no apparent distress. HEART:                       RR, no murmurs  EXTREMITIES:           No edema is identified. Pulses are +2. Tenderness to palpation at the origin of both plantar fascia. HEAD:                         Normocephalic, atraumatic, Mallampati 4, crowded oropharynx. NEUROLOGIC EXAMINATION       MENTAL STATUS:     Awake, alert, and oriented x 4. Attention and STM are grossly normal. There is no aphasia. Fund of knowledge is adequate. Mood and affect are appropriate  CRANIAL NERVES:   Visual fields are full to confrontation. Pupils are reactive to light and accommodation. Fundi are normal.   Extraocular movements are intact and there is no nystagmus. Facial sensation is normal  Face is symmetrical.   Hearing is present. SCM/TPZ 5/5  Tongue protrudes midline, palate elevates symmetrically. MOTOR:          5/5 strength throughout, normal tone. CEREBELLAR:           No tremors or dysmetria     SENSORY:      Decreased distal pinprick and vibration up to above ankle level bilaterally, normal distal toe proprioception. Romberg is negative.                  DTR's:                         absent bilateral ankle jerks, no long tract signs                 GAIT:                           Normal gait      Impression: Symmetric severe pain in a stocking distribution up to the knees is secondary to polyneuropathy which is suboptimally controlled on gabapentin. I advised her to avoid opiates, as this will complicate the long-term treatment. Lyrica may be an option we can go back to, but given her weight issues and weight promoting qualities of both Lyrica and Neurontin, and the fact that she is on citalopram, I think  it makes more sense to first substitute the citalopram for duloxetine. I advised her that the pain that she has upon first standing on her feet from bed is clearly from plantar fasciitis and will not respond to any neuropathic pain medications. Furthermore, she almost surely has obstructive sleep apnea associated with a florid metabolic syndrome and drug-resistant hypertension, and it is important that we look at this in anticipation of potential weight loss surgery. Plan: 1-duloxetine 60 mg every morning for a week, then twice a day. 2- discontinue citalopram.    3-attended overnight PSG. 4-Counseled pt at length about obstructive sleep apnea evaluation, treatment options, weight loss as appropriate, and consequences of untreated CLARE. 5-advised her to follow-up with podiatry for reevaluation of the plantar fasciitis aspects of her foot pain. In the meantime suggested procuring a [place] sock for nocturnal use. 6- we will see her after her sleep study. PLEASE NOTE:   Portions of this document may have been produced using voice recognition software. Unrecognized errors in transcription may be present. This note will not be viewable in [address]

## 2019-06-27 NOTE — PROGRESS NOTES
[address] Glenn Lazcano presents today for   Chief Complaint   Patient presents with   [facility] Establish Care    Leg Pain         Visit Vitals  BP (!) 172/104 (BP 1 Location: Left arm, BP Patient Position: Sitting)   Pulse 68   Temp 98.6 °F (37 °C) (Oral)   Resp 16   Ht 5' 10\" (1.778 m)   Wt 346 lb (156.9 kg)   SpO2 95%   BMI 49.65 kg/m²         Advance Directive:  1. Do you have an advance directive in place? Patient Reply: No    2. If not, would you like material regarding how to put one in place? Patient Reply: NO    Coordination of Care:  1. Have you been to the ER, urgent care clinic since your last visit? Hospitalized since your last visit?  No

## 2019-06-27 NOTE — PROGRESS NOTES
[address]    Patient's Name: Cristal Nunez   Age: 46 y.o. YOB: 1970   Sex: female    Date:   6/27/2019    Insurance:            Session: 2 of 6  Revision:   Surgeon:  Dr. Sakina Dong    Height: 5 f 10 Weight:    345      Lbs. BMI: 49.6   Pounds Lost since last month: 3               Pounds Gained since last month: 0    Starting Weight: 353   Previous Months Weight: 348  Overall Pounds Lost: 8 Overall Pounds Gained: 0      Do you smoke? None    Alcohol intake:  Number of drinks at a time:  None  Number of times a week: None    Class Guidelines    Guidelines are reviewed with patient at the start of every class. 1. Patient understands that weight loss trial classes must be consecutive. Patient understands if they miss a class, it is their responsibility to contact me to reschedule class. I will reach out to patient after their first no show. 2.  Patient understands the expectations that weight maintenance/weight loss is expected during the classes. Failure to demonstrate changes may result in one extra month of weight loss trial, followed by going back to see the surgeon. Patient understands that they CAN NOT gain any weight during the weight loss trial.  Gaining weight will result in extra classes. 3. Patient is also instructed to be doing their labs, blood work, psych visit, support group and any other test that the surgeon has used while they are working on their weight loss trial.  4.  Patient was instructed to bring their blue binder to every class and appointment. Other Pertinent Information:     Changes Made Since Last Class: No soda. Exercising now. Eating Habits and Behaviors    Today in class, I reviewed a power point that discussed Nutrition, Behavior, and Exercise changes to start working on.   Some of the eating behaviors that we discussed included the importance of eating breakfast.  We talked about some of the reasons that people don't eat breakfast and food choices that would be appropriate. We also talked about avoiding liquid calories. Patient is encouraged to aim for 64 ounces of fluid per day and trying to get them from water, Crystal Light, sugar free drinks. We also talked about eating out options that are healthier. Patient was encouraged to always request sauces and dressings on the side and request a salad, broth-based soup, or vegetables in place of fries. Patient's current diet habits include: 2 meals per day. Patient states she is eating eggs for breakfast.  Patient is doing a pack of tuna and a boiled egg for lunch. Dinner is chicken or pork chops. She states she is snacking on fruit. She is eating fast food 1-3 x a week. She is drinking water and Crystal light. Physical Activity/Exercise    Comments: We talked about exercise. Patient was given reasons of why exercise is so important and how that can help with their long-term success. I have encouraged patient to get a support system to help with the activity. Currently for activity, patient is doing walking for 30 minutes, 3 x a week. Behavior Modification       Comments: We also talked about behavior modifications. We talked about eating triggers, such as eating in front of the TV and solutions, such as making the TV a no eating zone. If patient is eating out out of emotion, food will only temporarily solve that. Patient is encouraged to HALT and assess if they are eating because they are Hungry, or out of emotions: Anxious, Lonely, Tired, which the food will only temporarily solve. We also talked about ways to prevent relapse. Goals that patient wants to work on includes:  1. Exercise more.     2. Eat Less carbohydrates       Cathi Locke, MS RD  6/27/2019

## 2019-06-27 NOTE — LETTER
6/27/19 Patient: Awilda Patrick YOB: 1970 Date of Visit: 6/27/2019 Anastacia Yates MD 
[address] [place] [id number] VIA Facsimile: [phone] Dear Anastacia Yates MD, Thank you for referring Ms. Vikki Dawson to i  for evaluation. My notes for this consultation are attached. If you have questions, please do not hesitate to call me. I look forward to following your patient along with you.  
 
 
Sincerely, 
 
Mandy Abarca MD

## 2019-07-01 ENCOUNTER — OFFICE VISIT (OUTPATIENT)
Dept: PULMONOLOGY | Age: 49
End: 2019-07-01

## 2019-07-01 VITALS
TEMPERATURE: 98.2 F | HEART RATE: 77 BPM | SYSTOLIC BLOOD PRESSURE: 148 MMHG | HEIGHT: 70 IN | OXYGEN SATURATION: 99 % | BODY MASS INDEX: 41.95 KG/M2 | WEIGHT: 293 LBS | DIASTOLIC BLOOD PRESSURE: 94 MMHG | RESPIRATION RATE: 18 BRPM

## 2019-07-01 DIAGNOSIS — K50.119 CROHN'S COLITIS, UNSPECIFIED COMPLICATION (HCC): ICD-10-CM

## 2019-07-01 DIAGNOSIS — E11.40 TYPE 2 DIABETES MELLITUS WITH DIABETIC NEUROPATHY, WITH LONG-TERM CURRENT USE OF INSULIN (HCC): ICD-10-CM

## 2019-07-01 DIAGNOSIS — I26.99 ACUTE PULMONARY EMBOLISM WITHOUT ACUTE COR PULMONALE, UNSPECIFIED PULMONARY EMBOLISM TYPE (HCC): Primary | ICD-10-CM

## 2019-07-01 DIAGNOSIS — E66.01 MORBID OBESITY WITH BMI OF 45.0-49.9, ADULT (HCC): ICD-10-CM

## 2019-07-01 DIAGNOSIS — Z79.4 TYPE 2 DIABETES MELLITUS WITH DIABETIC NEUROPATHY, WITH LONG-TERM CURRENT USE OF INSULIN (HCC): ICD-10-CM

## 2019-07-01 RX ORDER — DICYCLOMINE HYDROCHLORIDE 10 MG/1
CAPSULE ORAL
Refills: 1 | COMMUNITY
Start: 2019-06-25

## 2019-07-01 RX ORDER — ALPRAZOLAM 0.5 MG/1
TABLET ORAL
Refills: 0 | COMMUNITY
Start: 2019-05-03

## 2019-07-01 RX ORDER — L. ACIDOPHILUS/L.BULGARICUS 1MM CELL
TABLET ORAL
Refills: 1 | COMMUNITY
Start: 2019-04-04

## 2019-07-01 RX ORDER — TIZANIDINE 4 MG/1
TABLET ORAL
Refills: 0 | COMMUNITY
Start: 2019-06-17

## 2019-07-01 RX ORDER — ERGOCALCIFEROL 1.25 MG/1
CAPSULE ORAL
Refills: 2 | COMMUNITY
Start: 2019-06-21

## 2019-07-01 RX ORDER — BUDESONIDE 3 MG/1
CAPSULE, COATED PELLETS ORAL
Refills: 1 | COMMUNITY
Start: 2019-06-25

## 2019-07-01 RX ORDER — LOPERAMIDE HCL 2 MG
1 TABLET ORAL
COMMUNITY
Start: 2019-02-22

## 2019-07-01 NOTE — PROGRESS NOTES
HISTORY OF PRESENT ILLNESS  Jose Montero is a 46 y.o. female. 48/F with history of Crohn's disease who presented with chest pain, SOB and palpitations. CT revealed moderate burden of PE, mostly R sided. Risk factors include recent left ankle fracture with prolonged hospitalization and prolonged drive from [place] to [address] [place] [address],[facility]. Pt was hemodynamically stable and deemed not a candidate for thrombolysis. She was discharged home on Eliquis and has been doing well respiratory wise since discharge. She denies chest pain or shortness of breath. No other new respiratory symptoms registered. Pt is trying to get into a surgical weight loss program chris as she is diabetic and has signficant arthralgias aggravated by weight. Review of Systems   Constitutional: Negative for chills, diaphoresis, fever, malaise/fatigue and weight loss. HENT: Negative for congestion, ear discharge, ear pain, hearing loss, nosebleeds, sinus pain, sore throat and tinnitus. Eyes: Negative for blurred vision, double vision, photophobia, pain, discharge and redness. Respiratory: Negative for cough, hemoptysis, sputum production, shortness of breath, wheezing and stridor. Cardiovascular: Negative for chest pain, palpitations, orthopnea, claudication, leg swelling and PND. Gastrointestinal: Negative for blood in stool, constipation, heartburn, melena, nausea and vomiting. Abdominal pain: occasional. Diarrhea: occasional.   Genitourinary: Negative for dysuria, flank pain, frequency, hematuria and urgency. Musculoskeletal: Positive for joint pain. Negative for back pain, falls, myalgias and neck pain. Skin: Negative for itching and rash. Neurological: Negative for dizziness, tingling, tremors, sensory change, speech change, focal weakness, seizures, weakness and headaches. Endo/Heme/Allergies: Negative for environmental allergies and polydipsia. Bruises/bleeds easily.    Psychiatric/Behavioral: Negative for depression, hallucinations, memory loss, substance abuse and suicidal ideas. The patient is not nervous/anxious and does not have insomnia. Past Medical History:   Diagnosis Date    Crohn's colitis ([facility]     Diabetes ([facility]     HTN (hypertension)     Hypercholesterolemia     Pulmonary embolism (HCC)     Stool color black     crohns     Past Surgical History:   Procedure Laterality Date    HX ORTHOPAEDIC      fx left ankle    HX TUBAL LIGATION      btl     Current Outpatient Medications on File Prior to Visit   Medication Sig Dispense Refill    ALPRAZolam (XANAX) 0.5 mg tablet TAKE 1 TAB BY MOUTH TWICE A DAY AS NEEDED  0    budesonide (ENTOCORT EC) 3 mg capsule TAKE 1 CAPSULE BY MOUTH 3 TIMES DAILY  1    dicyclomine (BENTYL) 10 mg capsule TAKE 1 CAPSULE BY MOUTH EVERY 6 HOURS AS NEEDED FOR PAIN  1    ergocalciferol (ERGOCALCIFEROL) 50,000 unit capsule TAKE 1 (ONE) CAPSULE BY MOUTH ONCE A WEEK  2    loperamide (IMODIUM A-D) 2 mg tablet Take 1 Tab by mouth three (3) times daily as needed.  FLORANEX 1 million cell tab tablet TAKE 1 TABLET BY MOUTH EVERY DAY. 1    tiZANidine (ZANAFLEX) 4 mg tablet TAKE 1 (ONE) TABLET BY MOUTH THREE TIMES DAILY AS NEEDED  0    DULoxetine (CYMBALTA) 60 mg capsule Take 1 Cap by mouth daily. One cap qam for one week, then one bid 60 Cap 3    insulin glargine (LANTUS,BASAGLAR) 100 unit/mL (3 mL) inpn 30 Units by SubCUTAneous route nightly.  ondansetron hcl (ZOFRAN) 4 mg tablet Take 1 Tab by mouth every eight (8) hours as needed for Nausea. 30 Tab 0    hydrALAZINE (APRESOLINE) 100 mg tablet Take 1 Tab by mouth three (3) times daily. 90 Tab 0    SITagliptin (JANUVIA) 100 mg tablet Take 1 Tab by mouth daily. 30 Tab 0    metoprolol tartrate (LOPRESSOR) 50 mg tablet Take 1 Tab by mouth two (2) times a day. 60 Tab 0    apixaban (ELIQUIS) 5 mg tablet Take 1 Tab by mouth two (2) times a day.  60 Tab 2    atorvastatin (LIPITOR) 20 mg tablet Take 1 Tab by mouth nightly. 30 Tab 0    cloNIDine HCl (CATAPRES) 0.1 mg tablet Take 1 Tab by mouth every eight (8) hours as needed. 90 Tab 0    gabapentin (NEURONTIN) 300 mg capsule Take 1 Cap by mouth three (3) times daily. (Patient taking differently: Take 800 mg by mouth three (3) times daily.) 90 Cap 0    hydroCHLOROthiazide (HYDRODIURIL) 25 mg tablet Take 1 Tab by mouth daily. 30 Tab 0    pantoprazole (PROTONIX) 40 mg tablet Take 1 Tab by mouth Before breakfast and dinner. 30 Tab 0    losartan (COZAAR) 100 mg tablet Take 100 mg by mouth daily.  insulin lispro (HUMALOG U-100 INSULIN) 100 unit/mL injection by SubCUTAneous route Before breakfast, lunch, dinner and at bedtime.  azaTHIOprine (IMURAN) 50 mg tablet Take 250 mg by mouth daily. Indications: Crohn's disease      adalimumab (HUMIRA) 40 mg/0.8 mL injection 40 mg by SubCUTAneous route every seven (7) days. No current facility-administered medications on file prior to visit.       No Known Allergies  Family History   Problem Relation Age of Onset    Diabetes Mother     Hypertension Mother     Diabetes Father     Hypertension Father     Heart Disease Father      Social History     Socioeconomic History    Marital status: SINGLE     Spouse name: Not on file    Number of children: Not on file    Years of education: Not on file    Highest education level: Not on file   Occupational History    Not on file   Social Needs    Financial resource strain: Not on file    Food insecurity:     Worry: Not on file     Inability: Not on file    Transportation needs:     Medical: Not on file     Non-medical: Not on file   Tobacco Use    Smoking status: Never Smoker    Smokeless tobacco: Never Used   Substance and Sexual Activity    Alcohol use: Never     Frequency: Never    Drug use: Never    Sexual activity: Not on file   Lifestyle    Physical activity:     Days per week: Not on file     Minutes per session: Not on file    Stress: Not on file Relationships    Social connections:     Talks on phone: Not on file     Gets together: Not on file     Attends Rastafarian service: Not on file     Active member of club or organization: Not on file     Attends meetings of clubs or organizations: Not on file     Relationship status: Not on file    Intimate partner violence:     Fear of current or ex partner: Not on file     Emotionally abused: Not on file     Physically abused: Not on file     Forced sexual activity: Not on file   Other Topics Concern    Not on file   Social History Narrative    Not on file     Blood pressure (!) 148/94, pulse 77, temperature 98.2 °F (36.8 °C), temperature source Oral, resp. rate 18, height 5' 10\" (1.778 m), weight 154 kg (339 lb 6.4 oz), SpO2 99 %. Physical Exam   Constitutional: She is oriented to person, place, and time. She appears well-developed. No distress. Obese    HENT:   Head: Normocephalic and atraumatic. Nose: Nose normal.   Mouth/Throat: Oropharynx is clear and moist. No oropharyngeal exudate. Eyes: Pupils are equal, round, and reactive to light. Conjunctivae and EOM are normal. Right eye exhibits no discharge. Left eye exhibits no discharge. No scleral icterus. Neck: No JVD present. No tracheal deviation present. No thyromegaly present. Cardiovascular: Normal rate, regular rhythm, normal heart sounds and intact distal pulses. Exam reveals no gallop and no friction rub. No murmur heard. Pulmonary/Chest: Effort normal and breath sounds normal. No stridor. No respiratory distress. She has no wheezes. She has no rales. She exhibits no tenderness. Abdominal: Soft. Bowel sounds are normal. She exhibits no mass. There is no tenderness. There is no rebound. Musculoskeletal: She exhibits no edema, tenderness or deformity. Lymphadenopathy:     She has no cervical adenopathy. Neurological: She is alert and oriented to person, place, and time. Coordination normal.   Skin: Skin is warm and dry.  No rash noted. She is not diaphoretic. No erythema. No pallor. Small ecchymoses abdominal wall   Psychiatric: She has a normal mood and affect. Her behavior is normal. Judgment and thought content normal.        CT Results (most recent):  Results from Hospital Encounter encounter on 03/14/19   CT ANKLE LT WO CONT    Narrative CT of the left ankle, without contrast    History:  Left ankle ORIF with continued pain  Comparison:  Radiograph 1/15/2019    Technique: Axial images were obtained through the left ankle. Additional  sagittal and coronal reformations were also obtained to better evaluate osseous  detail and bony alignment. All CT scans at this facility are performed using dose optimization technique as  appropriate to the performed exam, to include automated exposure control,  adjustment of the mA and/or kV according to patient's size (Including  appropriate matching for site-specific examinations), or use of iterative  reconstruction technique. Findings:  Lateral fibular side plate with transfixing screws fixating oblique distal  fibular fracture with callus formation and some osseous bridging and union at  the proximal and mid aspect of the oblique fracture but the fracture line  remains visible. The most distal aspect of the fracture at the lateral malleolus  remains 4 mm distracted without union best demonstrated on sagittal image 16, at  least 50% of the fracture is healed. Anterior lateral tibiotalar joint  subcentimeter ossified bone fragments. The plate and transfixing screws are intact without evidence for fracture or  loosening. Intact 2 syndesmotic screws. Adjacent lateral ankle soft tissue  thickening and post surgical soft tissue scarring. No discrete fluid collection. No acute fractures. Evidence of previously removed hardware from the calcaneus. Tibiotalar joint effusion and mild marginal osteophytes.  No osseous changes in  the talar dome to suggest osteochondral lesion. Postsurgical changes in the medial malleolus suggesting prior medial ligamentous  reconstruction. Adjacent superficial soft tissue thickening and scarring. The Achilles tendon is unremarkable. Small Achilles tendon enthesophyte. The  plantar fascia and sinus tarsi are normal in appearance and attenuation. The  posterior tibial tendon, flexor digitorum longus tendon, and flexor hallucis  longus tendon remain in their expected location, without evidence for rupture  but the PT and flexor digitorum longus tendons are surrounded with soft tissue  thickening at the level of the medial malleolus and just distal to the malleolus  suggesting scarring. The peroneus brevis tendon and peroneus longus tendon  remain in their expected location, and are normal in caliber and attenuation. Focal short segment hypodense extensor digitorum longus tendon thickening about  2 cm at the level of the tibiotalar joint. Impression Impression:    1. Status post lateral ankle ORIF with intact hardware. No hardware failure. No  acute fracture. Partial osseous union of the distal fibular fracture, see discussion in  findings. 2. Tibiotalar joint effusion and mild arthritic changes. No finding to indicate  osteochondral lesion. 3. Surgical changes in the medial ankle suggesting medial ligament  reconstruction. Medial ankle soft tissue thickening and scarring surrounding the  PT and flexor digitorum longus tendons. 4. Short segment extensor digitorum longus tendon thickening at the tibiotalar  joint, cannot exclude tendinopathy or tendon injury. 1/12/2019 01:03) Provider Status: Open   Study Result     CTA CHEST PULMONARY EMBOLISM PROTOCOL          INDICATION: Acute chest pain with elevated d-dimer.  Question pulmonary embolism.     TECHNIQUE: Thin collimation axial images obtained through the level of the  pulmonary arteries with additional imaging through the chest following the  uneventful administration of 75 cc Isovue-370 nonionic intravenous contrast.   Images reconstructed into three dimensional coronal and sagittal projections for  complete evaluation of the tortuous and overlapping pulmonary vascular  structures and to reduce patient radiation dose.      All CT scans at this facility are performed using dose optimization technique as  appropriate to a performed exam, to include automated exposure control,  adjustment of the mA and/or kV according to patient size (including appropriate  matching first site-specific examinations), or use of iterative reconstruction  technique.     COMPARISON: None.     FINDINGS:     There our acute pulmonary emboli in the right middle lobe pulmonary artery and  in the proximal right lower lobe pulmonary artery with some extension into the  lateral basal segments. There are also acute pulmonary emboli in segmental  branches of the right upper lobe. There is a small burden of left upper lobe  pulmonary emboli. Some small pulmonary emboli are also seen in the left lower  lobe. Main pulmonary artery measures 33 mm and is mildly dilated. Right  ventricle is nondilated.      Thyroid: Unremarkable in its visualized aspects. Pericardium/ Heart: Heart is normal in size without significant pericardial  effusion. There is some coronary artery disease. Aorta/ Vessels: No aneurysm or dissection. Lymph Nodes: Unremarkable. .     Lungs: Bibasilar atelectasis.     Upper Abdomen: Low-density left adrenal nodule is partially seen and most likely  an adenoma and measures at least 2.3 cm     Bones/soft tissues: Unremarkable.     IMPRESSION  IMPRESSION:  Positive for pulmonary emboli with a small to moderate overall clot burden which  is more significant in the right lung.  Mildly dilated main pulmonary artery, but  no other CT evidence for significant right heart strain.       Left adrenal nodule is low-density and most likely a benign adenoma.       Findings were called to Dr. Sarah Pan by Dr. Talat Choe at 0153 hours.            01/11/19   ECHO ADULT COMPLETE 01/14/2019 1/14/2019    Narrative · Estimated left ventricular ejection fraction is 56 - 60%. Visually   measured ejection fraction. Left ventricular mild concentric hypertrophy. Normal left ventricular wall motion, no regional wall motion abnormality   noted. · Mild aortic root dilatation. · There is no evidence of pulmonary hypertension. Signed by: Liya Day MD         ASSESSMENT and PLAN  Encounter Diagnoses   Name Primary?  Acute pulmonary embolism without acute cor pulmonale, unspecified pulmonary embolism type (HCC) Yes    Morbid obesity with BMI of 45.0-49.9, adult (HCC)     Type 2 diabetes mellitus with diabetic neuropathy, with long-term current use of insulin ([address]     Crohn's colitis, unspecified complication ([facility]        Pt remains on Eliquis since January 2019 for a provoked PE. Will schedule CTA chest ASAP and if negative will discontinue Eliquis. Healthy weight encouraged, including follow up for medical/surgical weight loss. Will call pt with CTA results. RTC 6 months  Encouraged flu vaccination with PCP in the fall.

## 2019-07-01 NOTE — PROGRESS NOTES
Jose Kylah presents today for   Chief Complaint   Patient presents with   [facility] Follow Up     from 1/11-1/20/2019 [facility] fro pulmonary emboli; CTA/CXR 1/11/2019       Is someone accompanying this pt? No    Is the patient using any DME equipment during OV? No    -DME Company N/A    Depression Screening:  3 most recent PHQ Screens 6/5/2019   Little interest or pleasure in doing things Not at all   Feeling down, depressed, irritable, or hopeless Not at all   Total Score PHQ 2 0       Learning Assessment:  Learning Assessment 5/16/2019   PRIMARY LEARNER Patient   BARRIERS PRIMARY LEARNER NONE   PRIMARY LANGUAGE ENGLISH   LEARNER PREFERENCE PRIMARY READING   ANSWERED BY self   RELATIONSHIP SELF       Abuse Screening:  No flowsheet data found. Fall Risk  No flowsheet data found. Coordination of Care:  1. Have you been to the ER, urgent care clinic since your last visit? Hospitalized since your last visit? Yes; Where: [facility] & [facility], When: 1/7/2019 & 1/11-1/20/2019-left leg swelling & pulmonary emboli    2. Have you seen or consulted any other health care providers outside of the [address] [place] [address] since your last visit? Include any pap smears or colon screening. Yes.  Dr. Laurie Guzman, PCP

## 2019-07-02 ENCOUNTER — APPOINTMENT (OUTPATIENT)
Dept: PHYSICAL THERAPY | Age: 49
End: 2019-07-02

## 2019-07-22 ENCOUNTER — HOSPITAL ENCOUNTER (OUTPATIENT)
Dept: CT IMAGING | Age: 49
Discharge: HOME | End: 2019-07-22
Attending: INTERNAL MEDICINE
Payer: MEDICAID

## 2019-07-22 ENCOUNTER — HOSPITAL ENCOUNTER (OUTPATIENT)
Dept: BARIATRICS/WEIGHT MGMT | Age: 49
Discharge: HOME | End: 2019-07-22

## 2019-07-22 ENCOUNTER — DOCUMENTATION ONLY (OUTPATIENT)
Dept: BARIATRICS/WEIGHT MGMT | Age: 49
End: 2019-07-22

## 2019-07-22 ENCOUNTER — HOSPITAL ENCOUNTER (OUTPATIENT)
Dept: LAB | Age: 49
Discharge: HOME | End: 2019-07-22

## 2019-07-22 ENCOUNTER — HOSPITAL ENCOUNTER (OUTPATIENT)
Dept: LAB | Age: 49
Discharge: HOME | End: 2019-07-22
Payer: MEDICAID

## 2019-07-22 DIAGNOSIS — I26.99 ACUTE PULMONARY EMBOLISM WITHOUT ACUTE COR PULMONALE, UNSPECIFIED PULMONARY EMBOLISM TYPE (HCC): ICD-10-CM

## 2019-07-22 DIAGNOSIS — I26.99 OTHER ACUTE PULMONARY EMBOLISM WITHOUT ACUTE COR PULMONALE (HCC): ICD-10-CM

## 2019-07-22 DIAGNOSIS — I10 ESSENTIAL HYPERTENSION: ICD-10-CM

## 2019-07-22 DIAGNOSIS — K50.119 CROHN'S DISEASE OF COLON WITH COMPLICATION (HCC): ICD-10-CM

## 2019-07-22 DIAGNOSIS — Z79.4 CONTROLLED TYPE 2 DIABETES MELLITUS WITHOUT COMPLICATION, WITH LONG-TERM CURRENT USE OF INSULIN (HCC): ICD-10-CM

## 2019-07-22 DIAGNOSIS — E66.01 OBESITY, MORBID (HCC): ICD-10-CM

## 2019-07-22 DIAGNOSIS — E11.9 CONTROLLED TYPE 2 DIABETES MELLITUS WITHOUT COMPLICATION, WITH LONG-TERM CURRENT USE OF INSULIN (HCC): ICD-10-CM

## 2019-07-22 LAB
ATRIAL RATE: 82 BPM
CALCULATED P AXIS, ECG09: 71 DEGREES
CALCULATED R AXIS, ECG10: -10 DEGREES
CALCULATED T AXIS, ECG11: 49 DEGREES
CREAT UR-MCNC: 0.7 MG/DL (ref 0.6–1.3)
DIAGNOSIS, 93000: NORMAL
P-R INTERVAL, ECG05: 148 MS
Q-T INTERVAL, ECG07: 440 MS
QRS DURATION, ECG06: 110 MS
QTC CALCULATION (BEZET), ECG08: 514 MS
VENTRICULAR RATE, ECG03: 82 BPM
XX-LABCORP SPECIMEN COL,LCBCF: NORMAL

## 2019-07-22 PROCEDURE — 82565 ASSAY OF CREATININE: CPT

## 2019-07-22 PROCEDURE — 74011636320 HC RX REV CODE- 636/320: Performed by: INTERNAL MEDICINE

## 2019-07-22 PROCEDURE — 93005 ELECTROCARDIOGRAM TRACING: CPT

## 2019-07-22 PROCEDURE — 71275 CT ANGIOGRAPHY CHEST: CPT

## 2019-07-22 PROCEDURE — 99001 SPECIMEN HANDLING PT-LAB: CPT

## 2019-07-22 RX ADMIN — IOPAMIDOL 85 ML: 755 INJECTION, SOLUTION INTRAVENOUS at 15:13

## 2019-07-22 NOTE — PROGRESS NOTES
[address], [address]    Patient's Name: Tunde Ricardo   Age: 46 y.o. YOB: 1970   Sex: female    Date:   7/22/2019    Insurance:              Session: 3 of  6  Revision:     Surgeon:  Dr. Greg Cole    Height: 5 f 10   Weight:    336      Lbs. BMI: 48.3   Pounds Lost since last month: 9                 Pounds Gained since last month: 0    Starting Weight: 353     Previous Months Weight: 345  Overall Pounds Lost: 17   Overall Pounds Gained: 0      Do you smoke? None    Alcohol intake:  Number of drinks at a time:  None  Number of times a week: None    Class Guidelines    Guidelines are reviewed with patient at the start of every class. 1. Patient understands that weight loss trial classes must be consecutive. Patient understands if they miss a class, it is their responsibility to contact me to reschedule class. I will reach out to patient after their first no show. 2.  Patient understands the expectations that weight maintenance/weight loss is expected during the classes. Failure to demonstrate changes may result in one extra month of weight loss trial, followed by going back to see the surgeon. Patient understands that they CAN NOT gain any weight during the weight loss trial.  Gaining weight will result in extra classes. 3. Patient is also instructed to be doing their labs, blood work, psych visit, support group and any other test that the surgeon has used while they are working on their weight loss trial.  4.  Patient was instructed to bring their blue binder to every class and appointment. Other Pertinent Information:       Changes Made Since Last Class: Stop late night eating. LEss soda. Eating Habits and Behaviors    Today in class, we started talking about the key diet principles. We first focused on stopping liquid calories. Patient was also educated on carbohydrates.   Patient was instructed to start cutting out bread, rice, and pasta from the diet and start focusing more on meat and vegetables. I then gave a power point, which focused on Label Reading. In class, I gave patients a labels and we worked through a series of questions to help patients have a better understanding of label reading. Patient was instructed to review the serving size. Patient was encouraged to focus on protein and carbohydrates. We also did a few label reading activities to help the patient become more familiar with label reading. Patient's current diet habits include: 2-3 meals per day. Patient is skipping breakfast.  Lunch is a half of sandwich. Dinner is chicken, pork chops, starch, and a vegetable. Patient is snacking on pork rinds. She is drinking water and some sweet tea, which I have addressed with her. Physical Activity/Exercise    Comments: We talked about exercise. Patient was given reasons of why exercise is so important and how that can help with their long-term success. I have encouraged patient to get a support system to help with the activity. Currently for activity, patient is doing very little. Goals have been set. Behavior Modification       Comments:  Behavior modifications were reinforced. This included not eating in front of the TV, which could lead to bigger portions and eating when one is not hungry. We also talked about the importance of eating 3 meals per day. Patient was encouraged to food journal to keep their daily carbohydrates less than 30 grams per meal.      Goals that patient wants to work on includes:  1. Exercise more  2. Drink more water.       Kelli Ulrich, MS RD  7/22/2019

## 2019-07-24 ENCOUNTER — TELEPHONE (OUTPATIENT)
Dept: PULMONOLOGY | Age: 49
End: 2019-07-24

## 2019-07-25 LAB
25(OH)D3+25(OH)D2 SERPL-MCNC: 21.6 NG/ML (ref 30–100)
ALBUMIN SERPL-MCNC: 4.1 G/DL (ref 3.5–5.5)
BASOPHILS # BLD AUTO: 0 X10E3/UL (ref 0–0.2)
BASOPHILS NFR BLD AUTO: 1 %
BUN SERPL-MCNC: 7 MG/DL (ref 6–24)
BUN/CREAT SERPL: 9 (ref 9–23)
CALCIUM SERPL-MCNC: 9.4 MG/DL (ref 8.7–10.2)
CHLORIDE SERPL-SCNC: 100 MMOL/L (ref 96–106)
CO2 SERPL-SCNC: 23 MMOL/L (ref 20–29)
CREAT SERPL-MCNC: 0.77 MG/DL (ref 0.57–1)
EOSINOPHIL # BLD AUTO: 0.1 X10E3/UL (ref 0–0.4)
EOSINOPHIL NFR BLD AUTO: 2 %
ERYTHROCYTE [DISTWIDTH] IN BLOOD BY AUTOMATED COUNT: 19.8 % (ref 12.3–15.4)
EST. AVERAGE GLUCOSE BLD GHB EST-MCNC: 163 MG/DL
FOLATE SERPL-MCNC: 8.8 NG/ML
GLUCOSE SERPL-MCNC: 111 MG/DL (ref 65–99)
H PYLORI IGA SER-ACNC: <9 UNITS (ref 0–8.9)
H PYLORI IGM SER-ACNC: <9 UNITS (ref 0–8.9)
HBA1C MFR BLD: 7.3 % (ref 4.8–5.6)
HCT VFR BLD AUTO: 42.5 % (ref 34–46.6)
HGB BLD-MCNC: 13.1 G/DL (ref 11.1–15.9)
IMM GRANULOCYTES # BLD AUTO: 0 X10E3/UL (ref 0–0.1)
IMM GRANULOCYTES NFR BLD AUTO: 0 %
IRON SERPL-MCNC: 31 UG/DL (ref 27–159)
LYMPHOCYTES # BLD AUTO: 1.5 X10E3/UL (ref 0.7–3.1)
LYMPHOCYTES NFR BLD AUTO: 33 %
MCH RBC QN AUTO: 27 PG (ref 26.6–33)
MCHC RBC AUTO-ENTMCNC: 30.8 G/DL (ref 31.5–35.7)
MCV RBC AUTO: 88 FL (ref 79–97)
MONOCYTES # BLD AUTO: 0.3 X10E3/UL (ref 0.1–0.9)
MONOCYTES NFR BLD AUTO: 7 %
NEUTROPHILS # BLD AUTO: 2.6 X10E3/UL (ref 1.4–7)
NEUTROPHILS NFR BLD AUTO: 57 %
PLATELET # BLD AUTO: 305 X10E3/UL (ref 150–450)
POTASSIUM SERPL-SCNC: 3.5 MMOL/L (ref 3.5–5.2)
RBC # BLD AUTO: 4.85 X10E6/UL (ref 3.77–5.28)
SODIUM SERPL-SCNC: 140 MMOL/L (ref 134–144)
TSH SERPL DL<=0.005 MIU/L-ACNC: 1.56 UIU/ML (ref 0.45–4.5)
VIT B1 BLD-SCNC: 114.9 NMOL/L (ref 66.5–200)
VIT B12 SERPL-MCNC: 457 PG/ML (ref 232–1245)
WBC # BLD AUTO: 4.5 X10E3/UL (ref 3.4–10.8)

## 2019-08-01 NOTE — PROGRESS NOTES
[facility]  [address]  [place], [address]  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 Patient will report performance of HEP at least 2 times per day to facilitate improved outcomes and improved self management.     Long Term Goals: To be accomplished in 4 weeks:               1. Patient will report FOTO score of 47 or better to indicate significant improvement in functional status.              2. Pt will demonstrate left ankle DF PROM of 8 degrees or better to improve gait mechanics.              3. Pt will demonstrate left ankle MMTs of 4+/5 or better to improve stability during ambulatory activity.              4. Pt will demonstrate non-antalgic gait mechanics to improve ease of community ambulation.               5. Pt will report little difficulty walking 2 blocks to improve ease of community ambulation    ASSESSMENT/RECOMMENDATIONS: Unable to further assess progress towards goals at this time due to non-compliance/lack of attendance. DC at this time with no further instructions to the patient.   Thank you for this referral.    [x]Discontinue therapy: []Patient has reached or is progressing toward set goals      [x]Patient is non-compliant or has abdicated      []Due to lack of appreciable progress towards set goals    Andrew Rouse PT, DPT, ATC 8/1/2019 9:18 AM

## 2019-08-06 ENCOUNTER — TELEPHONE (OUTPATIENT)
Dept: PULMONOLOGY | Age: 49
End: 2019-08-06

## 2019-08-06 DIAGNOSIS — R07.9 CHEST PAIN, UNSPECIFIED TYPE: ICD-10-CM

## 2019-08-06 DIAGNOSIS — Z86.711 HISTORY OF PULMONARY EMBOLISM: Primary | ICD-10-CM

## 2019-08-06 NOTE — TELEPHONE ENCOUNTER
Pt states since CTA was ok her PCP Dr. Molly Kamara advised her to call and check w/ Dr. Jerel Flores to see if she still needs to be on blood thinner medication. Please advise [phone].

## 2019-08-07 ENCOUNTER — TELEPHONE (OUTPATIENT)
Dept: PULMONOLOGY | Age: 49
End: 2019-08-07

## 2019-08-07 DIAGNOSIS — G47.8 UPPER AIRWAY RESISTANCE SYNDROME: Primary | ICD-10-CM

## 2019-08-07 NOTE — TELEPHONE ENCOUNTER
Per Dr. Rajat Talley the pt's CTA was not clear enough due to body size. She requests that the pt. have a VQ Lung scan/CXR required. The order is placed in the chart for  [facility] Sched. They should give her a call. Attempted to reach the pt. But, no answer and the \"Mail box is full\".

## 2019-08-07 NOTE — TELEPHONE ENCOUNTER
Message   Received: Yesterday   Message Contents   MD Silvia Douglas LPN   Caller: Unspecified Roxannil Severs, 12:55 PM)             Pt needs a VQ scan prior to that decision as CTA was limited

## 2019-08-08 ENCOUNTER — TELEPHONE (OUTPATIENT)
Dept: PULMONOLOGY | Age: 49
End: 2019-08-08

## 2019-08-08 NOTE — PROGRESS NOTES
8/8/19:  Patient was contacted regarding her Vitamin D. Normal: . Patient's level is 21.3. She states her PCP recently prescribed 10,000 per day.     Farhat Grady MS RD

## 2019-08-08 NOTE — TELEPHONE ENCOUNTER
Attempted to reach the pt. To inform her of the need for a VQ Scan. A message is left to return the call.

## 2019-08-09 ENCOUNTER — TELEPHONE (OUTPATIENT)
Dept: PULMONOLOGY | Age: 49
End: 2019-08-09

## 2019-08-09 NOTE — TELEPHONE ENCOUNTER
The pt. Is left a message re: the need for a VQ Scan. The pt. Returns the call and is given explanation of why further testing is needed to check if, there are any emboli. I gave her the  number to call for an appt. . She is also instructed to maintain the Eliquis at this time.

## 2019-08-12 ENCOUNTER — HOSPITAL ENCOUNTER (OUTPATIENT)
Dept: GENERAL RADIOLOGY | Age: 49
Discharge: HOME | End: 2019-08-12
Attending: SURGERY
Payer: MEDICAID

## 2019-08-12 DIAGNOSIS — E66.01 OBESITY, MORBID (HCC): ICD-10-CM

## 2019-08-12 DIAGNOSIS — I10 ESSENTIAL HYPERTENSION: ICD-10-CM

## 2019-08-12 DIAGNOSIS — I26.99 OTHER ACUTE PULMONARY EMBOLISM WITHOUT ACUTE COR PULMONALE (HCC): ICD-10-CM

## 2019-08-12 DIAGNOSIS — Z79.4 CONTROLLED TYPE 2 DIABETES MELLITUS WITHOUT COMPLICATION, WITH LONG-TERM CURRENT USE OF INSULIN (HCC): ICD-10-CM

## 2019-08-12 DIAGNOSIS — K50.119 CROHN'S DISEASE OF COLON WITH COMPLICATION (HCC): ICD-10-CM

## 2019-08-12 DIAGNOSIS — E11.9 CONTROLLED TYPE 2 DIABETES MELLITUS WITHOUT COMPLICATION, WITH LONG-TERM CURRENT USE OF INSULIN (HCC): ICD-10-CM

## 2019-08-12 PROCEDURE — 74241 XR UPPER GI SERIES W KUB: CPT

## 2019-08-12 PROCEDURE — 74011000250 HC RX REV CODE- 250: Performed by: SURGERY

## 2019-08-12 PROCEDURE — 74011000255 HC RX REV CODE- 255: Performed by: SURGERY

## 2019-08-12 PROCEDURE — 74247 XR UPPER GI W KUB AIR CONT: CPT

## 2019-08-12 RX ADMIN — ANTACID/ANTIFLATULENT 4 G: 380; 550; 10; 10 GRANULE, EFFERVESCENT ORAL at 09:45

## 2019-08-12 RX ADMIN — BARIUM SULFATE 135 ML: 980 POWDER, FOR SUSPENSION ORAL at 09:45

## 2019-08-12 RX ADMIN — BARIUM SULFATE 176 G: 960 POWDER, FOR SUSPENSION ORAL at 09:45

## 2019-08-21 ENCOUNTER — HOSPITAL ENCOUNTER (OUTPATIENT)
Dept: NUCLEAR MEDICINE | Age: 49
Discharge: HOME | End: 2019-08-21
Attending: INTERNAL MEDICINE
Payer: MEDICAID

## 2019-08-21 ENCOUNTER — HOSPITAL ENCOUNTER (OUTPATIENT)
Dept: GENERAL RADIOLOGY | Age: 49
Discharge: HOME | End: 2019-08-21
Attending: INTERNAL MEDICINE
Payer: MEDICAID

## 2019-08-21 DIAGNOSIS — Z86.711 HISTORY OF PULMONARY EMBOLISM: ICD-10-CM

## 2019-08-21 DIAGNOSIS — R07.9 CHEST PAIN, UNSPECIFIED TYPE: ICD-10-CM

## 2019-08-21 PROCEDURE — 78582 LUNG VENTILAT&PERFUS IMAGING: CPT

## 2019-08-21 PROCEDURE — 71046 X-RAY EXAM CHEST 2 VIEWS: CPT

## 2019-08-22 ENCOUNTER — HOSPITAL ENCOUNTER (OUTPATIENT)
Dept: BARIATRICS/WEIGHT MGMT | Age: 49
Discharge: HOME | End: 2019-08-22

## 2019-08-23 ENCOUNTER — DOCUMENTATION ONLY (OUTPATIENT)
Dept: BARIATRICS/WEIGHT MGMT | Age: 49
End: 2019-08-23

## 2019-08-23 NOTE — PROGRESS NOTES
[address], [address]    Patient's Name: Daisy Wiley   Age: 46 y.o. YOB: 1970   Sex: female    Date:   8/23/2019    Insurance:            Session: 4 of 6  Revision:   Surgeon:  Dr. Olvin Demarco    Height: 5 f 10 Weight:    337      Lbs. BMI: 48.5   Pounds Lost since last month: 0               Pounds Gained since last month: 1      Starting Weight: 353   Previous Months Weight: 336  Overall Pounds Lost: 16 Overall Pounds Gained: 0      Do you smoke? None    Alcohol intake:  Number of drinks at a time:  None  Number of times a week: None    Class Guidelines    Guidelines are reviewed with patient at the start of every class. 1. Patient understands that weight loss trial classes must be consecutive. Patient understands if they miss a class, it is their responsibility to contact me to reschedule class. I will reach out to patient after their first no show. 2.  Patient understands the expectations that weight maintenance/weight loss is expected during the classes. Failure to demonstrate changes may result in one extra month of weight loss trial, followed by going back to see the surgeon. Patient understands that they CAN NOT gain any weight during the weight loss trial.  Gaining weight will result in extra classes. 3. Patient is also instructed to be doing their labs, blood work, psych visit, support group and any other test that the surgeon has used while they are working on their weight loss trial.  4.  Patient was instructed to bring their blue binder to every class and appointment. Other Pertinent Information:     Changes Made Since Last Class: Less calories. More water. More exercise. Eating Habits and Behaviors    Today in class, we reviewed the key diet principles. I have talked to patient about pushing the fluid and working towards 64 ounces per day.   We focused on following a low-calorie diet. Patient was instructed to count their carbohydrates and try to keep their daily intake under 100 grams per day and try to keep their daily protein at 80 grams per day. Patient was given examples of carbohydrates in starches. Patient was encouraged to focus on meat and vegetables and begin cutting carbohydrates out. We talked about foods that are protein-based and how to incorporate those into their meals. I also reviewed with patient the importance of eating 3 meals per day and suggestions were made for breakfast items. Patient's current diet habits include: 3 meals per day. Patient is doing scrambled eggs for breakfast.  Lunch is tuna sandwiches. Dinner is protein, carbohydrates and vegetables. She states she may have crackers in between meals. She is drinking 40 ounces of fluid and 20 ounces of sweet tea, which I have instructed her to discontinue this. Physical Activity/Exercise    Comments: We talked about exercise. Patient was given reasons of why exercise is so important and how that can help with their long-term success. I have encouraged patient to get a support system to help with the activity. Currently for activity, patient is doing walking for 30 minutes, 3 x a week. Behavior Modification       Comments:  During today's lesson, I gave a presentation called The 100-200 Calorie \"Mindless Margin. \"  The goal is to make modest daily 100-200 calorie reductions in certain things that the body won't notice. One, 100-200 calorie change and would will look 10-20 pounds in one year. An example could be cutting soda. Patient was given a check off list and was encouraged to come up with 1-3 100 calories changes they could make. The check off list is a daily tracker to see if these goals are being met. Goals that patient wants to work on includes:  1. Less eating at night. 2. More exercise by next month.       Audelia Reyes, MS RD  8/23/2019

## 2019-08-26 ENCOUNTER — OFFICE VISIT (OUTPATIENT)
Dept: ORTHOPEDIC SURGERY | Age: 49
End: 2019-08-26

## 2019-08-26 VITALS
SYSTOLIC BLOOD PRESSURE: 152 MMHG | BODY MASS INDEX: 41.95 KG/M2 | TEMPERATURE: 97.6 F | OXYGEN SATURATION: 94 % | HEIGHT: 70 IN | DIASTOLIC BLOOD PRESSURE: 84 MMHG | WEIGHT: 293 LBS | RESPIRATION RATE: 15 BRPM | HEART RATE: 80 BPM

## 2019-08-26 DIAGNOSIS — Z79.4 CONTROLLED TYPE 2 DIABETES MELLITUS WITHOUT COMPLICATION, WITH LONG-TERM CURRENT USE OF INSULIN (HCC): ICD-10-CM

## 2019-08-26 DIAGNOSIS — M19.072 OSTEOARTHRITIS OF LEFT ANKLE, UNSPECIFIED OSTEOARTHRITIS TYPE: ICD-10-CM

## 2019-08-26 DIAGNOSIS — S82.892G CLOSED LEFT ANKLE FRACTURE, WITH DELAYED HEALING, SUBSEQUENT ENCOUNTER: Primary | ICD-10-CM

## 2019-08-26 DIAGNOSIS — E66.01 OBESITY, MORBID (HCC): ICD-10-CM

## 2019-08-26 DIAGNOSIS — M72.2 PLANTAR FASCIITIS, BILATERAL: ICD-10-CM

## 2019-08-26 DIAGNOSIS — E11.9 CONTROLLED TYPE 2 DIABETES MELLITUS WITHOUT COMPLICATION, WITH LONG-TERM CURRENT USE OF INSULIN (HCC): ICD-10-CM

## 2019-08-26 DIAGNOSIS — G62.9 NEUROPATHY: ICD-10-CM

## 2019-08-26 NOTE — PATIENT INSTRUCTIONS
· Continue activity as tolerated  · Order provided for extra depth shoes  · PT ordered for plantar fasciitis, left  · Continue to follow up with neurology for tx of diabetic neuropathy  · Continue to wear custom orthotic or brace, left  · Wear compression hose for swelling  · Please follow up in 8 weeks

## 2019-08-26 NOTE — PROGRESS NOTES
1. Have you been to the ER, urgent care clinic since your last visit? Hospitalized since your last visit? No    2. Have you seen or consulted any other health care providers outside of the [address] [place] [address] since your last visit? Include any pap smears or colon screening.  No

## 2019-08-26 NOTE — PROGRESS NOTES
Patient: Jose Montero                MRN: [id number]        SSN: [id number]  YOB: 1970                    AGE: 46 y.o. SEX: female    Maribel Victoria MD    POST OP OFFICE NOTE  DOS: 1/18/19    Chief Complaint:   Chief Complaint   Patient presents with    Ankle Pain     LEFT ANKLE PAIN       HPI:     The patient is a 46 y.o. female who presents today for follow up s/p: ORIF left ankle completed in [place] on 12/3/18 and subsequent I&D left ankle on 1/18/19. Patient has been mostly WBAT to the left lower extremity. Patient states that she has completed PT and still has left lateral ankle pain that is sharp comes/goes. She states that the custom SMO brace has been adjusted and helps her to walk, but she sometimes has some pain when wearing the brace. She also reports some pain in the plantar medial fascia, heel and left popliteal fossa. There are no new illness or injuries to report since last seen in the office. Patient reports BLE burning and stinging and is being treated by neurology for diabetic polyneuropathy with Neurontin (2400 mg every day) and Cymbalta. She is requesting narcotic pain medication and does not want an rx for Ultram. We have offered the patient an NSAID and pain management.       PHYSICAL EXAM:     Visit Vitals  /84   Pulse 80   Temp 97.6 °F (36.4 °C) (Oral)   Resp 15   Ht 5' 10\" (1.778 m)   Wt 346 lb (156.9 kg)   SpO2 94%   BMI 49.65 kg/m²     Pain Assessment  8/26/2019   Location of Pain Ankle   Pain Location Comment -   Location Modifiers Left   Severity of Pain 8   Quality of Pain Sharp   Duration of Pain Persistent   Frequency of Pain Constant   Aggravating Factors Standing;Walking   Aggravating Factors Comment -   Limiting Behavior -   Relieving Factors Rest;NSAID   Relieving Factors Comment -   Result of Injury No   Work-Related Injury -   Type of Injury -         Pain Scale: 8/10      GEN:  Alert, well developed, well nourished, well appearing 46 y.o. female in no acute distress. PSYCH:  Normal affect, mood, and conduct. alert, oriented x 3 alert, oriented x 3, no dementia  M/S EXAMINATION OF: left foot/ankle  INCISION: Incision looks good, skin well healed  SKIN: mild edema and significant adipose tissue, no erythema, no  ecchymosis, no warmth,  TENDERNESS:  mild tenderness to palpation along lateral ankle, lateral heel and plantar medial fascia  NEUROVASCULAR:  grossly intact. Positive distal pulses and capillary refill. DVT ASSESSMENT:  The calf is not tender to palpation. No evidence of DVT seen on physical exam.  ROM: WNL       RADIOGRAPHS & DIAGNOSTIC STUDIES     X-rays of the left ankle reveal hardware from prior ORIF left ankle with syndesmotic stabilization in good position with no indication of failure or breakage. However, there is some chatter around the syndesmotic screws indicative of movement round the screws. Fracture is healed. Moderate soft tissue swelling. Degenerative changes noted to ankle. IMPRESSION:     Encounter Diagnoses     ICD-10-CM ICD-9-CM   1. Closed left ankle fracture, with delayed healing, subsequent encounter S82.892G V54.19   2. Osteoarthritis of left ankle, unspecified osteoarthritis type M19.072 715.97   3. Obesity, morbid ([facility] E66.01 278.01   4. Controlled type 2 diabetes mellitus without complication, with long-term current use of insulin ([facility]) E11.9 250.00    Z79.4 V58.67   5. Neuropathy G62.9 355.9   6.  Plantar fasciitis, bilateral M72.2 728.71       PLAN:         Orders Placed This Encounter    Generic Supply Order    AMB POC XRAY, ANKLE; COMPLETE, 3+ VIE    [employer]           · Continue activity as tolerated  · Order provided for extra depth shoes  · PT ordered for plantar fasciitis, left  · Continue to follow up with neurology for tx of diabetic neuropathy  · Continue to wear custom orthotic or brace, left  · Wear compression hose for swelling  · Please follow up in 8 weeks Patient has been examined and evaluated with Dr. Dyllan Presley during this visit and he agrees with the assessment and plan    Patient expresses understanding of the plan. Patient education provided on post surgical care. REVIEW OF SYSTEMS:     Otherwise as noted in HPI      PAST MEDICAL HISTORY:     Past Medical History:   Diagnosis Date    Crohn's colitis ([facility]     Diabetes ([facility]     HTN (hypertension)     Hypercholesterolemia     Pulmonary embolism (HCC)     Stool color black     crohns       MEDICATIONS:     Current Outpatient Medications   Medication Sig    ALPRAZolam (XANAX) 0.5 mg tablet TAKE 1 TAB BY MOUTH TWICE A DAY AS NEEDED    budesonide (ENTOCORT EC) 3 mg capsule TAKE 1 CAPSULE BY MOUTH 3 TIMES DAILY    dicyclomine (BENTYL) 10 mg capsule TAKE 1 CAPSULE BY MOUTH EVERY 6 HOURS AS NEEDED FOR PAIN    ergocalciferol (ERGOCALCIFEROL) 50,000 unit capsule TAKE 1 (ONE) CAPSULE BY MOUTH ONCE A WEEK    loperamide (IMODIUM A-D) 2 mg tablet Take 1 Tab by mouth three (3) times daily as needed.  FLORANEX 1 million cell tab tablet TAKE 1 TABLET BY MOUTH EVERY DAY.  tiZANidine (ZANAFLEX) 4 mg tablet TAKE 1 (ONE) TABLET BY MOUTH THREE TIMES DAILY AS NEEDED    DULoxetine (CYMBALTA) 60 mg capsule Take 1 Cap by mouth daily. One cap qam for one week, then one bid    insulin glargine (LANTUS,BASAGLAR) 100 unit/mL (3 mL) inpn 30 Units by SubCUTAneous route nightly.  ondansetron hcl (ZOFRAN) 4 mg tablet Take 1 Tab by mouth every eight (8) hours as needed for Nausea.  hydrALAZINE (APRESOLINE) 100 mg tablet Take 1 Tab by mouth three (3) times daily.  SITagliptin (JANUVIA) 100 mg tablet Take 1 Tab by mouth daily.  metoprolol tartrate (LOPRESSOR) 50 mg tablet Take 1 Tab by mouth two (2) times a day.  apixaban (ELIQUIS) 5 mg tablet Take 1 Tab by mouth two (2) times a day.  atorvastatin (LIPITOR) 20 mg tablet Take 1 Tab by mouth nightly.     cloNIDine HCl (CATAPRES) 0.1 mg tablet Take 1 Tab by mouth every eight (8) hours as needed.  gabapentin (NEURONTIN) 300 mg capsule Take 1 Cap by mouth three (3) times daily. (Patient taking differently: Take 800 mg by mouth three (3) times daily.)    hydroCHLOROthiazide (HYDRODIURIL) 25 mg tablet Take 1 Tab by mouth daily.  pantoprazole (PROTONIX) 40 mg tablet Take 1 Tab by mouth Before breakfast and dinner.  losartan (COZAAR) 100 mg tablet Take 100 mg by mouth daily.  insulin lispro (HUMALOG U-100 INSULIN) 100 unit/mL injection by SubCUTAneous route Before breakfast, lunch, dinner and at bedtime.  azaTHIOprine (IMURAN) 50 mg tablet Take 250 mg by mouth daily. Indications: Crohn's disease    adalimumab (HUMIRA) 40 mg/0.8 mL injection 40 mg by SubCUTAneous route every seven (7) days. No current facility-administered medications for this visit.         ALLERGIES:     No Known Allergies      PAST SURGICAL HISTORY:     Past Surgical History:   Procedure Laterality Date    HX ORTHOPAEDIC      fx left ankle    HX TUBAL LIGATION      btl       SOCIAL HISTORY:     Social History     Socioeconomic History    Marital status: SINGLE     Spouse name: Not on file    Number of children: Not on file    Years of education: Not on file    Highest education level: Not on file   Occupational History    Not on file   Social Needs    Financial resource strain: Not on file    Food insecurity:     Worry: Not on file     Inability: Not on file    Transportation needs:     Medical: Not on file     Non-medical: Not on file   Tobacco Use    Smoking status: Never Smoker    Smokeless tobacco: Never Used   Substance and Sexual Activity    Alcohol use: Never     Frequency: Never    Drug use: Never    Sexual activity: Not on file   Lifestyle    Physical activity:     Days per week: Not on file     Minutes per session: Not on file    Stress: Not on file   Relationships    Social connections:     Talks on phone: Not on file     Gets together: Not on file Attends Christian service: Not on file     Active member of club or organization: Not on file     Attends meetings of clubs or organizations: Not on file     Relationship status: Not on file    Intimate partner violence:     Fear of current or ex partner: Not on file     Emotionally abused: Not on file     Physically abused: Not on file     Forced sexual activity: Not on file   Other Topics Concern    Not on file   Social History Narrative    Not on file       FAMILY HISTORY:     Family History   Problem Relation Age of Onset    Diabetes Mother     Hypertension Mother     Diabetes Father     Hypertension Father     Heart Disease Father            Radha Annika, [place]  8/26/2019

## 2019-09-03 ENCOUNTER — TELEPHONE (OUTPATIENT)
Dept: PULMONOLOGY | Age: 49
End: 2019-09-03

## 2019-09-13 ENCOUNTER — OFFICE VISIT (OUTPATIENT)
Dept: SURGERY | Age: 49
End: 2019-09-13

## 2019-09-13 VITALS
OXYGEN SATURATION: 97 % | BODY MASS INDEX: 41.95 KG/M2 | HEART RATE: 82 BPM | WEIGHT: 293 LBS | RESPIRATION RATE: 20 BRPM | TEMPERATURE: 97 F | HEIGHT: 70 IN

## 2019-09-13 DIAGNOSIS — I10 ESSENTIAL HYPERTENSION: ICD-10-CM

## 2019-09-13 DIAGNOSIS — E55.9 HYPOVITAMINOSIS D: ICD-10-CM

## 2019-09-13 DIAGNOSIS — E11.9 TYPE 2 DIABETES MELLITUS WITHOUT COMPLICATION, WITH LONG-TERM CURRENT USE OF INSULIN (HCC): ICD-10-CM

## 2019-09-13 DIAGNOSIS — E66.01 MORBID OBESITY (HCC): Primary | ICD-10-CM

## 2019-09-13 DIAGNOSIS — Z79.4 TYPE 2 DIABETES MELLITUS WITHOUT COMPLICATION, WITH LONG-TERM CURRENT USE OF INSULIN (HCC): ICD-10-CM

## 2019-09-13 DIAGNOSIS — E78.00 HYPERCHOLESTEREMIA: ICD-10-CM

## 2019-09-13 NOTE — PROGRESS NOTES
Bariatric Preoperative Progress Note    Subjective:     Keyonna Burch is a 46 y.o. female who presents today for followup of their candidacy for bariatric surgery. Since last seen, Keyonna Burch has been working through bariatric program towards [address] [place] [address]. Past Medical History:   Diagnosis Date    Cro[facility] colitis ([facility]     [facility]     HTN (hypertension)     Hypercholesterolemia     Pulmonary embolism ([facility] 03/2019    Stool color black     crohns       Past Surgical History:   Procedure Laterality Date    HX ORTHOPAEDIC      fx left ankle ORIF    HX TUBAL LIGATION      btl       Current Outpatient Medications   Medication Sig Dispense Refill    ALPRAZolam (XANAX) 0.5 mg tablet TAKE 1 TAB BY MOUTH TWICE A DAY AS NEEDED  0    budesonide (ENTOCORT EC) 3 mg capsule TAKE 1 CAPSULE BY MOUTH 3 TIMES DAILY  1    dicyclomine (BENTYL) 10 mg capsule TAKE 1 CAPSULE BY MOUTH EVERY 6 HOURS AS NEEDED FOR PAIN  1    ergocalciferol (ERGOCALCIFEROL) 50,000 unit capsule TAKE 1 (ONE) CAPSULE BY MOUTH ONCE A WEEK  2    loperamide (IMODIUM A-D) 2 mg tablet Take 1 Tab by mouth three (3) times daily as needed.  FLORANEX 1 million cell tab tablet TAKE 1 TABLET BY MOUTH EVERY DAY. 1    tiZANidine (ZANAFLEX) 4 mg tablet TAKE 1 (ONE) TABLET BY MOUTH THREE TIMES DAILY AS NEEDED  0    DULoxetine (CYMBALTA) 60 mg capsule Take 1 Cap by mouth daily. One cap qam for one week, then one bid 60 Cap 3    insulin glargine (LANTUS,BASAGLAR) 100 unit/mL (3 mL) inpn 30 Units by SubCUTAneous route nightly.  ondansetron hcl (ZOFRAN) 4 mg tablet Take 1 Tab by mouth every eight (8) hours as needed for Nausea. 30 Tab 0    hydrALAZINE (APRESOLINE) 100 mg tablet Take 1 Tab by mouth three (3) times daily. 90 Tab 0    SITagliptin (JANUVIA) 100 mg tablet Take 1 Tab by mouth daily. 30 Tab 0    metoprolol tartrate (LOPRESSOR) 50 mg tablet Take 1 Tab by mouth two (2) times a day.  60 Tab 0    atorvastatin (LIPITOR) 20 mg tablet Take 1 Tab by mouth nightly. 30 Tab 0    cloNIDine HCl (CATAPRES) 0.1 mg tablet Take 1 Tab by mouth every eight (8) hours as needed. 90 Tab 0    gabapentin (NEURONTIN) 300 mg capsule Take 1 Cap by mouth three (3) times daily. (Patient taking differently: Take 800 mg by mouth three (3) times daily.) 90 Cap 0    hydroCHLOROthiazide (HYDRODIURIL) 25 mg tablet Take 1 Tab by mouth daily. 30 Tab 0    pantoprazole (PROTONIX) 40 mg tablet Take 1 Tab by mouth Before breakfast and dinner. 30 Tab 0    losartan (COZAAR) 100 mg tablet Take 100 mg by mouth daily.  azaTHIOprine (IMURAN) 50 mg tablet Take 250 mg by mouth daily. Indications: Crohn's disease      adalimumab (HUMIRA) 40 mg/0.8 mL injection 40 mg by SubCUTAneous route every seven (7) days.  apixaban (ELIQUIS) 5 mg tablet Take 1 Tab by mouth two (2) times a day. 60 Tab 2    insulin lispro (HUMALOG U-100 INSULIN) 100 unit/mL injection by SubCUTAneous route Before breakfast, lunch, dinner and at bedtime. No Known Allergies    ROS:  Review of Systems:  Positives in [place]    CONST: Fever, weight loss, fatigue or chills  GI: Nausea, vomiting, abdominal pain, change in bowel habits, hematochezia, melena, and GERD   INTEG: Dermatitis, abnormal moles  HEENT: Recent changes in vision, vertigo, epistaxis, dysphagia and hoarseness  CV: Chest pain, palpitations, HTN, edema and varicosities  RESP: Cough, shortness of breath, wheezing, hemoptysis, snoring and reactive airway disease  : Hematuria, dysuria, frequency, urgency, nocturia and stress urinary incontinence   MS: Weakness, joint pain and arthritis, knees  ENDO: Diabetes, thyroid disease, polyuria, polydipsia, polyphagia, poor wound healing, heat intolerance, cold intolerance  LYMPH/HEME: Anemia, bruising and history of blood transfusions  NEURO: Dizziness, headache, fainting, seizures and stroke  PSYCH: Anxiety and depression    Remainder of ROS as per HPI.           Objective: Physical Exam:  Visit Vitals  Pulse 82   Temp 97 °F (36.1 °C)   Resp 20   Ht 5' 10\" (1.778 m)   Wt 157.4 kg (347 lb)   SpO2 97%   BMI 49.79 kg/m²         General: AAOX3, pleasant and cooperative to exam. Appropriately groomed. NAD. Non-toxic in appearance. Appears stated age. HEENT: Normocephalic, atraumatic, Pupils equal and reactive, nasopharynx clear, oropharynx clear and moist without lesions  NECK: Supple, no lymphadenopathy, thyromegaly, carotid bruits or jugular venous distension. trachea midline  RESP: Clear to auscultation bilaterally, no wheezes, rhonchi, or rales, normal respiratory excursion  CV: Regular rate and rhythm, no murmurs, rubs or gallops. 3+/4 pulses in bilateral dorsalis pedis and posterior tibialis. No distal edema or varicosities. ABD: Soft, nontender, nondistended, normoactive bowel sounds, no hernias, no hepatosplenomegaly  Extremities: Warm, well perfused, no tenderness or swelling, normal gait/station  Neuro: Sensation and strength grossly intact and symmetrical  Psych: Alert and oriented to person, place, and time. Studies to date:    Labs: significant for hypovitaminosis D     EKG: Completed 7/22/2019    Nutritional evaluation: 4/6 completed     Psychiatric evaluation: Appt with Dr. William Calhoun on 9/19/2019    Support Group: Planning to attend in October     Additional evaluations: Heme appt 9/13/19. GI appt 9/17/19        Assessment:   Jenise العراقي is a 46 y.o. female who is progressing through the bariatric preoperative evaluation. At this time, they are an appropriate candidate for weight loss surgery. Ms. Maribel Read has a reminder for a \"due or due soon\" health maintenance. I have asked that she contact her primary care provider for follow-up on this health maintenance.       Plan:   -complete remainder of preop evaluation including nutrition classes, support group, Heme, GI and PCP clearance   -Patient voices understanding that weight gain during weight loss trial may result in cancellation of weight loss surgery.   -Follow up once has completed entirety of weight loss workup to determine next steps.         Aylin Meade, FNP-BC

## 2019-09-13 NOTE — PROGRESS NOTES
Pt ID confirmed    Weight Loss Metrics 9/13/2019 9/13/2019 8/26/2019 7/1/2019 6/27/2019 6/5/2019 5/16/2019   Pre op / Initial Wt 347 - - - - - 353   Today's Wt - 347 lb 346 lb 339 lb 6.4 oz 346 lb 355 lb -   BMI - 49.79 kg/m2 49.65 kg/m2 48.7 kg/m2 49.65 kg/m2 50.94 kg/m2 -   Ideal Body Wt 149 - - - - - 149   Excess Body Wt 198 - - - - - 204   Goal Wt 189 - - - - - 190   Wt loss to date 0 - - - - - 0   % Wt Loss 0 - - - - - 0   80% .4 - - - - - 163.2       Body mass index is 49.79 kg/m².

## 2019-09-25 ENCOUNTER — DOCUMENTATION ONLY (OUTPATIENT)
Dept: BARIATRICS/WEIGHT MGMT | Age: 49
End: 2019-09-25

## 2019-09-25 ENCOUNTER — HOSPITAL ENCOUNTER (OUTPATIENT)
Dept: BARIATRICS/WEIGHT MGMT | Age: 49
Discharge: HOME | End: 2019-09-25

## 2019-09-25 NOTE — PROGRESS NOTES
[address] Loss [place] [address] [facility], [address]    Patient's Name: Geraldo Chavez   Age: 46 y.o. YOB: 1970   Sex: female    Date:   9/25/2019    Insurance:            Session: 5 of 6  Revision:   Surgeon:  Dr. Marilyn Patel    Height: 5 f 10 Weight:    347      Lbs. BMI: 49.9   Pounds Lost since last month: 0               Pounds Gained since last month: 10    Starting Weight: 353   Previous Months Weight: 337  Overall Pounds Lost: 6 Overall Pounds Gained: 0      Do you smoke? None    Alcohol intake:  Number of drinks at a time:  None  Number of times a week: None    Class Guidelines    Guidelines are reviewed with patient at the start of every class. 1. Patient understands that weight loss trial classes must be consecutive. Patient understands if they miss a class, it is their responsibility to contact me to reschedule class. I will reach out to patient after their first no show. 2.  Patient understands the expectations that weight maintenance/weight loss is expected during the classes. Failure to demonstrate changes may result in one extra month of weight loss trial, followed by going back to see the surgeon. 3. Patient is also instructed to be doing their labs, blood work, psych visit, support group and any other test that the surgeon has used while they are working on their weight loss trial.    Other Pertinent Information: Patient states she was on steroids this last month. Changes Made Since Last Class: Reduced sugary drinks. Eating Habits and Behaviors      Today we reviewed key diet principles. We talked about ways that patient can follow a low calorie diet. These included: Stopping all liquid calories and patient was given a list of fluid choices that would be appropriate. We talked about carbohydrates.   Patient was educated that all carbohydrates turn to sugar and was encouraged to stick to the complex carbohydrates while in weight loss trials, but aim to keep less than 100 grams during weight loss trials. Patient was given a list of foods to not eat and drink due to high sugar, high fat, or high carbohydrate content. Patient was also given a list of foods and drinks that are high protein, low carbohydrate, and would be appropriate to eat. Patient was given a list of fruit and what a portion size is and how many carbohydrates it contains. Patient was encouraged to keep fruit intake to a minimum. Patient was also given a list of 50 low carbohydrate snack options, but was also cautioned that some of the choices still were high in calories and portion control should be practiced. Patient's current diet habits include: 3 meals per day. Patient is eating 2 eggs. Lunch is a sandwich. Dinner is a protein shake, starch, and a green. She is snacking on crackers or twizzlers. She is drinking 30 ounces of water and 30 ounces of diet soda and 10 ounces of sweet tea, which I have addressed with her. Physical Activity/Exercise    Comments:     Currently for exercise, patient is walking for 30 minutes per day. We talked about activities for patient to do, including walking, swimming, or chair exercises. Behavior Modification       Comments:   Patient was encouraged to food journal. I talked with patient about tracking their daily carbohydrate. One helpful danny is My Fitness Pal.  Patient was also encouraged to track their daily fluid intake. Discussed with patient the danny, Water Logged, that can be helpful in tracking their fluid intake. We also talked about the importance of planning ahead. Lack of willpower is often a lack of planning. Goals:  1. Cut out late eating. 2. Stop all sugary drinks.       Audelia Reyes, MS RD  9/25/2019

## 2019-10-16 ENCOUNTER — TELEPHONE (OUTPATIENT)
Dept: NEUROLOGY | Age: 49
End: 2019-10-16

## 2019-10-16 NOTE — TELEPHONE ENCOUNTER
Notice of missing information rec'd from Kendra and placed in provider's folder @ [facility] for review and signature.

## 2019-10-18 ENCOUNTER — ANESTHESIA EVENT (OUTPATIENT)
Dept: ENDOSCOPY | Age: 49
End: 2019-10-18
Payer: MEDICAID

## 2019-10-21 ENCOUNTER — ANESTHESIA (OUTPATIENT)
Dept: ENDOSCOPY | Age: 49
End: 2019-10-21
Payer: MEDICAID

## 2019-10-21 ENCOUNTER — HOSPITAL ENCOUNTER (OUTPATIENT)
Age: 49
Setting detail: OUTPATIENT SURGERY
Discharge: HOME | End: 2019-10-21
Attending: INTERNAL MEDICINE | Admitting: INTERNAL MEDICINE
Payer: MEDICAID

## 2019-10-21 VITALS
OXYGEN SATURATION: 96 % | HEART RATE: 64 BPM | BODY MASS INDEX: 39.68 KG/M2 | SYSTOLIC BLOOD PRESSURE: 162 MMHG | WEIGHT: 293 LBS | DIASTOLIC BLOOD PRESSURE: 87 MMHG | RESPIRATION RATE: 16 BRPM | HEIGHT: 72 IN | TEMPERATURE: 97.1 F

## 2019-10-21 LAB
GLUCOSE BLD STRIP.AUTO-MCNC: 103 MG/DL (ref 70–110)
GLUCOSE BLD STRIP.AUTO-MCNC: 143 MG/DL (ref 70–110)
HCG UR QL: NEGATIVE

## 2019-10-21 PROCEDURE — 88305 TISSUE EXAM BY PATHOLOGIST: CPT

## 2019-10-21 PROCEDURE — 74011250636 HC RX REV CODE- 250/636: Performed by: NURSE ANESTHETIST, CERTIFIED REGISTERED

## 2019-10-21 PROCEDURE — 77030013992 HC SNR POLYP ENDOSC BSC -B: Performed by: INTERNAL MEDICINE

## 2019-10-21 PROCEDURE — 81025 URINE PREGNANCY TEST: CPT

## 2019-10-21 PROCEDURE — 77030018846 HC SOL IRR STRL H20 ICUM -A: Performed by: INTERNAL MEDICINE

## 2019-10-21 PROCEDURE — 77030021593 HC FCPS BIOP ENDOSC BSC -A: Performed by: INTERNAL MEDICINE

## 2019-10-21 PROCEDURE — 82962 GLUCOSE BLOOD TEST: CPT

## 2019-10-21 PROCEDURE — 76040000007: Performed by: INTERNAL MEDICINE

## 2019-10-21 PROCEDURE — 76060000032 HC ANESTHESIA 0.5 TO 1 HR: Performed by: INTERNAL MEDICINE

## 2019-10-21 PROCEDURE — 77030008565 HC TBNG SUC IRR ERBE -B: Performed by: INTERNAL MEDICINE

## 2019-10-21 PROCEDURE — 77030019988 HC FCPS ENDOSC DISP BSC -B: Performed by: INTERNAL MEDICINE

## 2019-10-21 PROCEDURE — 74011000250 HC RX REV CODE- 250: Performed by: NURSE ANESTHETIST, CERTIFIED REGISTERED

## 2019-10-21 RX ORDER — SODIUM CHLORIDE 0.9 % (FLUSH) 0.9 %
5-40 SYRINGE (ML) INJECTION AS NEEDED
Status: DISCONTINUED | OUTPATIENT
Start: 2019-10-21 | End: 2019-10-21 | Stop reason: HOSPADM

## 2019-10-21 RX ORDER — SODIUM CHLORIDE 0.9 % (FLUSH) 0.9 %
5-40 SYRINGE (ML) INJECTION EVERY 8 HOURS
Status: CANCELLED | OUTPATIENT
Start: 2019-10-21

## 2019-10-21 RX ORDER — SODIUM CHLORIDE, SODIUM LACTATE, POTASSIUM CHLORIDE, CALCIUM CHLORIDE 600; 310; 30; 20 MG/100ML; MG/100ML; MG/100ML; MG/100ML
50 INJECTION, SOLUTION INTRAVENOUS CONTINUOUS
Status: DISCONTINUED | OUTPATIENT
Start: 2019-10-21 | End: 2019-10-21 | Stop reason: HOSPADM

## 2019-10-21 RX ORDER — SODIUM CHLORIDE 0.9 % (FLUSH) 0.9 %
5-40 SYRINGE (ML) INJECTION EVERY 8 HOURS
Status: DISCONTINUED | OUTPATIENT
Start: 2019-10-21 | End: 2019-10-21 | Stop reason: HOSPADM

## 2019-10-21 RX ORDER — ATROPINE SULFATE 0.1 MG/ML
0.5 INJECTION INTRAVENOUS
Status: CANCELLED | OUTPATIENT
Start: 2019-10-21 | End: 2019-10-22

## 2019-10-21 RX ORDER — SODIUM CHLORIDE 0.9 % (FLUSH) 0.9 %
5-40 SYRINGE (ML) INJECTION AS NEEDED
Status: CANCELLED | OUTPATIENT
Start: 2019-10-21

## 2019-10-21 RX ORDER — FLUMAZENIL 0.1 MG/ML
0.2 INJECTION INTRAVENOUS
Status: CANCELLED | OUTPATIENT
Start: 2019-10-21 | End: 2019-10-21

## 2019-10-21 RX ORDER — NALOXONE HYDROCHLORIDE 0.4 MG/ML
0.4 INJECTION, SOLUTION INTRAMUSCULAR; INTRAVENOUS; SUBCUTANEOUS
Status: CANCELLED | OUTPATIENT
Start: 2019-10-21 | End: 2019-10-21

## 2019-10-21 RX ORDER — DEXTROSE 50 % IN WATER (D50W) INTRAVENOUS SYRINGE
25-50 AS NEEDED
Status: DISCONTINUED | OUTPATIENT
Start: 2019-10-21 | End: 2019-10-21 | Stop reason: HOSPADM

## 2019-10-21 RX ORDER — MAGNESIUM SULFATE 100 %
4 CRYSTALS MISCELLANEOUS AS NEEDED
Status: DISCONTINUED | OUTPATIENT
Start: 2019-10-21 | End: 2019-10-21 | Stop reason: HOSPADM

## 2019-10-21 RX ORDER — DEXTROMETHORPHAN/PSEUDOEPHED 2.5-7.5/.8
1.2 DROPS ORAL
Status: CANCELLED | OUTPATIENT
Start: 2019-10-21

## 2019-10-21 RX ORDER — PROPOFOL 10 MG/ML
INJECTION, EMULSION INTRAVENOUS AS NEEDED
Status: DISCONTINUED | OUTPATIENT
Start: 2019-10-21 | End: 2019-10-21 | Stop reason: HOSPADM

## 2019-10-21 RX ORDER — LIDOCAINE HYDROCHLORIDE 20 MG/ML
INJECTION, SOLUTION EPIDURAL; INFILTRATION; INTRACAUDAL; PERINEURAL AS NEEDED
Status: DISCONTINUED | OUTPATIENT
Start: 2019-10-21 | End: 2019-10-21 | Stop reason: HOSPADM

## 2019-10-21 RX ORDER — INSULIN LISPRO 100 [IU]/ML
INJECTION, SOLUTION INTRAVENOUS; SUBCUTANEOUS ONCE
Status: DISCONTINUED | OUTPATIENT
Start: 2019-10-21 | End: 2019-10-21 | Stop reason: HOSPADM

## 2019-10-21 RX ORDER — EPINEPHRINE 0.1 MG/ML
1 INJECTION INTRACARDIAC; INTRAVENOUS
Status: CANCELLED | OUTPATIENT
Start: 2019-10-21 | End: 2019-10-22

## 2019-10-21 RX ADMIN — SODIUM CHLORIDE, SODIUM LACTATE, POTASSIUM CHLORIDE, AND CALCIUM CHLORIDE 50 ML/HR: 600; 310; 30; 20 INJECTION, SOLUTION INTRAVENOUS at 09:27

## 2019-10-21 RX ADMIN — FAMOTIDINE 20 MG: 10 INJECTION INTRAVENOUS at 09:27

## 2019-10-21 RX ADMIN — PROPOFOL 50 MG: 10 INJECTION, EMULSION INTRAVENOUS at 09:47

## 2019-10-21 RX ADMIN — PROPOFOL 50 MG: 10 INJECTION, EMULSION INTRAVENOUS at 09:56

## 2019-10-21 RX ADMIN — PROPOFOL 100 MG: 10 INJECTION, EMULSION INTRAVENOUS at 09:40

## 2019-10-21 RX ADMIN — PROPOFOL 50 MG: 10 INJECTION, EMULSION INTRAVENOUS at 09:43

## 2019-10-21 RX ADMIN — PROPOFOL 50 MG: 10 INJECTION, EMULSION INTRAVENOUS at 09:51

## 2019-10-21 RX ADMIN — LIDOCAINE HYDROCHLORIDE 100 MG: 20 INJECTION, SOLUTION EPIDURAL; INFILTRATION; INTRACAUDAL; PERINEURAL at 09:40

## 2019-10-21 NOTE — DISCHARGE INSTRUCTIONS
Gastritis: Care Instructions  Your Care Instructions    Gastritis is a sore and upset stomach. It happens when something irritates the stomach lining. Many things can cause it. These include an infection such as the flu or something you ate or drank. Medicines or a sore on the lining of the stomach (ulcer) also can cause it. Your belly may bloat and ache. You may belch, vomit, and feel sick to your stomach. You should be able to relieve the problem by taking medicine. And it may help to change your diet. If gastritis lasts, your doctor may prescribe medicine. Follow-up care is a key part of your treatment and safety. Be sure to make and go to all appointments, and call your doctor if you are having problems. It's also a good idea to know your test results and keep a list of the medicines you take. How can you care for yourself at home? · If your doctor prescribed antibiotics, take them as directed. Do not stop taking them just because you feel better. You need to take the full course of antibiotics. · Be safe with medicines. If your doctor prescribed medicine to decrease stomach acid, take it as directed. Call your doctor if you think you are having a problem with your medicine. · Do not take any other medicine, including over-the-counter pain relievers, without talking to your doctor first.  · If your doctor recommends over-the-counter medicine to reduce stomach acid, such as Pepcid AC, Prilosec, Tagamet HB, or Zantac 75, follow the directions on the label. · Drink plenty of fluids (enough so that your urine is light yellow or clear like water) to prevent dehydration. Choose water and other caffeine-free clear liquids. If you have kidney, heart, or liver disease and have to limit fluids, talk with your doctor before you increase the amount of fluids you drink. · Limit how much alcohol you drink. · Avoid coffee, tea, cola drinks, chocolate, and other foods with caffeine.  They increase stomach acid.  When should you call for help? Call 911 anytime you think you may need emergency care. For example, call if:    · You vomit blood or what looks like coffee grounds.     · You pass maroon or very bloody stools.    Call your doctor now or seek immediate medical care if:    · You start breathing fast and have not produced urine in the last 8 hours.     · You cannot keep fluids down.    Watch closely for changes in your health, and be sure to contact your doctor if:    · You do not get better as expected. Where can you learn more? Go to http://nasreen-mónica.info/. Enter [id number] in the search box to learn more about \"Gastritis: Care Instructions. \"  Current as of: November 7, 2018  Content Version: 12.2  © 2694-5012 [employer]. Care instructions adapted under license by [employer] (which disclaims liability or warranty for this information). If you have questions about a medical condition or this instruction, always ask your healthcare professional. [address] any warranty or liability for your use of this information. Learning About Diverticulosis and Diverticulitis  What are diverticulosis and diverticulitis? In diverticulosis and diverticulitis, pouches called diverticula form in the wall of the large intestine, or colon. · In diverticulosis, the pouches do not cause any pain or other symptoms. · In diverticulitis, the pouches get inflamed or infected and cause symptoms. Doctors aren't sure what causes these pouches in the colon. But they think that a low-fiber diet may play a role. Without fiber to add bulk to the stool, the colon has to work harder than normal to push the stool forward. The pressure from this may cause pouches to form in weak spots along the colon. Some people with diverticulosis get diverticulitis. But experts don't know why this happens. What are the symptoms?   · In diverticulosis, most people don't have symptoms. But pouches sometimes bleed. · In diverticulitis, symptoms may last from a few hours to a week or more. They include:  ? Belly pain. This is usually in the lower left side. It is sometimes worse when you move. This is the most common symptom. ? Fever and chills. ? Bloating and gas. ? Diarrhea or constipation. ? Nausea and sometimes vomiting.  ? Not feeling like eating. How can you prevent these problems? You may be able to lower your chance of getting diverticulitis. You can do this by taking steps to prevent constipation. · Eat fruits, vegetables, beans, and whole grains every day. These foods are high in fiber. · Drink plenty of fluids (enough so that your urine is light yellow or clear like water). If you have kidney, heart, or liver disease and have to limit fluids, talk with your doctor before you increase the amount of fluids you drink. · Get at least 30 minutes of exercise on most days of the week. Walking is a good choice. You also may want to do other activities, such as running, swimming, cycling, or playing tennis or team sports. · Take a fiber supplement, such as Citrucel or Metamucil, every day if needed. Read and follow all instructions on the label. · Schedule time each day for a bowel movement. Having a daily routine may help. Take your time and do not strain when having a bowel movement. Some people avoid nuts, seeds, berries, and popcorn. They believe that these foods might get trapped in the diverticula and cause pain. But there is no proof that these foods cause diverticulitis or make it worse. How are these problems treated? · The best way to treat diverticulosis is to avoid constipation. (See the tips above.)  · Treatment for diverticulitis includes antibiotics and often a change in your diet. You may need only liquids at first. Your doctor may suggest pain medicines for pain or belly cramps. In some cases, surgery may be needed.   Follow-up care is a key part of your treatment and safety. Be sure to make and go to all appointments, and call your doctor if you are having problems. It's also a good idea to know your test results and keep a list of the medicines you take. Where can you learn more? Go to http://nasreen-mónica.info/. Enter P582 in the search box to learn more about \"Learning About Diverticulosis and Diverticulitis. \"  Current as of: November 7, 2018  Content Version: 12.2  © 6642-7797 [employer]. Care instructions adapted under license by [employer] (which disclaims liability or warranty for this information). If you have questions about a medical condition or this instruction, always ask your healthcare professional. [place] [address] any warranty or liability for your use of this information. Upper GI Endoscopy: What to Expect at [address] [place] [address]  After you have an endoscopy, you will stay at the hospital or clinic for 1 to 2 hours. This will allow the medicine to wear off. You will be able to go home after your doctor or nurse checks to make sure you are not having any problems. You may have to stay overnight if you had treatment during the test. You may have a sore throat for a day or two after the test.  This care sheet gives you a general idea about what to expect after the test.  How can you care for yourself at home? Activity  · Rest as much as you need to after you go home. · You should be able to go back to your usual activities the day after the test.  Diet  · Follow your doctor's directions for eating after the test.  · Drink plenty of fluids (unless your doctor has told you not to). Medications  · If you have a sore throat the day after the test, use an over-the-counter spray to numb your throat. Follow-up care is a key part of your treatment and safety. Be sure to make and go to all appointments, and call your doctor if you are having problems.  It's also a good idea to know your test results and keep a list of the medicines you take. When should you call for help? Call 911 anytime you think you may need emergency care. For example, call if:    · You passed out (loses consciousness).     · You have trouble breathing.     · You pass maroon or bloody stools.    Call your doctor now or seek immediate medical care if:    · You have pain that does not get better after your take pain medicine.     · You have new or worse belly pain.     · You have blood in your stools.     · You are sick to your stomach and cannot keep fluids down.     · You have a fever.     · You cannot pass stools or gas.    Watch closely for changes in your health, and be sure to contact your doctor if:    · Your throat still hurts after a day or two.     · You do not get better as expected. Where can you learn more? Go to http://nasreen-mónica.info/. Enter [phone] in the search box to learn more about \"Upper GI Endoscopy: What to Expect at Home. \"  Current as of: November 7, 2018  Content Version: 12.2  © 9344-2065 [employer]. Care instructions adapted under license by [employer] (which disclaims liability or warranty for this information). If you have questions about a medical condition or this instruction, always ask your healthcare professional. [place] [address] any warranty or liability for your use of this information. Colonoscopy: What to Expect at [address] [place] [address]  After you have a colonoscopy, you will stay at the clinic for 1 to 2 hours until the medicines wear off. Then you can go home. But you will need to arrange for a ride. Your doctor will tell you when you can eat and do your other usual activities. Your doctor will talk to you about when you will need your next colonoscopy. Your doctor can help you decide how often you need to be checked. This will depend on the results of your test and your risk for colorectal cancer.   After the test, you may be bloated or have gas pains. You may need to pass gas. If a biopsy was done or a polyp was removed, you may have streaks of blood in your stool (feces) for a few days. Problems such as heavy rectal bleeding may not occur until several weeks after the test. This isn't common. But it can happen after polyps are removed. This care sheet gives you a general idea about how long it will take for you to recover. But each person recovers at a different pace. Follow the steps below to get better as quickly as possible. How can you care for yourself at home? Activity    · Rest when you feel tired.     · You can do your normal activities when it feels okay to do so. Diet    · Follow your doctor's directions for eating.     · Unless your doctor has told you not to, drink plenty of fluids. This helps to replace the fluids that were lost during the colon prep.     · Do not drink alcohol. Medicines    · Your doctor will tell you if and when you can restart your medicines. He or she will also give you instructions about taking any new medicines.     · If you take blood thinners, such as warfarin (Coumadin), clopidogrel (Plavix), or aspirin, be sure to talk to your doctor. He or she will tell you if and when to start taking those medicines again. Make sure that you understand exactly what your doctor wants you to do.     · If polyps were removed or a biopsy was done during the test, your doctor may tell you not to take aspirin or other anti-inflammatory medicines for a few days. These include ibuprofen (Advil, Motrin) and naproxen (Aleve). Other instructions    · For your safety, do not drive or operate machinery until the medicine wears off and you can think clearly. Your doctor may tell you not to drive or operate machinery until the day after your test.     · Do not sign legal documents or make major decisions until the medicine wears off and you can think clearly.  The anesthesia can make it hard for you to fully understand what you are agreeing to. Follow-up care is a key part of your treatment and safety. Be sure to make and go to all appointments, and call your doctor if you are having problems. It's also a good idea to know your test results and keep a list of the medicines you take. When should you call for help? Call 911 anytime you think you may need emergency care. For example, call if:    · You passed out (lost consciousness).     · You pass maroon or bloody stools.     · You have trouble breathing.    Call your doctor now or seek immediate medical care if:    · You have pain that does not get better after you take pain medicine.     · You are sick to your stomach or cannot drink fluids.     · You have new or worse belly pain.     · You have blood in your stools.     · You have a fever.     · You cannot pass stools or gas.    Watch closely for changes in your health, and be sure to contact your doctor if you have any problems. Where can you learn more? Go to http://nasreen-mónica.info/. Enter E264 in the search box to learn more about \"Colonoscopy: What to Expect at Home. \"  Current as of: December 19, 2018  Content Version: 12.2  © 5443-0357 [employer]. Care instructions adapted under license by [employer] (which disclaims liability or warranty for this information). If you have questions about a medical condition or this instruction, always ask your healthcare professional. [place] [address] any warranty or liability for your use of this information.

## 2019-10-21 NOTE — ANESTHESIA PREPROCEDURE EVALUATION
Relevant Problems   No relevant active problems       Anesthetic History   No history of anesthetic complications            Review of Systems / Medical History      Pulmonary                   Neuro/Psych   Within defined limits           Cardiovascular    Hypertension: well controlled              Exercise tolerance: >4 METS     GI/Hepatic/Renal                Endo/Other    Diabetes: well controlled, type 2    Morbid obesity     Other Findings              Physical Exam    Airway  Mallampati: III  TM Distance: 4 - 6 cm  Neck ROM: decreased range of motion   Mouth opening: Normal     Cardiovascular    Rhythm: regular  Rate: normal         Dental  No notable dental hx       Pulmonary  Breath sounds clear to auscultation               Abdominal  GI exam deferred       Other Findings            Anesthetic Plan    ASA: 3  Anesthesia type: MAC            Anesthetic plan and risks discussed with: Patient

## 2019-10-21 NOTE — ADDENDUM NOTE
Addendum  created 10/21/19 1032 by Jack Blanton CRNA    Intraprocedure LDAs edited, LDA properties accepted

## 2019-10-21 NOTE — H&P
[email or web address]  [phone]    GASTROENTEROLOGY Pre-Procedure H and P      Impression/Plan:   1. This patient is consented for an EGD and colonoscopy for acid reflux, chronic nausea and h/o Crohn's disease of the colon. Chief Complaint: acid reflux, chronic nausea and h/o Crohn's disease of the colon      HPI:  Renzo Alvarez is a 46 y.o. female who presents for an EGD and colonoscopy for evaluation of acid reflux, chronic nausea and h/o Crohn's disease of the colon.       PMH:   Past Medical History:   Diagnosis Date    [facility]     Diabetes ([facility]     HTN (hypertension)     Hypercholesterolemia     Pulmonary embolism ([facility] 03/2019    Stool color black     crohns       PSH:   Past Surgical History:   Procedure Laterality Date    HX ORTHOPAEDIC      fx left ankle ORIF    HX TUBAL LIGATION      btl       Social HX:   Social History     Socioeconomic History    Marital status: SINGLE     Spouse name: Not on file    Number of children: Not on file    Years of education: Not on file    Highest education level: Not on file   Occupational History    Not on file   Social Needs    Financial resource strain: Not on file    Food insecurity:     Worry: Not on file     Inability: Not on file    Transportation needs:     Medical: Not on file     Non-medical: Not on file   Tobacco Use    Smoking status: Never Smoker    Smokeless tobacco: Never Used   Substance and Sexual Activity    Alcohol use: Never     Frequency: Never    Drug use: Never    Sexual activity: Not on file   Lifestyle    Physical activity:     Days per week: Not on file     Minutes per session: Not on file    Stress: Not on file   Relationships    Social connections:     Talks on phone: Not on file     Gets together: Not on file     Attends Latter day service: Not on file     Active member of club or organization: Not on file     Attends meetings of clubs or organizations: Not on file     Relationship status: Not on file    Intimate partner violence:     Fear of current or ex partner: Not on file     Emotionally abused: Not on file     Physically abused: Not on file     Forced sexual activity: Not on file   Other Topics Concern    Not on file   Social History Narrative    Not on file       FHX:   Family History   Problem Relation Age of Onset    Diabetes Mother     Hypertension Mother     Diabetes Father     Hypertension Father     Heart Disease Father        Allergy:   No Known Allergies    Home Medications:     Medications Prior to Admission   Medication Sig    ALPRAZolam (XANAX) 0.5 mg tablet TAKE 1 TAB BY MOUTH TWICE A DAY AS NEEDED    budesonide (ENTOCORT EC) 3 mg capsule TAKE 1 CAPSULE BY MOUTH 3 TIMES DAILY    dicyclomine (BENTYL) 10 mg capsule TAKE 1 CAPSULE BY MOUTH EVERY 6 HOURS AS NEEDED FOR PAIN    ergocalciferol (ERGOCALCIFEROL) 50,000 unit capsule TAKE 1 (ONE) CAPSULE BY MOUTH ONCE A WEEK    loperamide (IMODIUM A-D) 2 mg tablet Take 1 Tab by mouth three (3) times daily as needed.  FLORANEX 1 million cell tab tablet TAKE 1 TABLET BY MOUTH EVERY DAY.  tiZANidine (ZANAFLEX) 4 mg tablet TAKE 1 (ONE) TABLET BY MOUTH THREE TIMES DAILY AS NEEDED    DULoxetine (CYMBALTA) 60 mg capsule Take 1 Cap by mouth daily. One cap qam for one week, then one bid    insulin glargine (LANTUS,BASAGLAR) 100 unit/mL (3 mL) inpn 30 Units by SubCUTAneous route nightly.  ondansetron hcl (ZOFRAN) 4 mg tablet Take 1 Tab by mouth every eight (8) hours as needed for Nausea.  hydrALAZINE (APRESOLINE) 100 mg tablet Take 1 Tab by mouth three (3) times daily.  SITagliptin (JANUVIA) 100 mg tablet Take 1 Tab by mouth daily.  metoprolol tartrate (LOPRESSOR) 50 mg tablet Take 1 Tab by mouth two (2) times a day.  atorvastatin (LIPITOR) 20 mg tablet Take 1 Tab by mouth nightly.  cloNIDine HCl (CATAPRES) 0.1 mg tablet Take 1 Tab by mouth every eight (8) hours as needed.     gabapentin (NEURONTIN) 300 mg capsule Take 1 Cap by mouth three (3) times daily. (Patient taking differently: Take 800 mg by mouth three (3) times daily.)    hydroCHLOROthiazide (HYDRODIURIL) 25 mg tablet Take 1 Tab by mouth daily.  pantoprazole (PROTONIX) 40 mg tablet Take 1 Tab by mouth Before breakfast and dinner.  losartan (COZAAR) 100 mg tablet Take 100 mg by mouth daily.  azaTHIOprine (IMURAN) 50 mg tablet Take 250 mg by mouth daily. Indications: Crohn's disease    adalimumab (HUMIRA) 40 mg/0.8 mL injection 40 mg by SubCUTAneous route every seven (7) days. Review of Systems:     A complete 10 point review of systems was performed and pertinents are as per the HPI. Remainder of the review of systems was negative. Visit Vitals  Ht 5' 10\" (1.778 m)   Wt 157.4 kg (347 lb)   BMI 49.79 kg/m²       Physical Assessment:     General: obese, no acute distress, appears stated age. HEENT: normocephalic, no scleral icterus, moist mucous membranes, EOMs intact, no neck masses noted. Respiratory: lungs clear to auscultation bilaterally. Cardiovascular: regular rate and rhythm, no murmurs, rubs or gallops. Abdomen: normal bowel sounds, soft, non-tender to palpation. Extremities: no lower extremity edema, no cyanosis or clubbing. Neuro: alert and oriented x 3; non-focal exam.  Skin: no rashes. Psych: normal mood and affect. Andrey Guidry MD  [facility]  [email or web address]  Phone: [phone]  Pager: [phone]  [email or web address]

## 2019-10-21 NOTE — ANESTHESIA POSTPROCEDURE EVALUATION
Procedure(s):  COLONOSCOPY w/ bxs  UPPER ENDOSCOPY w/bxs. MAC    Anesthesia Post Evaluation      Multimodal analgesia: multimodal analgesia not used between 6 hours prior to anesthesia start to PACU discharge  Patient location during evaluation: PACU  Patient participation: complete - patient participated  Level of consciousness: awake  Pain score: 0  Pain management: adequate  Airway patency: patent  Anesthetic complications: no  Cardiovascular status: acceptable and hemodynamically stable  Respiratory status: acceptable, spontaneous ventilation and nasal cannula  Hydration status: acceptable  Post anesthesia nausea and vomiting:  none      Vitals Value Taken Time   /75 10/21/2019 10:09 AM   Temp 36.5 °C (97.7 °F) 10/21/2019 10:09 AM   Pulse 70 10/21/2019 10:09 AM   Resp 14 10/21/2019 10:09 AM   SpO2 100 % 10/21/2019 10:10 AM   Vitals shown include unvalidated device data.

## 2019-10-21 NOTE — PROCEDURES
[email or web address] \[facility]\""  [facility], [address]      Brief Procedure Note    Melonie Contreras  1970  [id number]    Date of Procedure: 10/21/2019    Preoperative diagnosis: CROHN'S DISEASE OF COLON, CHANGE IN BOWEL HABITS, ABNORMAL CT SCAN, ACID REFLUX, NAUSEA, BODY MASS INDEX 45-49    Postoperative diagnosis: EGD: mild gastritis  COLO: Active Crohns DX    Type of Anesthesia: MAC (Monitored anesthesia care)    Description of findings: same as post op dx    Procedure: Procedure(s):  COLONOSCOPY w/ bxs  UPPER ENDOSCOPY w/bxs    :  Dr. Nathan Ocasio MD    Assistant(s): Endoscopy Technician-1: Madhav Roque  Endoscopy RN-1: Kalani Dunne RN; Kimberly Solano RN    EBL:None    Specimens:   ID Type Source Tests Collected by Time Destination   1 : antrum & body bxs  Preservative Stomach  Erendira Echevarria MD 10/21/2019 2835 Pathology   2 : transverse colon bxs  Preservative Colon  Erendira Echevarria MD 10/21/2019 3907 Pathology   3 : descending colon bxs Preservative Colon  Erendira Echevarria MD 10/21/2019 0957 Pathology   4 : sigmoid colon bxs  Preservative Colon  Erendira Echevarria MD 10/21/2019 1903 Pathology   5 : rectal bxs  Preservative Rectal  Erendira Echevarria MD 10/21/2019 1001 Pathology       Findings: See printed and scanned procedure note    Complications: None    Dr. Nathan Ocasio MD  10/21/2019  10:21 AM

## 2019-10-25 ENCOUNTER — DOCUMENTATION ONLY (OUTPATIENT)
Dept: BARIATRICS/WEIGHT MGMT | Age: 49
End: 2019-10-25

## 2019-10-25 ENCOUNTER — HOSPITAL ENCOUNTER (OUTPATIENT)
Dept: BARIATRICS/WEIGHT MGMT | Age: 49
Discharge: HOME | End: 2019-10-25

## 2019-10-25 NOTE — PROGRESS NOTES
[address] [facility] [address], [address]    Patient's Name: Carolina Jaramillo   Age: 46 y.o. YOB: 1970   Sex: female    Date:   October 24, 2019    Insurance:            Session: 6 of 6  Revision:   Surgeon:  Dr. Pamella Miller    Height: 5 f 10 Weight:    347      Lbs. BMI: 49.9   Pounds Lost since last month: 0               Pounds Gained since last month: 0    Starting Weight: 353   Previous Months Weight: 347  Overall Pounds Lost: 6 Overall Pounds Gained: 0      Do you smoke? None    Alcohol intake:  Number of drinks at a time:  None  Number of times a week: None    Class Guidelines    Guidelines are reviewed with patient at the start of every class. 1. Patient understands that weight loss trial classes must be consecutive. Patient understands if they miss a class, it is their responsibility to contact me to reschedule class. I will reach out to patient after their first no show. 2.  Patient understands the expectations that weight maintenance/weight loss is expected during the classes. Failure to demonstrate changes may result in one extra month of weight loss trial, followed by going back to see the surgeon. 3. Patient is also instructed to be doing their labs, blood work, psych visit, support group and any other test that the surgeon has used while they are working on their weight loss trial.  4. Patient is instructed to bring their education binder to all classes. Other Pertinent Information:     Changes Made Since Last Class: Less soda. More Crystal Light. Eating Habits and Behaviors      Today we reviewed key diet principles. We talked about patient following a low calorie/low carbohydrate diet while they are in weight loss trials. To achieve this, patient is encouraged to avoid liquid calories, including alcohol, soda, sweet tea, and fruit juices.    Patient can cut carbohydrates by trying to stick to meat and vegetables. Patient can also eat eggs, cheese, and good fat, while trying to eliminate starches, such as pasta, rice, crackers, chips, popcorn. I also gave a power point that included 21 Ways to Stay on Track with a Healthy Lifestyle. Some of the food-related suggestions included drinking plenty of water or calorie-free beverages prior to their meal.  Patient is encouraged to to fill up on protein first, which is the ultimate fill-me up food. We talked about the importance of eating breakfast and the effects that can happen if one skips meals, which includes eating larger portions, snacking more, and decreasing their metabolism. With the suggestions in the power point, patient will be able to decrease their calories and carbohydrate intake. Patient's current diet habits include: 2 meals per day. Patient is often skipping breakfast.  Lunch is large salad and sandwich. Dinner is 1 protein, 1 vegetable, and 1 starch. She states she may have ice cream in moderation. She is drinking 40 ounces of water per day. Physical Activity/Exercise    Comments: We talked about the importance of establishing a work out routine. Patient is currently walking 30 minutes, 2 x a week for activity. Behavior Modification       Comments:   Some of the behavior tips that were included in the power point, include being choosy about night time snacking. Patient was encouraged to make the TV a no eating zone and not eat after 7 pm.  Patient is also encouraged to keep a food journal.      One of the other things we talked about during class is whether or not patient has a support system. Patient indicated that their family is supportive of the surgery and they have some that will be there for them after surgery. I also reminded all patients to make their psychologist appointment and attend at least 1 support group.       Loly Hodges, MS RD  10/24/19

## 2019-10-25 NOTE — PROGRESS NOTES
Nutrition Evaluation    Patient's Name: Sara Teixeira   Age: 46 y.o. YOB: 1970   Sex: female    Height: 5 f 10 Weight: 347 BMI:  49.9  Starting Weight:  353        Smoking Status:  None  Alcohol Intake:  Number of Drinks at a Time: None  Number of Times a Week: None    Changes made during classes include:  More exercise  Less eating  Less carbohydrates  More water and Crystal light        Two things that patient learned during this weight loss trial:  Decrease carbohydrate intake    Review labels    Summary:  I feel that Sara Teixeira has demonstrated appropriate diet changes and is ready to move forward with surgery. Patient has been briefed on the importance of the protein drinks, vitamins, and the transition of the diet stages. Patient understands that the long-term diet will focus on protein and vegetables. Patient understand the effects of carbohydrates after surgery and what reactive hypoglycemia is. Patient is aware that they will be attending pre-op class 2 weeks before surgery and will get more detailed information on the post-op diet guidelines. Patient will see me again at 6 weeks post-op. At this 6 week visit, RD will assess how patient is tolerating soft protein and advance to vegetables, if tolerating soft protein without difficulty. Patient will also see RD again at 9 months post-op. This visit will assess patient's compliance with current protocol, including diet, vitamins, protein shakes, and exercise. Post-op diet guidelines will be reinforced. RD is available for questions and to meet with patient outside of the 6 week and 9 month post-op visit. We spent a lot of time talking about the vitamins. Patient understands the importance of being compliant with the diet protocol and the complications and risks that can occur if they are non-compliant with the nutritional protocol. Patient has attended at least one support group.     Candidate for surgery: Yes  Re-evaluation Date:     Procedure:  Gastric Sleeve    Brooke Troncoso, MS RD  10/25/2019

## 2019-10-29 ENCOUNTER — TELEPHONE (OUTPATIENT)
Dept: SURGERY | Age: 49
End: 2019-10-29

## 2019-10-29 NOTE — TELEPHONE ENCOUNTER
Called patient she will have all remainder requirement done by 2nd week in November.  She will call our office to let me know when she has gotten those things done

## 2019-10-31 ENCOUNTER — HOSPITAL ENCOUNTER (OUTPATIENT)
Dept: LAB | Age: 49
Discharge: HOME | End: 2019-10-31

## 2019-10-31 ENCOUNTER — HOSPITAL ENCOUNTER (OUTPATIENT)
Dept: GENERAL RADIOLOGY | Age: 49
End: 2019-10-31
Attending: NURSE PRACTITIONER
Payer: MEDICAID

## 2019-10-31 ENCOUNTER — HOSPITAL ENCOUNTER (OUTPATIENT)
Dept: GENERAL RADIOLOGY | Age: 49
Discharge: HOME | End: 2019-10-31
Attending: NURSE PRACTITIONER
Payer: MEDICAID

## 2019-10-31 DIAGNOSIS — M25.562 LEFT KNEE PAIN: ICD-10-CM

## 2019-10-31 DIAGNOSIS — M54.50 LOWER BACK PAIN: ICD-10-CM

## 2019-10-31 DIAGNOSIS — I26.99 PULMONARY EMBOLI (HCC): ICD-10-CM

## 2019-10-31 LAB
XX-LABCORP SPECIMEN COL,LCBCF: NORMAL
XX-LABCORP SPECIMEN COL,LCBCF: NORMAL

## 2019-10-31 PROCEDURE — 99001 SPECIMEN HANDLING PT-LAB: CPT

## 2019-10-31 PROCEDURE — 72100 X-RAY EXAM L-S SPINE 2/3 VWS: CPT

## 2019-10-31 PROCEDURE — 73562 X-RAY EXAM OF KNEE 3: CPT

## 2019-10-31 PROCEDURE — 71046 X-RAY EXAM CHEST 2 VIEWS: CPT

## 2019-11-05 ENCOUNTER — OFFICE VISIT (OUTPATIENT)
Dept: ONCOLOGY | Age: 49
End: 2019-11-05

## 2019-11-05 ENCOUNTER — TELEPHONE (OUTPATIENT)
Dept: SURGERY | Age: 49
End: 2019-11-05

## 2019-11-05 VITALS
BODY MASS INDEX: 39.68 KG/M2 | HEART RATE: 100 BPM | OXYGEN SATURATION: 93 % | HEIGHT: 72 IN | SYSTOLIC BLOOD PRESSURE: 176 MMHG | WEIGHT: 293 LBS | DIASTOLIC BLOOD PRESSURE: 99 MMHG | TEMPERATURE: 98.7 F | RESPIRATION RATE: 18 BRPM

## 2019-11-05 DIAGNOSIS — I26.99 PULMONARY EMBOLISM WITHOUT ACUTE COR PULMONALE, UNSPECIFIED CHRONICITY, UNSPECIFIED PULMONARY EMBOLISM TYPE (HCC): Primary | ICD-10-CM

## 2019-11-05 NOTE — LETTER
11/5/19 Patient: Niko Galdamez YOB: 1970 Date of Visit: 11/5/2019 Teri Matthews MD 
[address] [place] [id number] VIA Facsimile: [phone] Dear Teri Matthews MD, Thank you for referring Ms. Eb Estrada to [facility] for evaluation. My notes for this consultation are attached. If you have questions, please do not hesitate to call me. I look forward to following your patient along with you. Sincerely, Yaquelin Valles MD

## 2019-11-05 NOTE — TELEPHONE ENCOUNTER
Patient called and states she got hematology clearance today and we are waiting for office to put note in and forward clearance to Dr. Diamond Lawson. She goes to pcp clearance on 11/8/19 and will f/u with GI sometime hopefully next week.

## 2019-11-05 NOTE — PROGRESS NOTES
Hematology/Oncology Consultation Note    Name: Renzo Alvarez  Date: 2019  : 1970    Franco Yeung MD         Subjective:     Chief complaint: Pulmonary embolism    History of Present Illness:   Ms. Sanjana Oakley is a most pleasant 44y.o. year old female who was seen for history of pulmonary embolism. The patient has history of Crohn's disease, Diabetes, Hypertension who was admitted in 2019 for chest pain, SOB and palpitations. Her CTA on 19 reported moderate burden of pulmonary embolism, mostly Right sided. She had VTE risk factors included recent left ankle fracture with prolonged hospitalization and prolonged drive from [place] to [facility]. The patient did not start thrombolysis. She was then discharged home on Eliquis for a provoked PE and her respiratory has been doing well. She reported taking Eliquis for 3 months then off. Has followed up with Pulmonary, Dr Kendal Elizalde MD on 19. Repeat CTA and V/Q scan were negative for a new PE. She was recently sent for preop labs in prepare to incoming bariatric surgery; however, there're no available lab reports or other forms provided for review at this time. She denies chest pain or shortness of breath or new respiratory symptoms. She is not on OCP or hormonal therapy. Denied h.o of recurrent blood clots or miscarriages. No worsening leg swollen or tenderness. No other complaints. Family History: denied history of blood clots diseases      Oncologic History:   No history exists.        Past Medical History, Family History, and Social History:    Past Medical History:   Diagnosis Date    [facility]'s colitis ([facility]     Diabetes ([facility]     HTN (hypertension)     Hypercholesterolemia     Pulmonary embolism ([facility] 2019    Stool color black     crohns     Past Surgical History:   Procedure Laterality Date    COLONOSCOPY N/A 10/21/2019    COLONOSCOPY w/ bxs performed by India Guido MD at [address] HX GI      HX ORTHOPAEDIC      fx left ankle ORIF    HX TUBAL LIGATION      btl     Social History     Socioeconomic History    Marital status: SINGLE     Spouse name: Not on file    Number of children: Not on file    Years of education: Not on file    Highest education level: Not on file   Occupational History    Not on file   Social Needs    Financial resource strain: Not on file    Food insecurity:     Worry: Not on file     Inability: Not on file    Transportation needs:     Medical: Not on file     Non-medical: Not on file   Tobacco Use    Smoking status: Never Smoker    Smokeless tobacco: Never Used   Substance and Sexual Activity    Alcohol use: Never     Frequency: Never    Drug use: Never    Sexual activity: Not Currently   Lifestyle    Physical activity:     Days per week: Not on file     Minutes per session: Not on file    Stress: Not on file   Relationships    Social connections:     Talks on phone: Not on file     Gets together: Not on file     Attends Yarsanism service: Not on file     Active member of club or organization: Not on file     Attends meetings of clubs or organizations: Not on file     Relationship status: Not on file    Intimate partner violence:     Fear of current or ex partner: Not on file     Emotionally abused: Not on file     Physically abused: Not on file     Forced sexual activity: Not on file   Other Topics Concern    Not on file   Social History Narrative    Not on file     Family History   Problem Relation Age of Onset    Diabetes Mother     Hypertension Mother     Diabetes Father     Hypertension Father     Heart Disease Father      Current Outpatient Medications   Medication Sig Dispense Refill    budesonide (ENTOCORT EC) 3 mg capsule TAKE 1 CAPSULE BY MOUTH 3 TIMES DAILY  1    ergocalciferol (ERGOCALCIFEROL) 50,000 unit capsule TAKE 1 (ONE) CAPSULE BY MOUTH ONCE A WEEK  2    FLORANEX 1 million cell tab tablet TAKE 1 TABLET BY MOUTH EVERY DAY.   1    DULoxetine (CYMBALTA) 60 mg capsule Take 1 Cap by mouth daily. One cap qam for one week, then one bid 60 Cap 3    insulin glargine (LANTUS,BASAGLAR) 100 unit/mL (3 mL) inpn 30 Units by SubCUTAneous route nightly.  hydrALAZINE (APRESOLINE) 100 mg tablet Take 1 Tab by mouth three (3) times daily. 90 Tab 0    SITagliptin (JANUVIA) 100 mg tablet Take 1 Tab by mouth daily. 30 Tab 0    atorvastatin (LIPITOR) 20 mg tablet Take 1 Tab by mouth nightly. 30 Tab 0    gabapentin (NEURONTIN) 300 mg capsule Take 1 Cap by mouth three (3) times daily. (Patient taking differently: Take 300 mg by mouth three (3) times daily. Indications: 600mg) 90 Cap 0    losartan (COZAAR) 100 mg tablet Take 100 mg by mouth daily.  azaTHIOprine (IMURAN) 50 mg tablet Take 250 mg by mouth daily. Indications: Crohn's disease      adalimumab (HUMIRA) 40 mg/0.8 mL injection 40 mg by SubCUTAneous route every seven (7) days.  ALPRAZolam (XANAX) 0.5 mg tablet TAKE 1 TAB BY MOUTH TWICE A DAY AS NEEDED  0    dicyclomine (BENTYL) 10 mg capsule TAKE 1 CAPSULE BY MOUTH EVERY 6 HOURS AS NEEDED FOR PAIN  1    loperamide (IMODIUM A-D) 2 mg tablet Take 1 Tab by mouth three (3) times daily as needed.  tiZANidine (ZANAFLEX) 4 mg tablet TAKE 1 (ONE) TABLET BY MOUTH THREE TIMES DAILY AS NEEDED  0    ondansetron hcl (ZOFRAN) 4 mg tablet Take 1 Tab by mouth every eight (8) hours as needed for Nausea. 30 Tab 0    metoprolol tartrate (LOPRESSOR) 50 mg tablet Take 1 Tab by mouth two (2) times a day. 60 Tab 0    cloNIDine HCl (CATAPRES) 0.1 mg tablet Take 1 Tab by mouth every eight (8) hours as needed. 90 Tab 0    hydroCHLOROthiazide (HYDRODIURIL) 25 mg tablet Take 1 Tab by mouth daily. 30 Tab 0    pantoprazole (PROTONIX) 40 mg tablet Take 1 Tab by mouth Before breakfast and dinner. 30 Tab 0       Review of Systems:  Constitutional: The patient has no acute distress or discomfort.   HEENT: The patient denies recent head trauma, eye pain, blurred vision, hearing deficit, oropharyngeal mucosal pain or lesions, and the patient denies throat pain or discomfort. Lymphatics: The patient denies palpable peripheral lymphadenopathy. Hematologic: The patient denies having bruising, bleeding, or progressive fatigue. Respiratory: Patient denies having shortness of breath, cough, sputum production, fever, or dyspnea on exertion. Cardiovascular: The patient denies having leg pain, leg swelling, heart palpitations, chest permit, chest pain, or lightheadedness. The patient denies having dyspnea on exertion. Gastrointestinal: The patient denies having nausea, emesis, or diarrhea. The patient denies having any hematemesis or blood in the stool. Genitourinary: Patient denies having urinary urgency, frequency, or dysuria. The patient denies having blood in the urine. Psychological: The patient denies having symptoms of nervousness, anxiety, depression, or thoughts of harming self. Skin: Patient denies having skin rashes, skin, ulcerations, or unexplained itching or pruritus. Musculoskeletal: The patient denies having pain in the joints. Chronic b.l leg swelling, no worsening swollen or pain. Objective:     Visit Vitals  BP (!) 176/99 (BP 1 Location: Right arm, BP Patient Position: Sitting)   Pulse 100   Temp 98.7 °F (37.1 °C) (Oral)   Resp 18   Ht 6' (1.829 m)   Wt 156.9 kg (346 lb)   SpO2 93%   BMI 46.93 kg/m²       Physical Exam:   Gen. Appearance: The patient is in no acute distress. Obese. Skin: There is no bruise or rash. HEENT: The exam is unremarkable. Neck: Supple without lymphadenopathy or thyromegaly. Lungs: Clear to auscultation and percussion; there are no wheezes or rhonchi. Heart: Regular rate and rhythm; there are no murmurs, gallops, or rubs. Abdomen: Bowel sounds are present and normal.  There is no guarding, tenderness, or hepatosplenomegaly. Extremities: There is no clubbing, cyanosis.  Chronic L/E edema, no calf tenderness. Neurologic: There are no focal neurologic deficits. Lymphatics: There is no palpable peripheral lymphadenopathy. Musculoskeletal: The patient has full range of motion at all joints. There is no evidence of joint deformity or effusions. There is no focal joint tenderness. Psychological/psychiatric: There is no clinical evidence of anxiety, depression, or melancholy. Diagnostics:      No results found for this or any previous visit. Recent Results (from the past 2016 hour(s))   GLUCOSE, POC    Collection Time: 10/21/19  9:21 AM   Result Value Ref Range    Glucose (POC) 143 (H) 70 - 110 mg/dL   HCG URINE, QL. - POC    Collection Time: 10/21/19  9:22 AM   Result Value Ref Range    Pregnancy test,urine (POC) NEGATIVE  NEG     GLUCOSE, POC    Collection Time: 10/21/19 10:28 AM   Result Value Ref Range    Glucose (POC) 103 70 - 110 mg/dL   [facility] SPECIMEN COL    Collection Time: 10/31/19  2:43 PM   Result Value Ref Range    [facility]. Specimens collected/sent to [facility]. Please direct inquiries to ([phone]). [facility] SPECIMEN COL    Collection Time: 10/31/19  2:48 PM   Result Value Ref Range    [facility]. Specimens collected/sent to [facility]. Please direct inquiries to ([phone]). CTA 7/22/19  Impression:     Limited exam. No large central pulmonary embolism identified. More distal  embolus not excluded. Exam is limited due to patient body habitus. If symptoms  persist recommend repeat exam or possibly VQ scan for further delineation.     Sequela of pulmonary talar hypertension.     Grossly stable probable left adrenal adenoma.     Small hiatal hernia.     Partially evaluated colonic diverticulosis.     Please see report for additional findings and further details.         VQ Scan 8/21/19  VQ SCAN      CPT CODE: 22567     INDICATION: Pulmonary embolism     COMPARISON: CTA chest July 22, 2019     DESCRIPTION: After aerosolization of 33 mCi 99mTc-DTPA, which delivers  approximately 1-2 mCi of radiopharmaceutical to the lungs, ventilatory images of  the lungs were obtained in multiple projections. After intravenous  administration of 5.4 mCi 99mTc-MAA, perfusion images of the lungs were obtained  in multiple projections. Images are correlated with chest radiographic study  which demonstrate no evidence of focal pulmonary infiltrate or pleural effusion.        The distribution of radiopharmaceutical is patchy ventilation. Exam is overall  limited by patient body habitus. .  There is no evidence of moderate or large  mismatched subsegmental perfusion defect to indicate the presence of pulmonary  embolism.       IMPRESSION  Impression:     Limited exam.     Low probability, given limitations. Assessment and Plan:                                        1. Pulmonary Embolism  -She was diagnosed pulmonary embolism in January 2019. Her pulmonary embolism was likely provoked, first episode, with VTE risk factor included left ankle fracture with prolonged hospitalization with prolonged drive from [place] to [facility].  - She was placed on Eliquis for 3 months then off. Has followed up with Pulmonary, Dr Kendal Elizalde MD on 07/01/19. Repeat CTA and V/Q scan were negative for a new PE. No new symptoms suggested recurrent VTE episodes at this time. Not on OCPs or hormonal therapy. No FHx of VTEs. - Reported preop labs done recently for incoming bariatric surgery. No lab reports or forms are available for review at this visit. - For first VTE provoked by surgery, [employer] has estimated the risk of recurrence was 1 percent for the first year and 0.5 percent in years thereafter. Current data also suggested that indefinite anticoagulation should not be routinely administered to patients with a provoked episode of VTE with major transient risk factors such as surgery or cessation of hormonal therapy.   - Given her first VTE, provoked risk factors which already resolved, and she received 3 months of oral anticoagulant reportedly, we would not suggest hypercoagulability workup at this point. Would suggest a prophylaxis aspirin if no other contraindications. - At this time, we will not schedule a follow-up appointment in the hematology clinic but would be happy to see her back if any questions or concerns. She will follow up with her primary care provider as scheduled. All of patient's questions answered to their apparent satisfaction. They verbally show understanding and agreement with the plan. About 45 minutes were spent for this encounter with more than 50% of the time spent in face-to-face counseling and discussing on diagnosis and management plan going forward. I would like to  thank Dr Sergio Sneed MD  for allowing me to participate in the care of this very pleasant patient. If I can be of further assistance please do not hesitate to call. Tejas Leija MD  11/5/2019      Parts of this document has been produced using Dragon dictation system. Unrecognized errors in transcription may be present. Please do not hesitate to reach out for any questions or clarifications.

## 2019-11-11 ENCOUNTER — HOSPITAL ENCOUNTER (OUTPATIENT)
Dept: GENERAL RADIOLOGY | Age: 49
Discharge: HOME | End: 2019-11-11
Attending: NURSE PRACTITIONER
Payer: MEDICAID

## 2019-11-11 DIAGNOSIS — R11.0 NAUSEA: ICD-10-CM

## 2019-11-11 DIAGNOSIS — K21.9 ACID REFLUX: ICD-10-CM

## 2019-11-11 DIAGNOSIS — K50.10 CROHN'S DISEASE, COLON (HCC): ICD-10-CM

## 2019-11-11 DIAGNOSIS — R19.4 CHANGE IN BOWEL HABITS: ICD-10-CM

## 2019-11-11 PROCEDURE — 74011000255 HC RX REV CODE- 255: Performed by: NURSE PRACTITIONER

## 2019-11-11 PROCEDURE — 74250 X-RAY XM SM INT 1CNTRST STD: CPT

## 2019-11-11 RX ADMIN — BARIUM SULFATE 352 G: 960 POWDER, FOR SUSPENSION ORAL at 13:45

## 2019-11-14 ENCOUNTER — TELEPHONE (OUTPATIENT)
Dept: SURGERY | Age: 49
End: 2019-11-14

## 2019-11-25 ENCOUNTER — TELEPHONE (OUTPATIENT)
Dept: SURGERY | Age: 49
End: 2019-11-25

## 2019-11-25 NOTE — TELEPHONE ENCOUNTER
Patient called and told me she will f/u with GI 12/6/2019 and they may have to change some medications.

## 2019-12-08 ENCOUNTER — HOSPITAL ENCOUNTER (EMERGENCY)
Age: 49
Discharge: HOME | End: 2019-12-09
Attending: EMERGENCY MEDICINE
Payer: MEDICAID

## 2019-12-08 ENCOUNTER — APPOINTMENT (OUTPATIENT)
Dept: CT IMAGING | Age: 49
End: 2019-12-08
Attending: EMERGENCY MEDICINE
Payer: MEDICAID

## 2019-12-08 DIAGNOSIS — G43.011 INTRACTABLE MIGRAINE WITHOUT AURA AND WITH STATUS MIGRAINOSUS: Primary | ICD-10-CM

## 2019-12-08 LAB
ALBUMIN SERPL-MCNC: 3.4 G/DL (ref 3.4–5)
ALBUMIN/GLOB SERPL: 0.6 {RATIO} (ref 0.8–1.7)
ALP SERPL-CCNC: 109 U/L (ref 45–117)
ALT SERPL-CCNC: 17 U/L (ref 13–56)
ANION GAP SERPL CALC-SCNC: 10 MMOL/L (ref 3–18)
AST SERPL-CCNC: 13 U/L (ref 10–38)
BASOPHILS # BLD: 0 K/UL (ref 0–0.1)
BASOPHILS NFR BLD: 0 % (ref 0–2)
BILIRUB SERPL-MCNC: 0.3 MG/DL (ref 0.2–1)
BUN SERPL-MCNC: 11 MG/DL (ref 7–18)
BUN/CREAT SERPL: 11 (ref 12–20)
CALCIUM SERPL-MCNC: 8.9 MG/DL (ref 8.5–10.1)
CHLORIDE SERPL-SCNC: 101 MMOL/L (ref 100–111)
CK MB CFR SERPL CALC: NORMAL % (ref 0–4)
CK MB SERPL-MCNC: <1 NG/ML (ref 5–25)
CK SERPL-CCNC: 53 U/L (ref 26–192)
CO2 SERPL-SCNC: 25 MMOL/L (ref 21–32)
CREAT SERPL-MCNC: 0.98 MG/DL (ref 0.6–1.3)
DIFFERENTIAL METHOD BLD: ABNORMAL
EOSINOPHIL # BLD: 0.1 K/UL (ref 0–0.4)
EOSINOPHIL NFR BLD: 2 % (ref 0–5)
ERYTHROCYTE [DISTWIDTH] IN BLOOD BY AUTOMATED COUNT: 16.4 % (ref 11.6–14.5)
GLOBULIN SER CALC-MCNC: 5.3 G/DL (ref 2–4)
GLUCOSE SERPL-MCNC: 365 MG/DL (ref 74–99)
HCT VFR BLD AUTO: 44.6 % (ref 35–45)
HGB BLD-MCNC: 14.3 G/DL (ref 12–16)
LYMPHOCYTES # BLD: 1.2 K/UL (ref 0.9–3.6)
LYMPHOCYTES NFR BLD: 19 % (ref 21–52)
MCH RBC QN AUTO: 28.8 PG (ref 24–34)
MCHC RBC AUTO-ENTMCNC: 32.1 G/DL (ref 31–37)
MCV RBC AUTO: 89.9 FL (ref 74–97)
MONOCYTES # BLD: 0.3 K/UL (ref 0.05–1.2)
MONOCYTES NFR BLD: 5 % (ref 3–10)
NEUTS SEG # BLD: 4.5 K/UL (ref 1.8–8)
NEUTS SEG NFR BLD: 74 % (ref 40–73)
PLATELET # BLD AUTO: 250 K/UL (ref 135–420)
PMV BLD AUTO: 11 FL (ref 9.2–11.8)
POTASSIUM SERPL-SCNC: 3.8 MMOL/L (ref 3.5–5.5)
PROT SERPL-MCNC: 8.7 G/DL (ref 6.4–8.2)
RBC # BLD AUTO: 4.96 M/UL (ref 4.2–5.3)
SODIUM SERPL-SCNC: 136 MMOL/L (ref 136–145)
TROPONIN I SERPL-MCNC: <0.02 NG/ML (ref 0–0.04)
WBC # BLD AUTO: 6.1 K/UL (ref 4.6–13.2)

## 2019-12-08 PROCEDURE — 82550 ASSAY OF CK (CPK): CPT

## 2019-12-08 PROCEDURE — 74011250636 HC RX REV CODE- 250/636: Performed by: EMERGENCY MEDICINE

## 2019-12-08 PROCEDURE — 99285 EMERGENCY DEPT VISIT HI MDM: CPT

## 2019-12-08 PROCEDURE — 74011636637 HC RX REV CODE- 636/637: Performed by: EMERGENCY MEDICINE

## 2019-12-08 PROCEDURE — 80053 COMPREHEN METABOLIC PANEL: CPT

## 2019-12-08 PROCEDURE — 94762 N-INVAS EAR/PLS OXIMTRY CONT: CPT

## 2019-12-08 PROCEDURE — 85025 COMPLETE CBC W/AUTO DIFF WBC: CPT

## 2019-12-08 PROCEDURE — 96375 TX/PRO/DX INJ NEW DRUG ADDON: CPT

## 2019-12-08 PROCEDURE — 96372 THER/PROPH/DIAG INJ SC/IM: CPT

## 2019-12-08 PROCEDURE — 93005 ELECTROCARDIOGRAM TRACING: CPT

## 2019-12-08 PROCEDURE — 70450 CT HEAD/BRAIN W/O DYE: CPT

## 2019-12-08 PROCEDURE — 96374 THER/PROPH/DIAG INJ IV PUSH: CPT

## 2019-12-08 RX ORDER — PROMETHAZINE HYDROCHLORIDE 25 MG/ML
25 INJECTION, SOLUTION INTRAMUSCULAR; INTRAVENOUS
Status: COMPLETED | OUTPATIENT
Start: 2019-12-08 | End: 2019-12-08

## 2019-12-08 RX ORDER — MORPHINE SULFATE 2 MG/ML
2 INJECTION, SOLUTION INTRAMUSCULAR; INTRAVENOUS
Status: COMPLETED | OUTPATIENT
Start: 2019-12-08 | End: 2019-12-09

## 2019-12-08 RX ORDER — ONDANSETRON 2 MG/ML
4 INJECTION INTRAMUSCULAR; INTRAVENOUS
Status: COMPLETED | OUTPATIENT
Start: 2019-12-08 | End: 2019-12-09

## 2019-12-08 RX ORDER — MORPHINE SULFATE 2 MG/ML
4 INJECTION, SOLUTION INTRAMUSCULAR; INTRAVENOUS
Status: COMPLETED | OUTPATIENT
Start: 2019-12-08 | End: 2019-12-08

## 2019-12-08 RX ADMIN — INSULIN HUMAN 10 UNITS: 100 INJECTION, SOLUTION PARENTERAL at 23:51

## 2019-12-08 RX ADMIN — SODIUM CHLORIDE 1000 ML: 900 INJECTION, SOLUTION INTRAVENOUS at 23:51

## 2019-12-08 RX ADMIN — PROMETHAZINE HYDROCHLORIDE 25 MG: 25 INJECTION INTRAMUSCULAR; INTRAVENOUS at 23:18

## 2019-12-08 RX ADMIN — MORPHINE SULFATE 4 MG: 2 INJECTION, SOLUTION INTRAMUSCULAR; INTRAVENOUS at 23:18

## 2019-12-09 VITALS
OXYGEN SATURATION: 100 % | HEART RATE: 76 BPM | WEIGHT: 289 LBS | TEMPERATURE: 98.8 F | BODY MASS INDEX: 39.14 KG/M2 | SYSTOLIC BLOOD PRESSURE: 163 MMHG | RESPIRATION RATE: 19 BRPM | HEIGHT: 72 IN | DIASTOLIC BLOOD PRESSURE: 85 MMHG

## 2019-12-09 LAB
ATRIAL RATE: 104 BPM
CALCULATED P AXIS, ECG09: 67 DEGREES
CALCULATED R AXIS, ECG10: -10 DEGREES
CALCULATED T AXIS, ECG11: 59 DEGREES
DIAGNOSIS, 93000: NORMAL
GLUCOSE BLD STRIP.AUTO-MCNC: 232 MG/DL (ref 70–110)
P-R INTERVAL, ECG05: 158 MS
Q-T INTERVAL, ECG07: 370 MS
QRS DURATION, ECG06: 100 MS
QTC CALCULATION (BEZET), ECG08: 486 MS
VENTRICULAR RATE, ECG03: 104 BPM

## 2019-12-09 PROCEDURE — 74011636637 HC RX REV CODE- 636/637: Performed by: EMERGENCY MEDICINE

## 2019-12-09 PROCEDURE — 96376 TX/PRO/DX INJ SAME DRUG ADON: CPT

## 2019-12-09 PROCEDURE — 82962 GLUCOSE BLOOD TEST: CPT

## 2019-12-09 PROCEDURE — 96372 THER/PROPH/DIAG INJ SC/IM: CPT

## 2019-12-09 PROCEDURE — 96375 TX/PRO/DX INJ NEW DRUG ADDON: CPT

## 2019-12-09 PROCEDURE — 74011250636 HC RX REV CODE- 250/636: Performed by: EMERGENCY MEDICINE

## 2019-12-09 RX ORDER — HYDROCODONE BITARTRATE AND ACETAMINOPHEN 5; 325 MG/1; MG/1
1-2 TABLET ORAL
Qty: 12 TAB | Refills: 0 | Status: SHIPPED | OUTPATIENT
Start: 2019-12-09 | End: 2019-12-14

## 2019-12-09 RX ORDER — HYDROMORPHONE HYDROCHLORIDE 1 MG/ML
1 INJECTION, SOLUTION INTRAMUSCULAR; INTRAVENOUS; SUBCUTANEOUS ONCE
Status: COMPLETED | OUTPATIENT
Start: 2019-12-09 | End: 2019-12-09

## 2019-12-09 RX ORDER — SUMATRIPTAN 6 MG/.5ML
6 INJECTION, SOLUTION SUBCUTANEOUS ONCE
Status: COMPLETED | OUTPATIENT
Start: 2019-12-09 | End: 2019-12-09

## 2019-12-09 RX ORDER — NALOXONE HYDROCHLORIDE 4 MG/.1ML
SPRAY NASAL
Qty: 1 EACH | Refills: 0 | Status: SHIPPED | OUTPATIENT
Start: 2019-12-09

## 2019-12-09 RX ADMIN — ONDANSETRON 4 MG: 2 INJECTION INTRAMUSCULAR; INTRAVENOUS at 00:02

## 2019-12-09 RX ADMIN — SUMATRIPTAN SUCCINATE 6 MG: 6 INJECTION SUBCUTANEOUS at 00:54

## 2019-12-09 RX ADMIN — MORPHINE SULFATE 2 MG: 2 INJECTION, SOLUTION INTRAMUSCULAR; INTRAVENOUS at 00:02

## 2019-12-09 RX ADMIN — HYDROMORPHONE HYDROCHLORIDE 1 MG: 1 INJECTION, SOLUTION INTRAMUSCULAR; INTRAVENOUS; SUBCUTANEOUS at 01:35

## 2019-12-09 RX ADMIN — HYDROMORPHONE HYDROCHLORIDE 1 MG: 1 INJECTION, SOLUTION INTRAMUSCULAR; INTRAVENOUS; SUBCUTANEOUS at 02:38

## 2019-12-09 NOTE — ED NOTES
MD notified patients glucose is now 232mg/dl. Pt states pain is now a 6/10 and she is feeling better but her head still hurts. MD stated to give 6mg of imitrex SQ at this time. Will continue to monitor.

## 2019-12-09 NOTE — ED TRIAGE NOTES
Patient's BP has been high today with severe headache and left hand tingling.  Also c/o chest tightness

## 2019-12-09 NOTE — ED PROVIDER NOTES
HPI patient is a 52-year-old female who developed a severe headache this afternoon nausea. She describes the headache as a severe pounding in her frontal area of the head. Patient has history of hypertension and she states her blood pressure was extremely high today even though she took a blood pressure medication. No fever, chills, shortness of breath.       Past Medical History:   Diagnosis Date    Crohn's colitis ([facility]     Diabetes ([facility]     HTN (hypertension)     Hypercholesterolemia     Pulmonary embolism ([facility] 03/2019    Stool color black     crohns       Past Surgical History:   Procedure Laterality Date    COLONOSCOPY N/A 10/21/2019    COLONOSCOPY w/ bxs performed by Azalia Parker MD at [address] HX GI      HX ORTHOPAEDIC      fx left ankle ORIF    HX TUBAL LIGATION      btl         Family History:   Problem Relation Age of Onset    Diabetes Mother     Hypertension Mother     Diabetes Father     Hypertension Father     Heart Disease Father        Social History     Socioeconomic History    Marital status: SINGLE     Spouse name: Not on file    Number of children: Not on file    Years of education: Not on file    Highest education level: Not on file   Occupational History    Not on file   Social Needs    Financial resource strain: Not on file    Food insecurity:     Worry: Not on file     Inability: Not on file    Transportation needs:     Medical: Not on file     Non-medical: Not on file   Tobacco Use    Smoking status: Never Smoker    Smokeless tobacco: Never Used   Substance and Sexual Activity    Alcohol use: Never     Frequency: Never    Drug use: Never    Sexual activity: Not Currently   Lifestyle    Physical activity:     Days per week: Not on file     Minutes per session: Not on file    Stress: Not on file   Relationships    Social connections:     Talks on phone: Not on file     Gets together: Not on file     Attends Buddhist service: Not on file     Active member of club or organization: Not on file     Attends meetings of clubs or organizations: Not on file     Relationship status: Not on file    Intimate partner violence:     Fear of current or ex partner: Not on file     Emotionally abused: Not on file     Physically abused: Not on file     Forced sexual activity: Not on file   Other Topics Concern    Not on file   Social History Narrative    Not on file         ALLERGIES: Patient has no known allergies. Review of Systems   Constitutional: Negative. HENT: Negative. Eyes: Negative. Respiratory: Negative. Cardiovascular: Negative. Gastrointestinal: Negative. Endocrine: Negative. Genitourinary: Negative. Musculoskeletal: Negative. Skin: Negative. Allergic/Immunologic: Negative. Neurological: Negative. Hematological: Negative. Psychiatric/Behavioral: Negative. All other systems reviewed and are negative. Vitals:    12/08/19 2238 12/08/19 2245   BP: (!) 222/155 (!) 215/117   Pulse: (!) 114    Resp: 20    Temp: 98.8 °F (37.1 °C)    SpO2: 98%    Weight: 131.1 kg (289 lb)    Height: 6' 1\" (1.854 m)             Physical Exam  Vitals signs and nursing note reviewed. Constitutional:       General: She is not in acute distress. Appearance: She is well-developed. She is not diaphoretic. HENT:      Head: Normocephalic. Right Ear: External ear normal.      Left Ear: External ear normal.      Mouth/Throat:      Pharynx: No oropharyngeal exudate. Eyes:      General: No scleral icterus. Right eye: No discharge. Left eye: No discharge. Conjunctiva/sclera: Conjunctivae normal.      Pupils: Pupils are equal, round, and reactive to light. Neck:      Musculoskeletal: Normal range of motion and neck supple. Thyroid: No thyromegaly. Vascular: No JVD. Trachea: No tracheal deviation. Cardiovascular:      Rate and Rhythm: Normal rate and regular rhythm.       Heart sounds: Normal heart sounds. No murmur. No friction rub. No gallop. Pulmonary:      Effort: Pulmonary effort is normal. No respiratory distress. Breath sounds: Normal breath sounds. No stridor. No wheezing or rales. Chest:      Chest wall: No tenderness. Abdominal:      General: Bowel sounds are normal. There is no distension. Palpations: Abdomen is soft. There is no mass. Tenderness: There is no tenderness. There is no guarding or rebound. Musculoskeletal: Normal range of motion. General: No tenderness. Lymphadenopathy:      Cervical: No cervical adenopathy. Skin:     General: Skin is warm and dry. Coloration: Skin is not pale. Findings: No erythema or rash. Neurological:      Mental Status: She is alert and oriented to person, place, and time. Cranial Nerves: No cranial nerve deficit. Motor: No abnormal muscle tone.       Coordination: Coordination normal.      Deep Tendon Reflexes: Reflexes normal.          MDM       Procedures

## 2019-12-09 NOTE — ED NOTES
Patient given copy of dc instructions and script(s). Patient verbalized understanding of instructions and script (s). Patient given a current medication reconciliation form and verbalized understanding of their medications. Patient verbalized understanding of the importance of discussing medications with  his or her physician or clinic they will be following up with. Patient alert and oriented and in no acute distress. Patient discharged home ambulatory with self and daughter.

## 2019-12-09 NOTE — ED NOTES
Per Teri Zavala RN, pt still having pain, patient notified to allow pain medications to fully begin working, Teri Zavala RN reported to MD patient is still in pain and MD ordered more Morphine at this time, pt states pain is now an 8/10 on the pain scale, reassessment of pain medication in 20 mins.  Will give more morphine per MD order after report of Kenneth to MD.

## 2019-12-09 NOTE — DISCHARGE INSTRUCTIONS
Patient Education        Migraine Headache: Care Instructions  Your Care Instructions  Migraines are painful, throbbing headaches that often start on one side of the head. They may cause nausea and vomiting and make you sensitive to light, sound, or smell. Without treatment, migraines can last from 4 hours to a few days. Medicines can help prevent migraines or stop them after they have started. Your doctor can help you find which ones work best for you. Follow-up care is a key part of your treatment and safety. Be sure to make and go to all appointments, and call your doctor if you are having problems. It's also a good idea to know your test results and keep a list of the medicines you take. How can you care for yourself at home? · Do not drive if you have taken a prescription pain medicine. · Rest in a quiet, dark room until your headache is gone. Close your eyes, and try to relax or go to sleep. Don't watch TV or read. · Put a cold, moist cloth or cold pack on the painful area for 10 to 20 minutes at a time. Put a thin cloth between the cold pack and your skin. · Use a warm, moist towel or a heating pad set on low to relax tight shoulder and neck muscles. · Have someone gently massage your neck and shoulders. · Take your medicines exactly as prescribed. Call your doctor if you think you are having a problem with your medicine. You will get more details on the specific medicines your doctor prescribes. · Be careful not to take pain medicine more often than the instructions allow. You could get worse or more frequent headaches when the medicine wears off. To prevent migraines  · Keep a headache diary so you can figure out what triggers your headaches. Avoiding triggers may help you prevent headaches. Record when each headache began, how long it lasted, and what the pain was like.  (Was it throbbing, aching, stabbing, or dull?) Write down any other symptoms you had with the headache, such as nausea, flashing lights or dark spots, or sensitivity to bright light or loud noise. Note if the headache occurred near your period. List anything that might have triggered the headache. Triggers may include certain foods (chocolate, cheese, wine) or odors, smoke, bright light, stress, or lack of sleep. · If your doctor has prescribed medicine for your migraines, take it as directed. You may have medicine that you take only when you get a migraine and medicine that you take all the time to help prevent migraines. ? If your doctor has prescribed medicine for when you get a headache, take it at the first sign of a migraine, unless your doctor has given you other instructions. ? If your doctor has prescribed medicine to prevent migraines, take it exactly as prescribed. Call your doctor if you think you are having a problem with your medicine. · Find healthy ways to deal with stress. Migraines are most common during or right after stressful times. Take time to relax before and after you do something that has caused a migraine in the past.  · Try to keep your muscles relaxed by keeping good posture. Check your jaw, face, neck, and shoulder muscles for tension. Try to relax them. When you sit at a desk, change positions often. And make sure to stretch for 30 seconds each hour. · Get plenty of sleep and exercise. · Eat meals on a regular schedule. Avoid foods and drinks that often trigger migraines. These include chocolate, alcohol (especially red wine and port), aspartame, monosodium glutamate (MSG), and some additives found in foods (such as hot dogs, barr, cold cuts, aged cheeses, and pickled foods). · Limit caffeine. Don't drink too much coffee, tea, or soda. But don't quit caffeine suddenly. That can also give you migraines. · Do not smoke or allow others to smoke around you. If you need help quitting, talk to your doctor about stop-smoking programs and medicines.  These can increase your chances of quitting for good.  · If you are taking birth control pills or hormone therapy, talk to your doctor about whether they are triggering your migraines. When should you call for help? Call 911 anytime you think you may need emergency care. For example, call if:    · You have signs of a stroke. These may include:  ? Sudden numbness, paralysis, or weakness in your face, arm, or leg, especially on only one side of your body. ? Sudden vision changes. ? Sudden trouble speaking. ? Sudden confusion or trouble understanding simple statements. ? Sudden problems with walking or balance. ? A sudden, severe headache that is different from past headaches.    Call your doctor now or seek immediate medical care if:    · You have new or worse nausea and vomiting.     · You have a new or higher fever.     · Your headache gets much worse.    Watch closely for changes in your health, and be sure to contact your doctor if:    · You are not getting better after 2 days (48 hours). Where can you learn more? Go to http://nasreen-mónica.info/. Enter O790 in the search box to learn more about \"Migraine Headache: Care Instructions. \"  Current as of: March 28, 2019  Content Version: 12.2  © 2072-9122 [employer]. Care instructions adapted under license by [employer] (which disclaims liability or warranty for this information). If you have questions about a medical condition or this instruction, always ask your healthcare professional. [address] any warranty or liability for your use of this information.

## 2019-12-10 ENCOUNTER — APPOINTMENT (OUTPATIENT)
Dept: GENERAL RADIOLOGY | Age: 49
End: 2019-12-10
Attending: PHYSICIAN ASSISTANT
Payer: MEDICAID

## 2019-12-10 ENCOUNTER — HOSPITAL ENCOUNTER (EMERGENCY)
Age: 49
Discharge: LEFT BEFORE DISCHARGE | End: 2019-12-10
Payer: MEDICAID

## 2019-12-10 VITALS
DIASTOLIC BLOOD PRESSURE: 117 MMHG | HEIGHT: 72 IN | WEIGHT: 289 LBS | TEMPERATURE: 98.7 F | HEART RATE: 80 BPM | BODY MASS INDEX: 39.14 KG/M2 | SYSTOLIC BLOOD PRESSURE: 186 MMHG | OXYGEN SATURATION: 99 % | RESPIRATION RATE: 18 BRPM

## 2019-12-10 LAB
ATRIAL RATE: 78 BPM
CALCULATED P AXIS, ECG09: 37 DEGREES
CALCULATED R AXIS, ECG10: -18 DEGREES
CALCULATED T AXIS, ECG11: 51 DEGREES
DIAGNOSIS, 93000: NORMAL
P-R INTERVAL, ECG05: 134 MS
Q-T INTERVAL, ECG07: 410 MS
QRS DURATION, ECG06: 102 MS
QTC CALCULATION (BEZET), ECG08: 467 MS
VENTRICULAR RATE, ECG03: 78 BPM

## 2019-12-10 PROCEDURE — 93005 ELECTROCARDIOGRAM TRACING: CPT

## 2019-12-10 PROCEDURE — 75810000275 HC EMERGENCY DEPT VISIT NO LEVEL OF CARE: Performed by: EMERGENCY MEDICINE

## 2019-12-10 RX ORDER — ACETAMINOPHEN 500 MG
1000 TABLET ORAL
Status: DISCONTINUED | OUTPATIENT
Start: 2019-12-10 | End: 2019-12-10 | Stop reason: HOSPADM

## 2019-12-10 NOTE — ED TRIAGE NOTES
\"I came to the emergency room on Sunday and I have not been able to break my BP and have been taking my medications as prescribed. I was at my doctors office and they told me to take a clonidine 0.1 and I took that about an hour ago. I have been very nauseous and have a terrible headache. \"

## 2019-12-10 NOTE — ED TRIAGE NOTES
Patient arrived via EMS from a doctors office complaining of a headache, HTN, and tingling in her fingers and lips. Patient has been seen in the ER for similar issues but states they were not controlled. She spoke with her doctor and he advised her to come to the office and then sent the patient to the ER for symptoms.

## 2020-01-09 ENCOUNTER — TELEPHONE (OUTPATIENT)
Dept: SURGERY | Age: 50
End: 2020-01-09

## 2020-01-14 ENCOUNTER — TELEPHONE (OUTPATIENT)
Dept: SURGERY | Age: 50
End: 2020-01-14

## 2020-01-14 ENCOUNTER — OFFICE VISIT (OUTPATIENT)
Dept: ORTHOPEDIC SURGERY | Age: 50
End: 2020-01-14

## 2020-01-14 VITALS
RESPIRATION RATE: 15 BRPM | TEMPERATURE: 98 F | HEART RATE: 71 BPM | OXYGEN SATURATION: 97 % | HEIGHT: 72 IN | WEIGHT: 293 LBS | DIASTOLIC BLOOD PRESSURE: 77 MMHG | BODY MASS INDEX: 39.68 KG/M2 | SYSTOLIC BLOOD PRESSURE: 123 MMHG

## 2020-01-14 DIAGNOSIS — M79.672 PAIN OF LEFT HEEL: ICD-10-CM

## 2020-01-14 DIAGNOSIS — G62.9 NEUROPATHY: ICD-10-CM

## 2020-01-14 DIAGNOSIS — E66.01 OBESITY, MORBID (HCC): ICD-10-CM

## 2020-01-14 DIAGNOSIS — M72.2 PLANTAR FASCIITIS, BILATERAL: ICD-10-CM

## 2020-01-14 DIAGNOSIS — E11.9 CONTROLLED TYPE 2 DIABETES MELLITUS WITHOUT COMPLICATION, WITH LONG-TERM CURRENT USE OF INSULIN (HCC): ICD-10-CM

## 2020-01-14 DIAGNOSIS — M25.572 CHRONIC PAIN OF LEFT ANKLE: Primary | ICD-10-CM

## 2020-01-14 DIAGNOSIS — S82.892G CLOSED LEFT ANKLE FRACTURE, WITH DELAYED HEALING, SUBSEQUENT ENCOUNTER: ICD-10-CM

## 2020-01-14 DIAGNOSIS — Z79.4 CONTROLLED TYPE 2 DIABETES MELLITUS WITHOUT COMPLICATION, WITH LONG-TERM CURRENT USE OF INSULIN (HCC): ICD-10-CM

## 2020-01-14 DIAGNOSIS — M19.072 OSTEOARTHRITIS OF LEFT ANKLE, UNSPECIFIED OSTEOARTHRITIS TYPE: ICD-10-CM

## 2020-01-14 DIAGNOSIS — G89.29 CHRONIC PAIN OF LEFT ANKLE: Primary | ICD-10-CM

## 2020-01-14 NOTE — PATIENT INSTRUCTIONS
· Continue activity as tolerated · Complete stretching exercises plantar fasciitis, left · Follow up with neurology and PCP as scheduled · Continue to wear custom SMO brace · Dorsal night splint provided · Wear compression hose for swelling · Please follow up in 4 weeks Plantar Fasciitis: Exercises Introduction Here are some examples of exercises for you to try. The exercises may be suggested for a condition or for rehabilitation. Start each exercise slowly. Ease off the exercises if you start to have pain. You will be told when to start these exercises and which ones will work best for you. How to do the exercises Towel stretch 1. Sit with your legs extended and knees straight. 2. Place a towel around your foot just under the toes. 3. Hold each end of the towel in each hand, with your hands above your knees. 4. Pull back with the towel so that your foot stretches toward you. 5. Hold the position for at least 15 to 30 seconds. 6. Repeat 2 to 4 times a session, up to 5 sessions a day. Calf stretch 1. Stand facing a wall with your hands on the wall at about eye level. Put the leg you want to stretch about a step behind your other leg. 2. Keeping your back heel on the floor, bend your front knee until you feel a stretch in the back leg. 3. Hold the stretch for 15 to 30 seconds. Repeat 2 to 4 times. Towel curls 1. While sitting, place your foot on a towel on the floor and scrunch the towel toward you with your toes. 2. Then, also using your toes, push the towel away from you. [place] pickups 1. Put marbles on the floor next to a cup. 
2. Using your toes, try to lift the marbles up from the floor and put them in the cup. Follow-up care is a key part of your treatment and safety. Be sure to make and go to all appointments, and call your doctor if you are having problems. It's also a good idea to know your test results and keep a list of the medicines you take. Where can you learn more? Go to http://nasreen-mónica.info/. Shanna Gibbs in the search box to learn more about \"Plantar Fasciitis: Exercises. \" Current as of: June 26, 2019 Content Version: 12.2 © 3722-3106 [employer]. Care instructions adapted under license by [employer] (which disclaims liability or warranty for this information). If you have questions about a medical condition or this instruction, always ask your healthcare professional. [place] [address] any warranty or liability for your use of this information.

## 2020-01-14 NOTE — PROGRESS NOTES
1. Have you been to the ER, urgent care clinic since your last visit? Hospitalized since your last visit? No    2. Have you seen or consulted any other health care providers outside of the [address] [place] [address] since your last visit? Include any pap smears or colon screening.  No

## 2020-01-14 NOTE — PROGRESS NOTES
Patient: Dylan Carvalho                     MRN: [id number]       SSN: [id number]  YOB: 1970                          AGE: 44 y.o. SEX: female    Francisca Brooks MD    DOS: 1/18/19    Chief Complaint:   Chief Complaint   Patient presents with    Ankle Pain     left ankle pain       HPI:     Dylan Carvalho is a 44 y.o. female who presents today for follow up s/p ORIF left ankle completed in [place] on 12/3/18 and subsequent I&D left ankle on 1/18/19. Patient was last seen in the office on 8/26/19. The patient's prior history is detailed in the previous encounter which was reviewed and reveals that the patients status is unchanged and I provided the following plan:    PLAN:    · Continue activity as tolerated  · Order provided for extra depth shoes  · PT ordered for plantar fasciitis, left  · Continue to follow up with neurology for tx of diabetic neuropathy  · Continue to wear custom orthotic or brace, left  · Wear compression hose for swelling  · Offered NSAIDs and referral to Pain Management  · Please follow up in 8 weeks     Since we last saw the patient in the office, Dylan Carvalho has been to PT for plantar fasciitis and states that it was not beneficial. PT would complete a light message and ice the left foot. Patient states that she is in Pain Management her her PCP is referring her to [facility] for different treatment options other than pain medication. Patient states she has pain to plantar medial fascia when she first ambulates in the morning she also has central heel pain. She continues to wear the SMO brace, but states that the ankle socks wears are too tight. Patient would like to have the Medrol Dose pack for her plantar fasciitis, but understands that we much check her BS and A1c. Patient states that she has taken steroid before for Crohn with no difficulty.   She states that her BP has been an issue recently (she was hospitalized) but it is now under control. No changes in medications, allergies, social or family history. The patient has been experiencing pain and discomfort that has been confirmed as outlined in the following pain assessment:     Pain Assessment  1/14/2020   Location of Pain Ankle   Pain Location Comment -   Location Modifiers Left   Severity of Pain 5   Quality of Pain Burning;Aching   Quality of Pain Comment numbness   Duration of Pain Persistent   Frequency of Pain Constant   Aggravating Factors Walking   Aggravating Factors Comment -   Limiting Behavior -   Relieving Factors NSAID   Relieving Factors Comment -   Result of Injury No   Work-Related Injury -   Type of Injury -        Dylan Carvalho  has a past medical history of Crohn's colitis ([address] Diabetes ([address] HTN (hypertension), Hypercholesterolemia, Pulmonary embolism ([address] (03/2019), and Stool color black. Lab Results   Component Value Date/Time    Hemoglobin A1c 7.3 (H) 07/22/2019 12:00 AM    Hemoglobin A1c 8.0 (H) 01/11/2019 10:03 PM        Lab Results   Component Value Date/Time    Glucose 365 (H) 12/08/2019 11:15 PM    Glucose (POC) 232 (H) 12/09/2019 12:47 AM        Lab Results   Component Value Date/Time    Hemoglobin A1c 7.3 (H) 07/22/2019 12:00 AM         Lab Results   Component Value Date/Time    VITAMIN D, 25-HYDROXY 21.6 (L) 07/22/2019 12:00 AM           IMPRESSION:     1. Chronic pain of left ankle    2. Closed left ankle fracture, with delayed healing, subsequent encounter    3. Osteoarthritis of left ankle, unspecified osteoarthritis type    4. Obesity, morbid ([address]    5. Controlled type 2 diabetes mellitus without complication, with long-term current use of insulin ([address]    6. Neuropathy    7. Plantar fasciitis, bilateral    8.  Pain of left heel        PLAN:         Orders Placed This Encounter    Generic Supply Order     Dorsal night splint, left    [98948] Ankle Min 3V     Order Specific Question:   Weight bearing? Answer:   No    HEMOGLOBIN A1C WITH EAG     Standing Status:   Future     Standing Expiration Date:   4/77/7218    METABOLIC PANEL, COMPREHENSIVE     Standing Status:   Future     Standing Expiration Date:   1/14/2021            1. Risk factors include: abnormal BMI, primary OA, DM, Vitamin D deficiency   2. Cortisone injection and aspiration is not indicated today  3. Physical/Occupational Therapy is not indicated today  4. Diagnostic test is indicated today: Hemoglobin A1c, BS  5. Durable medical equipment is indicated today: dorsal night splint  6. Referral is not indicated today   7. Medications is not indicated today:   8. Surgical intervention is not indicated at this time          · Continue activity as tolerated  · Complete stretching exercises plantar fasciitis, left  · Follow up with neurology and PCP as scheduled  · Continue to wear custom SMO brace  · Dorsal night splint provided  · Wear compression hose for swelling  · Please follow up in 4 weeks          Patient has been consulted on with Dr. Connor Whitfield during this visit and he agrees with the assessment and plan. Patient expresses understanding of the diagnosis and treatment plan. Patient education has been provided. PHYSICAL EXAM:     Patient arrives to office via: with assistive device: SMO brace  Patient accompanied in the examination room  by: self. Visit Vitals  /77 (BP 1 Location: Left arm, BP Patient Position: Sitting)   Pulse 71   Temp 98 °F (36.7 °C) (Oral)   Resp 15   Ht 6' (1.829 m)   Wt 336 lb 3.2 oz (152.5 kg)   SpO2 97%   BMI 45.60 kg/m²         Blood pressure is: WNL on examination today. APPEARANCE: In general, Venancio Miller is an alert, well appearing, 44 y.o. female who is oriented to person, place/time, and who follows commands. Patient is well-developed, well-nourished with a normal affect. Seated comfortably in no acute distress.  Speech normal in context and clarity. The patient is afebrile. Current BMI is: Body mass index is 45.6 kg/m². As such, the treatment plan is more complex. PSYCHIATRIC: Affect, mood, judgement, behavior and conduct are appropriate. Memory grossly intact, no dementia  HEENT: Head normocephalic & atraumatic              Eye: EOM are intact. Both pupils are round and reaction to light and accommodation. Sclera are clear              Neck: Supple. ROM WNL. JVD is not present              Hearings Intact, does not require hearing aid device  RESPIRATORY: Breathing is unlabored without accessory chest muscle use  CARDIOVASCULAR/PERIPHERAL VASCULAR: Normal Pulses to each foot    MUSCULOSKELETAL: EXAMINATION OF:      M/S EXAMINATION OF: left foot/ankle  INCISION: Incision looks good, skin well healed  SKIN: mild edema and significant adipose tissue, no erythema, no  ecchymosis, no warmth,  TENDERNESS:  mild tenderness to palpation along lateral ankle, lateral heel and plantar medial fascia  NEUROVASCULAR:  grossly intact. Positive distal pulses and capillary refill. DVT ASSESSMENT:  The calf is not tender to palpation. No evidence of DVT seen on physical exam.  ROM: WNL      RADIOGRAPHS & DIAGNOSTIC STUDIES      1/14/2020 AT [facility]    X-rays, 3 views of the left ankle reveals post op changes s/p ORIF with syndesmotic stabilization. Note breakage of proximal syndesmotic screw close to fibula plate. Also, there is movement around the syndesmotic screws. Fracture is healed. No new acute fracture, dislocation or subluxation noted. Overall alignment is acceptable. Soft tissue swelling is noted. No osteolytic or osteoblastic lesions noted. Mineralization suggests osteopenia. Degenerative changes are noted. Calcified vessels are not present. I have personally reviewed the results of the above study.  The interpretation of this study is my professional opinion    REVIEW OF SYSTEMS:     REVIEW OF SYSTEMS: All Below are Negative except as indicated in the HPI: See HPI                Constitutional: negative for fever, chills, night sweats and unexpected weight loss and malaise/fatigue              HEENT: Negative. No hoarseness, no double vision              Respiratory: Negative. No difficulty breathing, No SOB              Cardiovascular: negative for chest pain, claudication, COTTRELL, + leg swelling               Gastrointestinal: Negative for pain, Blood in stool, incontinence, pelvic pain, N/V/C/D               Genitourinary: No saddle anesthesia, pelvic pain, blood in urine, incontinence, dysuria               Neurological: Negative for dizziness and weakness, visual changes, confusion, headaches, seizures               Phychiatric/Behavioral: Negative for depression, memory loss, substance abuse               Extremities: Negative for hair changes, rash, or skin lesion changes              Hematologic: Negative for bleeding problems, bruising, pallor or swollen lymph nodes              Peripheral Vascular: No calf pain, no circulation deficits              Musculoskeletal: As per HPI above    Pain Location: Ankle left foot/ankle. Otherwise, the remainder of the review of systems is negative except as mentioned in the HPI. PAST MEDICAL HISTORY:     Past Medical History:   Diagnosis Date    [facility]     Diabetes ([facility]     HTN (hypertension)     Hypercholesterolemia     Pulmonary embolism ([facility] 03/2019    Stool color black     crohns       MEDICATIONS:     Current Outpatient Medications   Medication Sig    naloxone (NARCAN) 4 mg/actuation nasal spray Use 1 spray intranasally, then discard. Repeat with new spray every 2 min as needed for opioid overdose symptoms, alternating nostrils.     ALPRAZolam (XANAX) 0.5 mg tablet TAKE 1 TAB BY MOUTH TWICE A DAY AS NEEDED    budesonide (ENTOCORT EC) 3 mg capsule TAKE 1 CAPSULE BY MOUTH 3 TIMES DAILY    dicyclomine (BENTYL) 10 mg capsule TAKE 1 CAPSULE BY MOUTH EVERY 6 HOURS AS NEEDED FOR PAIN    ergocalciferol (ERGOCALCIFEROL) 50,000 unit capsule TAKE 1 (ONE) CAPSULE BY MOUTH ONCE A WEEK    loperamide (IMODIUM A-D) 2 mg tablet Take 1 Tab by mouth three (3) times daily as needed.  FLORANEX 1 million cell tab tablet TAKE 1 TABLET BY MOUTH EVERY DAY.  tiZANidine (ZANAFLEX) 4 mg tablet TAKE 1 (ONE) TABLET BY MOUTH THREE TIMES DAILY AS NEEDED    DULoxetine (CYMBALTA) 60 mg capsule Take 1 Cap by mouth daily. One cap qam for one week, then one bid    insulin glargine (LANTUS,BASAGLAR) 100 unit/mL (3 mL) inpn 30 Units by SubCUTAneous route nightly.  ondansetron hcl (ZOFRAN) 4 mg tablet Take 1 Tab by mouth every eight (8) hours as needed for Nausea.  hydrALAZINE (APRESOLINE) 100 mg tablet Take 1 Tab by mouth three (3) times daily.  SITagliptin (JANUVIA) 100 mg tablet Take 1 Tab by mouth daily.  metoprolol tartrate (LOPRESSOR) 50 mg tablet Take 1 Tab by mouth two (2) times a day.  atorvastatin (LIPITOR) 20 mg tablet Take 1 Tab by mouth nightly.  cloNIDine HCl (CATAPRES) 0.1 mg tablet Take 1 Tab by mouth every eight (8) hours as needed.  gabapentin (NEURONTIN) 300 mg capsule Take 1 Cap by mouth three (3) times daily. (Patient taking differently: Take 300 mg by mouth three (3) times daily. Indications: 600mg)    hydroCHLOROthiazide (HYDRODIURIL) 25 mg tablet Take 1 Tab by mouth daily.  pantoprazole (PROTONIX) 40 mg tablet Take 1 Tab by mouth Before breakfast and dinner.  losartan (COZAAR) 100 mg tablet Take 100 mg by mouth daily.  azaTHIOprine (IMURAN) 50 mg tablet Take 250 mg by mouth daily. Indications: Crohn's disease    adalimumab (HUMIRA) 40 mg/0.8 mL injection 40 mg by SubCUTAneous route every seven (7) days. No current facility-administered medications for this visit.         ALLERGIES:     No Known Allergies    PAST SURGICAL HISTORY:     Past Surgical History:   Procedure Laterality Date    COLONOSCOPY N/A 10/21/2019    COLONOSCOPY w/ bxs performed by Daniel Hines MD at [facility] END[facility]COPY    HX GI      HX ORTHOPAEDIC      fx left ankle ORIF    HX TUBAL LIGATION      btl       SOCIAL HISTORY:     Social History     Socioeconomic History    Marital status: SINGLE     Spouse name: Not on file    Number of children: Not on file    Years of education: Not on file    Highest education level: Not on file   Occupational History    Not on file   Social Needs    Financial resource strain: Not on file    Food insecurity:     Worry: Not on file     Inability: Not on file    Transportation needs:     Medical: Not on file     Non-medical: Not on file   Tobacco Use    Smoking status: Never Smoker    Smokeless tobacco: Never Used   Substance and Sexual Activity    Alcohol use: Never     Frequency: Never    Drug use: Never    Sexual activity: Not Currently   Lifestyle    Physical activity:     Days per week: Not on file     Minutes per session: Not on file    Stress: Not on file   Relationships    Social connections:     Talks on phone: Not on file     Gets together: Not on file     Attends [employer] service: Not on file     Active member of club or organization: Not on file     Attends meetings of clubs or organizations: Not on file     Relationship status: Not on file    Intimate partner violence:     Fear of current or ex partner: Not on file     Emotionally abused: Not on file     Physically abused: Not on file     Forced sexual activity: Not on file   Other Topics Concern    Not on file   Social History Narrative    Not on file       FAMILY HISTORY:     Family History   Problem Relation Age of Onset    Diabetes Mother     Hypertension Mother     Diabetes Father     Hypertension Father     Heart Disease Father          Beth Daigle PA-C  1/14/2020   2:10 PM

## 2020-01-14 NOTE — TELEPHONE ENCOUNTER
Patient called me to inform me that she will be seeing GI on 1/23 she will call me back when she gets clearance.

## 2020-01-28 ENCOUNTER — OFFICE VISIT (OUTPATIENT)
Dept: ORTHOPEDIC SURGERY | Age: 50
End: 2020-01-28

## 2020-01-28 ENCOUNTER — OFFICE VISIT (OUTPATIENT)
Dept: NEUROLOGY | Age: 50
End: 2020-01-28

## 2020-01-28 VITALS
TEMPERATURE: 98 F | WEIGHT: 293 LBS | SYSTOLIC BLOOD PRESSURE: 120 MMHG | HEIGHT: 72 IN | BODY MASS INDEX: 39.68 KG/M2 | HEART RATE: 79 BPM | DIASTOLIC BLOOD PRESSURE: 100 MMHG | OXYGEN SATURATION: 98 %

## 2020-01-28 VITALS
SYSTOLIC BLOOD PRESSURE: 149 MMHG | HEART RATE: 79 BPM | DIASTOLIC BLOOD PRESSURE: 83 MMHG | TEMPERATURE: 97.7 F | WEIGHT: 293 LBS | BODY MASS INDEX: 39.68 KG/M2 | OXYGEN SATURATION: 94 % | HEIGHT: 72 IN

## 2020-01-28 DIAGNOSIS — M25.561 RIGHT KNEE PAIN, UNSPECIFIED CHRONICITY: ICD-10-CM

## 2020-01-28 DIAGNOSIS — E11.40 TYPE 2 DIABETES MELLITUS WITH DIABETIC NEUROPATHY, WITH LONG-TERM CURRENT USE OF INSULIN (HCC): Primary | ICD-10-CM

## 2020-01-28 DIAGNOSIS — G47.8 UPPER AIRWAY RESISTANCE SYNDROME: ICD-10-CM

## 2020-01-28 DIAGNOSIS — M17.11 PRIMARY OSTEOARTHRITIS OF RIGHT KNEE: ICD-10-CM

## 2020-01-28 DIAGNOSIS — M17.12 PRIMARY OSTEOARTHRITIS OF LEFT KNEE: Primary | ICD-10-CM

## 2020-01-28 DIAGNOSIS — I10 MALIGNANT HYPERTENSION: ICD-10-CM

## 2020-01-28 DIAGNOSIS — G44.89 OTHER HEADACHE SYNDROME: ICD-10-CM

## 2020-01-28 DIAGNOSIS — Z79.4 TYPE 2 DIABETES MELLITUS WITH DIABETIC NEUROPATHY, WITH LONG-TERM CURRENT USE OF INSULIN (HCC): Primary | ICD-10-CM

## 2020-01-28 RX ORDER — TRIAMCINOLONE ACETONIDE 40 MG/ML
40 INJECTION, SUSPENSION INTRA-ARTICULAR; INTRAMUSCULAR ONCE
Qty: 1 ML | Refills: 0
Start: 2020-01-28 | End: 2020-01-28

## 2020-01-28 RX ORDER — PREGABALIN 100 MG/1
200 CAPSULE ORAL 3 TIMES DAILY
Qty: 180 CAP | Refills: 3 | Status: SHIPPED | OUTPATIENT
Start: 2020-01-28 | End: 2020-07-10 | Stop reason: SDUPTHER

## 2020-01-28 NOTE — PATIENT INSTRUCTIONS
Cluster Headache: Care Instructions  Your Care Instructions  Cluster headaches are very painful. They happen on one side of the head and often start at night. They can last for 30 minutes to several hours. They usually occur in groups, or clusters, over weeks or months. You may have a stuffy nose and watery eyes during the headaches. The cause of cluster headaches is not known. Medicine may help prevent cluster headaches. You also can try to avoid things that trigger your headaches. Follow-up care is a key part of your treatment and safety. Be sure to make and go to all appointments, and call your doctor if you are having problems. It's also a good idea to know your test results and keep a list of the medicines you take. How can you care for yourself at home? · Watch for new symptoms with a headache. These include fever, weakness or numbness, vision changes, or confusion. They may be signs of a more serious problem. · Be safe with medicines. Take your medicines exactly as prescribed. Call your doctor if you think you are having a problem with your medicine. You will get more details on the specific medicines your doctor prescribes. · If your doctor recommends it, take an over-the-counter pain medicine, such as acetaminophen (Tylenol), ibuprofen (Advil, Motrin), or naproxen (Aleve). Read and follow all instructions on the label. · Do not take two or more pain medicines at the same time unless the doctor told you to. Many pain medicines have acetaminophen, which is Tylenol. Too much acetaminophen (Tylenol) can be harmful. · Carry medicine with you to quickly treat a headache. · Put ice or a cold pack on the area for 10 to 20 minutes at a time. Put a thin cloth between the ice and your skin. · If your doctor prescribed at-home oxygen therapy to stop a cluster headache, follow the directions for using it. To prevent cluster headaches  · Keep a headache diary.  Avoiding triggers may help you prevent headaches. Write down when a headache begins, how long it lasts, and what might have triggered it. This could include stress, alcohol, or certain foods. · Exercise daily to lower stress. · Limit caffeine by not drinking too much coffee, tea, or soda. But do not quit caffeine suddenly. This can also give you headaches. · Do not smoke or allow others to smoke around you. If you need help quitting, talk to your doctor about stop-smoking programs and medicines. These can increase your chances of quitting for good. · Tell your doctor if your headaches get worse and medicines do not help. You may need to try a different medicine. When should you call for help? Call 911 anytime you think you may need emergency care. For example, call if:    · You have symptoms of a stroke. These may include:  ? Sudden numbness, tingling, weakness, or loss of movement in your face, arm, or leg, especially on only one side of your body. ? Sudden vision changes. ? Sudden trouble speaking. ? Sudden confusion or trouble understanding simple statements. ? Sudden problems with walking or balance. ? A sudden, severe headache that is different from past headaches.    Call your doctor now or seek immediate medical care if:    · You have a fever with a stiff neck or a severe headache.     · You are sensitive to light or feel very sleepy or confused.     · You have new nausea and vomiting and you cannot keep down food or liquids.    Watch closely for changes in your health, and be sure to contact your doctor if:    · You have a headache that does not get better within 1 or 2 days.     · Your headaches get worse or happen more often. Where can you learn more? Go to http://nasreen-mónica.info/. Enter S546 in the search box to learn more about \"Cluster Headache: Care Instructions. \"  Current as of: March 28, 2019  Content Version: 12.2  © 1869-0475 [employer].  Care instructions adapted under license by [employer] (which disclaims liability or warranty for this information). If you have questions about a medical condition or this instruction, always ask your healthcare professional. [address] any warranty or liability for your use of this information.

## 2020-01-28 NOTE — PROGRESS NOTES
Ms. Harlo Nissen has a reminder for a \"due or due soon\" health maintenance. I have asked that she contact her primary care provider for follow-up on this health maintenance.

## 2020-01-28 NOTE — PROGRESS NOTES
[facility] [employer]  1970   Chief Complaint   Patient presents with    Knee Pain     LEFT        HISTORY OF PRESENT ILLNESS  Bailey Pierre is a 44 y.o. female who presents today for evaluation of b/l knee pain. L>R. Pt referred by Dr. Marie Guerrero. She rates her pain 6/10 today. Pain has been present for a couple of months. Pt states the pain in the left knee is excruciating. Pt notes the right knee also hurts. Pt has had cortisone injections in both knees before. Pt has hx of a fractured ankle and Chron's. Patient describes the pain as aching that is Constant in nature. Symptoms are worse with walking and standing, Activity and is better with  nothing. Associated symptoms include nothing. Since problem started, it: is unchanged. Pain does not wake patient up at night. Has taken no meds for the problem. Has tried following treatments: Injections:YES; Brace:NO;  Therapy:NO; Cane/Crutch:NO       No Known Allergies     Past Medical History:   Diagnosis Date    [facility]'s colitis ([facility]     Diabetes ([facility]     HTN (hypertension)     Hypercholesterolemia     Pulmonary embolism ([facility] 03/2019    Stool color black     crohns      Social History     Socioeconomic History    Marital status: SINGLE     Spouse name: Not on file    Number of children: Not on file    Years of education: Not on file    Highest education level: Not on file   Occupational History    Not on file   Social Needs    Financial resource strain: Not on file    Food insecurity:     Worry: Not on file     Inability: Not on file    Transportation needs:     Medical: Not on file     Non-medical: Not on file   Tobacco Use    Smoking status: Never Smoker    Smokeless tobacco: Never Used   Substance and Sexual Activity    Alcohol use: Never     Frequency: Never    Drug use: Never    Sexual activity: Not Currently   Lifestyle    Physical activity:     Days per week: Not on file     Minutes per session: Not on file    Stress: Not on file   Relationships    Social connections:     Talks on phone: Not on file     Gets together: Not on file     Attends [employer]ism service: Not on file     Active member of club or organization: Not on file     Attends meetings of clubs or organizations: Not on file     Relationship status: Not on file    Intimate partner violence:     Fear of current or ex partner: Not on file     Emotionally abused: Not on file     Physically abused: Not on file     Forced sexual activity: Not on file   Other Topics Concern    Not on file   Social History Narrative    Not on file      Past Surgical History:   Procedure Laterality Date    COLONOSCOPY N/A 10/21/2019    COLONOSCOPY w/ bxs performed by Albert Parker MD at [address] HX GI      HX ORTHOPAEDIC      fx left ankle ORIF    HX TUBAL LIGATION      btl      Family History   Problem Relation Age of Onset    Diabetes Mother     Hypertension Mother     Diabetes Father     Hypertension Father     Heart Disease Father       Current Outpatient Medications   Medication Sig    pregabalin (LYRICA) 100 mg capsule Take 2 Caps by mouth three (3) times daily. Max Daily Amount: 600 mg. ONE CAP TID FOR ONE WEEK, INCREASE TO TWO TID    naloxone (NARCAN) 4 mg/actuation nasal spray Use 1 spray intranasally, then discard. Repeat with new spray every 2 min as needed for opioid overdose symptoms, alternating nostrils.  ALPRAZolam (XANAX) 0.5 mg tablet TAKE 1 TAB BY MOUTH TWICE A DAY AS NEEDED    budesonide (ENTOCORT EC) 3 mg capsule TAKE 1 CAPSULE BY MOUTH 3 TIMES DAILY    dicyclomine (BENTYL) 10 mg capsule TAKE 1 CAPSULE BY MOUTH EVERY 6 HOURS AS NEEDED FOR PAIN    ergocalciferol (ERGOCALCIFEROL) 50,000 unit capsule TAKE 1 (ONE) CAPSULE BY MOUTH ONCE A WEEK    loperamide (IMODIUM A-D) 2 mg tablet Take 1 Tab by mouth three (3) times daily as needed.  FLORANEX 1 million cell tab tablet TAKE 1 TABLET BY MOUTH EVERY DAY.     tiZANidine (ZANAFLEX) 4 mg tablet TAKE 1 (ONE) TABLET BY MOUTH THREE TIMES DAILY AS NEEDED    DULoxetine (CYMBALTA) 60 mg capsule Take 1 Cap by mouth daily. One cap qam for one week, then one bid    insulin glargine (LANTUS,BASAGLAR) 100 unit/mL (3 mL) inpn 30 Units by SubCUTAneous route nightly.  ondansetron hcl (ZOFRAN) 4 mg tablet Take 1 Tab by mouth every eight (8) hours as needed for Nausea.  hydrALAZINE (APRESOLINE) 100 mg tablet Take 1 Tab by mouth three (3) times daily.  SITagliptin (JANUVIA) 100 mg tablet Take 1 Tab by mouth daily.  metoprolol tartrate (LOPRESSOR) 50 mg tablet Take 1 Tab by mouth two (2) times a day.  atorvastatin (LIPITOR) 20 mg tablet Take 1 Tab by mouth nightly.  cloNIDine HCl (CATAPRES) 0.1 mg tablet Take 1 Tab by mouth every eight (8) hours as needed.  gabapentin (NEURONTIN) 300 mg capsule Take 1 Cap by mouth three (3) times daily. (Patient taking differently: Take 300 mg by mouth three (3) times daily. Indications: 600mg)    hydroCHLOROthiazide (HYDRODIURIL) 25 mg tablet Take 1 Tab by mouth daily.  pantoprazole (PROTONIX) 40 mg tablet Take 1 Tab by mouth Before breakfast and dinner.  losartan (COZAAR) 100 mg tablet Take 100 mg by mouth daily.  azaTHIOprine (IMURAN) 50 mg tablet Take 250 mg by mouth daily. Indications: Crohn's disease    adalimumab (HUMIRA) 40 mg/0.8 mL injection 40 mg by SubCUTAneous route every seven (7) days. No current facility-administered medications for this visit. REVIEW OF SYSTEM   Patient denies: Weight loss, Fever/Chills, HA, Visual changes, Fatigue, Chest pain, SOB, Abdominal pain, N/V/D/C, Blood in stool or urine, Edema. Pertinent positive as above in HPI. All others were negative    PHYSICAL EXAM:   Visit Vitals  /83   Pulse 79   Temp 97.7 °F (36.5 °C) (Oral)   Ht 6' (1.829 m)   Wt 300 lb (136.1 kg)   SpO2 94%   BMI 40.69 kg/m²     The patient is a well-developed, well-nourished female   in no acute distress.   The patient is alert and oriented times three. The patient is alert and oriented times three. Mood and affect are normal.  LYMPHATIC: lymph nodes are not enlarged and are within normal limits  SKIN: normal in color and non tender to palpation. There are no bruises or abrasions noted. NEUROLOGICAL: Motor sensory exam is within normal limits. Reflexes are equal bilaterally. There is normal sensation to pinprick and light touch  MUSCULOSKELETAL:  Examination Left knee Right knee   Skin Intact Intact   Range of motion 0-130 0-130   Effusion + +   Medial joint line tenderness + +   Lateral joint line tenderness + +   Tenderness Pes Bursa - -   Tenderness insertion MCL - -   Tenderness insertion LCL - -   Stewarts - -   Patella crepitus + +   Patella grind + +   Lachman - -   Pivot shift - -   Anterior drawer - -   Posterior drawer - -   Varus stress - -   Valgus stress - -   Neurovascular Intact Intact   Calf Swelling and Tenderness to Palpation - -   Arturo's Test - -   Hamstring Cord Tightness - -       PROCEDURE: After sterile prep, 4 cc of Xylocaine and 1 cc of Kenalog were injected into the bilateral  knees.        [address] [facility]  OFFICE PROCEDURE PROGRESS NOTE        Chart reviewed for the following:  Elisa Greenfield MD, have reviewed the History, Physical and updated the Allergic reactions for Geovany Bae performed immediately prior to start of procedure:  Elisa Greenfield MD, have performed the following reviews on Lili Gu prior to the start of the procedure:            * Patient was identified by name and date of birth   * Agreement on procedure being performed was verified  * Risks and Benefits explained to the patient  * Procedure site verified and marked as necessary  * Patient was positioned for comfort  * Consent was signed and verified     Time: 4:16 PM    Date of procedure: 1/28/2020    Procedure performed by:  Mukund Gramajo MD    Provider assisted by: (see medication administration)    How tolerated by patient: tolerated the procedure well with no complications    Comments: none      IMAGING: XR of right knee obtained in the office dated 1/28/2020 was reviewed and read by Dr. Chelsey Dominguez: Severe degenerative arthritis in the medial compartment and patellofemoral joint. [facility] of left [facility] 10[facility]31[facility]19 was reviewed and read by Dr. Chelsey Dominguez: Mild to moderate DJD with joint effusion. IMPRESSION:      ICD-10-CM ICD-9-CM    1. Primary osteoarthritis of left knee M17.12 715.16 TRIAMCINOLONE ACETONIDE INJ      triamcinolone acetonide (KENALOG) 40 mg/mL injection      DRAIN/INJECT LARGE JOINT/BURSA   2. Right knee pain, unspecified chronicity M25.561 719.46 AMB POC XRAY, KNEE; 1/2 VIEWS   3. Primary osteoarthritis of right knee M17.11 715.16 TRIAMCINOLONE ACETONIDE INJ      triamcinolone acetonide (KENALOG) 40 mg/mL injection      DRAIN/INJECT LARGE JOINT/BURSA        PLAN:  1. Pt presents today with b/l knee pain due to primary OA and I would like to try injections today. Risk factors include: dm, htn, BMI>35  2. No ultrasound exam indicated today  3. Yes cortisone injection indicated today B/L KNEES  4. No Physical/Occupational Therapy indicated today  5. No diagnostic test indicated today:   6. No durable medical equipment indicated today  7. No referral indicated today   8. No medications indicated today:   9. No Narcotic indicated today       RTC 3 weeks if pain continues    Office note will be sent to referring provider. Scribed by Melecio Goldmann [address]) as dictated by Minerva Haley MD    I, Dr. Minerva Haley, confirm that all documentation is accurate.     Minerva Haley M.D.   [phone]

## 2020-01-28 NOTE — PROGRESS NOTES
1/28/2020 4:06 PM    SSN: [id number]    Subjective:   78-year-old female who I had seen late in June for evaluation of diabetic neuropathy. For many years she has had tingling, pins-and-needles, and numbness particularly from both knees down. The last time here she had only partial relief with gabapentin 600 mg 3 times a day after she had problems with Lyrica coverage, medication that she had taken at doses of 200 mg 3 times a day up to a year ago with reasonable improvement of her pins-and-needles. We opted to try Cymbalta to help hopefully with weight promotion that the gabapentin caused. She tried at 60 mg twice a day after a titration, took it for about 3 to 4 months, but it did not help and therefore she went back to gabapentin. She is not having satisfactory control of her pins-and-needles. She does complain of quite a bit of numbness. She also has a lot of pain related to her ankle and her knee pending pain clinic follow-up at [facility] in March. She is taking Percocet as needed for this pain. The other issue we had is her obesity, EDS, snoring, and metabolic syndrome and cardiovascular abnormalities were raising concern for obstructive sleep apnea. I ordered an attended sleep study. This was declined. On August 7 I received notice, order home sleep study, and for reasons I am not clear that this has not been done to this date. Also since her last visit here she had an emergency room visit for a very severe headache on December 8. I have read that note. She had a severe pounding headache in the frontal area of the head and was complaining of some chest discomfort as well. Her blood pressure was as high as 222/155. Someone told her that she was having cluster migraines. She has never had migraines before. She has not had regular headaches since she left the hospital.  Her blood pressure has remained a problem.     She also had been going through evaluation for weight loss surgery, which was postponed because recently she was found to have Crohn's. She has pending GI evaluation for this. Social History     Socioeconomic History    Marital status: SINGLE     Spouse name: Not on file    Number of children: Not on file    Years of education: Not on file    Highest education level: Not on file   Occupational History    Not on file   Social Needs    Financial resource strain: Not on file    Food insecurity:     Worry: Not on file     Inability: Not on file    Transportation needs:     Medical: Not on file     Non-medical: Not on file   Tobacco Use    Smoking status: Never Smoker    Smokeless tobacco: Never Used   Substance and Sexual Activity    Alcohol use: Never     Frequency: Never    Drug use: Never    Sexual activity: Not Currently   Lifestyle    Physical activity:     Days per week: Not on file     Minutes per session: Not on file    Stress: Not on file   Relationships    Social connections:     Talks on phone: Not on file     Gets together: Not on file     Attends Advent service: Not on file     Active member of club or organization: Not on file     Attends meetings of clubs or organizations: Not on file     Relationship status: Not on file    Intimate partner violence:     Fear of current or ex partner: Not on file     Emotionally abused: Not on file     Physically abused: Not on file     Forced sexual activity: Not on file   Other Topics Concern    Not on file   Social History Narrative    Not on file       Family History   Problem Relation Age of Onset    Diabetes Mother     Hypertension Mother     Diabetes Father     Hypertension Father     Heart Disease Father        Current Outpatient Medications   Medication Sig Dispense Refill    pregabalin (LYRICA) 100 mg capsule Take 2 Caps by mouth three (3) times daily. Max Daily Amount: 600 mg.  ONE CAP TID FOR ONE WEEK, INCREASE TO TWO  Cap 3    budesonide (ENTOCORT EC) 3 mg capsule TAKE 1 CAPSULE BY MOUTH 3 TIMES DAILY  1    dicyclomine (BENTYL) 10 mg capsule TAKE 1 CAPSULE BY MOUTH EVERY 6 HOURS AS NEEDED FOR PAIN  1    ergocalciferol (ERGOCALCIFEROL) 50,000 unit capsule TAKE 1 (ONE) CAPSULE BY MOUTH ONCE A WEEK  2    loperamide (IMODIUM A-D) 2 mg tablet Take 1 Tab by mouth three (3) times daily as needed.  FLORANEX 1 million cell tab tablet TAKE 1 TABLET BY MOUTH EVERY DAY. 1    tiZANidine (ZANAFLEX) 4 mg tablet TAKE 1 (ONE) TABLET BY MOUTH THREE TIMES DAILY AS NEEDED  0    insulin glargine (LANTUS,BASAGLAR) 100 unit/mL (3 mL) inpn 30 Units by SubCUTAneous route nightly.  ondansetron hcl (ZOFRAN) 4 mg tablet Take 1 Tab by mouth every eight (8) hours as needed for Nausea. 30 Tab 0    hydrALAZINE (APRESOLINE) 100 mg tablet Take 1 Tab by mouth three (3) times daily. 90 Tab 0    SITagliptin (JANUVIA) 100 mg tablet Take 1 Tab by mouth daily. 30 Tab 0    metoprolol tartrate (LOPRESSOR) 50 mg tablet Take 1 Tab by mouth two (2) times a day. 60 Tab 0    atorvastatin (LIPITOR) 20 mg tablet Take 1 Tab by mouth nightly. 30 Tab 0    gabapentin (NEURONTIN) 300 mg capsule Take 1 Cap by mouth three (3) times daily. (Patient taking differently: Take 300 mg by mouth three (3) times daily. Indications: 600mg) 90 Cap 0    hydroCHLOROthiazide (HYDRODIURIL) 25 mg tablet Take 1 Tab by mouth daily. 30 Tab 0    pantoprazole (PROTONIX) 40 mg tablet Take 1 Tab by mouth Before breakfast and dinner. 30 Tab 0    losartan (COZAAR) 100 mg tablet Take 100 mg by mouth daily.  azaTHIOprine (IMURAN) 50 mg tablet Take 250 mg by mouth daily. Indications: Crohn's disease      adalimumab (HUMIRA) 40 mg/0.8 mL injection 40 mg by SubCUTAneous route every seven (7) days.  naloxone (NARCAN) 4 mg/actuation nasal spray Use 1 spray intranasally, then discard. Repeat with new spray every 2 min as needed for opioid overdose symptoms, alternating nostrils.  1 Each 0    ALPRAZolam (XANAX) 0.5 mg tablet TAKE 1 TAB BY MOUTH TWICE A DAY AS NEEDED  0    DULoxetine (CYMBALTA) 60 mg capsule Take 1 Cap by mouth daily. One cap qam for one week, then one bid 60 Cap 3    cloNIDine HCl (CATAPRES) 0.1 mg tablet Take 1 Tab by mouth every eight (8) hours as needed. 80 Tab 0       Past Medical History:   Diagnosis Date    [facility]     Diabetes [facility]     HTN (hypertension)     Hypercholesterolemia     [facility]    Stool color black     crohns       Past Surgical History:   Procedure Laterality Date    COLONOSCOPY N/A 10/21/2019    COLONOSCOPY w/ bxs performed by Magy Salmon MD at [address] HX GI      HX ORTHOPAEDIC      fx left ankle ORIF    HX TUBAL LIGATION      btl       No Known Allergies    Vital signs:    Visit Vitals  BP (!) 120/100 (BP 1 Location: Left arm, BP Patient Position: Sitting)   Pulse 79   Temp 98 °F (36.7 °C) (Oral)   Ht 6' (1.829 m)   Wt 149.7 kg (330 lb)   SpO2 98%   BMI 44.76 kg/m²       Review of Systems:   GENERAL: Denies fever or fatigue  CARDIAC: No CP or SOB  PULMONARY: No cough of SOB  MUSCULOSKELETAL: No new joint pain  NEURO: SEE HPI      EXAM: Alert, in NAD. Obese. Mallampati 4. Heart is regular. Oriented x3, EOM's are full, PERRL, no facial asymmetries. Strength and tone are normal.  DTRs absent, pinprick decreased in a stocking distribution bilateral lower extremities      Assessment/Plan: She has diabetic polyneuropathy, reportedly she was responding to Lyrica, did not respond to Cymbalta and gabapentin is not really helping her much. With this in mind, we agreed to try Lyrica again, she will take 100 mg 3 times a day for a week and then 200 mg 3 times a day. Side effects were again discussed, but in the past she did tolerate 200 mg 3 times a day. She will stop gabapentin. Chronic pain and intake of opiates are lowering her pain threshold.   I also advised her that the pins-and-needles, and the symptoms of burning are likely to respond to neuropathic pain medications but that the numbness is a symptom of neuropathy that is there to stay and which medications will not address. She understands this and feels reassured about expectations at this point after discussion. As far as her sleep apnea possibility goes, I have ordered another home sleep study and have instructed my staff to make sure that we get this done without fail this time around. This is an aggravating factor for her malignant hypertension. I clarified that clearly the headache that she had was secondary to very severely elevated blood pressure. Her clinical picture is not consistent with a primary headache disorder such as cluster migraine or any other kind of migraine. She will return to see me a week or 2 after she has her sleep study done. 25 out of 40 minutes were spent counseling on the above detailed issues. PLEASE NOTE:   Portions of this document may have been produced using voice recognition software. Unrecognized errors in transcription may be present. This note will not be viewable in [address]

## 2020-01-28 NOTE — PROGRESS NOTES
Verbal order given by Dr. Myriam Chacon to draw up 1 ml of Kenalog and 4 ml of Xylocaine x 2 injections.

## 2020-03-02 ENCOUNTER — DOCUMENTATION ONLY (OUTPATIENT)
Dept: SURGERY | Age: 50
End: 2020-03-02

## 2020-03-02 NOTE — PROGRESS NOTES
Patient returned call. Patient is frustrated with current GI practice and asked for recommendations for a second opinion. Provided patient with Dr. Lucila Arias office [phone]. Patient to return call to office to notify when she has appointment.  Flores Louise, RED-BC

## 2020-03-02 NOTE — PROGRESS NOTES
Called patient to check on progress with GI clearance. Patient did not answer phone and VM was full- unable to leave .  Henny Abernathy, FRANKLINP-BC

## 2020-07-09 DIAGNOSIS — Z79.4 TYPE 2 DIABETES MELLITUS WITH DIABETIC NEUROPATHY, WITH LONG-TERM CURRENT USE OF INSULIN (HCC): ICD-10-CM

## 2020-07-09 DIAGNOSIS — E11.40 TYPE 2 DIABETES MELLITUS WITH DIABETIC NEUROPATHY, WITH LONG-TERM CURRENT USE OF INSULIN (HCC): ICD-10-CM

## 2020-07-10 RX ORDER — PREGABALIN 100 MG/1
200 CAPSULE ORAL 3 TIMES DAILY
Qty: 180 CAP | Refills: 0 | Status: SHIPPED | OUTPATIENT
Start: 2020-07-10 | End: 2020-08-12 | Stop reason: SDUPTHER

## 2020-07-10 NOTE — TELEPHONE ENCOUNTER
Requested Prescriptions     Pending Prescriptions Disp Refills    pregabalin (LYRICA) 100 mg capsule 180 Cap 3     Sig: Take 2 Caps by mouth three (3) times daily. Max Daily Amount: 600 mg.  ONE CAP TID FOR ONE WEEK, INCREASE TO TWO TID     Pt states she is out of medication

## 2020-08-05 ENCOUNTER — OFFICE VISIT (OUTPATIENT)
Dept: ORTHOPEDIC SURGERY | Age: 50
End: 2020-08-05

## 2020-08-05 VITALS
HEART RATE: 70 BPM | SYSTOLIC BLOOD PRESSURE: 135 MMHG | WEIGHT: 293 LBS | TEMPERATURE: 97.3 F | OXYGEN SATURATION: 97 % | HEIGHT: 72 IN | BODY MASS INDEX: 39.68 KG/M2 | DIASTOLIC BLOOD PRESSURE: 88 MMHG | RESPIRATION RATE: 16 BRPM

## 2020-08-05 DIAGNOSIS — M17.0 PRIMARY OSTEOARTHRITIS OF BOTH KNEES: Primary | ICD-10-CM

## 2020-08-05 NOTE — PROGRESS NOTES
Vinita Bellamy  1970   Chief Complaint   Patient presents with    Knee Pain     left knee pain        HISTORY OF PRESENT ILLNESS  Sandra Santos is a 44 y.o. female who presents today for reevaluation of b/l knee. Patient rates pain as 6/10 today. Pain has been present for awhile. Pt was last seen here in January and had a b/l knee cortisone injection which provided relief for around 5 months. She is requesting more injections today. Pt has hx of a fractured ankle and Chron's. Patient denies any fever, chills, chest pain, shortness of breath or calf pain. The remainder of the review of systems is negative. There are no new illness or injuries to report since last seen in the office. There are no changes to medications, allergies, family or social history. Pain Assessment  8/5/2020   Location of Pain Knee   Pain Location Comment -   Location Modifiers Left   Severity of Pain 6   Quality of Pain Throbbing   Quality of Pain Comment -   Duration of Pain A few hours   Frequency of Pain Intermittent   Aggravating Factors Walking;Standing;Stairs; Bending;Stretching;Straightening   Aggravating Factors Comment laying in the bed and having it extending for a long period of time   Limiting Behavior -   Relieving Factors Nothing   Relieving Factors Comment -   Result of Injury No   Work-Related Injury -   Type of Injury -       PHYSICAL EXAM:   Visit Vitals  /88 (BP 1 Location: Left arm, BP Patient Position: Sitting)   Pulse 70   Temp 97.3 °F (36.3 °C) (Oral)   Resp 16   Ht 6' (1.829 m)   Wt 312 lb 9.6 oz (141.8 kg)   SpO2 97%   BMI 42.40 kg/m²     The patient is a well-developed, well-nourished female   in no acute distress. The patient is alert and oriented times three. The patient is alert and oriented times three. Mood and affect are normal.  LYMPHATIC: lymph nodes are not enlarged and are within normal limits  SKIN: normal in color and non tender to palpation.  There are no bruises or abrasions noted. NEUROLOGICAL: Motor sensory exam is within normal limits. Reflexes are equal bilaterally. There is normal sensation to pinprick and light touch  MUSCULOSKELETAL:  Examination Left knee Right knee   Skin Intact Intact   Range of motion 0-130 0-130   Effusion + +   Medial joint line tenderness + +   Lateral joint line tenderness + +   Tenderness Pes Bursa - -   Tenderness insertion MCL - -   Tenderness insertion LCL - -   Stewarts - -   Patella crepitus + +   Patella grind + +   Lachman - -   Pivot shift - -   Anterior drawer - -   Posterior drawer - -   Varus stress - -   Valgus stress - -   Neurovascular Intact Intact   Calf Swelling and Tenderness to Palpation - -   Arturo's Test - -   Hamstring Cord Tightness - -         PROCEDURE: After sterile prep, 4 cc of Xylocaine and 1 cc of Kenalog were injected into the bilateral  knees.        [facility]  OFFICE PROCEDURE PROGRESS NOTE        Chart reviewed for the following:  Jaelyn Norton MD, have reviewed the History, Physical and updated the Allergic reactions for Geovany Bae performed immediately prior to start of procedure:  Jaelyn Norton MD, have performed the following reviews on Aman Poag prior to the start of the procedure:            * Patient was identified by name and date of birth   * Agreement on procedure being performed was verified  * Risks and Benefits explained to the patient  * Procedure site verified and marked as necessary  * Patient was positioned for comfort  * Consent was signed and verified     Time: 3:56 PM    Date of procedure: 8/5/2020    Procedure performed by:  Vishal Hebert MD    Provider assisted by: (see medication administration)    How tolerated by patient: tolerated the procedure well with no complications    Comments: none      IMAGING: XR of right knee obtained in the office dated 1/28/2020 was reviewed and read by Dr. Janee Medellin: Severe degenerative arthritis in the medial compartment and patellofemoral joint.     XR of left knee from [facility] dated 10/31/19 was reviewed and read by Dr. Janee Medellin: Mild to moderate DJD with joint effusion. IMPRESSION:      ICD-10-CM ICD-9-CM    1. Primary osteoarthritis of both knees  M17.0 715.16 TRIAMCINOLONE ACETONIDE INJ      triamcinolone acetonide (Kenalog) 40 mg/mL injection      DRAIN/INJECT LARGE JOINT/BURSA        PLAN:   1. Pt presents today with b/l knee pain due to primary OA and I would like to try repeat cortisone injections today. Risk factors include: dm, htn, BMI>35  2. No ultrasound exam indicated today  3. Yes cortisone injection indicated today B/L KNEES  4. No Physical/Occupational Therapy indicated today  5. No diagnostic test indicated today:   6. No durable medical equipment indicated today  7. No referral indicated today   8. No medications indicated today:   9. No Narcotic indicated today        RTC prn      Scribed by Viky Benitotingrehana Tai as dictated by Anais Kee MD    I, Dr. Anais Kee, confirm that all documentation is accurate.     Anais Kee M.D.   [facility]

## 2020-08-06 RX ORDER — TRIAMCINOLONE ACETONIDE 40 MG/ML
40 INJECTION, SUSPENSION INTRA-ARTICULAR; INTRAMUSCULAR ONCE
Qty: 1 ML | Refills: 0
Start: 2020-08-06 | End: 2020-08-06

## 2020-08-10 ENCOUNTER — TELEPHONE (OUTPATIENT)
Dept: SURGERY | Age: 50
End: 2020-08-10

## 2020-08-10 DIAGNOSIS — E11.40 TYPE 2 DIABETES MELLITUS WITH DIABETIC NEUROPATHY, WITH LONG-TERM CURRENT USE OF INSULIN (HCC): ICD-10-CM

## 2020-08-10 DIAGNOSIS — Z79.4 TYPE 2 DIABETES MELLITUS WITH DIABETIC NEUROPATHY, WITH LONG-TERM CURRENT USE OF INSULIN (HCC): ICD-10-CM

## 2020-08-10 RX ORDER — PREGABALIN 100 MG/1
200 CAPSULE ORAL 3 TIMES DAILY
Qty: 180 CAP | Refills: 0 | OUTPATIENT
Start: 2020-08-10

## 2020-08-10 NOTE — TELEPHONE ENCOUNTER
No further refills until last seen on, early June refill was given with instructions to make appointment and this has not happened.

## 2020-08-10 NOTE — TELEPHONE ENCOUNTER
Called patient and she stated that she will see GI on 9/1/2020. She will call us to update us if she is cleared after this appointment.

## 2020-08-12 ENCOUNTER — VIRTUAL VISIT (OUTPATIENT)
Dept: NEUROLOGY | Age: 50
End: 2020-08-12

## 2020-08-12 DIAGNOSIS — E66.01 MORBID OBESITY (HCC): ICD-10-CM

## 2020-08-12 DIAGNOSIS — G44.89 OTHER HEADACHE SYNDROME: ICD-10-CM

## 2020-08-12 DIAGNOSIS — E11.40 TYPE 2 DIABETES MELLITUS WITH DIABETIC NEUROPATHY, WITH LONG-TERM CURRENT USE OF INSULIN (HCC): Primary | ICD-10-CM

## 2020-08-12 DIAGNOSIS — G47.8 UPPER AIRWAY RESISTANCE SYNDROME: ICD-10-CM

## 2020-08-12 DIAGNOSIS — I10 MALIGNANT HYPERTENSION: ICD-10-CM

## 2020-08-12 DIAGNOSIS — Z79.4 TYPE 2 DIABETES MELLITUS WITH DIABETIC NEUROPATHY, WITH LONG-TERM CURRENT USE OF INSULIN (HCC): Primary | ICD-10-CM

## 2020-08-12 RX ORDER — PREGABALIN 100 MG/1
200 CAPSULE ORAL 3 TIMES DAILY
Qty: 180 CAP | Refills: 0 | Status: SHIPPED | OUTPATIENT
Start: 2020-08-12 | End: 2020-09-15 | Stop reason: SDUPTHER

## 2020-08-12 NOTE — PROGRESS NOTES
Praveena Raymond is a 44 y.o. female who will be using a cell phone for today's virtual visit. 1. Have you been to the ER, urgent care clinic since your last visit? Hospitalized since your last visit? No    2. Have you seen or consulted any other health care providers outside of the [address] [place] [address] since your last visit? Include any pap smears or colon screening.  No     The phone number the patient will be using is [phone]

## 2020-08-12 NOTE — PROGRESS NOTES
8/12/2020 11:19 AM    SSN: [id number]      HPI:  78-year-old female who I last saw in January for follow-up of a diabetic polyneuropathy with symptoms of tingling, pins-and-needles, numbness from the knees down. She also has orthopedic pain particularly in the ankle and knee followed at the [facility] pain clinic, for which he takes Percocet. Her neuropathic pain responded to Lyrica but not to Cymbalta or gabapentin, so last time here plan was for her to try Lyrica 100 mg 3 times a day, stop gabapentin, and return for follow-up, with expectations that given low pain threshold secondary to chronic opiate use pain control was going to be challenging. She also had history of EDS, snoring, history of malignant hypertension, headaches caused by severely elevated blood pressures raise concern for sleep apnea and she was to have a sleep study. She reports Leeann Zarate is doing a good job, doing better,losing wt as she waits for wt surgery, moving better. 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 She still not had a HST done, her PCP is reportedly trying to get it approved on his end. According to patient sleep study has been declined, she is not sure what it was just the attending or the home sleep study has been ordered before because there was not enough documentation. She continues to have symptoms of snoring, she has had malignant hypertension as mentioned, she is sleepy all the time, she has diabetes.       Social History     Socioeconomic History    Marital status: SINGLE     Spouse name: Not on file    Number of children: Not on file    Years of education: Not on file    Highest education level: Not on file   Occupational History    Not on file   Social Needs    Financial resource strain: Not on file    Food insecurity     Worry: Not on file     Inability: Not on file    Transportation needs     Medical: Not on file     Non-medical: Not on file   Tobacco Use    Smoking status: Never Smoker    Smokeless tobacco: Never Used   Substance and Sexual Activity    Alcohol use: Never     Frequency: Never    Drug use: Never    Sexual activity: Not Currently   Lifestyle    Physical activity     Days per week: Not on file     Minutes per session: Not on file    Stress: Not on file   Relationships    Social connections     Talks on phone: Not on file     Gets together: Not on file     Attends Mosque service: Not on file     Active member of club or organization: Not on file     Attends meetings of clubs or organizations: Not on file     Relationship status: Not on file    Intimate partner violence     Fear of current or ex partner: Not on file     Emotionally abused: Not on file     Physically abused: Not on file     Forced sexual activity: Not on file   Other Topics Concern    Not on file   Social History Narrative    Not on file       Family History   Problem Relation Age of Onset    Diabetes Mother     Hypertension Mother     Diabetes Father     Hypertension Father     Heart Disease Father        Current Outpatient Medications   Medication Sig Dispense Refill    pregabalin (LYRICA) 100 mg capsule Take 2 Caps by mouth three (3) times daily. Max Daily Amount: 600 mg. ONE CAP TID FOR ONE WEEK, INCREASE TO TWO  Cap 0    naloxone (NARCAN) 4 mg/actuation nasal spray Use 1 spray intranasally, then discard. Repeat with new spray every 2 min as needed for opioid overdose symptoms, alternating nostrils. 1 Each 0    ALPRAZolam (XANAX) 0.5 mg tablet TAKE 1 TAB BY MOUTH TWICE A DAY AS NEEDED  0    budesonide (ENTOCORT EC) 3 mg capsule TAKE 1 CAPSULE BY MOUTH 3 TIMES DAILY  1    dicyclomine (BENTYL) 10 mg capsule TAKE 1 CAPSULE BY MOUTH EVERY 6 HOURS AS NEEDED FOR PAIN  1    ergocalciferol (ERGOCALCIFEROL) 50,000 unit capsule TAKE 1 (ONE) CAPSULE BY MOUTH ONCE A WEEK  2    loperamide (IMODIUM A-D) 2 mg tablet Take 1 Tab by mouth three (3) times daily as needed.       FLORANEX 1 million cell tab tablet TAKE 1 TABLET BY MOUTH EVERY DAY. 1    tiZANidine (ZANAFLEX) 4 mg tablet TAKE 1 (ONE) TABLET BY MOUTH THREE TIMES DAILY AS NEEDED  0    insulin glargine (LANTUS,BASAGLAR) 100 unit/mL (3 mL) inpn 30 Units by SubCUTAneous route nightly.  ondansetron hcl (ZOFRAN) 4 mg tablet Take 1 Tab by mouth every eight (8) hours as needed for Nausea. 30 Tab 0    hydrALAZINE (APRESOLINE) 100 mg tablet Take 1 Tab by mouth three (3) times daily. 90 Tab 0    SITagliptin (JANUVIA) 100 mg tablet Take 1 Tab by mouth daily. 30 Tab 0    metoprolol tartrate (LOPRESSOR) 50 mg tablet Take 1 Tab by mouth two (2) times a day. 60 Tab 0    atorvastatin (LIPITOR) 20 mg tablet Take 1 Tab by mouth nightly. 30 Tab 0    cloNIDine HCl (CATAPRES) 0.1 mg tablet Take 1 Tab by mouth every eight (8) hours as needed. 90 Tab 0    hydroCHLOROthiazide (HYDRODIURIL) 25 mg tablet Take 1 Tab by mouth daily. 30 Tab 0    pantoprazole (PROTONIX) 40 mg tablet Take 1 Tab by mouth Before breakfast and dinner. 30 Tab 0    losartan (COZAAR) 100 mg tablet Take 100 mg by mouth daily.  azaTHIOprine (IMURAN) 50 mg tablet Take 250 mg by mouth daily. Indications: Crohn's disease      adalimumab (HUMIRA) 40 mg/0.8 mL injection 40 mg by SubCUTAneous route every seven (7) days.  DULoxetine (CYMBALTA) 60 mg capsule Take 1 Cap by mouth daily. One cap qam for one week, then one bid 60 Cap 3    gabapentin (NEURONTIN) 300 mg capsule Take 1 Cap by mouth three (3) times daily. (Patient taking differently: Take 300 mg by mouth three (3) times daily.  Indications: 600mg) 90 Cap 0       Past Medical History:   Diagnosis Date    [facility]     Diabetes ([facility]     HTN (hypertension)     Hypercholesterolemia     Pulmonary embolism ([facility] 03/2019    Stool color black     crohns       Past Surgical History:   Procedure Laterality Date    COLONOSCOPY N/A 10/21/2019    COLONOSCOPY w/ bxs performed by Rocio Mejia MD at [address] [facility]      HX ORTHOPAEDIC      fx left ankle ORIF    HX TUBAL LIGATION      btl       No Known Allergies    Review of Systems:   GENERAL: No reported fever, positive for fatigue  CARDIAC: No CP or SOB  PULMONARY: No cough of SOB reported  MUSCULOSKELETAL: No new joint pain  NEURO: SEE HPI      Vital signs: There were no vitals taken for this visit. EXAM: Alert, in NAD. Oriented to person, time, place, normal attention and concentration, no aphasia, EOM's are full, no facial asymmetries, hearing is present. No lateralizing weakness. gait deferred          Assessment/Plan: Diabetic painful polyneuropathy, neuropathic pain now appears to be well controlled on Lyrica 100 mg 3 times a day which he is tolerating well and which is working better than gabapentin or duloxetine helped. Therefore I will continue these medication unchanged. I do not know what exactly happened with a sleep study, whether it was an attended or a home sleep study that was declined or both, but she clearly is obese, she has excessive daytime sleepiness, she has had episodes of malignant hypertension associated with symptoms, she is on multiple blood pressure medications, she is a diabetic, is obese, and has heavy disruptive snoring, all ample reasons to get a home sleep study approved. I will go ahead and order it again. I will plan to see her 1 to 2 weeks after this is done. PLEASE NOTE:   Portions of this document may have been produced using voice recognition software. Unrecognized errors in transcription may be present. This note will not be viewable in [address] Thiago Guaman is a 44 y.o. female who was seen by synchronous (real-time) audio-video technology on 8/12/2020. Consent:  Pt and/or healthcare decision maker is aware that this patient-initiated Telehealth encounter is a billable service, with coverage as determined by insurance carrier.  Pt is aware he/she may receive a bill and has provided verbal consent to proceed: Yes    I was in the office while conducting this encounter. Patient is location was at pt's home. I spent 25 minutes with this established patient, and 15 minutes of the time was spent counseling and/or coordinating care regarding the aforementioned issues as stated above. Pursuant to the emergency declaration under the [address] [place] [address], [facility] waiver authority and the [employer] Act, this Virtual  Visit was conducted, with patient's consent, to reduce the patient's risk of exposure to COVID-19 and provide continuity of care for an established patient. Services were provided through a video synchronous discussion virtually to substitute for in-person clinic visit.     Sorin Long MD

## 2020-09-15 DIAGNOSIS — Z79.4 TYPE 2 DIABETES MELLITUS WITH DIABETIC NEUROPATHY, WITH LONG-TERM CURRENT USE OF INSULIN (HCC): ICD-10-CM

## 2020-09-15 DIAGNOSIS — E11.40 TYPE 2 DIABETES MELLITUS WITH DIABETIC NEUROPATHY, WITH LONG-TERM CURRENT USE OF INSULIN (HCC): ICD-10-CM

## 2020-09-15 NOTE — TELEPHONE ENCOUNTER
Requested Prescriptions     Pending Prescriptions Disp Refills    pregabalin (LYRICA) 100 mg capsule 180 Cap 0     Sig: Take 2 Caps by mouth three (3) times daily. Max Daily Amount: 600 mg.  ONE CAP TID FOR ONE WEEK, INCREASE TO TWO TID

## 2020-09-18 ENCOUNTER — HOSPITAL ENCOUNTER (OUTPATIENT)
Dept: LAB | Age: 50
Discharge: HOME | End: 2020-09-18
Payer: MEDICAID

## 2020-09-18 PROCEDURE — 87635 SARS-COV-2 COVID-19 AMP PRB: CPT

## 2020-09-18 RX ORDER — PREGABALIN 100 MG/1
200 CAPSULE ORAL 3 TIMES DAILY
Qty: 180 CAP | Refills: 0 | Status: SHIPPED | OUTPATIENT
Start: 2020-09-18 | End: 2020-12-11 | Stop reason: SDUPTHER

## 2020-09-18 NOTE — TELEPHONE ENCOUNTER
Pt states she has called several times regarding refill request for Lyrica. Pt states she is out of medication. Please advise.

## 2020-09-20 LAB — SARS-COV-2, COV2NT: NOT DETECTED

## 2020-09-21 ENCOUNTER — ANESTHESIA EVENT (OUTPATIENT)
Dept: ENDOSCOPY | Age: 50
End: 2020-09-21
Payer: MEDICAID

## 2020-09-22 ENCOUNTER — HOSPITAL ENCOUNTER (OUTPATIENT)
Age: 50
Setting detail: OUTPATIENT SURGERY
Discharge: HOME | End: 2020-09-22
Attending: INTERNAL MEDICINE | Admitting: INTERNAL MEDICINE
Payer: MEDICAID

## 2020-09-22 ENCOUNTER — ANESTHESIA (OUTPATIENT)
Dept: ENDOSCOPY | Age: 50
End: 2020-09-22
Payer: MEDICAID

## 2020-09-22 VITALS
SYSTOLIC BLOOD PRESSURE: 137 MMHG | HEIGHT: 72 IN | DIASTOLIC BLOOD PRESSURE: 79 MMHG | WEIGHT: 293 LBS | BODY MASS INDEX: 39.68 KG/M2 | RESPIRATION RATE: 16 BRPM | TEMPERATURE: 97.4 F | OXYGEN SATURATION: 99 % | HEART RATE: 60 BPM

## 2020-09-22 LAB
GLUCOSE BLD STRIP.AUTO-MCNC: 112 MG/DL (ref 70–110)
GLUCOSE BLD STRIP.AUTO-MCNC: 94 MG/DL (ref 70–110)
HBV SURFACE AG SER QL: <0.1 INDEX
HBV SURFACE AG SER QL: NEGATIVE
HCG SERPL QL: NEGATIVE

## 2020-09-22 PROCEDURE — 84703 CHORIONIC GONADOTROPIN ASSAY: CPT

## 2020-09-22 PROCEDURE — 82962 GLUCOSE BLOOD TEST: CPT

## 2020-09-22 PROCEDURE — 86704 HEP B CORE ANTIBODY TOTAL: CPT

## 2020-09-22 PROCEDURE — 76040000019: Performed by: INTERNAL MEDICINE

## 2020-09-22 PROCEDURE — 74011250637 HC RX REV CODE- 250/637: Performed by: ANESTHESIOLOGY

## 2020-09-22 PROCEDURE — 36415 COLL VENOUS BLD VENIPUNCTURE: CPT

## 2020-09-22 PROCEDURE — 88305 TISSUE EXAM BY PATHOLOGIST: CPT

## 2020-09-22 PROCEDURE — 77030021593 HC FCPS BIOP ENDOSC BSC -A: Performed by: INTERNAL MEDICINE

## 2020-09-22 PROCEDURE — 2709999900 HC NON-CHARGEABLE SUPPLY: Performed by: INTERNAL MEDICINE

## 2020-09-22 PROCEDURE — 77030008565 HC TBNG SUC IRR ERBE -B: Performed by: INTERNAL MEDICINE

## 2020-09-22 PROCEDURE — 74011250636 HC RX REV CODE- 250/636: Performed by: NURSE ANESTHETIST, CERTIFIED REGISTERED

## 2020-09-22 PROCEDURE — 76060000031 HC ANESTHESIA FIRST 0.5 HR: Performed by: INTERNAL MEDICINE

## 2020-09-22 PROCEDURE — 00811 ANES LWR INTST NDSC NOS: CPT | Performed by: NURSE ANESTHETIST, CERTIFIED REGISTERED

## 2020-09-22 PROCEDURE — 00811 ANES LWR INTST NDSC NOS: CPT | Performed by: ANESTHESIOLOGY

## 2020-09-22 PROCEDURE — 74011000250 HC RX REV CODE- 250: Performed by: NURSE ANESTHETIST, CERTIFIED REGISTERED

## 2020-09-22 PROCEDURE — 86480 TB TEST CELL IMMUN MEASURE: CPT

## 2020-09-22 PROCEDURE — 87340 HEPATITIS B SURFACE AG IA: CPT

## 2020-09-22 RX ORDER — LIDOCAINE HYDROCHLORIDE 20 MG/ML
INJECTION, SOLUTION EPIDURAL; INFILTRATION; INTRACAUDAL; PERINEURAL AS NEEDED
Status: DISCONTINUED | OUTPATIENT
Start: 2020-09-22 | End: 2020-09-22 | Stop reason: HOSPADM

## 2020-09-22 RX ORDER — INSULIN LISPRO 100 [IU]/ML
INJECTION, SOLUTION INTRAVENOUS; SUBCUTANEOUS ONCE
Status: DISCONTINUED | OUTPATIENT
Start: 2020-09-22 | End: 2020-09-22 | Stop reason: HOSPADM

## 2020-09-22 RX ORDER — SODIUM CHLORIDE 0.9 % (FLUSH) 0.9 %
5-40 SYRINGE (ML) INJECTION EVERY 8 HOURS
Status: DISCONTINUED | OUTPATIENT
Start: 2020-09-22 | End: 2020-09-22 | Stop reason: HOSPADM

## 2020-09-22 RX ORDER — FAMOTIDINE 20 MG/1
20 TABLET, FILM COATED ORAL ONCE
Status: COMPLETED | OUTPATIENT
Start: 2020-09-22 | End: 2020-09-22

## 2020-09-22 RX ORDER — LIDOCAINE HYDROCHLORIDE 10 MG/ML
0.1 INJECTION, SOLUTION EPIDURAL; INFILTRATION; INTRACAUDAL; PERINEURAL AS NEEDED
Status: DISCONTINUED | OUTPATIENT
Start: 2020-09-22 | End: 2020-09-22 | Stop reason: HOSPADM

## 2020-09-22 RX ORDER — PROPOFOL 10 MG/ML
INJECTION, EMULSION INTRAVENOUS AS NEEDED
Status: DISCONTINUED | OUTPATIENT
Start: 2020-09-22 | End: 2020-09-22 | Stop reason: HOSPADM

## 2020-09-22 RX ORDER — MAGNESIUM SULFATE 100 %
4 CRYSTALS MISCELLANEOUS AS NEEDED
Status: DISCONTINUED | OUTPATIENT
Start: 2020-09-22 | End: 2020-09-22 | Stop reason: HOSPADM

## 2020-09-22 RX ORDER — SODIUM CHLORIDE 0.9 % (FLUSH) 0.9 %
5-40 SYRINGE (ML) INJECTION AS NEEDED
Status: DISCONTINUED | OUTPATIENT
Start: 2020-09-22 | End: 2020-09-22 | Stop reason: HOSPADM

## 2020-09-22 RX ORDER — DEXTROSE 50 % IN WATER (D50W) INTRAVENOUS SYRINGE
25-50 AS NEEDED
Status: DISCONTINUED | OUTPATIENT
Start: 2020-09-22 | End: 2020-09-22 | Stop reason: HOSPADM

## 2020-09-22 RX ORDER — SODIUM CHLORIDE, SODIUM LACTATE, POTASSIUM CHLORIDE, CALCIUM CHLORIDE 600; 310; 30; 20 MG/100ML; MG/100ML; MG/100ML; MG/100ML
25 INJECTION, SOLUTION INTRAVENOUS CONTINUOUS
Status: DISCONTINUED | OUTPATIENT
Start: 2020-09-22 | End: 2020-09-22 | Stop reason: HOSPADM

## 2020-09-22 RX ORDER — SODIUM CHLORIDE, SODIUM LACTATE, POTASSIUM CHLORIDE, CALCIUM CHLORIDE 600; 310; 30; 20 MG/100ML; MG/100ML; MG/100ML; MG/100ML
50 INJECTION, SOLUTION INTRAVENOUS CONTINUOUS
Status: DISCONTINUED | OUTPATIENT
Start: 2020-09-22 | End: 2020-09-22 | Stop reason: HOSPADM

## 2020-09-22 RX ADMIN — PROPOFOL 80 MG: 10 INJECTION, EMULSION INTRAVENOUS at 11:01

## 2020-09-22 RX ADMIN — PROPOFOL 40 MG: 10 INJECTION, EMULSION INTRAVENOUS at 11:08

## 2020-09-22 RX ADMIN — PROPOFOL 40 MG: 10 INJECTION, EMULSION INTRAVENOUS at 11:03

## 2020-09-22 RX ADMIN — SODIUM CHLORIDE, SODIUM LACTATE, POTASSIUM CHLORIDE, AND CALCIUM CHLORIDE 25 ML/HR: 600; 310; 30; 20 INJECTION, SOLUTION INTRAVENOUS at 09:58

## 2020-09-22 RX ADMIN — LIDOCAINE HYDROCHLORIDE 100 MG: 20 INJECTION, SOLUTION EPIDURAL; INFILTRATION; INTRACAUDAL; PERINEURAL at 11:08

## 2020-09-22 RX ADMIN — FAMOTIDINE 20 MG: 20 TABLET ORAL at 09:58

## 2020-09-22 RX ADMIN — PROPOFOL 40 MG: 10 INJECTION, EMULSION INTRAVENOUS at 11:05

## 2020-09-22 RX ADMIN — PROPOFOL 40 MG: 10 INJECTION, EMULSION INTRAVENOUS at 11:11

## 2020-09-22 NOTE — H&P
[facility]   [email or web address]      Impression: 1. Crohns disease       Plan:     1.  Dewey mac all risks benefits and alt discussed       Chief Complaint: Crohns disease     HPI:  Praveena Raymond is a 44 y.o. female who is being seen on consult for Crohns disease    PMH:   Past Medical History:   Diagnosis Date    [facility] ([facility]     Diabetes ([facility]     HTN (hypertension)     Hypercholesterolemia     [facility]    Stool color black     crohns       PSH:   Past Surgical History:   Procedure Laterality Date    COLONOSCOPY N/A 10/21/2019    COLONOSCOPY w/ bxs performed by Lola Garza MD at [address] [facility]      HX ORTHOPAEDIC      fx left ankle ORIF    HX TUBAL LIGATION      btl       Social HX:   Social History     Socioeconomic History    Marital status: SINGLE     Spouse name: Not on file    Number of children: Not on file    Years of education: Not on file    Highest education level: Not on file   Occupational History    Not on file   Social Needs    Financial resource strain: Not on file    Food insecurity     Worry: Not on file     Inability: Not on file    Transportation needs     Medical: Not on file     Non-medical: Not on file   Tobacco Use    Smoking status: Never Smoker    Smokeless tobacco: Never Used   Substance and Sexual Activity    Alcohol use: Never     Frequency: Never    Drug use: Never    Sexual activity: Not Currently   Lifestyle    Physical activity     Days per week: Not on file     Minutes per session: Not on file    Stress: Not on file   Relationships    Social connections     Talks on phone: Not on file     Gets together: Not on file     Attends Worship service: Not on file     Active member of club or organization: Not on file     Attends meetings of clubs or organizations: Not on file     Relationship status: Not on file    Intimate partner violence     Fear of current or ex partner: Not on file     Emotionally abused: Not on file     Physically abused: Not on file     Forced sexual activity: Not on file   Other Topics Concern    Not on file   Social History Narrative    Not on file       FHX:   Family History   Problem Relation Age of Onset    Diabetes Mother     Hypertension Mother     Diabetes Father     Hypertension Father     Heart Disease Father        Allergy:   No Known Allergies    Home Medications:     No medications prior to admission. Review of Systems:     Constitutional: No fevers, chills, weight loss, fatigue. Skin: No rashes, pruritis, jaundice, ulcerations, erythema. HENT: No headaches, nosebleeds, sinus pressure, rhinorrhea, sore throat. Eyes: No visual changes, blurred vision, eye pain, photophobia, jaundice. Cardiovascular: No chest pain, heart palpitations. Respiratory: No cough, SOB, wheezing, chest discomfort, orthopnea. Gastrointestinal: Crohns   Genitourinary: No dysuria, bleeding, discharge, pyuria. Musculoskeletal: No weakness, arthralgias, wasting. Endo: No sweats. Heme: No bruising, easy bleeding. Allergies: As noted. Neurological: Cranial nerves intact. Alert and oriented. Gait not assessed. Psychiatric:  No anxiety, depression, hallucinations. Visit Vitals  Ht 6' (1.829 m)   Wt 141.5 kg (312 lb)   BMI 42.31 kg/m²       Physical Assessment:     constitutional: appearance: well developed, well nourished, normal habitus, no deformities, in no acute distress. skin: inspection: no rashes, ulcers, icterus or other lesions; no clubbing or telangiectasias. palpation: no induration or subcutaneos nodules. eyes: inspection: normal conjunctivae and lids; no jaundice pupils: symmetrical, normoreactive to light, normal accommodation and size. ENMT: mouth: normal oral mucosa,lips and gums; good dentition.  oropharynx: normal tongue, hard and soft palate; posterior pharynx without erythema, exudate or lesions. neck: no masses organomegaly or tenderness. respiratory: effort: normal chest excursion; no intercostal retraction or accessory muscle use. cardiovascular: abdominal aorta: normal size and position; no bruits. palpation: PMI of normal size and position; normal rhythm; no thrill or murmurs. abdominal: abdomen: normal consistency; no tenderness or masses. hernias: no hernias appreciated. liver: normal size and consistency. spleen: not palpable. rectal: hemoccult/guaiac: not performed. musculoskeletal: no deformities or muscle wasting   lymphatic: axilae: not palpable. groin: not palpable. neck: within normal limits. other: not palpable. neurologic: cranial nerves: II-XII normal.   psychiatric: judgement/insight: within normal limits. memory: within normal limits for recent and remote events. mood and affect: no evidence of depression, anxiety or agitation. orientation: oriented to time, space and person. Basic Metabolic Profile   No results for input(s): NA, K, CL, CO2, BUN, GLU, CA, MG, PHOS in the last 72 hours. No lab exists for component: CREAT      CBC w/Diff    No results for input(s): WBC, RBC, HGB, HCT, MCV, MCH, MCHC, RDW, PLT, HGBEXT, HCTEXT, PLTEXT in the last 72 hours. No lab exists for component: MPV No results for input(s): GRANS, LYMPH, EOS, PRO, MYELO, METAS, BLAST in the last 72 hours. No lab exists for component: MONO, BASO     Hepatic Function   No results for input(s): ALB, TP, TBILI, AP, AML, LPSE in the last 72 hours. No lab exists for component: DBILI, GPT, SGOT       Vinita Moran MD, M.D. [facility], [address] [place] [address]  [email or web address]

## 2020-09-22 NOTE — ANESTHESIA POSTPROCEDURE EVALUATION
Procedure(s):  COLONOSCOPY with bx's. MAC    Anesthesia Post Evaluation      Multimodal analgesia: multimodal analgesia used between 6 hours prior to anesthesia start to PACU discharge  Patient location during evaluation: bedside  Patient participation: complete - patient participated  Level of consciousness: awake  Pain management: adequate  Airway patency: patent  Anesthetic complications: no  Cardiovascular status: stable  Respiratory status: acceptable  Hydration status: acceptable  Post anesthesia nausea and vomiting:  controlled      INITIAL Post-op Vital signs:   Vitals Value Taken Time   /74 9/22/2020 11:34 AM   Temp 36.4 °C (97.5 °F) 9/22/2020 11:21 AM   Pulse 61 9/22/2020 11:40 AM   Resp 16 9/22/2020 11:40 AM   SpO2 100 % 9/22/2020 11:40 AM   Vitals shown include unvalidated device data.

## 2020-09-22 NOTE — DISCHARGE INSTRUCTIONS
DISCHARGE SUMMARY from Nurse  PATIENT INSTRUCTIONS:  After general anesthesia or intravenous sedation, for 24 hours or while taking prescription Narcotics:  · Limit your activities  · Do not drive and operate hazardous machinery  · Do not make important personal or business decisions  · Do  not drink alcoholic beverages  · If you have not urinated within 8 hours after discharge, please contact your surgeon on call. Report the following to your surgeon:  · Excessive pain, swelling, redness or odor of or around the surgical area  · Temperature over 100.5  · Nausea and vomiting lasting longer than 4 hours or if unable to take medications  · Any signs of decreased circulation or nerve impairment to extremity: change in color, persistent  numbness, tingling, coldness or increase pain  · Any questions    What to do at Home:  Recommended activity: Activity as tolerated and no driving for today. *  Please give a list of your current medications to your Primary Care Provider. *  Please update this list whenever your medications are discontinued, doses are      changed, or new medications (including over-the-counter products) are added. *  Please carry medication information at all times in case of emergency situations. These are general instructions for a healthy lifestyle:    No smoking/ No tobacco products/ Avoid exposure to second hand smoke  Surgeon General's Warning:  Quitting smoking now greatly reduces serious risk to your health. Obesity, smoking, and sedentary lifestyle greatly increases your risk for illness    A healthy diet, regular physical exercise & weight monitoring are important for maintaining a healthy lifestyle    You may be retaining fluid if you have a history of heart failure or if you experience any of the following symptoms:  Weight gain of 3 pounds or more overnight or 5 pounds in a week, increased swelling in our hands or feet or shortness of breath while lying flat in bed.   Please call your doctor as soon as you notice any of these symptoms; do not wait until your next office visit. The discharge information has been reviewed with the patient. The patient verbalized understanding. Discharge medications reviewed with the patient and appropriate educational materials and side effects teaching were provided. ___________________________________________________________________________________________________________________________________    Patient Education   Colonoscopy: What to Expect at Home  Your Recovery  After a colonoscopy, you'll stay at the clinic for 1 to 2 hours until the medicines wear off. Then you can go home. But you'll need to arrange for a ride. Your doctor will tell you when you can eat and do your other usual activities. Your doctor will talk to you about when you'll need your next colonoscopy. Your doctor can help you decide how often you need to be checked. This will depend on the results of your test and your risk for colorectal cancer. After the test, you may be bloated or have gas pains. You may need to pass gas. If a biopsy was done or a polyp was removed, you may have streaks of blood in your stool (feces) for a few days. Problems such as heavy rectal bleeding may not occur until several weeks after the test. This isn't common. But it can happen after polyps are removed. This care sheet gives you a general idea about how long it will take for you to recover. But each person recovers at a different pace. Follow the steps below to get better as quickly as possible. How can you care for yourself at home? Activity    · Rest when you feel tired.     · You can do your normal activities when it feels okay to do so. Diet    · Follow your doctor's directions for eating.     · Unless your doctor has told you not to, drink plenty of fluids. This helps to replace the fluids that were lost during the colon prep.     · Do not drink alcohol.    Medicines    · Your doctor will tell you if and when you can restart your medicines. He or she will also give you instructions about taking any new medicines.     · If you take aspirin or some other blood thinner, ask your doctor if and when to start taking it again. Make sure that you understand exactly what your doctor wants you to do.     · If polyps were removed or a biopsy was done during the test, your doctor may tell you not to take aspirin or other anti-inflammatory medicines for a few days. These include ibuprofen (Advil, Motrin) and naproxen (Aleve). Other instructions    · For your safety, do not drive or operate machinery until the medicine wears off and you can think clearly. Your doctor may tell you not to drive or operate machinery until the day after your test.     · Do not sign legal documents or make major decisions until the medicine wears off and you can think clearly. The anesthesia can make it hard for you to fully understand what you are agreeing to. Follow-up care is a key part of your treatment and safety. Be sure to make and go to all appointments, and call your doctor if you are having problems. It's also a good idea to know your test results and keep a list of the medicines you take. When should you call for help? Call 911 anytime you think you may need emergency care. For example, call if:    · You passed out (lost consciousness).     · You pass maroon or bloody stools.     · You have trouble breathing. Call your doctor now or seek immediate medical care if:    · You have pain that does not get better after you take pain medicine.     · You are sick to your stomach or cannot drink fluids.     · You have new or worse belly pain.     · You have blood in your stools.     · You have a fever.     · You cannot pass stools or gas. Watch closely for changes in your health, and be sure to contact your doctor if you have any problems. Where can you learn more?   Go to http://nasreen-mónica.info/  Enter E264 in the search box to learn more about \"Colonoscopy: What to Expect at Home. \"  Current as of: April 29, 2020               Content Version: 12.6  © 2006-2020 [employer]. Care instructions adapted under license by [employer] (which disclaims liability or warranty for this information). If you have questions about a medical condition or this instruction, always ask your healthcare professional. [place] [address] any warranty or liability for your use of this information. Patient Education        Crohn's Disease: Care Instructions  Your Care Instructions     Crohn's disease is a lifelong inflammatory bowel disease. Parts of the digestive tract get swollen and irritated and may develop sores called ulcers. It usually occurs in the last part of the small intestine and the first part of the large intestine. The main symptoms of Crohn's disease are belly pain, diarrhea, fever, and weight loss. It sometimes causes problems with joints, eyes, or skin. Symptoms may be mild or severe. The disease can also go into remission (not be active). Bad attacks are often treated in the hospital with medicines and liquids through a tube in your vein (IV). Talk with your doctor about the best treatments for you. You may need medicines that help prevent or treat flare-ups. You may need surgery to remove part of your bowel if you have an abnormal opening in the bowel (fistula), an abscess, or an obstruction. Self-care can help reduce your symptoms. Follow-up care is a key part of your treatment and safety. Be sure to make and go to all appointments, and call your doctor if you are having problems. It's also a good idea to know your test results and keep a list of the medicines you take. How can you care for yourself at home? · Take your medicines exactly as prescribed.  Call your doctor if you think you are having a problem with your medicine. You will get more details on the specific medicines your doctor prescribes. · Do not take anti-inflammatory medicines, such as aspirin, ibuprofen (Advil, Motrin), or naproxen (Aleve). They may make your symptoms worse. Do not take any other medicines or herbal products without talking to your doctor first.  · Avoid foods that make your symptoms worse. These might include milk, alcohol, high-fiber foods, or spicy foods. · Eat a healthy diet. Make sure to get enough iron. Rectal bleeding may make you lose iron. Good sources of iron include beef, lentils, spinach, raisins, and iron-enriched breads and cereals. · Drink liquid meal replacements if your doctor recommends them. These are high in calories and contain vitamins and minerals. Severe symptoms may make it hard for your body to absorb vitamins and minerals. · Do not smoke. Smoking makes Crohn's disease worse. If you need help quitting, talk to your doctor about stop-smoking programs and medicines. These can increase your chances of quitting for good. · Seek support from friends and family to help cope with Crohn's disease. The illness can affect all parts of your life. Get counseling if you need it. When should you call for help? Call 911 anytime you think you may need emergency care. For example, call if:    · Your stools are maroon or very bloody.     · You passed out (lost consciousness). Call your doctor now or seek immediate medical care if:    · You are vomiting.     · You have new or worse belly pain.     · You have a fever.     · You cannot pass stools or gas.     · You have new or more blood in your stools. Watch closely for changes in your health, and be sure to contact your doctor if:    · You have new or worse symptoms.     · You are losing weight.     · You do not get better as expected. Where can you learn more?   Go to http://nasreen-mónica.info/  Enter Y197 in the search box to learn more about \"Crohn's Disease: Care Instructions. \"  Current as of: April 15, 2020               Content Version: 12.6  © 8096-0100 [employer]. Care instructions adapted under license by [employer] (which disclaims liability or warranty for this information). If you have questions about a medical condition or this instruction, always ask your healthcare professional. [address] any warranty or liability for your use of this information.

## 2020-09-22 NOTE — ANESTHESIA PREPROCEDURE EVALUATION
Relevant Problems   No relevant active problems       Anesthetic History   No history of anesthetic complications            Review of Systems / Medical History  Patient summary reviewed and pertinent labs reviewed    Pulmonary  Within defined limits                 Neuro/Psych   Within defined limits           Cardiovascular    Hypertension: well controlled              Exercise tolerance: >4 METS     GI/Hepatic/Renal     GERD           Endo/Other    Diabetes: well controlled, type 2    Morbid obesity     Other Findings              Physical Exam    Airway  Mallampati: III  TM Distance: 4 - 6 cm  Neck ROM: decreased range of motion   Mouth opening: Diminished (comment)     Cardiovascular    Rhythm: regular  Rate: normal         Dental    Dentition: Poor dentition     Pulmonary  Breath sounds clear to auscultation               Abdominal  GI exam deferred       Other Findings            Anesthetic Plan    ASA: 3  Anesthesia type: MAC            Anesthetic plan and risks discussed with: Patient

## 2020-09-23 LAB — HBV CORE AB SERPL QL IA: NEGATIVE

## 2020-09-25 LAB
M TB IFN-G BLD-IMP: NEGATIVE
QUANTIFERON CRITERIA, QFI1T: NORMAL
QUANTIFERON MITOGEN VALUE: >10 IU/ML
QUANTIFERON NIL VALUE: 0.02 IU/ML
QUANTIFERON TB1 AG: 0.03 IU/ML
QUANTIFERON TB2 AG: 0.03 IU/ML

## 2020-11-16 ENCOUNTER — OFFICE VISIT (OUTPATIENT)
Dept: ORTHOPEDIC SURGERY | Age: 50
End: 2020-11-16
Payer: MEDICAID

## 2020-11-16 VITALS
RESPIRATION RATE: 16 BRPM | HEART RATE: 51 BPM | SYSTOLIC BLOOD PRESSURE: 164 MMHG | OXYGEN SATURATION: 97 % | DIASTOLIC BLOOD PRESSURE: 87 MMHG | HEIGHT: 72 IN | TEMPERATURE: 97.5 F | BODY MASS INDEX: 39.68 KG/M2 | WEIGHT: 293 LBS

## 2020-11-16 DIAGNOSIS — M17.12 PRIMARY OSTEOARTHRITIS OF LEFT KNEE: ICD-10-CM

## 2020-11-16 DIAGNOSIS — M17.11 PRIMARY OSTEOARTHRITIS OF RIGHT KNEE: Primary | ICD-10-CM

## 2020-11-16 PROCEDURE — 20611 DRAIN/INJ JOINT/BURSA W/US: CPT | Performed by: ORTHOPAEDIC SURGERY

## 2020-11-16 RX ORDER — TRIAMCINOLONE ACETONIDE 40 MG/ML
40 INJECTION, SUSPENSION INTRA-ARTICULAR; INTRAMUSCULAR ONCE
Status: COMPLETED | OUTPATIENT
Start: 2020-11-16 | End: 2020-11-16

## 2020-11-16 RX ADMIN — TRIAMCINOLONE ACETONIDE 40 MG: 40 INJECTION, SUSPENSION INTRA-ARTICULAR; INTRAMUSCULAR at 10:18

## 2020-11-16 NOTE — PROGRESS NOTES
Yohan Tang  1970   Chief Complaint   Patient presents with    Knee Pain     right knee pain        HISTORY OF PRESENT ILLNESS  Clark Mesa is a 44 y.o. female who presents today for reevaluation of right knee. Patient rates pain as 8/10 today. Pain has been present for awhile. At last OV on 8/05/2020, patient had a b/l knee cortisone injection but the pain has returned. She is inquiring about gel injections and Pain Management today. Pt had a b/l knee cortisone injection in January which provided relief for around 5 months. Pt has hx of a fractured ankle and Chron's, recently had a Stelara treatment. Patient denies any fever, chills, chest pain, shortness of breath or calf pain. The remainder of the review of systems is negative. There are no new illness or injuries to report since last seen in the office. There are no changes to medications, allergies, family or social history. Pain Assessment  11/16/2020   Location of Pain Knee   Pain Location Comment -   Location Modifiers Right   Severity of Pain 8   Quality of Pain Throbbing;Aching; Sharp   Quality of Pain Comment -   Duration of Pain Persistent   Frequency of Pain Constant   Aggravating Factors Walking;Standing   Aggravating Factors Comment sitting for long period of time then getting up   Limiting Behavior -   Relieving Factors (No Data)   Relieving Factors Comment tylenol   Result of Injury No   Work-Related Injury -   Type of Injury -       PHYSICAL EXAM:   Visit Vitals  BP (!) 164/87 (BP 1 Location: Left arm, BP Patient Position: Sitting)   Pulse (!) 51   Temp 97.5 °F (36.4 °C) (Temporal)   Resp 16   Ht 6' (1.829 m)   Wt 319 lb 3.2 oz (144.8 kg)   SpO2 97%   BMI 43.29 kg/m²     The patient is a well-developed, well-nourished female   in no acute distress. The patient is alert and oriented times three. The patient is alert and oriented times three.  Mood and affect are normal.  LYMPHATIC: lymph nodes are not enlarged and are within normal limits  SKIN: normal in color and non tender to palpation. There are no bruises or abrasions noted. NEUROLOGICAL: Motor sensory exam is within normal limits. Reflexes are equal bilaterally. There is normal sensation to pinprick and light touch  MUSCULOSKELETAL:  Examination Left knee Right knee   Skin Intact Intact   Range of motion 0-130 0-130   Effusion + +   Medial joint line tenderness + +   Lateral joint line tenderness + +   Tenderness Pes Bursa - -   Tenderness insertion MCL - -   Tenderness insertion LCL - -   Stewarts - -   Patella crepitus + +   Patella grind + +   Lachman - -   Pivot shift - -   Anterior drawer - -   Posterior drawer - -   Varus stress - -   Valgus stress - -   Neurovascular Intact Intact   Calf Swelling and Tenderness to Palpation - -   Arturo's Test - -   Hamstring Cord Tightness - -         PROCEDURE: Right Knee Injection with Ultrasound Guidance  Indication:Right Knee pain/swelling    After sterile prep, 4 cc of Xylocaine and 1 cc of Kenalog were injected into the right knee. Ultrasound images captured using "[employer] [facility] [address] Ultrasound machine and scanned into patient's chart.        [facility] - [facility]  OFFICE PROCEDURE PROGRESS NOTE        Chart reviewed for the following:  Max Wilkinson M.D, have reviewed the History, Physical and updated the Allergic reactions for Geovany Bae performed immediately prior to start of procedure:  Max Wilkinson M.D, have performed the following reviews on [facility] prior to the start of the procedure:            * Patient was identified by name and date of birth   * Agreement on procedure being performed was verified  * Risks and Benefits explained to the patient  * Procedure site verified and marked as necessary  * Patient was positioned for comfort  * Consent was signed and verified     Time: 9:49 AM     Date of procedure: 11/16/2020    Procedure performed by:  Sameer Herrmann M.D    Provider assisted by: (see medication administration)    How tolerated by patient: tolerated the procedure well with no complications    Comments: none        IMAGING: XR of right knee obtained in the office dated 1/28/2020 was reviewed and read by Dr. Sabine Barakat: Severe degenerative arthritis in the medial compartment and patellofemoral joint.         XR of left knee from [facility] dated 10/31/19 was reviewed and read by Dr. Sabine Barakat: Mild to moderate DJD with joint effusion. IMPRESSION:      ICD-10-CM ICD-9-CM    1. Primary osteoarthritis of right knee  M17.11 715.16 triamcinolone acetonide (KENALOG-40) 40 mg/mL injection 40 mg      US GUIDE INJ/ASP/ARTHRO LG JNT/BURSA      PROCEDURE AUTHORIZATION TO       REFERRAL TO PAIN MANAGEMENT   2. Primary osteoarthritis of left knee  M17.12 715.16 PROCEDURE AUTHORIZATION TO       REFERRAL TO PAIN MANAGEMENT        PLAN:   1. Pt presents today with right knee pain due to primary OA and I would like to try a repeat cortisone injection in the right knee today. Will also proceed with Euflexxa authorization for both knees. Pt also requests a referral to Pain Management. Risk factors include: dm, htn, BMI>35  2. No ultrasound exam indicated today  3. Yes cortisone injection indicated today R KNEE US  4. No Physical/Occupational Therapy indicated today  5. No diagnostic test indicated today:   6. No durable medical equipment indicated today  7. Yes referral indicated today PAIN MANAGEMENT  8. No medications indicated today:   9. No Narcotic indicated today        RTC following Euflexxa auth      Scribed by Leilani Aguirre [address]) as dictated by Sameer Herrmann MD    I, Dr. Sameer Herrmann, confirm that all documentation is accurate.     Sameer Herrmann M.D.   [facility]

## 2020-11-23 ENCOUNTER — DOCUMENTATION ONLY (OUTPATIENT)
Dept: ORTHOPEDIC SURGERY | Age: 50
End: 2020-11-23

## 2020-12-03 ENCOUNTER — TELEPHONE (OUTPATIENT)
Dept: ORTHOPEDIC SURGERY | Age: 50
End: 2020-12-03

## 2020-12-03 NOTE — TELEPHONE ENCOUNTER
Patient is requesting pain management referral be sent to [facility]

## 2020-12-08 ENCOUNTER — OFFICE VISIT (OUTPATIENT)
Dept: ORTHOPEDIC SURGERY | Age: 50
End: 2020-12-08
Payer: MEDICAID

## 2020-12-08 VITALS
WEIGHT: 293 LBS | RESPIRATION RATE: 16 BRPM | TEMPERATURE: 96.4 F | DIASTOLIC BLOOD PRESSURE: 94 MMHG | SYSTOLIC BLOOD PRESSURE: 177 MMHG | OXYGEN SATURATION: 96 % | HEART RATE: 63 BPM | HEIGHT: 72 IN | BODY MASS INDEX: 39.68 KG/M2

## 2020-12-08 DIAGNOSIS — M17.0 PRIMARY OSTEOARTHRITIS OF BOTH KNEES: Primary | ICD-10-CM

## 2020-12-08 PROCEDURE — 20611 DRAIN/INJ JOINT/BURSA W/US: CPT | Performed by: ORTHOPAEDIC SURGERY

## 2020-12-08 NOTE — PROGRESS NOTES
Patient: Asael Ardon                MRN: [id number]       SSN: [id number]  YOB: 1970        AGE: 44 y.o. SEX: female  Body mass index is 42.42 kg/m². PCP: Quita Bustamante MD  12/08/20    Chief Complaint   Patient presents with    Knee Pain     bora knee pain       HISTORY:  Asael Ardon is a 44 y.o. female who is seen for reevaluation of Bilateral knee and here for 1st injection of Euflexxa. PROCEDURE:  Under ultrasound guidance, patient's Bilateral knee, after timeout under sterile conditions, was injected with 2 cc of Euflexxa. [facility]  OFFICE PROCEDURE PROGRESS NOTE        Chart reviewed for the following:   Enmanuel Vasquez MD, have reviewed the History, Physical and updated the Allergic reactions for Geovany Bae performed immediately prior to start of procedure:   Enmanuel Vasquez MD, have performed the following reviews on Asael Ardon prior to the start of the procedure:            * Patient was identified by name and date of birth   * Agreement on procedure being performed was verified  * Risks and Benefits explained to the patient  * Procedure site verified and marked as necessary  * Patient was positioned for comfort  * Consent was signed and verified     Time: 1:07 PM       Date of procedure: 12/8/2020    Procedure performed by:  Sherrie Jerez MD    Provider assisted by: None     How tolerated by patient: tolerated the procedure well with no complications    Comments: none    IMPRESSION:     ICD-10-CM ICD-9-CM    1. Primary osteoarthritis of both knees  M17.0 715.16 sodium hyaluronate (SUPARTZ FX/EUFLEXXA/HYALGAN) 10 mg/mL injection syrg 20 mg      US GUIDE INJ/ASP/ARTHRO LG JNT/BURSA        PLAN:  Ms. Sly Fowler will return in one week for her second Euflexxa injection.       Scribed by Zach Christine) as dictated by Darnell Sanchez Kartik Renner MD    I, Dr. Deangelo Martins, confirm that all documentation is accurate.     Deangelo Martins M.D.   [facility]

## 2020-12-11 ENCOUNTER — TELEPHONE (OUTPATIENT)
Dept: NEUROLOGY | Age: 50
End: 2020-12-11

## 2020-12-11 DIAGNOSIS — Z79.4 TYPE 2 DIABETES MELLITUS WITH DIABETIC NEUROPATHY, WITH LONG-TERM CURRENT USE OF INSULIN (HCC): ICD-10-CM

## 2020-12-11 DIAGNOSIS — E11.40 TYPE 2 DIABETES MELLITUS WITH DIABETIC NEUROPATHY, WITH LONG-TERM CURRENT USE OF INSULIN (HCC): ICD-10-CM

## 2020-12-11 RX ORDER — PREGABALIN 100 MG/1
100 CAPSULE ORAL 3 TIMES DAILY
Qty: 90 CAP | Refills: 0 | Status: SHIPPED | OUTPATIENT
Start: 2020-12-11 | End: 2020-12-15 | Stop reason: DRUGHIGH

## 2020-12-15 ENCOUNTER — VIRTUAL VISIT (OUTPATIENT)
Dept: NEUROLOGY | Age: 50
End: 2020-12-15
Payer: MEDICAID

## 2020-12-15 DIAGNOSIS — G44.221 CHRONIC TENSION-TYPE HEADACHE, INTRACTABLE: ICD-10-CM

## 2020-12-15 DIAGNOSIS — E11.40 TYPE 2 DIABETES MELLITUS WITH DIABETIC NEUROPATHY, WITH LONG-TERM CURRENT USE OF INSULIN (HCC): Primary | ICD-10-CM

## 2020-12-15 DIAGNOSIS — Z79.4 TYPE 2 DIABETES MELLITUS WITH DIABETIC NEUROPATHY, WITH LONG-TERM CURRENT USE OF INSULIN (HCC): Primary | ICD-10-CM

## 2020-12-15 PROCEDURE — 99442 PR PHYS/QHP TELEPHONE EVALUATION 11-20 MIN: CPT | Performed by: STUDENT IN AN ORGANIZED HEALTH CARE EDUCATION/TRAINING PROGRAM

## 2020-12-15 RX ORDER — AMITRIPTYLINE HYDROCHLORIDE 25 MG/1
25 TABLET, FILM COATED ORAL
Qty: 30 TAB | Refills: 3 | Status: SHIPPED | OUTPATIENT
Start: 2020-12-15 | End: 2021-01-14

## 2020-12-15 RX ORDER — PREGABALIN 100 MG/1
200 CAPSULE ORAL 3 TIMES DAILY
Qty: 180 CAP | Refills: 3 | Status: SHIPPED | OUTPATIENT
Start: 2020-12-15 | End: 2021-01-14

## 2020-12-15 NOTE — PROGRESS NOTES
Daisy Zepeda is a 44 y.o. female, evaluated via audio-only technology on 12/15/2020 for Peripheral Neuropathy (diabetic)  . Assessment & Plan:   Diagnoses and all orders for this visit:    1. Type 2 diabetes mellitus with diabetic neuropathy, with long-term current use of insulin (HCC)  -     amitriptyline (ELAVIL) 25 mg tablet; Take 1 Tab by mouth nightly for 30 days. -     pregabalin (LYRICA) 100 mg capsule; Take 2 Caps by mouth three (3) times daily for 30 days. Max Daily Amount: 600 mg.    2. Chronic tension-type headache, intractable  -     amitriptyline (ELAVIL) 25 mg tablet; Take 1 Tab by mouth nightly for 30 days. A 44years old female patient here for follow-up of diabetic neuropathy. These is an audio encounter. On pregabalin 200 mg p.o. 3 times daily; claims it helps but still has the tingling sensation below her knees that gets worse at night. Previous addition of duloxetine did not help. Patient had been on gabapentin high dose in the past.  For both her what looks like tension type headache and the neuropathy, I will add Elavil 25 mg p.o. nightly. We will see her in the clinic in 3 months time. 12  Subjective:   A 44years old female patient here for follow-up of diabetic neuropathy. This is an audio encounter. Former patient of Dr. Kimmy Weinstein. Last clinic visit was in August 2020. Currently on pregabalin 200 mg p.o. 3 times daily. She claims pregabalin helps but despite that she continues to have the numbness and tingling sensation below her knees. Sometimes she loses her balance. No recent falls. Symptoms are worse at night. She has been on gabapentin in the past [3200 mg/day]. Had also been taking duloxetine and claims there is no significant improvement. She claims her blood sugar control is good currently but her labs hemoglobin A1c has been 8.4.   She has a new complaint headache and was told that she has cluster headache at [facility].  Headache description is frontal, bilateral, throbbing, nagging and continuous pain. Lasted for 2 to 3 hours. Takes Tylenol with improvement. No associated nausea or vomiting. No tearing/no runny nose/no redness of her eyes. Not on prophylactic medications for headache. She claims headaches are almost daily currently. Prior to Admission medications    Medication Sig Start Date End Date Taking? Authorizing Provider   ustekinumab [facility]) by SubCUTAneous route. Yes Provider, Historical   pregabalin (LYRICA) 100 mg capsule Take 1 Cap by mouth three (3) times daily for 30 days. Max Daily Amount: 300 mg. ONE CAP TID FOR ONE WEEK, INCREASE TO TWO TID 12/11/20 1/10/21 Yes Sage JIM MD   naloxone [facility]) 4 mg/actuation nasal spray Use 1 spray intranasally, then discard. Repeat with new spray every 2 min as needed for opioid overdose symptoms, alternating nostrils. 12/9/19  Yes Mary Layne MD   ALPRAZolam Alric Shiver) 0.5 mg tablet TAKE 1 TAB BY MOUTH TWICE A DAY AS NEEDED 5/3/19  Yes Provider, Historical   budesonide (ENTOCORT EC) 3 mg capsule TAKE 1 CAPSULE BY MOUTH 3 TIMES DAILY 6/25/19  Yes Provider, Historical   dicyclomine (BENTYL) 10 mg capsule TAKE 1 CAPSULE BY MOUTH EVERY 6 HOURS AS NEEDED FOR PAIN 6/25/19  Yes Provider, Historical   ergocalciferol (ERGOCALCIFEROL) 50,000 unit capsule TAKE 1 (ONE) CAPSULE BY MOUTH ONCE A WEEK 6/21/19  Yes Provider, Historical   loperamide (IMODIUM A-D) 2 mg tablet Take 1 Tab by mouth three (3) times daily as needed. 2/22/19  Yes Provider, Historical   FLORANEX 1 million cell tab tablet TAKE 1 TABLET BY MOUTH EVERY DAY. 4/4/19  Yes Provider, Historical   tiZANidine (ZANAFLEX) 4 mg tablet TAKE 1 (ONE) TABLET BY MOUTH THREE TIMES DAILY AS NEEDED 6/17/19  Yes Provider, Historical   insulin glargine (LANTUS,BASAGLAR) 100 unit/mL (3 mL) inpn 30 Units by SubCUTAneous route nightly.    Yes Provider, Historical   ondansetron hcl (ZOFRAN) 4 mg tablet Take 1 Tab by mouth every eight (8) hours as needed for Nausea. 1/20/19  Yes John Espino PA-C   hydrALAZINE (APRESOLINE) 100 mg tablet Take 1 Tab by mouth three (3) times daily. 1/20/19  Yes Hanna Richards MD   SITagliptin (JANUVIA) 100 mg tablet Take 1 Tab by mouth daily. 1/21/19  Yes Hanna Richards MD   metoprolol tartrate (LOPRESSOR) 50 mg tablet Take 1 Tab by mouth two (2) times a day. 1/20/19  Yes Hanna Richards MD   atorvastatin (LIPITOR) 20 mg tablet Take 1 Tab by mouth nightly. 1/20/19  Yes Hanna Richards MD   cloNIDine HCl (CATAPRES) 0.1 mg tablet Take 1 Tab by mouth every eight (8) hours as needed. 1/20/19  Yes Hanna Richards MD   hydroCHLOROthiazide (HYDRODIURIL) 25 mg tablet Take 1 Tab by mouth daily. 1/21/19  Yes Hanna Richards MD   pantoprazole (PROTONIX) 40 mg tablet Take 1 Tab by mouth Before breakfast and dinner. 1/20/19  Yes Hanna Richards MD   losartan (COZAAR) 100 mg tablet Take 100 mg by mouth daily. Yes Provider, Historical   azaTHIOprine (IMURAN) 50 mg tablet Take 250 mg by mouth daily. Indications: Crohn's disease   Yes Provider, Historical   adalimumab (HUMIRA) 40 mg/0.8 mL injection 40 mg by SubCUTAneous route every seven (7) days.     Provider, Historical     Patient Active Problem List   Diagnosis Code    Pulmonary emboli (HCC) I26.99    Accelerated hypertension I10    Crohn's colitis ([address] K50.10    Wound of left ankle S91.002A    Status post open reduction with internal fixation (ORIF) of fracture of ankle Z98.890, Z87.81    Dehiscence of incision, initial encounter T81.31XA    Obesity, morbid ([address] E66.01    Controlled type 2 diabetes mellitus without complication, with long-term current use of insulin ([address] E11.9, Z79.4    Essential hypertension I10    BMI 50.0-59.9, adult ([address] Z68.43    Type 2 diabetes mellitus with diabetic neuropathy ([address] E11.40     Patient Active Problem List    Diagnosis Date Noted    Type 2 diabetes mellitus with diabetic neuropathy ([facility] 06/27/2019    Controlled type 2 diabetes mellitus without complication, with long-term current use of insulin ([facility] 05/16/2019    Essential hypertension 05/16/2019    BMI 50.0-59.9, adult ([facility] 05/16/2019    Obesity, morbid ([facility] 03/11/2019    Dehiscence of incision, initial encounter 01/14/2019    Pulmonary emboli ([facility] 01/12/2019    Accelerated hypertension 01/12/2019    Crohn's colitis ([facility] 01/12/2019    Wound of left ankle 01/12/2019    Status post open reduction with internal fixation (ORIF) of fracture of ankle 01/12/2019     Current Outpatient Medications   Medication Sig Dispense Refill    ustekinumab (STELARA SC) by SubCUTAneous route.  amitriptyline (ELAVIL) 25 mg tablet Take 1 Tab by mouth nightly for 30 days. 30 Tab 3    pregabalin (LYRICA) 100 mg capsule Take 2 Caps by mouth three (3) times daily for 30 days. Max Daily Amount: 600 mg. 180 Cap 3    naloxone (NARCAN) 4 mg/actuation nasal spray Use 1 spray intranasally, then discard. Repeat with new spray every 2 min as needed for opioid overdose symptoms, alternating nostrils. 1 Each 0    ALPRAZolam (XANAX) 0.5 mg tablet TAKE 1 TAB BY MOUTH TWICE A DAY AS NEEDED  0    budesonide (ENTOCORT EC) 3 mg capsule TAKE 1 CAPSULE BY MOUTH 3 TIMES DAILY  1    dicyclomine (BENTYL) 10 mg capsule TAKE 1 CAPSULE BY MOUTH EVERY 6 HOURS AS NEEDED FOR PAIN  1    ergocalciferol (ERGOCALCIFEROL) 50,000 unit capsule TAKE 1 (ONE) CAPSULE BY MOUTH ONCE A WEEK  2    loperamide (IMODIUM A-D) 2 mg tablet Take 1 Tab by mouth three (3) times daily as needed.  FLORANEX 1 million cell tab tablet TAKE 1 TABLET BY MOUTH EVERY DAY. 1    tiZANidine (ZANAFLEX) 4 mg tablet TAKE 1 (ONE) TABLET BY MOUTH THREE TIMES DAILY AS NEEDED  0    insulin glargine (LANTUS,BASAGLAR) 100 unit/mL (3 mL) inpn 30 Units by SubCUTAneous route nightly.  ondansetron hcl (ZOFRAN) 4 mg tablet Take 1 Tab by mouth every eight (8) hours as needed for Nausea.  30 Tab 0    hydrALAZINE (APRESOLINE) 100 mg tablet Take 1 Tab by mouth three (3) times daily. 90 Tab 0    SITagliptin (JANUVIA) 100 mg tablet Take 1 Tab by mouth daily. 30 Tab 0    metoprolol tartrate (LOPRESSOR) 50 mg tablet Take 1 Tab by mouth two (2) times a day. 60 Tab 0    atorvastatin (LIPITOR) 20 mg tablet Take 1 Tab by mouth nightly. 30 Tab 0    cloNIDine HCl (CATAPRES) 0.1 mg tablet Take 1 Tab by mouth every eight (8) hours as needed. 90 Tab 0    hydroCHLOROthiazide (HYDRODIURIL) 25 mg tablet Take 1 Tab by mouth daily. 30 Tab 0    pantoprazole (PROTONIX) 40 mg tablet Take 1 Tab by mouth Before breakfast and dinner. 30 Tab 0    losartan (COZAAR) 100 mg tablet Take 100 mg by mouth daily.  azaTHIOprine (IMURAN) 50 mg tablet Take 250 mg by mouth daily. Indications: Crohn's disease      adalimumab (HUMIRA) 40 mg/0.8 mL injection 40 mg by SubCUTAneous route every seven (7) days.        Allergies   Allergen Reactions    Metronidazole Hcl Other (comments)     neuropathy     Past Medical History:   Diagnosis Date    [facility]     Diabetes ([facility]     HTN (hypertension)     Hypercholesterolemia     Pulmonary embolism ([facility] 03/2019    Stool color black     crohns     Past Surgical History:   Procedure Laterality Date    COLONOSCOPY N/A 10/21/2019    COLONOSCOPY w/ bxs performed by Abdirizak Orourke MD at [facility] ENDOSCOPY    COLONOSCOPY N/A 9/22/2020    COLONOSCOPY with bx's performed by Rocio Alex MD at [address] HX GI      HX ORTHOPAEDIC      fx left ankle ORIF    HX TUBAL LIGATION      btl     Family History   Problem Relation Age of Onset    Diabetes Mother     Hypertension Mother     Diabetes Father     Hypertension Father     Heart Disease Father      Social History     Tobacco Use    Smoking status: Never Smoker    Smokeless tobacco: Never Used   Substance Use Topics    Alcohol use: Never     Frequency: Never       Review of Systems   Constitutional: Negative for chills and fever.   HENT: Negative for hearing loss and tinnitus. Eyes: Negative for blurred vision (uses glasses) and double vision. Respiratory: Negative for cough and shortness of breath. Cardiovascular: Positive for leg swelling (left ankle). Negative for chest pain. Gastrointestinal: Negative for nausea and vomiting. Genitourinary: Negative for dysuria, frequency and urgency. Musculoskeletal: Positive for back pain. Negative for neck pain. Skin: Negative for itching and rash. Neurological: Positive for tingling, sensory change and headaches (was on fioricet). Negative for dizziness, tremors and focal weakness. Psychiatric/Behavioral: Negative for depression. Patient-Reported Vitals 12/15/2020   Patient-Reported Weight 318lb   Patient-Reported Height -   Patient-Reported Pulse 88   Patient-Reported Temperature 97.9   Patient-Reported Systolic  139   Patient-Reported Diastolic 78        Stanislaw Mar, who was evaluated through a patient-initiated, synchronous (real-time) audio only encounter, and/or her healthcare decision maker, is aware that it is a billable service, with coverage as determined by her insurance carrier. She provided verbal consent to proceed: Yes. She has not had a related appointment within my department in the past 7 days or scheduled within the next 24 hours.       Total Time: minutes: 11-20 minutes    Martinez Campo MD

## 2020-12-15 NOTE — PROGRESS NOTES
Yahir Alicia is a 44 y.o. female on virtual visit today for follow-up on diabetic neuropathy. 1. Have you been to the ER, urgent care clinic since your last visit? Hospitalized since your last visit? No    2. Have you seen or consulted any other health care providers outside of the [address] [place] [address] since your last visit? Include any pap smears or colon screening. No     Mobile number [phone] will be used for today's visit.

## 2020-12-16 ENCOUNTER — OFFICE VISIT (OUTPATIENT)
Dept: ORTHOPEDIC SURGERY | Age: 50
End: 2020-12-16
Payer: MEDICAID

## 2020-12-16 VITALS
DIASTOLIC BLOOD PRESSURE: 70 MMHG | SYSTOLIC BLOOD PRESSURE: 126 MMHG | OXYGEN SATURATION: 97 % | BODY MASS INDEX: 39.68 KG/M2 | WEIGHT: 293 LBS | HEIGHT: 72 IN | HEART RATE: 81 BPM | TEMPERATURE: 97.3 F

## 2020-12-16 DIAGNOSIS — M17.0 PRIMARY OSTEOARTHRITIS OF BOTH KNEES: Primary | ICD-10-CM

## 2020-12-16 PROCEDURE — 20611 DRAIN/INJ JOINT/BURSA W/US: CPT | Performed by: ORTHOPAEDIC SURGERY

## 2021-05-11 DIAGNOSIS — Z79.4 TYPE 2 DIABETES MELLITUS WITH DIABETIC NEUROPATHY, WITH LONG-TERM CURRENT USE OF INSULIN (HCC): Primary | ICD-10-CM

## 2021-05-11 DIAGNOSIS — E11.40 TYPE 2 DIABETES MELLITUS WITH DIABETIC NEUROPATHY, WITH LONG-TERM CURRENT USE OF INSULIN (HCC): Primary | ICD-10-CM

## 2021-05-11 RX ORDER — PREGABALIN 100 MG/1
200 CAPSULE ORAL 3 TIMES DAILY
Qty: 180 CAP | Refills: 3 | Status: SHIPPED | OUTPATIENT
Start: 2021-05-11 | End: 2021-06-10

## 2021-05-11 RX ORDER — PREGABALIN 100 MG/1
CAPSULE ORAL 2 TIMES DAILY
COMMUNITY
End: 2021-05-11 | Stop reason: SDUPTHER

## 2021-06-08 ENCOUNTER — OFFICE VISIT (OUTPATIENT)
Dept: ORTHOPEDIC SURGERY | Age: 51
End: 2021-06-08
Payer: MEDICAID

## 2021-06-08 VITALS
TEMPERATURE: 97.3 F | HEART RATE: 80 BPM | BODY MASS INDEX: 39.68 KG/M2 | OXYGEN SATURATION: 97 % | WEIGHT: 293 LBS | HEIGHT: 72 IN

## 2021-06-08 DIAGNOSIS — M17.0 PRIMARY OSTEOARTHRITIS OF BOTH KNEES: Primary | ICD-10-CM

## 2021-06-08 PROCEDURE — 99214 OFFICE O/P EST MOD 30 MIN: CPT | Performed by: ORTHOPAEDIC SURGERY

## 2021-06-08 PROCEDURE — 20611 DRAIN/INJ JOINT/BURSA W/US: CPT | Performed by: ORTHOPAEDIC SURGERY

## 2021-06-08 RX ORDER — TRIAMCINOLONE ACETONIDE 40 MG/ML
80 INJECTION, SUSPENSION INTRA-ARTICULAR; INTRAMUSCULAR ONCE
Status: COMPLETED | OUTPATIENT
Start: 2021-06-08 | End: 2021-06-08

## 2021-06-08 RX ADMIN — TRIAMCINOLONE ACETONIDE 80 MG: 40 INJECTION, SUSPENSION INTRA-ARTICULAR; INTRAMUSCULAR at 16:22

## 2021-06-08 NOTE — PROGRESS NOTES
Denise Muñoz  1970   Chief Complaint   Patient presents with    Knee Pain     bilateral        HISTORY OF PRESENT ILLNESS  Moy Reyes is a 44 y.o. female who presents today for reevaluation of b/l knee. Patient rates pain as 7/10 today. Pain has been present for awhile. She was last seen here in December and received her second Euflexxa injection for both knees at that time. She did not return for her third injection because she had to quarantine for COVID-19. She is requesting b/l knee cortisone injections today. Previous cortisone injections have helped. Last had a right knee cortisone injection on 11/16/2020. The patient also had a b/l knee cortisone injection on 8/05/2020 but the pain returned. Pt had a b/l knee cortisone injection in January which provided relief for around 5 months. Pt has hx of a fractured ankle and Chron's, recently had a Stelara treatment. Patient denies any fever, chills, chest pain, shortness of breath or calf pain. The remainder of the review of systems is negative. There are no new illness or injuries to report since last seen in the office. There are no changes to medications, allergies, family or social history. Pain Assessment  6/8/2021   Location of Pain Knee   Pain Location Comment -   Location Modifiers Right;Left   Severity of Pain 7   Quality of Pain Aching;Dull   Quality of Pain Comment -   Duration of Pain Persistent   Frequency of Pain Constant   Aggravating Factors Standing;Walking; Other (Comment)   Aggravating Factors Comment start up pain   Limiting Behavior Yes   Relieving Factors Other (Comment)   Relieving Factors Comment hydrocodone   Result of Injury No   Work-Related Injury -   Type of Injury -       PHYSICAL EXAM:   Visit Vitals  Pulse 80   Temp 97.3 °F (36.3 °C) (Skin)   Ht 6' (1.829 m)   Wt 308 lb 6.4 oz (139.9 kg)   SpO2 97%   BMI 41.83 kg/m²     The patient is a well-developed, well-nourished female   in no acute distress.   The patient is alert and oriented times three. The patient is alert and oriented times three. Mood and affect are normal.  LYMPHATIC: lymph nodes are not enlarged and are within normal limits  SKIN: normal in color and non tender to palpation. There are no bruises or abrasions noted. NEUROLOGICAL: Motor sensory exam is within normal limits. Reflexes are equal bilaterally. There is normal sensation to pinprick and light touch  MUSCULOSKELETAL:  Examination Left knee Right knee   Skin Intact Intact   Range of motion 0-130 0-130   Effusion + +   Medial joint line tenderness + +   Lateral joint line tenderness + +   Tenderness Pes Bursa - -   Tenderness insertion MCL - -   Tenderness insertion LCL - -   Stewarts - -   Patella crepitus + +   Patella grind + +   Lachman - -   Pivot shift - -   Anterior drawer - -   Posterior drawer - -   Varus stress - -   Valgus stress - -   Neurovascular Intact Intact   Calf Swelling and Tenderness to Palpation - -   Arturo's Test - -   Hamstring Cord Tightness - -         PROCEDURE:  Bilateral Knee Injection with Ultrasound Guidance    Indication:Bilateral Knee pain/swelling    After sterile prep, 4 cc of Xylocaine and 1 cc of Kenalog were injected into the bilateral  Knee. Intra-articular Ultrasound images captured using [address] [place] [address] Ultrasound machine using a frequency of 10 MHz with a linear transducer and scanned into patient's chart.            [facility]  OFFICE PROCEDURE PROGRESS NOTE        Chart reviewed for the following:  Angela Bowman M.D, have reviewed the History, Physical and updated the Allergic reactions for Geovany Bae performed immediately prior to start of procedure:  Angela Bowman M.D, have performed the following reviews on [facility] prior to the start of the procedure:            * Patient was identified by name and date of birth   * Agreement on procedure being performed was verified  * Risks and Benefits explained to the patient  * Procedure site verified and marked as necessary  * Patient was positioned for comfort  * Needle placement confirmed by ultrasound  * Consent was signed and verified     Time: 4:25 PM     Date of procedure: 6/8/2021    Procedure performed by:  Enzo Cooney M.D    Provider assisted by: (see medication administration)    How tolerated by patient: tolerated the procedure well with no complications    Comments: none      IMAGING: XR of right knee obtained in the office dated 1/28/2020 was reviewed and read by Dr. Viji Dodson: Severe degenerative arthritis in the medial compartment and patellofemoral joint.         XR of left knee from [facility] dated 10/31/19 was reviewed and read by Dr. Viji Dodson: Mild to moderate DJD with joint effusion. IMPRESSION:      ICD-10-CM ICD-9-CM    1. Primary osteoarthritis of both knees  M17.0 715.16 triamcinolone acetonide (KENALOG-40) 40 mg/mL injection 80 mg      ARTHROCENTESIS ASPIR&/INJ MAJOR JT/BURSA W/US        PLAN:   1. Pt presents today with b/l knee pain due to primary OA and I would like to try injecting both knees with cortisone. Risk factors include: dm, htn, BMI>35  2. No ultrasound exam indicated today  3. Yes cortisone injection indicated today B/L KNEE US  4. No Physical/Occupational Therapy indicated today  5. No diagnostic test indicated today:   6. No durable medical equipment indicated today  7. No referral indicated today  8. No medications indicated today:   9. No Narcotic indicated today        RTC 3 weeks if pain continues      Scribed by Christine Bustamante [address]) as dictated by Enzo Cooney MD    I, Dr. Enzo Cooney, confirm that all documentation is accurate.     Enzo Cooney M.D.   [facility]

## 2021-06-22 ENCOUNTER — VIRTUAL VISIT (OUTPATIENT)
Dept: NEUROLOGY | Age: 51
End: 2021-06-22
Payer: MEDICAID

## 2021-06-22 DIAGNOSIS — E11.40 TYPE 2 DIABETES MELLITUS WITH DIABETIC NEUROPATHY, WITH LONG-TERM CURRENT USE OF INSULIN (HCC): ICD-10-CM

## 2021-06-22 DIAGNOSIS — G44.221 CHRONIC TENSION-TYPE HEADACHE, INTRACTABLE: Primary | ICD-10-CM

## 2021-06-22 DIAGNOSIS — Z79.4 TYPE 2 DIABETES MELLITUS WITH DIABETIC NEUROPATHY, WITH LONG-TERM CURRENT USE OF INSULIN (HCC): ICD-10-CM

## 2021-06-22 PROCEDURE — 99214 OFFICE O/P EST MOD 30 MIN: CPT | Performed by: STUDENT IN AN ORGANIZED HEALTH CARE EDUCATION/TRAINING PROGRAM

## 2021-06-22 RX ORDER — BUTALBITAL, ACETAMINOPHEN AND CAFFEINE 50; 325; 40 MG/1; MG/1; MG/1
1 TABLET ORAL
Qty: 10 TABLET | Refills: 5 | Status: SHIPPED | OUTPATIENT
Start: 2021-06-22 | End: 2021-07-02

## 2021-06-22 RX ORDER — AMITRIPTYLINE HYDROCHLORIDE 50 MG/1
50 TABLET, FILM COATED ORAL
Qty: 30 TABLET | Refills: 6 | Status: SHIPPED | OUTPATIENT
Start: 2021-06-22 | End: 2021-07-22

## 2021-06-22 RX ORDER — PREGABALIN 200 MG/1
200 CAPSULE ORAL 3 TIMES DAILY
Qty: 90 CAPSULE | Refills: 6 | Status: SHIPPED | OUTPATIENT
Start: 2021-06-22 | End: 2021-07-22

## 2021-06-22 NOTE — PROGRESS NOTES
Nara Cole is a 44 y.o. female, evaluated via audio-only technology on 6/22/2021 for Follow-up (Type 2 diabetes mellitus with diabetic neuropathy) and Migraine  . Assessment & Plan:   Diagnoses and all orders for this visit:    1. Chronic tension-type headache, intractable  -     amitriptyline (ELAVIL) 50 mg tablet; Take 1 Tablet by mouth nightly for 30 days. -     butalbital-acetaminophen-caffeine (FIORICET, ESGIC) -40 mg per tablet; Take 1 Tablet by mouth every twelve (12) hours as needed for Headache for up to 10 days. 2. Type 2 diabetes mellitus with diabetic neuropathy, with long-term current use of insulin (HCC)  -     pregabalin (LYRICA) 200 mg capsule; Take 1 Capsule by mouth three (3) times daily for 30 days. Max Daily Amount: 600 mg.    A 44years old female patient here for follow-up of diabetic neuropathy and headache. On pregabalin 200 mg p.o. 3 times daily; claims it helps. But still continues to have some tingling that gets worse at night. She is on amitriptyline for possible tension type headache; continues to have headache about once a week. Denies any significant change with the amitriptyline. We will increase the dose to 50 mg p.o. nightly which might help for both the headache and the neuropathy. We will see her again in 6 months time. 12  Subjective:   A 44years old female patient here for follow-up of diabetic neuropathy and headache. This is a virtual/vidoe encounter. She was last seen in the clinic in December 2020. Currently on pregabalin 200 mg p.o. 3 times daily for neuropathy. When she is taking the pregabalin, the pins-and-needles sensation gets better. Continues to have intermittent tingling and numbness below her knees. Symptoms are worse at nighttime and might wake up from her sleep. No weakness. Has some difficulty with her balance but no falls. Last hemoglobin A1c was 7.4 according to her [could not get any result on chronic care].   She continues to have the headache. Has headaches about once a week. Frontal and sometimes on the left side. Might be throbbing.  7-8/10 in severity. Might have mild nausea. Some difficulty moving the and prefers to lay down when having headache. Might last half a day. She takes Tylenol with mild improvement. She is following with pain management for her chronic knee and left ankle pain. Was given Norco and takes it 3 times a day as needed. Previously has been taking Fioricet and claims it helps the headache better than the Tylenol. Prior to Admission medications    Medication Sig Start Date End Date Taking? Authorizing Provider   pregabalin (LYRICA) 200 mg capsule Take 1 Capsule by mouth three (3) times daily for 30 days. Max Daily Amount: 600 mg. 6/22/21 7/22/21 Yes Luan Favre D, MD   amitriptyline (ELAVIL) 50 mg tablet Take 1 Tablet by mouth nightly for 30 days. 6/22/21 7/22/21 Yes Winstno Bustamante MD   butalbital-acetaminophen-caffeine (FIORICET, ESGIC) -40 mg per tablet Take 1 Tablet by mouth every twelve (12) hours as needed for Headache for up to 10 days. 6/22/21 7/2/21 Yes Luan Favre D, MD   ustekinumab (STELARA SC) by SubCUTAneous route. Yes Provider, Historical   naloxone (NARCAN) 4 mg/actuation nasal spray Use 1 spray intranasally, then discard. Repeat with new spray every 2 min as needed for opioid overdose symptoms, alternating nostrils.  12/9/19  Yes Glenn Vazquez MD   ALPRAZolam Arabella Brumfield) 0.5 mg tablet TAKE 1 TAB BY MOUTH TWICE A DAY AS NEEDED 5/3/19  Yes Provider, Historical   budesonide (ENTOCORT EC) 3 mg capsule TAKE 1 CAPSULE BY MOUTH 3 TIMES DAILY 6/25/19  Yes Provider, Historical   dicyclomine (BENTYL) 10 mg capsule TAKE 1 CAPSULE BY MOUTH EVERY 6 HOURS AS NEEDED FOR PAIN 6/25/19  Yes Provider, Historical   ergocalciferol (ERGOCALCIFEROL) 50,000 unit capsule TAKE 1 (ONE) CAPSULE BY MOUTH ONCE A WEEK 6/21/19  Yes Provider, Historical   loperamide (IMODIUM A-D) 2 mg tablet Take 1 Tab by mouth three (3) times daily as needed. 2/22/19  Yes Provider, Historical   FLORANEX 1 million cell tab tablet TAKE 1 TABLET BY MOUTH EVERY DAY. 4/4/19  Yes Provider, Historical   tiZANidine (ZANAFLEX) 4 mg tablet TAKE 1 (ONE) TABLET BY MOUTH THREE TIMES DAILY AS NEEDED 6/17/19  Yes Provider, Historical   insulin glargine (LANTUS,BASAGLAR) 100 unit/mL (3 mL) inpn 30 Units by SubCUTAneous route nightly. Yes Provider, Historical   ondansetron hcl (ZOFRAN) 4 mg tablet Take 1 Tab by mouth every eight (8) hours as needed for Nausea. 1/20/19  Yes Kathie Walker PA-C   hydrALAZINE (APRESOLINE) 100 mg tablet Take 1 Tab by mouth three (3) times daily. 1/20/19  Yes Janelle Horton MD   SITagliptin (JANUVIA) 100 mg tablet Take 1 Tab by mouth daily. 1/21/19  Yes Janelle Horton MD   metoprolol tartrate (LOPRESSOR) 50 mg tablet Take 1 Tab by mouth two (2) times a day. 1/20/19  Yes Janelle Horton MD   atorvastatin (LIPITOR) 20 mg tablet Take 1 Tab by mouth nightly. 1/20/19  Yes Janelle Horton MD   cloNIDine HCl (CATAPRES) 0.1 mg tablet Take 1 Tab by mouth every eight (8) hours as needed. 1/20/19  Yes Janelle Horton MD   hydroCHLOROthiazide (HYDRODIURIL) 25 mg tablet Take 1 Tab by mouth daily. 1/21/19  Yes Janelle Horton MD   pantoprazole (PROTONIX) 40 mg tablet Take 1 Tab by mouth Before breakfast and dinner. 1/20/19  Yes Janelle Horton MD   losartan (COZAAR) 100 mg tablet Take 100 mg by mouth daily. Yes Provider, Historical   azaTHIOprine (IMURAN) 50 mg tablet Take 250 mg by mouth daily.  Indications: Crohn's disease   Yes Provider, Historical     Patient Active Problem List   Diagnosis Code    Pulmonary emboli ([facility] I26.99    Accelerated hypertension I10    Crohn's colitis ([facility] K50.10    Wound of left ankle S91.002A    Status post open reduction with internal fixation (ORIF) of fracture of ankle Z98.890, Z87.81    Dehiscence of incision, initial encounter T81. 31XA    Obesity, morbid ([facility] E66.01    Controlled type 2 diabetes mellitus without complication, with long-term current use of insulin ([facility] E11.9, Z79.4    Essential hypertension I10    BMI 50.0-59.9, adult ([facility] Z68.43    Type 2 diabetes mellitus with diabetic neuropathy ([facility] E11.40     Patient Active Problem List    Diagnosis Date Noted    Type 2 diabetes mellitus with diabetic neuropathy ([facility] 06/27/2019    Controlled type 2 diabetes mellitus without complication, with long-term current use of insulin ([facility] 05/16/2019    Essential hypertension 05/16/2019    BMI 50.0-59.9, adult ([facility] 05/16/2019    Obesity, morbid ([facility] 03/11/2019    Dehiscence of incision, initial encounter 01/14/2019    Pulmonary emboli ([facility] 01/12/2019    Accelerated hypertension 01/12/2019    Crohn's colitis ([facility] 01/12/2019    Wound of left ankle 01/12/2019    Status post open reduction with internal fixation (ORIF) of fracture of ankle 01/12/2019     Current Outpatient Medications   Medication Sig Dispense Refill    pregabalin (LYRICA) 200 mg capsule Take 1 Capsule by mouth three (3) times daily for 30 days. Max Daily Amount: 600 mg. 90 Capsule 6    amitriptyline (ELAVIL) 50 mg tablet Take 1 Tablet by mouth nightly for 30 days. 30 Tablet 6    butalbital-acetaminophen-caffeine (FIORICET, ESGIC) -40 mg per tablet Take 1 Tablet by mouth every twelve (12) hours as needed for Headache for up to 10 days. 10 Tablet 5    ustekinumab (STELARA SC) by SubCUTAneous route.  naloxone (NARCAN) 4 mg/actuation nasal spray Use 1 spray intranasally, then discard. Repeat with new spray every 2 min as needed for opioid overdose symptoms, alternating nostrils.  1 Each 0    ALPRAZolam (XANAX) 0.5 mg tablet TAKE 1 TAB BY MOUTH TWICE A DAY AS NEEDED  0    budesonide (ENTOCORT EC) 3 mg capsule TAKE 1 CAPSULE BY MOUTH 3 TIMES DAILY  1    dicyclomine (BENTYL) 10 mg capsule TAKE 1 CAPSULE BY MOUTH EVERY 6 HOURS AS NEEDED FOR PAIN 1    ergocalciferol (ERGOCALCIFEROL) 50,000 unit capsule TAKE 1 (ONE) CAPSULE BY MOUTH ONCE A WEEK  2    loperamide (IMODIUM A-D) 2 mg tablet Take 1 Tab by mouth three (3) times daily as needed.  FLORANEX 1 million cell tab tablet TAKE 1 TABLET BY MOUTH EVERY DAY. 1    tiZANidine (ZANAFLEX) 4 mg tablet TAKE 1 (ONE) TABLET BY MOUTH THREE TIMES DAILY AS NEEDED  0    insulin glargine (LANTUS,BASAGLAR) 100 unit/mL (3 mL) inpn 30 Units by SubCUTAneous route nightly.  ondansetron hcl (ZOFRAN) 4 mg tablet Take 1 Tab by mouth every eight (8) hours as needed for Nausea. 30 Tab 0    hydrALAZINE (APRESOLINE) 100 mg tablet Take 1 Tab by mouth three (3) times daily. 90 Tab 0    SITagliptin (JANUVIA) 100 mg tablet Take 1 Tab by mouth daily. 30 Tab 0    metoprolol tartrate (LOPRESSOR) 50 mg tablet Take 1 Tab by mouth two (2) times a day. 60 Tab 0    atorvastatin (LIPITOR) 20 mg tablet Take 1 Tab by mouth nightly. 30 Tab 0    cloNIDine HCl (CATAPRES) 0.1 mg tablet Take 1 Tab by mouth every eight (8) hours as needed. 90 Tab 0    hydroCHLOROthiazide (HYDRODIURIL) 25 mg tablet Take 1 Tab by mouth daily. 30 Tab 0    pantoprazole (PROTONIX) 40 mg tablet Take 1 Tab by mouth Before breakfast and dinner. 30 Tab 0    losartan (COZAAR) 100 mg tablet Take 100 mg by mouth daily.  azaTHIOprine (IMURAN) 50 mg tablet Take 250 mg by mouth daily.  Indications: Crohn's disease       Allergies   Allergen Reactions    Metronidazole Hcl Other (comments)     neuropathy     Past Medical History:   Diagnosis Date    Bilateral knee pain     [facility]'s [facility]     Diabetes ([facility]     HTN (hypertension)     Hypercholesterolemia     Pulmonary embolism ([facility] 03/2019    Stool color black     crohns     Past Surgical History:   Procedure Laterality Date    COLONOSCOPY N/A 10/21/2019    COLONOSCOPY w/ bxs performed by Sondra Espinal MD at [address] COLONOSCOPY N/A 9/22/2020    COLONOSCOPY with bx's performed by Ochoa Rosales MD at [facility] ENDOSCOPY    HX GI      HX ORTHOPAEDIC      fx left ankle ORIF    HX TUBAL LIGATION      btl     Family History   Problem Relation Age of Onset    Diabetes Mother     Hypertension Mother     Diabetes Father     Hypertension Father     Heart Disease Father      Social History     Tobacco Use    Smoking status: Never Smoker    Smokeless tobacco: Never Used   Substance Use Topics    Alcohol use: Never       Review of Systems   Constitutional: Negative for chills and fever. HENT: Negative for hearing loss and tinnitus. Eyes: Negative for blurred vision (uses glasses) and double vision. Respiratory: Negative for cough and shortness of breath. Cardiovascular: Positive for leg swelling (around the ankles). Negative for chest pain. Gastrointestinal: Positive for nausea (with the headache). Negative for vomiting. Genitourinary: Negative for dysuria, frequency and urgency. Musculoskeletal: Positive for back pain and joint pain (knees; and left ankles). Negative for neck pain. Skin: Negative for itching and rash. Neurological: Positive for tingling, sensory change and headaches (was on fioricet). Negative for dizziness, tremors and focal weakness (left LE might feel weak). Psychiatric/Behavioral: Negative for depression. Patient-Reported Vitals 6/22/2021   Patient-Reported Weight 298lb   Patient-Reported Height -   Patient-Reported Pulse 78   Patient-Reported Temperature 97.1   Patient-Reported Systolic  841   Patient-Reported Diastolic 70        Neuro exam:      Mental status: Awake, alert, oriented , follows simple and complex commands. Speech and languge: fluent, coherent,  and comprehension intact  CN: EOMI,  no facial asymmetry noted, palate elevation symmetric bilat,  tongue midline  Motor: no pronator drift, symmetric movement of the upper and lower extremities; normal gait.   Coordination: Intact rapid alternating movements; able to touch her nose and stretch out her fingers without any dysmetria; normal gait. Sumit Anne, who was evaluated through a patient-initiated, synchronous (real-time) audio/vifeo encounter, and/or her healthcare decision maker, is aware that it is a billable service, with coverage as determined by her insurance carrier. She provided verbal consent to proceed: Yes. She has not had a related appointment within my department in the past 7 days or scheduled within the next 24 hours.         Jacqueline Cooney MD

## 2021-06-22 NOTE — PROGRESS NOTES
Amrit Honeycutt is a 44 y.o. female on virtual visit today for follow-up on type 2 diabetes mellitus with diabetic neuropathy. Patient has additional c/o migraines. 1. Have you been to the ER, urgent care clinic since your last visit? Hospitalized since your last visit? No    2. Have you seen or consulted any other health care providers outside of the [address] [place] [address] since your last visit? Include any pap smears or colon screening. No     Mobile number [phone] will be used for today's visit.

## 2021-08-03 PROBLEM — I10 ESSENTIAL HYPERTENSION: Status: RESOLVED | Noted: 2019-05-16 | Resolved: 2021-08-03

## 2021-09-16 NOTE — PROGRESS NOTES
Patient ID: Morgan is a 65 year old male.    Chief Complaint   Patient presents with   • Follow-up     Morgan is here for a follow up appt. patient recently had a echo done on 09/08/2021 and would like to review the results    • Other     patient states he has been getting winded and exhausted very easy lately. patient states he does experience chest pain at times and dizziness      HPI  Mr. Palm is a 65-year-old male who is here for a follow up visit.    At last visit in 2/2021 he presented to establish care with a new cardiologist.  He was seeing Dr. Kenny Ca but due to insurance issues he was in need of a new cardiologist.    The patient has a history of aortic stenosis.  He comes at this time reporting that he now feels very fatigued with exertion and also has profound dyspnea.  Last year he could go hiking and camping without any issues and no he becomes very short of breath.  His shortness of breath does not stop him but it is very noticeable.  The patient has an echocardiogram which is mean gradient increased from 24 to 36 mmHg.  The peak velocity is now 3.8 across the valve.    The patient had chest discomfort in 2017.  He was cooking breakfast at that time.  Ultimately the patient had continuation of symptoms and worsening of symptoms.  The patient was taken by ambulance to [facility].  He underwent PCI of the left anterior descending artery with a Xience 2.75 x 15 stent.      The patient has moderate aortic stenosis.  In 2017 the patient had a velocity across his aortic valve 2 m/s.  In October 2020 the velocity had increased at 3.1 m/s.  Has velocity across the aortic valve is now 3.8 m/s.    The patient's only complaint at this time is that he has fatigue.  He does have sleep apnea and is not able to tolerate the mask.    The patient reports blood pressure at home in the 120s over 80s.    He has no syncope or chest pain.    Patient's medications, allergies, past medical, surgical, social and  Patient: Ivan Castaneda                MRN: [id number]       SSN: [id number]  YOB: 1970        AGE: 44 y.o. SEX: female  Body mass index is 43.35 kg/m². PCP: Di Tolliver MD  12/16/20    Chief Complaint   Patient presents with    Knee Pain     bilateral knee 2/3 euflexxa       HISTORY:  Ivan Castaneda is a 44 y.o. female who is seen for reevaluation of Bilateral knee and here for 2nd injection of Euflexxa. PROCEDURE:  Under ultrasound guidance, patient's Bilateral knee, after timeout under sterile conditions, was injected with 2 cc of Euflexxa. [facility]  OFFICE PROCEDURE PROGRESS NOTE        Chart reviewed for the following:   Raquel Pierce MD, have reviewed the History, Physical and updated the Allergic reactions for Geovany Bae performed immediately prior to start of procedure:   Raquel Pierce MD, have performed the following reviews on Ivan Castaneda prior to the start of the procedure:            * Patient was identified by name and date of birth   * Agreement on procedure being performed was verified  * Risks and Benefits explained to the patient  * Procedure site verified and marked as necessary  * Patient was positioned for comfort  * Consent was signed and verified     Time: 2:13 PM    Date of procedure: 12/16/2020    Procedure performed by:  Vangie Archer MD    Provider assisted by: None     How tolerated by patient: tolerated the procedure well with no complications    Comments: none    IMPRESSION:     ICD-10-CM ICD-9-CM    1. Primary osteoarthritis of both knees  M17.0 715.16 sodium hyaluronate (SUPARTZ FX/EUFLEXXA/HYALGAN) 10 mg/mL injection syrg 20 mg      ARTHROCENTESIS ASPIR&/INJ MAJOR JT/BURSA W/US        PLAN:  Ms. Corina Floyd will return in one week for her third Euflexxa injection.       Scribed by Dao Alexis (Servando Cheney) as dictated by MD TAMIKA Schustre, Dr. Deangelo Parada, confirm that all documentation is accurate.     Deangelo Parada M.D.   [facility] family histories were reviewed and updated as appropriate.    Past Medical History:   Diagnosis Date   Coronary artery disease  Aortic stenosis  GERD  HTN  Hyperlipidemia  Hypothyroidism  Neuropathy  Elevated triglycerides  Obstructive sleep apnea, unable to tolerate the CPAP mask    Past Surgical History:   Procedure Laterality Date   • Cardiac catherization  02/2017    LAD   • Colonoscopy  09/19/2018    Due 2023     ALLERGIES:  No Known Allergies     Current Outpatient Medications   Medication Sig Dispense Refill   • rosuvastatin (CRESTOR) 40 MG tablet Take 1 tablet by mouth at bedtime. 90 tablet 1   • pantoprazole (PROTONIX) 40 MG tablet Take 1 tablet by mouth daily. 90 tablet 1   • clopidogrel (PLAVIX) 75 MG tablet Take 1 tablet by mouth daily. 90 tablet 1   • gabapentin (NEURONTIN) 300 MG capsule Take 1 capsule by mouth 2 times daily. 180 capsule 1   • levothyroxine 175 MCG tablet Take 1 tablet by mouth daily. 90 tablet 1   • losartan (COZAAR) 25 MG tablet Take 1 tablet by mouth daily. 90 tablet 1   • metoPROLOL succinate (Toprol XL) 50 MG 24 hr tablet Take 1 tablet by mouth every morning. 90 tablet 1   • traZODone (DESYREL) 50 MG tablet 1- 2 p.o. nightly as needed for sleep 180 tablet 1   • polyethylene glycol (MiraLax) 17 GM/SCOOP powder as directed     • aspirin 81 MG chewable tablet every 24 hours.     • vitamin E 400 UNIT capsule Take 400 Units by mouth nightly.      • Multiple Vitamin (MULTI-VITAMINS) Tab Take 1 tablet by mouth nightly.      • Cholecalciferol (VITAMIN D) 2000 units capsule Take 2,000 Units by mouth nightly.  30 capsule      No current facility-administered medications for this visit.     Social History     Socioeconomic History   • Marital status: /Civil Union   Tobacco Use   • Smoking status: Former Smoker   • Smokeless tobacco: Never Used   Substance and Sexual Activity   • Alcohol use: Not Currently     Comment: very little   • Drug use: No   • Sexual activity: Not on file    Lifestyle   • Physical activity     Days per week: 5 days     Minutes per session: 20 min   • Stress: Not on file     Family History   Problem Relation Age of Onset   • Dementia/Alzheimers Mother    • Heart Mother         murmur   • Heart disease Father    • Cancer, Breast Sister    • Cancer, Ovarian Sister    • Patient is unaware of any medical problems Sister    • Patient is unaware of any medical problems Sister    • Patient is unaware of any medical problems Sister    • Cancer, Lung Maternal Grandmother    • Cancer, Lung Maternal Grandfather      Review of Systems   Constitutional: Negative.  Negative for diaphoresis and fatigue.   HENT: Negative for congestion.    Eyes: Negative for visual disturbance.   Respiratory: Negative.  Negative for shortness of breath.    Cardiovascular: Negative.  Negative for chest pain and palpitations.   Gastrointestinal: Negative for abdominal pain, blood in stool, nausea and vomiting.   Endocrine: Negative for cold intolerance and heat intolerance.   Genitourinary: Negative for difficulty urinating and dysuria.   Musculoskeletal: Negative for gait problem, neck pain and neck stiffness.   Skin: Negative for wound.   Allergic/Immunologic: Negative for environmental allergies.   Neurological: Negative for dizziness, weakness and light-headedness.   Hematological: Does not bruise/bleed easily.   Psychiatric/Behavioral: Negative for confusion, decreased concentration and sleep disturbance. The patient is not nervous/anxious.      Visit Vitals  /72 (BP Location: LUE - Left upper extremity, Patient Position: Sitting, Cuff Size: Regular)   Pulse (!) 58   Ht 6' 1\" (1.854 m)   Wt 108.9 kg (240 lb)   BMI 31.66 kg/m²     Physical Exam  Vitals and nursing note reviewed.   Constitutional:       General: He is not in acute distress.     Appearance: He is well-developed. He is not diaphoretic.   HENT:      Head: Normocephalic and atraumatic.   Eyes:      Pupils: Pupils are equal, round,  and reactive to light.   Neck:      Vascular: No JVD.   Cardiovascular:      Rate and Rhythm: Regular rhythm. Bradycardia present.      Pulses:           Dorsalis pedis pulses are 2+ on the right side and 2+ on the left side.      Heart sounds: Murmur heard.   No friction rub. No gallop.    Pulmonary:      Effort: No respiratory distress.      Breath sounds: No wheezing or rales.   Chest:      Chest wall: No tenderness.   Abdominal:      General: Bowel sounds are normal. There is no distension.      Palpations: Abdomen is soft. There is no mass.      Tenderness: There is no guarding or rebound.   Musculoskeletal:         General: No tenderness.      Cervical back: Neck supple.   Skin:     General: Skin is warm.      Findings: No erythema.   Neurological:      Mental Status: He is alert and oriented to person, place, and time.   Psychiatric:         Behavior: Behavior normal.         Thought Content: Thought content normal.       Assessment   1.  Coronary artery disease    The patient is status post PCI of the left anterior descending artery with a Xience 2.75 x 15 stent in 2017 for an acute coronary syndrome.  The patient will need to have a coronary angiogram as part of work-up for TAVR    2.  Aortic stenosis    The patient has symptoms consistent with severe aortic stenosis.  The patient's velocity has increased to 3.8 m/s.  The patient will need work-up for aortic valve replacement.  The patient will have a CT TAVR protocol as well as a coronary angiogram with right heart catheterization.  We will need to make a decision whether he is best served with surgical aortic valve replacement or TAVR.      3.  Carotid bruit    No stenosis on carotid duplex done in February 2021    4.  Hyperlipidemia    LDL is at goal on Crestor 40 mg p.o. nightly.  His LDL was 72 with HDL of 40 in June of this year.  The patient's triglycerides were elevated at 201.  He will need to continue diet and exercise.    5.  Hypertension    The  patient blood pressures currently well controlled continue present management    6.  Tobacco abuse    The patient is a former smoker.  He is no longer smoking.  The patient had a CT scan in 2019 which there is no evidence of abdominal aortic aneurysm.    7.  Sleep apnea    The patient has obstructive sleep apnea.  He is not on CPAP as he cannot tolerate this.  He does have fatigue.  Advised that he see his sleep doctor in order to discuss further options.      [unfilled]

## 2021-12-17 ENCOUNTER — HOSPITAL ENCOUNTER (EMERGENCY)
Age: 51
Discharge: HOME | End: 2021-12-17
Attending: EMERGENCY MEDICINE
Payer: MEDICAID

## 2021-12-17 ENCOUNTER — APPOINTMENT (OUTPATIENT)
Dept: GENERAL RADIOLOGY | Age: 51
End: 2021-12-17
Attending: EMERGENCY MEDICINE
Payer: MEDICAID

## 2021-12-17 ENCOUNTER — APPOINTMENT (OUTPATIENT)
Dept: CT IMAGING | Age: 51
End: 2021-12-17
Attending: EMERGENCY MEDICINE
Payer: MEDICAID

## 2021-12-17 VITALS
DIASTOLIC BLOOD PRESSURE: 103 MMHG | RESPIRATION RATE: 21 BRPM | TEMPERATURE: 97.7 F | SYSTOLIC BLOOD PRESSURE: 170 MMHG | HEART RATE: 78 BPM | HEIGHT: 72 IN | WEIGHT: 293 LBS | OXYGEN SATURATION: 98 % | BODY MASS INDEX: 39.68 KG/M2

## 2021-12-17 DIAGNOSIS — R42 LIGHTHEADED: Primary | ICD-10-CM

## 2021-12-17 DIAGNOSIS — M25.561 ACUTE PAIN OF RIGHT KNEE: ICD-10-CM

## 2021-12-17 LAB
ALBUMIN SERPL-MCNC: 3.5 G/DL (ref 3.4–5)
ALBUMIN/GLOB SERPL: 0.7 {RATIO} (ref 0.8–1.7)
ALP SERPL-CCNC: 144 U/L (ref 45–117)
ALT SERPL-CCNC: 20 U/L (ref 13–56)
ANION GAP SERPL CALC-SCNC: 4 MMOL/L (ref 3–18)
AST SERPL-CCNC: 11 U/L (ref 10–38)
ATRIAL RATE: 72 BPM
BASOPHILS # BLD: 0 K/UL (ref 0–0.1)
BASOPHILS NFR BLD: 1 % (ref 0–2)
BILIRUB SERPL-MCNC: 0.3 MG/DL (ref 0.2–1)
BUN SERPL-MCNC: 16 MG/DL (ref 7–18)
BUN/CREAT SERPL: 15 (ref 12–20)
CALCIUM SERPL-MCNC: 9.4 MG/DL (ref 8.5–10.1)
CALCULATED P AXIS, ECG09: 38 DEGREES
CALCULATED R AXIS, ECG10: -23 DEGREES
CALCULATED T AXIS, ECG11: 26 DEGREES
CHLORIDE SERPL-SCNC: 102 MMOL/L (ref 100–111)
CK MB CFR SERPL CALC: NORMAL % (ref 0–4)
CK MB SERPL-MCNC: <1 NG/ML (ref 5–25)
CK SERPL-CCNC: 129 U/L (ref 26–192)
CO2 SERPL-SCNC: 32 MMOL/L (ref 21–32)
CREAT SERPL-MCNC: 1.05 MG/DL (ref 0.6–1.3)
DIAGNOSIS, 93000: NORMAL
DIFFERENTIAL METHOD BLD: ABNORMAL
EOSINOPHIL # BLD: 0.2 K/UL (ref 0–0.4)
EOSINOPHIL NFR BLD: 4 % (ref 0–5)
ERYTHROCYTE [DISTWIDTH] IN BLOOD BY AUTOMATED COUNT: 15.8 % (ref 11.6–14.5)
EST. AVERAGE GLUCOSE BLD GHB EST-MCNC: 280 MG/DL
GLOBULIN SER CALC-MCNC: 5.1 G/DL (ref 2–4)
GLUCOSE BLD STRIP.AUTO-MCNC: 333 MG/DL (ref 70–110)
GLUCOSE SERPL-MCNC: 459 MG/DL (ref 74–99)
HBA1C MFR BLD: 11.4 % (ref 4.2–5.6)
HCT VFR BLD AUTO: 45.6 % (ref 35–45)
HGB BLD-MCNC: 13.9 G/DL (ref 12–16)
IMM GRANULOCYTES # BLD AUTO: 0 K/UL (ref 0–0.04)
IMM GRANULOCYTES NFR BLD AUTO: 0 % (ref 0–0.5)
INR PPP: 1 (ref 0.8–1.2)
LYMPHOCYTES # BLD: 1.4 K/UL (ref 0.9–3.6)
LYMPHOCYTES NFR BLD: 23 % (ref 21–52)
MCH RBC QN AUTO: 26.8 PG (ref 24–34)
MCHC RBC AUTO-ENTMCNC: 30.5 G/DL (ref 31–37)
MCV RBC AUTO: 88 FL (ref 78–100)
MONOCYTES # BLD: 0.3 K/UL (ref 0.05–1.2)
MONOCYTES NFR BLD: 5 % (ref 3–10)
NEUTS SEG # BLD: 4.2 K/UL (ref 1.8–8)
NEUTS SEG NFR BLD: 68 % (ref 40–73)
NRBC # BLD: 0 K/UL (ref 0–0.01)
NRBC BLD-RTO: 0 PER 100 WBC
P-R INTERVAL, ECG05: 156 MS
PLATELET # BLD AUTO: 249 K/UL (ref 135–420)
PMV BLD AUTO: 11.6 FL (ref 9.2–11.8)
POTASSIUM SERPL-SCNC: 3.9 MMOL/L (ref 3.5–5.5)
PROT SERPL-MCNC: 8.6 G/DL (ref 6.4–8.2)
PROTHROMBIN TIME: 12.9 SEC (ref 11.5–15.2)
Q-T INTERVAL, ECG07: 428 MS
QRS DURATION, ECG06: 108 MS
QTC CALCULATION (BEZET), ECG08: 468 MS
RBC # BLD AUTO: 5.18 M/UL (ref 4.2–5.3)
SODIUM SERPL-SCNC: 138 MMOL/L (ref 136–145)
TROPONIN-HIGH SENSITIVITY: 5 NG/L (ref 0–54)
TROPONIN-HIGH SENSITIVITY: NORMAL NG/L
VENTRICULAR RATE, ECG03: 72 BPM
WBC # BLD AUTO: 6.2 K/UL (ref 4.6–13.2)

## 2021-12-17 PROCEDURE — 96372 THER/PROPH/DIAG INJ SC/IM: CPT

## 2021-12-17 PROCEDURE — 74011250636 HC RX REV CODE- 250/636: Performed by: EMERGENCY MEDICINE

## 2021-12-17 PROCEDURE — 73560 X-RAY EXAM OF KNEE 1 OR 2: CPT

## 2021-12-17 PROCEDURE — 82553 CREATINE MB FRACTION: CPT

## 2021-12-17 PROCEDURE — 74011636637 HC RX REV CODE- 636/637: Performed by: EMERGENCY MEDICINE

## 2021-12-17 PROCEDURE — 84484 ASSAY OF TROPONIN QUANT: CPT

## 2021-12-17 PROCEDURE — 99284 EMERGENCY DEPT VISIT MOD MDM: CPT

## 2021-12-17 PROCEDURE — 80053 COMPREHEN METABOLIC PANEL: CPT

## 2021-12-17 PROCEDURE — 82962 GLUCOSE BLOOD TEST: CPT

## 2021-12-17 PROCEDURE — 70450 CT HEAD/BRAIN W/O DYE: CPT

## 2021-12-17 PROCEDURE — 71045 X-RAY EXAM CHEST 1 VIEW: CPT

## 2021-12-17 PROCEDURE — 85610 PROTHROMBIN TIME: CPT

## 2021-12-17 PROCEDURE — 83036 HEMOGLOBIN GLYCOSYLATED A1C: CPT

## 2021-12-17 PROCEDURE — 99219 PR INITIAL OBSERVATION CARE/DAY 50 MINUTES: CPT | Performed by: PSYCHIATRY & NEUROLOGY

## 2021-12-17 PROCEDURE — 74011250637 HC RX REV CODE- 250/637: Performed by: EMERGENCY MEDICINE

## 2021-12-17 PROCEDURE — 85025 COMPLETE CBC W/AUTO DIFF WBC: CPT

## 2021-12-17 PROCEDURE — 93005 ELECTROCARDIOGRAM TRACING: CPT

## 2021-12-17 RX ORDER — INSULIN LISPRO 100 [IU]/ML
INJECTION, SOLUTION INTRAVENOUS; SUBCUTANEOUS EVERY 6 HOURS
Status: DISCONTINUED | OUTPATIENT
Start: 2021-12-17 | End: 2021-12-17 | Stop reason: HOSPADM

## 2021-12-17 RX ORDER — OXYCODONE AND ACETAMINOPHEN 5; 325 MG/1; MG/1
1 TABLET ORAL
Status: COMPLETED | OUTPATIENT
Start: 2021-12-17 | End: 2021-12-17

## 2021-12-17 RX ORDER — MAGNESIUM SULFATE 100 %
4 CRYSTALS MISCELLANEOUS AS NEEDED
Status: DISCONTINUED | OUTPATIENT
Start: 2021-12-17 | End: 2021-12-17 | Stop reason: HOSPADM

## 2021-12-17 RX ORDER — INSULIN GLARGINE 100 [IU]/ML
32 INJECTION, SOLUTION SUBCUTANEOUS DAILY
Status: DISCONTINUED | OUTPATIENT
Start: 2021-12-17 | End: 2021-12-17 | Stop reason: HOSPADM

## 2021-12-17 RX ORDER — DEXTROSE 50 % IN WATER (D50W) INTRAVENOUS SYRINGE
25-50 AS NEEDED
Status: DISCONTINUED | OUTPATIENT
Start: 2021-12-17 | End: 2021-12-17 | Stop reason: HOSPADM

## 2021-12-17 RX ADMIN — Medication 32 UNITS: at 10:54

## 2021-12-17 RX ADMIN — OXYCODONE HYDROCHLORIDE AND ACETAMINOPHEN 1 TABLET: 5; 325 TABLET ORAL at 12:02

## 2021-12-17 RX ADMIN — SODIUM CHLORIDE 1000 ML: 900 INJECTION, SOLUTION INTRAVENOUS at 09:55

## 2021-12-17 RX ADMIN — Medication 8 UNITS: at 10:55

## 2021-12-17 NOTE — ED TRIAGE NOTES
Pt arrived via EMS for reported dizziness. Pt reports headache times 2 days and states she went to bed dizzy last night. Pt states she woke up this morning and fell getting out of the bed because she was dizzy. Pt states she fell again while outside and hit the back of her head on the ground. Pt states  \"I think I might have blacked out for a minute\".   EMS

## 2021-12-17 NOTE — ED PROVIDER NOTES
EMERGENCY DEPARTMENT HISTORY AND PHYSICAL EXAM  This was created with voice recognition software and transcription errors may be present. 8:25 AM  Date: 12/17/2021  Patient Name: Deshaun Peters    History of Presenting Illness     Chief Complaint:    History Provided By:     HPI: Deshaun Peters is a 46 y.o. female past medical history of knee pain colitis diabetes hypertension high cholesterol pulmonary embolism who presents with lightheadedness and unsteadiness. Patient states symptoms started yesterday after school since then she has been feeling somewhat unsteady she describes as dizzy as described as though she might feel as though she may pass out no chest pain or shortness of breath no nausea no vomiting but also feels unsteady when she ambulates having to hold onto things to find her way.   Has a history of left foot surgery with postop /PE currently not have any chest pain or shortness of breath no nausea no vomiting    PCP: Stacy Aschoff, MD      Past History     Past Medical History:  Past Medical History:   Diagnosis Date    Bilateral knee pain     [facility]'s colitis [facility] [address]     Diabetes ([facility] [address]     HTN (hypertension)     Hypercholesterolemia     Pulmonary embolism ([facility] [address] 03/2019    Stool color black     crohns       Past Surgical History:  Past Surgical History:   Procedure Laterality Date    COLONOSCOPY N/A 10/21/2019    COLONOSCOPY w/ bxs performed by Deanne Cruz MD at [facility]    COLONOSCOPY N/A 9/22/2020    COLONOSCOPY with bx's performed by Gabrielle Cardoza MD at [facility]    HX GI      HX ORTHOPAEDIC      fx left ankle ORIF    HX TUBAL LIGATION      btl       Family History:  Family History   Problem Relation Age of Onset    Diabetes Mother     Hypertension Mother     Diabetes Father     Hypertension Father     Heart Disease Father        Social History:  Social History     Tobacco Use    Smoking status: Never Smoker    Smokeless tobacco: Never Used Substance Use Topics    Alcohol use: Never    Drug use: Never       Allergies: Allergies   Allergen Reactions    Metronidazole Hcl Other (comments)     neuropathy       Review of Systems     Review of Systems   All other systems reviewed and are negative. 10 point review of systems otherwise negative unless noted in HPI. Physical Exam       Physical Exam  Constitutional:       Appearance: She is well-developed. HENT:      Head: Normocephalic and atraumatic. Eyes:      Pupils: Pupils are equal, round, and reactive to light. Cardiovascular:      Rate and Rhythm: Normal rate and regular rhythm. Heart sounds: Normal heart sounds. No murmur heard. No friction rub. Pulmonary:      Effort: Pulmonary effort is normal. No respiratory distress. Breath sounds: Normal breath sounds. No wheezing. Abdominal:      General: There is no distension. Palpations: Abdomen is soft. Tenderness: There is no abdominal tenderness. There is no guarding or rebound. Musculoskeletal:         General: Normal range of motion. Cervical back: Normal range of motion and neck supple. Skin:     General: Skin is warm and dry. Neurological:      Mental Status: She is alert and oriented to person, place, and time. Comments: Cranial nerves II through XII grossly intact bilaterally strength 5 out of 5 upper and lower bilaterally lower extremity not tested coordination testing finger-to-nose bilaterally intact   Psychiatric:         Behavior: Behavior normal.         Thought Content: Thought content normal.         Diagnostic Study Results     Vital Signs  EKG: KG shows sinus at 72 normal axis normal intervals there is no ST elevation or depression no hypertrophy.   Labs: CBC noted unremarkable history of mild elevated glucose troponin bicarb and gap normal negative  Imaging:   Knee x-ray unremarkable except for arthritis  He had negative  X-ray transfusion    Medical Decision Making     ED Course: Progress Notes, Reevaluation, and Consults:    I will be the provider of record for this patient. Provider Notes (Medical Decision Making): Is a 52-year-old female who gives a history of lightheadedness/dizziness for 24 hours. Overall etiology unclear somewhat difficult to differentiate between presyncope versus vertiginous symptoms. Patient initially says she felt more lightheaded but she describes having trouble ambulating with having to hold onto things on her walking which is concerning that she may be a little vertiginous. She denies any secondary symptoms of slurred speech or blurry vision she does have a history of similar was diagnosed with bilateral PE last time this occurred however she was recently postop and unclear if there is a large or small PEs    Chart reviewed patient reportedly had pulmonary emboli with small to moderate overall clot burden in the dilated right lung suggesting reasonable sized PE    No suspicion of PE patient's blood pressure is elevated not hypoxic not tachycardic. Clinically not similar to last presentation seen by neurology felt not to be neurologically related. Will discharge for outpatient follow-up lightheadedness         Diagnosis     Clinical Impression: No diagnosis found. Disposition:        Patient's Medications   Start Taking    No medications on file   Continue Taking    ALPRAZOLAM (XANAX) 0.5 MG TABLET    TAKE 1 TAB BY MOUTH TWICE A DAY AS NEEDED    ATORVASTATIN (LIPITOR) 20 MG TABLET    Take 1 Tab by mouth nightly. AZATHIOPRINE (IMURAN) 50 MG TABLET    Take 250 mg by mouth daily. Indications: Crohn's disease    BUDESONIDE (ENTOCORT EC) 3 MG CAPSULE    TAKE 1 CAPSULE BY MOUTH 3 TIMES DAILY    CLONIDINE HCL (CATAPRES) 0.1 MG TABLET    Take 1 Tab by mouth every eight (8) hours as needed.     DICYCLOMINE (BENTYL) 10 MG CAPSULE    TAKE 1 CAPSULE BY MOUTH EVERY 6 HOURS AS NEEDED FOR PAIN    ERGOCALCIFEROL (ERGOCALCIFEROL) 50,000 UNIT CAPSULE    TAKE 1 (ONE) CAPSULE BY MOUTH ONCE A WEEK    FLORANEX 1 MILLION CELL TAB TABLET    TAKE 1 TABLET BY MOUTH EVERY DAY. HYDRALAZINE (APRESOLINE) 100 MG TABLET    Take 1 Tab by mouth three (3) times daily. HYDROCHLOROTHIAZIDE (HYDRODIURIL) 25 MG TABLET    Take 1 Tab by mouth daily. INSULIN GLARGINE (LANTUS,BASAGLAR) 100 UNIT/ML (3 ML) INPN    30 Units by SubCUTAneous route nightly. LOPERAMIDE (IMODIUM A-D) 2 MG TABLET    Take 1 Tab by mouth three (3) times daily as needed. LOSARTAN (COZAAR) 100 MG TABLET    Take 100 mg by mouth daily. METOPROLOL TARTRATE (LOPRESSOR) 50 MG TABLET    Take 1 Tab by mouth two (2) times a day. NALOXONE (NARCAN) 4 MG/ACTUATION NASAL SPRAY    Use 1 spray intranasally, then discard. Repeat with new spray every 2 min as needed for opioid overdose symptoms, alternating nostrils. ONDANSETRON HCL (ZOFRAN) 4 MG TABLET    Take 1 Tab by mouth every eight (8) hours as needed for Nausea. PANTOPRAZOLE (PROTONIX) 40 MG TABLET    Take 1 Tab by mouth Before breakfast and dinner. SITAGLIPTIN (JANUVIA) 100 MG TABLET    Take 1 Tab by mouth daily. TIZANIDINE (ZANAFLEX) 4 MG TABLET    TAKE 1 (ONE) TABLET BY MOUTH THREE TIMES DAILY AS NEEDED    USTEKINUMAB (STELARA SC)    by SubCUTAneous route.    These Medications have changed    No medications on file   Stop Taking    No medications on file

## 2021-12-17 NOTE — DIABETES MGMT
Diabetes/ Glycemic Control Plan of Care    : Seen patient in ED. Noted that she was admitted today with report of headache, dizziness and falling out of bed and hit her head on the ground. 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 Discussed elevated A1c of 11.4 (2021). Patient not interested in assessment and education and stated, \"I got this. \" Patient reported that she last took lantus insulin 32 units at bedtime last night. Lab B  POC B    Recommendations:   1.) basal lantus insulin 32 units daily starting today. Order obtained. 2.) correctional lispro insulin. Order obtained. 3.) if admitted and BG is above target, change lantus frequency to every 12 hours (home dose). Assessment:   DX:   1. Lightheaded     2. Acute pain of right knee        Fasting/ Morning blood glucose:   Lab Results   Component Value Date/Time    Glucose 459 (HH) 2021 08:05 AM    Glucose (POC) 333 (H) 2021 10:53 AM     IV Fluids containing dextrose: None. Steroids:   None. Blood glucose values: Within target range (70-180mg/dL):  No.    Current insulin orders:   Basal lantus insulin 32 units daily  Correctional lispro insulin. Normal sensitivity dose    Total Daily Dose previous 24 hours: N/A. Patient admitted today, 2021  Current A1c:   Lab Results   Component Value Date/Time    Hemoglobin A1c 11.4 (H) 2021 08:05 AM      equivalent  to ave Blood Glucose of 280 mg/dl for 2-3 months prior to admission    Adequate glycemic control PTA: No.    Nutrition/Diet:   Active Orders   There are no active orders of the following types: Diet. Meal Intake:  No data found. Supplement Intake:  No data found. Home diabetes medications: Patient reported that she is no longer taking Januvia. Key Antihyperglycemic Medications             insulin glargine (LANTUS,BASAGLAR) 100 unit/mL (3 mL) inpn 30 Units by SubCUTAneous route nightly.     SITagliptin (JANUVIA) 100 mg tablet Take 1 Tab by mouth daily. Plan/Goals:   Blood glucose will be within target of 70 - 180 mg/dl within 72 hours  Reinforce dietary and medication compliance at home. Education: Patient not interested.    [] Refer to Diabetes Education Record   [] Education not indicated at this time     Florin Byers RN  Pager: [phone]

## 2021-12-17 NOTE — DIABETES MGMT
Diabetes Patient/Family Education Record    Factors That May Influence Patients Ability to Learn or Comply with Recommendations   []   Language barrier    []   Cultural needs   []   Motivation    []   Cognitive limitation    []   Physical   []   Education    []   Physiological factors   []   Hearing/vision/speaking impairment   []   Buddhism beliefs    []   Financial factors   []  Other:   []  No factors identified at this time. Person Instructed:   []   Patient   []   Family   []  Other     Preference for Learning:   []   Verbal   []   Written: Patient not interested in diabetes education packet. []  Demonstration     Level of Comprehension & Competence:    []  Good                                      [] Fair                                     []  Poor                             []  Needs Reinforcement   []  Teach back completed    Education Component:      Discussed elevated A1c level of 11.4% (12/17/2021) with patient and offered diabetes education. 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 [x]  Medication management, including how to administer insulin (if appropriate) and potential medication interactions: Patient reported taking prescribed diabetes med, Lantus (Basaglar) pen insulin 32 units twice daily: morning and bedtime.        []  Nutritional management - [] Obtained usual meal pattern   []   Basic carbohydrate counting  []  Plate method  []  Limit concentrated sweets and avoid sweetened beverages  []  Portion control  []    Avoid skipping meals   []  Exercise   []  Signs, symptoms, and treatment of hyperglycemia and hypoglycemia   [] Prevention, recognition and treatment of hyperglycemia and hypoglycemia   []  Importance of blood glucose monitoring  [] Blood Glucose targets   []   Provided patient with blood glucose meter  []  Has glucometer and supplies at home   []  Instruction on use of the blood glucose meter and recommended monitoring schedule   [x]  Discuss the importance of HbA1C monitoring. Informed patient that her current A1c is 11.4% (12/17/2021). This is equivalent to average glucose of 280 mg/dl for the past 2-3 months.   []  Sick day guidelines   []  Proper use and disposal of lancets, needles, syringes or insulin pens (if appropriate)   []  Potential long-term complications (retinopathy, kidney disease, neuropathy, foot care)   [] Information about whom to contact in case of emergency or for more information    [x]  Goal:  Patient/family will demonstrate understanding of Diabetes Self- Management Skills by: 12/24/2021  Plan for post-discharge education or self-management support:    [] Outpatient class schedule provided            [] Patient Declined    [] Scheduled for outpatient classes (date) _______    [] Written information provided  Verify: [x] Prior to admission Diabetes medications    Does patient understand how diabetes medications work? Yes. Does patient have difficulty obtaining diabetes medications and testing supplies?  No.    Reena Magallanes RN [facility]  Pager: [phone]

## 2021-12-17 NOTE — CONSULTS
NEUROLOGY CONSULT NOTE    Patient ID:  Lito Juan  [id number]  46 y.o.  1970    Date of Consultation:  December 17, 2021    Referring Physician: Taina Reynolds MD    Reason for Consultation: Dizziness        Subjective:       History of Present Illness:     Ms Stanley Patrick is a 76-year-old woman, -American, with past medical history of morbid obesity, type 2 diabetes with diabetic neuropathy, history of headaches states cluster and migraine probably, chronic colitis, hypertension, history of PE, she had a fracture of left ankle, evaluated today for chief complaints of dizziness. The patient is a RN, she is a teacher at [employer]. Stated that for about 2 to 3 days she has not been feeling well and developed a headache and intermittent episodes of room spinning sensation for a brief. Of time and nausea. In the past used to take Fioricet but did not take this time. This morning when she was outside going to work she had a sensation of almost passing out and dropped to the ground. No head trauma. No full loss of consciousness. When the EMS found her with the blood glucose was 494. She is taking insulin. She agrees for not having breakfast this morning and did not have enough fluids either. No diplopia. Headache is not positional. She endorses light sensitivity. No focal weakness. No coordination problem. When she ambulates she is cautioned. Denies chest pain, denies fever chills. Denies nuchal rigidity. No language problem. No swallowing problem.       Patient Active Problem List    Diagnosis Date Noted    Type 2 diabetes mellitus with diabetic neuropathy ([address] 06/27/2019    Controlled type 2 diabetes mellitus without complication, with long-term current use of insulin ([address] 05/16/2019    BMI 50.0-59.9, adult ([address] 05/16/2019    Obesity, morbid ([address] 03/11/2019    Dehiscence of incision, initial encounter 01/14/2019    Pulmonary emboli ([address] 01/12/2019    Accelerated hypertension 01/12/2019    Crohn's colitis ([facility] 01/12/2019    Wound of left ankle 01/12/2019    Status post open reduction with internal fixation (ORIF) of fracture of ankle 01/12/2019     Past Medical History:   Diagnosis Date    Bilateral knee pain     Crohn's colitis ([facility]     Diabetes ([facility]     HTN (hypertension)     Hypercholesterolemia     Pulmonary embolism ([facility] 03/2019    Stool color black     crohns      Past Surgical History:   Procedure Laterality Date    COLONOSCOPY N/A 10/21/2019    COLONOSCOPY w/ bxs performed by Elian Pickett MD at [address] COLONOSCOPY N/A 9/22/2020    COLONOSCOPY with bx's performed by Juan Antonio [place] MD at [address] HX GI      HX ORTHOPAEDIC      fx left ankle ORIF    HX TUBAL LIGATION      btl      Prior to Admission medications    Medication Sig Start Date End Date Taking? Authorizing Provider   ustekinumab [facility]) by SubCUTAneous route. Provider, Historical   naloxone (NARCAN) 4 mg/actuation nasal spray Use 1 spray intranasally, then discard. Repeat with new spray every 2 min as needed for opioid overdose symptoms, alternating nostrils. 12/9/19   Mihai Reardon MD   ALPRAZolam Aldon Nones) 0.5 mg tablet TAKE 1 TAB BY MOUTH TWICE A DAY AS NEEDED 5/3/19   Provider, Historical   budesonide (ENTOCORT EC) 3 mg capsule TAKE 1 CAPSULE BY MOUTH 3 TIMES DAILY 6/25/19   Provider, Historical   dicyclomine (BENTYL) 10 mg capsule TAKE 1 CAPSULE BY MOUTH EVERY 6 HOURS AS NEEDED FOR PAIN 6/25/19   Provider, Historical   ergocalciferol (ERGOCALCIFEROL) 50,000 unit capsule TAKE 1 (ONE) CAPSULE BY MOUTH ONCE A WEEK 6/21/19   Provider, Historical   loperamide (IMODIUM A-D) 2 mg tablet Take 1 Tab by mouth three (3) times daily as needed.  2/22/19   Provider, Historical   FLORANEX 1 million cell tab tablet TAKE 1 TABLET BY MOUTH EVERY DAY. 4/4/19   Provider, Historical   tiZANidine (ZANAFLEX) 4 mg tablet TAKE 1 (ONE) TABLET BY MOUTH THREE TIMES DAILY AS NEEDED 6/17/19 Provider, Historical   insulin glargine (LANTUS,BASAGLAR) 100 unit/mL (3 mL) inpn 30 Units by SubCUTAneous route nightly. Provider, Historical   ondansetron hcl (ZOFRAN) 4 mg tablet Take 1 Tab by mouth every eight (8) hours as needed for Nausea. 1/20/19   Clearhilda Reddy PA-C   hydrALAZINE (APRESOLINE) 100 mg tablet Take 1 Tab by mouth three (3) times daily. 1/20/19   Marelyn Osler, MD   SITagliptin (JANUVIA) 100 mg tablet Take 1 Tab by mouth daily. 1/21/19   Marelyn Osler, MD   metoprolol tartrate (LOPRESSOR) 50 mg tablet Take 1 Tab by mouth two (2) times a day. 1/20/19   Marelyn Osler, MD   atorvastatin (LIPITOR) 20 mg tablet Take 1 Tab by mouth nightly. 1/20/19   Marelyn Osler, MD   cloNIDine HCl (CATAPRES) 0.1 mg tablet Take 1 Tab by mouth every eight (8) hours as needed. 1/20/19   Marelyn Osler, MD   hydroCHLOROthiazide (HYDRODIURIL) 25 mg tablet Take 1 Tab by mouth daily. 1/21/19   Marelyn Osler, MD   pantoprazole (PROTONIX) 40 mg tablet Take 1 Tab by mouth Before breakfast and dinner. 1/20/19   Marelyn Osler, MD   losartan (COZAAR) 100 mg tablet Take 100 mg by mouth daily. Provider, Historical   azaTHIOprine (IMURAN) 50 mg tablet Take 250 mg by mouth daily. Indications: Crohn's disease    Provider, Historical     Allergies   Allergen Reactions    Metronidazole Hcl Other (comments)     neuropathy      Social History     Tobacco Use    Smoking status: Never Smoker    Smokeless tobacco: Never Used   Substance Use Topics    Alcohol use: Never      Family History   Problem Relation Age of Onset    Diabetes Mother     Hypertension Mother     Diabetes Father     Hypertension Father     Heart Disease Father        Review of Systems    Constitutional: No recent weight change, fever,fatigue, sleep difficulties, or loss of appetite.     ENT/Mouth:  No hearing loss, ringing in the ears, chronic sinus problem, nose bleeds sore throat, voice change, hoarseness, swollen glands in neck, or difficulties with chewing and swallowing. Cardiovascular:  No chest pain/angina pectoris, palpitations,swelling of feet/ankles/hands, or calf pain while walking. Respiratory: No chronic or frequent coughs, spitting up blood, shortness of breath, asthma, or wheezing. Gastrointestinal: No abdominal pain, heartburn, nausea, vomiting, constipation, frequent diarrhea, rectal bleeding, or blood in stool. Genitourinary: No frequent urination, burning or painful urination, blood in urine, incontinence or dribbling. Musculoskeletal:   No joint pain, stiffness/swelling, weakness of muscles, muscle pain/cramp, or back pain. Integument:   No rash/itching, change in skin color, change in hair/nails, or change in color/size of moles. Neurological:   Per HPI otherwise negative   Psychiatric:   No nervousness, depression, hallucinations, paranoia or suspiciousness. Endocrine: No excessive thirst or urination, heat or cold intolerance. Hematologic/Lymphatic: No bleeding/bruising tendency, phlebitis, or past transfusion. Objective:     Patient Vitals for the past 8 hrs:   BP Temp Pulse Resp SpO2 Height Weight   12/17/21 0747  98.2 °F (36.8 °C)        12/17/21 0746 (!) 159/91  72 18 99 % 6' (1.829 m) 136.1 kg (300 lb)       General Exam  No acute distress, normal body habitus    HEENT: Normocephalic, atraumatic, Sclera anicteric, normal conjunctiva  Mucous membranes: normal color and hydration     CV: No carotid bruits,   Heart: regular to rate and rhythm. No murmurs     RESP:  CTAB     Neurologic Exam:    MENTAL STATUS:     The patient is awake, alert, and oriented x 4. Fund of knowledge is adequate. Speech is fluent and memory appears to be intact, both long and short term. CRANIAL NERVES:   II Pupils are midline, symmetric, both reactive to light and accommodation. III, IV, VI  Extraocular movements are intact and there is no nystagmus.    V  Facial sensation is intact to pinprick and light touch. VII  Face is symmetrical.   VIII - Hearing is present. IX, X, [address] rises symmetrically. Gag is present. Tongue is in the midline. XI - Shoulder shrugging and head turning intact    MOTOR:          Tone is normal.  Muscle bulk is normal.  The patient is 5/5 in all four limbs without any drift. No involuntary movements    SENSATION: Light touch grossly intact    REFLEXES: 2+ and symmetrical biceps, triceps, brachioradialis. Patella and ankle jerks were deferred due to body habitus. COORDINATION: Cerebellar examination reveals no gross ataxia or dysmetria. GAIT: Able to stand up without difficulties. She has an antalgic gait and cautioned.       Data Review:    Recent Results (from the past 24 hour(s))   EKG, 12 LEAD, INITIAL    Collection Time: 12/17/21  7:54 AM   Result Value Ref Range    Ventricular Rate 72 BPM    Atrial Rate 72 BPM    P-R Interval 156 ms    QRS Duration 108 ms    Q-T Interval 428 ms    QTC Calculation (Bezet) 468 ms    Calculated P Axis 38 degrees    Calculated R Axis -23 degrees    Calculated T Axis 26 degrees    Diagnosis       Normal sinus rhythm  Moderate voltage criteria for LVH, may be normal variant ( R in aVL , [place]   product )  Cannot rule out Anterior infarct , age undetermined  Abnormal ECG  When compared with ECG of 10-DEC-2019 16:40,  No significant change was found  Confirmed by Niya Samaniego ([id number]) on 12/17/2021 8:24:45 AM     CBC WITH AUTOMATED DIFF    Collection Time: 12/17/21  8:05 AM   Result Value Ref Range    WBC 6.2 4.6 - 13.2 K/uL    RBC 5.18 4.20 - 5.30 M/uL    HGB 13.9 12.0 - 16.0 g/dL    HCT 45.6 (H) 35.0 - 45.0 %    MCV 88.0 78.0 - 100.0 FL    MCH 26.8 24.0 - 34.0 PG    MCHC 30.5 (L) 31.0 - 37.0 g/dL    RDW 15.8 (H) 11.6 - 14.5 %    PLATELET 106 016 - 632 K/uL    MPV 11.6 9.2 - 11.8 FL    NRBC 0.0 0  WBC    ABSOLUTE NRBC 0.00 0.00 - 0.01 K/uL NEUTROPHILS 68 40 - 73 %    LYMPHOCYTES 23 21 - 52 %    MONOCYTES 5 3 - 10 %    EOSINOPHILS 4 0 - 5 %    BASOPHILS 1 0 - 2 %    IMMATURE GRANULOCYTES 0 0.0 - 0.5 %    ABS. NEUTROPHILS 4.2 1.8 - 8.0 K/UL    ABS. LYMPHOCYTES 1.4 0.9 - 3.6 K/UL    ABS. MONOCYTES 0.3 0.05 - 1.2 K/UL    ABS. EOSINOPHILS 0.2 0.0 - 0.4 K/UL    ABS. BASOPHILS 0.0 0.0 - 0.1 K/UL    ABS. IMM. GRANS. 0.0 0.00 - 0.04 K/UL    DF AUTOMATED     METABOLIC PANEL, COMPREHENSIVE    Collection Time: 12/17/21  8:05 AM   Result Value Ref Range    Sodium 138 136 - 145 mmol/L    Potassium 3.9 3.5 - 5.5 mmol/L    Chloride 102 100 - 111 mmol/L    CO2 32 21 - 32 mmol/L    Anion gap 4 3.0 - 18 mmol/L    Glucose 459 (HH) 74 - 99 mg/dL    BUN 16 7.0 - 18 MG/DL    Creatinine 1.05 0.6 - 1.3 MG/DL    BUN/Creatinine ratio 15 12 - 20      GFR est AA >60 >60 ml/min/1.73m2    GFR est non-AA 55 (L) >60 ml/min/1.73m2    Calcium 9.4 8.5 - 10.1 MG/DL    Bilirubin, total 0.3 0.2 - 1.0 MG/DL    ALT (SGPT) 20 13 - 56 U/L    AST (SGOT) 11 10 - 38 U/L    Alk. phosphatase 144 (H) 45 - 117 U/L    Protein, total 8.6 (H) 6.4 - 8.2 g/dL    Albumin 3.5 3.4 - 5.0 g/dL    Globulin 5.1 (H) 2.0 - 4.0 g/dL    A-G Ratio 0.7 (L) 0.8 - 1.7     CARDIAC PANEL,(CK, CKMB & TROPONIN)    Collection Time: 12/17/21  8:05 AM   Result Value Ref Range    CK - MB <1.0 <3.6 ng/ml    CK-MB Index  0.0 - 4.0 %     CALCULATION NOT PERFORMED WHEN RESULT IS BELOW LINEAR LIMIT     26 - 192 U/L    Troponin-High Sensitivity SEE REORDER ng/L   TROPONIN-HIGH SENSITIVITY    Collection Time: 12/17/21  8:05 AM   Result Value Ref Range    Troponin-High Sensitivity 5 0 - 54 ng/L   PROTHROMBIN TIME + INR    Collection Time: 12/17/21  8:05 AM   Result Value Ref Range    Prothrombin time 12.9 11.5 - 15.2 sec    INR 1.0 0.8 - 1.2           Radiology studies:   Head CT 12/70/2021 normal, no acute intracranial pathology.     55 minutes were spent on the patient of which more than 50% was spent in coordination of care and counseling (time spent with patient/family face to face, physical exam, reviewing laboratory and imaging investigations, speaking with physicians and nursing staff involved in this patient's care)    Assessment: This is a 49-year-old woman, with history of diabetes, hypertension, headaches cluster/migraine headaches, anxiety, morbid obesity, evaluated today for 2 to 3 days history of headaches with nausea and light sensitivity  episodic vertiginous symptoms and lightheadedness/presyncope this morning. The patient neurological exam is nonfocal, gait is cautioned, antalgic. No cerebellar findings and no abnormality. Most likely the patient's symptoms are due to a migraine phenomenon. None of the symptoms associated with neurological exam suggest a central nervous system etiology. Plan:     -Follow-up with outpatient neurology for headache management  -Do not skip meals, good hydration  -Agree with IV normal saline bolus now 1 L  -Fioricet 1 to 2 pills every 8 hours as needed for headaches however do not exceed 10 times a month  -Zofran 4 mg as needed nausea  -Can try supplement for migraine prophylaxis as follow: 90 to 400 mg twice a day plus vitamin B2 200 mg twice a day. She can also try as needed melatonin.       Kye Ramsay MD  Adult Neurologist  12/17/2021

## 2021-12-21 ENCOUNTER — TRANSCRIBE ORDER (OUTPATIENT)
Dept: SCHEDULING | Age: 51
End: 2021-12-21

## 2021-12-21 DIAGNOSIS — Z12.31 VISIT FOR SCREENING MAMMOGRAM: Primary | ICD-10-CM

## 2022-01-04 ENCOUNTER — HOSPITAL ENCOUNTER (EMERGENCY)
Age: 52
Discharge: HOME | End: 2022-01-04
Attending: EMERGENCY MEDICINE
Payer: MEDICAID

## 2022-01-04 ENCOUNTER — APPOINTMENT (OUTPATIENT)
Dept: CT IMAGING | Age: 52
End: 2022-01-04
Attending: STUDENT IN AN ORGANIZED HEALTH CARE EDUCATION/TRAINING PROGRAM
Payer: MEDICAID

## 2022-01-04 ENCOUNTER — APPOINTMENT (OUTPATIENT)
Dept: GENERAL RADIOLOGY | Age: 52
End: 2022-01-04
Attending: STUDENT IN AN ORGANIZED HEALTH CARE EDUCATION/TRAINING PROGRAM
Payer: MEDICAID

## 2022-01-04 VITALS
HEART RATE: 108 BPM | DIASTOLIC BLOOD PRESSURE: 100 MMHG | RESPIRATION RATE: 16 BRPM | TEMPERATURE: 98.4 F | OXYGEN SATURATION: 97 % | SYSTOLIC BLOOD PRESSURE: 138 MMHG

## 2022-01-04 DIAGNOSIS — U07.1 PNEUMONIA DUE TO COVID-19 VIRUS: Primary | ICD-10-CM

## 2022-01-04 DIAGNOSIS — J12.82 PNEUMONIA DUE TO COVID-19 VIRUS: Primary | ICD-10-CM

## 2022-01-04 LAB
ALBUMIN SERPL-MCNC: 3.1 G/DL (ref 3.5–5)
ALBUMIN/GLOB SERPL: 0.5 {RATIO} (ref 1.1–2.2)
ALP SERPL-CCNC: 100 U/L (ref 45–117)
ALT SERPL-CCNC: 22 U/L (ref 12–78)
ANION GAP SERPL CALC-SCNC: 14 MMOL/L (ref 5–15)
AST SERPL-CCNC: 34 U/L (ref 15–37)
ATRIAL RATE: 122 BPM
BASOPHILS # BLD: 0 K/UL (ref 0–0.1)
BASOPHILS NFR BLD: 0 % (ref 0–1)
BILIRUB SERPL-MCNC: 0.6 MG/DL (ref 0.2–1)
BNP SERPL-MCNC: 198 PG/ML
BUN SERPL-MCNC: 30 MG/DL (ref 6–20)
BUN/CREAT SERPL: 22 (ref 12–20)
CALCIUM SERPL-MCNC: 9.9 MG/DL (ref 8.5–10.1)
CALCULATED P AXIS, ECG09: 63 DEGREES
CALCULATED R AXIS, ECG10: 17 DEGREES
CALCULATED T AXIS, ECG11: 40 DEGREES
CHLORIDE SERPL-SCNC: 95 MMOL/L (ref 97–108)
CO2 SERPL-SCNC: 23 MMOL/L (ref 21–32)
COMMENT, HOLDF: NORMAL
CREAT SERPL-MCNC: 1.35 MG/DL (ref 0.55–1.02)
D DIMER PPP FEU-MCNC: 1.56 MG/L FEU (ref 0–0.65)
DIAGNOSIS, 93000: NORMAL
DIFFERENTIAL METHOD BLD: ABNORMAL
EOSINOPHIL # BLD: 0.1 K/UL (ref 0–0.4)
EOSINOPHIL NFR BLD: 1 % (ref 0–7)
ERYTHROCYTE [DISTWIDTH] IN BLOOD BY AUTOMATED COUNT: 16 % (ref 11.5–14.5)
GLOBULIN SER CALC-MCNC: 5.9 G/DL (ref 2–4)
GLUCOSE SERPL-MCNC: 484 MG/DL (ref 65–100)
HCT VFR BLD AUTO: 44.9 % (ref 35–47)
HGB BLD-MCNC: 14.3 G/DL (ref 11.5–16)
IMM GRANULOCYTES # BLD AUTO: 0 K/UL
IMM GRANULOCYTES NFR BLD AUTO: 0 %
LIPASE SERPL-CCNC: 99 U/L (ref 73–393)
LYMPHOCYTES # BLD: 1.2 K/UL (ref 0.8–3.5)
LYMPHOCYTES NFR BLD: 14 % (ref 12–49)
MCH RBC QN AUTO: 27.5 PG (ref 26–34)
MCHC RBC AUTO-ENTMCNC: 31.8 G/DL (ref 30–36.5)
MCV RBC AUTO: 86.3 FL (ref 80–99)
METAMYELOCYTES NFR BLD MANUAL: 2 %
MONOCYTES # BLD: 1 K/UL (ref 0–1)
MONOCYTES NFR BLD: 11 % (ref 5–13)
NEUTS SEG # BLD: 6.3 K/UL (ref 1.8–8)
NEUTS SEG NFR BLD: 72 % (ref 32–75)
NRBC # BLD: 0 K/UL (ref 0–0.01)
NRBC BLD-RTO: 0 PER 100 WBC
P-R INTERVAL, ECG05: 150 MS
PLATELET # BLD AUTO: 274 K/UL (ref 150–400)
PMV BLD AUTO: 12.1 FL (ref 8.9–12.9)
POTASSIUM SERPL-SCNC: 4.2 MMOL/L (ref 3.5–5.1)
PROT SERPL-MCNC: 9 G/DL (ref 6.4–8.2)
Q-T INTERVAL, ECG07: 330 MS
QRS DURATION, ECG06: 92 MS
QTC CALCULATION (BEZET), ECG08: 470 MS
RBC # BLD AUTO: 5.2 M/UL (ref 3.8–5.2)
RBC MORPH BLD: ABNORMAL
SAMPLES BEING HELD,HOLD: NORMAL
SARS-COV-2, COV2: NORMAL
SODIUM SERPL-SCNC: 132 MMOL/L (ref 136–145)
TROPONIN-HIGH SENSITIVITY: 6 NG/L (ref 0–51)
VENTRICULAR RATE, ECG03: 122 BPM
WBC # BLD AUTO: 8.8 K/UL (ref 3.6–11)

## 2022-01-04 PROCEDURE — 74011250636 HC RX REV CODE- 250/636: Performed by: STUDENT IN AN ORGANIZED HEALTH CARE EDUCATION/TRAINING PROGRAM

## 2022-01-04 PROCEDURE — 96361 HYDRATE IV INFUSION ADD-ON: CPT

## 2022-01-04 PROCEDURE — 73630 X-RAY EXAM OF FOOT: CPT

## 2022-01-04 PROCEDURE — 85379 FIBRIN DEGRADATION QUANT: CPT

## 2022-01-04 PROCEDURE — 83880 ASSAY OF NATRIURETIC PEPTIDE: CPT

## 2022-01-04 PROCEDURE — 85025 COMPLETE CBC W/AUTO DIFF WBC: CPT

## 2022-01-04 PROCEDURE — 84484 ASSAY OF TROPONIN QUANT: CPT

## 2022-01-04 PROCEDURE — 36415 COLL VENOUS BLD VENIPUNCTURE: CPT

## 2022-01-04 PROCEDURE — 74011000636 HC RX REV CODE- 636: Performed by: RADIOLOGY

## 2022-01-04 PROCEDURE — 83690 ASSAY OF LIPASE: CPT

## 2022-01-04 PROCEDURE — U0005 INFEC AGEN DETEC AMPLI PROBE: HCPCS

## 2022-01-04 PROCEDURE — 96374 THER/PROPH/DIAG INJ IV PUSH: CPT

## 2022-01-04 PROCEDURE — 93005 ELECTROCARDIOGRAM TRACING: CPT

## 2022-01-04 PROCEDURE — 71275 CT ANGIOGRAPHY CHEST: CPT

## 2022-01-04 PROCEDURE — 80053 COMPREHEN METABOLIC PANEL: CPT

## 2022-01-04 PROCEDURE — 99283 EMERGENCY DEPT VISIT LOW MDM: CPT

## 2022-01-04 RX ORDER — KETOROLAC TROMETHAMINE 30 MG/ML
30 INJECTION, SOLUTION INTRAMUSCULAR; INTRAVENOUS ONCE
Status: COMPLETED | OUTPATIENT
Start: 2022-01-04 | End: 2022-01-04

## 2022-01-04 RX ORDER — BENZONATATE 100 MG/1
100 CAPSULE ORAL
Qty: 30 CAPSULE | Refills: 0 | Status: SHIPPED | OUTPATIENT
Start: 2022-01-04 | End: 2022-01-11

## 2022-01-04 RX ORDER — ALBUTEROL SULFATE 90 UG/1
2 AEROSOL, METERED RESPIRATORY (INHALATION)
Qty: 18 G | Refills: 0 | Status: SHIPPED | OUTPATIENT
Start: 2022-01-04 | End: 2022-08-29

## 2022-01-04 RX ADMIN — SODIUM CHLORIDE 1000 ML: 9 INJECTION, SOLUTION INTRAVENOUS at 15:25

## 2022-01-04 RX ADMIN — SODIUM CHLORIDE 1000 ML: 9 INJECTION, SOLUTION INTRAVENOUS at 17:34

## 2022-01-04 RX ADMIN — IOPAMIDOL 80 ML: 755 INJECTION, SOLUTION INTRAVENOUS at 15:59

## 2022-01-04 RX ADMIN — KETOROLAC TROMETHAMINE 30 MG: 30 INJECTION, SOLUTION INTRAMUSCULAR; INTRAVENOUS at 15:26

## 2022-01-04 NOTE — DISCHARGE INSTRUCTIONS
You presented to the ER tonight with symptoms concerning for COVID-19. Alternate tylenol and ibuprofen as needed for fever/bodyaches/chills. Take tessalon perles as needed for cough. Use albuterol inhaler as needed for shortness of breath/wheezing/cough. Recommend using pulse oximeter in order to monitor oxygen saturation. If your oxygen saturation goes below 90% at rest, please return to ER.

## 2022-01-04 NOTE — ED PROVIDER NOTES
Patient is a 57-year-old female with a past medical history of Crohn's, diabetes, hypertension, hyperlipidemia, pulmonary embolism who presents to ED complaining of chest pain and shortness of breath which started on 12/27/2021. Patient reports her daughter was diagnosed with COVID-19. States she developed fatigue, fever, chills, nasal congestion and cough initially. Reports her symptoms have been worsening and 2 days ago she developed worsening shortness of breath, chest pain and palpitations. States the chest pain and palpitations have been worsening over the past 2 days and have become constant causing patient to be unable to ambulate secondary to generalized weakness and a feeling of near syncope. She reports she has been unable to get her heart rate down despite managing her fever. States this is similar to when she had a PE in the past.  She also complains of left foot pain and ecchymosis. She denies any abdominal pain, diarrhea, urinary symptoms. She is been taking Tylenol and ibuprofen with only minor improvement of symptoms. She received the first 2 doses of COVID-19 vaccine with last dose in April 2021.            Past Medical History:   Diagnosis Date    Bilateral knee pain     [facility]'s [facility] ([facility]     Diabetes ([facility]     HTN (hypertension)     Hypercholesterolemia     Pulmonary embolism ([facility] 03/2019    Stool color black     crohns       Past Surgical History:   Procedure Laterality Date    COLONOSCOPY N/A 10/21/2019    COLONOSCOPY w/ bxs performed by Kimberley Ch MD at [address] COLONOSCOPY N/A 9/22/2020    COLONOSCOPY with bx's performed by Rubén Brown MD [address] HX GI      HX ORTHOPAEDIC      fx left ankle ORIF    HX TUBAL LIGATION      btl         Family History:   Problem Relation Age of Onset    Diabetes Mother     Hypertension Mother     Diabetes Father     Hypertension Father     Heart Disease Father        Social History     Socioeconomic History  Marital status: SINGLE     Spouse name: Not on file    Number of children: Not on file    Years of education: Not on file    Highest education level: Not on file   Occupational History    Not on file   Tobacco Use    Smoking status: Never Smoker    Smokeless tobacco: Never Used   Substance and Sexual Activity    Alcohol use: Never    Drug use: Never    Sexual activity: Not Currently   Other Topics Concern    Not on file   Social History Narrative    Not on file     Social Determinants of Health     Financial Resource Strain:     Difficulty of Paying Living Expenses: Not on file   Food Insecurity:     Worried About Running Out of Food in the Last Year: Not on file    Boyd of Food in the Last Year: Not on file   Transportation Needs:     Lack of Transportation (Medical): Not on file    Lack of Transportation (Non-Medical): Not on file   Physical Activity:     Days of Exercise per Week: Not on file    Minutes of Exercise per Session: Not on file   Stress:     Feeling of Stress : Not on file   Social Connections:     Frequency of Communication with Friends and Family: Not on file    Frequency of Social Gatherings with Friends and Family: Not on file    Attends Synagogue Services: Not on file    Active Member of [address] [place] [address] [employer] or Organizations: Not on file    Attends Club or Organization Meetings: Not on file    Marital Status: Not on file   Intimate Partner Violence:     Fear of Current or Ex-Partner: Not on file    Emotionally Abused: Not on file    Physically Abused: Not on file    Sexually Abused: Not on file   Housing Stability:     Unable to Pay for Housing in the Last Year: Not on file    Number of [place] in the Last Year: Not on file    Unstable Housing in the Last Year: Not on file         ALLERGIES: Metronidazole hcl    Review of Systems   Constitutional: Positive for activity change, appetite change, chills, fatigue and fever. HENT: Negative for congestion and sore throat. Eyes: Negative for pain and visual disturbance. Respiratory: Positive for cough and shortness of breath. Cardiovascular: Positive for chest pain, palpitations and leg swelling. Gastrointestinal: Negative for abdominal distention, abdominal pain, constipation, diarrhea, nausea and vomiting. Genitourinary: Negative for decreased urine volume, dysuria, flank pain, frequency and urgency. Musculoskeletal: Positive for arthralgias and gait problem. Negative for back pain and neck pain. Skin: Negative for rash and wound. Allergic/Immunologic: Negative for immunocompromised state. Neurological: Negative for dizziness, syncope, weakness, light-headedness, numbness and headaches. Psychiatric/Behavioral: Negative for confusion. All other systems reviewed and are negative. Vitals:    01/04/22 1327 01/04/22 1409   BP: (!) 138/100    Pulse: (!) 124 (!) 126   Resp: 16    Temp: 97.8 °F (36.6 °C) 98.4 °F (36.9 °C)   SpO2: 97% 97%            Physical Exam  Vitals and nursing note reviewed. Constitutional:       General: She is not in acute distress. Appearance: She is well-developed. She is ill-appearing. She is not toxic-appearing. HENT:      Head: Normocephalic and atraumatic. Nose: Nose normal.      Mouth/Throat:      Mouth: Mucous membranes are moist.   Eyes:      General: Lids are normal.      Extraocular Movements: Extraocular movements intact. Conjunctiva/sclera: Conjunctivae normal.   Cardiovascular:      Rate and Rhythm: Regular rhythm. Tachycardia present. Pulses: Normal pulses. Heart sounds: Normal heart sounds, S1 normal and S2 normal.   Pulmonary:      Effort: Tachypnea present. No accessory muscle usage. Breath sounds: Normal breath sounds. Abdominal:      Palpations: Abdomen is soft. Tenderness: There is no abdominal tenderness. There is no guarding or rebound. Musculoskeletal:         General: Normal range of motion.       Cervical back: Normal range of motion and neck supple. Comments: Tenderness to dorsal aspect of left foot near 3rd and 4th metacarpal with ecchymosis noted. Limited ROM and exam secondary to pain. Skin:     General: Skin is warm and dry. Capillary Refill: Capillary refill takes less than 2 seconds. Neurological:      General: No focal deficit present. Mental Status: She is alert and oriented to person, place, and time. Mental status is at baseline. Psychiatric:         Attention and Perception: Attention normal.         Mood and Affect: Mood and affect normal.         Speech: Speech normal.         Behavior: Behavior is cooperative. Thought Content: Thought content normal.         Cognition and Memory: Cognition normal.         Judgment: Judgment normal.          MDM  Number of Diagnoses or Management Options  Pneumonia due to COVID-19 virus  Diagnosis management comments: Patient with COVID pneumonia. No PE seen on CTA. Patient appears dehydrated on CMP. Given 2L of IV fluids and states she feels much improved prior to d/c. Discussed symptomatic care and close follow-up with PCP. Return to ER warnings discussed in detail with patient. All questions addressed and answered.        Amount and/or Complexity of Data Reviewed  Clinical lab tests: reviewed  Tests in the radiology section of CPT®: reviewed  Discuss the patient with other providers: yes (Dr. Lesli Rivera, ED attending )           Procedures

## 2022-01-05 ENCOUNTER — PATIENT OUTREACH (OUTPATIENT)
Dept: CASE MANAGEMENT | Age: 52
End: 2022-01-05

## 2022-01-05 LAB
SARS-COV-2, XPLCVT: DETECTED
SOURCE, COVRS: ABNORMAL

## 2022-01-05 NOTE — PROGRESS NOTES
Patient resolved from Transition of Care episode on 01/05/22  . ACM/CTN was unsuccessful at contacting this patient today. Patient has not had any additional ED or hospital visits. No further outreach scheduled with this CTN/ACM. Episode of Care resolved. Patient has this CTN/ACM contact information if future needs arise.

## 2022-01-14 DIAGNOSIS — E11.40 TYPE 2 DIABETES MELLITUS WITH DIABETIC NEUROPATHY, WITH LONG-TERM CURRENT USE OF INSULIN (HCC): Primary | ICD-10-CM

## 2022-01-14 DIAGNOSIS — Z79.4 TYPE 2 DIABETES MELLITUS WITH DIABETIC NEUROPATHY, WITH LONG-TERM CURRENT USE OF INSULIN (HCC): Primary | ICD-10-CM

## 2022-01-14 RX ORDER — PREGABALIN 200 MG/1
CAPSULE ORAL
COMMUNITY
Start: 2021-12-01 | End: 2022-01-14 | Stop reason: SDUPTHER

## 2022-01-17 RX ORDER — PREGABALIN 200 MG/1
200 CAPSULE ORAL 3 TIMES DAILY
Qty: 90 CAPSULE | Refills: 2 | Status: SHIPPED | OUTPATIENT
Start: 2022-01-17 | End: 2022-02-16

## 2022-01-31 ENCOUNTER — OFFICE VISIT (OUTPATIENT)
Dept: ORTHOPEDIC SURGERY | Age: 52
End: 2022-01-31
Payer: MEDICAID

## 2022-01-31 VITALS — TEMPERATURE: 97.7 F | BODY MASS INDEX: 39.68 KG/M2 | WEIGHT: 293 LBS | HEIGHT: 72 IN

## 2022-01-31 DIAGNOSIS — M17.11 PRIMARY OSTEOARTHRITIS OF RIGHT KNEE: Primary | ICD-10-CM

## 2022-01-31 DIAGNOSIS — M17.12 PRIMARY OSTEOARTHRITIS OF LEFT KNEE: ICD-10-CM

## 2022-01-31 PROCEDURE — 20611 DRAIN/INJ JOINT/BURSA W/US: CPT | Performed by: ORTHOPAEDIC SURGERY

## 2022-01-31 RX ORDER — TRIAMCINOLONE ACETONIDE 40 MG/ML
40 INJECTION, SUSPENSION INTRA-ARTICULAR; INTRAMUSCULAR ONCE
Status: COMPLETED | OUTPATIENT
Start: 2022-01-31 | End: 2022-01-31

## 2022-01-31 RX ADMIN — TRIAMCINOLONE ACETONIDE 40 MG: 40 INJECTION, SUSPENSION INTRA-ARTICULAR; INTRAMUSCULAR at 15:57

## 2022-01-31 NOTE — PROGRESS NOTES
Marques Salmon  1970   Chief Complaint   Patient presents with    Knee Pain     bilat        HISTORY OF PRESENT ILLNESS  Marques Salmon is a 46 y.o. female who presents today for reevaluation of b/l knee R>L. Patient rates pain as 7/10 today. At last OV on 6/8/2021, patient had a b/l knee cortisone injection which provided relief but the pain has returned. She has fallen 4 times since the last OV. Pain with prolonged sitting and standing. She has swelling today. Patient denies any fever, chills, chest pain, shortness of breath or calf pain. The remainder of the review of systems is negative. There are no new illness or injuries to report since last seen in the office. There are no changes to medications, allergies, family or social history. Pain Assessment  1/31/2022   Location of Pain Knee   Pain Location Comment -   Location Modifiers Left;Right   Severity of Pain 6   Quality of Pain Aching;Dull   Quality of Pain Comment -   Duration of Pain -   Frequency of Pain Constant   Aggravating Factors Walking;Standing   Aggravating Factors Comment -   Limiting Behavior Yes   Relieving Factors Nothing   Relieving Factors Comment -   Result of Injury No   Work-Related Injury -   Type of Injury -       PHYSICAL EXAM:   Visit Vitals  Temp 97.7 °F (36.5 °C)   Ht 6' (1.829 m)   Wt 298 lb (135.2 kg)   BMI 40.42 kg/m²     The patient is a well-developed, well-nourished female   in no acute distress. The patient is alert and oriented times three. The patient is alert and oriented times three. Mood and affect are normal.  LYMPHATIC: lymph nodes are not enlarged and are within normal limits  SKIN: normal in color and non tender to palpation. There are no bruises or abrasions noted. NEUROLOGICAL: Motor sensory exam is within normal limits. Reflexes are equal bilaterally.  There is normal sensation to pinprick and light touch  MUSCULOSKELETAL:  Examination Left knee Right knee   Skin Intact Intact   Range of motion 0-130 0-130   Effusion + +   Medial joint line tenderness + +   Lateral joint line tenderness + +   Tenderness Pes Bursa - -   Tenderness insertion MCL - -   Tenderness insertion LCL - -   Stewarts - -   Patella crepitus + +   Patella grind + +   Lachman - -   Pivot shift - -   Anterior drawer - -   Posterior drawer - -   Varus stress - -   Valgus stress - -   Neurovascular Intact Intact   Calf Swelling and Tenderness to Palpation - -   Arturo's Test - -   Hamstring Cord Tightness - -         PROCEDURE:  Right Knee Injection with Ultrasound Guidance    Indication:right  Knee pain/swelling    After sterile prep, 4 cc of Xylocaine and 1 cc of Kenalog were injected into the right  Knee. Intra-articular Ultrasound images captured using [employer] Ultrasound machine using a frequency of 10 MHz with a linear transducer and scanned into patient's chart.            [facility] - [facility]  OFFICE PROCEDURE PROGRESS NOTE        Chart reviewed for the following:  Faustino Pena M.D, have reviewed the History, Physical and updated the Allergic reactions for [facility] performed immediately prior to start of procedure:  Faustino Pena M.D, have performed the following reviews on [facility] prior to the start of the procedure:            * Patient was identified by name and date of birth   * Agreement on procedure being performed was verified  * Risks and Benefits explained to the patient  * Procedure site verified and marked as necessary  * Patient was positioned for comfort  * Needle placement confirmed by ultrasound  * Consent was signed and verified     Time: 4:25 PM     Date of procedure: 1/31/2022    Procedure performed by:  Vijaya dEwards M.D    Provider assisted by: (see medication administration)    How tolerated by patient: tolerated the procedure well with no complications    Comments: none      IMAGING: XR of right knee obtained in the office dated 1/28/2020 was reviewed and read by Dr. Raphael Connor: Severe degenerative arthritis in the medial compartment and patellofemoral joint.         XR of left knee from [facility] dated 10/31/19 was reviewed and read by Dr. Raphael Connor: Mild to moderate DJD with joint effusion. IMPRESSION:      ICD-10-CM ICD-9-CM    1. Primary osteoarthritis of right knee  M17.11 715.16 ARTHROCENTESIS ASPIR&/INJ MAJOR JT/BURSA W/US      triamcinolone acetonide (KENALOG-40) 40 mg/mL injection 40 mg   2. Primary osteoarthritis of left knee  M17.12 715.16         PLAN:   1. Pt presents today with b/l knee pain R>L due to degenerative arthritis with joint space narrowing. I am hopeful a right knee cortisone injection will provide relief. Provided a work note to limit standing and walking for prolonged periods. Return in 3 weeks if pain continues   Risk factors include: dm, htn, BMI>35  2. No ultrasound exam indicated today  3. Yes cortisone injection indicated today R KNEE US  4. No Physical/Occupational Therapy indicated today  5. No diagnostic test indicated today:   6. No durable medical equipment indicated today  7. No referral indicated today  8. No medications indicated today:   9. No Narcotic indicated today        RTC 3 weeks if pain continues      Scribed by Marshal Currie [facility]) as dictated by Lauren Murray MD    I, Dr. Lauren Murray, confirm that all documentation is accurate.     Lauren Murray M.D.   [facility]

## 2022-01-31 NOTE — LETTER
NOTIFICATION RETURN TO WORK / SCHOOL    1/31/2022 3:51 PM    [facility]  [address]  [address] [address]      To Whom It May Concern:    [facility] is currently under the care of GénesisSonido Peter. She was evaluated in the office today and has the following restrictions: no prolonged standing or sitting. If there are questions or concerns please have the patient contact our office.         Sincerely,      Dougie Vivas MD

## 2022-01-31 NOTE — LETTER
NOTIFICATION RETURN TO WORK / SCHOOL    1/31/2022 3:56 PM    [facility]  [address]  [address] [address]      To Whom It May Concern:    [facility] is currently under the care of Alena Peter. She was seen in the office today and has the following restrictions: no prolonged standing or walking     If there are questions or concerns please have the patient contact our office.         Sincerely,      Lauren Murray MD

## 2022-03-07 RX ORDER — AMITRIPTYLINE HYDROCHLORIDE 50 MG/1
50 TABLET, FILM COATED ORAL
Qty: 30 TABLET | Refills: 0 | Status: SHIPPED | OUTPATIENT
Start: 2022-03-07 | End: 2022-04-06

## 2022-03-07 RX ORDER — AMITRIPTYLINE HYDROCHLORIDE 50 MG/1
TABLET, FILM COATED ORAL
COMMUNITY
Start: 2022-01-11 | End: 2022-03-07

## 2022-03-07 RX ORDER — AMITRIPTYLINE HYDROCHLORIDE 50 MG/1
TABLET, FILM COATED ORAL
COMMUNITY
End: 2022-03-07 | Stop reason: SDUPTHER

## 2022-03-19 PROBLEM — I26.99 PULMONARY EMBOLI (HCC): Status: ACTIVE | Noted: 2019-01-12

## 2022-03-19 PROBLEM — Z87.81 STATUS POST OPEN REDUCTION WITH INTERNAL FIXATION (ORIF) OF FRACTURE OF ANKLE: Status: ACTIVE | Noted: 2019-01-12

## 2022-03-19 PROBLEM — E66.01 OBESITY, MORBID (HCC): Status: ACTIVE | Noted: 2019-03-11

## 2022-03-19 PROBLEM — T81.31XA DEHISCENCE OF INCISION, INITIAL ENCOUNTER: Status: ACTIVE | Noted: 2019-01-14

## 2022-03-19 PROBLEM — Z79.4 CONTROLLED TYPE 2 DIABETES MELLITUS WITHOUT COMPLICATION, WITH LONG-TERM CURRENT USE OF INSULIN (HCC): Status: ACTIVE | Noted: 2019-05-16

## 2022-03-19 PROBLEM — K50.10 CROHN'S COLITIS (HCC): Status: ACTIVE | Noted: 2019-01-12

## 2022-03-19 PROBLEM — I10 ACCELERATED HYPERTENSION: Status: ACTIVE | Noted: 2019-01-12

## 2022-03-19 PROBLEM — S91.002A WOUND OF LEFT ANKLE: Status: ACTIVE | Noted: 2019-01-12

## 2022-03-19 PROBLEM — Z98.890 STATUS POST OPEN REDUCTION WITH INTERNAL FIXATION (ORIF) OF FRACTURE OF ANKLE: Status: ACTIVE | Noted: 2019-01-12

## 2022-03-19 PROBLEM — E11.40 TYPE 2 DIABETES MELLITUS WITH DIABETIC NEUROPATHY (HCC): Status: ACTIVE | Noted: 2019-06-27

## 2022-03-19 PROBLEM — E11.9 CONTROLLED TYPE 2 DIABETES MELLITUS WITHOUT COMPLICATION, WITH LONG-TERM CURRENT USE OF INSULIN (HCC): Status: ACTIVE | Noted: 2019-05-16

## 2022-04-19 ENCOUNTER — VIRTUAL VISIT (OUTPATIENT)
Dept: NEUROLOGY | Age: 52
End: 2022-04-19
Payer: MEDICAID

## 2022-04-19 DIAGNOSIS — E11.40 TYPE 2 DIABETES MELLITUS WITH DIABETIC NEUROPATHY, WITH LONG-TERM CURRENT USE OF INSULIN (HCC): Primary | ICD-10-CM

## 2022-04-19 DIAGNOSIS — G44.221 CHRONIC TENSION-TYPE HEADACHE, INTRACTABLE: ICD-10-CM

## 2022-04-19 DIAGNOSIS — Z79.4 TYPE 2 DIABETES MELLITUS WITH DIABETIC NEUROPATHY, WITH LONG-TERM CURRENT USE OF INSULIN (HCC): Primary | ICD-10-CM

## 2022-04-19 PROCEDURE — 99214 OFFICE O/P EST MOD 30 MIN: CPT | Performed by: STUDENT IN AN ORGANIZED HEALTH CARE EDUCATION/TRAINING PROGRAM

## 2022-04-19 RX ORDER — OXYCODONE AND ACETAMINOPHEN 10; 325 MG/1; MG/1
1 TABLET ORAL
COMMUNITY

## 2022-04-19 RX ORDER — PREGABALIN 200 MG/1
200 CAPSULE ORAL 3 TIMES DAILY
Qty: 90 CAPSULE | Refills: 6 | Status: SHIPPED | OUTPATIENT
Start: 2022-04-19 | End: 2022-05-19

## 2022-04-19 RX ORDER — PREGABALIN 200 MG/1
200 CAPSULE ORAL 3 TIMES DAILY
COMMUNITY
End: 2022-04-19 | Stop reason: SDUPTHER

## 2022-04-19 NOTE — PROGRESS NOTES
[facility] is a 46 y.o. female on virtual visit today for follow-up on neuropathy and migraines. 1. Have you been to the ER, urgent care clinic since your last visit? Hospitalized since your last visit? Yes Reason for visit: [facility] ED, HTN, 3/2022    2. Have you seen or consulted any other health care providers outside of the [address] [place] [address] since your last visit? Include any pap smears or colon screening. Yes Reason for visit: [facility] ED     Mobile number [phone] will be used for today's visit.

## 2022-04-19 NOTE — PROGRESS NOTES
Malcom Cantrell is a 46 y.o. female, evaluated via audio-only technology on 4/19/2022 for Migraine and Peripheral Neuropathy  . Assessment & Plan:   Diagnoses and all orders for this visit:    1. Type 2 diabetes mellitus with diabetic neuropathy, with long-term current use of insulin (HCC)  -     pregabalin (LYRICA) 200 mg capsule; Take 1 Capsule by mouth three (3) times daily for 30 days. Max Daily Amount: 600 mg.    2. Chronic tension-type headache, intractable    Pregabalin helps for neuropathy symptoms. She said no obvious difference with Elavil for both the headache and the neuropathy. We will continue with pregabalin 200 mg p.o. 3 times daily. We will discontinue the Elavil. I have advised her on blood sugar control. Since the headache is mild at this time, she will continue with over-the-counter medications and need to avoid overuse. But the patient currently is following with pain management for her left ankle pain; she has as needed Percocet. We will see her again in 6 months time. 12  Subjective:   A 46years old female patient here for follow-up of diabetic neuropathy and headache. Last seen over virtual encounter in June 2021. She is currently on pregabalin 200 mg p.o. 3 times daily and Elavil 50 mg p.o. daily. She ran out of 1 medications last week. She continues to have the tingling sensation and intermittent burning both lower extremities. More worse at nighttime. She has some difficulty walking because of her leg left ankle; she uses a walker. No obvious lower extremity weakness currently. No problem with her balance that is uses a walker. No recent falls. Since last visit, she had COVID infection around December 2021; not admitted to the hospital but had respiratory problems; currently symptoms have resolved completely. Her hemoglobin A1c from December was 11.4; she says she had another test since then with hemoglobin A1c around 8  Headache currently is much better.   She has 3-4 headaches per month; takes Excedrin. Headache is from 1 to 3 hours. Mild nausea and mild photophobia. Frontal and pressure-like pain. She did not see any difference when using the Elavil. She has been to the emergency room in December for dizziness. Had a CT scan of the head which revealed acute changes. Prior to Admission medications    Medication Sig Start Date End Date Taking? Authorizing Provider   pregabalin (LYRICA) 200 mg capsule Take 1 Capsule by mouth three (3) times daily for 30 days. Max Daily Amount: 600 mg. 4/19/22 5/19/22 Yes Merry JIM MD   albuterol (ProAir HFA) 90 mcg/actuation inhaler Take 2 Puffs by inhalation every four (4) hours as needed for Wheezing. 1/4/22  Yes Christin Schwab, PA   [facility]) by SubCUTAneous route. Yes Provider, Historical   naloxone (NARCAN) 4 mg/actuation nasal spray Use 1 spray intranasally, then discard. Repeat with new spray every 2 min as needed for opioid overdose symptoms, alternating nostrils. 12/9/19  Yes Yulissa Patel MD   ALPRAZolam Mukesh Snyder) 0.5 mg tablet TAKE 1 TAB BY MOUTH TWICE A DAY AS NEEDED 5/3/19  Yes Provider, Historical   budesonide (ENTOCORT EC) 3 mg capsule TAKE 1 CAPSULE BY MOUTH 3 TIMES DAILY 6/25/19  Yes Provider, Historical   dicyclomine (BENTYL) 10 mg capsule TAKE 1 CAPSULE BY MOUTH EVERY 6 HOURS AS NEEDED FOR PAIN 6/25/19  Yes Provider, Historical   ergocalciferol (ERGOCALCIFEROL) 50,000 unit capsule TAKE 1 (ONE) CAPSULE BY MOUTH ONCE A WEEK 6/21/19  Yes Provider, Historical   loperamide (IMODIUM A-D) 2 mg tablet Take 1 Tab by mouth three (3) times daily as needed.  2/22/19  Yes Provider, Historical   FLORANEX 1 million cell tab tablet TAKE 1 TABLET BY MOUTH EVERY DAY. 4/4/19  Yes Provider, Historical   tiZANidine (ZANAFLEX) 4 mg tablet TAKE 1 (ONE) TABLET BY MOUTH THREE TIMES DAILY AS NEEDED 6/17/19  Yes Provider, Historical   insulin glargine (LANTUS,BASAGLAR) 100 unit/mL (3 mL) inpn 30 Units by SubCUTAneous route nightly. Yes Provider, Historical   ondansetron hcl (ZOFRAN) 4 mg tablet Take 1 Tab by mouth every eight (8) hours as needed for Nausea. 1/20/19  Yes Gabriel Juarez PA-C   hydrALAZINE (APRESOLINE) 100 mg tablet Take 1 Tab by mouth three (3) times daily. 1/20/19  Yes Augusto Chand MD   SITagliptin (JANUVIA) 100 mg tablet Take 1 Tab by mouth daily. 1/21/19  Yes Augusto Chand MD   metoprolol tartrate (LOPRESSOR) 50 mg tablet Take 1 Tab by mouth two (2) times a day. 1/20/19  Yes Augusto Chand MD   atorvastatin (LIPITOR) 20 mg tablet Take 1 Tab by mouth nightly. 1/20/19  Yes Augusto Chand MD   cloNIDine HCl (CATAPRES) 0.1 mg tablet Take 1 Tab by mouth every eight (8) hours as needed. 1/20/19  Yes Augusto Chand MD   hydroCHLOROthiazide (HYDRODIURIL) 25 mg tablet Take 1 Tab by mouth daily. 1/21/19  Yes Augusto Chand MD   pantoprazole (PROTONIX) 40 mg tablet Take 1 Tab by mouth Before breakfast and dinner. 1/20/19  Yes Augusto Chand MD   losartan (COZAAR) 100 mg tablet Take 100 mg by mouth daily. Yes Provider, Historical   azaTHIOprine (IMURAN) 50 mg tablet Take 250 mg by mouth daily. Indications: Crohn's disease   Yes Provider, Historical   oxyCODONE-acetaminophen (PERCOCET 10)  mg per tablet Take 1 Tablet by mouth three (3) times daily as needed.     Provider, Historical     Patient Active Problem List   Diagnosis Code    Pulmonary emboli (HCC) I26.99    Accelerated hypertension I10    Crohn's colitis ([facility] K50.10    Wound of left ankle S91.002A    Status post open reduction with internal fixation (ORIF) of fracture of ankle Z98.890, Z87.81    Dehiscence of incision, initial encounter T81.31XA    Obesity, morbid ([facility] E66.01    Controlled type 2 diabetes mellitus without complication, with long-term current use of insulin ([facility] E11.9, Z79.4    BMI 50.0-59.9, adult ([email or web address] Z68.43    Type 2 diabetes mellitus with diabetic neuropathy ([email or web address] E11.40     Patient Active Problem List    Diagnosis Date Noted    Type 2 diabetes mellitus with diabetic neuropathy ([facility] 06/27/2019    Controlled type 2 diabetes mellitus without complication, with long-term current use of insulin ([facility] 05/16/2019    BMI 50.0-59.9, adult ([facility] 05/16/2019    Obesity, morbid ([facility] 03/11/2019    Dehiscence of incision, initial encounter 01/14/2019    Pulmonary emboli ([facility] 01/12/2019    Accelerated hypertension 01/12/2019    Crohn's colitis ([facility] 01/12/2019    Wound of left ankle 01/12/2019    Status post open reduction with internal fixation (ORIF) of fracture of ankle 01/12/2019     Current Outpatient Medications   Medication Sig Dispense Refill    pregabalin (LYRICA) 200 mg capsule Take 1 Capsule by mouth three (3) times daily for 30 days. Max Daily Amount: 600 mg. 90 Capsule 6    albuterol (ProAir HFA) 90 mcg/actuation inhaler Take 2 Puffs by inhalation every four (4) hours as needed for Wheezing. 18 g 0    ustekinumab (STELARA SC) by SubCUTAneous route.  naloxone (NARCAN) 4 mg/actuation nasal spray Use 1 spray intranasally, then discard. Repeat with new spray every 2 min as needed for opioid overdose symptoms, alternating nostrils. 1 Each 0    ALPRAZolam (XANAX) 0.5 mg tablet TAKE 1 TAB BY MOUTH TWICE A DAY AS NEEDED  0    budesonide (ENTOCORT EC) 3 mg capsule TAKE 1 CAPSULE BY MOUTH 3 TIMES DAILY  1    dicyclomine (BENTYL) 10 mg capsule TAKE 1 CAPSULE BY MOUTH EVERY 6 HOURS AS NEEDED FOR PAIN  1    ergocalciferol (ERGOCALCIFEROL) 50,000 unit capsule TAKE 1 (ONE) CAPSULE BY MOUTH ONCE A WEEK  2    loperamide (IMODIUM A-D) 2 mg tablet Take 1 Tab by mouth three (3) times daily as needed.  FLORANEX 1 million cell tab tablet TAKE 1 TABLET BY MOUTH EVERY DAY.   1    tiZANidine (ZANAFLEX) 4 mg tablet TAKE 1 (ONE) TABLET BY MOUTH THREE TIMES DAILY AS NEEDED  0    insulin glargine (LANTUS,BASAGLAR) 100 unit/mL (3 mL) inpn 30 Units by SubCUTAneous route nightly.  ondansetron hcl (ZOFRAN) 4 mg tablet Take 1 Tab by mouth every eight (8) hours as needed for Nausea. 30 Tab 0    hydrALAZINE (APRESOLINE) 100 mg tablet Take 1 Tab by mouth three (3) times daily. 90 Tab 0    SITagliptin (JANUVIA) 100 mg tablet Take 1 Tab by mouth daily. 30 Tab 0    metoprolol tartrate (LOPRESSOR) 50 mg tablet Take 1 Tab by mouth two (2) times a day. 60 Tab 0    atorvastatin (LIPITOR) 20 mg tablet Take 1 Tab by mouth nightly. 30 Tab 0    cloNIDine HCl (CATAPRES) 0.1 mg tablet Take 1 Tab by mouth every eight (8) hours as needed. 90 Tab 0    hydroCHLOROthiazide (HYDRODIURIL) 25 mg tablet Take 1 Tab by mouth daily. 30 Tab 0    pantoprazole (PROTONIX) 40 mg tablet Take 1 Tab by mouth Before breakfast and dinner. 30 Tab 0    losartan (COZAAR) 100 mg tablet Take 100 mg by mouth daily.  azaTHIOprine (IMURAN) 50 mg tablet Take 250 mg by mouth daily. Indications: Crohn's disease      oxyCODONE-acetaminophen (PERCOCET 10)  mg per tablet Take 1 Tablet by mouth three (3) times daily as needed.        Allergies   Allergen Reactions    Metronidazole Hcl Other (comments)     neuropathy     Past Medical History:   Diagnosis Date    Bilateral knee pain     Cro[facility]'s colitis ([facility]     Diabetes ([facility]     HTN (hypertension)     Hypercholesterolemia     Pulmonary embolism (HCC) 03/2019    Stool color black     crohns     Past Surgical History:   Procedure Laterality Date    COLONOSCOPY N/A 10/21/2019    COLONOSCOPY w/ bxs performed by Thane Claude, MD at [address] COLONOSCOPY N/A 9/22/2020    COLONOSCOPY with bx's performed by Fritz Bermudez MD at [address] HX GI      HX ORTHOPAEDIC      fx left ankle ORIF    HX TUBAL LIGATION      btl     Family History   Problem Relation Age of Onset    Diabetes Mother     Hypertension Mother     Diabetes Father     Hypertension Father     Heart Disease Father      Social History     Tobacco Use    Smoking status: Never Smoker    Smokeless tobacco: Never Used   Substance Use Topics    Alcohol use: Never       Review of Systems   Constitutional: Positive for weight loss. Negative for chills and fever. HENT: Negative for hearing loss and tinnitus. Eyes: Negative for blurred vision (uses glasses) and double vision. Respiratory: Negative for cough and shortness of breath. Cardiovascular: Positive for leg swelling (Sometimes around the left ankle). Negative for chest pain. Gastrointestinal: Positive for nausea (mild). Negative for abdominal pain, blood in stool, diarrhea and vomiting. Hx of Crohn's     Genitourinary: Negative for dysuria, frequency and urgency. Musculoskeletal: Positive for joint pain (LEft ankle and right knee). Negative for back pain and neck pain. Skin: Negative for itching and rash. Neurological: Positive for tingling, sensory change and headaches (frontal and bilateral; occasionally more on the left; more pressure like sensation. ). Negative for dizziness, tremors and focal weakness. Psychiatric/Behavioral: Negative for depression. The patient is not nervous/anxious. Patient-Reported Vitals 4/19/2022   Patient-Reported Weight 276lb   Patient-Reported Height -   Patient-Reported Pulse 76   Patient-Reported Temperature 98.7   Patient-Reported Systolic  134   Patient-Reported Diastolic 70        Neuro exam:      Mental status: Awake, alert, oriented , follows simple and complex commands. Speech and languge: fluent, coherent,  and comprehension intact  CN: EOMI,  no facial asymmetry noted, palate elevation symmetric bilat,  tongue midline  Motor: no pronator drift, symmetric movement of the upper and lower extremities;  Antalgic gait from left ankle pain  Coordination: Intact rapid alternating movements; able to touch her nose and stretch out her fingers without any dysmetria         Richelle Snyder, who was evaluated through a patient-initiated, synchronous (real-time) audio/vifeo encounter, and/or her healthcare decision maker, is aware that it is a billable service, with coverage as determined by her insurance carrier. She provided verbal consent to proceed: Yes. She has not had a related appointment within my department in the past 7 days or scheduled within the next 24 hours.         Luda Estevez MD

## 2022-05-03 NOTE — TELEPHONE ENCOUNTER
Called to discuss CTA results. Person who answered the phone hung up when I identified myself. Sushma Huntley called requesting a refill of the below medication which has been pended for you:       [place] from [place], and [place] reviewed. NORCO 5-325 mg last filled 3/31/22 #30/8 days. Report available for your review. Requested Prescriptions     Pending Prescriptions Disp Refills    HYDROcodone-acetaminophen (NORCO) 5-325 MG per tablet 30 tablet 0     Sig: Take 1 tablet by mouth every 6 hours as needed for Pain for up to 8 days. Last Appointment Date: 4/1/2022  Next Appointment Date: 7/21/2022    Allergies   Allergen Reactions    Chantix [Varenicline] Other (See Comments)     Unusual dreams.

## 2022-05-16 ENCOUNTER — OFFICE VISIT (OUTPATIENT)
Dept: ORTHOPEDIC SURGERY | Age: 52
End: 2022-05-16
Payer: MEDICAID

## 2022-05-16 VITALS — BODY MASS INDEX: 39.68 KG/M2 | TEMPERATURE: 97.1 F | HEIGHT: 72 IN | WEIGHT: 293 LBS

## 2022-05-16 DIAGNOSIS — G89.29 CHRONIC PAIN OF LEFT ANKLE: ICD-10-CM

## 2022-05-16 DIAGNOSIS — M25.572 CHRONIC PAIN OF LEFT ANKLE: ICD-10-CM

## 2022-05-16 DIAGNOSIS — M19.172 POST-TRAUMATIC OSTEOARTHRITIS OF LEFT ANKLE: Primary | ICD-10-CM

## 2022-05-16 DIAGNOSIS — M79.89 LEG SWELLING: ICD-10-CM

## 2022-05-16 DIAGNOSIS — S99.921A INJURY OF RIGHT FOOT, INITIAL ENCOUNTER: ICD-10-CM

## 2022-05-16 DIAGNOSIS — M79.672 LEFT FOOT PAIN: ICD-10-CM

## 2022-05-16 PROBLEM — S82.892G: Status: ACTIVE | Noted: 2022-05-16

## 2022-05-16 PROCEDURE — 73630 X-RAY EXAM OF FOOT: CPT | Performed by: ORTHOPAEDIC SURGERY

## 2022-05-16 PROCEDURE — 73620 X-RAY EXAM OF FOOT: CPT | Performed by: ORTHOPAEDIC SURGERY

## 2022-05-16 PROCEDURE — 73610 X-RAY EXAM OF ANKLE: CPT | Performed by: ORTHOPAEDIC SURGERY

## 2022-05-16 PROCEDURE — 99214 OFFICE O/P EST MOD 30 MIN: CPT | Performed by: ORTHOPAEDIC SURGERY

## 2022-05-16 NOTE — PROGRESS NOTES
AMBULATORY PROGRESS NOTE      Patient: Nawaf Valencia             MRN: [id number]     SSN: [id number] Body mass index is 41.37 kg/m². YOB: 1970     AGE: 46 y.o. EX: female    PCP: Rissa Aguayo MD       IMPRESSION //  DIAGNOSIS AND TREATMENT PLAN        Nawaf Valencia has a diagnosis of:      DIAGNOSES    1. Post-traumatic osteoarthritis of left ankle    2. Chronic pain of left ankle    3. Left foot pain    4. Injury of right foot, initial encounter    5. Leg swelling        Orders Placed This Encounter    Generic Supply Order     Left Custom Trilaminar orthotic w/  Met pad w/ goal to offload forefoot    [address] [49370] Ankle Min 3V     Order Specific Question:   Weight bearing? Answer:   No    [85483] Foot 2V     Order Specific Question:   Weight bearing? Answer:   No    [74614] Foot Min 3V     Order Specific Question:   Weight bearing? Answer:   No    [facility]     Referral Priority:   Routine     Referral Type:   Consultation     Referral Reason:   Specialty Services Required     Referred to Provider:   Marichuy Lerma MD     Number of Visits Requested:   1    REFERRAL TO NEUROLOGY     Referral Priority:   Routine     Referral Type:   Consultation     Referral Reason:   Specialty Services Required     Referred to Provider:   Jaden Najera MD     Requested Specialty:   Neurology    DUPLEX LOWER EXT VENOUS LEFT     Standing Status:   Future     Standing Expiration Date:   6/16/2022          Ken Howell MD has ordered a custom brace or DME product for Nawaf Valencia . This will be customized and made for you by an outside facility. I am requesting that you contact the Orthotist company provided below in order to have the prescription made.  PROSTHETICS AND ORTHOTICS      Nawaf Valencia is in need of CUSTOM   Bilateral     1.  Bilateral: TRILAMINAR ORTHOTIC,  MEDIAL POSTED Bilateral    2. GOAL OF TREATMENT TO: to provide M/L stability, optimal arch support, and limit ML/AP motion. Brayd Lora needs custom trilaminar orthotics, as this will give her better proprioception, better balance, as she does have a history of frequent falls, peripheral neuropathy, and diabetes. She has she has bilateral midfoot OA, without current evidence of Charcot neuroarthropathy at this time. Akua Stevenson has tried other orthopaedic devices (over-the-counter shoe inserts) and each of these has been either ill fitting, poorly tolerated, provided incomplete support, provided insufficient  pain relief. Please look favorably upon Brady Lora and please assist in covering the financial expense of the custom DME. Jai León MD  5/16/2022  9:02 AM          PLAN:    1. Obtain 3-view XR of left ankle, 2-View XR of left foot, 3-View XR of right foot  2. I will order a PVL venous study of her left lower extremity  3. Referral to Dr. Brianna Martinez   4. Dr. Priya Gutierrez    5. DME: Left Custom Trilaminar w/ met pad w/ goal to offload forefoot  6. Continue pain management, with Dr. Alexander Nair      She is a nurse, at [employer]. I did write a note to keep her out of work. She will bring her work for [employer], due to the severity of her OA of her knees and her peripheral neuropathy, her frequent falls, and OA across each midfoot. RTO : after PVL study    Patient Instructions   Dr. Priya Gutierrez: Neurology    Dr. Brianna Martinez: knees    DME: Shara Clear w/ met pad w/ goal to offload forefoot ([facility])     PVL Venous study to check circulation of left leg          Please follow up with your PCP for any health maintenance as recommended         Brady Lora  expresses understanding of the diagnosis, treatment plan, and all of their proposed questions were answered to their satisfaction. Patient education has been provided re the diagnoses.          HPI // [address] [facility] [address] IS A 46 y.o. female who is a/an  established patient, presenting to my outpatient office for evaluation of  the following chief complaint(s):     Chief Complaint   Patient presents with    Ankle Pain     left       At [facility] was last seen and evaluated by my PA , Karen Junior. Since Noah Khan presents today w/ worsening left foot pain and swelling. She has had six falls due to knee pain and her most recent fall was about a month ago. She recalls the dorsal part of her right foot and lateral side of her left foot having purple discoloration and pain after her more recent fall. She went to [facility] and they diagnosed her with a right foot sprain. She reports purple discoloration along the lateral side of her left foot. H/O of peripheral neuropathy , DM, and 5 left foot surgeries, and PE. She is seeing Dr. Deanne Mcallister for bilateral knee OA. She has had injections in her knee , but they didn't alleviate her bilateral knee pain. She goes to pain management w/ Dr. Alessandro Garcia. Visit Vitals  Temp 97.1 °F (36.2 °C)   Ht 6' (1.829 m)   Wt 305 lb (138.3 kg)   BMI 41.37 kg/m²       Appearance: Alert, well appearing and pleasant patient who is in no distress, oriented to person, place/time, and who follows commands. Normal dress/motor activity/thought processes/memory. This patient is accompanied in the examination room by her  self. Patient arrives to office via: with assistive device: seated walker    Psychiatric:  Normal Affect/mood. Judgement, behavior, and conduct are appropriate. Speech normal in context and clarity, memory intact grossly, no involuntary movements - tremors. H EENT (2): Head normocephalic & atraumatic.   Eye: pupils are round// EOM are intact // Neck: ROM WNL  // Hearings Intact   Respiratory: Breathing non labored     ANKLE/FOOT left    Gait: uses assistive device seated walker  Tenderness: mild over  5th metatarsal head  Cutaneous: purple discoloration along the 5th metatarsal  Joint Motion:  WNL   Joint / Tendon Stability:  No Ankle or Subtalar instability or joint laxity. No peroneal sublux ability or dislocation  Alignment: neutral Hindfoot,    Neuro Motor/Sensory: NL/diminished sensation, bilateral foot and ankle regions to light touch  Vascular: NL foot/ankle pulses,   Lymphatics: No extremity lymphedema, No calf swelling, no tenderness to calf muscles. Right foot, there is some mild swelling to the second and third metatarsals distal one third. She is functional range of motion of her ankle and hindfoot. No redness erythema no rashes. CHART REVIEW     Javier Cullen has been experiencing pain and discomfort confirmed as outlined in the pain assessment outlined below.  was reviewed by Alex Jurado MD on 5/16/2022. Pain Assessment  1/31/2022   Location of Pain Knee   Pain Location Comment -   Location Modifiers Left;Right   Severity of Pain 6   Quality of Pain Aching;Dull   Quality of Pain Comment -   Duration of Pain -   Frequency of Pain Constant   Aggravating Factors Walking;Standing   Aggravating Factors Comment -   Limiting Behavior Yes   Relieving Factors Nothing   Relieving Factors Comment -   Result of Injury No   Work-Related Injury -   Type of Injury -        Javier Cullen  has a past medical history of Bilateral knee pain, Crohn's colitis ([address] Diabetes ([address] HTN (hypertension), Hypercholesterolemia, Pulmonary embolism ([address] (03/2019), and Stool color black. Patients is employed at:          has a past medical history of Bilateral knee pain, Crohn's colitis ([address] Diabetes ([address] HTN (hypertension), Hypercholesterolemia, Pulmonary embolism ([address] (03/2019), and Stool color black. has a past surgical history that includes hx tubal ligation; hx orthopaedic; colonoscopy (N/A, 10/21/2019); hx gi; and colonoscopy (N/A, 9/22/2020). family history includes Diabetes in her father and mother; Heart Disease in her father; Hypertension in her father and mother. Current Outpatient Medications   Medication Sig    oxyCODONE-acetaminophen (PERCOCET 10)  mg per tablet Take 1 Tablet by mouth three (3) times daily as needed.  pregabalin (LYRICA) 200 mg capsule Take 1 Capsule by mouth three (3) times daily for 30 days. Max Daily Amount: 600 mg.    albuterol (ProAir HFA) 90 mcg/actuation inhaler Take 2 Puffs by inhalation every four (4) hours as needed for Wheezing.  ustekinumab (STELARA SC) by SubCUTAneous route.  naloxone (NARCAN) 4 mg/actuation nasal spray Use 1 spray intranasally, then discard. Repeat with new spray every 2 min as needed for opioid overdose symptoms, alternating nostrils.  ALPRAZolam (XANAX) 0.5 mg tablet TAKE 1 TAB BY MOUTH TWICE A DAY AS NEEDED    budesonide (ENTOCORT EC) 3 mg capsule TAKE 1 CAPSULE BY MOUTH 3 TIMES DAILY    dicyclomine (BENTYL) 10 mg capsule TAKE 1 CAPSULE BY MOUTH EVERY 6 HOURS AS NEEDED FOR PAIN    ergocalciferol (ERGOCALCIFEROL) 50,000 unit capsule TAKE 1 (ONE) CAPSULE BY MOUTH ONCE A WEEK    loperamide (IMODIUM A-D) 2 mg tablet Take 1 Tab by mouth three (3) times daily as needed.  FLORANEX 1 million cell tab tablet TAKE 1 TABLET BY MOUTH EVERY DAY.  tiZANidine (ZANAFLEX) 4 mg tablet TAKE 1 (ONE) TABLET BY MOUTH THREE TIMES DAILY AS NEEDED    insulin glargine (LANTUS,BASAGLAR) 100 unit/mL (3 mL) inpn 30 Units by SubCUTAneous route nightly.  ondansetron hcl (ZOFRAN) 4 mg tablet Take 1 Tab by mouth every eight (8) hours as needed for Nausea.  hydrALAZINE (APRESOLINE) 100 mg tablet Take 1 Tab by mouth three (3) times daily.  SITagliptin (JANUVIA) 100 mg tablet Take 1 Tab by mouth daily.  metoprolol tartrate (LOPRESSOR) 50 mg tablet Take 1 Tab by mouth two (2) times a day.  atorvastatin (LIPITOR) 20 mg tablet Take 1 Tab by mouth nightly.     cloNIDine HCl (CATAPRES) 0.1 mg tablet Take 1 Tab by mouth every eight (8) hours as needed.  hydroCHLOROthiazide (HYDRODIURIL) 25 mg tablet Take 1 Tab by mouth daily.  pantoprazole (PROTONIX) 40 mg tablet Take 1 Tab by mouth Before breakfast and dinner.  losartan (COZAAR) 100 mg tablet Take 100 mg by mouth daily.  azaTHIOprine (IMURAN) 50 mg tablet Take 250 mg by mouth daily. Indications: Crohn's disease     No current facility-administered medications for this visit. Allergies   Allergen Reactions    Metronidazole Hcl Other (comments)     neuropathy     Social History     Occupational History    Not on file   Tobacco Use    Smoking status: Never Smoker    Smokeless tobacco: Never Used   Substance and Sexual Activity    Alcohol use: Never    Drug use: Never    Sexual activity: Not Currently       reports that she has never smoked. She has never used smokeless tobacco. She reports that she does not drink alcohol and does not use drugs. DIAGNOSTIC LAB DATA      Lab Results   Component Value Date/Time    Hemoglobin A1c 11.4 (H) 12/17/2021 08:05 AM    Hemoglobin A1c 7.3 (H) 07/22/2019 12:00 AM    Hemoglobin A1c 8.0 (H) 01/11/2019 10:03 PM    //   Lab Results   Component Value Date/Time    Glucose 484 (H) 01/04/2022 02:56 PM    Glucose (POC) 333 (H) 12/17/2021 10:53 AM        No results found for: JGA8JPCT, PAL4BACP      Lab Results   Component Value Date/Time    VITAMIN D, 25-HYDROXY 21.6 (L) 07/22/2019 12:00 AM        Drug Screen Most Recent Result Date     DRUG SCREEN, URINE  Collected: 1/13/2020  6:11 PM (Final result)    Complete Results                  REVIEW OF SYSTEMS : 5/16/2022  ALL BELOW ARE Negative except : SEE HPI     All other systems reviewed and are negative. 12 point review of systems otherwise negative unless noted in HPI.       RADIOGRAPHS// IMAGING//DIAGNOSTIC DATA     Orders Placed This Encounter    Generic Supply Order    [56381] Ankle Min 3V    [44863] Foot 2V    [28613] Foot Min 3V    [facility]    REFERRAL TO NEUROLOGY        No notes on file     Ankle left ankle x-rays, 3 views, AP/LAT/OBL completed 5/16/2022 AT [facility]    X-rays reveal   no acute fracture//there are no dislocation or subluxation noted. Overall alignment is show some mild valgus alignment of the ankle joint acceptable. Soft tissue swelling is medial greater than lateral soft tissue swelling is present noted. No osteolytic or osteoblastic lesions noted. Mineralization suggests yes osteopenia. Degenerative changes are posterior Metoject changes seen to the ankle joint, mostly along the medial aspect of the ankle joint, there are some bone spurs medially, and slight narrowing of the tib talar joint space noted. Calcified vessels are not present. 2 view, left foot: AP, oblique: There are some degenerative changes seen across her left midfoot, without signs of Charcot neural arthropathic changes across this midfoot, at this current time. 3 view right foot: Today, nonweightbearing: There are some degenerative changes seen across the right midfoot, as is peripheral bone spur seen along the lateral projection of the right midfoot. There are some thickening, to the second metatarsal, but I see no evidence of an acute fracture to the right midfoot forefoot, and or hindfoot. No vascular calcifications are seen at this current time. I have personally reviewed the images of the above study. The interpretation of this study is my professional opinion             I have spent 30 minutes reviewing the previous notes, reviewing diagnostic studies [Advanced  Imaging, Diagnostic test results (x-rays)] and had a direct face to face with the patient discussing the diagnosis and importance of compliance with the treatment and plan.   There is  discussion for the potential for surgery, answering all questions, as well as documenting patient care coordination for this individual on the day of the visit. Disclaimer:     Sections of this note are dictated using utilizing voice recognition software, which may have resulted in some phonetic based errors in grammar and contents. Even though attempts were made to correct all the mistakes, some may have been missed, and remained in the body of the document. If questions arise, please contact our department. An electronic signature was used to authenticate this note. Shaniqua Presley may have a reminder for a \"due or due soon\" health maintenance. I have asked that she contact her primary care provider for follow-up on this health maintenance. Patient Instructions   Dr. Shafer Gather: Neurology    Dr. Maria C Cruz: knees    DME: Martha Brazen w/ met pad w/ goal to offload forefoot ([facility])     PVL Venous study to check circulation of left leg          Please follow up with your PCP for any health maintenance as recommended.               Ann-Marie Chambers, as dictated byLala  5/16/2022  7:58 AM

## 2022-05-16 NOTE — LETTER
NOTIFICATION     5/16/2022 8:50 AM    [facility]  [address]  [address] [id number]      To Whom It May Concern:    [facility] is currently under the care of [address] Please allow [facility] to remain out of work due to severe bilateral knee osteoarthritis and left foot osteoarthritis. If there are questions or concerns please have the patient contact our office.         Sincerely,      Chidi Salcido MD

## 2022-05-16 NOTE — PROGRESS NOTES
DIAGNOSTIC IMAGING      [facility]: INTERPRETATION BY RADIOLOGIST     Information    Status: Final result (Exam End: 1/4/2022 15:10) Provider Status: Open         XR FOOT LT MIN 3 V: Patient Communication     Add Comments   Seen       Study Result    Narrative & Impression   INDICATION: Left foot injury and pain.       Exam: AP, lateral, oblique views of the left foot.     Direct comparison is made to prior plain radiographs of the left ankle dated  1/6/2019.       FINDINGS: There is no acute fracture or dislocation. There is no evidence of  orthopedic stabilization hardware. Pes planus is noted. Bones are diffusely  demineralized. Soft tissues are normal. No radiodense foreign body is seen.     IMPRESSION  No acute fracture or dislocation.               I have reviewed the radiology transcribed results and I have reviewed the images of the above study.       Lidia Walls MD  5/16/2022  8:55 PM

## 2022-05-16 NOTE — PATIENT INSTRUCTIONS
Dr. Herminio Spain: Neurology    Dr. Rica Silva: knees    DME: Linda Gasmen w/ met pad w/ goal to offload forefoot ([facility])     PVL Venous study to check circulation of left leg

## 2022-05-17 DIAGNOSIS — M79.89 LEG SWELLING: ICD-10-CM

## 2022-05-17 DIAGNOSIS — M19.172 POST-TRAUMATIC OSTEOARTHRITIS OF LEFT ANKLE: ICD-10-CM

## 2022-05-25 ENCOUNTER — HOSPITAL ENCOUNTER (OUTPATIENT)
Dept: VASCULAR SURGERY | Age: 52
Discharge: HOME | End: 2022-05-25
Attending: ORTHOPAEDIC SURGERY
Payer: MEDICAID

## 2022-05-25 PROCEDURE — 93971 EXTREMITY STUDY: CPT

## 2022-06-09 ENCOUNTER — DOCUMENTATION ONLY (OUTPATIENT)
Dept: ORTHOPEDIC SURGERY | Age: 52
End: 2022-06-09

## 2022-06-09 ENCOUNTER — OFFICE VISIT (OUTPATIENT)
Dept: ORTHOPEDIC SURGERY | Age: 52
End: 2022-06-09
Payer: MEDICAID

## 2022-06-09 VITALS — HEIGHT: 72 IN | BODY MASS INDEX: 39.68 KG/M2 | TEMPERATURE: 98.2 F | WEIGHT: 293 LBS

## 2022-06-09 DIAGNOSIS — G89.29 CHRONIC PAIN OF BOTH KNEES: Primary | ICD-10-CM

## 2022-06-09 DIAGNOSIS — M17.12 PRIMARY OSTEOARTHRITIS OF LEFT KNEE: ICD-10-CM

## 2022-06-09 DIAGNOSIS — M25.562 CHRONIC PAIN OF BOTH KNEES: Primary | ICD-10-CM

## 2022-06-09 DIAGNOSIS — M17.11 PRIMARY OSTEOARTHRITIS OF RIGHT KNEE: ICD-10-CM

## 2022-06-09 DIAGNOSIS — M25.561 CHRONIC PAIN OF BOTH KNEES: Primary | ICD-10-CM

## 2022-06-09 PROCEDURE — 99214 OFFICE O/P EST MOD 30 MIN: CPT | Performed by: ORTHOPAEDIC SURGERY

## 2022-06-09 PROCEDURE — 73564 X-RAY EXAM KNEE 4 OR MORE: CPT | Performed by: ORTHOPAEDIC SURGERY

## 2022-06-09 NOTE — PROGRESS NOTES
Form received from [facility] for FMLA via Fax at our [address] [place] [address] location. Form received from [employer] for Short Term Disability via Fax at our [address] [place] [address] location. Blank forms scanned into patient's chart. Form placed in forms bin at [address] [place] [address] location for completion.

## 2022-06-09 NOTE — PROGRESS NOTES
Patient: Sujatha De Dios                MRN: [id number]       SSN: [id number]  YOB: 1970        AGE: 46 y.o. SEX: female  Body mass index is 40.24 kg/m².     PCP: Darlyn Lee MD  06/09/22    Mercy Vaughan presents with bilateral knee pain she also has posttraumatic arthritis of the left ankle injections have not been very efficacious for her she is interested in having knee surgery she uses a walker with full-time at this time and she is diabetic BMI is over 40 although they did not get a weight on her today    The examination today very nice lady she does have some obesity hips rotate adequately both knees are in varus calf nontender Homans' sign is negative she has some mild evidence neuropathy her ankle wound is nicely healed and some stiffness with the ankle as well of each knee has about a 3 degree fixed flexion deformity and bends only about 105 degrees and Homans' sign is negative    Review of x-rays today 4 views of both knees confirm end-stage arthritis involving both knees    I like her to lose some weight I be happy to reconstruct the knees and in a staged fashion we also discussed chronic pain associated with him and other complications    Risks and benefits described including but not limited to infection DVT pulmonary embolism anesthetic complications blood loss requiring transfusion chronic pain instability implant longevity arthrofibrosis and also the need for bending and straightening knee on an hourly basis to avoid complications    REVIEW OF SYSTEMS:      CON: negative  EYE: negative   ENT: negative  RESP: negative  GI:    negative   :  negative  MSK: Positive  A twelve point review of systems was completed, positives noted and all other systems were reviewed and are negative          Past Medical History:   Diagnosis Date    Bilateral knee pain     [facility]     Diabetes ([facility]     HTN (hypertension)     Hypercholesterolemia     Pulmonary embolism ([facility] 03/2019    Stool color black     crohns       Family History   Problem Relation Age of Onset    Diabetes Mother     Hypertension Mother     Diabetes Father     Hypertension Father     Heart Disease Father        Current Outpatient Medications   Medication Sig Dispense Refill    oxyCODONE-acetaminophen (PERCOCET 10)  mg per tablet Take 1 Tablet by mouth three (3) times daily as needed.  ustekinumab (STELARA SC) by SubCUTAneous route.  naloxone (NARCAN) 4 mg/actuation nasal spray Use 1 spray intranasally, then discard. Repeat with new spray every 2 min as needed for opioid overdose symptoms, alternating nostrils. 1 Each 0    ALPRAZolam (XANAX) 0.5 mg tablet TAKE 1 TAB BY MOUTH TWICE A DAY AS NEEDED  0    dicyclomine (BENTYL) 10 mg capsule TAKE 1 CAPSULE BY MOUTH EVERY 6 HOURS AS NEEDED FOR PAIN  1    ergocalciferol (ERGOCALCIFEROL) 50,000 unit capsule TAKE 1 (ONE) CAPSULE BY MOUTH ONCE A WEEK  2    loperamide (IMODIUM A-D) 2 mg tablet Take 1 Tab by mouth three (3) times daily as needed.  FLORANEX 1 million cell tab tablet TAKE 1 TABLET BY MOUTH EVERY DAY. 1    tiZANidine (ZANAFLEX) 4 mg tablet TAKE 1 (ONE) TABLET BY MOUTH THREE TIMES DAILY AS NEEDED  0    insulin glargine (LANTUS,BASAGLAR) 100 unit/mL (3 mL) inpn 30 Units by SubCUTAneous route nightly.  ondansetron hcl (ZOFRAN) 4 mg tablet Take 1 Tab by mouth every eight (8) hours as needed for Nausea. 30 Tab 0    hydrALAZINE (APRESOLINE) 100 mg tablet Take 1 Tab by mouth three (3) times daily. 90 Tab 0    metoprolol tartrate (LOPRESSOR) 50 mg tablet Take 1 Tab by mouth two (2) times a day. 60 Tab 0    atorvastatin (LIPITOR) 20 mg tablet Take 1 Tab by mouth nightly. 30 Tab 0    cloNIDine HCl (CATAPRES) 0.1 mg tablet Take 1 Tab by mouth every eight (8) hours as needed. 90 Tab 0    hydroCHLOROthiazide (HYDRODIURIL) 25 mg tablet Take 1 Tab by mouth daily.  30 Tab 0    pantoprazole (PROTONIX) 40 mg tablet Take 1 Tab by mouth Before breakfast and dinner. 30 Tab 0    losartan (COZAAR) 100 mg tablet Take 100 mg by mouth daily.  albuterol (ProAir HFA) 90 mcg/actuation inhaler Take 2 Puffs by inhalation every four (4) hours as needed for Wheezing. (Patient not taking: Reported on 6/9/2022) 18 g 0    budesonide (ENTOCORT EC) 3 mg capsule TAKE 1 CAPSULE BY MOUTH 3 TIMES DAILY (Patient not taking: Reported on 6/9/2022)  1    SITagliptin (JANUVIA) 100 mg tablet Take 1 Tab by mouth daily. (Patient not taking: Reported on 6/9/2022) 30 Tab 0    azaTHIOprine (IMURAN) 50 mg tablet Take 250 mg by mouth daily.  Indications: Crohn's disease (Patient not taking: Reported on 6/9/2022)         Allergies   Allergen Reactions    Metronidazole Hcl Other (comments)     neuropathy       Past Surgical History:   Procedure Laterality Date    COLONOSCOPY N/A 10/21/2019    COLONOSCOPY w/ bxs performed by Baldemar Orr MD at [address] COLONOSCOPY N/A 9/22/2020    COLONOSCOPY with bx's performed by Edouard Nascimento MD at [address] HX GI      HX ORTHOPAEDIC      fx left ankle ORIF    HX TUBAL LIGATION      btl       Social History     Socioeconomic History    Marital status: SINGLE     Spouse name: Not on file    Number of children: Not on file    Years of education: Not on file    Highest education level: Not on file   Occupational History    Not on file   Tobacco Use    Smoking status: Never Smoker    Smokeless tobacco: Never Used   Substance and Sexual Activity    Alcohol use: Never    Drug use: Never    Sexual activity: Not Currently   Other Topics Concern    Not on file   Social History Narrative    Not on file     Social Determinants of Health     Financial Resource Strain:     Difficulty of Paying Living Expenses: Not on file   Food Insecurity:     Worried About [address] in the Last Year: Not on file    [address] in the Last Year: Not on file   Transportation Needs:     Lack of Transportation (Medical): Not on file    Lack of Transportation (Non-Medical): Not on file   Physical Activity:     Days of Exercise per Week: Not on file    Minutes of Exercise per Session: Not on file   Stress:     Feeling of Stress : Not on file   Social Connections:     Frequency of Communication with Friends and Family: Not on file    Frequency of Social Gatherings with Friends and Family: Not on file    Attends Zoroastrianism Services: Not on file    Active Member of [address] [place] [address] or Organizations: Not on file    Attends Club or Organization Meetings: Not on file    Marital Status: Not on file   Intimate Partner Violence:     Fear of Current or Ex-Partner: Not on file    Emotionally Abused: Not on file    Physically Abused: Not on file    Sexually Abused: Not on file   Housing Stability:     Unable to Pay for Housing in the Last Year: Not on file    Number of [place] in the Last Year: Not on file    Unstable Housing in the Last Year: Not on file       Visit Vitals  Temp 98.2 °F (36.8 °C) (Temporal)   Ht 6' 1\" (1.854 m)   Wt 138.3 kg (305 lb)   BMI 40.24 kg/m²         PHYSICAL EXAMINATION:  GENERAL: Alert and oriented x3, in no acute distress, well-developed, well-nourished, afebrile. HEART: No JVD. EYES: No scleral icterus   NECK: No significant lymphadenopathy   LUNGS: No respiratory compromise or indrawing  ABDOMEN: Soft, non-tender, non-distended. Note: This note was completed using voice recognition software. Any typographical/name errors or mistakes are unintentional.    Electronically signed by:  Evelia Hensley MD

## 2022-06-09 NOTE — LETTER
6/9/2022 2:42 PM    [facility]  [address]  [facility] [place] [address] [address]      To Whom It May Concern:    [facility] is currently under the care of [address] [place] [address]. Due to Ms. Garcia's medical condition, she should be permitted to be excused from work until further notice effective today 6/9/2022. If there are questions or concerns please have the patient contact our office.         Sincerely,      Asael Wild MD

## 2022-06-20 ENCOUNTER — OFFICE VISIT (OUTPATIENT)
Dept: ORTHOPEDIC SURGERY | Age: 52
End: 2022-06-20
Payer: MEDICAID

## 2022-06-20 VITALS — BODY MASS INDEX: 39.68 KG/M2 | TEMPERATURE: 96.9 F | WEIGHT: 293 LBS | HEIGHT: 72 IN

## 2022-06-20 DIAGNOSIS — M17.0 PRIMARY OSTEOARTHRITIS OF BOTH KNEES: Primary | ICD-10-CM

## 2022-06-20 PROCEDURE — 20611 DRAIN/INJ JOINT/BURSA W/US: CPT | Performed by: ORTHOPAEDIC SURGERY

## 2022-06-20 RX ORDER — TRIAMCINOLONE ACETONIDE 40 MG/ML
80 INJECTION, SUSPENSION INTRA-ARTICULAR; INTRAMUSCULAR ONCE
Status: COMPLETED | OUTPATIENT
Start: 2022-06-20 | End: 2022-06-20

## 2022-06-20 RX ADMIN — TRIAMCINOLONE ACETONIDE 80 MG: 40 INJECTION, SUSPENSION INTRA-ARTICULAR; INTRAMUSCULAR at 15:32

## 2022-06-20 NOTE — PROGRESS NOTES
Aidan Hunt  1970   Chief Complaint   Patient presents with    Knee Pain     bilateral        HISTORY OF PRESENT ILLNESS  Shaniqua Presley is a 46 y.o. female who presents today for reevaluation of b/l knee. R>L. Patient rates pain as 8/10 today. She was last seen in the office for this on 1/31/2022 at which time she received a right knee cortisone injection. She saw Dr. Maria C Cruz on 6/09/2022 at which time knee replacement surgery was discussed but she was told she needs to work on weight loss first. She states Dr. Maria C Cruz was hesitant to give her cortisone injections because previous injections have not provided relief. She is requesting cortisone injections today. She has an upcoming appointment on 7/01/2022 with the weight loss clinic. At [address] [place] [address] on 6/8/2021, patient had a b/l knee cortisone injection which provided relief but the pain returned. Patient denies any fever, chills, chest pain, shortness of breath or calf pain. The remainder of the review of systems is negative. There are no new illness or injuries to report since last seen in the office. There are no changes to medications, allergies, family or social history. Pain Assessment  6/20/2022   Location of Pain Knee   Pain Location Comment -   Location Modifiers Left;Right   Severity of Pain 8   Quality of Pain Aching;Dull; Sharp   Quality of Pain Comment -   Duration of Pain Persistent   Frequency of Pain Constant   Date Pain First Started (No Data)   Date Pain First Started Comment f/u   Aggravating Factors Bending;Stretching;Straightening;Exercise;Kneeling;Squatting;Standing;Stairs; Walking   Aggravating Factors Comment -   Limiting Behavior Yes   Relieving Factors Nothing   Relieving Factors Comment -   Result of Injury No   Work-Related Injury -   Type of Injury -       PHYSICAL EXAM:   Visit Vitals  Temp 96.9 °F (36.1 °C) (Temporal)   Ht 6' 1\" (1.854 m)   Wt 305 lb (138.3 kg)   BMI 40.24 kg/m²     The patient is a well-developed, well-nourished female   in no acute distress. The patient is alert and oriented times three. The patient is alert and oriented times three. Mood and affect are normal.  LYMPHATIC: lymph nodes are not enlarged and are within normal limits  SKIN: normal in color and non tender to palpation. There are no bruises or abrasions noted. NEUROLOGICAL: Motor sensory exam is within normal limits. Reflexes are equal bilaterally. There is normal sensation to pinprick and light touch  MUSCULOSKELETAL:  Examination Left knee Right knee   Skin Intact Intact   Range of motion 0-130 0-130   Effusion + +   Medial joint line tenderness + +   Lateral joint line tenderness + +   Tenderness Pes Bursa - -   Tenderness insertion MCL - -   Tenderness insertion LCL - -   Stewarts - -   Patella crepitus + +   Patella grind + +   Lachman - -   Pivot shift - -   Anterior drawer - -   Posterior drawer - -   Varus stress - -   Valgus stress - -   Neurovascular Intact Intact   Calf Swelling and Tenderness to Palpation - -   Arturo's Test - -   Hamstring Cord Tightness - -         PROCEDURE:    Bilateral Knee Injection with Ultrasound Guidance    Indication:Bilateral Knee pain/swelling    After sterile prep, 4 cc of Xylocaine and 1 cc of Kenalog were injected into the bilateral  Knee. Intra-articular Ultrasound images captured using [address] [place] [address] Loop Ultrasound machine using a frequency of 10 MHz with a linear transducer and scanned into patient's chart.            [facility]  OFFICE PROCEDURE PROGRESS NOTE        Chart reviewed for the following:  Montez Tejeda M.D, have reviewed the History, Physical and updated the Allergic reactions for Geovany Bae performed immediately prior to start of procedure:  Montez Tejeda M.D, have performed the following reviews on Seth Plump prior to the start of the procedure:            * Patient was identified by name and date of birth   * Agreement on procedure being performed was verified  * Risks and Benefits explained to the patient  * Procedure site verified and marked as necessary  * Patient was positioned for comfort  * Needle placement confirmed by ultrasound  * Consent was signed and verified     Time: 3:11 PM     Date of procedure: 6/20/2022    Procedure performed by:  Evens Castro M.D    Provider assisted by: (see medication administration)    How tolerated by patient: tolerated the procedure well with no complications    Comments: none        IMAGING: XR of right knee obtained in the office dated 1/28/2020 was reviewed and read by Dr. Jacquelyn Pritchett: Severe degenerative arthritis in the medial compartment and patellofemoral joint.       XR of left knee from [facility] dated 10/31/19 was reviewed and read by Dr. Jacquelyn Pritchett: Mild to moderate DJD with joint effusion. IMPRESSION:      ICD-10-CM ICD-9-CM    1. Primary osteoarthritis of both knees  M17.0 715.16         PLAN:   1. Pt presents today with b/l knee pain R>L due to degenerative arthritis with joint space narrowing and I would like to try injecting both knees with cortisone today. Risk factors include: dm, htn, BMI>35  2. No ultrasound exam indicated today  3. Yes cortisone injection indicated today B/L KNEE US  4. No Physical/Occupational Therapy indicated today  5. No diagnostic test indicated today:   6. No durable medical equipment indicated today  7. No referral indicated today  8. No medications indicated today:   9. No Narcotic indicated today        RTC 3 weeks if pain continues      Scribed by Ida Stephens) as dictated by Evens Castro MD    I, Dr. Evens Castro, confirm that all documentation is accurate.     Evens Castro M.D.   [facility]

## 2022-07-05 ENCOUNTER — DOCUMENTATION ONLY (OUTPATIENT)
Dept: ORTHOPEDIC SURGERY | Age: 52
End: 2022-07-05

## 2022-07-05 NOTE — PROGRESS NOTES
Completed disability forms sent to [facility] via [facility] [id number], [id number] [facility] for

## 2022-07-11 ENCOUNTER — ANESTHESIA EVENT (OUTPATIENT)
Dept: ENDOSCOPY | Age: 52
End: 2022-07-11
Payer: MEDICAID

## 2022-07-12 ENCOUNTER — ANESTHESIA (OUTPATIENT)
Dept: ENDOSCOPY | Age: 52
End: 2022-07-12
Payer: MEDICAID

## 2022-07-12 ENCOUNTER — HOSPITAL ENCOUNTER (OUTPATIENT)
Age: 52
Setting detail: OUTPATIENT SURGERY
Discharge: HOME | End: 2022-07-12
Attending: INTERNAL MEDICINE | Admitting: INTERNAL MEDICINE
Payer: MEDICAID

## 2022-07-12 ENCOUNTER — ANESTHESIA EVENT (OUTPATIENT)
Dept: ENDOSCOPY | Age: 52
End: 2022-07-12
Payer: MEDICAID

## 2022-07-12 VITALS
SYSTOLIC BLOOD PRESSURE: 150 MMHG | RESPIRATION RATE: 18 BRPM | BODY MASS INDEX: 39.68 KG/M2 | TEMPERATURE: 97.2 F | DIASTOLIC BLOOD PRESSURE: 98 MMHG | OXYGEN SATURATION: 100 % | WEIGHT: 293 LBS | HEART RATE: 71 BPM | HEIGHT: 72 IN

## 2022-07-12 LAB
GLUCOSE BLD STRIP.AUTO-MCNC: 98 MG/DL (ref 70–110)
HCG UR QL: NEGATIVE

## 2022-07-12 PROCEDURE — 76060000031 HC ANESTHESIA FIRST 0.5 HR: Performed by: INTERNAL MEDICINE

## 2022-07-12 PROCEDURE — 77030019988 HC FCPS ENDOSC DISP BSC -B: Performed by: INTERNAL MEDICINE

## 2022-07-12 PROCEDURE — 88305 TISSUE EXAM BY PATHOLOGIST: CPT

## 2022-07-12 PROCEDURE — 74011250636 HC RX REV CODE- 250/636: Performed by: NURSE ANESTHETIST, CERTIFIED REGISTERED

## 2022-07-12 PROCEDURE — 00813 ANES UPR LWR GI NDSC PX: CPT | Performed by: ANESTHESIOLOGY

## 2022-07-12 PROCEDURE — 74011000250 HC RX REV CODE- 250: Performed by: NURSE ANESTHETIST, CERTIFIED REGISTERED

## 2022-07-12 PROCEDURE — 00813 ANES UPR LWR GI NDSC PX: CPT | Performed by: NURSE ANESTHETIST, CERTIFIED REGISTERED

## 2022-07-12 PROCEDURE — 2709999900 HC NON-CHARGEABLE SUPPLY: Performed by: INTERNAL MEDICINE

## 2022-07-12 PROCEDURE — 76040000019: Performed by: INTERNAL MEDICINE

## 2022-07-12 PROCEDURE — 81025 URINE PREGNANCY TEST: CPT

## 2022-07-12 PROCEDURE — 77030008565 HC TBNG SUC IRR ERBE -B: Performed by: INTERNAL MEDICINE

## 2022-07-12 PROCEDURE — 82962 GLUCOSE BLOOD TEST: CPT

## 2022-07-12 RX ORDER — INSULIN LISPRO 100 [IU]/ML
INJECTION, SOLUTION INTRAVENOUS; SUBCUTANEOUS ONCE
Status: DISCONTINUED | OUTPATIENT
Start: 2022-07-12 | End: 2022-07-12 | Stop reason: HOSPADM

## 2022-07-12 RX ORDER — SODIUM CHLORIDE 0.9 % (FLUSH) 0.9 %
5-40 SYRINGE (ML) INJECTION EVERY 8 HOURS
Status: DISCONTINUED | OUTPATIENT
Start: 2022-07-12 | End: 2022-07-12 | Stop reason: HOSPADM

## 2022-07-12 RX ORDER — PROPOFOL 10 MG/ML
INJECTION, EMULSION INTRAVENOUS AS NEEDED
Status: DISCONTINUED | OUTPATIENT
Start: 2022-07-12 | End: 2022-07-12 | Stop reason: HOSPADM

## 2022-07-12 RX ORDER — SODIUM CHLORIDE 0.9 % (FLUSH) 0.9 %
5-40 SYRINGE (ML) INJECTION AS NEEDED
Status: DISCONTINUED | OUTPATIENT
Start: 2022-07-12 | End: 2022-07-12 | Stop reason: HOSPADM

## 2022-07-12 RX ORDER — LIDOCAINE HYDROCHLORIDE 20 MG/ML
INJECTION, SOLUTION EPIDURAL; INFILTRATION; INTRACAUDAL; PERINEURAL AS NEEDED
Status: DISCONTINUED | OUTPATIENT
Start: 2022-07-12 | End: 2022-07-12 | Stop reason: HOSPADM

## 2022-07-12 RX ORDER — SODIUM CHLORIDE, SODIUM LACTATE, POTASSIUM CHLORIDE, CALCIUM CHLORIDE 600; 310; 30; 20 MG/100ML; MG/100ML; MG/100ML; MG/100ML
75 INJECTION, SOLUTION INTRAVENOUS CONTINUOUS
Status: DISCONTINUED | OUTPATIENT
Start: 2022-07-12 | End: 2022-07-12 | Stop reason: HOSPADM

## 2022-07-12 RX ORDER — MAGNESIUM SULFATE 100 %
4 CRYSTALS MISCELLANEOUS AS NEEDED
Status: DISCONTINUED | OUTPATIENT
Start: 2022-07-12 | End: 2022-07-12 | Stop reason: HOSPADM

## 2022-07-12 RX ADMIN — LIDOCAINE HYDROCHLORIDE 50 MG: 20 INJECTION, SOLUTION EPIDURAL; INFILTRATION; INTRACAUDAL; PERINEURAL at 15:02

## 2022-07-12 RX ADMIN — SODIUM CHLORIDE, POTASSIUM CHLORIDE, SODIUM LACTATE AND CALCIUM CHLORIDE 75 ML/HR: 600; 310; 30; 20 INJECTION, SOLUTION INTRAVENOUS at 11:42

## 2022-07-12 RX ADMIN — PROPOFOL 100 MG: 10 INJECTION, EMULSION INTRAVENOUS at 15:02

## 2022-07-12 RX ADMIN — PROPOFOL 100 MG: 10 INJECTION, EMULSION INTRAVENOUS at 15:05

## 2022-07-12 RX ADMIN — FAMOTIDINE 20 MG: 10 INJECTION INTRAVENOUS at 11:49

## 2022-07-12 NOTE — ANESTHESIA PREPROCEDURE EVALUATION
Relevant Problems   No relevant active problems       Anesthetic History   No history of anesthetic complications            Review of Systems / Medical History  Patient summary reviewed and pertinent labs reviewed    Pulmonary  Within defined limits                 Neuro/Psych   Within defined limits           Cardiovascular    Hypertension              Exercise tolerance: >4 METS     GI/Hepatic/Renal  Within defined limits              Endo/Other    Diabetes: poorly controlled, type 2, using insulin    Morbid obesity     Other Findings              Physical Exam    Airway  Mallampati: II  TM Distance: 4 - 6 cm  Neck ROM: normal range of motion   Mouth opening: Normal     Cardiovascular  Regular rate and rhythm,  S1 and S2 normal,  no murmur, click, rub, or gallop  Rhythm: regular  Rate: normal         Dental    Dentition: Lower dentition intact and Upper dentition intact     Pulmonary  Breath sounds clear to auscultation               Abdominal  GI exam deferred       Other Findings            Anesthetic Plan    ASA: 3  Anesthesia type: MAC          Induction: Intravenous  Anesthetic plan and risks discussed with: Patient

## 2022-07-12 NOTE — DISCHARGE INSTRUCTIONS
Return tomorrow 7/13/2022 @ 1200 for colonoscopy  Prep medications sent to [facility] on [address] from Nurse    PATIENT INSTRUCTIONS:    After general anesthesia or intravenous sedation, for 24 hours or while taking prescription Narcotics:  · Limit your activities  · Do not drive and operate hazardous machinery  · Do not make important personal or business decisions  · Do  not drink alcoholic beverages  · If you have not urinated within 8 hours after discharge, please contact your surgeon on call. Report the following to your surgeon:  · Excessive pain, swelling, redness or odor of or around the surgical area  · Temperature over 100.5  · Nausea and vomiting lasting longer than 4 hours or if unable to take medications  · Any signs of decreased circulation or nerve impairment to extremity: change in color, persistent  numbness, tingling, coldness or increase pain  · Any questions    What to do at Home:  Recommended activity: Activity as tolerated and no driving for today    *  Please give a list of your current medications to your Primary Care Provider. *  Please update this list whenever your medications are discontinued, doses are      changed, or new medications (including over-the-counter products) are added. *  Please carry medication information at all times in case of emergency situations. These are general instructions for a healthy lifestyle:    No smoking/ No tobacco products/ Avoid exposure to second hand smoke  Surgeon General's Warning:  Quitting smoking now greatly reduces serious risk to your health.     Obesity, smoking, and sedentary lifestyle greatly increases your risk for illness    A healthy diet, regular physical exercise & weight monitoring are important for maintaining a healthy lifestyle    You may be retaining fluid if you have a history of heart failure or if you experience any of the following symptoms:  Weight gain of 3 pounds or more overnight or 5 pounds in a week, increased swelling in our hands or feet or shortness of breath while lying flat in bed. Please call your doctor as soon as you notice any of these symptoms; do not wait until your next office visit. The discharge information has been reviewed with the patient. The patient verbalized understanding. Discharge medications reviewed with the patient and appropriate educational materials and side effects teaching were provided.   ___________________________________________________________________________________________________________________________________

## 2022-07-12 NOTE — H&P
[email or web address]  [address] [facility], [address]        Impression:   1. GERD nausea   2. Hx of Crohns       Plan:     1. EGD COLO MAC all risks benefits and alt discussed       Chief Complaint: gerd nausea crohns      HPI:  Ameya Watters is a 46 y.o. female who is being seen on consult for gerd nausea crohns . PMH:   Past Medical History:   Diagnosis Date    Bilateral knee pain     [facility]     Diabetes ([facility]     HTN (hypertension)     Hypercholesterolemia     Pulmonary embolism ([facility] 03/2019    Stool color black     crohns       PSH:   Past Surgical History:   Procedure Laterality Date    COLONOSCOPY N/A 10/21/2019    COLONOSCOPY w/ bxs performed by Rj Chun MD at [address] COLONOSCOPY N/A 9/22/2020    COLONOSCOPY with bx's performed by Estrella Mcnair MD at [address] HX GI      HX ORTHOPAEDIC      fx left ankle ORIF    HX TUBAL LIGATION      btl       Social HX:   Social History     Socioeconomic History    Marital status: SINGLE     Spouse name: Not on file    Number of children: Not on file    Years of education: Not on file    Highest education level: Not on file   Occupational History    Not on file   Tobacco Use    Smoking status: Never Smoker    Smokeless tobacco: Never Used   Substance and Sexual Activity    Alcohol use: Never    Drug use: Never    Sexual activity: Not Currently   Other Topics Concern    Not on file   Social History Narrative    Not on file     Social Determinants of Health     Financial Resource Strain:     Difficulty of Paying Living Expenses: Not on file   Food Insecurity:     Worried About Running Out of Food in the Last Year: Not on file    Boyd of Food in the Last Year: Not on file   Transportation Needs:     Lack of Transportation (Medical): Not on file    Lack of Transportation (Non-Medical):  Not on file   Physical Activity:     Days of Exercise per Week: Not on file    Minutes of Exercise per Session: Not on file   Stress:     Feeling of Stress : Not on file   Social Connections:     Frequency of Communication with Friends and Family: Not on file    Frequency of Social Gatherings with Friends and Family: Not on file    Attends Protestant Services: Not on file    Active Member of Clubs or Organizations: Not on file    Attends Club or Organization Meetings: Not on file    Marital Status: Not on file   Intimate Partner Violence:     Fear of Current or Ex-Partner: Not on file    Emotionally Abused: Not on file    Physically Abused: Not on file    Sexually Abused: Not on file   Housing Stability:     Unable to Pay for Housing in the Last Year: Not on file    Number of [place] in the Last Year: Not on file    Unstable Housing in the Last Year: Not on file       FHX:   Family History   Problem Relation Age of Onset    Diabetes Mother     Hypertension Mother     Diabetes Father     Hypertension Father     Heart Disease Father        Allergy:   Allergies   Allergen Reactions    Metronidazole Hcl Other (comments)     neuropathy       Home Medications:     Medications Prior to Admission   Medication Sig    ustekinumab (STELARA SC) by SubCUTAneous route.  ALPRAZolam (XANAX) 0.5 mg tablet TAKE 1 TAB BY MOUTH TWICE A DAY AS NEEDED    ergocalciferol (ERGOCALCIFEROL) 50,000 unit capsule TAKE 1 (ONE) CAPSULE BY MOUTH ONCE A WEEK    FLORANEX 1 million cell tab tablet TAKE 1 TABLET BY MOUTH EVERY DAY.  tiZANidine (ZANAFLEX) 4 mg tablet TAKE 1 (ONE) TABLET BY MOUTH THREE TIMES DAILY AS NEEDED    insulin glargine (LANTUS,BASAGLAR) 100 unit/mL (3 mL) inpn 30 Units by SubCUTAneous route nightly.  hydrALAZINE (APRESOLINE) 100 mg tablet Take 1 Tab by mouth three (3) times daily.  SITagliptin (JANUVIA) 100 mg tablet Take 1 Tab by mouth daily.  metoprolol tartrate (LOPRESSOR) 50 mg tablet Take 1 Tab by mouth two (2) times a day.  atorvastatin (LIPITOR) 20 mg tablet Take 1 Tab by mouth nightly.  cloNIDine HCl (CATAPRES) 0.1 mg tablet Take 1 Tab by mouth every eight (8) hours as needed.  hydroCHLOROthiazide (HYDRODIURIL) 25 mg tablet Take 1 Tab by mouth daily.  losartan (COZAAR) 100 mg tablet Take 100 mg by mouth daily.  oxyCODONE-acetaminophen (PERCOCET 10)  mg per tablet Take 1 Tablet by mouth three (3) times daily as needed.  albuterol (ProAir HFA) 90 mcg/actuation inhaler Take 2 Puffs by inhalation every four (4) hours as needed for Wheezing. (Patient not taking: Reported on 6/9/2022)    naloxone [facility]) 4 mg/actuation nasal spray Use 1 spray intranasally, then discard. Repeat with new spray every 2 min as needed for opioid overdose symptoms, alternating nostrils.  budesonide (ENTOCORT EC) 3 mg capsule TAKE 1 CAPSULE BY MOUTH 3 TIMES DAILY (Patient not taking: Reported on 6/9/2022)    dicyclomine (BENTYL) 10 mg capsule TAKE 1 CAPSULE BY MOUTH EVERY 6 HOURS AS NEEDED FOR PAIN (Patient not taking: Reported on 7/12/2022)    loperamide (IMODIUM A-D) 2 mg tablet Take 1 Tab by mouth three (3) times daily as needed.  ondansetron hcl (ZOFRAN) 4 mg tablet Take 1 Tab by mouth every eight (8) hours as needed for Nausea.  pantoprazole (PROTONIX) 40 mg tablet Take 1 Tab by mouth Before breakfast and dinner. (Patient not taking: Reported on 7/12/2022)    azaTHIOprine (IMURAN) 50 mg tablet Take 250 mg by mouth daily. Indications: Crohn's disease (Patient not taking: Reported on 6/9/2022)       Review of Systems:     Constitutional: No fevers, chills, weight loss, fatigue. Skin: No rashes, pruritis, jaundice, ulcerations, erythema. HENT: No headaches, nosebleeds, sinus pressure, rhinorrhea, sore throat. Eyes: No visual changes, blurred vision, eye pain, photophobia, jaundice. Cardiovascular: No chest pain, heart palpitations. Respiratory: No cough, SOB, wheezing, chest discomfort, orthopnea. Gastrointestinal: gerd nausea crohns    Genitourinary: No dysuria, bleeding, discharge, pyuria. Musculoskeletal: No weakness, arthralgias, wasting. Endo: No sweats. Heme: No bruising, easy bleeding. Allergies: As noted. Neurological: Cranial nerves intact. Alert and oriented. Gait not assessed. Psychiatric:  No anxiety, depression, hallucinations. Visit Vitals  BP (!) 150/91 (BP 1 Location: Right upper arm, BP Patient Position: At rest)   Pulse 71   Temp 97.4 °F (36.3 °C)   Resp 18   Ht 6' (1.829 m)   Wt 138.3 kg (305 lb)   SpO2 98%   Breastfeeding No   BMI 41.37 kg/m²       Physical Assessment:     constitutional: appearance: well developed, well nourished, normal habitus, no deformities, in no acute distress. skin: inspection: no rashes, ulcers, icterus or other lesions; no clubbing or telangiectasias. palpation: no induration or subcutaneos nodules. eyes: inspection: normal conjunctivae and lids; no jaundice pupils: symmetrical, normoreactive to light, normal accommodation and size. ENMT: mouth: normal oral mucosa,lips and gums; good dentition. oropharynx: normal tongue, hard and soft palate; posterior pharynx without erythema, exudate or lesions. neck: no masses organomegaly or tenderness. respiratory: effort: normal chest excursion; no intercostal retraction or accessory muscle use. cardiovascular: abdominal aorta: normal size and position; no bruits. palpation: PMI of normal size and position; normal rhythm; no thrill or murmurs. abdominal: abdomen: normal consistency; no tenderness or masses. hernias: no hernias appreciated. liver: normal size and consistency. spleen: not palpable. rectal: hemoccult/guaiac: not performed. musculoskeletal: no deformities or muscle wasting   lymphatic: axilae: not palpable. groin: not palpable. neck: within normal limits. other: not palpable.    neurologic: cranial nerves: II-XII normal.   psychiatric: judgement/insight: within normal limits. memory: within normal limits for recent and remote events. mood and affect: no evidence of depression, anxiety or agitation. orientation: oriented to time, space and person. Basic Metabolic Profile   No results for input(s): NA, K, CL, CO2, BUN, GLU, CA, MG, PHOS in the last 72 hours. No lab exists for component: CREAT      CBC w/Diff    No results for input(s): WBC, RBC, HGB, HCT, MCV, MCH, MCHC, RDW, PLT, HGBEXT, HCTEXT, PLTEXT in the last 72 hours. No lab exists for component: MPV No results for input(s): GRANS, LYMPH, EOS, PRO, MYELO, METAS, BLAST in the last 72 hours. No lab exists for component: MONO, BASO     Hepatic Function   No results for input(s): ALB, TP, TBILI, AP, AML, LPSE in the last 72 hours. No lab exists for component: DBILI, GPT, SGOT       Abdon Willis MD, M.D. [facility], [address] [place] [address]  [email or web address]

## 2022-07-12 NOTE — ANESTHESIA POSTPROCEDURE EVALUATION
Procedure(s):  UPPER ENDOSCOPY with Bx's  COLONOSCOPY. MAC    Anesthesia Post Evaluation      Multimodal analgesia: multimodal analgesia used between 6 hours prior to anesthesia start to PACU discharge  Patient location during evaluation: bedside  Patient participation: complete - patient participated  Level of consciousness: awake  Pain management: adequate  Airway patency: patent  Anesthetic complications: no  Cardiovascular status: stable  Respiratory status: acceptable  Hydration status: acceptable  Post anesthesia nausea and vomiting:  controlled  Final Post Anesthesia Temperature Assessment:  Normothermia (36.0-37.5 degrees C)      INITIAL Post-op Vital signs:   Vitals Value Taken Time   /80 07/12/22 1527   Temp 36.1 °C (97 °F) 07/12/22 1517   Pulse 67 07/12/22 1529   Resp 18 07/12/22 1529   SpO2 100 % 07/12/22 1529   Vitals shown include unvalidated device data.

## 2022-07-13 ENCOUNTER — ANESTHESIA (OUTPATIENT)
Dept: ENDOSCOPY | Age: 52
End: 2022-07-13
Payer: MEDICAID

## 2022-07-15 NOTE — PROCEDURES
[email or web address] [facility]  [facility], [address]      Endoscopic Gastroduodenoscopy and Colonoscopy Procedure Note    Impression:    EGD: -Normal upper endoscopy, with no endoscopic evidence of neoplasia or mucosal abnormality. Colonoscopy: inadequate prep    Recommendations:  EGD: -continue present regimen check bxs   Colonoscopy: -reprepp and repeat     Indications:   EGD: gerd  Colonoscopy: Crohns disease     Anesthesia/Sedation: mac    Pre-Procedure Physical:    No current facility-administered medications for this encounter. Current Outpatient Medications   Medication Sig    ustekinumab (STELARA SC) by SubCUTAneous route.  ALPRAZolam (XANAX) 0.5 mg tablet TAKE 1 TAB BY MOUTH TWICE A DAY AS NEEDED    ergocalciferol (ERGOCALCIFEROL) 50,000 unit capsule TAKE 1 (ONE) CAPSULE BY MOUTH ONCE A WEEK    FLORANEX 1 million cell tab tablet TAKE 1 TABLET BY MOUTH EVERY DAY.  tiZANidine (ZANAFLEX) 4 mg tablet TAKE 1 (ONE) TABLET BY MOUTH THREE TIMES DAILY AS NEEDED    insulin glargine (LANTUS,BASAGLAR) 100 unit/mL (3 mL) inpn 30 Units by SubCUTAneous route nightly.  hydrALAZINE (APRESOLINE) 100 mg tablet Take 1 Tab by mouth three (3) times daily.  SITagliptin (JANUVIA) 100 mg tablet Take 1 Tab by mouth daily.  metoprolol tartrate (LOPRESSOR) 50 mg tablet Take 1 Tab by mouth two (2) times a day.  atorvastatin (LIPITOR) 20 mg tablet Take 1 Tab by mouth nightly.  cloNIDine HCl (CATAPRES) 0.1 mg tablet Take 1 Tab by mouth every eight (8) hours as needed.  hydroCHLOROthiazide (HYDRODIURIL) 25 mg tablet Take 1 Tab by mouth daily.  losartan (COZAAR) 100 mg tablet Take 100 mg by mouth daily.  oxyCODONE-acetaminophen (PERCOCET 10)  mg per tablet Take 1 Tablet by mouth three (3) times daily as needed.     albuterol (ProAir HFA) 90 mcg/actuation inhaler Take 2 Puffs by inhalation every four (4) hours as needed for Wheezing. (Patient not taking: Reported on 6/9/2022)    naloxone [facility]) 4 mg/actuation nasal spray Use 1 spray intranasally, then discard. Repeat with new spray every 2 min as needed for opioid overdose symptoms, alternating nostrils.  budesonide (ENTOCORT EC) 3 mg capsule TAKE 1 CAPSULE BY MOUTH 3 TIMES DAILY (Patient not taking: Reported on 6/9/2022)    dicyclomine (BENTYL) 10 mg capsule TAKE 1 CAPSULE BY MOUTH EVERY 6 HOURS AS NEEDED FOR PAIN (Patient not taking: Reported on 7/12/2022)    loperamide (IMODIUM A-D) 2 mg tablet Take 1 Tab by mouth three (3) times daily as needed.  ondansetron hcl (ZOFRAN) 4 mg tablet Take 1 Tab by mouth every eight (8) hours as needed for Nausea.  pantoprazole (PROTONIX) 40 mg tablet Take 1 Tab by mouth Before breakfast and dinner. (Patient not taking: Reported on 7/12/2022)    azaTHIOprine (IMURAN) 50 mg tablet Take 250 mg by mouth daily. Indications: Crohn's disease (Patient not taking: Reported on 6/9/2022)      Metronidazole hcl    No data found. Exam:    Airway: clear   Heart: normal S1and S2    Lungs: clear bilateral  Abdomen: soft, nontender, bowel sounds present and normal in all quads   Mental Status: awake, alert and oriented to person, place and time          Procedure Details    Informed consent was obtained for the procedure, including conscious sedation. Risks of pancreatitis, infection, perforation, hemorrhage, adverse drug reaction and aspiration were discussed. The patient was placed in the left lateral decubitus position. Based on the pre-procedure assessment, including review of the patient's medical history, medications, allergies, and review of systems, She had been deemed to be an appropriate candidate for conscious sedation; She was therefore sedated with the medications listed below. She was monitored continuously with ECG tracing, pulse oximetry, blood pressure monitoring, and direct observation.        The gastroscope was inserted into the mouth and advanced under direct vision to the third portion of the duodenum. A careful inspection was made as the gastroscope was withdrawn, including a retroflexed view of the proximal stomach; findings and interventions are described below. Appropriate photodocumentation was obtained. The patient was placed in the left lateral decubitus position. A rectal examination was performed. The colonoscope was inserted into the rectum and advanced under direct vision to the the sigmoid. The quality of the colonic preparation was poor. A careful inspection was made as the colonoscope was withdrawn, including a retroflexed view of the rectum; findings and interventions are described below. Appropriate photodocumentation was obtained. Findings:   EGD: OROPHARYNX: Cords and pyriform recesses normal.   ESOPHAGUS: The esophagus is normal. The proximal, mid, and distal portions are normal. The Z-Line is intact. STOMACH: The fundus on antegrade and retroflex views is normal. The body, antrum, and pylorus are normal.   DUODENUM: The bulb and second portions are normal.  Colonoscopy: poor prep inadequate exam procedure terminated     Therapies:    EGD: none  Colonoscopy: none    Specimens: antrum for HP  Estimated blood loss: none  Surgical assitnats none  Implants none            Complications:   None; patient tolerated the procedure well. Attending Attestation:  I performed the procedure.      Lauren Fregoso MD

## 2022-08-15 RX ORDER — AMITRIPTYLINE HYDROCHLORIDE 50 MG/1
TABLET, FILM COATED ORAL
COMMUNITY

## 2022-08-18 RX ORDER — AMITRIPTYLINE HYDROCHLORIDE 50 MG/1
TABLET, FILM COATED ORAL
OUTPATIENT
Start: 2022-08-18

## 2022-08-29 NOTE — PERIOP NOTES
PRE-SURGICAL INSTRUCTIONS        Patient's Name:  Loree Rangel      Today's Date:  8/29/2022    Surgery Date:  8/31/2022                Do NOT eat or drink anything, including candy, gum, or ice chips after midnight on 8/31/2022, unless you have specific instructions from your surgeon or anesthesia provider to do so. You may brush your teeth before coming to the hospital.  No smoking 24 hours prior to the day of surgery. No alcohol 24 hours prior to the day of surgery. No recreational drugs for one week prior to the day of surgery. Leave all valuables, including money/purse, at home. Remove all jewelry, nail polish, acrylic nails, and makeup (including mascara); no lotions powders, deodorant, or perfume/cologne/after shave on the skin. Follow instruction for Hibiclens washes and CHG wipes from surgeon's office. Glasses/contact lenses and dentures may be worn to the hospital.  They will be removed prior to surgery. Call your doctor if symptoms of a cold or illness develop within 24-48 hours prior to your surgery. 11.  If you are having an outpatient procedure, please make arrangements for a responsible ADULT TO [address] [place] [address] and stay with you for 24 hours after your surgery. 12. ONE VISITOR in the hospital at this time for outpatient procedures. Exceptions may be made for surgical admissions, per nursing unit guidelines      Special Instructions:      Bring list of CURRENT medications. Bring any pertinent legal medical records:  Covid vaccine card  Take these medications the morning of surgery with a sip of water:  Losartan, Hydralazine, Metoprolol  Follow physician instructions about insulin. Complete bowel prep per MD instructions. On the day of surgery, come in the main entrance of [facility]. Let the  at the desk know you are there for surgery. A staff member will come escort you to the surgical area on the second floor.     If you have any questions or concerns, please do not hesitate to call:     (Prior to the day of surgery) [facility] department:  [phone]   (Day of surgery) Pre-Op department:  [phone]    These surgical instructions were reviewed with Virgie Siddiqui during the [facility] phone call.

## 2022-08-31 ENCOUNTER — APPOINTMENT (OUTPATIENT)
Dept: PREADMISSION TESTING | Age: 52
End: 2022-08-31
Payer: MEDICAID

## 2022-08-31 ENCOUNTER — HOSPITAL ENCOUNTER (OUTPATIENT)
Age: 52
Setting detail: OUTPATIENT SURGERY
Discharge: HOME | End: 2022-08-31
Attending: INTERNAL MEDICINE | Admitting: INTERNAL MEDICINE
Payer: MEDICAID

## 2022-08-31 VITALS
RESPIRATION RATE: 14 BRPM | HEIGHT: 72 IN | HEART RATE: 58 BPM | TEMPERATURE: 97.7 F | WEIGHT: 293 LBS | BODY MASS INDEX: 39.68 KG/M2 | SYSTOLIC BLOOD PRESSURE: 165 MMHG | DIASTOLIC BLOOD PRESSURE: 68 MMHG | OXYGEN SATURATION: 100 %

## 2022-08-31 LAB
COVID-19 RAPID TEST, COVR: NOT DETECTED
GLUCOSE BLD STRIP.AUTO-MCNC: 173 MG/DL (ref 70–110)
HCG UR QL: NEGATIVE
SOURCE, COVRS: NORMAL

## 2022-08-31 PROCEDURE — 88305 TISSUE EXAM BY PATHOLOGIST: CPT

## 2022-08-31 PROCEDURE — 74011250636 HC RX REV CODE- 250/636: Performed by: STUDENT IN AN ORGANIZED HEALTH CARE EDUCATION/TRAINING PROGRAM

## 2022-08-31 PROCEDURE — 76060000031 HC ANESTHESIA FIRST 0.5 HR: Performed by: INTERNAL MEDICINE

## 2022-08-31 PROCEDURE — 74011000250 HC RX REV CODE- 250: Performed by: STUDENT IN AN ORGANIZED HEALTH CARE EDUCATION/TRAINING PROGRAM

## 2022-08-31 PROCEDURE — 00811 ANES LWR INTST NDSC NOS: CPT | Performed by: STUDENT IN AN ORGANIZED HEALTH CARE EDUCATION/TRAINING PROGRAM

## 2022-08-31 PROCEDURE — 77030021593 HC FCPS BIOP ENDOSC BSC -A: Performed by: INTERNAL MEDICINE

## 2022-08-31 PROCEDURE — 2709999900 HC NON-CHARGEABLE SUPPLY: Performed by: INTERNAL MEDICINE

## 2022-08-31 PROCEDURE — 87635 SARS-COV-2 COVID-19 AMP PRB: CPT

## 2022-08-31 PROCEDURE — 74011250637 HC RX REV CODE- 250/637: Performed by: NURSE ANESTHETIST, CERTIFIED REGISTERED

## 2022-08-31 PROCEDURE — 76040000019: Performed by: INTERNAL MEDICINE

## 2022-08-31 PROCEDURE — 74011250636 HC RX REV CODE- 250/636: Performed by: NURSE ANESTHETIST, CERTIFIED REGISTERED

## 2022-08-31 PROCEDURE — 77030008565 HC TBNG SUC IRR ERBE -B: Performed by: INTERNAL MEDICINE

## 2022-08-31 PROCEDURE — 82962 GLUCOSE BLOOD TEST: CPT

## 2022-08-31 PROCEDURE — 81025 URINE PREGNANCY TEST: CPT

## 2022-08-31 RX ORDER — PROPOFOL 10 MG/ML
INJECTION, EMULSION INTRAVENOUS AS NEEDED
Status: DISCONTINUED | OUTPATIENT
Start: 2022-08-31 | End: 2022-08-31 | Stop reason: HOSPADM

## 2022-08-31 RX ORDER — SODIUM CHLORIDE, SODIUM LACTATE, POTASSIUM CHLORIDE, CALCIUM CHLORIDE 600; 310; 30; 20 MG/100ML; MG/100ML; MG/100ML; MG/100ML
INJECTION, SOLUTION INTRAVENOUS
Status: DISCONTINUED | OUTPATIENT
Start: 2022-08-31 | End: 2022-08-31 | Stop reason: HOSPADM

## 2022-08-31 RX ORDER — FAMOTIDINE 20 MG/1
20 TABLET, FILM COATED ORAL ONCE
Status: COMPLETED | OUTPATIENT
Start: 2022-08-31 | End: 2022-08-31

## 2022-08-31 RX ORDER — LIDOCAINE HYDROCHLORIDE 20 MG/ML
INJECTION, SOLUTION EPIDURAL; INFILTRATION; INTRACAUDAL; PERINEURAL AS NEEDED
Status: DISCONTINUED | OUTPATIENT
Start: 2022-08-31 | End: 2022-08-31 | Stop reason: HOSPADM

## 2022-08-31 RX ORDER — LIDOCAINE HYDROCHLORIDE 10 MG/ML
0.1 INJECTION, SOLUTION EPIDURAL; INFILTRATION; INTRACAUDAL; PERINEURAL AS NEEDED
Status: DISCONTINUED | OUTPATIENT
Start: 2022-08-31 | End: 2022-08-31 | Stop reason: HOSPADM

## 2022-08-31 RX ORDER — SODIUM CHLORIDE, SODIUM LACTATE, POTASSIUM CHLORIDE, CALCIUM CHLORIDE 600; 310; 30; 20 MG/100ML; MG/100ML; MG/100ML; MG/100ML
50 INJECTION, SOLUTION INTRAVENOUS CONTINUOUS
Status: DISCONTINUED | OUTPATIENT
Start: 2022-08-31 | End: 2022-08-31 | Stop reason: HOSPADM

## 2022-08-31 RX ORDER — INSULIN LISPRO 100 [IU]/ML
INJECTION, SOLUTION INTRAVENOUS; SUBCUTANEOUS ONCE
Status: DISCONTINUED | OUTPATIENT
Start: 2022-08-31 | End: 2022-08-31 | Stop reason: HOSPADM

## 2022-08-31 RX ADMIN — PROPOFOL 10 MG: 10 INJECTION, EMULSION INTRAVENOUS at 12:57

## 2022-08-31 RX ADMIN — PROPOFOL 30 MG: 10 INJECTION, EMULSION INTRAVENOUS at 12:51

## 2022-08-31 RX ADMIN — PROPOFOL 50 MG: 10 INJECTION, EMULSION INTRAVENOUS at 12:48

## 2022-08-31 RX ADMIN — FAMOTIDINE 20 MG: 20 TABLET ORAL at 12:20

## 2022-08-31 RX ADMIN — PROPOFOL 30 MG: 10 INJECTION, EMULSION INTRAVENOUS at 12:52

## 2022-08-31 RX ADMIN — PROPOFOL 50 MG: 10 INJECTION, EMULSION INTRAVENOUS at 12:46

## 2022-08-31 RX ADMIN — LIDOCAINE HYDROCHLORIDE 40 MG: 20 INJECTION, SOLUTION EPIDURAL; INFILTRATION; INTRACAUDAL; PERINEURAL at 12:46

## 2022-08-31 RX ADMIN — SODIUM CHLORIDE, SODIUM LACTATE, POTASSIUM CHLORIDE, AND CALCIUM CHLORIDE: 600; 310; 30; 20 INJECTION, SOLUTION INTRAVENOUS at 12:46

## 2022-08-31 RX ADMIN — SODIUM CHLORIDE, POTASSIUM CHLORIDE, SODIUM LACTATE AND CALCIUM CHLORIDE 50 ML/HR: 600; 310; 30; 20 INJECTION, SOLUTION INTRAVENOUS at 12:22

## 2022-08-31 RX ADMIN — PROPOFOL 30 MG: 10 INJECTION, EMULSION INTRAVENOUS at 12:55

## 2022-08-31 NOTE — ANESTHESIA POSTPROCEDURE EVALUATION
Procedure(s):  COLONOSCOPY/ Biopsies.     MAC    Anesthesia Post Evaluation      Multimodal analgesia: multimodal analgesia used between 6 hours prior to anesthesia start to PACU discharge  Patient location during evaluation: PACU  Patient participation: complete - patient participated  Level of consciousness: sleepy but conscious  Pain management: adequate  Airway patency: patent  Anesthetic complications: no  Cardiovascular status: acceptable  Respiratory status: acceptable  Hydration status: acceptable  Post anesthesia nausea and vomiting:  controlled  Final Post Anesthesia Temperature Assessment:  Normothermia (36.0-37.5 degrees C)      INITIAL Post-op Vital signs:   Vitals Value Taken Time   /88 08/31/22 1316   Temp     Pulse 82 08/31/22 1316   Resp 20 08/31/22 1316   SpO2 98 % 08/31/22 1316

## 2022-08-31 NOTE — ANESTHESIA PREPROCEDURE EVALUATION
Relevant Problems   CARDIOVASCULAR   (+) Accelerated hypertension      ENDOCRINE   (+) Controlled type 2 diabetes mellitus without complication, with long-term current use of insulin (HCC)   (+) Obesity, morbid (HCC)   (+) Type 2 diabetes mellitus with diabetic neuropathy (HCC)       Anesthetic History   No history of anesthetic complications            Review of Systems / Medical History  Patient summary reviewed, nursing notes reviewed and pertinent labs reviewed    Pulmonary  Within defined limits                 Neuro/Psych   Within defined limits           Cardiovascular    Hypertension: well controlled                   GI/Hepatic/Renal  Within defined limits              Endo/Other    Diabetes: well controlled, type 2    Morbid obesity     Other Findings              Physical Exam    Airway  Mallampati: III  TM Distance: 4 - 6 cm  Neck ROM: normal range of motion   Mouth opening: Normal     Cardiovascular    Rhythm: regular  Rate: normal         Dental  No notable dental hx       Pulmonary  Breath sounds clear to auscultation               Abdominal  GI exam deferred       Other Findings            Anesthetic Plan    ASA: 3  Anesthesia type: MAC          Induction: Intravenous  Anesthetic plan and risks discussed with: Patient

## 2022-08-31 NOTE — DISCHARGE INSTRUCTIONS
Colonoscopy: What to Expect at [address] [place] [address]  After you have a colonoscopy, you will stay at the clinic for 1 to 2 hours until the medicines wear off. Then you can go home. But you will need to arrange for a ride. Your doctor will tell you when you can eat and do your other usual activities. Your doctor will talk to you about when you will need your next colonoscopy. Your doctor can help you decide how often you need to be checked. This will depend on the results of your test and your risk for colorectal cancer. After the test, you may be bloated or have gas pains. You may need to pass gas. If a biopsy was done or a polyp was removed, you may have streaks of blood in your stool (feces) for a few days. This care sheet gives you a general idea about how long it will take for you to recover. But each person recovers at a different pace. Follow the steps below to get better as quickly as possible. How can you care for yourself at home? Activity  Rest when you feel tired. You can do your normal activities when it feels okay to do so. Diet  Follow your doctor's directions for eating. Unless your doctor has told you not to, drink plenty of fluids. This helps to replace the fluids that were lost during the colon prep. Do not drink alcohol. Medicines  Your doctor will tell you if and when you can restart your medicines. He or she will also give you instructions about taking any new medicines. If you take blood thinners, such as warfarin (Coumadin), clopidogrel (Plavix), or aspirin, be sure to talk to your doctor. He or she will tell you if and when to start taking those medicines again. Make sure that you understand exactly what your doctor wants you to do. If polyps were removed or a biopsy was done during the test, your doctor may tell you not to take aspirin or other anti-inflammatory medicines for a few days. These include ibuprofen (Advil, Motrin) and naproxen (Aleve).   Other instructions  For your safety, do not drive or operate machinery until the medicine wears off and you can think clearly. Your doctor may tell you not to drive or operate machinery until the day after your test.  Do not sign legal documents or make major decisions until the medicine wears off and you can think clearly. The anesthesia can make it hard for you to fully understand what you are agreeing to. Follow-up care is a key part of your treatment and safety. Be sure to make and go to all appointments, and call your doctor if you are having problems. It's also a good idea to know your test results and keep a list of the medicines you take. When should you call for help? Call 911 anytime you think you may need emergency care. For example, call if:  You passed out (lost consciousness). You pass maroon or bloody stools. You have severe belly pain. Call your doctor now or seek immediate medical care if:  Your stools are black and tarlike. Your stools have streaks of blood, but you did not have a biopsy or any polyps removed. You have belly pain, or your belly is swollen and firm. You vomit. You have a fever. You are very dizzy. Watch closely for changes in your health, and be sure to contact your doctor if you have any problems. Where can you learn more? Go to CITYBIZLIST.be  Enter E264 in the search box to learn more about \"Colonoscopy: What to Expect at Home. \"   © 4354-7650 Healthwise, Incorporated. Care instructions adapted under license by [facility] (which disclaims liability or warranty for this information). This care instruction is for use with your licensed healthcare professional. If you have questions about a medical condition or this instruction, always ask your healthcare professional. [place] [address] any warranty or liability for your use of this information.   Content Version: 23.6.706337; Current as of: November 20, 2015                  DISCHARGE SUMMARY from Nurse POST-PROCEDURE INSTRUCTIONS:    Call your Physician if you:  Observe any excess bleeding. Develop a temperature over 100.5o F. Experience abdominal, shoulder or chest pain. Notice any signs of decreased circulation or nerve impairment to an extremity such as a change in color, persistent numbness, tingling, coldness or increase in pain. Vomit blood or you have nausea and vomiting lasting longer than 4 hours. Are unable to take medications. Are unable to urinate within 8 hours after discharge following general anesthesia or intravenous sedation. For the next 24 hours after receiving general anesthesia or intravenous sedation, or while taking prescription Narcotics, limit your activities:  Do NOT drive a motor vehicle, operate hazard machinery or power tools, or perform tasks that require coordination. The medication you received during your procedure may have some effect on your mental awareness. Do NOT make important personal or business decisions. The medication you received during your procedure may have some effect on your mental awareness. Do NOT drink alcoholic beverages. These drinks do not mix well with the medications that have been given to you. Upon discharge from the hospital, you must be accompanied by a responsible adult. Resume your diet as directed by your physician. Resume medications as your physician has prescribed. Please give a list of your current medications to your Primary Care Provider. Please update this list whenever your medications are discontinued, doses are changed, or new medications (including over-the-counter products) are added. Please carry medication information at all times in case of emergency situations. These are general instructions for a healthy lifestyle:    No smoking/ No tobacco products/ Avoid exposure to second hand smoke. Surgeon General's Warning:  Quitting smoking now greatly reduces serious risk to your health.     Obesity, smoking, and a sedentary lifestyle greatly increase your risk for illness. A healthy diet, regular physical exercise & weight monitoring are important for maintaining a healthy lifestyle  You may be retaining fluid if you have a history of heart failure or if you experience any of the following symptoms:  Weight gain of 3 pounds or more overnight or 5 pounds in a week, increased swelling in our hands or feet or shortness of breath while lying flat in bed. Please call your doctor as soon as you notice any of these symptoms; do not wait until your next office visit. Recognize signs and symptoms of STROKE:  F  -  Face looks uneven  A  -  Arms unable to move or move unevenly  S  -  Speech slurred or non-existent  T  -  Time to call 911 - as soon as signs and symptoms begin - DO NOT go back to bed or wait to see If you get better - TIME IS BRAIN. Colorectal Screening  Colorectal cancer almost always develops from precancerous polyps (abnormal growths) in the colon or rectum. Screening tests can find precancerous polyps, so that they can be removed before they turn into cancer. Screening tests can also find colorectal cancer early, when treatment works best.  Speak with your physician about when you should begin screening and how often you should be tested. Educational references and/or instructions provided during this visit included:    colitis      APPOINTMENTS:    Please make a follow-up appointment with your physician. Discharge information has been reviewed with the patient. The patient verbalized understanding. Colitis: Care Instructions  Your Care Instructions  Colitis is the medical term for swelling (inflammation) of the intestine. It can be caused by different things, such as an infection or loss of blood flow in the intestine. Other causes are problems like Crohn's disease or ulcerative colitis. Symptoms may include fever, diarrhea that may be bloody, or belly pain.  Sometimes symptoms go away without treatment. But you may need treatment or more tests, such as blood tests or a stool test. Or you may need imaging tests like a CT scan or a colonoscopy. In some cases, the doctor may want to test a sample of tissue from the intestine. This test is called a biopsy. The doctor has checked you carefully, but problems can develop later. If you notice any problems or new symptoms, get medical treatment right away. Follow-up care is a key part of your treatment and safety. Be sure to make and go to all appointments, and call your doctor if you are having problems. It's also a good idea to know your test results and keep a list of the medicines you take. How can you care for yourself at home? Rest until you feel better. Your doctor may recommend that you eat bland foods. These include rice, dry toast or crackers, bananas, and applesauce. To prevent dehydration, drink plenty of fluids. Choose water and other clear liquids until you feel better. If you have kidney, heart, or liver disease and have to limit fluids, talk with your doctor before you increase the amount of fluids you drink. Be safe with medicines. Take your medicines exactly as prescribed. Call your doctor if you think you are having a problem with your medicine. You will get more details on the specific medicines your doctor prescribes. When should you call for help? Call 911 anytime you think you may need emergency care. For example, call if:    You passed out (lost consciousness). Your stools are maroon or very bloody. Call your doctor now or seek immediate medical care if:    You have new or worse belly pain. You have a fever. You are vomiting. You cannot pass stools or gas. You have new or more blood in your stools. Watch closely for changes in your health, and be sure to contact your doctor if:    You have new or worse symptoms. You are losing weight. You do not get better as expected.    Where can you learn more? Go to http://www.gray.com/  Enter [id number] in the search box to learn more about \"Colitis: Care Instructions. \"  Current as of: September 8, 2021               Content Version: 13.2  © 3809-8689 Healthwise, Incorporated. Care instructions adapted under license by [employer] (which disclaims liability or warranty for this information). If you have questions about a medical condition or this instruction, always ask your healthcare professional. [place] [address] any warranty or liability for your use of this information.

## 2022-08-31 NOTE — ADDENDUM NOTE
Addendum  created 08/31/22 1935 by Julia Pna MD    Intraprocedure Event edited, Intraprocedure Staff edited

## 2022-08-31 NOTE — H&P
[email or web address]  [address] [place] [place]  [place], [address]        Impression:   1.crohns disease colon      Plan:     1. [place] all risks benfits and lat discussed       Chief Complaint: crohns disease      HPI:  Jesus Alberto Mansfield is a 46 y.o. female who is being seen on consult for Crohns disease.     PMH:   Past Medical History:   Diagnosis Date    Bilateral knee pain     Crohn's colitis ([facility]     Diabetes ([facility] [address]     HTN (hypertension)     Hypercholesterolemia     Pulmonary embolism ([facility] [address] 03/2019    Stool color black     crohns       PSH:   Past Surgical History:   Procedure Laterality Date    COLONOSCOPY N/A 10/21/2019    COLONOSCOPY w/ bxs performed by Dajuan Calhoun MD at [facility]    COLONOSCOPY N/A 9/22/2020    COLONOSCOPY with bx's performed by Bill Montemayor MD at [facility]    COLONOSCOPY N/A 7/12/2022    COLONOSCOPY performed by Bill Montemayor MD at [facility]    HX GI      HX ORTHOPAEDIC      fx left ankle ORIF    HX TUBAL LIGATION      btl       Social HX:   Social History     Socioeconomic History    Marital status: SINGLE     Spouse name: Not on file    Number of children: Not on file    Years of education: Not on file    Highest education level: Not on file   Occupational History    Not on file   Tobacco Use    Smoking status: Never    Smokeless tobacco: Never   Substance and Sexual Activity    Alcohol use: Never    Drug use: Never    Sexual activity: Not Currently   Other Topics Concern    Not on file   Social History Narrative    Not on file     Social Determinants of Health     Financial Resource Strain: Not on file   Food Insecurity: Not on file   Transportation Needs: Not on file   Physical Activity: Not on file   Stress: Not on file   Social Connections: Not on file   Intimate Partner Violence: Not on file   Housing Stability: Not on file       FHX:   Family History   Problem Relation Age of Onset    Diabetes Mother Hypertension Mother     Diabetes Father     Hypertension Father     Heart Disease Father        Allergy:   Allergies   Allergen Reactions    Metronidazole Hcl Other (comments)     neuropathy       Home Medications:     Medications Prior to Admission   Medication Sig    amitriptyline (ELAVIL) 50 mg tablet amitriptyline 50 mg tablet    oxyCODONE-acetaminophen (PERCOCET 10)  mg per tablet Take 1 Tablet by mouth three (3) times daily as needed. ustekinumab (STELARA SC) by SubCUTAneous route. naloxone (NARCAN) 4 mg/actuation nasal spray Use 1 spray intranasally, then discard. Repeat with new spray every 2 min as needed for opioid overdose symptoms, alternating nostrils. ALPRAZolam (XANAX) 0.5 mg tablet TAKE 1 TAB BY MOUTH TWICE A DAY AS NEEDED    budesonide (ENTOCORT EC) 3 mg capsule TAKE 1 CAPSULE BY MOUTH 3 TIMES DAILY    dicyclomine (BENTYL) 10 mg capsule TAKE 1 CAPSULE BY MOUTH EVERY 6 HOURS AS NEEDED FOR PAIN    ergocalciferol (ERGOCALCIFEROL) 50,000 unit capsule TAKE 1 (ONE) CAPSULE BY MOUTH ONCE A WEEK    loperamide (IMMODIUM) 2 mg tablet Take 1 Tab by mouth three (3) times daily as needed. FLORANEX 1 million cell tab tablet TAKE 1 TABLET BY MOUTH EVERY DAY. tiZANidine (ZANAFLEX) 4 mg tablet TAKE 1 (ONE) TABLET BY MOUTH THREE TIMES DAILY AS NEEDED    insulin glargine (LANTUS,BASAGLAR) 100 unit/mL (3 mL) inpn 30 Units by SubCUTAneous route nightly. ondansetron hcl (ZOFRAN) 4 mg tablet Take 1 Tab by mouth every eight (8) hours as needed for Nausea. hydrALAZINE (APRESOLINE) 100 mg tablet Take 1 Tab by mouth three (3) times daily. SITagliptin (JANUVIA) 100 mg tablet Take 1 Tab by mouth daily. metoprolol tartrate (LOPRESSOR) 50 mg tablet Take 1 Tab by mouth two (2) times a day. atorvastatin (LIPITOR) 20 mg tablet Take 1 Tab by mouth nightly. cloNIDine HCl (CATAPRES) 0.1 mg tablet Take 1 Tab by mouth every eight (8) hours as needed.     hydroCHLOROthiazide (HYDRODIURIL) 25 mg tablet Take 1 Tab by mouth daily. losartan (COZAAR) 100 mg tablet Take 100 mg by mouth daily. azaTHIOprine (IMURAN) 50 mg tablet Take 250 mg by mouth daily. Indications: Crohn's disease       Review of Systems:     Constitutional: No fevers, chills, weight loss, fatigue. Skin: No rashes, pruritis, jaundice, ulcerations, erythema. HENT: No headaches, nosebleeds, sinus pressure, rhinorrhea, sore throat. Eyes: No visual changes, blurred vision, eye pain, photophobia, jaundice. Cardiovascular: No chest pain, heart palpitations. Respiratory: No cough, SOB, wheezing, chest discomfort, orthopnea. Gastrointestinal: neg   Genitourinary: No dysuria, bleeding, discharge, pyuria. Musculoskeletal: No weakness, arthralgias, wasting. Endo: No sweats. Heme: No bruising, easy bleeding. Allergies: As noted. Neurological: Cranial nerves intact. Alert and oriented. Gait not assessed. Psychiatric:  No anxiety, depression, hallucinations. Visit Vitals  Ht 6' (1.829 m)   Wt 138.3 kg (305 lb)   BMI 41.37 kg/m²       Physical Assessment:     constitutional: appearance: well developed, well nourished, normal habitus, no deformities, in no acute distress. skin: inspection: no rashes, ulcers, icterus or other lesions; no clubbing or telangiectasias. palpation: no induration or subcutaneos nodules. eyes: inspection: normal conjunctivae and lids; no jaundice pupils: symmetrical, normoreactive to light, normal accommodation and size. ENMT: mouth: normal oral mucosa,lips and gums; good dentition. oropharynx: normal tongue, hard and soft palate; posterior pharynx without erythema, exudate or lesions. neck: no masses organomegaly or tenderness. respiratory: effort: normal chest excursion; no intercostal retraction or accessory muscle use. cardiovascular: abdominal aorta: normal size and position; no bruits. palpation: PMI of normal size and position; normal rhythm; no thrill or murmurs. abdominal: abdomen: normal consistency; no tenderness or masses. hernias: no hernias appreciated. liver: normal size and consistency. spleen: not palpable. rectal: hemoccult/guaiac: not performed. musculoskeletal: no deformities or muscle wasting   lymphatic: axilae: not palpable. groin: not palpable. neck: within normal limits. other: not palpable. neurologic: cranial nerves: II-XII normal.   psychiatric: judgement/insight: within normal limits. memory: within normal limits for recent and remote events. mood and affect: no evidence of depression, anxiety or agitation. orientation: oriented to time, space and person. Basic Metabolic Profile   No results for input(s): NA, K, CL, CO2, BUN, GLU, CA, MG, PHOS in the last 72 hours. No lab exists for component: CREAT      CBC w/Diff    No results for input(s): WBC, RBC, HGB, HCT, MCV, MCH, MCHC, RDW, PLT, HGBEXT, HCTEXT, PLTEXT in the last 72 hours. No lab exists for component: MPV No results for input(s): GRANS, LYMPH, EOS, PRO, MYELO, METAS, BLAST in the last 72 hours. No lab exists for component: MONO, BASO     Hepatic Function   No results for input(s): ALB, TP, TBILI, AP, AML, LPSE in the last 72 hours. No lab exists for component: DBILI, GPT, SGOT       Naeem Castanon MD, M.D. [facility] [address] 1949, [address] [facility]  [email or web address]

## 2022-10-12 ENCOUNTER — TELEPHONE (OUTPATIENT)
Dept: NEUROLOGY | Age: 52
End: 2022-10-12

## 2022-10-12 DIAGNOSIS — E11.40 TYPE 2 DIABETES MELLITUS WITH DIABETIC NEUROPATHY, WITH LONG-TERM CURRENT USE OF INSULIN (HCC): Primary | ICD-10-CM

## 2022-10-12 DIAGNOSIS — Z79.4 TYPE 2 DIABETES MELLITUS WITH DIABETIC NEUROPATHY, WITH LONG-TERM CURRENT USE OF INSULIN (HCC): Primary | ICD-10-CM

## 2022-10-12 RX ORDER — PREGABALIN 200 MG/1
CAPSULE ORAL
COMMUNITY
Start: 2022-09-10 | End: 2022-10-12 | Stop reason: SDUPTHER

## 2022-10-14 RX ORDER — PREGABALIN 200 MG/1
CAPSULE ORAL
Qty: 30 CAPSULE | Refills: 0 | Status: SHIPPED | OUTPATIENT
Start: 2022-10-14 | End: 2022-10-20 | Stop reason: SDUPTHER

## 2022-10-20 DIAGNOSIS — Z79.4 TYPE 2 DIABETES MELLITUS WITH DIABETIC NEUROPATHY, WITH LONG-TERM CURRENT USE OF INSULIN (HCC): ICD-10-CM

## 2022-10-20 DIAGNOSIS — E11.40 TYPE 2 DIABETES MELLITUS WITH DIABETIC NEUROPATHY, WITH LONG-TERM CURRENT USE OF INSULIN (HCC): ICD-10-CM

## 2022-10-20 NOTE — TELEPHONE ENCOUNTER
Pt called to notify that she rec Rx for Lyrica; it was written for 1 tab 3x daily for 30 days (that's only 10 day supply).  Pt stated suppose to be 1 tab 3x daily for days 90 days

## 2022-10-24 DIAGNOSIS — E11.40 TYPE 2 DIABETES MELLITUS WITH DIABETIC NEUROPATHY, WITH LONG-TERM CURRENT USE OF INSULIN (HCC): ICD-10-CM

## 2022-10-24 DIAGNOSIS — Z79.4 TYPE 2 DIABETES MELLITUS WITH DIABETIC NEUROPATHY, WITH LONG-TERM CURRENT USE OF INSULIN (HCC): ICD-10-CM

## 2022-10-24 RX ORDER — PREGABALIN 200 MG/1
200 CAPSULE ORAL 3 TIMES DAILY
Qty: 90 CAPSULE | Refills: 0 | Status: SHIPPED | OUTPATIENT
Start: 2022-10-24 | End: 2022-11-15 | Stop reason: SDUPTHER

## 2022-10-24 NOTE — TELEPHONE ENCOUNTER
Requested Prescriptions     Pending Prescriptions Disp Refills    pregabalin (LYRICA) 200 mg capsule 90 Capsule 0     Sig: Take 1 Capsule by mouth three (3) times daily. Max Daily Amount: 600 mg. TAKE 1 CAPSULE BY MOUTH THREE (3) TIMES DAILY FOR 30 DAYS. MAX DAILY AMOUNT: 600 MG.

## 2022-10-25 RX ORDER — PREGABALIN 200 MG/1
200 CAPSULE ORAL 3 TIMES DAILY
Qty: 90 CAPSULE | Refills: 0 | OUTPATIENT
Start: 2022-10-25

## 2022-11-09 ENCOUNTER — OFFICE VISIT (OUTPATIENT)
Dept: ORTHOPEDIC SURGERY | Age: 52
End: 2022-11-09
Payer: MEDICAID

## 2022-11-09 VITALS — WEIGHT: 293 LBS | BODY MASS INDEX: 39.68 KG/M2 | HEIGHT: 72 IN

## 2022-11-09 DIAGNOSIS — M17.12 PRIMARY OSTEOARTHRITIS OF LEFT KNEE: Primary | ICD-10-CM

## 2022-11-09 DIAGNOSIS — G89.29 CHRONIC PAIN OF BOTH KNEES: ICD-10-CM

## 2022-11-09 DIAGNOSIS — M25.561 CHRONIC PAIN OF BOTH KNEES: ICD-10-CM

## 2022-11-09 DIAGNOSIS — M25.562 CHRONIC PAIN OF BOTH KNEES: ICD-10-CM

## 2022-11-09 PROCEDURE — 20610 DRAIN/INJ JOINT/BURSA W/O US: CPT | Performed by: ORTHOPAEDIC SURGERY

## 2022-11-09 RX ORDER — BETAMETHASONE SODIUM PHOSPHATE AND BETAMETHASONE ACETATE 3; 3 MG/ML; MG/ML
6 INJECTION, SUSPENSION INTRA-ARTICULAR; INTRALESIONAL; INTRAMUSCULAR; SOFT TISSUE ONCE
Status: COMPLETED | OUTPATIENT
Start: 2022-11-09 | End: 2022-11-09

## 2022-11-09 RX ADMIN — BETAMETHASONE SODIUM PHOSPHATE AND BETAMETHASONE ACETATE 6 MG: 3; 3 INJECTION, SUSPENSION INTRA-ARTICULAR; INTRALESIONAL; INTRAMUSCULAR; SOFT TISSUE at 07:54

## 2022-11-09 NOTE — LETTER
11/9/2022 7:43 AM    Ms. [facility]  [address]  [facility] [address] [address]      To Whom It May Concern:    [facility] is currently under the care of [address] [place] [address]. She will continue to work under my care with the following work restrictions: no prolonged working/standing pending knee surgery as patient has severe osteoarthritis (bone on bone) to both knees. We will follow monthly with my office as needed. If there are questions or concerns please have the patient contact our office.         Sincerely,            Leon Pugh MD

## 2022-11-09 NOTE — PROGRESS NOTES
Patient: Rosenda Anne                MRN: [id number]       SSN: [id number]  YOB: 1970        AGE: 45 y.o. SEX: female  Body mass index is 42.45 kg/m². PCP: Yas Chandler MD  11/09/22    Returns for reevaluation of bilateral knee pain she gets a few months of relief out of the knees the left is the worst of the 2 she needs to go back to work and we will write her note for some restrictions as well. Pain is moderate she is done very nicely to reduce her A1c and she is has known diverticular disease previous E. coli urine infection feeling a lot better pain is moderate nonradicular is quite limiting and and I agree with her I think teaching well from generally sitting be appropriate for her. Examination today very nice lady body mass index is at 42 hips rotate nicely each knee comes out fairly straight and bends to 115 degrees calf nontender Wing Naas' sign is negative there is there is just a slight effusion calf nontender Homans' sign is negative the skin is normal minimal peripheral edema.     Previous x-rays confirm severe arthritis really of both knees    She like to get an injection today she is working on her weight and she would like to have surgery in the new year be very happy to accommodate her as well we did inject the left knee today as per protocol and she is can return to see us in a couple weeks time we can inject the right knee as well and will discuss a little bit further about risks and benefits over the regarding the total knee replacement I think she is really getting her health the straight and around which is terrific and I will see her back in the not-too-distant future    REVIEW OF SYSTEMS:      CON: negative  EYE: negative   ENT: negative  RESP: negative  GI:    negative   :  negative  MSK: Positive  A twelve point review of systems was completed, positives noted and all other systems were reviewed and are negative          Past Medical History:   Diagnosis Date    Bilateral knee pain     [facility]'s colitis ([facility]     Diabetes ([facility]     HTN (hypertension)     Hypercholesterolemia     Pulmonary embolism ([facility] 03/2019    Stool color black     crohns       Family History   Problem Relation Age of Onset    Diabetes Mother     Hypertension Mother     Diabetes Father     Hypertension Father     Heart Disease Father        Current Outpatient Medications   Medication Sig Dispense Refill    pregabalin (LYRICA) 200 mg capsule Take 1 Capsule by mouth three (3) times daily. Max Daily Amount: 600 mg. TAKE 1 CAPSULE BY MOUTH THREE (3) TIMES DAILY FOR 30 DAYS. MAX DAILY AMOUNT: 600 MG. 90 Capsule 0    amitriptyline (ELAVIL) 50 mg tablet amitriptyline 50 mg tablet (Patient not taking: Reported on 8/31/2022)      oxyCODONE-acetaminophen (PERCOCET 10)  mg per tablet Take 1 Tablet by mouth three (3) times daily as needed. ustekinumab (STELARA SC) by SubCUTAneous route. naloxone (NARCAN) 4 mg/actuation nasal spray Use 1 spray intranasally, then discard. Repeat with new spray every 2 min as needed for opioid overdose symptoms, alternating nostrils. 1 Each 0    ALPRAZolam (XANAX) 0.5 mg tablet TAKE 1 TAB BY MOUTH TWICE A DAY AS NEEDED  0    budesonide (ENTOCORT EC) 3 mg capsule TAKE 1 CAPSULE BY MOUTH 3 TIMES DAILY (Patient not taking: Reported on 8/31/2022)  1    dicyclomine (BENTYL) 10 mg capsule TAKE 1 CAPSULE BY MOUTH EVERY 6 HOURS AS NEEDED FOR PAIN (Patient not taking: Reported on 8/31/2022)  1    ergocalciferol (ERGOCALCIFEROL) 50,000 unit capsule TAKE 1 (ONE) CAPSULE BY MOUTH ONCE A WEEK  2    loperamide (IMMODIUM) 2 mg tablet Take 1 Tab by mouth three (3) times daily as needed. FLORANEX 1 million cell tab tablet TAKE 1 TABLET BY MOUTH EVERY DAY.  (Patient not taking: Reported on 8/31/2022)  1    tiZANidine (ZANAFLEX) 4 mg tablet TAKE 1 (ONE) TABLET BY MOUTH THREE TIMES DAILY AS NEEDED (Patient not taking: Reported on 8/31/2022)  0    insulin glargine (LANTUS,BASAGLAR) 100 unit/mL (3 mL) inpn 30 Units by SubCUTAneous route nightly. ondansetron hcl (ZOFRAN) 4 mg tablet Take 1 Tab by mouth every eight (8) hours as needed for Nausea. 30 Tab 0    hydrALAZINE (APRESOLINE) 100 mg tablet Take 1 Tab by mouth three (3) times daily. 90 Tab 0    SITagliptin (JANUVIA) 100 mg tablet Take 1 Tab by mouth daily. (Patient not taking: Reported on 8/31/2022) 30 Tab 0    metoprolol tartrate (LOPRESSOR) 50 mg tablet Take 1 Tab by mouth two (2) times a day. 60 Tab 0    atorvastatin (LIPITOR) 20 mg tablet Take 1 Tab by mouth nightly. 30 Tab 0    cloNIDine HCl (CATAPRES) 0.1 mg tablet Take 1 Tab by mouth every eight (8) hours as needed. (Patient not taking: Reported on 8/31/2022) 90 Tab 0    hydroCHLOROthiazide (HYDRODIURIL) 25 mg tablet Take 1 Tab by mouth daily. 30 Tab 0    losartan (COZAAR) 100 mg tablet Take 100 mg by mouth daily. azaTHIOprine (IMURAN) 50 mg tablet Take 250 mg by mouth daily.  Indications: Crohn's disease (Patient not taking: Reported on 8/31/2022)         Allergies   Allergen Reactions    Metronidazole Hcl Other (comments)     neuropathy       Past Surgical History:   Procedure Laterality Date    COLONOSCOPY N/A 10/21/2019    COLONOSCOPY w/ bxs performed by Khalida Jaquez MD at [facility]    COLONOSCOPY N/A 9/22/2020    COLONOSCOPY with bx's performed by Karan Obando MD at [facility]    COLONOSCOPY N/A 7/12/2022    COLONOSCOPY performed by Karan Obando MD at [facility]    COLONOSCOPY N/A 8/31/2022    COLONOSCOPY/ Biopsies performed by Karan Obando MD at [facility]    HX GI      HX ORTHOPAEDIC      fx left ankle ORIF    HX TUBAL LIGATION      btl       Social History     Socioeconomic History    Marital status: SINGLE     Spouse name: Not on file    Number of children: Not on file    Years of education: Not on file    Highest education level: Not on file   Occupational History    Not on file   Tobacco Use    Smoking status: Never    Smokeless tobacco: Never   Substance and Sexual Activity    Alcohol use: Never    Drug use: Never    Sexual activity: Not Currently   Other Topics Concern    Not on file   Social History Narrative    Not on file     Social Determinants of Health     Financial Resource Strain: Not on file   Food Insecurity: Not on file   Transportation Needs: Not on file   Physical Activity: Not on file   Stress: Not on file   Social Connections: Not on file   Intimate Partner Violence: Not on file   Housing Stability: Not on file       Visit Vitals  Ht 6' (1.829 m)   Wt 142 kg (313 lb)   BMI 42.45 kg/m²         PHYSICAL EXAMINATION:  GENERAL: Alert and oriented x3, in no acute distress, well-developed, well-nourished, afebrile. HEART: No JVD. EYES: No scleral icterus   NECK: No significant lymphadenopathy   LUNGS: No respiratory compromise or indrawing  ABDOMEN: Soft, non-tender, non-distended. Note: This note was completed using voice recognition software. Any typographical/name errors or mistakes are unintentional.    Electronically signed by: MD TAMIKA Tran, Lady Dienes Therese Cushing, M.D., have reviewed the history, physical, and have updated the allergic reactions for [facility]. TIME OUT performed immediately prior to the start of procedure:  Tyler Paulson M.D., have performed the following reviews on [facility] prior to the start of the procedure:    - Patient was identified by name and date of birth  - Agreement on procedure being performed was verified  - Risks and benefits explained to the patient  - Patient was positioned for comfort  - Consent was signed and verified  - Patient was advised regarding risks of bruising, bleeding, infection and pain    Time: 8:00 AM     Body Part: intra-articular injection of left knee    Medication and Dose: 1mL Celestone preparation, i.e. 6 mg, and 3 mL 1% lidocaine    Date of Procedure: 11/09/22    PROCEDURE PERFORMED BY : Emile Him L. Therese Cushing, M.D., KHRIS(C)    Provider Assisted by: Nehal Bennett    Patient assisted by: self    Patient tolerated procedure well with no complications

## 2022-11-15 DIAGNOSIS — Z79.4 TYPE 2 DIABETES MELLITUS WITH DIABETIC NEUROPATHY, WITH LONG-TERM CURRENT USE OF INSULIN (HCC): ICD-10-CM

## 2022-11-15 DIAGNOSIS — E11.40 TYPE 2 DIABETES MELLITUS WITH DIABETIC NEUROPATHY, WITH LONG-TERM CURRENT USE OF INSULIN (HCC): ICD-10-CM

## 2022-11-17 RX ORDER — PREGABALIN 200 MG/1
200 CAPSULE ORAL 3 TIMES DAILY
Qty: 90 CAPSULE | Refills: 0 | Status: SHIPPED | OUTPATIENT
Start: 2022-11-17

## 2022-12-13 DIAGNOSIS — Z79.4 TYPE 2 DIABETES MELLITUS WITH DIABETIC NEUROPATHY, WITH LONG-TERM CURRENT USE OF INSULIN (HCC): ICD-10-CM

## 2022-12-13 DIAGNOSIS — E11.40 TYPE 2 DIABETES MELLITUS WITH DIABETIC NEUROPATHY, WITH LONG-TERM CURRENT USE OF INSULIN (HCC): ICD-10-CM

## 2022-12-14 RX ORDER — PREGABALIN 200 MG/1
200 CAPSULE ORAL 3 TIMES DAILY
Qty: 90 CAPSULE | Refills: 2 | Status: SHIPPED | OUTPATIENT
Start: 2022-12-14

## 2023-03-10 DIAGNOSIS — Z79.4 TYPE 2 DIABETES MELLITUS WITH DIABETIC NEUROPATHY, WITH LONG-TERM CURRENT USE OF INSULIN (HCC): Primary | ICD-10-CM

## 2023-03-10 DIAGNOSIS — E11.40 TYPE 2 DIABETES MELLITUS WITH DIABETIC NEUROPATHY, WITH LONG-TERM CURRENT USE OF INSULIN (HCC): Primary | ICD-10-CM

## 2023-03-10 RX ORDER — PREGABALIN 200 MG/1
200 CAPSULE ORAL 3 TIMES DAILY
Qty: 90 CAPSULE | Refills: 1 | Status: SHIPPED | OUTPATIENT
Start: 2023-03-10 | End: 2023-04-09

## 2023-04-21 ENCOUNTER — OFFICE VISIT (OUTPATIENT)
Age: 53
End: 2023-04-21

## 2023-04-21 VITALS — WEIGHT: 293 LBS | HEIGHT: 72 IN | BODY MASS INDEX: 39.68 KG/M2

## 2023-04-21 DIAGNOSIS — M17.12 UNILATERAL PRIMARY OSTEOARTHRITIS, LEFT KNEE: Primary | ICD-10-CM

## 2023-04-21 DIAGNOSIS — M17.11 UNILATERAL PRIMARY OSTEOARTHRITIS, RIGHT KNEE: ICD-10-CM

## 2023-04-21 DIAGNOSIS — M25.562 LEFT KNEE PAIN, UNSPECIFIED CHRONICITY: ICD-10-CM

## 2023-04-21 RX ORDER — BETAMETHASONE SODIUM PHOSPHATE AND BETAMETHASONE ACETATE 3; 3 MG/ML; MG/ML
6 INJECTION, SUSPENSION INTRA-ARTICULAR; INTRALESIONAL; INTRAMUSCULAR; SOFT TISSUE ONCE
Status: COMPLETED | OUTPATIENT
Start: 2023-04-21 | End: 2023-04-21

## 2023-04-21 RX ADMIN — BETAMETHASONE SODIUM PHOSPHATE AND BETAMETHASONE ACETATE 6 MG: 3; 3 INJECTION, SUSPENSION INTRA-ARTICULAR; INTRALESIONAL; INTRAMUSCULAR; SOFT TISSUE at 15:14

## 2023-04-21 RX ADMIN — BETAMETHASONE SODIUM PHOSPHATE AND BETAMETHASONE ACETATE 6 MG: 3; 3 INJECTION, SUSPENSION INTRA-ARTICULAR; INTRALESIONAL; INTRAMUSCULAR; SOFT TISSUE at 15:13

## 2023-04-21 NOTE — PROGRESS NOTES
Patient: Judith Bautista                MRN: [id number]       SSN: [id number]  YOB: 1970        AGE: 45 y.o. SEX: female  Body mass index is 41.3 kg/m². PCP: Vimal Malloy MD  04/21/23            REVIEW OF SYSTEMS:  Constitutional: Negative for fever, chills, weight loss and malaise/fatigue. HENT: Negative. Eyes: Negative. Respiratory: Negative. Cardiovascular: Negative. Gastrointestinal: No bowel incontinence or constipation. Genitourinary: No bladder incontinence or saddle anesthesia. Skin: Negative. Neurological: Negative. Endo/Heme/Allergies: Negative. Psychiatric/Behavioral: Negative. Musculoskeletal: As per HPI above.      Past Medical History:   Diagnosis Date    Bilateral knee pain     [facility]     Diabetes ([facility]     HTN (hypertension)     Hypercholesterolemia     Pulmonary embolism ([facility] 03/2019    Stool color black     crohns         Current Outpatient Medications:     ALPRAZolam (XANAX) 0.5 MG tablet, TAKE 1 TAB BY MOUTH TWICE A DAY AS NEEDED, Disp: , Rfl:     amitriptyline (ELAVIL) 50 MG tablet, amitriptyline 50 mg tablet, Disp: , Rfl:     atorvastatin (LIPITOR) 20 MG tablet, Take 1 tablet by mouth, Disp: , Rfl:     azaTHIOprine (IMURAN) 50 MG tablet, Take 5 tablets by mouth daily, Disp: , Rfl:     budesonide (ENTOCORT EC) 3 MG extended release capsule, TAKE 1 CAPSULE BY MOUTH 3 TIMES DAILY, Disp: , Rfl:     cloNIDine (CATAPRES) 0.1 MG tablet, Take 1 tablet by mouth every 8 hours as needed, Disp: , Rfl:     dicyclomine (BENTYL) 10 MG capsule, TAKE 1 CAPSULE BY MOUTH EVERY 6 HOURS AS NEEDED FOR PAIN, Disp: , Rfl:     ergocalciferol (ERGOCALCIFEROL) 1.25 MG (94010 UT) capsule, TAKE 1 (ONE) CAPSULE BY MOUTH ONCE A WEEK, Disp: , Rfl:     hydrALAZINE (APRESOLINE) 100 MG tablet, Take 1 tablet by mouth 3 times daily, Disp: , Rfl:     hydroCHLOROthiazide (HYDRODIURIL) 25 MG tablet, Take 1 tablet by mouth daily, Disp: , Rfl:

## 2023-05-11 ENCOUNTER — TELEPHONE (OUTPATIENT)
Age: 53
End: 2023-05-11

## 2023-05-17 ENCOUNTER — TELEPHONE (OUTPATIENT)
Age: 53
End: 2023-05-17

## 2023-05-18 DIAGNOSIS — Z79.4 TYPE 2 DIABETES MELLITUS WITH DIABETIC NEUROPATHY, WITH LONG-TERM CURRENT USE OF INSULIN (HCC): ICD-10-CM

## 2023-05-18 DIAGNOSIS — E11.40 TYPE 2 DIABETES MELLITUS WITH DIABETIC NEUROPATHY, WITH LONG-TERM CURRENT USE OF INSULIN (HCC): ICD-10-CM

## 2023-05-18 RX ORDER — PREGABALIN 200 MG/1
200 CAPSULE ORAL 3 TIMES DAILY
Qty: 90 CAPSULE | Refills: 1 | Status: SHIPPED | OUTPATIENT
Start: 2023-05-18 | End: 2023-06-17

## 2023-09-19 ENCOUNTER — CLINICAL DOCUMENTATION (OUTPATIENT)
Age: 53
End: 2023-09-19

## 2023-10-09 ENCOUNTER — NURSE ONLY (OUTPATIENT)
Age: 53
End: 2023-10-09
Payer: COMMERCIAL

## 2023-10-09 DIAGNOSIS — M17.12 UNILATERAL PRIMARY OSTEOARTHRITIS, LEFT KNEE: Primary | ICD-10-CM

## 2023-10-09 DIAGNOSIS — Z01.818 PREOP TESTING: Primary | ICD-10-CM

## 2023-10-09 DIAGNOSIS — M17.12 PRIMARY OSTEOARTHRITIS OF LEFT KNEE: ICD-10-CM

## 2023-10-09 PROCEDURE — 73562 X-RAY EXAM OF KNEE 3: CPT | Performed by: PHYSICIAN ASSISTANT

## 2023-10-11 ENCOUNTER — OFFICE VISIT (OUTPATIENT)
Age: 53
End: 2023-10-11

## 2023-10-11 ENCOUNTER — HOSPITAL ENCOUNTER (OUTPATIENT)
Facility: HOSPITAL | Age: 53
Discharge: HOME | End: 2023-10-14
Payer: COMMERCIAL

## 2023-10-11 ENCOUNTER — HOSPITAL ENCOUNTER (OUTPATIENT)
Facility: HOSPITAL | Age: 53
Discharge: HOME | End: 2023-10-14

## 2023-10-11 VITALS
SYSTOLIC BLOOD PRESSURE: 163 MMHG | BODY MASS INDEX: 39.68 KG/M2 | HEIGHT: 72 IN | DIASTOLIC BLOOD PRESSURE: 89 MMHG | WEIGHT: 293 LBS

## 2023-10-11 DIAGNOSIS — M17.12 PRIMARY OSTEOARTHRITIS OF LEFT KNEE: ICD-10-CM

## 2023-10-11 DIAGNOSIS — Z01.818 PRE-OP EXAM: Primary | ICD-10-CM

## 2023-10-11 DIAGNOSIS — Z01.818 PREOP TESTING: ICD-10-CM

## 2023-10-11 DIAGNOSIS — M17.12 UNILATERAL PRIMARY OSTEOARTHRITIS, LEFT KNEE: ICD-10-CM

## 2023-10-11 LAB
EKG ATRIAL RATE: 57 BPM
EKG DIAGNOSIS: NORMAL
EKG P AXIS: 55 DEGREES
EKG P-R INTERVAL: 160 MS
EKG Q-T INTERVAL: 468 MS
EKG QRS DURATION: 104 MS
EKG QTC CALCULATION (BAZETT): 455 MS
EKG R AXIS: -2 DEGREES
EKG T AXIS: 43 DEGREES
EKG VENTRICULAR RATE: 57 BPM
SENTARA SPECIMEN COLLECTION: NORMAL

## 2023-10-11 PROCEDURE — 93005 ELECTROCARDIOGRAM TRACING: CPT

## 2023-10-11 PROCEDURE — 93010 ELECTROCARDIOGRAM REPORT: CPT | Performed by: INTERNAL MEDICINE

## 2023-10-11 PROCEDURE — 71046 X-RAY EXAM CHEST 2 VIEWS: CPT

## 2023-10-11 RX ORDER — PREGABALIN 25 MG/1
75 CAPSULE ORAL
OUTPATIENT
Start: 2023-10-11 | End: 2023-10-11

## 2023-10-11 RX ORDER — CELECOXIB 100 MG/1
200 CAPSULE ORAL
OUTPATIENT
Start: 2023-10-11 | End: 2023-10-11

## 2023-10-12 LAB
APTT: 28 SEC (ref 22–36)
INR BLD: 0.96 (ref 0.89–1.29)
PROTHROMBIN TIME: 10.7 SEC (ref 9–13)

## 2023-10-13 LAB
ALBUMIN SERPL-MCNC: 3.8 G/DL (ref 3.5–5)
ANION GAP SERPL CALCULATED.3IONS-SCNC: 14 MMOL/L (ref 3–15)
AVERAGE GLUCOSE: 114 MG/DL (ref 91–123)
BACTERIA: PRESENT
BASOPHILS # BLD: 1 % (ref 0–2)
BASOPHILS ABSOLUTE: 0 K/UL (ref 0–0.2)
BILIRUB SERPL-MCNC: NEGATIVE MG/DL
BLOOD: ABNORMAL
BUN BLDV-MCNC: 23 MG/DL (ref 6–22)
CALCIUM SERPL-MCNC: 9.1 MG/DL (ref 8.4–10.5)
CHLORIDE BLD-SCNC: 101 MMOL/L (ref 98–110)
CLARITY: ABNORMAL
CO2: 25 MMOL/L (ref 20–32)
COLOR: ABNORMAL
CREAT SERPL-MCNC: 1.3 MG/DL (ref 0.5–1.2)
EOSINOPHIL # BLD: 5 % (ref 0–6)
EOSINOPHILS ABSOLUTE: 0.3 K/UL (ref 0–0.5)
EPITHELIAL CELLS: >20 /HPF
GLOMERULAR FILTRATION RATE: 47.3 ML/MIN/1.73 SQ.M.
GLUCOSE: 90 MG/DL (ref 70–99)
GLUCOSE: ABNORMAL MG/DL
HBA1C MFR BLD: 5.6 % (ref 4.8–5.6)
HCT VFR BLD CALC: 40.6 % (ref 35.1–48)
HEMOGLOBIN: 12.5 G/DL (ref 11.7–16)
HYALINE CASTS: ABNORMAL /LPF (ref 0–2)
KETONES, URINE: ABNORMAL MG/DL
LEUKOCYTE ESTERASE, URINE: ABNORMAL
LYMPHOCYTES # BLD: 22 % (ref 20–45)
LYMPHOCYTES ABSOLUTE: 1.2 K/UL (ref 1–4.8)
MCH RBC QN AUTO: 29 PG (ref 26–34)
MCHC RBC AUTO-ENTMCNC: 31 G/DL (ref 31–36)
MCV RBC AUTO: 93 FL (ref 80–99)
MONOCYTES ABSOLUTE: 0.4 K/UL (ref 0.1–1)
MONOCYTES: 8 % (ref 3–12)
NEUTROPHILS ABSOLUTE: 3.6 K/UL (ref 1.8–7.7)
NEUTROPHILS: 64 % (ref 40–75)
NITRITE, URINE: POSITIVE
PDW BLD-RTO: 16.2 % (ref 10–15.5)
PH, URINE: 5.5 PH (ref 5–8)
PLATELET # BLD: 285 K/UL (ref 140–440)
PMV BLD AUTO: 12 FL (ref 9–13)
POTASSIUM SERPL-SCNC: 3.7 MMOL/L (ref 3.5–5.5)
PROTEIN UA: NEGATIVE MG/DL
RBC URINE: ABNORMAL /HPF
RBC: 4.35 M/UL (ref 3.8–5.2)
RESULT: NORMAL
SODIUM BLD-SCNC: 140 MMOL/L (ref 133–145)
SPECIFIC GRAVITY: 1.02 (ref 1–1.03)
UROBILINOGEN: 0.2 MG/DL
WBC UA: ABNORMAL /HPF (ref 0–5)
WBC: 5.6 K/UL (ref 4–11)

## 2023-10-16 RX ORDER — IBUPROFEN 800 MG/1
800 TABLET ORAL EVERY 6 HOURS PRN
Status: ON HOLD | COMMUNITY
Start: 2023-09-09 | End: 2023-10-23 | Stop reason: HOSPADM

## 2023-10-16 RX ORDER — DULOXETIN HYDROCHLORIDE 60 MG/1
60 CAPSULE, DELAYED RELEASE ORAL DAILY
COMMUNITY

## 2023-10-16 RX ORDER — AMITRIPTYLINE HYDROCHLORIDE 50 MG/1
1 TABLET, FILM COATED ORAL
COMMUNITY

## 2023-10-16 NOTE — PERIOP NOTE
Instructions for your surgery at [address] [place] [address] Date:  10/16/2023      Patient's Name:  Albina Sheridan           Surgery Date:  10/23/2023              Please enter the main entrance of the hospital and check-[facility] at the  [facility] Once checked in at the , you will take the elevators to the second floor, and report to the waiting room on the left. The room will say Procedure Registration. Do NOT eat or drink anything, including candy, gum, or ice chips after midnight prior to your surgery, unless you have specific instructions from your surgeon or anesthesia provider to do so. Brush your teeth before coming to the hospital. You may swish with water, but do not swallow. No smoking/Vaping/E-Cigarettes 24 hours prior to the day of surgery. No alcohol 24 hours prior to the day of surgery. No recreational drugs for one week prior to the day of surgery. Bring Photo ID, Insurance information, and Co-pay if required on day of surgery. Bring in pertinent legal documents, such as, Medical Power of [place], DNR, Advance Directive, etc.  Leave all valuables, including money/purse, at home. Remove all jewelry, including ALL body piercings, nail polish, acrylic nails, and makeup (including mascara); no lotions, powders, deodorant, or perfume/cologne/after shave on the skin. Follow instruction for Hibiclens washes and CHG wipes from surgeon's office. Glasses and dentures may be worn to the hospital. They must be removed prior to surgery. Please bring case/container for glasses or dentures. Contact lenses should not be worn on day of surgery. Call your doctor's office if symptoms of a cold or illness develop within 24-48 hours prior to your surgery. Call your doctor's office if you have any questions concerning insurance or co-pays. 15. AN ADULT (relative or friend 25 years or older) [address]   16. Please make arrangements for a responsible adult (18 years or older) to be with you for 24 hours after your surgery. 16. ONE VISITOR will be allowed in the waiting area during your surgery. Exceptions may be made for surgical admissions, per nursing unit guidelines      Special Instructions:      Bring a list of CURRENT medications. Follow instructions from the office regarding Blood Thinners and/or Insulin  Follow instructions from the office regarding medications to take the morning of surgery. All BP medications      On day of surgery if you are running late, unable to make procedure time, or sick, please call the Pre-op department at [phone]    These surgical instructions were reviewed with Prince Craig during the PAT phone call.

## 2023-10-17 ENCOUNTER — OFFICE VISIT (OUTPATIENT)
Age: 53
End: 2023-10-17
Payer: COMMERCIAL

## 2023-10-17 VITALS
BODY MASS INDEX: 39.68 KG/M2 | OXYGEN SATURATION: 95 % | WEIGHT: 293 LBS | DIASTOLIC BLOOD PRESSURE: 81 MMHG | SYSTOLIC BLOOD PRESSURE: 134 MMHG | HEART RATE: 96 BPM | HEIGHT: 72 IN

## 2023-10-17 DIAGNOSIS — Z01.818 PREOPERATIVE CLEARANCE: Primary | ICD-10-CM

## 2023-10-17 DIAGNOSIS — I10 HYPERTENSION, UNSPECIFIED TYPE: ICD-10-CM

## 2023-10-17 DIAGNOSIS — I72.9 ANEURYSM (HCC): ICD-10-CM

## 2023-10-17 PROCEDURE — 3075F SYST BP GE 130 - 139MM HG: CPT | Performed by: INTERNAL MEDICINE

## 2023-10-17 PROCEDURE — 99204 OFFICE O/P NEW MOD 45 MIN: CPT | Performed by: INTERNAL MEDICINE

## 2023-10-17 PROCEDURE — 3079F DIAST BP 80-89 MM HG: CPT | Performed by: INTERNAL MEDICINE

## 2023-10-17 RX ORDER — METOPROLOL TARTRATE 50 MG/1
25 TABLET, FILM COATED ORAL 2 TIMES DAILY
Qty: 60 TABLET | Refills: 5 | Status: SHIPPED | OUTPATIENT
Start: 2023-10-17

## 2023-10-17 ASSESSMENT — ENCOUNTER SYMPTOMS
COUGH: 0
WHEEZING: 0
BLOOD IN STOOL: 0
COLOR CHANGE: 0
SHORTNESS OF BREATH: 0
NAUSEA: 0
ABDOMINAL PAIN: 0
APNEA: 0
DIARRHEA: 0
CONSTIPATION: 0
CHEST TIGHTNESS: 0

## 2023-10-17 NOTE — PROGRESS NOTES
Have you had Fatigue? No      2. Have you had have you had Chest Pain? No     3. Have you had Dyspnea (SOB)? No     4. Have you had Orthopnea? No  H    5. Have you had PND? No     6. Have you had leg swelling? No     7. Have you had any weight gain? No    8. Have you had any palpitations? No     9. Have you had any syncope? No    10. Do you have any wounds on legs?  No

## 2023-10-17 NOTE — PATIENT INSTRUCTIONS
Learning About the [address] [place] [address] Diet  What is the Mediterranean diet? The Mediterranean diet is a style of eating rather than a diet plan. It features foods eaten in [facility], [place], [place], and other countries along the [facility]. It emphasizes eating foods like fish, fruits, vegetables, beans, high-fiber breads and whole grains, nuts, and olive oil. This style of eating includes limited red meat, cheese, and sweets. Why choose the Mediterranean diet? A Mediterranean-style diet may improve heart health. It contains more fat than other heart-healthy diets. But the fats are mainly from nuts, unsaturated oils (such as fish oils and olive oil), and certain nut or seed oils (such as canola, soybean, or flaxseed oil). These fats may help protect the heart and blood vessels. How can you get started on the Mediterranean diet? Here are some things you can do to switch to a more Mediterranean way of eating. What to eat  Eat a variety of fruits and vegetables each day, such as grapes, blueberries, tomatoes, broccoli, peppers, figs, olives, spinach, eggplant, beans, lentils, and chickpeas. Eat a variety of whole-grain foods each day, such as oats, brown rice, and whole wheat bread, pasta, and couscous. Eat fish at least 2 times a week. Try tuna, salmon, mackerel, lake trout, herring, or sardines. Eat moderate amounts of low-fat dairy products, such as milk, cheese, or yogurt. Eat moderate amounts of poultry and eggs. Choose healthy (unsaturated) fats, such as nuts, olive oil, and certain nut or seed oils like canola, soybean, and flaxseed. Limit unhealthy (saturated) fats, such as butter, palm oil, and coconut oil. And limit fats found in animal products, such as meat and dairy products made with whole milk. Try to eat red meat only a few times a month in very small amounts. Limit sweets and desserts to only a few times a week. This includes sugar-sweetened drinks like soda.   The

## 2023-10-21 ENCOUNTER — ANESTHESIA EVENT (OUTPATIENT)
Facility: HOSPITAL | Age: 53
End: 2023-10-21
Payer: COMMERCIAL

## 2023-10-23 ENCOUNTER — HOSPITAL ENCOUNTER (OUTPATIENT)
Facility: HOSPITAL | Age: 53
Setting detail: OBSERVATION
Discharge: HOME | End: 2023-10-24
Attending: ORTHOPAEDIC SURGERY | Admitting: ORTHOPAEDIC SURGERY
Payer: COMMERCIAL

## 2023-10-23 ENCOUNTER — APPOINTMENT (OUTPATIENT)
Facility: HOSPITAL | Age: 53
End: 2023-10-23
Attending: ORTHOPAEDIC SURGERY
Payer: COMMERCIAL

## 2023-10-23 ENCOUNTER — ANESTHESIA (OUTPATIENT)
Facility: HOSPITAL | Age: 53
End: 2023-10-23
Payer: COMMERCIAL

## 2023-10-23 DIAGNOSIS — Z79.4 CONTROLLED TYPE 2 DIABETES MELLITUS WITHOUT COMPLICATION, WITH LONG-TERM CURRENT USE OF INSULIN (HCC): ICD-10-CM

## 2023-10-23 DIAGNOSIS — M25.362 INSTABILITY OF KNEE JOINT, LEFT: ICD-10-CM

## 2023-10-23 DIAGNOSIS — E11.9 CONTROLLED TYPE 2 DIABETES MELLITUS WITHOUT COMPLICATION, WITH LONG-TERM CURRENT USE OF INSULIN (HCC): ICD-10-CM

## 2023-10-23 DIAGNOSIS — M17.12 ARTHRITIS OF KNEE, LEFT: Primary | ICD-10-CM

## 2023-10-23 DIAGNOSIS — E66.01 CLASS 3 SEVERE OBESITY DUE TO EXCESS CALORIES WITH SERIOUS COMORBIDITY AND BODY MASS INDEX (BMI) OF 45.0 TO 49.9 IN ADULT (HCC): ICD-10-CM

## 2023-10-23 PROBLEM — M21.162 VARUS DEFORMITY, NOT ELSEWHERE CLASSIFIED, LEFT KNEE: Status: ACTIVE | Noted: 2023-10-23

## 2023-10-23 PROBLEM — E66.813 CLASS 3 SEVERE OBESITY DUE TO EXCESS CALORIES WITH SERIOUS COMORBIDITY AND BODY MASS INDEX (BMI) OF 45.0 TO 49.9 IN ADULT: Status: ACTIVE | Noted: 2023-10-23

## 2023-10-23 PROBLEM — M17.10 ARTHRITIS OF KNEE: Status: ACTIVE | Noted: 2023-10-23

## 2023-10-23 LAB
GLUCOSE BLD STRIP.AUTO-MCNC: 102 MG/DL (ref 70–110)
GLUCOSE BLD STRIP.AUTO-MCNC: 105 MG/DL (ref 70–110)
GLUCOSE BLD STRIP.AUTO-MCNC: 114 MG/DL (ref 70–110)
HCG SERPL QL: NEGATIVE
INR PPP: 1.1 (ref 0.9–1.1)
PROTHROMBIN TIME: 14.8 SEC (ref 11.9–14.7)

## 2023-10-23 PROCEDURE — 2580000003 HC RX 258: Performed by: NURSE ANESTHETIST, CERTIFIED REGISTERED

## 2023-10-23 PROCEDURE — 6360000002 HC RX W HCPCS: Performed by: NURSE ANESTHETIST, CERTIFIED REGISTERED

## 2023-10-23 PROCEDURE — 82962 GLUCOSE BLOOD TEST: CPT

## 2023-10-23 PROCEDURE — C9290 INJ, BUPIVACAINE LIPOSOME: HCPCS | Performed by: ORTHOPAEDIC SURGERY

## 2023-10-23 PROCEDURE — 6370000000 HC RX 637 (ALT 250 FOR IP): Performed by: NURSE ANESTHETIST, CERTIFIED REGISTERED

## 2023-10-23 PROCEDURE — 73560 X-RAY EXAM OF KNEE 1 OR 2: CPT

## 2023-10-23 PROCEDURE — 2580000003 HC RX 258: Performed by: PHYSICIAN ASSISTANT

## 2023-10-23 PROCEDURE — 2500000003 HC RX 250 WO HCPCS: Performed by: NURSE ANESTHETIST, CERTIFIED REGISTERED

## 2023-10-23 PROCEDURE — C1776 JOINT DEVICE (IMPLANTABLE): HCPCS | Performed by: ORTHOPAEDIC SURGERY

## 2023-10-23 PROCEDURE — 6360000002 HC RX W HCPCS: Performed by: ORTHOPAEDIC SURGERY

## 2023-10-23 PROCEDURE — 2580000003 HC RX 258: Performed by: ORTHOPAEDIC SURGERY

## 2023-10-23 PROCEDURE — C1713 ANCHOR/SCREW BN/BN,TIS/BN: HCPCS | Performed by: ORTHOPAEDIC SURGERY

## 2023-10-23 PROCEDURE — 3600000013 HC SURGERY LEVEL 3 ADDTL 15MIN: Performed by: ORTHOPAEDIC SURGERY

## 2023-10-23 PROCEDURE — 97161 PT EVAL LOW COMPLEX 20 MIN: CPT

## 2023-10-23 PROCEDURE — 3700000000 HC ANESTHESIA ATTENDED CARE: Performed by: ORTHOPAEDIC SURGERY

## 2023-10-23 PROCEDURE — 3700000001 HC ADD 15 MINUTES (ANESTHESIA): Performed by: ORTHOPAEDIC SURGERY

## 2023-10-23 PROCEDURE — 7100000001 HC PACU RECOVERY - ADDTL 15 MIN: Performed by: ORTHOPAEDIC SURGERY

## 2023-10-23 PROCEDURE — 7100000000 HC PACU RECOVERY - FIRST 15 MIN: Performed by: ORTHOPAEDIC SURGERY

## 2023-10-23 PROCEDURE — 85610 PROTHROMBIN TIME: CPT

## 2023-10-23 PROCEDURE — 3600000003 HC SURGERY LEVEL 3 BASE: Performed by: ORTHOPAEDIC SURGERY

## 2023-10-23 PROCEDURE — 64447 NJX AA&/STRD FEMORAL NRV IMG: CPT | Performed by: ANESTHESIOLOGY

## 2023-10-23 PROCEDURE — 2709999900 HC NON-CHARGEABLE SUPPLY: Performed by: ORTHOPAEDIC SURGERY

## 2023-10-23 PROCEDURE — 6360000002 HC RX W HCPCS: Performed by: ANESTHESIOLOGY

## 2023-10-23 PROCEDURE — 2720000010 HC SURG SUPPLY STERILE: Performed by: ORTHOPAEDIC SURGERY

## 2023-10-23 PROCEDURE — G0378 HOSPITAL OBSERVATION PER HR: HCPCS

## 2023-10-23 PROCEDURE — 6370000000 HC RX 637 (ALT 250 FOR IP): Performed by: PHYSICIAN ASSISTANT

## 2023-10-23 PROCEDURE — 81001 URINALYSIS AUTO W/SCOPE: CPT

## 2023-10-23 PROCEDURE — 51702 INSERT TEMP BLADDER CATH: CPT

## 2023-10-23 PROCEDURE — 97116 GAIT TRAINING THERAPY: CPT

## 2023-10-23 PROCEDURE — 6360000002 HC RX W HCPCS: Performed by: PHYSICIAN ASSISTANT

## 2023-10-23 PROCEDURE — 51798 US URINE CAPACITY MEASURE: CPT

## 2023-10-23 PROCEDURE — 97110 THERAPEUTIC EXERCISES: CPT

## 2023-10-23 PROCEDURE — 2500000003 HC RX 250 WO HCPCS: Performed by: ORTHOPAEDIC SURGERY

## 2023-10-23 PROCEDURE — 36415 COLL VENOUS BLD VENIPUNCTURE: CPT

## 2023-10-23 PROCEDURE — 87086 URINE CULTURE/COLONY COUNT: CPT

## 2023-10-23 PROCEDURE — 94761 N-INVAS EAR/PLS OXIMETRY MLT: CPT

## 2023-10-23 PROCEDURE — A4217 STERILE WATER/SALINE, 500 ML: HCPCS | Performed by: ORTHOPAEDIC SURGERY

## 2023-10-23 PROCEDURE — 84703 CHORIONIC GONADOTROPIN ASSAY: CPT

## 2023-10-23 PROCEDURE — 2500000003 HC RX 250 WO HCPCS: Performed by: PHYSICIAN ASSISTANT

## 2023-10-23 PROCEDURE — 6370000000 HC RX 637 (ALT 250 FOR IP): Performed by: ORTHOPAEDIC SURGERY

## 2023-10-23 DEVICE — LEGION POSTERIOR STABILIZED HIGH                                    FLEX HIGHLY CROSS LINKED                                    POLYETHYLENE SIZE 5-6 11MM
Type: IMPLANTABLE DEVICE | Site: KNEE | Status: FUNCTIONAL
Brand: LEGION

## 2023-10-23 DEVICE — GENESIS TROCHLEAR PIN 1/8 X 3
Type: IMPLANTABLE DEVICE | Site: KNEE | Status: FUNCTIONAL
Brand: GENESIS

## 2023-10-23 DEVICE — CEMENT BNE 20ML 41GM FULL DOSE PMMA W/ TOBRA M VISC RADPQ: Type: IMPLANTABLE DEVICE | Site: KNEE | Status: FUNCTIONAL

## 2023-10-23 DEVICE — IMPLANT PAT DIA35MM THK10MM X3 ASYM TRIATHLON: Type: IMPLANTABLE DEVICE | Site: KNEE | Status: FUNCTIONAL

## 2023-10-23 DEVICE — GENESIS II NON-POROUS TIBIAL                                    BASEPLATE SIZE 5 LEFT
Type: IMPLANTABLE DEVICE | Site: KNEE | Status: FUNCTIONAL
Brand: GENESIS II

## 2023-10-23 DEVICE — GENESIS II OXINIUM POSTERIOR                                    STABILIZED FEMORAL LEFT SIZE 7
Type: IMPLANTABLE DEVICE | Site: KNEE | Status: FUNCTIONAL
Brand: GENESIS II

## 2023-10-23 RX ORDER — ROCURONIUM BROMIDE 10 MG/ML
INJECTION, SOLUTION INTRAVENOUS PRN
Status: DISCONTINUED | OUTPATIENT
Start: 2023-10-23 | End: 2023-10-23 | Stop reason: SDUPTHER

## 2023-10-23 RX ORDER — ONDANSETRON 4 MG/1
4 TABLET, ORALLY DISINTEGRATING ORAL EVERY 6 HOURS PRN
Status: DISCONTINUED | OUTPATIENT
Start: 2023-10-23 | End: 2023-10-24 | Stop reason: HOSPADM

## 2023-10-23 RX ORDER — SODIUM CHLORIDE, SODIUM LACTATE, POTASSIUM CHLORIDE, CALCIUM CHLORIDE 600; 310; 30; 20 MG/100ML; MG/100ML; MG/100ML; MG/100ML
INJECTION, SOLUTION INTRAVENOUS CONTINUOUS
Status: DISCONTINUED | OUTPATIENT
Start: 2023-10-23 | End: 2023-10-23 | Stop reason: HOSPADM

## 2023-10-23 RX ORDER — HYDROMORPHONE HYDROCHLORIDE 2 MG/ML
0.5 INJECTION, SOLUTION INTRAMUSCULAR; INTRAVENOUS; SUBCUTANEOUS EVERY 4 HOURS PRN
Status: DISCONTINUED | OUTPATIENT
Start: 2023-10-23 | End: 2023-10-23

## 2023-10-23 RX ORDER — INSULIN LISPRO 100 [IU]/ML
0-15 INJECTION, SOLUTION INTRAVENOUS; SUBCUTANEOUS ONCE
Status: CANCELLED | OUTPATIENT
Start: 2023-10-23 | End: 2023-10-23

## 2023-10-23 RX ORDER — PREGABALIN 75 MG/1
75 CAPSULE ORAL
Status: COMPLETED | OUTPATIENT
Start: 2023-10-23 | End: 2023-10-23

## 2023-10-23 RX ORDER — CELECOXIB 200 MG/1
200 CAPSULE ORAL 2 TIMES DAILY
Qty: 60 CAPSULE | Refills: 2 | Status: SHIPPED | OUTPATIENT
Start: 2023-10-23

## 2023-10-23 RX ORDER — FENTANYL CITRATE 50 UG/ML
50 INJECTION, SOLUTION INTRAMUSCULAR; INTRAVENOUS EVERY 5 MIN PRN
Status: COMPLETED | OUTPATIENT
Start: 2023-10-23 | End: 2023-10-23

## 2023-10-23 RX ORDER — HYDROCHLOROTHIAZIDE 25 MG/1
25 TABLET ORAL DAILY
Status: DISCONTINUED | OUTPATIENT
Start: 2023-10-24 | End: 2023-10-24 | Stop reason: HOSPADM

## 2023-10-23 RX ORDER — PROPOFOL 10 MG/ML
INJECTION, EMULSION INTRAVENOUS PRN
Status: DISCONTINUED | OUTPATIENT
Start: 2023-10-23 | End: 2023-10-23 | Stop reason: SDUPTHER

## 2023-10-23 RX ORDER — OXYCODONE HYDROCHLORIDE 10 MG/1
10 TABLET ORAL
Status: DISCONTINUED | OUTPATIENT
Start: 2023-10-23 | End: 2023-10-23

## 2023-10-23 RX ORDER — CEFADROXIL 500 MG/1
500 CAPSULE ORAL 2 TIMES DAILY
Qty: 10 CAPSULE | Refills: 0 | Status: SHIPPED | OUTPATIENT
Start: 2023-10-23 | End: 2023-10-28

## 2023-10-23 RX ORDER — SODIUM CHLORIDE 9 MG/ML
INJECTION, SOLUTION INTRAVENOUS PRN
Status: DISCONTINUED | OUTPATIENT
Start: 2023-10-23 | End: 2023-10-24 | Stop reason: HOSPADM

## 2023-10-23 RX ORDER — DOCUSATE SODIUM 100 MG/1
100 CAPSULE, LIQUID FILLED ORAL 2 TIMES DAILY
Qty: 60 CAPSULE | Refills: 1 | Status: SHIPPED | OUTPATIENT
Start: 2023-10-23 | End: 2023-12-22

## 2023-10-23 RX ORDER — OXYCODONE HYDROCHLORIDE 15 MG/1
15 TABLET ORAL
Status: DISCONTINUED | OUTPATIENT
Start: 2023-10-23 | End: 2023-10-23

## 2023-10-23 RX ORDER — OXYCODONE HYDROCHLORIDE 10 MG/1
10 TABLET ORAL
Status: DISCONTINUED | OUTPATIENT
Start: 2023-10-23 | End: 2023-10-24 | Stop reason: HOSPADM

## 2023-10-23 RX ORDER — OXYCODONE HYDROCHLORIDE 15 MG/1
15 TABLET ORAL
Status: DISCONTINUED | OUTPATIENT
Start: 2023-10-23 | End: 2023-10-24 | Stop reason: HOSPADM

## 2023-10-23 RX ORDER — SODIUM CHLORIDE 9 MG/ML
INJECTION, SOLUTION INTRAVENOUS CONTINUOUS
Status: DISCONTINUED | OUTPATIENT
Start: 2023-10-23 | End: 2023-10-24 | Stop reason: HOSPADM

## 2023-10-23 RX ORDER — ROPIVACAINE HYDROCHLORIDE 2 MG/ML
30 INJECTION, SOLUTION EPIDURAL; INFILTRATION; PERINEURAL ONCE
Status: COMPLETED | OUTPATIENT
Start: 2023-10-23 | End: 2023-10-23

## 2023-10-23 RX ORDER — MIDAZOLAM HYDROCHLORIDE 2 MG/2ML
2 INJECTION, SOLUTION INTRAMUSCULAR; INTRAVENOUS
Status: COMPLETED | OUTPATIENT
Start: 2023-10-23 | End: 2023-10-23

## 2023-10-23 RX ORDER — ASPIRIN 81 MG/1
81 TABLET ORAL 2 TIMES DAILY
Qty: 60 TABLET | Refills: 3 | Status: SHIPPED | OUTPATIENT
Start: 2023-10-23 | End: 2023-10-23 | Stop reason: HOSPADM

## 2023-10-23 RX ORDER — FAMOTIDINE 20 MG/1
20 TABLET, FILM COATED ORAL ONCE
Status: COMPLETED | OUTPATIENT
Start: 2023-10-23 | End: 2023-10-23

## 2023-10-23 RX ORDER — ONDANSETRON 2 MG/ML
4 INJECTION INTRAMUSCULAR; INTRAVENOUS
Status: COMPLETED | OUTPATIENT
Start: 2023-10-23 | End: 2023-10-23

## 2023-10-23 RX ORDER — KETOROLAC TROMETHAMINE 15 MG/ML
15 INJECTION, SOLUTION INTRAMUSCULAR; INTRAVENOUS EVERY 6 HOURS
Status: COMPLETED | OUTPATIENT
Start: 2023-10-23 | End: 2023-10-24

## 2023-10-23 RX ORDER — HYDRALAZINE HYDROCHLORIDE 50 MG/1
100 TABLET, FILM COATED ORAL 3 TIMES DAILY
Status: DISCONTINUED | OUTPATIENT
Start: 2023-10-23 | End: 2023-10-24 | Stop reason: HOSPADM

## 2023-10-23 RX ORDER — ONDANSETRON 2 MG/ML
4 INJECTION INTRAMUSCULAR; INTRAVENOUS EVERY 6 HOURS PRN
Status: DISCONTINUED | OUTPATIENT
Start: 2023-10-23 | End: 2023-10-24 | Stop reason: HOSPADM

## 2023-10-23 RX ORDER — SENNA AND DOCUSATE SODIUM 50; 8.6 MG/1; MG/1
1 TABLET, FILM COATED ORAL 2 TIMES DAILY
Status: DISCONTINUED | OUTPATIENT
Start: 2023-10-23 | End: 2023-10-24 | Stop reason: HOSPADM

## 2023-10-23 RX ORDER — CELECOXIB 100 MG/1
200 CAPSULE ORAL 2 TIMES DAILY
Status: DISCONTINUED | OUTPATIENT
Start: 2023-10-23 | End: 2023-10-24 | Stop reason: HOSPADM

## 2023-10-23 RX ORDER — GLYCOPYRROLATE 0.2 MG/ML
INJECTION INTRAMUSCULAR; INTRAVENOUS PRN
Status: DISCONTINUED | OUTPATIENT
Start: 2023-10-23 | End: 2023-10-23 | Stop reason: SDUPTHER

## 2023-10-23 RX ORDER — LIDOCAINE HYDROCHLORIDE 10 MG/ML
1 INJECTION, SOLUTION EPIDURAL; INFILTRATION; INTRACAUDAL; PERINEURAL
Status: COMPLETED | OUTPATIENT
Start: 2023-10-23 | End: 2023-10-23

## 2023-10-23 RX ORDER — LOSARTAN POTASSIUM 50 MG/1
100 TABLET ORAL DAILY
Status: DISCONTINUED | OUTPATIENT
Start: 2023-10-24 | End: 2023-10-24 | Stop reason: HOSPADM

## 2023-10-23 RX ORDER — OXYCODONE HYDROCHLORIDE AND ACETAMINOPHEN 5; 325 MG/1; MG/1
1-2 TABLET ORAL EVERY 6 HOURS PRN
Qty: 40 TABLET | Refills: 0 | Status: SHIPPED | OUTPATIENT
Start: 2023-10-23 | End: 2023-10-30

## 2023-10-23 RX ORDER — TAMSULOSIN HYDROCHLORIDE 0.4 MG/1
0.4 CAPSULE ORAL ONCE
Status: COMPLETED | OUTPATIENT
Start: 2023-10-23 | End: 2023-10-23

## 2023-10-23 RX ORDER — ONDANSETRON 4 MG/1
4 TABLET, FILM COATED ORAL EVERY 8 HOURS PRN
Qty: 30 TABLET | Refills: 2 | Status: SHIPPED | OUTPATIENT
Start: 2023-10-23

## 2023-10-23 RX ORDER — FAMOTIDINE 20 MG/1
20 TABLET, FILM COATED ORAL 2 TIMES DAILY
Status: DISCONTINUED | OUTPATIENT
Start: 2023-10-23 | End: 2023-10-24 | Stop reason: HOSPADM

## 2023-10-23 RX ORDER — AMITRIPTYLINE HYDROCHLORIDE 50 MG/1
50 TABLET, FILM COATED ORAL
Status: DISCONTINUED | OUTPATIENT
Start: 2023-10-23 | End: 2023-10-24 | Stop reason: HOSPADM

## 2023-10-23 RX ORDER — SODIUM CHLORIDE 0.9 % (FLUSH) 0.9 %
5-40 SYRINGE (ML) INJECTION PRN
Status: DISCONTINUED | OUTPATIENT
Start: 2023-10-23 | End: 2023-10-24 | Stop reason: HOSPADM

## 2023-10-23 RX ORDER — DIPHENHYDRAMINE HYDROCHLORIDE 50 MG/ML
12.5 INJECTION INTRAMUSCULAR; INTRAVENOUS
Status: DISCONTINUED | OUTPATIENT
Start: 2023-10-23 | End: 2023-10-23 | Stop reason: HOSPADM

## 2023-10-23 RX ORDER — ONDANSETRON 2 MG/ML
INJECTION INTRAMUSCULAR; INTRAVENOUS PRN
Status: DISCONTINUED | OUTPATIENT
Start: 2023-10-23 | End: 2023-10-23 | Stop reason: SDUPTHER

## 2023-10-23 RX ORDER — NEOSTIGMINE METHYLSULFATE 1 MG/ML
INJECTION, SOLUTION INTRAVENOUS PRN
Status: DISCONTINUED | OUTPATIENT
Start: 2023-10-23 | End: 2023-10-23 | Stop reason: SDUPTHER

## 2023-10-23 RX ORDER — EPHEDRINE SULFATE/0.9% NACL/PF 50 MG/5 ML
SYRINGE (ML) INTRAVENOUS PRN
Status: DISCONTINUED | OUTPATIENT
Start: 2023-10-23 | End: 2023-10-23 | Stop reason: SDUPTHER

## 2023-10-23 RX ORDER — POLYETHYLENE GLYCOL 3350 17 G/17G
17 POWDER, FOR SOLUTION ORAL DAILY PRN
Status: DISCONTINUED | OUTPATIENT
Start: 2023-10-23 | End: 2023-10-24 | Stop reason: HOSPADM

## 2023-10-23 RX ORDER — SUCCINYLCHOLINE/SOD CL,ISO/PF 100 MG/5ML
SYRINGE (ML) INTRAVENOUS PRN
Status: DISCONTINUED | OUTPATIENT
Start: 2023-10-23 | End: 2023-10-23 | Stop reason: SDUPTHER

## 2023-10-23 RX ORDER — CELECOXIB 100 MG/1
200 CAPSULE ORAL
Status: COMPLETED | OUTPATIENT
Start: 2023-10-23 | End: 2023-10-23

## 2023-10-23 RX ORDER — FENTANYL CITRATE 50 UG/ML
INJECTION, SOLUTION INTRAMUSCULAR; INTRAVENOUS PRN
Status: DISCONTINUED | OUTPATIENT
Start: 2023-10-23 | End: 2023-10-23 | Stop reason: SDUPTHER

## 2023-10-23 RX ORDER — AMLODIPINE BESYLATE 10 MG/1
10 TABLET ORAL DAILY
Status: DISCONTINUED | OUTPATIENT
Start: 2023-10-24 | End: 2023-10-24 | Stop reason: HOSPADM

## 2023-10-23 RX ORDER — ACETAMINOPHEN 650 MG
TABLET, EXTENDED RELEASE ORAL PRN
Status: DISCONTINUED | OUTPATIENT
Start: 2023-10-23 | End: 2023-10-23 | Stop reason: HOSPADM

## 2023-10-23 RX ORDER — LIDOCAINE HYDROCHLORIDE 20 MG/ML
INJECTION, SOLUTION EPIDURAL; INFILTRATION; INTRACAUDAL; PERINEURAL PRN
Status: DISCONTINUED | OUTPATIENT
Start: 2023-10-23 | End: 2023-10-23 | Stop reason: SDUPTHER

## 2023-10-23 RX ORDER — SODIUM CHLORIDE 0.9 % (FLUSH) 0.9 %
5-40 SYRINGE (ML) INJECTION EVERY 12 HOURS SCHEDULED
Status: DISCONTINUED | OUTPATIENT
Start: 2023-10-23 | End: 2023-10-24 | Stop reason: HOSPADM

## 2023-10-23 RX ORDER — INSULIN LISPRO 100 [IU]/ML
0-8 INJECTION, SOLUTION INTRAVENOUS; SUBCUTANEOUS
Status: DISCONTINUED | OUTPATIENT
Start: 2023-10-23 | End: 2023-10-24 | Stop reason: HOSPADM

## 2023-10-23 RX ORDER — DEXTROSE MONOHYDRATE 100 MG/ML
INJECTION, SOLUTION INTRAVENOUS CONTINUOUS PRN
Status: DISCONTINUED | OUTPATIENT
Start: 2023-10-23 | End: 2023-10-23 | Stop reason: HOSPADM

## 2023-10-23 RX ORDER — TIZANIDINE 4 MG/1
4 TABLET ORAL EVERY 8 HOURS PRN
Status: DISCONTINUED | OUTPATIENT
Start: 2023-10-23 | End: 2023-10-24 | Stop reason: HOSPADM

## 2023-10-23 RX ORDER — FENTANYL CITRATE 50 UG/ML
100 INJECTION, SOLUTION INTRAMUSCULAR; INTRAVENOUS
Status: COMPLETED | OUTPATIENT
Start: 2023-10-23 | End: 2023-10-23

## 2023-10-23 RX ORDER — DEXTROSE MONOHYDRATE 100 MG/ML
INJECTION, SOLUTION INTRAVENOUS CONTINUOUS PRN
Status: DISCONTINUED | OUTPATIENT
Start: 2023-10-23 | End: 2023-10-24 | Stop reason: HOSPADM

## 2023-10-23 RX ORDER — DEXTROSE MONOHYDRATE 100 MG/ML
INJECTION, SOLUTION INTRAVENOUS CONTINUOUS PRN
Status: CANCELLED | OUTPATIENT
Start: 2023-10-23

## 2023-10-23 RX ORDER — WARFARIN SODIUM 3 MG/1
3 TABLET ORAL DAILY
Qty: 30 TABLET | Refills: 3 | Status: SHIPPED | OUTPATIENT
Start: 2023-10-23

## 2023-10-23 RX ORDER — WARFARIN SODIUM 10 MG/1
10 TABLET ORAL
Status: COMPLETED | OUTPATIENT
Start: 2023-10-23 | End: 2023-10-23

## 2023-10-23 RX ORDER — INSULIN LISPRO 100 [IU]/ML
1-15 INJECTION, SOLUTION INTRAVENOUS; SUBCUTANEOUS ONCE
Status: DISCONTINUED | OUTPATIENT
Start: 2023-10-23 | End: 2023-10-23 | Stop reason: HOSPADM

## 2023-10-23 RX ORDER — ACETAMINOPHEN 500 MG
1000 TABLET ORAL EVERY 6 HOURS
Status: DISCONTINUED | OUTPATIENT
Start: 2023-10-23 | End: 2023-10-24 | Stop reason: HOSPADM

## 2023-10-23 RX ADMIN — ROCURONIUM BROMIDE 5 MG: 10 INJECTION, SOLUTION INTRAVENOUS at 10:05

## 2023-10-23 RX ADMIN — Medication 5 MG: at 11:32

## 2023-10-23 RX ADMIN — ACETAMINOPHEN 1000 MG: 500 TABLET ORAL at 15:36

## 2023-10-23 RX ADMIN — CELECOXIB 200 MG: 100 CAPSULE ORAL at 08:21

## 2023-10-23 RX ADMIN — HYDROMORPHONE HYDROCHLORIDE 0.5 MG: 1 INJECTION, SOLUTION INTRAMUSCULAR; INTRAVENOUS; SUBCUTANEOUS at 14:30

## 2023-10-23 RX ADMIN — ONDANSETRON 4 MG: 2 INJECTION INTRAMUSCULAR; INTRAVENOUS at 11:59

## 2023-10-23 RX ADMIN — SENNOSIDES AND DOCUSATE SODIUM 1 TABLET: 50; 8.6 TABLET ORAL at 21:14

## 2023-10-23 RX ADMIN — WATER 3000 MG: 1 INJECTION, SOLUTION INTRAMUSCULAR; INTRAVENOUS; SUBCUTANEOUS at 10:11

## 2023-10-23 RX ADMIN — HYDROMORPHONE HYDROCHLORIDE 0.5 MG: 1 INJECTION, SOLUTION INTRAMUSCULAR; INTRAVENOUS; SUBCUTANEOUS at 13:51

## 2023-10-23 RX ADMIN — Medication 5 MG: at 10:23

## 2023-10-23 RX ADMIN — AMITRIPTYLINE HYDROCHLORIDE 50 MG: 50 TABLET, FILM COATED ORAL at 20:33

## 2023-10-23 RX ADMIN — TRANEXAMIC ACID 1000 MG: 100 INJECTION, SOLUTION INTRAVENOUS at 10:17

## 2023-10-23 RX ADMIN — FENTANYL CITRATE 50 MCG: 50 INJECTION, SOLUTION INTRAMUSCULAR; INTRAVENOUS at 13:05

## 2023-10-23 RX ADMIN — SODIUM CHLORIDE: 9 INJECTION, SOLUTION INTRAVENOUS at 15:44

## 2023-10-23 RX ADMIN — ROCURONIUM BROMIDE 10 MG: 10 INJECTION, SOLUTION INTRAVENOUS at 10:54

## 2023-10-23 RX ADMIN — KETOROLAC TROMETHAMINE 15 MG: 15 INJECTION, SOLUTION INTRAMUSCULAR; INTRAVENOUS at 20:35

## 2023-10-23 RX ADMIN — PREGABALIN 75 MG: 75 CAPSULE ORAL at 08:21

## 2023-10-23 RX ADMIN — Medication 5 MG: at 10:20

## 2023-10-23 RX ADMIN — NEOSTIGMINE METHYLSULFATE 3 MG: 1 INJECTION, SOLUTION INTRAVENOUS at 12:06

## 2023-10-23 RX ADMIN — Medication 5 MG: at 10:11

## 2023-10-23 RX ADMIN — KETOROLAC TROMETHAMINE 15 MG: 15 INJECTION, SOLUTION INTRAMUSCULAR; INTRAVENOUS at 13:28

## 2023-10-23 RX ADMIN — TIZANIDINE 4 MG: 4 TABLET ORAL at 21:14

## 2023-10-23 RX ADMIN — LIDOCAINE HYDROCHLORIDE 60 MG: 20 INJECTION, SOLUTION EPIDURAL; INFILTRATION; INTRACAUDAL; PERINEURAL at 10:05

## 2023-10-23 RX ADMIN — FENTANYL CITRATE 25 MCG: 50 INJECTION INTRAMUSCULAR; INTRAVENOUS at 11:39

## 2023-10-23 RX ADMIN — HYDRALAZINE HYDROCHLORIDE 100 MG: 50 TABLET, FILM COATED ORAL at 16:39

## 2023-10-23 RX ADMIN — METOPROLOL TARTRATE 25 MG: 25 TABLET, FILM COATED ORAL at 21:15

## 2023-10-23 RX ADMIN — FAMOTIDINE 20 MG: 20 TABLET, FILM COATED ORAL at 08:21

## 2023-10-23 RX ADMIN — WARFARIN SODIUM 10 MG: 10 TABLET ORAL at 17:39

## 2023-10-23 RX ADMIN — SODIUM CHLORIDE, POTASSIUM CHLORIDE, SODIUM LACTATE AND CALCIUM CHLORIDE: 600; 310; 30; 20 INJECTION, SOLUTION INTRAVENOUS at 08:23

## 2023-10-23 RX ADMIN — ONDANSETRON 4 MG: 2 INJECTION INTRAMUSCULAR; INTRAVENOUS at 12:51

## 2023-10-23 RX ADMIN — FAMOTIDINE 20 MG: 20 TABLET, FILM COATED ORAL at 20:33

## 2023-10-23 RX ADMIN — ACETAMINOPHEN 1000 MG: 500 TABLET ORAL at 21:14

## 2023-10-23 RX ADMIN — Medication 5 MG: at 10:30

## 2023-10-23 RX ADMIN — Medication 140 MG: at 10:06

## 2023-10-23 RX ADMIN — LIDOCAINE HYDROCHLORIDE 1 ML: 10 INJECTION, SOLUTION EPIDURAL; INFILTRATION; INTRACAUDAL; PERINEURAL at 09:38

## 2023-10-23 RX ADMIN — PROPOFOL 200 MG: 10 INJECTION, EMULSION INTRAVENOUS at 10:05

## 2023-10-23 RX ADMIN — GLYCOPYRROLATE 0.4 MG: 0.2 INJECTION INTRAMUSCULAR; INTRAVENOUS at 12:05

## 2023-10-23 RX ADMIN — OXYCODONE HYDROCHLORIDE 15 MG: 15 TABLET ORAL at 23:49

## 2023-10-23 RX ADMIN — ROPIVACAINE HYDROCHLORIDE 25 ML: 2 INJECTION, SOLUTION EPIDURAL; INFILTRATION at 09:44

## 2023-10-23 RX ADMIN — FENTANYL CITRATE 50 MCG: 50 INJECTION, SOLUTION INTRAMUSCULAR; INTRAVENOUS at 17:39

## 2023-10-23 RX ADMIN — CELECOXIB 200 MG: 100 CAPSULE ORAL at 20:32

## 2023-10-23 RX ADMIN — HYDROMORPHONE HYDROCHLORIDE 0.25 MG: 1 INJECTION, SOLUTION INTRAMUSCULAR; INTRAVENOUS; SUBCUTANEOUS at 12:57

## 2023-10-23 RX ADMIN — FENTANYL CITRATE 25 MCG: 50 INJECTION INTRAMUSCULAR; INTRAVENOUS at 12:09

## 2023-10-23 RX ADMIN — TRANEXAMIC ACID 1000 MG: 100 INJECTION, SOLUTION INTRAVENOUS at 11:42

## 2023-10-23 RX ADMIN — WATER 3000 MG: 1 INJECTION INTRAMUSCULAR; INTRAVENOUS; SUBCUTANEOUS at 17:40

## 2023-10-23 RX ADMIN — ROCURONIUM BROMIDE 35 MG: 10 INJECTION, SOLUTION INTRAVENOUS at 10:15

## 2023-10-23 RX ADMIN — FENTANYL CITRATE 50 MCG: 50 INJECTION, SOLUTION INTRAMUSCULAR; INTRAVENOUS at 13:00

## 2023-10-23 RX ADMIN — Medication 10 MG: at 11:50

## 2023-10-23 RX ADMIN — HYDRALAZINE HYDROCHLORIDE 100 MG: 50 TABLET, FILM COATED ORAL at 20:33

## 2023-10-23 RX ADMIN — HYDROMORPHONE HYDROCHLORIDE 0.5 MG: 1 INJECTION, SOLUTION INTRAMUSCULAR; INTRAVENOUS; SUBCUTANEOUS at 14:19

## 2023-10-23 RX ADMIN — FENTANYL CITRATE 50 MCG: 50 INJECTION INTRAMUSCULAR; INTRAVENOUS at 09:37

## 2023-10-23 RX ADMIN — TAMSULOSIN HYDROCHLORIDE 0.4 MG: 0.4 CAPSULE ORAL at 23:49

## 2023-10-23 RX ADMIN — HYDROMORPHONE HYDROCHLORIDE 0.5 MG: 1 INJECTION, SOLUTION INTRAMUSCULAR; INTRAVENOUS; SUBCUTANEOUS at 14:09

## 2023-10-23 RX ADMIN — OXYCODONE HYDROCHLORIDE 15 MG: 15 TABLET ORAL at 20:34

## 2023-10-23 RX ADMIN — FENTANYL CITRATE 50 MCG: 50 INJECTION, SOLUTION INTRAMUSCULAR; INTRAVENOUS at 12:48

## 2023-10-23 RX ADMIN — MIDAZOLAM 2 MG: 1 INJECTION INTRAMUSCULAR; INTRAVENOUS at 09:37

## 2023-10-23 RX ADMIN — FENTANYL CITRATE 50 MCG: 50 INJECTION INTRAMUSCULAR; INTRAVENOUS at 12:15

## 2023-10-23 RX ADMIN — Medication 5 MG: at 11:25

## 2023-10-23 ASSESSMENT — PAIN DESCRIPTION - PAIN TYPE
TYPE: SURGICAL PAIN

## 2023-10-23 ASSESSMENT — PAIN DESCRIPTION - ORIENTATION
ORIENTATION: LEFT

## 2023-10-23 ASSESSMENT — PAIN SCALES - GENERAL
PAINLEVEL_OUTOF10: 5
PAINLEVEL_OUTOF10: 7
PAINLEVEL_OUTOF10: 2
PAINLEVEL_OUTOF10: 8
PAINLEVEL_OUTOF10: 4
PAINLEVEL_OUTOF10: 6
PAINLEVEL_OUTOF10: 6
PAINLEVEL_OUTOF10: 7
PAINLEVEL_OUTOF10: 9
PAINLEVEL_OUTOF10: 7

## 2023-10-23 ASSESSMENT — PAIN DESCRIPTION - LOCATION
LOCATION: KNEE
LOCATION: ANKLE
LOCATION: KNEE
LOCATION: KNEE
LOCATION: INCISION;KNEE
LOCATION: KNEE
LOCATION: INCISION;KNEE
LOCATION: KNEE
LOCATION: INCISION;KNEE

## 2023-10-23 ASSESSMENT — PAIN - FUNCTIONAL ASSESSMENT
PAIN_FUNCTIONAL_ASSESSMENT: ACTIVITIES ARE NOT PREVENTED
PAIN_FUNCTIONAL_ASSESSMENT: PREVENTS OR INTERFERES SOME ACTIVE ACTIVITIES AND ADLS
PAIN_FUNCTIONAL_ASSESSMENT: ACTIVITIES ARE NOT PREVENTED
PAIN_FUNCTIONAL_ASSESSMENT: ACTIVITIES ARE NOT PREVENTED
PAIN_FUNCTIONAL_ASSESSMENT: 0-10
PAIN_FUNCTIONAL_ASSESSMENT: PREVENTS OR INTERFERES SOME ACTIVE ACTIVITIES AND ADLS

## 2023-10-23 ASSESSMENT — PAIN DESCRIPTION - DESCRIPTORS
DESCRIPTORS: ACHING
DESCRIPTORS: SHARP
DESCRIPTORS: ACHING;THROBBING
DESCRIPTORS: SHOOTING;SHARP
DESCRIPTORS: SHOOTING;SHARP
DESCRIPTORS: ACHING
DESCRIPTORS: SHOOTING;SHARP
DESCRIPTORS: OTHER (COMMENT)
DESCRIPTORS: SHOOTING;SHARP
DESCRIPTORS: SHARP
DESCRIPTORS: ACHING

## 2023-10-23 NOTE — INTERVAL H&P NOTE
Update History & Physical    The patient's History and Physical of October 23, 2023 was reviewed with the patient and I examined the patient. There was no change. The surgical site was confirmed by the patient and me. Plan: The risks, benefits, expected outcome, and alternative to the recommended procedure have been discussed with the patient. Patient understands and wants to proceed with the procedure.      Electronically signed by Maria Dolores Means MD on 10/23/2023 at 9:06 AM

## 2023-10-23 NOTE — BRIEF OP NOTE
Brief Postoperative Note      Patient: Simon Navarro  YOB: 1970  MRN: [id number]    Date of Procedure: 10/23/2023    Pre-Op Diagnosis Codes:     * Osteoarthritis of left knee, unspecified osteoarthritis type [M17.12]morbid obesity, instability, varus    Post-Op Diagnosis: Same       Procedure(s):  LEFT TOTAL KNEE REPLACEMENT    Surgeon(s):  Marc Hansen MD    Assistant:  Surgical Assistant: Anabel Paulson  Physician Assistant: Jessica Cartagena PA-C    Anesthesia: General    Estimated Blood Loss (mL): less than 50     Complications: None    Specimens:   * No specimens in log *    Implants:  Implant Name Type Inv. Item Serial No.  Lot No. LRB No. Used Action   CEMENT BNE 20ML 41GM FULL DOSE PMMA W/ TOBRA M VISC RADPQ - [id number]  CEMENT BNE 20ML 41GM FULL DOSE PMMA W/ TOBRA M VISC RADPQ  [employer]- [id number] Left 2 Implanted   BASEPLATE TIB SZ 5 [id number] ML74MM THK2. 3MM L KNEE TI ALLY NP - [id number]  BASEPLATE TIB SZ 5 [id number] ML74MM THK2. 3MM L KNEE TI ALLY NP  [facility] AND [employer]- [id number] Left 1 Implanted   COMPONENT FEM SZ 7 L KNEE OXINIUM POST STBL NP EDYTA STEMLESS - [id number]  COMPONENT FEM SZ 7 L KNEE OXINIUM POST STBL NP EDYTA STEMLESS  [employer]-WD [id number] Left 1 Implanted   IMPLANT PAT PIF00LY [id number] X3 ASYM TRIATHLON - [id number]  IMPLANT PAT [id number] X3 ASYM TRIATHLON  [employer] .[employer]-WD [id number] Left 1 Implanted   INSERT TIB SZ 5-6 [id number] KNEE XLPE POST STBL HI FLX LEGION - [id number]  INSERT TIB SZ 5-6 [id number] KNEE XLPE POST STBL HI FLX LEGION  [employer] Walt Destiny [id number] Left 1 Implanted         Drains: * No LDAs found *    Findings: same      Electronically signed by Marc Hansen MD on 10/23/2023 at 11:45 AM

## 2023-10-23 NOTE — CARE COORDINATION
Potential DME:  Patient has walker, shower chair, and SW ordered bedside commode to home. Patient expects to discharge to: (P) House  Plan for transportation at discharge: (P) Family    Financial    Payor: [address] [place] [address] / Plan: [address] [place], [place] [address] / Product Type: *No Product type* /     Does insurance require precert for SNF: Medicaid no SNF benefits. Potential assistance Purchasing Medications: (P) No      [facility]  [address]  Phone: [phone] Fax: [phone]      Notes:    Factors facilitating achievement of predicted outcomes: Family support, Caregiver support, Motivated, Cooperative, and Pleasant    The Plan for Transition of Care is related to the following treatment goals of Osteoarthritis of left knee, unspecified osteoarthritis type [M17.12]  Arthritis of knee [C34.38]    IF APPLICABLE: The Patient and/or patient representative Corazon Peters and her family were provided with a choice of provider and agrees with the discharge plan. [address] [facility] for [facility]    The Patient and/or Patient Representative Agree with the Discharge Plan? Patient to discharge home and is in agreement with d/c plans. Patient requesting [facility] at discharge. She reports she has walker, shower chair. SW ordered DME bedside commode to her home. Family to provide support and transport at discharge. No additional CM needs identified at this time.      10/23/23 1632   Service Assessment   Patient Orientation Alert and Oriented   Cognition Alert   History Provided By Patient   Primary [address] is: Legal Next of Kin   PCP Verified by CM Yes   Last Visit to PCP Within last 3 months   Prior Functional Level Independent in ADLs/IADLs   Current Functional Level Independent in ADLs/IADLs   Can patient return to prior living arrangement Yes Ability to make needs known: Good   Family able to assist with home care needs: Yes   Would you like for me to discuss the discharge plan with any other family members/significant others, and if so, who? Yes   Financial Resources Medicaid   Social/Functional History   Lives With Family   Type of [facility]  One level   Home Access Level entry   [address] unit   [address] [facility] chair   [facility]   [place] Help From [address] Responsibilities Yes   Ambulation Assistance Independent   Transfer Assistance Independent   Active  Yes   Discharge Planning   Type of [address] Prior To Admission None   [address]   DME Ordered? Bedside commode   Potential Assistance Purchasing Medications No   Type of Home Care Services PT;Skilled Therapy   Patient expects to be discharged to: [facility] Discharge   Transition of Care Consult (CM Consult) [address] Provided? No   Mode of Transport at Discharge Other (see comment)  (Children)   Confirm Follow Up Transport Family   Condition of Participation: Discharge Planning   The Plan for Transition of Care is related to the following treatment goals: Discharge home with appropriate resources in place.      Pietro David LMSW   Case Management

## 2023-10-23 NOTE — PERIOP NOTE
TRANSFER - OUT REPORT:    Verbal report given to Ant Izquierdo on Marybel Burch  being transferred to room  for routine progression of patient care       Report consisted of patient's Situation, Background, Assessment and   Recommendations(SBAR). Information from the following report(s) Nurse Handoff Report, Surgery Report, Intake/Output, MAR, and Recent Results was reviewed with the receiving nurse. Lines:   Peripheral IV 10/23/23 Proximal;Right; Anterior Forearm (Active)        Opportunity for questions and clarification was provided.       Patient transported with:  [employer]

## 2023-10-23 NOTE — ANESTHESIA PRE PROCEDURE
PM           Anesthesia Evaluation  Patient summary reviewed and Nursing notes reviewed no history of anesthetic complications:   Airway: Mallampati: III  TM distance: <3 FB   Neck ROM: full  Mouth opening: > = 3 FB   Dental:    (+) poor dentition      Pulmonary:Negative Pulmonary ROS and normal exam                               Cardiovascular:    (+) hypertension: mild,                   Neuro/Psych:   Negative Neuro/Psych ROS              GI/Hepatic/Renal:   (+) morbid obesity          Endo/Other:    (+) DiabetesType II DM, , .                 Abdominal:   (+) obese,           Vascular: negative vascular ROS. Other Findings:           Anesthesia Plan      general and regional     ASA 3       Induction: intravenous. Anesthetic plan and risks discussed with patient.                         Chase Reynolds MD   10/23/2023

## 2023-10-23 NOTE — PERIOP NOTE
Patient Stacy Neal has been informed that [facility] is not responsible for patient belongings per policy and the signed [facility] Patient Agreement document. Personal items should be sent home or checked in with security. Patient Stacy Neal selected the following action:                            []  Send personal items home with a family member or friend                                                 []  Check in personal items with security, excluding clothing                            [x]  Maintain personal items at the bedside, against recommendation                                 by [address] [place] [address]                                   ** If patient Dmitry London chooses to maintain personal items at the bedside,                                      Complete the patient belongings inventory in the EMR.

## 2023-10-23 NOTE — ANESTHESIA PROCEDURE NOTES
Peripheral Block    Patient location during procedure: pre-op  Reason for block: post-op pain management and at surgeon's request  Start time: 10/23/2023 9:38 AM  End time: 10/23/2023 9:43 AM  Staffing  Performed: anesthesiologist   Anesthesiologist: Anahy Zendejas MD  Performed by: Anahy Zendejas MD  Authorized by: Anahy Zendejas MD    Preanesthetic Checklist  Completed: patient identified, IV checked, site marked, risks and benefits discussed, surgical/procedural consents, equipment checked, pre-op evaluation, timeout performed, anesthesia consent given, oxygen available, monitors applied/VS acknowledged, fire risk safety assessment completed and verbalized and blood product R/B/A discussed and consented  Peripheral Block   Patient position: sitting  Prep: ChloraPrep  Provider prep: mask and sterile gloves  Patient monitoring: cardiac monitor, continuous pulse ox, continuous capnometry, frequent blood pressure checks, IV access and oxygen  Block type: Saphenous  Laterality: left  Injection technique: single-shot  Guidance: ultrasound guided  Local infiltration: ropivacaine  Infiltration strength: 0.2 %  Local infiltration: ropivacaine  Dose: 30 mL    Needle   Needle type: insulated echogenic nerve stimulator needle   Needle gauge: 20 G  Needle localization: ultrasound guidance  Needle length: 10 cm  Assessment   Injection assessment: negative aspiration for heme, no paresthesia on injection, local visualized surrounding nerve on ultrasound and no intravascular symptoms  Paresthesia pain: none  Slow fractionated injection: yes  Hemodynamics: stable  Real-time US image taken/store: yes  Outcomes: uncomplicated and patient tolerated procedure well

## 2023-10-23 NOTE — ANESTHESIA POSTPROCEDURE EVALUATION
Department of Anesthesiology  Postprocedure Note    Patient: Perri Ramirez  MRN: [id number]  YOB: 1970  Date of evaluation: 10/23/2023      Procedure Summary     Date: 10/23/23 Room / Location: [facility] MAIN 07 / [facility] MAIN OR    Anesthesia Start: 6115 Anesthesia Stop: 3122    Procedure: LEFT TOTAL KNEE REPLACEMENT (Left: Knee) Diagnosis:       Osteoarthritis of left knee, unspecified osteoarthritis type      (Osteoarthritis of left knee, unspecified osteoarthritis type [M17.12])    Surgeons: Frank Sanders MD Responsible Provider: Bernita Hodgkins., MD    Anesthesia Type: General ASA Status: 3          Anesthesia Type: General    Ulises Phase I: Ulises Score: 10    Ulises Phase II:        Anesthesia Post Evaluation    Patient location during evaluation: bedside  Airway patency: patent  Complications: no  Cardiovascular status: hemodynamically stable  Respiratory status: acceptable  Hydration status: stable  Pain management: adequate

## 2023-10-24 ENCOUNTER — APPOINTMENT (OUTPATIENT)
Facility: HOSPITAL | Age: 53
End: 2023-10-24
Attending: ORTHOPAEDIC SURGERY
Payer: COMMERCIAL

## 2023-10-24 ENCOUNTER — HOME HEALTH ADMISSION (OUTPATIENT)
Age: 53
End: 2023-10-24
Payer: COMMERCIAL

## 2023-10-24 VITALS
BODY MASS INDEX: 39.68 KG/M2 | DIASTOLIC BLOOD PRESSURE: 88 MMHG | TEMPERATURE: 97.7 F | SYSTOLIC BLOOD PRESSURE: 127 MMHG | WEIGHT: 293 LBS | HEIGHT: 72 IN | OXYGEN SATURATION: 95 % | RESPIRATION RATE: 16 BRPM | HEART RATE: 74 BPM

## 2023-10-24 LAB
APPEARANCE UR: CLEAR
BACTERIA URNS QL MICRO: NEGATIVE /HPF
BILIRUB UR QL: NEGATIVE
COLOR UR: YELLOW
EPITH CASTS URNS QL MICRO: NORMAL /LPF (ref 0–5)
GLUCOSE BLD STRIP.AUTO-MCNC: 111 MG/DL (ref 70–110)
GLUCOSE BLD STRIP.AUTO-MCNC: 127 MG/DL (ref 70–110)
GLUCOSE BLD STRIP.AUTO-MCNC: 89 MG/DL (ref 70–110)
GLUCOSE UR STRIP.AUTO-MCNC: >1000 MG/DL
HGB UR QL STRIP: NEGATIVE
INR PPP: 1.2 (ref 0.9–1.1)
KETONES UR QL STRIP.AUTO: NEGATIVE MG/DL
LEUKOCYTE ESTERASE UR QL STRIP.AUTO: NEGATIVE
NITRITE UR QL STRIP.AUTO: NEGATIVE
PH UR STRIP: 5.5 (ref 5–8)
PROT UR STRIP-MCNC: ABNORMAL MG/DL
PROTHROMBIN TIME: 15.2 SEC (ref 11.9–14.7)
RBC #/AREA URNS HPF: NEGATIVE /HPF (ref 0–5)
SP GR UR REFRACTOMETRY: >1.03 (ref 1–1.03)
UROBILINOGEN UR QL STRIP.AUTO: 0.2 EU/DL (ref 0.2–1)
WBC URNS QL MICRO: NEGATIVE /HPF (ref 0–4)

## 2023-10-24 PROCEDURE — 6370000000 HC RX 637 (ALT 250 FOR IP): Performed by: ORTHOPAEDIC SURGERY

## 2023-10-24 PROCEDURE — 85610 PROTHROMBIN TIME: CPT

## 2023-10-24 PROCEDURE — 97116 GAIT TRAINING THERAPY: CPT

## 2023-10-24 PROCEDURE — 93971 EXTREMITY STUDY: CPT

## 2023-10-24 PROCEDURE — 96374 THER/PROPH/DIAG INJ IV PUSH: CPT

## 2023-10-24 PROCEDURE — 96375 TX/PRO/DX INJ NEW DRUG ADDON: CPT

## 2023-10-24 PROCEDURE — 97165 OT EVAL LOW COMPLEX 30 MIN: CPT

## 2023-10-24 PROCEDURE — 94761 N-INVAS EAR/PLS OXIMETRY MLT: CPT

## 2023-10-24 PROCEDURE — 36415 COLL VENOUS BLD VENIPUNCTURE: CPT

## 2023-10-24 PROCEDURE — 97535 SELF CARE MNGMENT TRAINING: CPT

## 2023-10-24 PROCEDURE — 6370000000 HC RX 637 (ALT 250 FOR IP): Performed by: PHYSICIAN ASSISTANT

## 2023-10-24 PROCEDURE — 2580000003 HC RX 258: Performed by: ORTHOPAEDIC SURGERY

## 2023-10-24 PROCEDURE — 6360000002 HC RX W HCPCS: Performed by: ORTHOPAEDIC SURGERY

## 2023-10-24 PROCEDURE — G0378 HOSPITAL OBSERVATION PER HR: HCPCS

## 2023-10-24 PROCEDURE — 82962 GLUCOSE BLOOD TEST: CPT

## 2023-10-24 PROCEDURE — 96361 HYDRATE IV INFUSION ADD-ON: CPT

## 2023-10-24 PROCEDURE — 97110 THERAPEUTIC EXERCISES: CPT

## 2023-10-24 PROCEDURE — 96376 TX/PRO/DX INJ SAME DRUG ADON: CPT

## 2023-10-24 PROCEDURE — 93971 EXTREMITY STUDY: CPT | Performed by: STUDENT IN AN ORGANIZED HEALTH CARE EDUCATION/TRAINING PROGRAM

## 2023-10-24 RX ORDER — TAMSULOSIN HYDROCHLORIDE 0.4 MG/1
0.4 CAPSULE ORAL ONCE
Status: COMPLETED | OUTPATIENT
Start: 2023-10-24 | End: 2023-10-24

## 2023-10-24 RX ORDER — 0.9 % SODIUM CHLORIDE 0.9 %
250 INTRAVENOUS SOLUTION INTRAVENOUS ONCE
Status: COMPLETED | OUTPATIENT
Start: 2023-10-24 | End: 2023-10-24

## 2023-10-24 RX ORDER — WARFARIN SODIUM 5 MG/1
5 TABLET ORAL
Status: COMPLETED | OUTPATIENT
Start: 2023-10-24 | End: 2023-10-24

## 2023-10-24 RX ORDER — 0.9 % SODIUM CHLORIDE 0.9 %
500 INTRAVENOUS SOLUTION INTRAVENOUS ONCE
Status: COMPLETED | OUTPATIENT
Start: 2023-10-24 | End: 2023-10-24

## 2023-10-24 RX ADMIN — KETOROLAC TROMETHAMINE 15 MG: 15 INJECTION, SOLUTION INTRAMUSCULAR; INTRAVENOUS at 10:37

## 2023-10-24 RX ADMIN — ONDANSETRON 4 MG: 2 INJECTION INTRAMUSCULAR; INTRAVENOUS at 10:37

## 2023-10-24 RX ADMIN — WARFARIN SODIUM 5 MG: 5 TABLET ORAL at 18:22

## 2023-10-24 RX ADMIN — SENNOSIDES AND DOCUSATE SODIUM 1 TABLET: 50; 8.6 TABLET ORAL at 10:36

## 2023-10-24 RX ADMIN — KETOROLAC TROMETHAMINE 15 MG: 15 INJECTION, SOLUTION INTRAMUSCULAR; INTRAVENOUS at 02:33

## 2023-10-24 RX ADMIN — WATER 3000 MG: 1 INJECTION INTRAMUSCULAR; INTRAVENOUS; SUBCUTANEOUS at 02:34

## 2023-10-24 RX ADMIN — KETOROLAC TROMETHAMINE 15 MG: 15 INJECTION, SOLUTION INTRAMUSCULAR; INTRAVENOUS at 15:13

## 2023-10-24 RX ADMIN — WATER 3000 MG: 1 INJECTION INTRAMUSCULAR; INTRAVENOUS; SUBCUTANEOUS at 10:43

## 2023-10-24 RX ADMIN — SODIUM CHLORIDE, PRESERVATIVE FREE 10 ML: 5 INJECTION INTRAVENOUS at 09:04

## 2023-10-24 RX ADMIN — SODIUM CHLORIDE 500 ML: 9 INJECTION, SOLUTION INTRAVENOUS at 11:40

## 2023-10-24 RX ADMIN — SODIUM CHLORIDE 250 ML: 9 INJECTION, SOLUTION INTRAVENOUS at 15:14

## 2023-10-24 RX ADMIN — ACETAMINOPHEN 1000 MG: 500 TABLET ORAL at 10:37

## 2023-10-24 RX ADMIN — ACETAMINOPHEN 1000 MG: 500 TABLET ORAL at 03:05

## 2023-10-24 RX ADMIN — ACETAMINOPHEN 1000 MG: 500 TABLET ORAL at 18:22

## 2023-10-24 RX ADMIN — CELECOXIB 200 MG: 100 CAPSULE ORAL at 08:42

## 2023-10-24 RX ADMIN — OXYCODONE HYDROCHLORIDE 15 MG: 15 TABLET ORAL at 08:42

## 2023-10-24 RX ADMIN — OXYCODONE HYDROCHLORIDE 15 MG: 15 TABLET ORAL at 02:46

## 2023-10-24 RX ADMIN — OXYCODONE HYDROCHLORIDE 15 MG: 15 TABLET ORAL at 13:55

## 2023-10-24 RX ADMIN — FAMOTIDINE 20 MG: 20 TABLET, FILM COATED ORAL at 10:44

## 2023-10-24 RX ADMIN — TAMSULOSIN HYDROCHLORIDE 0.4 MG: 0.4 CAPSULE ORAL at 15:13

## 2023-10-24 ASSESSMENT — PAIN SCALES - GENERAL
PAINLEVEL_OUTOF10: 6
PAINLEVEL_OUTOF10: 8
PAINLEVEL_OUTOF10: 3
PAINLEVEL_OUTOF10: 9
PAINLEVEL_OUTOF10: 8
PAINLEVEL_OUTOF10: 9
PAINLEVEL_OUTOF10: 6
PAINLEVEL_OUTOF10: 3
PAINLEVEL_OUTOF10: 9
PAINLEVEL_OUTOF10: 3
PAINLEVEL_OUTOF10: 4
PAINLEVEL_OUTOF10: 8
PAINLEVEL_OUTOF10: 7

## 2023-10-24 ASSESSMENT — PAIN DESCRIPTION - DESCRIPTORS
DESCRIPTORS: ACHING
DESCRIPTORS: THROBBING
DESCRIPTORS: ACHING;DISCOMFORT

## 2023-10-24 ASSESSMENT — PAIN - FUNCTIONAL ASSESSMENT
PAIN_FUNCTIONAL_ASSESSMENT: ACTIVITIES ARE NOT PREVENTED

## 2023-10-24 ASSESSMENT — PAIN DESCRIPTION - LOCATION
LOCATION: KNEE

## 2023-10-24 ASSESSMENT — PAIN DESCRIPTION - ORIENTATION
ORIENTATION: LEFT

## 2023-10-24 NOTE — NURSE NAVIGATOR
Bladder scan performed on patient, patient has 295 in bladder. Per AMANDEEP dang patient is to have donaldson placed to leg bag if greater than 250 cc in bladder and consult to urology placed. Patient agreeable. Consult placed to [facility] [place] via inbox to AMANDEEP alicia. Patient will be contacted by [facility] with follow up. Per Dr. Ricardo Carrasco patient may discharge home once venous duplex returns negative for DVT. She is set up for home health and home PT which will monitor her coumadin and provide donaldson care until donaldson is removed. If Doppler is positive for DVT please contact AMANDEEP dang for further orders.

## 2023-10-24 NOTE — DISCHARGE SUMMARY
10/23/2023  6:45 AM    10/24/2023, 8:03 AM    Primary Dx:left Orthopedic / Rheumatologic: Total Knee Replacement  Secondary Dx: Etiological Diagnoses: none    HPI:  Pt has end stage OA of their left knee and had failed conservative treatment. Due to the current findings and affected activity of daily living surgical intervention is indicated.   The alternatives, risks, complications as well as expected outcome were discussed, the patient understands and wishes to proceed with surgery    Past Medical History:   Diagnosis Date    Bilateral knee pain     Crohn's colitis ([facility])     Diabetes ([facility])     IDDM    HTN (hypertension)     Hypercholesterolemia     Pulmonary embolism ([facility]) 03/2019    Stool color black     crohns         Current Facility-Administered Medications:     bupivacaine liposome (EXPAREL) 1.3 % injection 66.5 mg, 5 mL, SubCUTAneous, Once, Leiyoo, MELINDA    amitriptyline (ELAVIL) tablet 50 mg, 50 mg, Oral, QHS, Jaciel Saldana MD, 50 mg at 10/23/23 2033    amLODIPine (NORVASC) tablet 10 mg, 10 mg, Oral, Daily, Jaciel Saldana MD    hydrALAZINE (APRESOLINE) tablet 100 mg, 100 mg, Oral, TID, Jaciel Sladana MD, 100 mg at 10/23/23 2033    hydroCHLOROthiazide (HYDRODIURIL) tablet 25 mg, 25 mg, Oral, Daily, Jaciel Saldana MD    losartan (COZAAR) tablet 100 mg, 100 mg, Oral, Daily, Jaciel Saldana MD    metoprolol tartrate (LOPRESSOR) tablet 25 mg, 25 mg, Oral, BID, Jaciel Saldana MD, 25 mg at 10/23/23 2115    tiZANidine (ZANAFLEX) tablet 4 mg, 4 mg, Oral, Q8H PRN, Jaciel Saldana MD, 4 mg at 10/23/23 2114    insulin lispro (HUMALOG) injection vial 0-8 Units, 0-8 Units, SubCUTAneous, TID WCJaciel MD    glucose chewable tablet 16 g, 4 tablet, Oral, PRN, Jaciel Saldana MD    dextrose bolus 10% 125 mL, 125 mL, IntraVENous, PRN **OR** dextrose bolus 10% 250 mL, 250 mL, IntraVENous, PRN, Jaciel Saldana MD    glucagon injection 1 mg, 1 mg, SubCUTAneous, PRN, Jaciel Saldana MD    dextrose hydroCHLOROthiazide (HYDRODIURIL) 25 MG tablet Take 1 tablet by mouth daily      insulin glargine (LANTUS SOLOSTAR) 100 UNIT/ML injection pen Inject 30 Units into the skin nightly      loperamide (IMODIUM A-D) 2 MG tablet Take 1 tablet by mouth 3 times daily as needed      losartan (COZAAR) 100 MG tablet Take 1 tablet by mouth daily      naloxone 4 MG/0.1ML LIQD nasal spray Use 1 spray intranasally, then discard. Repeat with new spray every 2 min as needed for opioid overdose symptoms, alternating nostrils. !! ondansetron (ZOFRAN) 4 MG tablet Take 1 tablet by mouth every 8 hours as needed      SITagliptin (JANUVIA) 100 MG tablet Take 1 tablet by mouth daily      tiZANidine (ZANAFLEX) 4 MG tablet TAKE 1 (ONE) TABLET BY MOUTH THREE TIMES DAILY AS NEEDED       !! - Potential duplicate medications found. Please discuss with provider. STOP taking these medications       ibuprofen (ADVIL;MOTRIN) 800 MG tablet Comments:   Reason for Stopping:               Discharge Plan:  The patient will be d/c'd to home, total knee protocol, WBAT. [facility]. Total joint protocol. Pt safe for homebound transfer, after being cleared by PT, sp Total joint replacement. A walker, bedside commode, and shower chair will be utilized for ADL's. Follow up with Dr. Michael Goyal in 14 days. Call with any questions or concerns.

## 2023-10-24 NOTE — PROGRESS NOTES
OT order received and chart reviewed. Patient was seen for skilled OT and is safe for d/c home when medically stable. Recommending rolling walker and bedside commode as patient reports she has not received DME and her walker is standard. Full note to follow.          Thank you for this referral,  Jacob Zelaya, MS, OTR/L

## 2023-10-24 NOTE — HOME CARE
Received home health referral for [facility] for (SN, PT). Discharge order for today. Spoke with patient via phone;  patient identifiers verified. Explained home health care services and routines. Demographics verified including insurance, phone and address confirmed. Patient has the following DME: verbalized of having a shower chair available -  inquired of 3-in-1. Explained of needing a MD order. Caregivers available has multiple family member available to assist.  Orders noted and arranged and sent to central intake and scheduling.       17:30 pm  Updated referral  -  [facility]  -  And notified Scheduled RN; Central Intake and Scheduling.          ---    Herminio Aj, BALJIT  [address] [place]

## 2023-10-24 NOTE — NURSE NAVIGATOR
Rounded on patient s/p left total knee replacement with Dr. Cindi Moser, [facility] 10/23/2023. Patient observed to be alert and oriented x 3, sitting up in bed. She denies chest pain, shortness of breath, nausea or vomiting. She states that pain has been well controlled throughout the night and that she has been up ambulating with standard walker. She denies any residual numbness to her left lower extremity. Dressing observed to be dry and intact with small amount of strike through observed. Castle placed overnight for postop urinary retention still in place. Per RN, order to remove is already in place. Patient received a total knee replacement education book at the clinic level. Reviewed postoperative showering instructions. Patient reminded that she may shower in two days, no tubs or submersion in water. She is to contact clinic with any dressing issues. Reminded patient of the importance of ambulation to her recovery as well as in the prevention of complications such as DVT. Encouraged hourly ambulation , ten minutes every hour, followed by 20 minutes of icing , not to be placed directly on her skin. Encouraged continued use of incentive spirometry to keep lungs expanded and free from complications. Patient provided with bariatric walker as she did not receive this prior to surgery due to pending insurance auth. Patient states that she already has a shower chair at home. Anticipate discharge home today with home health and home physical therapy once cleared by PT/OT and patient voids on her own. If patient is unable to void please contact orthopedic coordinator for further orders. Patient verbalized understanding of all information provided. She has no questions or concerns at this time. will continue to follow until discharge and then postoperatively.

## 2023-10-24 NOTE — PLAN OF CARE
Discharge: rolling walker- bariatric    AMPAC:   AM-PAC Inpatient Mobility without Stair Climbing Raw Score : 17  Current research shows that an AM-PAC score of 14 without stairs or greater is associated with a discharge to the patient's home setting. Based on an AM-PAC score and their current functional mobility deficits, it is recommended that the patient have 2-3 sessions per week of Physical Therapy at d/c to increase the patient's independence. This AMPAC score should be considered in conjunction with interdisciplinary team recommendations to determine the most appropriate discharge setting. Patient's social support, diagnosis, medical stability, and prior level of function should also be taken into consideration. SUBJECTIVE:   Patient stated, \"That nerve block wore off and I'm feeling the pain. \"    OBJECTIVE DATA SUMMARY:   Critical Behavior:  Orientation  Orientation Level: Oriented X4  Cognition  Overall Cognitive Status: WNL    Functional Mobility Training:  Bed Mobility:  Bed Mobility Training  Bed Mobility Training: Yes  Supine to Sit: Modified independent  Sit to Supine: Modified independent  Transfers:  Transfer Training  Transfer Training: Yes  Sit to Stand: Stand-by assistance  Stand to Sit: Stand-by assistance  Toilet Transfer: Supervision  Balance:  Balance  Sitting: Intact  Standing - Static: Good  Standing - Dynamic:  (fair+)   Ambulation/Gait Training:  Gait  Overall Level of Assistance: Stand-by assistance  Distance (ft): 100 Feet  Assistive Device: Walker, rolling  Base of Support: Widened  Speed/Julia: Slow  Gait Abnormalities: Antalgic;Decreased step clearance  Therapeutic Exercises:     EXERCISE   Sets   Reps   Active Active Assist   Passive Self ROM   Comments   Ankle Pumps 1 10  [x] [] [] []    Quad Sets/Glut Sets 1 10  [x] [] [] [] Hold for 5 secs   Hamstring Sets   [] [] [] []    Short Arc Quads   [] [] [] []    Heel Slides 1 10 [x] [] [] []    Straight Leg Raises 1 5 [x] [] [] []    Hip Add   [] [] [] [] Hold for 5 secs, w/ pillow squeeze   Long Arc Quads 1 5 [x] [] [] []    Seated Marching   [] [] [] []    Standing Marching   [] [] [] []       [] [] [] []        Pain:  Pain level pre-treatment: 9/10  Pain level post-treatment: 9/10   Pain Intervention(s): Rest, Ice, Repositioning   Response to intervention: Nurse notified    Activity Tolerance:   Activity Tolerance: Patient limited by pain  Please refer to the flowsheet for vital signs taken during this treatment.   After treatment:   [] Patient left in no apparent distress sitting up in chair  [x] Patient left in no apparent distress in bed  [x] Call bell left within reach  [x] Nursing notified  [x] Caregiver present  [] Bed alarm activated  [x] SCDs applied      COMMUNICATION/EDUCATION:   Patient Education  Education Given To: Patient  Education Provided: Transfer Training;Equipment;Home Exercise Program  Education Method: Verbal;Teach Back;Demonstration  Barriers to Learning: None  Education Outcome: Verbalized understanding;Demonstrated understanding      Danielle Suárez PT  Minutes: 30

## 2023-10-24 NOTE — PROGRESS NOTES
OCCUPATIONAL THERAPY EVALUATION/DISCHARGE    Patient: Cyn Chow (81 y.o. female)  Date: 10/24/2023  Primary Diagnosis: Osteoarthritis of left knee, unspecified osteoarthritis type [M17.12]  Arthritis of knee [M17.10]  Procedure(s) (LRB):  LEFT TOTAL KNEE REPLACEMENT (Left) 1 Day Post-Op   Precautions: Contact Precautions, Weight Bearing, Fall Risk,  , Left Lower Extremity Weight Bearing: Weight Bearing As Tolerated,  ,  ,  ,  ,    PLOF: Patient was independent with self-care and functional mobility PTA. 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 ASSESSMENT AND RECOMMENDATIONS:  Patient cleared to participate in OT evaluation by RN. Upon entering the room, patient was supine in bed, alert, and agreeable to participate in OT evaluation. Patient educated on weight-bearing status, importance of ice, and safety during this admission/around the house. Patient is independent - modified independent with basic self-care using AE issued, modified independent with bed mobility and supervision with functional transfers using rolling walker in preparation for ADLs. Based on the objective data described below, the patient presents with no deficits that impede pt function with ADLs, functional transfers, and functional mobility in preparation for selfcare tasks. At this time patient is safe to d/c home with family support from selfcare standpoint. Patient left all needs met and call bell in reach. Maximum therapeutic gains met at current level of care and patient will be discharged from occupational therapy at this time. Further Equipment Recommendations for Discharge: rolling walker and bedside commode as pt with low toilet at home    [facility]: AM-PAC Inpatient Daily Activity Raw Score: 24      Current research shows that an AM-PAC score of 18 or greater is associated with a discharge to the patient's home setting.   Based on an AM-PAC score and their current ADL deficits; it is dominant    Cognitive/Behavioral Status:  Orientation  Orientation Level: Oriented X4  Cognition  Overall Cognitive Status: WNL    Skin: Intact  Edema: None noted    Vision/Perceptual:    Vision  Vision: Within Functional Limits       Coordination: BUE  Coordination: Within functional limits    Strength: BUE  Strength: Within functional limits    Tone & Sensation: BUE  Tone: Normal  Sensation: Intact    Range of Motion: BUE  AROM: Within functional limits       Functional Mobility and Transfers for ADLs:  Bed Mobility:     Bed Mobility Training  Bed Mobility Training: Yes  Supine to Sit: Modified independent  Sit to Supine: Modified independent    Transfers:  Transfer Training  Transfer Training: Yes  Sit to Stand: Supervision  Stand to Sit: Supervision  Toilet Transfer: Supervision    ADL Assessment:   Feeding: Independent  Grooming: Independent  UE Bathing: Independent  LE Bathing: Modified independent ; Adaptive equipment  UE Dressing: Independent  LE Dressing: Modified independent ; Adaptive equipment (Bend forward method)  Toileting: Modified independent       Pain:  Pain level pre-treatment: 5/10 , LLE  Pain level post-treatment: 5-6/10   Pain Intervention(s): Rest, Ice, Repositioning   Response to intervention: Nurse notified    Activity Tolerance:   Activity Tolerance: Patient Tolerated treatment well  Please refer to the flowsheet for vital signs taken during this treatment. After treatment:   [] Patient left in no apparent distress sitting up in chair  [x] Patient left in no apparent distress in bed  [x] Call bell left within reach  [x] Nursing notified  [x] CNA present obtaining vitals  [] Bed alarm activated    COMMUNICATION/EDUCATION:   Patient Education  Education Given To: Patient  Education Provided: Role of Therapy;Plan of Care;Transfer Training;Equipment; Fall Prevention Strategies; ADL Adaptive Strategies;Precautions  Education Method: Demonstration;Verbal;Teach Back  Barriers to Learning:

## 2023-10-24 NOTE — PROGRESS NOTES
Progress Summary:  Assumed care of patient in bed awake and alert, remains in isolation. VSS, Uneventful night, except patient unable to void. Castle inserted, specimen sent and flomax given. Call bell within reach.

## 2023-10-25 ENCOUNTER — HOME CARE VISIT (OUTPATIENT)
Age: 53
End: 2023-10-25
Payer: COMMERCIAL

## 2023-10-25 VITALS
OXYGEN SATURATION: 100 % | RESPIRATION RATE: 16 BRPM | TEMPERATURE: 97.4 F | DIASTOLIC BLOOD PRESSURE: 80 MMHG | SYSTOLIC BLOOD PRESSURE: 120 MMHG | HEART RATE: 92 BPM

## 2023-10-25 VITALS
SYSTOLIC BLOOD PRESSURE: 120 MMHG | TEMPERATURE: 97.4 F | RESPIRATION RATE: 17 BRPM | HEART RATE: 92 BPM | DIASTOLIC BLOOD PRESSURE: 80 MMHG | OXYGEN SATURATION: 97 %

## 2023-10-25 LAB
BACTERIA SPEC CULT: NORMAL
SERVICE CMNT-IMP: NORMAL

## 2023-10-25 PROCEDURE — G0299 HHS/HOSPICE OF RN EA 15 MIN: HCPCS

## 2023-10-25 PROCEDURE — G0151 HHCP-SERV OF PT,EA 15 MIN: HCPCS

## 2023-10-25 ASSESSMENT — ENCOUNTER SYMPTOMS
PAIN LOCATION - PAIN QUALITY: ACHE
PAIN LOCATION - PAIN QUALITY: ACHING/THROBBING
CONSTIPATION: 1
HEMOPTYSIS: 0

## 2023-10-25 NOTE — HOME HEALTH
PMHx:   Bilateral knee pain   Crohn's colitis ([address])   Diabetes ([address])   HTN (hypertension)   Hypercholesterolemia   Pulmonary embolism ([address])   03/2019   Stool color black   crohns   Pts daughter, who is a home care RN, and father present during assessment. SN completing the [facility] upon my arrival  SUBJECTIVE: Pt states she was feeling good last night but then the nerve block wore off so she is hurting now  LIVING SITUATION: Lives with father nd small dog in a single story home with small threshold to enter. Pts daughter and son are very involved in care  REQUIRES CAREGIVER ASSISTANCE FOR: medications, meals, transfers and transportation to appointments  PLOF: Independent including driving and working as a RN/instructor. MEDICATIONS REVIEWED AND UPDATED: SN finishing med reconciliation upon my arrival  NEXT MD APPT: 11/9 with ortho  ROM:L knee AROM ext/flex= -3/91  STRENGTH: L Hip flex 3-/5, L quads 2+/5, L hams 3-/5  WOUNDS:dressing clean, dry and intact to L anterior knee. Small drainage noted, no signs of infection  BED MOBILITY:Mod A to lift L LE on/off bed  TRANSFERS:CGA/Min A with sit to stand from EOB; Mod A X2 people with standing from couch  GAIT: X50 ft with FWRW. Gait characterized by decrease step length on R LE with decreased toe off/heel strike on L LE  STAIRS:NA  BALANCE: Pt scored 11/28 on Tinetti Balance Assessment placing patient at high risk for falls. PATIENT EDUCATION PROVIDED THIS VISIT: safety, HEP, walking, deep breathing, proper positioning of L LE when laying in bed to imrpove knee ext, pain management  PATIENT RESPONSE TO EDUCATION: Pt verbalized understanding   HEP consisting of:  1. Walking every hour during the day with FWRW   2. Seated heel slides, supine AP, GS, QS, Windshield wipers, heel slides  3. Deep breathing   Written instructions issued. Patient/caregiver verbalized understanding.    PATIENT RESPONSE TO TREATMENT: Pt with increased c/o L knee pain with initial

## 2023-10-26 ENCOUNTER — HOME CARE VISIT (OUTPATIENT)
Age: 53
End: 2023-10-26
Payer: COMMERCIAL

## 2023-10-26 ENCOUNTER — TELEPHONE (OUTPATIENT)
Facility: HOSPITAL | Age: 53
End: 2023-10-26

## 2023-10-26 PROCEDURE — G0299 HHS/HOSPICE OF RN EA 15 MIN: HCPCS

## 2023-10-26 PROCEDURE — G2168 SVS BY PT IN HOME HEALTH: HCPCS

## 2023-10-26 NOTE — TELEPHONE ENCOUNTER
Call placed to patient, ID verified x 2. Patient is s/p left total knee replacement with Dr. Ricardo Carrasco, [facility] 10/23/2023. She denies chest pain, shortness of breath, nausea, vomiting, fever, chills or calf pain. She denies any residual numbness to her left lower extremity, she is passing gas. Donaldson remains in place. Per patient she has not heard from urology of [address] yet. Reassurance provided that she should be hearing from them today as voiding trial and donaldson removal is usually within 7 days of discharge. Patient given number for urology of [address]. She states that her pain has been well controlled taking her pain medication scheduled as prescribed. Her dressing is reported as clean, dry and intact. She states that she has been ambulating , icing and elevating hourly. Home physical therapy and home health have been out to see her. Overall she feels she is doing very well. She has no questions or concerns at this time. She will follow up with Dr. Ricardo Carrasco in two weeks or sooner if needed.

## 2023-10-26 NOTE — HOME HEALTH
and are agreeable to plan of care at this time. Physician Liam Blue notified of patient admission to home health and plan of care including anticipated frequency of visits and treatments/interventions/modalities of SN/PT. Discharge planning discussed with patient and caregiver. Discharge planning as follows: Will discharge when education is completed,  patient is medically stable and wound is healed, or family can manage dressing. ..  Pt/Caregiver did verbalize understanding of discharge planning. Next MD appointment TBD with Liam Blue MD.  Patient/caregiver encouraged/instructed to keep appointment as lack of follow through with physician appointment could result in discontinuation of home care services for non-compliance.

## 2023-10-27 ENCOUNTER — HOME CARE VISIT (OUTPATIENT)
Age: 53
End: 2023-10-27
Payer: COMMERCIAL

## 2023-10-27 PROCEDURE — G2168 SVS BY PT IN HOME HEALTH: HCPCS

## 2023-10-27 ASSESSMENT — ENCOUNTER SYMPTOMS: PAIN LOCATION - PAIN QUALITY: DULL ACHE

## 2023-10-27 NOTE — CASE COMMUNICATION
I called and spoke with patient today, She tells me she has an appointment with [facility] for 11/14/23, Castle was put in on 10/24/23. Will obtain Castle orders then if needed.

## 2023-10-28 ENCOUNTER — HOME CARE VISIT (OUTPATIENT)
Age: 53
End: 2023-10-28
Payer: COMMERCIAL

## 2023-10-28 VITALS
RESPIRATION RATE: 14 BRPM | OXYGEN SATURATION: 98 % | DIASTOLIC BLOOD PRESSURE: 72 MMHG | TEMPERATURE: 97.1 F | SYSTOLIC BLOOD PRESSURE: 128 MMHG | HEART RATE: 92 BPM

## 2023-10-28 VITALS
HEART RATE: 90 BPM | DIASTOLIC BLOOD PRESSURE: 75 MMHG | DIASTOLIC BLOOD PRESSURE: 85 MMHG | TEMPERATURE: 97.7 F | TEMPERATURE: 98.7 F | HEART RATE: 92 BPM | OXYGEN SATURATION: 98 % | SYSTOLIC BLOOD PRESSURE: 155 MMHG | OXYGEN SATURATION: 98 % | SYSTOLIC BLOOD PRESSURE: 110 MMHG

## 2023-10-28 PROCEDURE — G0157 HHC PT ASSISTANT EA 15: HCPCS

## 2023-10-28 ASSESSMENT — ENCOUNTER SYMPTOMS
PAIN LOCATION - PAIN QUALITY: ACHING, SORE
PAIN LOCATION - PAIN QUALITY: DULL

## 2023-10-28 NOTE — HOME HEALTH
SUBJECTIVE: Pt c/o pain in left knee, no changes in medication, no falls reported   CAREGIVER INVOLVEMENT/ASSISTANCE NEEDED FOR: Daughter assist pt as needed   OBJECTIVE: See interventions  PATIENT EDUCATION PROVIDED THIS VISIT: reviewed fall prevention, pain management, s/s of infection  PATIENT RESPONSE TO EDUCATION: Pt able to teach back fall prevention   PATIENT RESPONSE TO TREATMENT/ASSESSMENT OF PROGRESS TOWARD GOALS: Established HEP consisting of supine strengthening/ROM to LLE pt able to return demonstrate with VCs to improve tech, bed mobility requires assist to manage LLE, towel roll placed at left knee and ankle to keep LLE from rolling out. Assist and AD needed for sit to stand transfers from edge of bed VCs for hand placment. Pt able to take a few steps before becoming lightheaded and dizzy. Pt poitioned in bed with LEs elevated instructed to increase fluids. Recommend pt check BP prior to taking BP medications and contact PCP with concerns,    PLAN FOR NEXT VISIT: Next visit will address gait, strength and ROM  DISCHARGE PLANS: POC and Discharge plans discussed pt understands and agrees eventual DC once goals have been met or max HC benefits have been achieved.  13 visits remaining DC is scheduled for 11/08/23

## 2023-10-28 NOTE — HOME HEALTH
SUBJECTIVE: Pt c/o pain in left knee, no changes in medication, no falls reported   CAREGIVER INVOLVEMENT/ASSISTANCE NEEDED FOR: Daughter Jace Barr assist as needed   OBJECTIVE: See interventions  PATIENT EDUCATION PROVIDED THIS VISIT: Pt instructed in fall prevention, pain management, s/s of infection  PATIENT RESPONSE TO EDUCATION: Pt is able to teach back education  PATIENT RESPONSE TO TREATMENT/ASSESSMENT OF PROGRESS TOWARD GOALS: Pt instructed in supine strengtening to LLE AAROM to LLE VCs to improve tech, Assist needed for bed mobility to mange LLE Sup and AD needed for sit to stand transfers from edge of bed, gait tng requires AD and Assist to improve quality of gait. Pt tolerated increased amb distance improved Left Knee ROM. No dizziness reported today, no change in pain level. Catheter fell out when pt went to void, attempted to contact MDs office unable to speak to anyone. Nursing informed. PLAN FOR NEXT VISIT: Next visit will address gait, strength, ROM and bed mobility  DISCHARGE PLANS: POC and Discharge plans discussed pt understands and agrees eventual DC once goals have been met or max HC benefits have been achieved.  12 visits remaining DC is scheduled for 11/08/23

## 2023-10-29 ENCOUNTER — HOME CARE VISIT (OUTPATIENT)
Age: 53
End: 2023-10-29
Payer: COMMERCIAL

## 2023-10-30 ENCOUNTER — TELEPHONE (OUTPATIENT)
Age: 53
End: 2023-10-30

## 2023-10-30 ENCOUNTER — HOME CARE VISIT (OUTPATIENT)
Age: 53
End: 2023-10-30
Payer: COMMERCIAL

## 2023-10-30 VITALS
RESPIRATION RATE: 18 BRPM | SYSTOLIC BLOOD PRESSURE: 148 MMHG | DIASTOLIC BLOOD PRESSURE: 80 MMHG | TEMPERATURE: 97.8 F | HEART RATE: 80 BPM | OXYGEN SATURATION: 100 %

## 2023-10-30 VITALS
TEMPERATURE: 98.9 F | SYSTOLIC BLOOD PRESSURE: 140 MMHG | DIASTOLIC BLOOD PRESSURE: 76 MMHG | RESPIRATION RATE: 14 BRPM | HEART RATE: 89 BPM | OXYGEN SATURATION: 95 %

## 2023-10-30 DIAGNOSIS — G89.18 POST-OP PAIN: Primary | ICD-10-CM

## 2023-10-30 PROCEDURE — G0299 HHS/HOSPICE OF RN EA 15 MIN: HCPCS

## 2023-10-30 PROCEDURE — G0157 HHC PT ASSISTANT EA 15: HCPCS

## 2023-10-30 PROCEDURE — G2168 SVS BY PT IN HOME HEALTH: HCPCS

## 2023-10-30 ASSESSMENT — ENCOUNTER SYMPTOMS
DYSPNEA ACTIVITY LEVEL: AFTER AMBULATING 10 - 20 FT
PAIN LOCATION - PAIN QUALITY: SORENESS, TIGHTNESS
PAIN LOCATION - PAIN QUALITY: THROBBING

## 2023-10-30 NOTE — TELEPHONE ENCOUNTER
HBCS with [facility] home care called to report the patients dressing change looks dark green in color, and that drainage was at 25%. HBCS states there's no redness or pus around the incision , and that staples are still in tact. HBCS can be reached at [phone].

## 2023-10-30 NOTE — TELEPHONE ENCOUNTER
Wiley Ford from [facility] is calling with the patient Proton results         PT 19.4  INR.  1.6      Patient been taking coumadin 3mg daily       Ninfa from [facility]   [phone]

## 2023-10-30 NOTE — TELEPHONE ENCOUNTER
Patient called to request a refill of her pain medication. She states that she just recently had surgery on 10/23. Pharmacy: [facility] on [facility] in [facility].

## 2023-10-31 ENCOUNTER — HOME CARE VISIT (OUTPATIENT)
Age: 53
End: 2023-10-31
Payer: COMMERCIAL

## 2023-10-31 VITALS
SYSTOLIC BLOOD PRESSURE: 138 MMHG | RESPIRATION RATE: 17 BRPM | TEMPERATURE: 97.8 F | HEART RATE: 92 BPM | OXYGEN SATURATION: 99 % | DIASTOLIC BLOOD PRESSURE: 88 MMHG

## 2023-10-31 VITALS
TEMPERATURE: 97.8 F | OXYGEN SATURATION: 100 % | DIASTOLIC BLOOD PRESSURE: 80 MMHG | SYSTOLIC BLOOD PRESSURE: 148 MMHG | HEART RATE: 80 BPM

## 2023-10-31 PROCEDURE — G0151 HHCP-SERV OF PT,EA 15 MIN: HCPCS

## 2023-10-31 RX ORDER — OXYCODONE HYDROCHLORIDE AND ACETAMINOPHEN 5; 325 MG/1; MG/1
1-2 TABLET ORAL EVERY 8 HOURS PRN
Qty: 42 TABLET | Refills: 0 | Status: SHIPPED | OUTPATIENT
Start: 2023-10-31 | End: 2023-11-07

## 2023-10-31 ASSESSMENT — ENCOUNTER SYMPTOMS
PAIN LOCATION - PAIN QUALITY: ACHING, SORE
DYSPNEA ACTIVITY LEVEL: AFTER AMBULATING MORE THAN 20 FT
PAIN LOCATION - PAIN QUALITY: ACHING

## 2023-10-31 NOTE — OP NOTE
[facility]  OPERATIVE REPORT    Name:  Daniel Mercedes  MR#:   [id number]  :  1970  ACCOUNT #:  [de-identified]  DATE OF SERVICE:  10/23/2023    PREOPERATIVE DIAGNOSES:  End-stage arthritis of the left knee, morbid obesity stage III with BMI of 48.4, instability, and varus. POSTOPERATIVE DIAGNOSES:  End-stage arthritis of the left knee, morbid obesity stage III with BMI of 48.4, instability, and varus. PROCEDURES PERFORMED:  1. Difficult left total knee replacement using the [employer] system with a size 7 left posterior-stabilized femoral component, a size 5 tibia, and an 11 mm posterior-stabilized size 5/6 insert, and a 35 asymmetric patella. 2.  Correction of instability. 3.  Rebalancing of knee. SURGEON:  Lucretia Reynolds. Cindi Moser MD.    FIRST ASSISTANT:  Zonia Menezes. SECOND ASSISTANT:  Tish Dorsey. ANESTHESIOLOGIST:  Dr. Digna Scott. ANESTHESIA:  Preoperative nerve block with light general.    COMPLICATIONS:  None. SPECIMENS REMOVED:  None. IMPLANTS:  As above mentioned. ESTIMATED BLOOD LOSS:  Less than 48.    Zonia Menezes was the first assistant who assisted with all phases of the surgery commencing with patient positioning, patient prep, patient drape, leg positioning during the surgery, retracting, assisting with the surgery itself, closure, dressing placement, and transfer. DESCRIPTION OF PROCEDURE:  After the anesthetic was successfully induced, it was confirmed the patient did receive appropriate antibiotics for her weight and a time-out was performed. Midline incision. We extended the incision both proximally and distally. There was a fair bit of adipose tissue prior to getting to the fascia. We maintained full-thickness flaps. Every step of the way was more difficult due to the patient's size and deformities. The femoral canal was aspirated, lavaged, and re-aspirated prior to instrumentation. Crab claw was utilized to prevent undercutting.   All cuts

## 2023-10-31 NOTE — HOME HEALTH
Subjective: Patient relays that she has been trying to move around the home often using the [employer] for support as instructed. She states that she has been attempting to complete the given HEP exercises as instructed as well. Objective: Skilled home health physical therapy interventions completed today listed in care plan section. Interventions performed today to assist in return to prior level of function, address deficits as apparent upon time of initial evaluation, return to community and personal activities, and progress further towards goals as previously established in plan of care. There are not any changes to medications upon timing of this visit. Home health supplies were not ordered/delivered today. Visual inspection finds dressing intact with some drainage noted into dressing, but not significant enough levels warranting early change. Yazmin's sign negative. Assessment:  Patient is progressing towards goals as previously established in POC with skilled home health physical therapy services at this time as made apparent by ability to ambulate around the home today with no noted LOB and was able to ambulate with better stride length and heel strike following training for corrections today. She is displaying improving knee flexion active range of motion overall. Family/caregiver daughter and father involvement are present/set-up at this point in time and assists with meals, transport, and general encouragement. Patient should be capable of safe and effective completion of HEP as instructed including regular ambulation as she is already displaying this. No suspected post surgical infection nor DVT at this time. Plan:  Discharge planning discussed at this visit regarding eventual discharge once patient has met goals and/or met maximum benefit from home health skilled PT services with patient understanding and agreeable at this time.  Continued need for skilled home health PT at this time to address

## 2023-10-31 NOTE — HOME HEALTH
SUBJECTIVE: Pt c/o left knee pain, no changes in medications no falls SN changed dressing today    CAREGIVER INVOLVEMENT/ASSISTANCE NEEDED FOR: Daughter assist pt as needed   OBJECTIVE: See interventions  PATIENT EDUCATION PROVIDED THIS VISIT: reviewed fall prevention and pain management   PATIENT RESPONSE TO EDUCATION: Pt able to teach back fall prevention   PATIENT RESPONSE TO TREATMENT/ASSESSMENT OF PROGRESS TOWARD GOALS: Upgraded HEP to standing strengthening pt able to return demonstrate safely with UE support. Gait tng outside pt tolerated incresed amb distance instruction to improve gait speed and knee flexion during swing phase of gait good return demonstration, pt requires instructed to negotiate single step to exit/enter home with use of AD. Mod I transfers from couch, edge of bed and commode with use of FWW TUG= 35. Pt progessing well toward established goals and is on target for scheduled DC date. PLAN FOR NEXT VISIT: Sup visit with PT  DISCHARGE PLANS: POC and Discharge plans discussed pt understands and agrees eventual DC once goals have been met or max HC benefits have been achieved.  9 visits remaining DC is scheduled for 11/8/2023

## 2023-10-31 NOTE — HOME HEALTH
Skilled reason for visit: S/P Left TKA, patient noted with small amount of green drainage on dressing. Incision dressing changed completed as ordered POD 7. Call placed to Dr. Rosy Oliver office to notify of color of drainage, no redness, swelling or warmth to site noted. Raymond intact, Cong Lopes at Dr. Rosy Oliver office stated that MD will be in the OR all day today but will send a message. PT/INR completed (1.6/19.4) called into MD, new orders to continue 3mg of Coumadin QD and obtain PT/INR on Thursday. Castle dislodged per patient last friday, PT on site at this time, patient denies any bleeding while balloon was still inflated. Patient reported voiding shortly after and thereafter without difficulty. Caregiver involvement: family assist with ADLs, meal prep, and transportation. Medications reviewed and all medications are available in the home this visit. The following education was provided regarding medications:  SN instructed patient / caregiver that Percocet is used to treat moderate to moderately severe pain - containing two drugs: acetaminophen and oxycodone. Explained that Acetaminophen is used to reduce both pain and fever & Oxycodone is used for its calming effect and for pain. Explained that the common side effects include dizziness, light - headedness, nausea, sedation, vomiting. SN instructed that side effects may be alleviated by lying down and if they persist or worsen to notify Physician. SN instructed to call physician immediately if the patient experiences slow / irregular breathing, slow / irregular heartbeat, change in the amount of urine or any allergic reactions. SN instructed patient to drink at least 8 glasses of water and eat foods that are high in fiber such as apples, peaches, oranges and oats if constipation is experienced    . MD notified of any discrepancies/look a-like medications/medication interactions: none  Medications are effective at this time.       Home health supplies by

## 2023-11-01 ENCOUNTER — HOME CARE VISIT (OUTPATIENT)
Age: 53
End: 2023-11-01
Payer: COMMERCIAL

## 2023-11-01 VITALS
SYSTOLIC BLOOD PRESSURE: 105 MMHG | OXYGEN SATURATION: 98 % | DIASTOLIC BLOOD PRESSURE: 65 MMHG | TEMPERATURE: 97.7 F | HEART RATE: 70 BPM

## 2023-11-01 PROCEDURE — G2168 SVS BY PT IN HOME HEALTH: HCPCS

## 2023-11-01 PROCEDURE — G0157 HHC PT ASSISTANT EA 15: HCPCS

## 2023-11-01 ASSESSMENT — ENCOUNTER SYMPTOMS: PAIN LOCATION - PAIN QUALITY: SORE, ACHING

## 2023-11-02 ENCOUNTER — HOME CARE VISIT (OUTPATIENT)
Age: 53
End: 2023-11-02
Payer: COMMERCIAL

## 2023-11-02 PROCEDURE — G0300 HHS/HOSPICE OF LPN EA 15 MIN: HCPCS

## 2023-11-02 NOTE — HOME HEALTH
SUBJECTIVE: Pt c/o left knee pain, no changes in medications no falls reported  CAREGIVER INVOLVEMENT/ASSISTANCE NEEDED FOR: Daughter assist pt as needed   OBJECTIVE: See interventions  PATIENT EDUCATION PROVIDED THIS VISIT: reviewed fall prevention and pain management   PATIENT RESPONSE TO EDUCATION: Pt able to teach back fall education  PATIENT RESPONSE TO TREATMENT/ASSESSMENT OF PROGRESS TOWARD GOALS: Pt tolerated tx session without increase in pain reported, noted fatigue following gait tng, demonstrates improved transfers from armed chair, improve strength and balance evident by increased amb distance with SPC VCs required to improve cane placement and sequencing with good return demonstration. turn demonstration, Pt requires continued tx to address balance, LLE ROM and Strength. pt is on target for anticipated DC next week  PLAN FOR NEXT VISIT: Gait with LRAD, Left Knee ROM and Balance  DISCHARGE PLANS: POC and Discharge plans discussed pt understands and agrees eventual DC once goals have been met or max HC benefits have been achieved.  7 visits remaining DC is scheduled for 11/8/2023

## 2023-11-02 NOTE — HOME HEALTH
SUBJECTIVE: Pt states she is having a hard time this AM. States she didn't sleep well. denies any falls or medication changes. Reports pain 4-7/10 in the L knee  CAREGIVER INVOLVEMENT/ASSISTANCE NEEDED FOR: Father assists with meals and transportation to appointments  [address] ORDERED/DELIVERED THIS VISIT INCLUDE: none  OBJECTIVE:  See interventions. MEDICATIONS: Medications reconciled and all medications are available in the home this visit. The following education was provided regarding medications, medication interactions, and look a like medications: Continue medication as prescribed by MD. Medications are effective at this time. PATIENT RESPONSE TO TREATMENT:  SLight increased c/o pain with end range ROM which is immediately relieved when pressure taken off. PATIENT LEVEL OF UNDERSTANDING OF EDUCATION PROVIDED: Instucted pt in use of SC with ambulation and adjusted cane to appropriate height. Instructed pt to continue to use walker if she has to go outside until we are able to train her when weather permits. Pt verbalized understanding  ASSESSMENT OF PROGRESS TOWARD GOALS: Pts ROM and strength are improving and she has started using SC in the home  CONTINUED NEED FOR THE FOLLOWING SKILLS: Pt continues to have L quad weakness and unsteady gait placing her at risk for falls.  Pt continues to have pain in the knee limiting her mobility  PLAN FOR NEXT VISIT: Gait train outside with [facility] for improved mobility for safe egress from home for medical appointments  THE FOLLOWING DISCHARGE PLANNING WAS DISCUSSED WITH THE PATIENT/CAREGIVER: DC to family under MD supervision when goals met/max rehab potential

## 2023-11-03 ENCOUNTER — HOME CARE VISIT (OUTPATIENT)
Age: 53
End: 2023-11-03
Payer: COMMERCIAL

## 2023-11-03 ENCOUNTER — TELEPHONE (OUTPATIENT)
Age: 53
End: 2023-11-03

## 2023-11-03 VITALS
DIASTOLIC BLOOD PRESSURE: 76 MMHG | TEMPERATURE: 98 F | HEART RATE: 75 BPM | SYSTOLIC BLOOD PRESSURE: 122 MMHG | OXYGEN SATURATION: 97 %

## 2023-11-03 PROCEDURE — G0157 HHC PT ASSISTANT EA 15: HCPCS

## 2023-11-03 NOTE — HOME HEALTH
SUBJECTIVE: Pt c/o left knee pain, no changes in medications, pt reports she had a fall this am 7:45am pt states she fell back and hit the floor on her buttocks, significant other was called to help her get up off of the floor. Pt states knee just gave out. CAREGIVER INVOLVEMENT/ASSISTANCE NEEDED FOR: Patients children assist with ADLs and Meals   OBJECTIVE: See interventions  PATIENT EDUCATION PROVIDED THIS VISIT: reviewed fall prevention and pain management   PATIENT RESPONSE TO EDUCATION: Pt able to teach back education  PATIENT RESPONSE TO TREATMENT/ASSESSMENT OF PROGRESS TOWARD GOALS:  Increased assist needed for sit to stand transfers today, pt c/o pain in left quad area, no pain with wt bearing and gait, pt instructed to use FWW for ambulation, BSC positioned at bed side for safety. pt instructed to elevate and apply ice pack x 20 minutes. Insturcted in supine strengthening pt able to return demonstrate. PLAN FOR NEXT VISIT: Transfers, Gait with FWW and LLE ROM  DISCHARGE PLANS: POC and Discharge plans discussed pt understands and agrees eventual DC once goals have been met or max HC benefits have been achieved.  5 visits remaining DC is scheduled for 11/8/2023

## 2023-11-03 NOTE — TELEPHONE ENCOUNTER
Left generic voicemail stating to give us a call back so I can relay Nolvia Lane message , so if calls back please relay message he stated

## 2023-11-03 NOTE — TELEPHONE ENCOUNTER
Lexie from [facility] called to report that the patient had a fall this morning in the bathroom but she didn't fall on her knee , she fell on her bottom and is pretty sore.    She just wanted to call and inform us.    Lexie can be reached at [phone].

## 2023-11-03 NOTE — TELEPHONE ENCOUNTER
Chantal from [facility] called to give PT INR results. PT 19.4  INR 1.7  Coumadin 3mg daily       Chantal can be reached at [phone].

## 2023-11-04 ENCOUNTER — HOME CARE VISIT (OUTPATIENT)
Age: 53
End: 2023-11-04
Payer: COMMERCIAL

## 2023-11-04 PROCEDURE — G0157 HHC PT ASSISTANT EA 15: HCPCS

## 2023-11-04 ASSESSMENT — ENCOUNTER SYMPTOMS: PAIN LOCATION - PAIN QUALITY: SORENESS, TIGHTNESS

## 2023-11-05 ENCOUNTER — HOME CARE VISIT (OUTPATIENT)
Age: 53
End: 2023-11-05
Payer: COMMERCIAL

## 2023-11-05 VITALS
HEART RATE: 64 BPM | DIASTOLIC BLOOD PRESSURE: 78 MMHG | SYSTOLIC BLOOD PRESSURE: 124 MMHG | RESPIRATION RATE: 16 BRPM | OXYGEN SATURATION: 98 %

## 2023-11-05 PROCEDURE — G0157 HHC PT ASSISTANT EA 15: HCPCS

## 2023-11-05 ASSESSMENT — ENCOUNTER SYMPTOMS: STOOL DESCRIPTION: FORMED

## 2023-11-06 ENCOUNTER — HOME CARE VISIT (OUTPATIENT)
Age: 53
End: 2023-11-06
Payer: COMMERCIAL

## 2023-11-06 VITALS
OXYGEN SATURATION: 99 % | DIASTOLIC BLOOD PRESSURE: 78 MMHG | OXYGEN SATURATION: 98 % | HEART RATE: 98 BPM | TEMPERATURE: 97.4 F | DIASTOLIC BLOOD PRESSURE: 76 MMHG | RESPIRATION RATE: 14 BRPM | HEART RATE: 84 BPM | SYSTOLIC BLOOD PRESSURE: 124 MMHG | TEMPERATURE: 87 F | SYSTOLIC BLOOD PRESSURE: 122 MMHG | RESPIRATION RATE: 13 BRPM

## 2023-11-06 VITALS
HEART RATE: 65 BPM | TEMPERATURE: 98.2 F | DIASTOLIC BLOOD PRESSURE: 83 MMHG | OXYGEN SATURATION: 98 % | SYSTOLIC BLOOD PRESSURE: 143 MMHG | RESPIRATION RATE: 16 BRPM

## 2023-11-06 PROCEDURE — G0151 HHCP-SERV OF PT,EA 15 MIN: HCPCS

## 2023-11-06 ASSESSMENT — ENCOUNTER SYMPTOMS: PAIN LOCATION - PAIN QUALITY: SORENESS, TIGHTNESS

## 2023-11-06 NOTE — HOME HEALTH
Subjective: Patient relays that she has been able to not have any additional falls since last visit. She complains of left anterior thigh discomfort today more than true knee post surgical pain, and wonders if it is a byproduct of her recent falls. She states that she has been trying to complete her exercises in the bed regularly and is walking around the home occasionally using the walker as she is fearful of falling again. Objective: Skilled home health physical therapy interventions completed today listed in care plan section. Interventions performed today to assist in return to prior level of function, address deficits as apparent upon time of initial evaluation, return to community and personal activities, and progress further towards goals as previously established in plan of care. There are not any changes to medications upon timing of this visit. Home health supplies were not ordered/delivered today. Trained patient in self soft tissue mobilization to left distal iliotibial band in an attempt to better independent management of pain symptoms and improve upon ability to maximize available pain free knee range of motion left LE. Assessment:  Patient is slowly progressing towards goals as previously established in POC with skilled home health physical therapy services at this time as made apparent by her displaying a resting position of around 90 degrees knee flexion measured visually whilst supine in bed today. Patient reported decreased overall discomfort status post manual therapy technique utilized to improve range of motion left knee today. Left knee extension range of motion remains limited. Patient is aware of fall risk reduction techniques including wearing of non-skid footwear and ensuring AD use with all mobility attempts and is agreeable to doing so after reinforcement patient education today.  Family/caregiver daughter, father, and significant caregiver other involvement are present/set-up

## 2023-11-06 NOTE — HOME HEALTH
Subjective: Patient relays that she was sore after last visit. She complains of limited sleep last night due to having difficulty getting comfortable. She states that she has been able to complete her given HEP as instructed including some standing exercises occasionally. Objective: Skilled home health physical therapy interventions completed today listed in care plan section. Interventions performed today to assist in return to prior level of function, address deficits as apparent upon time of initial evaluation, return to community and personal activities, and progress further towards goals as previously established in plan of care. There are not any changes to medications upon timing of this visit. Home health supplies were not ordered/delivered today. Visual inspection finds some ecchymosis at left anterolateral thigh today, possibly coming to cutaneous surface and related to recent falls. Assessment:  Patient is progressing towards goals as previously established in POC with skilled home health physical therapy services at this time as made apparent by improving overall functional mobility as patient able to better complete sit to stands from chair with sitting atop pillows creating higher surface. Limited left quadriceps strength limits ability to rise to standing from normal height chair without physical assistance. Patient understands need for continued HEP completion to address deficits and agrees to do so. Instructed patient in benefit of outpatient physical therapy for continued targeting of current deficits with patient agreeable to doing so and wants to discuss with surgeon during her surgical follow-up. . Family/caregiver daughter, father, and significant other involvement are present/set-up at this point in time and assists with meals, transport, transfer and ADLs as needed, as well as general encouragement.  Noticed decreased hamstring tightness and better ability to move into end of range knee

## 2023-11-07 ENCOUNTER — HOME CARE VISIT (OUTPATIENT)
Age: 53
End: 2023-11-07
Payer: COMMERCIAL

## 2023-11-07 ENCOUNTER — TELEPHONE (OUTPATIENT)
Age: 53
End: 2023-11-07

## 2023-11-07 ENCOUNTER — CLINICAL DOCUMENTATION (OUTPATIENT)
Age: 53
End: 2023-11-07

## 2023-11-07 VITALS
HEART RATE: 76 BPM | TEMPERATURE: 97.8 F | OXYGEN SATURATION: 98 % | SYSTOLIC BLOOD PRESSURE: 155 MMHG | DIASTOLIC BLOOD PRESSURE: 108 MMHG

## 2023-11-07 DIAGNOSIS — G89.18 POST-OP PAIN: Primary | ICD-10-CM

## 2023-11-07 PROCEDURE — G0157 HHC PT ASSISTANT EA 15: HCPCS

## 2023-11-07 PROCEDURE — G2168 SVS BY PT IN HOME HEALTH: HCPCS

## 2023-11-07 ASSESSMENT — ENCOUNTER SYMPTOMS: PAIN LOCATION - PAIN QUALITY: ACHING, SORE

## 2023-11-07 NOTE — PROGRESS NOTES
Patient brought form into [place] office to be completed form  faxed to [facility].    Placed in forms bin.

## 2023-11-08 ENCOUNTER — HOME CARE VISIT (OUTPATIENT)
Age: 53
End: 2023-11-08
Payer: COMMERCIAL

## 2023-11-08 ENCOUNTER — TELEPHONE (OUTPATIENT)
Age: 53
End: 2023-11-08

## 2023-11-08 VITALS
RESPIRATION RATE: 18 BRPM | TEMPERATURE: 98 F | SYSTOLIC BLOOD PRESSURE: 148 MMHG | OXYGEN SATURATION: 97 % | HEART RATE: 80 BPM | DIASTOLIC BLOOD PRESSURE: 78 MMHG

## 2023-11-08 LAB
INTERNATIONAL NORMALIZATION RATIO, POC: 1.6 (ref 0.9–1.1)
PROTHROMBIN TIME, POC: 19.5 (ref 11.8–14.9)

## 2023-11-08 PROCEDURE — G0299 HHS/HOSPICE OF RN EA 15 MIN: HCPCS

## 2023-11-08 RX ORDER — OXYCODONE HYDROCHLORIDE AND ACETAMINOPHEN 5; 325 MG/1; MG/1
1 TABLET ORAL EVERY 6 HOURS PRN
Qty: 28 TABLET | Refills: 0 | Status: SHIPPED | OUTPATIENT
Start: 2023-11-08 | End: 2023-11-15

## 2023-11-08 ASSESSMENT — ENCOUNTER SYMPTOMS
PAIN LOCATION - PAIN QUALITY: THROBBING
DYSPNEA ACTIVITY LEVEL: AFTER AMBULATING 10 - 20 FT

## 2023-11-08 NOTE — TELEPHONE ENCOUNTER
Patient says that [facility] pharmacy advised her that she needs a pre-auth for her medication Oxycodone.

## 2023-11-08 NOTE — TELEPHONE ENCOUNTER
Adelaide Denton from [facility] She said     PT 19.5    INR 1.6    Coumadin : 3 mg Daily. [facility]. [phone].

## 2023-11-08 NOTE — TELEPHONE ENCOUNTER
Cameron Kendall from [facility] states the patients PT INR was missed on 11/6/23, and that is was an error all the way around. Cameron Reusing states the nurse was scheduled off, and that they were unaware. Cameron Reusing is asking if the PT INR should be taken today, or can it be taken tomorrow. Cameron Spencerusing can be reached at [phone].

## 2023-11-08 NOTE — HOME HEALTH
SUBJECTIVE: Improved pain management, no falls, no changes in medications.  BP elevated, pt has not taken BP medications, instructed to take medications  CAREGIVER INVOLVEMENT/ASSISTANCE NEEDED FOR: Daughter assist as needed   Assessment and Summary of Care:  Patient's current functional status before discharge is as follows  Strength: LLE 3/10   ROM:  0-120  Bed Mobility: Ind  Transfers: Ind from raised sofa, Max A from low surface  Gait/WC mobility: up to 200ft w/FWW Sup  Stairs: CGA single step to eit home   Special Tests: TINETTI = 18/28  Recommendations: Continue HH x 2 weeks

## 2023-11-09 ENCOUNTER — HOME CARE VISIT (OUTPATIENT)
Age: 53
End: 2023-11-09
Payer: COMMERCIAL

## 2023-11-09 ENCOUNTER — TELEPHONE (OUTPATIENT)
Age: 53
End: 2023-11-09

## 2023-11-09 DIAGNOSIS — Z96.652 STATUS POST TOTAL KNEE REPLACEMENT, LEFT: ICD-10-CM

## 2023-11-09 DIAGNOSIS — G89.18 POST-OP PAIN: Primary | ICD-10-CM

## 2023-11-09 PROCEDURE — G0151 HHCP-SERV OF PT,EA 15 MIN: HCPCS

## 2023-11-09 NOTE — TELEPHONE ENCOUNTER
Ninfa with [facility] called to follow up in regards to previous PT INR message. Lidia Duran is asking for a call back, and can be reached at [phone].

## 2023-11-09 NOTE — TELEPHONE ENCOUNTER
Gaby carreon/Home Care advised Pt is not progressing and they would like to extent her appts out:     She will come in this Saturday, then 3 times next week and twice the week after that.

## 2023-11-10 ENCOUNTER — OFFICE VISIT (OUTPATIENT)
Age: 53
End: 2023-11-10

## 2023-11-10 VITALS
DIASTOLIC BLOOD PRESSURE: 73 MMHG | TEMPERATURE: 98.3 F | OXYGEN SATURATION: 97 % | HEART RATE: 64 BPM | SYSTOLIC BLOOD PRESSURE: 127 MMHG | RESPIRATION RATE: 17 BRPM

## 2023-11-10 DIAGNOSIS — Z96.652 STATUS POST TOTAL KNEE REPLACEMENT, LEFT: Primary | ICD-10-CM

## 2023-11-10 DIAGNOSIS — Z48.02: ICD-10-CM

## 2023-11-10 DIAGNOSIS — Z91.81 HISTORY OF RECENT FALL: ICD-10-CM

## 2023-11-10 DIAGNOSIS — Z96.652 ARTIFICIAL KNEE JOINT PRESENT, LEFT: ICD-10-CM

## 2023-11-10 DIAGNOSIS — M25.462 EFFUSION OF LEFT KNEE: ICD-10-CM

## 2023-11-10 ASSESSMENT — ENCOUNTER SYMPTOMS
DYSPNEA ACTIVITY LEVEL: AFTER AMBULATING MORE THAN 20 FT
PAIN LOCATION - PAIN QUALITY: ACHING

## 2023-11-10 NOTE — PROGRESS NOTES
non-labored. Examination of the left knee reveals skin intact. The surgical wound healed nicely. Staples in place. There is no erythema noted. Does have some resolving ecchymosis. There is minimal effusion. Negative patellar ballottement. No obvious defects in extensor mechanism. The patient is able to hold the leg out straight however having trouble with maintaining a straight leg raise. Flexion to 110. Negative calf tenderness. Negative Homans. No signs of DVT present. Radiographs obtained in office today 11/10/2023 at the [facility] location include AP, lateral, skyline shows a total knee components to be well fixed without evidence for loosening or fracture noted. Assessment: Status post left total knee replacement    Plan: At this point, we discussed treatment options. The staples were removed and replaced with Steri-Strips without complications. She will continue with her home physical therapy. She is instructed on strengthening and range of motion activities on an hourly basis. She will continue with ice therapy. She can discontinue her aspirin. We will see her back in 2 weeks time for reevaluation.                 JR Sher TOLBERT PA-C, ATC

## 2023-11-10 NOTE — HOME HEALTH
SUBJECTIVE:Pt reports she is doing okay. States she is still having a hard time with her transfers d/t the weakness in the L leg and feels safer using her walker d/t recent falls. Denies any medication changes or falls since last visit. Pain reported at 4-5/10 in the L knee. CAREGIVER ASSISTANCE NEEDED FOR: Family assists with meals, household chores and transportation to appointments  MEDICATIONS REVIEWED AND UPDATED: no changes noted  [address] is clean dry and intact to L anterior knee. No drainage noted. Bruising present on L lateral thigh from fall last week  ROM: L knee AAROM- ext 0, flex 120  BED MOBILITY: SBA to lift the L LE in/out of bed which is improved from min A at initial evaluation. TRANSFERS:CGA/Min A with sit to stand. Improved from Min A at initial evaluation. GAIT TRAINING performed in order to reduce gait impairments and reduce the risk for falls:  100ft X2 outside with FWRW. Gait deficits noted: slight antalgic gait on L LE. Pt remains fearful of the L LE giving way d/t ongoing quad weakness. Cues and education provided for: continued use of walker instead of SC d/t increased fall risk. STAIRS: NA  BALANCE: Pt scored 15/28 on Tinetti which is a 4 point improvement from initial evaluation but pt remains a high fall risk    PATIENT/CAREGIVER EDUCATION PROVIDED THIS VISIT: Added TKE with blue T-band to HEP for quad strengthening   RESPONSE TO EDUCATION PROVIDED: Pt returned demonstration  RESPONSE TO TREATMETN PROVIDED: Pt reported fatigue and ongoing wekaness in the L quads after treatment but no significant increase in pain  HEP consisting of:  LE strengthening and ROM for improved safe and independent transfers and ambulation  ASSESSMENT AND PROGRESS TOWARD GOALS:  Patient is progressing fair toward goals and has not met goals for L LE strength, transfers and ambulation.   Patient continues to have deficits in L quad strength with hx of the L LE giving way resulting in fall with

## 2023-11-11 ENCOUNTER — HOME CARE VISIT (OUTPATIENT)
Age: 53
End: 2023-11-11
Payer: COMMERCIAL

## 2023-11-11 VITALS
RESPIRATION RATE: 18 BRPM | HEART RATE: 70 BPM | DIASTOLIC BLOOD PRESSURE: 95 MMHG | OXYGEN SATURATION: 99 % | TEMPERATURE: 98.1 F | SYSTOLIC BLOOD PRESSURE: 165 MMHG

## 2023-11-11 PROCEDURE — G0157 HHC PT ASSISTANT EA 15: HCPCS

## 2023-11-11 ASSESSMENT — ENCOUNTER SYMPTOMS: PAIN LOCATION - PAIN QUALITY: THROB

## 2023-11-11 NOTE — HOME HEALTH
Patient's Subjective: Last night she was feeling really bad. The staples came out yesterday. She still cannot lift her leg (i.e. LAQ)    Falls since last session:   no  Trips to ER since last session? no  Pain/Discomfort ? Yes, see pain page. New Meds: no  .  Caregiver involvement/assistance needed for: The patient's caregiver assists with meals, chores, transportation, HEP  . Home health supplies by type and quantity ordered/delivered this visit include: none  . Objective:  See interventions. Patient response to treatment:  No increase in sx she maintained 6/10 throughout. RPE 6/10 with each activity. Patient level of understanding of education provided:  - Correct placement and alinement of the leg with LAQ and sit/stand due to compensations. - Correct LAQ exercise - ECC with the ability to correct ~ 50% of the time. Assess of progress towards goals:   She continues to demonstrate difficulty with LAQ- active recruitment of her L quads. Moderate sx continue. She is able to amb in the home with her RW without demonstrating any instability this session. Continued need for the following skills: Strengthening L quads, safe amb and transfers    Plan for next visit: Cont to work towards increasing L quads, stability and gait to decrease fall risk. The following discharge was discussed with the patient/caregiver : New order in. Pt discussed with evaluating PT. New estimated DC ~ 11/22/2023 by  Wendy Garcia, PT     [facility] HEART required?  No

## 2023-11-13 ENCOUNTER — HOME CARE VISIT (OUTPATIENT)
Age: 53
End: 2023-11-13
Payer: COMMERCIAL

## 2023-11-13 ENCOUNTER — TELEPHONE (OUTPATIENT)
Age: 53
End: 2023-11-13

## 2023-11-13 VITALS
HEART RATE: 81 BPM | RESPIRATION RATE: 16 BRPM | OXYGEN SATURATION: 95 % | TEMPERATURE: 97 F | DIASTOLIC BLOOD PRESSURE: 92 MMHG | SYSTOLIC BLOOD PRESSURE: 138 MMHG

## 2023-11-13 DIAGNOSIS — G89.18 POST-OP PAIN: Primary | ICD-10-CM

## 2023-11-13 PROCEDURE — G0299 HHS/HOSPICE OF RN EA 15 MIN: HCPCS

## 2023-11-13 ASSESSMENT — ENCOUNTER SYMPTOMS: PAIN LOCATION - PAIN QUALITY: SORE

## 2023-11-13 NOTE — TELEPHONE ENCOUNTER
Patient is requesting a refill on the medication       oxyCODONE-acetaminophen (PERCOCET) 5-325 MG per tablet         [facility]/pharmacy   [address] [address]   Phone:  [phone]  Fax:  [phone]      Please call and advise patient at   [phone]

## 2023-11-14 ENCOUNTER — HOME CARE VISIT (OUTPATIENT)
Age: 53
End: 2023-11-14
Payer: COMMERCIAL

## 2023-11-14 ENCOUNTER — CLINICAL DOCUMENTATION (OUTPATIENT)
Age: 53
End: 2023-11-14

## 2023-11-14 VITALS
OXYGEN SATURATION: 98 % | SYSTOLIC BLOOD PRESSURE: 170 MMHG | DIASTOLIC BLOOD PRESSURE: 98 MMHG | HEART RATE: 66 BPM | TEMPERATURE: 98.4 F

## 2023-11-14 PROCEDURE — G0157 HHC PT ASSISTANT EA 15: HCPCS

## 2023-11-14 PROCEDURE — G2168 SVS BY PT IN HOME HEALTH: HCPCS

## 2023-11-14 RX ORDER — OXYCODONE HYDROCHLORIDE AND ACETAMINOPHEN 5; 325 MG/1; MG/1
1-2 TABLET ORAL EVERY 8 HOURS PRN
Qty: 42 TABLET | Refills: 0 | Status: SHIPPED | OUTPATIENT
Start: 2023-11-14 | End: 2023-11-21

## 2023-11-14 ASSESSMENT — ENCOUNTER SYMPTOMS: PAIN LOCATION - PAIN QUALITY: ACHING, SORE

## 2023-11-14 NOTE — HOME HEALTH
SUBJECTIVE: Pt c/o pain in left knee pain waiting, pt is waiting for prescriptioin to be filled, no changes in medicaions, no falls reported  CAREGIVER INVOLVEMENT/ASSISTANCE NEEDED FOR: Spouse assist pt as needed   OBJECTIVE: See interventions  PATIENT EDUCATION PROVIDED THIS VISIT: reviewed fall prevention   PATIENT RESPONSE TO EDUCATION: Pt able to teach back fall prevention    PATIENT RESPONSE TO TREATMENT/ASSESSMENT OF PROGRESS TOWARD GOALS: BP is elevated pt has not taken second dose of BP medications instructed to take now. no symptoms, attempted to contact PCP on hold for some time with no answer, pt instructed to monitor BP and contact PCP if BP does not decrease or if she begins to develop symptoms. Gait tng on uneven surfaces with focus on increasing heel strike and toe off, pt amb with minimal to no heel strike and toe off instructed to increase stride length to improve to assist with heel strike/toe off gait pattern, instructed in strengthening with focus on improving quad strength. pt demonstrates improved strength able extend left knee but unable to reach full ROM or hold. Pt able to manage LLE to perform sit to supine transfers unassisted. Pt instructed to peform HEP 3x day walk every 1-2 hours with focus on heel strike/toe off and increased stride length. Recommend increased mobility throughtout the day. PLAN FOR NEXT VISIT: Next visit pt will perform knee ext sitting edge of chair with 5 second hold, will perform resisted TKE with Tband x 20 with 5 second, will perform SLR x 10 unassisted  DISCHARGE PLANS: POC and Discharge plans discussed pt understands and agrees eventual DC once goals have been met or max HC benefits have been achieved.   4 visits remaining DC scheduled for 11/22/23

## 2023-11-15 ENCOUNTER — HOME CARE VISIT (OUTPATIENT)
Age: 53
End: 2023-11-15
Payer: COMMERCIAL

## 2023-11-15 ENCOUNTER — TELEPHONE (OUTPATIENT)
Age: 53
End: 2023-11-15

## 2023-11-15 VITALS
OXYGEN SATURATION: 98 % | TEMPERATURE: 98.7 F | DIASTOLIC BLOOD PRESSURE: 90 MMHG | HEART RATE: 67 BPM | SYSTOLIC BLOOD PRESSURE: 145 MMHG

## 2023-11-15 PROCEDURE — G0157 HHC PT ASSISTANT EA 15: HCPCS

## 2023-11-15 ASSESSMENT — ENCOUNTER SYMPTOMS: PAIN LOCATION - PAIN QUALITY: ACHING SORE

## 2023-11-15 NOTE — TELEPHONE ENCOUNTER
Chinook from [address] called stating the patient advised she was told to discontinue taking coumadin on her last visit with Brandon on 11/10/23. Toya Tipton is asking for a call back to confirm this information. Chinook can be reached at [phone].

## 2023-11-16 ENCOUNTER — HOME CARE VISIT (OUTPATIENT)
Age: 53
End: 2023-11-16
Payer: COMMERCIAL

## 2023-11-16 PROCEDURE — G0300 HHS/HOSPICE OF LPN EA 15 MIN: HCPCS

## 2023-11-17 NOTE — HOME HEALTH
SUBJECTIVE: Pt c/o pain in left knee pain, no changes in medicaions, no falls reported  CAREGIVER INVOLVEMENT/ASSISTANCE NEEDED FOR: Family assit pt as needed  OBJECTIVE: See interventions  PATIENT EDUCATION PROVIDED THIS VISIT: reviewed fall prevention   PATIENT RESPONSE TO EDUCATION: Pt able to teach back fall prevention    PATIENT RESPONSE TO TREATMENT/ASSESSMENT OF PROGRESS TOWARD GOALS: Pt states she is doing a little better, improved pain management. Strengthening to LLE with focus on left quad strength, slight improvement in quad strength, peformed gait tng with VCs to improve heel strike and toe off. Pt peforms bed mobility Ind, Mod I transfers from multiple sufaces in home with FWW. PLAN FOR NEXT VISIT: Pt will amb with improved heel strke and toe off, TKE with 5 second hold, SLR with Stephen   DISCHARGE PLANS: POC and Discharge plans discussed pt understands and agrees eventual DC once goals have been met or max HC benefits have been achieved.   3 visits remaining DC scheduled for 11/22/23

## 2023-11-18 ENCOUNTER — HOME CARE VISIT (OUTPATIENT)
Age: 53
End: 2023-11-18
Payer: COMMERCIAL

## 2023-11-21 ENCOUNTER — HOME CARE VISIT (OUTPATIENT)
Age: 53
End: 2023-11-21
Payer: COMMERCIAL

## 2023-11-21 PROCEDURE — G0157 HHC PT ASSISTANT EA 15: HCPCS

## 2023-11-21 RX ORDER — CEFADROXIL 500 MG/1
CAPSULE ORAL
Qty: 10 CAPSULE | Refills: 0 | Status: SHIPPED | OUTPATIENT
Start: 2023-11-21

## 2023-11-21 RX ORDER — ONDANSETRON 4 MG/1
4 TABLET, FILM COATED ORAL EVERY 8 HOURS PRN
Qty: 30 TABLET | Refills: 2 | Status: SHIPPED | OUTPATIENT
Start: 2023-11-21

## 2023-11-21 RX ORDER — NITROFURANTOIN 25; 75 MG/1; MG/1
CAPSULE ORAL
Qty: 10 CAPSULE | Refills: 0 | Status: SHIPPED | OUTPATIENT
Start: 2023-11-21

## 2023-11-22 ENCOUNTER — TELEPHONE (OUTPATIENT)
Age: 53
End: 2023-11-22

## 2023-11-22 ENCOUNTER — HOME CARE VISIT (OUTPATIENT)
Age: 53
End: 2023-11-22
Payer: COMMERCIAL

## 2023-11-22 VITALS — OXYGEN SATURATION: 100 % | RESPIRATION RATE: 16 BRPM | HEART RATE: 76 BPM

## 2023-11-22 DIAGNOSIS — G89.18 POST-OP PAIN: Primary | ICD-10-CM

## 2023-11-22 PROCEDURE — G0151 HHCP-SERV OF PT,EA 15 MIN: HCPCS

## 2023-11-22 RX ORDER — OXYCODONE HYDROCHLORIDE AND ACETAMINOPHEN 5; 325 MG/1; MG/1
1-2 TABLET ORAL EVERY 8 HOURS PRN
Qty: 42 TABLET | Refills: 0 | Status: SHIPPED | OUTPATIENT
Start: 2023-11-22 | End: 2023-11-29

## 2023-11-22 ASSESSMENT — ENCOUNTER SYMPTOMS
STOOL DESCRIPTION: FORMED
PAIN LOCATION - PAIN QUALITY: ACHING

## 2023-11-22 NOTE — HOME HEALTH
SUBJECTIVE: Pt states she is doing well. States she is using her cane most of the time but still uses her walker first thing in the AM until she gets moving around. CAREGIVER INVOLVEMENT/ASSISTANCE NEEDED FOR: Pts father assists with meals and transportation to appointments  [address] ORDERED/DELIVERED THIS VISIT INCLUDE: none  OBJECTIVE:  See interventions. MEDICATIONS: Medications reconciled and all medications are available in the home this visit. The following education was provided regarding medications, medication interactions, and look a like medications: Continue medication as prescribed by MD. Medications are effective at this time. PATIENT RESPONSE TO TREATMENT:  No c/o increased pain during assessment  PATIENT LEVEL OF UNDERSTANDING OF EDUCATION PROVIDED: Pt verbalized understanding of discharge from home care and importance of compliance with HEP for continued functional improvements. Pt has f/u with ortho on 12/1 and will f/u regarding continuing PT on an outpatient basis. Update sent to Dr. Walter Navarro office with recommendation d/t ongoing L quad weakness and increased fall risk  ASSESSMENT OF PROGRESS TOWARD GOALS: Pt has met all gols for home care. Pt is ambulating independently with SC/FWRW throughout her home. Pts ROM has improved to 0/114 in the L knee. L knee incision is well approximated with no signs of infection  THE FOLLOWING DISCHARGE PLANNING WAS DISCUSSED WITH THE PATIENT/CAREGIVER: DC to family under MD supervision goals met.

## 2023-11-24 VITALS
TEMPERATURE: 97.7 F | SYSTOLIC BLOOD PRESSURE: 110 MMHG | HEART RATE: 64 BPM | OXYGEN SATURATION: 100 % | DIASTOLIC BLOOD PRESSURE: 80 MMHG | RESPIRATION RATE: 16 BRPM

## 2023-11-24 ASSESSMENT — ENCOUNTER SYMPTOMS: PAIN LOCATION - PAIN QUALITY: ACHING

## 2023-11-24 NOTE — HOME HEALTH
SUBJECTIVE: \"I go back to see the doctor on the first. I can do all the other exercises, but I can't kick it out like I need to. But this leg was weak before the surgery. They said that they would see how I was doing at my follow up on the 1st for me to start outpatient, but I want to be on the cane by then so I can go back to work. \"    CAREGIVER INVOLVEMENT/ASSISTANCE NEEDED FOR: Family assist pt as needed    OBJECTIVE: See interventions    PATIENT EDUCATION PROVIDED THIS VISIT: reviewed fall prevention strategies. Recommended pt call Orthopedic MD office to request earlier start time for outpatient PT . PATIENT RESPONSE TO EDUCATION: Pt able to teach back fall prevention, and reported she will call Dr. Jacky Cali office requesting earlier start for outpatient. PATIENT RESPONSE TO TREATMENT/ASSESSMENT OF PROGRESS TOWARD GOALS:   Pt presents today getting out of vehicle reporting she was about to head to the post office as she had not gotten any mail in the past 3 weeks. She demonstrates (I) getting in/out of the vehicle today and (I) managing her FWW AD. 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 Per pt, pain is improving, and she is eager to ambulate w/ a SPC to progress her (I). Pt has met her goals and ready for discharge from [address] [place] [address] next visit. Summary of Care:  Patient's current functional status before discharge is as follows  Strength:MMT defer to next encounter ; @ evaluation L Hip flex 3-/5, L quads 2+/5, L hams 3-/5   ROM:  AROM  0 - 120 (goal met) compared to ROM:L knee AROM ext/flex= -3/91 at [facility].  Bed Mobility: (I) sit <> supine compared to mod A to lift LLE on/off bed. Wounds: No dressing donned on L knee incision.  Incision is closed and healed well, no s/s of

## 2023-11-27 RX ORDER — DOCUSATE SODIUM 100 MG/1
100 CAPSULE, LIQUID FILLED ORAL 2 TIMES DAILY
Qty: 60 CAPSULE | Refills: 1 | Status: SHIPPED | OUTPATIENT
Start: 2023-11-27

## 2023-11-28 ENCOUNTER — APPOINTMENT (OUTPATIENT)
Facility: HOSPITAL | Age: 53
End: 2023-11-28
Payer: COMMERCIAL

## 2023-11-28 ENCOUNTER — TELEPHONE (OUTPATIENT)
Age: 53
End: 2023-11-28

## 2023-11-28 ENCOUNTER — HOSPITAL ENCOUNTER (EMERGENCY)
Facility: HOSPITAL | Age: 53
Discharge: HOME | End: 2023-11-28
Payer: COMMERCIAL

## 2023-11-28 VITALS
OXYGEN SATURATION: 99 % | HEART RATE: 74 BPM | RESPIRATION RATE: 16 BRPM | HEIGHT: 72 IN | SYSTOLIC BLOOD PRESSURE: 108 MMHG | DIASTOLIC BLOOD PRESSURE: 76 MMHG | WEIGHT: 293 LBS | TEMPERATURE: 98.5 F | BODY MASS INDEX: 39.68 KG/M2

## 2023-11-28 DIAGNOSIS — S62.322A DISPLACED FRACTURE OF SHAFT OF THIRD METACARPAL BONE, RIGHT HAND, INITIAL ENCOUNTER FOR CLOSED FRACTURE: ICD-10-CM

## 2023-11-28 DIAGNOSIS — S63.289A CLOSED TRAUMATIC DISLOCATION OF PROXIMAL INTERPHALANGEAL (PIP) JOINT OF FINGER: ICD-10-CM

## 2023-11-28 DIAGNOSIS — S52.531A CLOSED COLLES' FRACTURE OF RIGHT RADIUS, INITIAL ENCOUNTER: Primary | ICD-10-CM

## 2023-11-28 DIAGNOSIS — G89.18 POST-OP PAIN: Primary | ICD-10-CM

## 2023-11-28 DIAGNOSIS — Z96.652 STATUS POST TOTAL KNEE REPLACEMENT, LEFT: ICD-10-CM

## 2023-11-28 LAB
ALBUMIN SERPL-MCNC: 3.3 G/DL (ref 3.4–5)
ALBUMIN/GLOB SERPL: 0.6 (ref 0.8–1.7)
ALP SERPL-CCNC: 98 U/L (ref 45–117)
ALT SERPL-CCNC: 11 U/L (ref 13–56)
ANION GAP SERPL CALC-SCNC: 4 MMOL/L (ref 3–18)
APTT PPP: 29.3 SEC (ref 23–36.4)
AST SERPL-CCNC: 11 U/L (ref 10–38)
BASOPHILS # BLD: 0.1 K/UL (ref 0–0.1)
BASOPHILS NFR BLD: 1 % (ref 0–2)
BILIRUB SERPL-MCNC: 0.3 MG/DL (ref 0.2–1)
BUN SERPL-MCNC: 17 MG/DL (ref 7–18)
BUN/CREAT SERPL: 16 (ref 12–20)
CALCIUM SERPL-MCNC: 9.3 MG/DL (ref 8.5–10.1)
CHLORIDE SERPL-SCNC: 106 MMOL/L (ref 100–111)
CO2 SERPL-SCNC: 28 MMOL/L (ref 21–32)
CREAT SERPL-MCNC: 1.08 MG/DL (ref 0.6–1.3)
DIFFERENTIAL METHOD BLD: ABNORMAL
EOSINOPHIL # BLD: 0.4 K/UL (ref 0–0.4)
EOSINOPHIL NFR BLD: 6 % (ref 0–5)
ERYTHROCYTE [DISTWIDTH] IN BLOOD BY AUTOMATED COUNT: 16.4 % (ref 11.6–14.5)
GLOBULIN SER CALC-MCNC: 5.1 G/DL (ref 2–4)
GLUCOSE SERPL-MCNC: 131 MG/DL (ref 74–99)
HCT VFR BLD AUTO: 39.4 % (ref 35–45)
HGB BLD-MCNC: 12.2 G/DL (ref 12–16)
IMM GRANULOCYTES # BLD AUTO: 0 K/UL (ref 0–0.04)
IMM GRANULOCYTES NFR BLD AUTO: 1 % (ref 0–0.5)
INR PPP: 1.1 (ref 0.9–1.1)
LYMPHOCYTES # BLD: 1.1 K/UL (ref 0.9–3.6)
LYMPHOCYTES NFR BLD: 17 % (ref 21–52)
MCH RBC QN AUTO: 27.9 PG (ref 24–34)
MCHC RBC AUTO-ENTMCNC: 31 G/DL (ref 31–37)
MCV RBC AUTO: 90 FL (ref 78–100)
MONOCYTES # BLD: 0.4 K/UL (ref 0.05–1.2)
MONOCYTES NFR BLD: 6 % (ref 3–10)
NEUTS SEG # BLD: 4.6 K/UL (ref 1.8–8)
NEUTS SEG NFR BLD: 71 % (ref 40–73)
NRBC # BLD: 0 K/UL (ref 0–0.01)
NRBC BLD-RTO: 0 PER 100 WBC
PLATELET # BLD AUTO: 227 K/UL (ref 135–420)
PMV BLD AUTO: 11.4 FL (ref 9.2–11.8)
POTASSIUM SERPL-SCNC: 3.7 MMOL/L (ref 3.5–5.5)
PROT SERPL-MCNC: 8.4 G/DL (ref 6.4–8.2)
PROTHROMBIN TIME: 14.3 SEC (ref 11.9–14.7)
RBC # BLD AUTO: 4.38 M/UL (ref 4.2–5.3)
SODIUM SERPL-SCNC: 138 MMOL/L (ref 136–145)
WBC # BLD AUTO: 6.5 K/UL (ref 4.6–13.2)

## 2023-11-28 PROCEDURE — 99284 EMERGENCY DEPT VISIT MOD MDM: CPT

## 2023-11-28 PROCEDURE — 85730 THROMBOPLASTIN TIME PARTIAL: CPT

## 2023-11-28 PROCEDURE — 73100 X-RAY EXAM OF WRIST: CPT

## 2023-11-28 PROCEDURE — 85025 COMPLETE CBC W/AUTO DIFF WBC: CPT

## 2023-11-28 PROCEDURE — 6370000000 HC RX 637 (ALT 250 FOR IP)

## 2023-11-28 PROCEDURE — 25605 CLTX DST RDL FX/EPHYS SEP W/: CPT

## 2023-11-28 PROCEDURE — 6360000002 HC RX W HCPCS

## 2023-11-28 PROCEDURE — 85610 PROTHROMBIN TIME: CPT

## 2023-11-28 PROCEDURE — 73120 X-RAY EXAM OF HAND: CPT

## 2023-11-28 PROCEDURE — 80053 COMPREHEN METABOLIC PANEL: CPT

## 2023-11-28 PROCEDURE — 73610 X-RAY EXAM OF ANKLE: CPT

## 2023-11-28 PROCEDURE — 2500000003 HC RX 250 WO HCPCS

## 2023-11-28 PROCEDURE — 73110 X-RAY EXAM OF WRIST: CPT

## 2023-11-28 RX ORDER — LIDOCAINE HYDROCHLORIDE 10 MG/ML
5 INJECTION, SOLUTION EPIDURAL; INFILTRATION; INTRACAUDAL; PERINEURAL
Status: COMPLETED | OUTPATIENT
Start: 2023-11-28 | End: 2023-11-28

## 2023-11-28 RX ORDER — HYDROMORPHONE HYDROCHLORIDE 1 MG/ML
1 INJECTION, SOLUTION INTRAMUSCULAR; INTRAVENOUS; SUBCUTANEOUS
Status: COMPLETED | OUTPATIENT
Start: 2023-11-28 | End: 2023-11-28

## 2023-11-28 RX ORDER — LIDOCAINE HYDROCHLORIDE 10 MG/ML
2 INJECTION, SOLUTION EPIDURAL; INFILTRATION; INTRACAUDAL; PERINEURAL
Status: COMPLETED | OUTPATIENT
Start: 2023-11-28 | End: 2023-11-28

## 2023-11-28 RX ORDER — OXYCODONE HYDROCHLORIDE AND ACETAMINOPHEN 5; 325 MG/1; MG/1
1 TABLET ORAL
Status: COMPLETED | OUTPATIENT
Start: 2023-11-28 | End: 2023-11-28

## 2023-11-28 RX ORDER — HYDROCODONE BITARTRATE AND ACETAMINOPHEN 5; 325 MG/1; MG/1
1 TABLET ORAL EVERY 6 HOURS PRN
Qty: 12 TABLET | Refills: 0 | Status: SHIPPED | OUTPATIENT
Start: 2023-11-28 | End: 2023-11-29 | Stop reason: SDUPTHER

## 2023-11-28 RX ADMIN — LIDOCAINE HYDROCHLORIDE 5 ML: 10 INJECTION, SOLUTION EPIDURAL; INFILTRATION; INTRACAUDAL; PERINEURAL at 16:29

## 2023-11-28 RX ADMIN — HYDROMORPHONE HYDROCHLORIDE 1 MG: 1 INJECTION, SOLUTION INTRAMUSCULAR; INTRAVENOUS; SUBCUTANEOUS at 14:16

## 2023-11-28 RX ADMIN — HYDROMORPHONE HYDROCHLORIDE 0.5 MG: 1 INJECTION, SOLUTION INTRAMUSCULAR; INTRAVENOUS; SUBCUTANEOUS at 15:38

## 2023-11-28 RX ADMIN — OXYCODONE AND ACETAMINOPHEN 1 TABLET: 5; 325 TABLET ORAL at 17:23

## 2023-11-28 RX ADMIN — LIDOCAINE HYDROCHLORIDE 2 ML: 10 INJECTION, SOLUTION EPIDURAL; INFILTRATION; INTRACAUDAL; PERINEURAL at 15:14

## 2023-11-28 ASSESSMENT — PAIN SCALES - GENERAL
PAINLEVEL_OUTOF10: 9
PAINLEVEL_OUTOF10: 7

## 2023-11-28 ASSESSMENT — PAIN DESCRIPTION - LOCATION
LOCATION: HAND
LOCATION: HAND

## 2023-11-28 ASSESSMENT — ENCOUNTER SYMPTOMS
COUGH: 0
DIARRHEA: 0
VOMITING: 0
ABDOMINAL PAIN: 0
SHORTNESS OF BREATH: 0
CHEST TIGHTNESS: 0
CONSTIPATION: 0
NAUSEA: 0

## 2023-11-28 ASSESSMENT — PAIN DESCRIPTION - ORIENTATION
ORIENTATION: RIGHT
ORIENTATION: RIGHT

## 2023-11-28 NOTE — ED PROVIDER NOTES
Lymphocytes Absolute 1.1 0.9 - 3.6 K/UL    Monocytes Absolute 0.4 0.05 - 1.2 K/UL    Eosinophils Absolute 0.4 0.0 - 0.4 K/UL    Basophils Absolute 0.1 0.0 - 0.1 K/UL    Absolute Immature Granulocyte 0.0 0.00 - 0.04 K/UL    Differential Type AUTOMATED     CMP    Collection Time: 11/28/23  2:13 PM   Result Value Ref Range    Sodium 138 136 - 145 mmol/L    Potassium 3.7 3.5 - 5.5 mmol/L    Chloride 106 100 - 111 mmol/L    CO2 28 21 - 32 mmol/L    Anion Gap 4 3.0 - 18 mmol/L    Glucose 131 (H) 74 - 99 mg/dL    BUN 17 7.0 - 18 MG/DL    Creatinine 1.08 0.6 - 1.3 MG/DL    Bun/Cre Ratio 16 12 - 20      Est, Glom Filt Rate >60 >60 ml/min/1.73m2    Calcium 9.3 8.5 - 10.1 MG/DL    Total Bilirubin 0.3 0.2 - 1.0 MG/DL    ALT 11 (L) 13 - 56 U/L    AST 11 10 - 38 U/L    Alk Phosphatase 98 45 - 117 U/L    Total Protein 8.4 (H) 6.4 - 8.2 g/dL    Albumin 3.3 (L) 3.4 - 5.0 g/dL    Globulin 5.1 (H) 2.0 - 4.0 g/dL    Albumin/Globulin Ratio 0.6 (L) 0.8 - 1.7     Protime-INR    Collection Time: 11/28/23  2:13 PM   Result Value Ref Range    Protime 14.3 11.9 - 14.7 sec    INR 1.1 0.9 - 1.1     APTT    Collection Time: 11/28/23  2:13 PM   Result Value Ref Range    PTT 29.3 23.0 - 36.4 SEC       Radiologic Studies -   XR ANKLE LEFT (MIN 3 VIEWS)   Final Result      1. No acute osseous abnormality. 2. Mild degenerative changes. 2. Status post ORIF distal fibula and syndesmosis, without interval change as   discussed above. Terrence Mattson MD   R1 Radiology Resident      All findings discussed with attending radiologist. I have personally reviewed   all images and agree with the interpretation above. XR HAND RIGHT (2 VIEWS)   Final Result   1. Acute Colles fracture   2. Acute displaced oblique fracture of the third metacarpal   3.    Dislocation of the second digit at the PIP joint      Terrence Mattson MD    R1 Radiology Resident      All findings discussed with attending radiologist. I have personally reviewed   all images and

## 2023-11-28 NOTE — TELEPHONE ENCOUNTER
Patient is requesting a refill on       oxyCODONE-acetaminophen (PERCOCET) 5-325 MG per tablet        [facility]/pharmacy   [address] [facility] [address]   Phone:  [phone]  Fax:  [phone]

## 2023-11-28 NOTE — DISCHARGE INSTRUCTIONS
You must remain in the splint until you are reevaluated by the orthopedic team.  Do not get the splint wet. Take Norco every 6 hours as needed for pain. This medication can make you drowsy so do not take prior to driving or operating heavy machinery. Rest, ice, elevate the affected arm to decrease pain and swelling. Please contact Dr. Vinita Soria to schedule follow-up appointment. Return to the ED with any new or worsening symptoms.

## 2023-11-28 NOTE — ED TRIAGE NOTES
Client had mechinal from home,NO LOC, denies hitting head, put hands out to brace fall, noted swelling to r. Wrist and obvious deformity to r. Index finger. NAD. AXOX4.

## 2023-11-29 ENCOUNTER — TELEPHONE (OUTPATIENT)
Age: 53
End: 2023-11-29

## 2023-11-29 RX ORDER — HYDROCODONE BITARTRATE AND ACETAMINOPHEN 7.5; 325 MG/1; MG/1
1-2 TABLET ORAL EVERY 8 HOURS PRN
Qty: 42 TABLET | Refills: 0 | Status: SHIPPED | OUTPATIENT
Start: 2023-11-29 | End: 2023-12-04 | Stop reason: SDUPTHER

## 2023-11-29 RX ORDER — HYDROCODONE BITARTRATE AND ACETAMINOPHEN 5; 325 MG/1; MG/1
1 TABLET ORAL EVERY 6 HOURS PRN
Qty: 12 TABLET | Refills: 0 | Status: SHIPPED | OUTPATIENT
Start: 2023-11-29 | End: 2023-12-03

## 2023-11-29 NOTE — TELEPHONE ENCOUNTER
Patient called and stated that she just spoke with her insurance and they advised her that they haven't received anything from AMANDEEP Olivarez.      I advised that we are currently working on it.

## 2023-11-29 NOTE — TELEPHONE ENCOUNTER
New Patient called and said that she was seen yesterday at [facility] for a Right Wrist Fx. That they took an xray , and told her to follow up with . Patient said she has never had any sx on the Right hand or Wrist.     Patient said she is able to go to [facility]. Patient would like to know how soon Tonia Olivares would like to see her at the [facility] location. Patient tel. [phone].

## 2023-11-29 NOTE — TELEPHONE ENCOUNTER
Can you please schedule her at 1 with dr Kent Spine tomorrow at [facility]. Please reiterate how imperative it is that she is seen.

## 2023-11-30 ENCOUNTER — OFFICE VISIT (OUTPATIENT)
Age: 53
End: 2023-11-30

## 2023-11-30 ENCOUNTER — TELEPHONE (OUTPATIENT)
Age: 53
End: 2023-11-30

## 2023-11-30 VITALS — HEIGHT: 72 IN | SYSTOLIC BLOOD PRESSURE: 180 MMHG | DIASTOLIC BLOOD PRESSURE: 114 MMHG | BODY MASS INDEX: 40.14 KG/M2

## 2023-11-30 DIAGNOSIS — S52.531A CLOSED COLLES' FRACTURE OF RIGHT RADIUS, INITIAL ENCOUNTER: Primary | ICD-10-CM

## 2023-11-30 DIAGNOSIS — S63.270A CLOSED DISLOCATION OF INTERPHALANGEAL JOINT OF RIGHT INDEX FINGER: ICD-10-CM

## 2023-11-30 DIAGNOSIS — S62.323A DISPLACED FRACTURE OF SHAFT OF THIRD METACARPAL BONE, LEFT HAND, INITIAL ENCOUNTER FOR CLOSED FRACTURE: ICD-10-CM

## 2023-11-30 RX ORDER — HYDROCODONE BITARTRATE AND ACETAMINOPHEN 5; 325 MG/1; MG/1
1 TABLET ORAL EVERY 6 HOURS PRN
Qty: 20 TABLET | Refills: 0 | Status: SHIPPED | OUTPATIENT
Start: 2023-11-30 | End: 2023-12-05

## 2023-11-30 NOTE — TELEPHONE ENCOUNTER
Patient stated she called yesterday advising the pharmacy is requesting a pre- authorization for her HYDROcodone     [facility]

## 2023-12-01 ENCOUNTER — OFFICE VISIT (OUTPATIENT)
Age: 53
End: 2023-12-01
Payer: COMMERCIAL

## 2023-12-01 VITALS — BODY MASS INDEX: 39.68 KG/M2 | WEIGHT: 293 LBS | HEIGHT: 72 IN

## 2023-12-01 DIAGNOSIS — G89.18 POST-OP PAIN: ICD-10-CM

## 2023-12-01 DIAGNOSIS — Z91.81 HISTORY OF RECENT FALL: ICD-10-CM

## 2023-12-01 DIAGNOSIS — Z96.652 STATUS POST TOTAL KNEE REPLACEMENT, LEFT: Primary | ICD-10-CM

## 2023-12-01 PROCEDURE — 99024 POSTOP FOLLOW-UP VISIT: CPT | Performed by: PHYSICIAN ASSISTANT

## 2023-12-01 PROCEDURE — 73562 X-RAY EXAM OF KNEE 3: CPT | Performed by: PHYSICIAN ASSISTANT

## 2023-12-01 NOTE — TELEPHONE ENCOUNTER
She has an option to pay out of pocket for the pain medication as she awaits pre-authorization of the medication.

## 2023-12-04 ENCOUNTER — TELEPHONE (OUTPATIENT)
Age: 53
End: 2023-12-04

## 2023-12-04 DIAGNOSIS — Z96.652 STATUS POST TOTAL KNEE REPLACEMENT, LEFT: ICD-10-CM

## 2023-12-04 DIAGNOSIS — G89.18 POST-OP PAIN: ICD-10-CM

## 2023-12-04 RX ORDER — HYDROCODONE BITARTRATE AND ACETAMINOPHEN 7.5; 325 MG/1; MG/1
1-2 TABLET ORAL EVERY 8 HOURS PRN
Qty: 42 TABLET | Refills: 0 | Status: SHIPPED | OUTPATIENT
Start: 2023-12-04 | End: 2023-12-08 | Stop reason: SDUPTHER

## 2023-12-04 NOTE — TELEPHONE ENCOUNTER
Patient called again upset and requested for AMANDEEP Fonseca to call [facility] pharmacy so she can at least pay out of pocket until the pre-auth goes through.     Pharmacy:    [facility]/PHARMACY [facility], [address] [place] [address] [[id number]]

## 2023-12-04 NOTE — TELEPHONE ENCOUNTER
Unable to add to the open encounter on 11/28. Patient called back regarding her medication. Please contact her back as soon as possible. Its regarding the pre-auth from [facility] pharmacy.     She says she hasn't had any pain meds since her knee surgery and also has a fx wrist.

## 2023-12-05 ENCOUNTER — TELEPHONE (OUTPATIENT)
Facility: HOSPITAL | Age: 53
End: 2023-12-05

## 2023-12-05 ENCOUNTER — APPOINTMENT (OUTPATIENT)
Facility: HOSPITAL | Age: 53
End: 2023-12-05
Payer: COMMERCIAL

## 2023-12-07 ENCOUNTER — CLINICAL DOCUMENTATION (OUTPATIENT)
Age: 53
End: 2023-12-07

## 2023-12-07 ENCOUNTER — HOSPITAL ENCOUNTER (OUTPATIENT)
Facility: HOSPITAL | Age: 53
Setting detail: RECURRING SERIES
Discharge: HOME | End: 2023-12-10
Payer: COMMERCIAL

## 2023-12-07 PROCEDURE — 97535 SELF CARE MNGMENT TRAINING: CPT

## 2023-12-07 PROCEDURE — 97162 PT EVAL MOD COMPLEX 30 MIN: CPT

## 2023-12-07 PROCEDURE — 97110 THERAPEUTIC EXERCISES: CPT

## 2023-12-07 NOTE — PROGRESS NOTES
PT DAILY TREATMENT NOTE/Hip/Knee/Ankle EVAL 10-18      Patient Name: Vinita Rios    Date: 2023    : 1970  Insurance: Payor: [address] [place] [address] / Plan: [address] / Product Type: *No Product type* /      Patient  verified yes     Visit #   Current / Total 1 12   Time   In / Out 10:35 11:15   Pain   In / Out 4/10 4/10   Subjective Functional Status/Changes: Left knee pain s/p TKR on 10/23/23   Changes to:  Meds, Allergies, Med Hx, Sx Hx? If yes, update Summary List no       TREATMENT AREA =  Pain in left knee [M25.562]        SUBJECTIVE  Pain Level (0-10 scale): 4/10  []constant [x]intermittent []improving []worsening [x]no change since onset    Any medication changes, allergies to medications, adverse drug reactions, diagnosis change, or new procedure performed?: [x] No    [] Yes (see summary sheet for update)  Subjective functional status/changes:     PLOF: functionally independent, no AD. Due to pain and weakness pt using FWW for safe transfers and ambulation. Mechanism of Injury: Pt reports she was bone on bone and underwent TKR on 10/23/23. Pt came walking with FWW. Pt reports she had a fall on 10/28/23 resulting right wrist fracture and pt is scheduled for an ORIF on 23. Pt received therapy at home after the surgery and recently discharged. Current symptoms/Complaints: 4/10 at best with Pain medicine and ice pack; 7/10 at worst with early morning stiffness, prolong walking and resting; pt reports they are unable to sleep through the night secondary to pain  Previous Treatment/Compliance: Medication, rest, therapy. PMHx/Surgical Hx: Fracture left ankle ORIF, DM, HTN, Chron's disease, Right wrist fracture on 10/28/23. Work Hx: Full-time, Nurse educator   Living Situation: Lives in one story house alone and daughter comes over sometimes. Pt Goals: \"I want to able to transfers and ambulate without pain. Improve balance to prevent falls. \"  Barriers:
education: patient (P)  Barriers to Learning/Limitations: none  Measures taken if barriers to learning present: None  Patient Goal (s):  \"I want to able to transfers and ambulate without pain. Improve balance to prevent falls. \"   Patient Self Reported Health Status: fair  Rehabilitation Potential: good    Short Term Goals: To be accomplished in 4 weeks:  Patient will be independent and compliant with HEP to progress toward goals and restore functional mobility. Eval Status: issued at [facility] (Access Code [id number] )     Patient will improve pain in left knee to 6/10 at worst to improve standing and walking tolerance. Eval Status: 7/10 at worst     Long Term Goals: To be accomplished in 6 weeks:  Patient will improve FOTO score by 64 points to improve functional mobility. Eval Status: FOTO 33  FOTO score = an established functional score where 100 = no disability     2. Pt will have painfree left knee AROM WFL to aid in functional mechanics for ambulation/ADLs. Eval Status: -5 to 110 deg     3. Pt will have at least 5-/5 left LE strength to return to improve single leg stance during community mobility and stair negotiation. Eval Status: 3/5 to 3+/5     4. Patient will improve pain in left knee to 3/10 at worst to improve prolong standing and walking tolerance and restore prior level of function. Eval Status: 7/10 at worst    Frequency / Duration: Patient to be seen 2 times per week for 6 weeks  Goals will be assigned and reassessed every 10 visits/ 30 days per guidelines . Patient/ Caregiver education and instruction: Diagnosis, prognosis, self care, activity modification, and exercises [x]  Plan of care has been reviewed with PTA    Certification Period: 12/7/23 to 1/18/24    Holden Morel, PT       12/7/2023       94:13 AM    I certify that the above Therapy Services are being furnished while the patient is under my care.  I agree with the treatment plan and certify that this therapy is

## 2023-12-07 NOTE — PROGRESS NOTES
Form received from [address] [place] [address] for Short Term Disability Forms via Fax at our [facility] location. Blank form scanned into patient's chart. Form placed in forms bin at [address] [place] [address] location for completion.

## 2023-12-08 DIAGNOSIS — G89.18 POST-OP PAIN: ICD-10-CM

## 2023-12-08 DIAGNOSIS — Z96.652 STATUS POST TOTAL KNEE REPLACEMENT, LEFT: ICD-10-CM

## 2023-12-08 RX ORDER — HYDROCODONE BITARTRATE AND ACETAMINOPHEN 7.5; 325 MG/1; MG/1
1-2 TABLET ORAL EVERY 8 HOURS PRN
Qty: 42 TABLET | Refills: 0 | Status: SHIPPED | OUTPATIENT
Start: 2023-12-08 | End: 2023-12-15

## 2023-12-11 ENCOUNTER — ANESTHESIA EVENT (OUTPATIENT)
Facility: HOSPITAL | Age: 53
End: 2023-12-11
Payer: COMMERCIAL

## 2023-12-11 PROBLEM — S52.531D CLOSED COLLES' FRACTURE OF RIGHT RADIUS WITH ROUTINE HEALING: Status: ACTIVE | Noted: 2023-12-11

## 2023-12-11 PROBLEM — S62.322D: Status: ACTIVE | Noted: 2023-12-11

## 2023-12-11 RX ORDER — ERGOCALCIFEROL 1.25 MG/1
50000 CAPSULE ORAL WEEKLY
COMMUNITY

## 2023-12-11 NOTE — PROGRESS NOTES
Instructions for your surgery at [address] [place] [address] Date:  12/11/2023      Patient's Name:  Eros Marroquin           Surgery Date:  12/12              Please enter the main entrance of the hospital and check-in at the  [facility] Once checked in at the , you will take the elevators to the second floor, and report to the waiting room on the left. The room will say Procedure Registration. Do NOT eat or drink anything, including candy, gum, or ice chips after midnight prior to your surgery, unless you have specific instructions from your surgeon or anesthesia provider to do so. Brush your teeth before coming to the hospital. You may swish with water, but do not swallow. No smoking/Vaping/E-Cigarettes 24 hours prior to the day of surgery. No alcohol 24 hours prior to the day of surgery. No recreational drugs for one week prior to the day of surgery. Bring Photo ID, Insurance information, and Co-pay if required on day of surgery. Bring in pertinent legal documents, such as, Medical Power of [place], DNR, Advance Directive, etc.  Leave all valuables, including money/purse, at home. Remove all jewelry, including ALL body piercings, nail polish, acrylic nails, and makeup (including mascara); no lotions, powders, deodorant, or perfume/cologne/after shave on the skin. Follow instruction for Hibiclens washes and CHG wipes from surgeon's office. Glasses and dentures may be worn to the hospital. They must be removed prior to surgery. Please bring case/container for glasses or dentures. Contact lenses should not be worn on day of surgery. Call your doctor's office if symptoms of a cold or illness develop within 24-48 hours prior to your surgery. Call your doctor's office if you have any questions concerning insurance or co-pays. 15. AN ADULT (relative or friend 25 years or older) [address]   16. Please make arrangements
[address] Exp Problem Focused - Mod. Complex

## 2023-12-11 NOTE — DISCHARGE INSTRUCTIONS
Ice and Elevate operative wound/dressing. Begin moving fingers immediately after surgery. Keep dressing clean and dry. Cover for showering. Do not remove dressing. DISCHARGE SUMMARY from Nurse    PATIENT INSTRUCTIONS:    After general anesthesia or intravenous sedation, for 24 hours or while taking prescription Narcotics:  Limit your activities  Do not drive and operate hazardous machinery  Do not make important personal or business decisions  Do  not drink alcoholic beverages  If you have not urinated within 8 hours after discharge, please contact your surgeon on call. Report the following to your surgeon:  Excessive pain, swelling, redness or odor of or around the surgical area  Temperature over 100.5  Nausea and vomiting lasting longer than 4 hours or if unable to take medications  Any signs of decreased circulation or nerve impairment to extremity: change in color, persistent  numbness, tingling, coldness or increase pain  Any questions          These are general instructions for a healthy lifestyle:    No smoking/ No tobacco products/ Avoid exposure to second hand smoke  Surgeon General's Warning:  Quitting smoking now greatly reduces serious risk to your health. Obesity, smoking, and sedentary lifestyle greatly increases your risk for illness    A healthy diet, regular physical exercise & weight monitoring are important for maintaining a healthy lifestyle    You may be retaining fluid if you have a history of heart failure or if you experience any of the following symptoms:  Weight gain of 3 pounds or more overnight or 5 pounds in a week, increased swelling in our hands or feet or shortness of breath while lying flat in bed. Please call your doctor as soon as you notice any of these symptoms; do not wait until your next office visit. The discharge information has been reviewed with the patient. The patient verbalized understanding.   Discharge medications reviewed with the patient

## 2023-12-12 ENCOUNTER — ANESTHESIA (OUTPATIENT)
Facility: HOSPITAL | Age: 53
End: 2023-12-12
Payer: COMMERCIAL

## 2023-12-12 ENCOUNTER — HOSPITAL ENCOUNTER (OUTPATIENT)
Facility: HOSPITAL | Age: 53
Setting detail: OUTPATIENT SURGERY
Discharge: HOME | End: 2023-12-12
Attending: ORTHOPAEDIC SURGERY | Admitting: ORTHOPAEDIC SURGERY
Payer: COMMERCIAL

## 2023-12-12 ENCOUNTER — CLINICAL DOCUMENTATION (OUTPATIENT)
Age: 53
End: 2023-12-12

## 2023-12-12 ENCOUNTER — TELEPHONE (OUTPATIENT)
Age: 53
End: 2023-12-12

## 2023-12-12 VITALS
DIASTOLIC BLOOD PRESSURE: 64 MMHG | BODY MASS INDEX: 39.68 KG/M2 | SYSTOLIC BLOOD PRESSURE: 94 MMHG | WEIGHT: 293 LBS | OXYGEN SATURATION: 94 % | HEIGHT: 72 IN | RESPIRATION RATE: 18 BRPM | HEART RATE: 80 BPM | TEMPERATURE: 98.5 F

## 2023-12-12 DIAGNOSIS — S62.322D CLOSED DISPLACED FRACTURE OF SHAFT OF THIRD METACARPAL BONE OF RIGHT HAND WITH ROUTINE HEALING: Primary | ICD-10-CM

## 2023-12-12 DIAGNOSIS — S52.531D CLOSED COLLES' FRACTURE OF RIGHT RADIUS WITH ROUTINE HEALING: ICD-10-CM

## 2023-12-12 LAB
GLUCOSE BLD STRIP.AUTO-MCNC: 108 MG/DL (ref 70–110)
GLUCOSE BLD STRIP.AUTO-MCNC: 114 MG/DL (ref 70–110)
HCG UR QL: NEGATIVE

## 2023-12-12 PROCEDURE — 2709999900 HC NON-CHARGEABLE SUPPLY: Performed by: ORTHOPAEDIC SURGERY

## 2023-12-12 PROCEDURE — 2580000003 HC RX 258: Performed by: NURSE ANESTHETIST, CERTIFIED REGISTERED

## 2023-12-12 PROCEDURE — 7100000011 HC PHASE II RECOVERY - ADDTL 15 MIN: Performed by: ORTHOPAEDIC SURGERY

## 2023-12-12 PROCEDURE — C1713 ANCHOR/SCREW BN/BN,TIS/BN: HCPCS | Performed by: ORTHOPAEDIC SURGERY

## 2023-12-12 PROCEDURE — 2500000003 HC RX 250 WO HCPCS: Performed by: NURSE ANESTHETIST, CERTIFIED REGISTERED

## 2023-12-12 PROCEDURE — 7100000010 HC PHASE II RECOVERY - FIRST 15 MIN: Performed by: ORTHOPAEDIC SURGERY

## 2023-12-12 PROCEDURE — 2720000010 HC SURG SUPPLY STERILE: Performed by: ORTHOPAEDIC SURGERY

## 2023-12-12 PROCEDURE — 3600000002 HC SURGERY LEVEL 2 BASE: Performed by: ORTHOPAEDIC SURGERY

## 2023-12-12 PROCEDURE — 3700000001 HC ADD 15 MINUTES (ANESTHESIA): Performed by: ORTHOPAEDIC SURGERY

## 2023-12-12 PROCEDURE — 6360000002 HC RX W HCPCS: Performed by: NURSE ANESTHETIST, CERTIFIED REGISTERED

## 2023-12-12 PROCEDURE — 3700000000 HC ANESTHESIA ATTENDED CARE: Performed by: ORTHOPAEDIC SURGERY

## 2023-12-12 PROCEDURE — C1769 GUIDE WIRE: HCPCS | Performed by: ORTHOPAEDIC SURGERY

## 2023-12-12 PROCEDURE — 6360000002 HC RX W HCPCS: Performed by: ORTHOPAEDIC SURGERY

## 2023-12-12 PROCEDURE — 81025 URINE PREGNANCY TEST: CPT

## 2023-12-12 PROCEDURE — 82962 GLUCOSE BLOOD TEST: CPT

## 2023-12-12 PROCEDURE — L3650 SO 8 ABD RESTRAINT PRE OTS: HCPCS | Performed by: ORTHOPAEDIC SURGERY

## 2023-12-12 PROCEDURE — 6370000000 HC RX 637 (ALT 250 FOR IP): Performed by: NURSE ANESTHETIST, CERTIFIED REGISTERED

## 2023-12-12 PROCEDURE — 2500000003 HC RX 250 WO HCPCS: Performed by: ORTHOPAEDIC SURGERY

## 2023-12-12 PROCEDURE — 64415 NJX AA&/STRD BRCH PLXS IMG: CPT | Performed by: ANESTHESIOLOGY

## 2023-12-12 PROCEDURE — 7100000001 HC PACU RECOVERY - ADDTL 15 MIN: Performed by: ORTHOPAEDIC SURGERY

## 2023-12-12 PROCEDURE — 3600000012 HC SURGERY LEVEL 2 ADDTL 15MIN: Performed by: ORTHOPAEDIC SURGERY

## 2023-12-12 PROCEDURE — 6360000002 HC RX W HCPCS: Performed by: ANESTHESIOLOGY

## 2023-12-12 PROCEDURE — 2580000003 HC RX 258: Performed by: ORTHOPAEDIC SURGERY

## 2023-12-12 PROCEDURE — C9290 INJ, BUPIVACAINE LIPOSOME: HCPCS | Performed by: ORTHOPAEDIC SURGERY

## 2023-12-12 PROCEDURE — 7100000000 HC PACU RECOVERY - FIRST 15 MIN: Performed by: ORTHOPAEDIC SURGERY

## 2023-12-12 DEVICE — 3.5MM X 14MM NON-LOCKING HEXALOBE SCREW
Type: IMPLANTABLE DEVICE | Site: WRIST | Status: FUNCTIONAL
Brand: ACUMED

## 2023-12-12 DEVICE — ACU-LOC® 2 VDR PLT STD R
Type: IMPLANTABLE DEVICE | Site: WRIST | Status: FUNCTIONAL
Brand: ACUMED

## 2023-12-12 DEVICE — 2.3MM X 20MM LOCKING CORTICAL SCREW
Type: IMPLANTABLE DEVICE | Site: WRIST | Status: FUNCTIONAL
Brand: ACUMED

## 2023-12-12 DEVICE — 2.3MM X 16MM LOCKING CORTICAL SCREW
Type: IMPLANTABLE DEVICE | Site: WRIST | Status: FUNCTIONAL
Brand: ACUMED

## 2023-12-12 DEVICE — 3.5MM X 14MM LOCKING HEXALOBE SCREW
Type: IMPLANTABLE DEVICE | Site: WRIST | Status: FUNCTIONAL
Brand: ACUMED

## 2023-12-12 DEVICE — 2.3MM X 14MM LKG VARIABLE ANGLE SCREW
Type: IMPLANTABLE DEVICE | Site: WRIST | Status: FUNCTIONAL
Brand: ACUMED

## 2023-12-12 DEVICE — INNATE™ IMPLANT 4.5MM X 55MM
Type: IMPLANTABLE DEVICE | Site: WRIST | Status: FUNCTIONAL
Brand: ACUMED

## 2023-12-12 DEVICE — 3.5MM X 12MM LOCKING HEXALOBE SCREW
Type: IMPLANTABLE DEVICE | Site: WRIST | Status: FUNCTIONAL
Brand: ACUMED

## 2023-12-12 DEVICE — 2.3MM X 22MM LOCKING CORTICAL SCREW
Type: IMPLANTABLE DEVICE | Site: WRIST | Status: FUNCTIONAL
Brand: ACUMED

## 2023-12-12 RX ORDER — OXYCODONE HYDROCHLORIDE AND ACETAMINOPHEN 5; 325 MG/1; MG/1
1 TABLET ORAL EVERY 6 HOURS PRN
Qty: 12 TABLET | Refills: 0 | Status: SHIPPED | OUTPATIENT
Start: 2023-12-12 | End: 2023-12-15

## 2023-12-12 RX ORDER — LIDOCAINE HYDROCHLORIDE 10 MG/ML
1 INJECTION, SOLUTION EPIDURAL; INFILTRATION; INTRACAUDAL; PERINEURAL
Status: COMPLETED | OUTPATIENT
Start: 2023-12-12 | End: 2023-12-12

## 2023-12-12 RX ORDER — MIDAZOLAM HYDROCHLORIDE 2 MG/2ML
2 INJECTION, SOLUTION INTRAMUSCULAR; INTRAVENOUS ONCE
Status: COMPLETED | OUTPATIENT
Start: 2023-12-12 | End: 2023-12-12

## 2023-12-12 RX ORDER — ONDANSETRON 2 MG/ML
INJECTION INTRAMUSCULAR; INTRAVENOUS PRN
Status: DISCONTINUED | OUTPATIENT
Start: 2023-12-12 | End: 2023-12-12 | Stop reason: SDUPTHER

## 2023-12-12 RX ORDER — INSULIN LISPRO 100 [IU]/ML
0-15 INJECTION, SOLUTION INTRAVENOUS; SUBCUTANEOUS ONCE
Status: DISCONTINUED | OUTPATIENT
Start: 2023-12-12 | End: 2023-12-12 | Stop reason: HOSPADM

## 2023-12-12 RX ORDER — FENTANYL CITRATE 50 UG/ML
50 INJECTION, SOLUTION INTRAMUSCULAR; INTRAVENOUS EVERY 5 MIN PRN
Status: DISCONTINUED | OUTPATIENT
Start: 2023-12-12 | End: 2023-12-12 | Stop reason: HOSPADM

## 2023-12-12 RX ORDER — HYDROMORPHONE HYDROCHLORIDE 2 MG/ML
INJECTION, SOLUTION INTRAMUSCULAR; INTRAVENOUS; SUBCUTANEOUS PRN
Status: DISCONTINUED | OUTPATIENT
Start: 2023-12-12 | End: 2023-12-12 | Stop reason: SDUPTHER

## 2023-12-12 RX ORDER — FAMOTIDINE 20 MG/1
20 TABLET, FILM COATED ORAL ONCE
Status: COMPLETED | OUTPATIENT
Start: 2023-12-12 | End: 2023-12-12

## 2023-12-12 RX ORDER — SODIUM CHLORIDE 0.9 % (FLUSH) 0.9 %
5-40 SYRINGE (ML) INJECTION PRN
Status: DISCONTINUED | OUTPATIENT
Start: 2023-12-12 | End: 2023-12-12 | Stop reason: HOSPADM

## 2023-12-12 RX ORDER — LIDOCAINE HYDROCHLORIDE 20 MG/ML
INJECTION, SOLUTION EPIDURAL; INFILTRATION; INTRACAUDAL; PERINEURAL PRN
Status: DISCONTINUED | OUTPATIENT
Start: 2023-12-12 | End: 2023-12-12 | Stop reason: SDUPTHER

## 2023-12-12 RX ORDER — ACETAMINOPHEN 500 MG
1000 TABLET ORAL ONCE
Status: COMPLETED | OUTPATIENT
Start: 2023-12-12 | End: 2023-12-12

## 2023-12-12 RX ORDER — ONDANSETRON 2 MG/ML
4 INJECTION INTRAMUSCULAR; INTRAVENOUS
Status: DISCONTINUED | OUTPATIENT
Start: 2023-12-12 | End: 2023-12-12 | Stop reason: HOSPADM

## 2023-12-12 RX ORDER — SODIUM CHLORIDE 0.9 % (FLUSH) 0.9 %
5-40 SYRINGE (ML) INJECTION EVERY 12 HOURS SCHEDULED
Status: DISCONTINUED | OUTPATIENT
Start: 2023-12-12 | End: 2023-12-12 | Stop reason: HOSPADM

## 2023-12-12 RX ORDER — DEXTROSE MONOHYDRATE 100 MG/ML
INJECTION, SOLUTION INTRAVENOUS CONTINUOUS PRN
Status: DISCONTINUED | OUTPATIENT
Start: 2023-12-12 | End: 2023-12-12 | Stop reason: HOSPADM

## 2023-12-12 RX ORDER — OXYCODONE HYDROCHLORIDE AND ACETAMINOPHEN 5; 325 MG/1; MG/1
1 TABLET ORAL
Status: DISCONTINUED | OUTPATIENT
Start: 2023-12-12 | End: 2023-12-12 | Stop reason: HOSPADM

## 2023-12-12 RX ORDER — SODIUM CHLORIDE, SODIUM LACTATE, POTASSIUM CHLORIDE, CALCIUM CHLORIDE 600; 310; 30; 20 MG/100ML; MG/100ML; MG/100ML; MG/100ML
INJECTION, SOLUTION INTRAVENOUS CONTINUOUS
Status: DISCONTINUED | OUTPATIENT
Start: 2023-12-12 | End: 2023-12-12 | Stop reason: HOSPADM

## 2023-12-12 RX ORDER — VANCOMYCIN HYDROCHLORIDE 1 G/20ML
INJECTION, POWDER, LYOPHILIZED, FOR SOLUTION INTRAVENOUS PRN
Status: DISCONTINUED | OUTPATIENT
Start: 2023-12-12 | End: 2023-12-12 | Stop reason: ALTCHOICE

## 2023-12-12 RX ORDER — ROPIVACAINE HYDROCHLORIDE 5 MG/ML
INJECTION, SOLUTION EPIDURAL; INFILTRATION; PERINEURAL
Status: COMPLETED | OUTPATIENT
Start: 2023-12-12 | End: 2023-12-12

## 2023-12-12 RX ORDER — PROPOFOL 10 MG/ML
INJECTION, EMULSION INTRAVENOUS PRN
Status: DISCONTINUED | OUTPATIENT
Start: 2023-12-12 | End: 2023-12-12 | Stop reason: SDUPTHER

## 2023-12-12 RX ORDER — PHENYLEPHRINE HCL IN 0.9% NACL 1 MG/10 ML
SYRINGE (ML) INTRAVENOUS PRN
Status: DISCONTINUED | OUTPATIENT
Start: 2023-12-12 | End: 2023-12-12 | Stop reason: SDUPTHER

## 2023-12-12 RX ORDER — DIPHENHYDRAMINE HYDROCHLORIDE 50 MG/ML
12.5 INJECTION INTRAMUSCULAR; INTRAVENOUS
Status: DISCONTINUED | OUTPATIENT
Start: 2023-12-12 | End: 2023-12-12 | Stop reason: HOSPADM

## 2023-12-12 RX ORDER — ROPIVACAINE HYDROCHLORIDE 5 MG/ML
30 INJECTION, SOLUTION EPIDURAL; INFILTRATION; PERINEURAL ONCE
Status: DISCONTINUED | OUTPATIENT
Start: 2023-12-12 | End: 2023-12-12 | Stop reason: HOSPADM

## 2023-12-12 RX ORDER — GLYCOPYRROLATE 0.2 MG/ML
INJECTION INTRAMUSCULAR; INTRAVENOUS PRN
Status: DISCONTINUED | OUTPATIENT
Start: 2023-12-12 | End: 2023-12-12 | Stop reason: SDUPTHER

## 2023-12-12 RX ORDER — FENTANYL CITRATE 50 UG/ML
INJECTION, SOLUTION INTRAMUSCULAR; INTRAVENOUS PRN
Status: DISCONTINUED | OUTPATIENT
Start: 2023-12-12 | End: 2023-12-12 | Stop reason: SDUPTHER

## 2023-12-12 RX ORDER — EPHEDRINE SULFATE/0.9% NACL/PF 50 MG/5 ML
SYRINGE (ML) INTRAVENOUS PRN
Status: DISCONTINUED | OUTPATIENT
Start: 2023-12-12 | End: 2023-12-12 | Stop reason: SDUPTHER

## 2023-12-12 RX ORDER — SODIUM CHLORIDE 9 MG/ML
INJECTION, SOLUTION INTRAVENOUS PRN
Status: DISCONTINUED | OUTPATIENT
Start: 2023-12-12 | End: 2023-12-12 | Stop reason: HOSPADM

## 2023-12-12 RX ORDER — FENTANYL CITRATE 50 UG/ML
100 INJECTION, SOLUTION INTRAMUSCULAR; INTRAVENOUS ONCE
Status: COMPLETED | OUTPATIENT
Start: 2023-12-12 | End: 2023-12-12

## 2023-12-12 RX ADMIN — MIDAZOLAM 2 MG: 1 INJECTION INTRAMUSCULAR; INTRAVENOUS at 10:07

## 2023-12-12 RX ADMIN — PROPOFOL 150 MG: 10 INJECTION, EMULSION INTRAVENOUS at 10:28

## 2023-12-12 RX ADMIN — Medication 10 MG: at 11:27

## 2023-12-12 RX ADMIN — Medication 10 MG: at 10:41

## 2023-12-12 RX ADMIN — FENTANYL CITRATE 25 MCG: 50 INJECTION INTRAMUSCULAR; INTRAVENOUS at 11:00

## 2023-12-12 RX ADMIN — GLYCOPYRROLATE 0.2 MG: 0.2 INJECTION INTRAMUSCULAR; INTRAVENOUS at 10:25

## 2023-12-12 RX ADMIN — ACETAMINOPHEN 1000 MG: 500 TABLET ORAL at 09:56

## 2023-12-12 RX ADMIN — TRANEXAMIC ACID 1000 MG: 100 INJECTION, SOLUTION INTRAVENOUS at 10:40

## 2023-12-12 RX ADMIN — HYDROMORPHONE HYDROCHLORIDE 2 MG: 2 INJECTION INTRAMUSCULAR; INTRAVENOUS; SUBCUTANEOUS at 12:17

## 2023-12-12 RX ADMIN — WATER 3000 MG: 1 INJECTION, SOLUTION INTRAMUSCULAR; INTRAVENOUS; SUBCUTANEOUS at 10:25

## 2023-12-12 RX ADMIN — ROPIVACAINE HYDROCHLORIDE 20 ML: 5 INJECTION, SOLUTION EPIDURAL; INFILTRATION; PERINEURAL at 10:06

## 2023-12-12 RX ADMIN — TRANEXAMIC ACID 1000 MG: 100 INJECTION, SOLUTION INTRAVENOUS at 11:47

## 2023-12-12 RX ADMIN — VASOPRESSIN 1 ML: 20 INJECTION INTRAVENOUS at 11:16

## 2023-12-12 RX ADMIN — ONDANSETRON 4 MG: 2 INJECTION INTRAMUSCULAR; INTRAVENOUS at 10:28

## 2023-12-12 RX ADMIN — DEXMEDETOMIDINE HYDROCHLORIDE 8 MCG: 100 INJECTION, SOLUTION INTRAVENOUS at 10:25

## 2023-12-12 RX ADMIN — FAMOTIDINE 20 MG: 20 TABLET, FILM COATED ORAL at 09:56

## 2023-12-12 RX ADMIN — LIDOCAINE HYDROCHLORIDE 100 MG: 20 INJECTION, SOLUTION EPIDURAL; INFILTRATION; INTRACAUDAL; PERINEURAL at 10:28

## 2023-12-12 RX ADMIN — SODIUM CHLORIDE, SODIUM LACTATE, POTASSIUM CHLORIDE, AND CALCIUM CHLORIDE: 600; 310; 30; 20 INJECTION, SOLUTION INTRAVENOUS at 11:17

## 2023-12-12 RX ADMIN — FENTANYL CITRATE 25 MCG: 50 INJECTION INTRAMUSCULAR; INTRAVENOUS at 10:35

## 2023-12-12 RX ADMIN — HYDROMORPHONE HYDROCHLORIDE 0.25 MG: 1 INJECTION, SOLUTION INTRAMUSCULAR; INTRAVENOUS; SUBCUTANEOUS at 12:49

## 2023-12-12 RX ADMIN — LIDOCAINE HYDROCHLORIDE 1 ML: 10 INJECTION, SOLUTION EPIDURAL; INFILTRATION; INTRACAUDAL; PERINEURAL at 10:07

## 2023-12-12 RX ADMIN — FENTANYL CITRATE 50 MCG: 50 INJECTION INTRAMUSCULAR; INTRAVENOUS at 11:58

## 2023-12-12 RX ADMIN — VASOPRESSIN 1 ML: 20 INJECTION INTRAVENOUS at 11:08

## 2023-12-12 RX ADMIN — HYDROMORPHONE HYDROCHLORIDE 0.25 MG: 1 INJECTION, SOLUTION INTRAMUSCULAR; INTRAVENOUS; SUBCUTANEOUS at 12:44

## 2023-12-12 RX ADMIN — SODIUM CHLORIDE, SODIUM LACTATE, POTASSIUM CHLORIDE, AND CALCIUM CHLORIDE: 600; 310; 30; 20 INJECTION, SOLUTION INTRAVENOUS at 09:55

## 2023-12-12 RX ADMIN — FENTANYL CITRATE 100 MCG: 50 INJECTION INTRAMUSCULAR; INTRAVENOUS at 10:07

## 2023-12-12 RX ADMIN — Medication 100 MCG: at 10:39

## 2023-12-12 ASSESSMENT — PAIN DESCRIPTION - DESCRIPTORS
DESCRIPTORS: SORE

## 2023-12-12 ASSESSMENT — PAIN DESCRIPTION - ORIENTATION
ORIENTATION: RIGHT

## 2023-12-12 ASSESSMENT — PAIN DESCRIPTION - LOCATION
LOCATION: WRIST;FINGER (COMMENT WHICH ONE)
LOCATION: WRIST
LOCATION: FINGER (COMMENT WHICH ONE);WRIST

## 2023-12-12 ASSESSMENT — PAIN SCALES - GENERAL
PAINLEVEL_OUTOF10: 5
PAINLEVEL_OUTOF10: 5
PAINLEVEL_OUTOF10: 4
PAINLEVEL_OUTOF10: 7

## 2023-12-12 ASSESSMENT — PAIN - FUNCTIONAL ASSESSMENT: PAIN_FUNCTIONAL_ASSESSMENT: 0-10

## 2023-12-12 NOTE — ANESTHESIA PRE PROCEDURE
Department of Anesthesiology  Preprocedure Note       Name:  Chales Sever   Age:  52 y.o.  :  1970                                          MRN:  [id number]         Date:  2023      Surgeon: Jason Blair): Verdell Kanner A, DO    Procedure: Procedure(s):  RIGHT DISTAL RADIUS OPEN REDUCTION INTERNAL FIXATION, RIGHT THIRD METACARPAL OPEN REDUCTION INTERNAL FIXATION;MINI C-ARM, SUPRACLAVICULAR BLOCK; [ACUMED ORTHO]    Medications prior to admission:   Prior to Admission medications    Medication Sig Start Date End Date Taking? Authorizing Provider   vitamin D (ERGOCALCIFEROL) 1.25 MG (36473 UT) CAPS capsule Take 1 capsule by mouth once a week Every Wed   Yes Imer Tran MD   HYDROcodone-acetaminophen (NORCO) 7.5-325 MG per tablet Take 1-2 tablets by mouth every 8 hours as needed for Pain for up to 7 days. Max Daily Amount: 6 tablets 12/8/23 12/15/23  Darnell Driver PA-C   docusate sodium (COLACE) 100 MG capsule TAKE 1 CAPSULE BY MOUTH TWICE A DAY 23   Darnell Driver PA-C   ondansetron (ZOFRAN) 4 MG tablet TAKE 1 TABLET BY MOUTH EVERY 8 HOURS AS NEEDED FOR NAUSEA AND VOMITING 23   Darnell Driver PA-C   atorvastatin (LIPITOR) 80 MG tablet Take 80 mg by mouth daily. Imer Tran MD   tiZANidine (ZANAFLEX) 6 MG capsule Take 6 mg by mouth 2 times daily as needed for Muscle spasms. Imer Tran MD   promethazine (PHENERGAN) 12.5 MG tablet Take 12.5 mg by mouth every 12 hours as needed for Nausea. Imer Tran MD   DULoxetine (CYMBALTA) 30 MG extended release capsule Take 30 mg by mouth daily.     Imer Tran MD   ustekinumab (STELARA) 90 MG/ML SOSY prefilled syringe Inject 1 mL into the skin Once every 6 weeks    Imer Tran MD   celecoxib (CELEBREX) 200 MG capsule Take 1 capsule by mouth 2 times daily 10/23/23   Darnell Driver PA-C   metoprolol tartrate (LOPRESSOR) 50 MG tablet Take 0.5 tablets by mouth 2 times daily 10/17/23   Joby Bundy

## 2023-12-12 NOTE — TELEPHONE ENCOUNTER
Patient states prior auth is needed before she can  the Percocet medication from her pharmacy ([facility])    Patient can be reached at [phone].

## 2023-12-12 NOTE — OP NOTE
Operative Note      Patient: Judy Colvin  YOB: 1970  MRN: [id number]    Date of Procedure: 12/12/2023    Pre-Op Diagnosis Codes:     * Closed displaced fracture of shaft of third metacarpal bone of right hand, initial encounter [S62.323A]     * Closed Colles' fracture of right radius, initial encounter [S52.531A]    Post-Op Diagnosis: Same       Procedure(s):  RIGHT DISTAL RADIUS OPEN REDUCTION INTERNAL FIXATION, RIGHT THIRD METACARPAL OPEN REDUCTION INTERNAL FIXATION;MINI C-ARM, SUPRACLAVICULAR BLOCK; [GUILLERMINA Byrnes    Surgeon(s): Eliud Salinas DO    Assistant:   Surgical Assistant: Maricruz Morales    Anesthesia: General    Estimated Blood Loss (mL): less than 50     Complications: None    Specimens:   * No specimens in log *    Implants:  * No implants in log *      Drains:   [REMOVED] Urinary Catheter 10/23/23 [place] (Removed)       [REMOVED] Urinary Catheter 10/24/23 [facility] (Removed)       Findings: displaced fractures      Detailed Description of Procedure: Indications for procedure been outlined in the perioperative documentation, most notably being not amenable to conservative treatment. Informed consent was obtained from the patient. The risks and benefits of the procedure were discussed with the patient. They include but are not limited to neurovascular injury, tendon/ligamentous injury, blood loss, infection, malunion, nonunion, posttraumatic arthritis, hardware complication, need for further surgery, hematoma, neuroma, seroma, chronic pain, chronic stiffness, complications from anesthesia including death, and the possibility of jennyfer Covid. After informed consent was obtained, the patient was taken back to the operative suite. A tourniquet was applied to the operative extremity and it was prepped and draped in the normal sterile fashion. The arm was exsanguinated and the tourniquet was elevated to 250 mmHg.     Attention was turned to the wrist where an

## 2023-12-12 NOTE — BRIEF OP NOTE
Brief Postoperative Note      Patient: Hay Sol  YOB: 1970  MRN: [id number]    Date of Procedure: 12/12/2023    Pre-Op Diagnosis Codes:     * Closed displaced fracture of shaft of third metacarpal bone of right hand, initial encounter [S62.323A]     * Closed Colles' fracture of right radius, initial encounter [S52.531A]    Post-Op Diagnosis: Same       Procedure(s):  RIGHT DISTAL RADIUS OPEN REDUCTION INTERNAL FIXATION, RIGHT THIRD METACARPAL OPEN REDUCTION INTERNAL FIXATION;MINI C-ARM, SUPRACLAVICULAR BLOCK; [ACUMED Sirisha Railing    Surgeon(s):   Rach Weir DO    Assistant:  Surgical Assistant: Rajinder Redman    Anesthesia: General    Estimated Blood Loss (mL): less than 50     Complications: None    Specimens:   * No specimens in log *    Implants:  * No implants in log *      Drains:   [REMOVED] Urinary Catheter 10/23/23 [place] (Removed)       [REMOVED] Urinary Catheter 10/24/23 [place] (Removed)       Findings: displaced fractures      Electronically signed by Rach Weir DO on 12/12/2023 at 9:33 AM

## 2023-12-12 NOTE — H&P
Update History & Physical    The patient's History and Physical of November 30, 2023 was reviewed with the patient and I examined the patient. There was no change. The surgical site was confirmed by the patient and me. Plan: The risks, benefits, expected outcome, and alternative to the recommended procedure have been discussed with the patient. Patient understands and wants to proceed with the procedure.      Electronically signed by Sunday Wilcox DO on 12/12/2023 at 9:31 AM

## 2023-12-12 NOTE — PERIOP NOTE
Patient Daniel Katz Husbands has been informed that [facility] is not responsible for patient belongings per policy and the signed [id number] [facility] Patient Agreement document. Personal items should be sent home or checked in with security. Patient Daniel Katz Husbands selected the following action:                            []  Send personal items home with a family member or friend                                                 []  Check in personal items with security, excluding clothing                            [x]  Maintain personal items at the bedside, against recommendation                                 by [facility]                                   ** If patient Skylar Hodges chooses to maintain personal items at the bedside,                                      Complete the patient belongings inventory in the EMR. Belongings are in locker. Contact: Lorrie Anderson.   Number: [phone]

## 2023-12-12 NOTE — ANESTHESIA PROCEDURE NOTES
Peripheral Block    Patient location during procedure: holding area  Reason for block: post-op pain management and at surgeon's request  Start time: 12/12/2023 10:06 AM  End time: 12/12/2023 10:15 AM  Staffing  Performed: anesthesiologist   Anesthesiologist: Duy Maldonado MD  Performed by: Duy Maldonado MD  Authorized by: Duy Maldonado MD    Preanesthetic Checklist  Completed: patient identified, IV checked, site marked, risks and benefits discussed, surgical/procedural consents, equipment checked, pre-op evaluation, timeout performed, anesthesia consent given, oxygen available, monitors applied/VS acknowledged, fire risk safety assessment completed and verbalized and blood product R/B/A discussed and consented  Peripheral Block   Patient position: sitting  Prep: ChloraPrep  Provider prep: mask and sterile gloves  Patient monitoring: cardiac monitor, continuous pulse ox, frequent blood pressure checks, IV access, oxygen and responsive to questions  Block type: Brachial plexus  Interscalene  Laterality: right  Injection technique: single-shot  Guidance: nerve stimulator and ultrasound guided    Needle   Needle type: insulated echogenic nerve stimulator needle   Needle gauge: 22 G  Needle localization: anatomical landmarks, nerve stimulator and ultrasound guidance  Needle length: 10 cm  Assessment   Injection assessment: negative aspiration for heme, no paresthesia on injection, local visualized surrounding nerve on ultrasound and no intravascular symptoms  Slow fractionated injection: yes  Hemodynamics: stable  Real-time US image taken/store: yes  Outcomes: uncomplicated    Medications Administered  ropivacaine (NAROPIN) injection 0.5% - Perineural   20 mL - 12/12/2023 10:06:00 AM

## 2023-12-15 ENCOUNTER — TELEPHONE (OUTPATIENT)
Age: 53
End: 2023-12-15

## 2023-12-15 ENCOUNTER — APPOINTMENT (OUTPATIENT)
Facility: HOSPITAL | Age: 53
End: 2023-12-15
Payer: COMMERCIAL

## 2023-12-15 ENCOUNTER — TELEPHONE (OUTPATIENT)
Facility: HOSPITAL | Age: 53
End: 2023-12-15

## 2023-12-15 NOTE — TELEPHONE ENCOUNTER
Prior auth is still in process for the medication at this time.  No further questions or information was needed.

## 2023-12-15 NOTE — TELEPHONE ENCOUNTER
Patient called and stated that she's still in a great deal of pain.    She's requesting a refill of oxyCODONE-acetaminophen (PERCOCET) 5-325 MG per tablet.    Pharmacy:    [facility] #[id number] - [place], [place] - [address] [phone] - F [phone] [[id number]]

## 2023-12-18 ENCOUNTER — TELEPHONE (OUTPATIENT)
Age: 53
End: 2023-12-18

## 2023-12-18 DIAGNOSIS — G89.18 POST-OP PAIN: Primary | ICD-10-CM

## 2023-12-18 RX ORDER — HYDROCODONE BITARTRATE AND ACETAMINOPHEN 7.5; 325 MG/1; MG/1
1 TABLET ORAL EVERY 6 HOURS PRN
Qty: 28 TABLET | Refills: 0 | Status: SHIPPED | OUTPATIENT
Start: 2023-12-18 | End: 2023-12-25

## 2023-12-18 NOTE — TELEPHONE ENCOUNTER
Patient called and is asking for a refill on the Hydrocodone 7.5-325 mg medication from AMANDEEP Olivarez.     [facility] on [address]  Tel. [phone]    Patient tel. [phone].    Note :  Patient last seen on 12/1/23 for the left knee.

## 2023-12-21 ENCOUNTER — HOSPITAL ENCOUNTER (OUTPATIENT)
Facility: HOSPITAL | Age: 53
Discharge: HOME | End: 2023-12-24
Payer: COMMERCIAL

## 2023-12-21 DIAGNOSIS — Z96.652 STATUS POST TOTAL KNEE REPLACEMENT, LEFT: ICD-10-CM

## 2023-12-21 DIAGNOSIS — Z91.81 HISTORY OF RECENT FALL: ICD-10-CM

## 2023-12-21 PROCEDURE — 73721 MRI JNT OF LWR EXTRE W/O DYE: CPT

## 2023-12-22 ENCOUNTER — APPOINTMENT (OUTPATIENT)
Facility: HOSPITAL | Age: 53
End: 2023-12-22
Payer: COMMERCIAL

## 2023-12-22 ENCOUNTER — TELEPHONE (OUTPATIENT)
Age: 53
End: 2023-12-22

## 2023-12-22 DIAGNOSIS — G89.18 POST-OP PAIN: ICD-10-CM

## 2023-12-26 ENCOUNTER — HOSPITAL ENCOUNTER (OUTPATIENT)
Facility: HOSPITAL | Age: 53
Setting detail: RECURRING SERIES
Discharge: HOME | End: 2023-12-29
Payer: COMMERCIAL

## 2023-12-26 DIAGNOSIS — G89.18 POST-OP PAIN: Primary | ICD-10-CM

## 2023-12-26 PROCEDURE — 97110 THERAPEUTIC EXERCISES: CPT

## 2023-12-26 PROCEDURE — 97112 NEUROMUSCULAR REEDUCATION: CPT

## 2023-12-26 PROCEDURE — 97530 THERAPEUTIC ACTIVITIES: CPT

## 2023-12-26 RX ORDER — HYDROCODONE BITARTRATE AND ACETAMINOPHEN 7.5; 325 MG/1; MG/1
1 TABLET ORAL EVERY 8 HOURS PRN
Qty: 21 TABLET | Refills: 0 | Status: SHIPPED | OUTPATIENT
Start: 2023-12-26 | End: 2024-01-02

## 2023-12-26 NOTE — PROGRESS NOTES
[address]  [address] [facility] [address]    Medications below, sent to the pharmacy      Orders Placed This Encounter    HYDROcodone-acetaminophen ([address]) 7.5-325 MG per tablet     Sig: Take 1 tablet by mouth every 8 hours as needed for Pain for up to 7 days.  Intended supply: 30 days Max Daily Amount: 3 tablets     Dispense:  21 tablet     Refill:  0     Reduce doses taken as pain becomes manageable           Yenifer Harper MD  12/26/2023  9:26 AM

## 2023-12-26 NOTE — TELEPHONE ENCOUNTER
Patient called and is checking the status of previous refill request.        Please call and advise patient at  [phone]

## 2023-12-26 NOTE — PROGRESS NOTES
PHYSICAL / OCCUPATIONAL THERAPY - DAILY TREATMENT NOTE (updated )    Patient Name: Loan Causey    Date: 2023    : 1970  Insurance: Payor: [address] [place] [address] / Plan: Maria Del Rosario [employer] / Product Type: *No Product type* /      Patient  verified Yes     Visit #   Current / Total 3 12   Time   In / Out 230 316   Pain   In / Out 3 3   Subjective Functional Status/Changes: Pt reports when she woke up this morning she had pain at about a 5/10 in her left knee but after taking some tylenol it is down to about a 3/10. Changes to: Allergies, Med Hx, Sx Hx?   no       TREATMENT AREA =  Pain in left knee [M25.562]    OBJECTIVE    Therapeutic Procedures: Tx Min Billable or 1:1 Min (if diff from Tx Min) Procedure, Rationale, Specifics   15  62864 Therapeutic Exercise (timed):  increase ROM, strength, coordination, balance, and proprioception to improve patient's ability to progress to PLOF and address remaining functional goals. (see flow sheet as applicable)    Details if applicable:       16  81598 Neuromuscular Re-Education (timed):  improve balance, coordination, kinesthetic sense, posture, core stability and proprioception to improve patient's ability to develop conscious control of individual muscles and awareness of position of extremities in order to progress to PLOF and address remaining functional goals. (see flow sheet as applicable)    Details if applicable:     15   37473 Therapeutic Activity (timed):  use of dynamic activities replicating functional movements to increase ROM, strength, coordination, balance, and proprioception in order to improve patient's ability to progress to PLOF and address remaining functional goals.   (see flow sheet as applicable)    Details if applicable:           Details if applicable:            Details if applicable:     [address] Totals Reminder: bill using total billable min of TIMED therapeutic procedures (example: do not include

## 2023-12-29 ENCOUNTER — TELEPHONE (OUTPATIENT)
Facility: HOSPITAL | Age: 53
End: 2023-12-29

## 2023-12-29 ENCOUNTER — HOSPITAL ENCOUNTER (OUTPATIENT)
Facility: HOSPITAL | Age: 53
Setting detail: RECURRING SERIES
End: 2023-12-29
Payer: COMMERCIAL

## 2024-01-02 DIAGNOSIS — G89.18 POST-OP PAIN: ICD-10-CM

## 2024-01-02 RX ORDER — ONDANSETRON 4 MG/1
4 TABLET, FILM COATED ORAL EVERY 8 HOURS PRN
Qty: 30 TABLET | Refills: 2 | Status: SHIPPED | OUTPATIENT
Start: 2024-01-02

## 2024-01-03 ENCOUNTER — CLINICAL DOCUMENTATION (OUTPATIENT)
Age: 54
End: 2024-01-03

## 2024-01-03 NOTE — PROGRESS NOTES
Patient had surgery 12/14, scheduled for post op appt on 12/26.      Patient called 12/22 and rescheduled for 12/28.  Patient called 12/28 and stated she did not have transportation and rescheduled for 12/29.  Patient was No Show to appt on 12/29.     Called patient on 1/2/24 and rescheduled post op for 1/4.  Patient called and rescheduled 1/4 appt in favor of seeing JR Olivarez on 1/4 and scheduled to see Dinorah on 1/9.      Called patient on 1/2 to advise we would like her to be seen sooner than 1/9 and patient agreed to schedule for 1/3 at [facility].  Patient called the morning of 1/3 to state she mixed up her days and thought her appt was scheduled for 1/8.      Patient advised that we would be at [facility] on 1/8.  Patient stated that was closer to her home and she scheduled for 1/8 at 2:30pm.

## 2024-01-04 ENCOUNTER — OFFICE VISIT (OUTPATIENT)
Age: 54
End: 2024-01-04

## 2024-01-04 ENCOUNTER — APPOINTMENT (OUTPATIENT)
Facility: HOSPITAL | Age: 54
End: 2024-01-04
Payer: COMMERCIAL

## 2024-01-04 VITALS — HEIGHT: 72 IN | WEIGHT: 293 LBS | BODY MASS INDEX: 39.68 KG/M2

## 2024-01-04 DIAGNOSIS — G89.29 CHRONIC PAIN OF LEFT KNEE: ICD-10-CM

## 2024-01-04 DIAGNOSIS — Z96.652 PRESENCE OF ARTIFICIAL KNEE JOINT, LEFT: ICD-10-CM

## 2024-01-04 DIAGNOSIS — M25.562 CHRONIC PAIN OF LEFT KNEE: ICD-10-CM

## 2024-01-04 DIAGNOSIS — S83.519A PARTIAL TEAR OF ANTERIOR CRUCIATE LIGAMENT OF KNEE: Primary | ICD-10-CM

## 2024-01-04 DIAGNOSIS — M25.462 EFFUSION OF LEFT KNEE: ICD-10-CM

## 2024-01-04 DIAGNOSIS — M22.8X2 PATELLAR MALTRACKING, LEFT: ICD-10-CM

## 2024-01-04 PROCEDURE — 99024 POSTOP FOLLOW-UP VISIT: CPT | Performed by: PHYSICIAN ASSISTANT

## 2024-01-04 RX ORDER — HYDROCODONE BITARTRATE AND ACETAMINOPHEN 7.5; 325 MG/1; MG/1
1 TABLET ORAL
Qty: 28 TABLET | Refills: 0 | Status: SHIPPED | OUTPATIENT
Start: 2024-01-04 | End: 2024-01-11

## 2024-01-04 RX ORDER — HYDROCODONE BITARTRATE AND ACETAMINOPHEN 7.5; 325 MG/1; MG/1
1 TABLET ORAL EVERY 6 HOURS PRN
Qty: 28 TABLET | Refills: 0 | OUTPATIENT
Start: 2024-01-04 | End: 2024-01-11

## 2024-01-04 RX ORDER — HYDROCODONE BITARTRATE AND ACETAMINOPHEN 7.5; 325 MG/1; MG/1
1 TABLET ORAL EVERY 8 HOURS PRN
Qty: 21 TABLET | Refills: 0 | OUTPATIENT
Start: 2024-01-04 | End: 2024-01-11

## 2024-01-04 NOTE — PROGRESS NOTES
given her current weakness and extensor lag surgical intervention is indicated for the best results.  This was discussed with the patient today in office.  We discussed discussed tissue repair of the quad tendon and medial retinaculum to help with patellar maltracking.  She is given a note to return to work and will hopefully get this done in the next 6 weeks or so.  Refill pain medicine was sent to her pharmacy.    Care plan outlined and precautions discussed. Radiographs and Results were reviewed with the patient.  Medications were reviewed with the patient. All of pt's questions and concerns were addressed.  Increasing symptoms and return precautions associated with chief complaint and evaluation were reviewed with the patient in detail.  The patient demonstrated adequate understanding.                  JR Sher TOLBERT, MELINDA, ATC

## 2024-01-08 ENCOUNTER — OFFICE VISIT (OUTPATIENT)
Age: 54
End: 2024-01-08
Payer: COMMERCIAL

## 2024-01-08 VITALS — HEIGHT: 72 IN | WEIGHT: 293 LBS | TEMPERATURE: 96.4 F | BODY MASS INDEX: 39.68 KG/M2

## 2024-01-08 DIAGNOSIS — Z98.890 S/P ORIF (OPEN REDUCTION INTERNAL FIXATION) FRACTURE: ICD-10-CM

## 2024-01-08 DIAGNOSIS — S52.531D CLOSED COLLES' FRACTURE OF RIGHT RADIUS WITH ROUTINE HEALING, SUBSEQUENT ENCOUNTER: Primary | ICD-10-CM

## 2024-01-08 DIAGNOSIS — Z87.81 S/P ORIF (OPEN REDUCTION INTERNAL FIXATION) FRACTURE: ICD-10-CM

## 2024-01-08 DIAGNOSIS — S62.323D CLOSED DISPLACED FRACTURE OF SHAFT OF THIRD METACARPAL BONE OF LEFT HAND WITH ROUTINE HEALING, SUBSEQUENT ENCOUNTER: ICD-10-CM

## 2024-01-08 PROCEDURE — 73110 X-RAY EXAM OF WRIST: CPT

## 2024-01-08 PROCEDURE — 99024 POSTOP FOLLOW-UP VISIT: CPT

## 2024-01-08 NOTE — PROGRESS NOTES
Priscilla Dean is a 53 y.o. female right handed.  Worker's Compensation and legal considerations: none    Chief Complaint   Patient presents with    Post-Op Check     Post op DOS 12/12/23     Pain Score:   1    1/8/2024 HPI: Patient presents today status post right distal radius open reduction internal fixation as well as third metacarpal open reduction internal fixation on 12/12/2023 with Dr. Mcneill.  She denies any numbness or tingling.  She notes minimal pain today and her main concern is throbbing at night.    Initial HPI: Patient presents today for ER follow-up status post fall.  She injured her right distal radius as well as her third metacarpal.  She was placed into a splint.  She denies any numbness or tingling.  She is status post total knee arthroplasty in October.    Date of onset: 11/28/2023  Injury: Yes: Comment: Fall  Prior Treatment:  Yes: Comment: ED splint    ROS: Review of Systems - General ROS: negative except HPI    Past Medical History:   Diagnosis Date    Bilateral knee pain     Crohn's colitis (HCC)     Diabetes (HCC)     IDDM    HTN (hypertension)     Hypercholesterolemia     Pulmonary embolism (HCC) 03/2019    Stool color black     crohns       Past Surgical History:   Procedure Laterality Date    ANKLE FRACTURE SURGERY Left 2018    COLONOSCOPY N/A 8/31/2022    COLONOSCOPY/ Biopsies performed by JASMINE Shetty MD at [facility] ENDOSCOPY    COLONOSCOPY N/A 7/12/2022    COLONOSCOPY performed by JASMINE Shetty MD at [facility] ENDOSCOPY    COLONOSCOPY N/A 9/22/2020    COLONOSCOPY with bx's performed by JASMINE Shetty MD at [facility] ENDOSCOPY    COLONOSCOPY N/A 10/21/2019    COLONOSCOPY w/ bxs performed by Wayne Cheema MD at [facility] ENDOSCOPY    FOREARM SURGERY Right 12/12/2023    RIGHT DISTAL RADIUS OPEN REDUCTION INTERNAL FIXATION, RIGHT THIRD METACARPAL OPEN REDUCTION INTERNAL FIXATION;MINI C-ARM, SUPRACLAVICULAR BLOCK; [ACUMED ORTHO] performed by Brett Mcneill DO at [facility] MAIN OR    TOTAL KNEE ARTHROPLASTY

## 2024-01-25 ENCOUNTER — TELEPHONE (OUTPATIENT)
Age: 54
End: 2024-01-25

## 2024-01-25 NOTE — TELEPHONE ENCOUNTER
Patient is requesting a refill on       HYDROcodone-acetaminophen (NORCO) 7.5-325 MG per tablet       [facility]   [address] [address]   Phone: [phone]  Fax: [phone]

## 2024-01-26 NOTE — TELEPHONE ENCOUNTER
Spoke with patient.  Patient states she has tried Tylenol and if offers no relief. Patient is asking for just \"one more round\" of pain medication. Patient is unable to take NSAIDS due to Chron's disease.

## 2024-02-16 DIAGNOSIS — E11.40 TYPE 2 DIABETES MELLITUS WITH DIABETIC NEUROPATHY, WITH LONG-TERM CURRENT USE OF INSULIN (HCC): ICD-10-CM

## 2024-02-16 DIAGNOSIS — Z79.4 TYPE 2 DIABETES MELLITUS WITH DIABETIC NEUROPATHY, WITH LONG-TERM CURRENT USE OF INSULIN (HCC): ICD-10-CM

## 2024-02-22 RX ORDER — PREGABALIN 200 MG/1
200 CAPSULE ORAL 3 TIMES DAILY
Qty: 270 CAPSULE | Refills: 0 | Status: SHIPPED | OUTPATIENT
Start: 2024-02-22 | End: 2024-03-15 | Stop reason: SDUPTHER

## 2024-03-15 ENCOUNTER — TELEPHONE (OUTPATIENT)
Age: 54
End: 2024-03-15

## 2024-03-15 DIAGNOSIS — Z79.4 TYPE 2 DIABETES MELLITUS WITH DIABETIC NEUROPATHY, WITH LONG-TERM CURRENT USE OF INSULIN (HCC): ICD-10-CM

## 2024-03-15 DIAGNOSIS — E11.40 TYPE 2 DIABETES MELLITUS WITH DIABETIC NEUROPATHY, WITH LONG-TERM CURRENT USE OF INSULIN (HCC): ICD-10-CM

## 2024-03-15 RX ORDER — PREGABALIN 200 MG/1
200 CAPSULE ORAL 3 TIMES DAILY
Qty: 270 CAPSULE | Refills: 0 | Status: SHIPPED | OUTPATIENT
Start: 2024-03-15 | End: 2024-06-13

## 2024-03-19 RX ORDER — ONDANSETRON 4 MG/1
4 TABLET, FILM COATED ORAL EVERY 8 HOURS PRN
Qty: 30 TABLET | Refills: 2 | Status: SHIPPED | OUTPATIENT
Start: 2024-03-19

## 2024-05-13 ENCOUNTER — TELEPHONE (OUTPATIENT)
Age: 54
End: 2024-05-13

## 2024-05-16 ENCOUNTER — APPOINTMENT (OUTPATIENT)
Facility: HOSPITAL | Age: 54
DRG: 372 | End: 2024-05-16
Payer: COMMERCIAL

## 2024-05-16 ENCOUNTER — HOSPITAL ENCOUNTER (INPATIENT)
Facility: HOSPITAL | Age: 54
LOS: 3 days | Discharge: HOME | DRG: 372 | End: 2024-05-20
Attending: INTERNAL MEDICINE | Admitting: INTERNAL MEDICINE
Payer: COMMERCIAL

## 2024-05-16 DIAGNOSIS — K52.9 COLITIS: ICD-10-CM

## 2024-05-16 DIAGNOSIS — E87.6 HYPOKALEMIA: ICD-10-CM

## 2024-05-16 DIAGNOSIS — R03.0 ELEVATED BLOOD PRESSURE READING: ICD-10-CM

## 2024-05-16 DIAGNOSIS — R11.2 NAUSEA AND VOMITING, UNSPECIFIED VOMITING TYPE: ICD-10-CM

## 2024-05-16 DIAGNOSIS — N12 PYELONEPHRITIS: ICD-10-CM

## 2024-05-16 DIAGNOSIS — R94.31 PROLONGED Q-T INTERVAL ON ECG: ICD-10-CM

## 2024-05-16 DIAGNOSIS — K50.911 CROHN'S DISEASE WITH RECTAL BLEEDING, UNSPECIFIED GASTROINTESTINAL TRACT LOCATION (HCC): Primary | ICD-10-CM

## 2024-05-16 DIAGNOSIS — R07.9 ACUTE CHEST PAIN: ICD-10-CM

## 2024-05-16 LAB
ALBUMIN SERPL-MCNC: 3 G/DL (ref 3.4–5)
ALBUMIN/GLOB SERPL: 0.5 (ref 0.8–1.7)
ALP SERPL-CCNC: 91 U/L (ref 45–117)
ALT SERPL-CCNC: 11 U/L (ref 13–56)
ANION GAP SERPL CALC-SCNC: 9 MMOL/L (ref 3–18)
AST SERPL-CCNC: 24 U/L (ref 10–38)
BASOPHILS # BLD: 0 K/UL (ref 0–0.1)
BASOPHILS NFR BLD: 0 % (ref 0–2)
BILIRUB SERPL-MCNC: 0.6 MG/DL (ref 0.2–1)
BUN SERPL-MCNC: 18 MG/DL (ref 7–18)
BUN/CREAT SERPL: 16 (ref 12–20)
CALCIUM SERPL-MCNC: 9.6 MG/DL (ref 8.5–10.1)
CHLORIDE SERPL-SCNC: 104 MMOL/L (ref 100–111)
CO2 SERPL-SCNC: 25 MMOL/L (ref 21–32)
CREAT SERPL-MCNC: 1.15 MG/DL (ref 0.6–1.3)
DIFFERENTIAL METHOD BLD: ABNORMAL
EKG ATRIAL RATE: 111 BPM
EKG DIAGNOSIS: NORMAL
EKG P AXIS: 73 DEGREES
EKG P-R INTERVAL: 146 MS
EKG Q-T INTERVAL: 386 MS
EKG QRS DURATION: 98 MS
EKG QTC CALCULATION (BAZETT): 524 MS
EKG R AXIS: 0 DEGREES
EKG T AXIS: 49 DEGREES
EKG VENTRICULAR RATE: 111 BPM
EOSINOPHIL # BLD: 0 K/UL (ref 0–0.4)
EOSINOPHIL NFR BLD: 0 % (ref 0–5)
ERYTHROCYTE [DISTWIDTH] IN BLOOD BY AUTOMATED COUNT: 17 % (ref 11.6–14.5)
GLOBULIN SER CALC-MCNC: 5.8 G/DL (ref 2–4)
GLUCOSE SERPL-MCNC: 153 MG/DL (ref 74–99)
HCT VFR BLD AUTO: 42.5 % (ref 35–45)
HEMOCCULT STL QL: POSITIVE
HGB BLD-MCNC: 13.2 G/DL (ref 12–16)
IMM GRANULOCYTES # BLD AUTO: 0.1 K/UL (ref 0–0.04)
IMM GRANULOCYTES NFR BLD AUTO: 1 % (ref 0–0.5)
INR PPP: 1.2 (ref 0.9–1.1)
LIPASE SERPL-CCNC: 13 U/L (ref 13–75)
LYMPHOCYTES # BLD: 1.1 K/UL (ref 0.9–3.6)
LYMPHOCYTES NFR BLD: 8 % (ref 21–52)
MAGNESIUM SERPL-MCNC: 2.2 MG/DL (ref 1.6–2.6)
MCH RBC QN AUTO: 25.7 PG (ref 24–34)
MCHC RBC AUTO-ENTMCNC: 31.1 G/DL (ref 31–37)
MCV RBC AUTO: 82.7 FL (ref 78–100)
MONOCYTES # BLD: 1 K/UL (ref 0.05–1.2)
MONOCYTES NFR BLD: 7 % (ref 3–10)
NEUTS SEG # BLD: 12.2 K/UL (ref 1.8–8)
NEUTS SEG NFR BLD: 85 % (ref 40–73)
NRBC # BLD: 0 K/UL (ref 0–0.01)
NRBC BLD-RTO: 0 PER 100 WBC
PLATELET # BLD AUTO: 285 K/UL (ref 135–420)
PMV BLD AUTO: 11.1 FL (ref 9.2–11.8)
POTASSIUM SERPL-SCNC: 3.4 MMOL/L (ref 3.5–5.5)
PROT SERPL-MCNC: 8.8 G/DL (ref 6.4–8.2)
PROTHROMBIN TIME: 15.4 SEC (ref 11.9–14.7)
RBC # BLD AUTO: 5.14 M/UL (ref 4.2–5.3)
SODIUM SERPL-SCNC: 138 MMOL/L (ref 136–145)
TROPONIN I SERPL HS-MCNC: 7 NG/L (ref 0–54)
WBC # BLD AUTO: 14.4 K/UL (ref 4.6–13.2)

## 2024-05-16 PROCEDURE — A4216 STERILE WATER/SALINE, 10 ML: HCPCS | Performed by: EMERGENCY MEDICINE

## 2024-05-16 PROCEDURE — 80053 COMPREHEN METABOLIC PANEL: CPT

## 2024-05-16 PROCEDURE — 96372 THER/PROPH/DIAG INJ SC/IM: CPT

## 2024-05-16 PROCEDURE — 85025 COMPLETE CBC W/AUTO DIFF WBC: CPT

## 2024-05-16 PROCEDURE — 96375 TX/PRO/DX INJ NEW DRUG ADDON: CPT

## 2024-05-16 PROCEDURE — 83735 ASSAY OF MAGNESIUM: CPT

## 2024-05-16 PROCEDURE — 93005 ELECTROCARDIOGRAM TRACING: CPT | Performed by: STUDENT IN AN ORGANIZED HEALTH CARE EDUCATION/TRAINING PROGRAM

## 2024-05-16 PROCEDURE — 93010 ELECTROCARDIOGRAM REPORT: CPT | Performed by: INTERNAL MEDICINE

## 2024-05-16 PROCEDURE — 99285 EMERGENCY DEPT VISIT HI MDM: CPT

## 2024-05-16 PROCEDURE — 82270 OCCULT BLOOD FECES: CPT

## 2024-05-16 PROCEDURE — 93005 ELECTROCARDIOGRAM TRACING: CPT | Performed by: EMERGENCY MEDICINE

## 2024-05-16 PROCEDURE — C9113 INJ PANTOPRAZOLE SODIUM, VIA: HCPCS | Performed by: EMERGENCY MEDICINE

## 2024-05-16 PROCEDURE — 83690 ASSAY OF LIPASE: CPT

## 2024-05-16 PROCEDURE — 6360000002 HC RX W HCPCS: Performed by: STUDENT IN AN ORGANIZED HEALTH CARE EDUCATION/TRAINING PROGRAM

## 2024-05-16 PROCEDURE — 6360000002 HC RX W HCPCS: Performed by: EMERGENCY MEDICINE

## 2024-05-16 PROCEDURE — 84484 ASSAY OF TROPONIN QUANT: CPT

## 2024-05-16 PROCEDURE — 93005 ELECTROCARDIOGRAM TRACING: CPT | Performed by: INTERNAL MEDICINE

## 2024-05-16 PROCEDURE — 6370000000 HC RX 637 (ALT 250 FOR IP)

## 2024-05-16 PROCEDURE — 6370000000 HC RX 637 (ALT 250 FOR IP): Performed by: STUDENT IN AN ORGANIZED HEALTH CARE EDUCATION/TRAINING PROGRAM

## 2024-05-16 PROCEDURE — 2580000003 HC RX 258: Performed by: EMERGENCY MEDICINE

## 2024-05-16 PROCEDURE — 85610 PROTHROMBIN TIME: CPT

## 2024-05-16 PROCEDURE — 96365 THER/PROPH/DIAG IV INF INIT: CPT

## 2024-05-16 RX ORDER — MAGNESIUM SULFATE IN WATER 40 MG/ML
2000 INJECTION, SOLUTION INTRAVENOUS ONCE
Status: COMPLETED | OUTPATIENT
Start: 2024-05-16 | End: 2024-05-16

## 2024-05-16 RX ORDER — 0.9 % SODIUM CHLORIDE 0.9 %
1000 INTRAVENOUS SOLUTION INTRAVENOUS ONCE
Status: COMPLETED | OUTPATIENT
Start: 2024-05-16 | End: 2024-05-17

## 2024-05-16 RX ORDER — ONDANSETRON 4 MG/1
4 TABLET, ORALLY DISINTEGRATING ORAL
Status: COMPLETED | OUTPATIENT
Start: 2024-05-16 | End: 2024-05-16

## 2024-05-16 RX ORDER — HYDRALAZINE HYDROCHLORIDE 25 MG/1
100 TABLET, FILM COATED ORAL
Status: COMPLETED | OUTPATIENT
Start: 2024-05-16 | End: 2024-05-16

## 2024-05-16 RX ORDER — PROMETHAZINE HYDROCHLORIDE 25 MG/1
25 TABLET ORAL
Status: COMPLETED | OUTPATIENT
Start: 2024-05-16 | End: 2024-05-16

## 2024-05-16 RX ORDER — MORPHINE SULFATE 4 MG/ML
4 INJECTION, SOLUTION INTRAMUSCULAR; INTRAVENOUS
Status: COMPLETED | OUTPATIENT
Start: 2024-05-16 | End: 2024-05-16

## 2024-05-16 RX ORDER — ONDANSETRON 2 MG/ML
4 INJECTION INTRAMUSCULAR; INTRAVENOUS
Status: COMPLETED | OUTPATIENT
Start: 2024-05-16 | End: 2024-05-16

## 2024-05-16 RX ORDER — HYDROCODONE BITARTRATE AND ACETAMINOPHEN 5; 325 MG/1; MG/1
1 TABLET ORAL
Status: COMPLETED | OUTPATIENT
Start: 2024-05-16 | End: 2024-05-16

## 2024-05-16 RX ORDER — PROMETHAZINE HYDROCHLORIDE 25 MG/1
TABLET ORAL
Status: COMPLETED
Start: 2024-05-16 | End: 2024-05-16

## 2024-05-16 RX ORDER — PROMETHAZINE HYDROCHLORIDE 25 MG/ML
25 INJECTION, SOLUTION INTRAMUSCULAR; INTRAVENOUS ONCE
Status: DISCONTINUED | OUTPATIENT
Start: 2024-05-16 | End: 2024-05-16

## 2024-05-16 RX ADMIN — PANTOPRAZOLE SODIUM 40 MG: 40 INJECTION, POWDER, FOR SOLUTION INTRAVENOUS at 17:51

## 2024-05-16 RX ADMIN — HYDRALAZINE HYDROCHLORIDE 100 MG: 25 TABLET ORAL at 23:37

## 2024-05-16 RX ADMIN — PROMETHAZINE HYDROCHLORIDE 25 MG: 25 TABLET ORAL at 22:23

## 2024-05-16 RX ADMIN — HYDROCODONE BITARTRATE AND ACETAMINOPHEN 1 TABLET: 5; 325 TABLET ORAL at 20:30

## 2024-05-16 RX ADMIN — ONDANSETRON 4 MG: 4 TABLET, ORALLY DISINTEGRATING ORAL at 21:58

## 2024-05-16 RX ADMIN — ONDANSETRON 4 MG: 2 INJECTION INTRAMUSCULAR; INTRAVENOUS at 17:53

## 2024-05-16 RX ADMIN — MORPHINE SULFATE 4 MG: 4 INJECTION, SOLUTION INTRAMUSCULAR; INTRAVENOUS at 22:24

## 2024-05-16 RX ADMIN — MAGNESIUM SULFATE HEPTAHYDRATE 2000 MG: 40 INJECTION, SOLUTION INTRAVENOUS at 17:53

## 2024-05-16 ASSESSMENT — PAIN DESCRIPTION - LOCATION
LOCATION: ABDOMEN
LOCATION: ABDOMEN

## 2024-05-16 ASSESSMENT — PAIN SCALES - GENERAL
PAINLEVEL_OUTOF10: 9
PAINLEVEL_OUTOF10: 7
PAINLEVEL_OUTOF10: 10

## 2024-05-16 ASSESSMENT — PAIN - FUNCTIONAL ASSESSMENT: PAIN_FUNCTIONAL_ASSESSMENT: 0-10

## 2024-05-16 NOTE — ED TRIAGE NOTES
WC to room 8 with c/o severe abdominal pain, chest pain, N/V, and blood in stool and in vomit x 2 days. States \"I am having a Crohn's flare and it has been worse than usual\".

## 2024-05-17 ENCOUNTER — APPOINTMENT (OUTPATIENT)
Facility: HOSPITAL | Age: 54
DRG: 372 | End: 2024-05-17
Payer: COMMERCIAL

## 2024-05-17 PROBLEM — N12 PYELONEPHRITIS: Status: ACTIVE | Noted: 2024-05-17

## 2024-05-17 PROBLEM — K52.9 COLITIS: Status: ACTIVE | Noted: 2024-05-17

## 2024-05-17 PROBLEM — E87.6 HYPOKALEMIA: Status: ACTIVE | Noted: 2024-05-17

## 2024-05-17 PROBLEM — Z22.358 ESBL E. COLI CARRIER: Status: ACTIVE | Noted: 2024-05-17

## 2024-05-17 LAB
ANION GAP SERPL CALC-SCNC: 11 MMOL/L (ref 3–18)
APPEARANCE UR: ABNORMAL
BACTERIA URNS QL MICRO: ABNORMAL /HPF
BILIRUB UR QL: NEGATIVE
BUN SERPL-MCNC: 14 MG/DL (ref 7–18)
BUN/CREAT SERPL: 17 (ref 12–20)
CALCIUM SERPL-MCNC: 8.5 MG/DL (ref 8.5–10.1)
CHLORIDE SERPL-SCNC: 105 MMOL/L (ref 100–111)
CO2 SERPL-SCNC: 25 MMOL/L (ref 21–32)
COLOR UR: YELLOW
CREAT SERPL-MCNC: 0.84 MG/DL (ref 0.6–1.3)
CRP SERPL-MCNC: 13.5 MG/DL (ref 0–0.3)
EKG ATRIAL RATE: 112 BPM
EKG ATRIAL RATE: 95 BPM
EKG DIAGNOSIS: NORMAL
EKG DIAGNOSIS: NORMAL
EKG P AXIS: 73 DEGREES
EKG P AXIS: 85 DEGREES
EKG P-R INTERVAL: 150 MS
EKG P-R INTERVAL: 152 MS
EKG Q-T INTERVAL: 376 MS
EKG Q-T INTERVAL: 426 MS
EKG QRS DURATION: 92 MS
EKG QRS DURATION: 96 MS
EKG QTC CALCULATION (BAZETT): 513 MS
EKG QTC CALCULATION (BAZETT): 535 MS
EKG R AXIS: 14 DEGREES
EKG R AXIS: 14 DEGREES
EKG T AXIS: 24 DEGREES
EKG T AXIS: 7 DEGREES
EKG VENTRICULAR RATE: 112 BPM
EKG VENTRICULAR RATE: 95 BPM
EPITH CASTS URNS QL MICRO: ABNORMAL /LPF
EPITH CASTS URNS QL MICRO: ABNORMAL /LPF (ref 0–5)
FOLATE SERPL-MCNC: 4.3 NG/ML (ref 3.1–17.5)
GLUCOSE BLD STRIP.AUTO-MCNC: 121 MG/DL (ref 70–110)
GLUCOSE BLD STRIP.AUTO-MCNC: 129 MG/DL (ref 70–110)
GLUCOSE BLD STRIP.AUTO-MCNC: 152 MG/DL (ref 70–110)
GLUCOSE BLD STRIP.AUTO-MCNC: 160 MG/DL (ref 70–110)
GLUCOSE BLD STRIP.AUTO-MCNC: 173 MG/DL (ref 70–110)
GLUCOSE SERPL-MCNC: 166 MG/DL (ref 74–99)
GLUCOSE UR STRIP.AUTO-MCNC: >1000 MG/DL
GRAN CASTS URNS QL MICRO: ABNORMAL /LPF
HGB UR QL STRIP: ABNORMAL
KETONES UR QL STRIP.AUTO: 80 MG/DL
LEUKOCYTE ESTERASE UR QL STRIP.AUTO: ABNORMAL
MAGNESIUM SERPL-MCNC: 2.8 MG/DL (ref 1.6–2.6)
MUCOUS THREADS URNS QL MICRO: ABNORMAL /LPF
NITRITE UR QL STRIP.AUTO: POSITIVE
PH UR STRIP: 5.5 (ref 5–8)
POTASSIUM SERPL-SCNC: 2.4 MMOL/L (ref 3.5–5.5)
PROT UR STRIP-MCNC: 30 MG/DL
RBC #/AREA URNS HPF: ABNORMAL /HPF (ref 0–5)
SODIUM SERPL-SCNC: 141 MMOL/L (ref 136–145)
SP GR UR REFRACTOMETRY: >1.03 (ref 1–1.03)
UROBILINOGEN UR QL STRIP.AUTO: 1 EU/DL (ref 0.2–1)
VIT B12 SERPL-MCNC: 606 PG/ML (ref 211–911)
WBC URNS QL MICRO: ABNORMAL /HPF (ref 0–4)

## 2024-05-17 PROCEDURE — 6360000002 HC RX W HCPCS: Performed by: STUDENT IN AN ORGANIZED HEALTH CARE EDUCATION/TRAINING PROGRAM

## 2024-05-17 PROCEDURE — 80048 BASIC METABOLIC PNL TOTAL CA: CPT

## 2024-05-17 PROCEDURE — 6360000002 HC RX W HCPCS: Performed by: EMERGENCY MEDICINE

## 2024-05-17 PROCEDURE — 82306 VITAMIN D 25 HYDROXY: CPT

## 2024-05-17 PROCEDURE — 2580000003 HC RX 258: Performed by: INTERNAL MEDICINE

## 2024-05-17 PROCEDURE — 82607 VITAMIN B-12: CPT

## 2024-05-17 PROCEDURE — 71275 CT ANGIOGRAPHY CHEST: CPT

## 2024-05-17 PROCEDURE — 82746 ASSAY OF FOLIC ACID SERUM: CPT

## 2024-05-17 PROCEDURE — 96367 TX/PROPH/DG ADDL SEQ IV INF: CPT

## 2024-05-17 PROCEDURE — 96361 HYDRATE IV INFUSION ADD-ON: CPT

## 2024-05-17 PROCEDURE — 6370000000 HC RX 637 (ALT 250 FOR IP): Performed by: STUDENT IN AN ORGANIZED HEALTH CARE EDUCATION/TRAINING PROGRAM

## 2024-05-17 PROCEDURE — 2580000003 HC RX 258: Performed by: STUDENT IN AN ORGANIZED HEALTH CARE EDUCATION/TRAINING PROGRAM

## 2024-05-17 PROCEDURE — 87088 URINE BACTERIA CULTURE: CPT

## 2024-05-17 PROCEDURE — 36415 COLL VENOUS BLD VENIPUNCTURE: CPT

## 2024-05-17 PROCEDURE — 93010 ELECTROCARDIOGRAM REPORT: CPT | Performed by: INTERNAL MEDICINE

## 2024-05-17 PROCEDURE — 87186 SC STD MICRODIL/AGAR DIL: CPT

## 2024-05-17 PROCEDURE — 6370000000 HC RX 637 (ALT 250 FOR IP): Performed by: INTERNAL MEDICINE

## 2024-05-17 PROCEDURE — 87449 NOS EACH ORGANISM AG IA: CPT

## 2024-05-17 PROCEDURE — 87324 CLOSTRIDIUM AG IA: CPT

## 2024-05-17 PROCEDURE — 96375 TX/PRO/DX INJ NEW DRUG ADDON: CPT

## 2024-05-17 PROCEDURE — 94761 N-INVAS EAR/PLS OXIMETRY MLT: CPT

## 2024-05-17 PROCEDURE — 2580000003 HC RX 258: Performed by: EMERGENCY MEDICINE

## 2024-05-17 PROCEDURE — 82962 GLUCOSE BLOOD TEST: CPT

## 2024-05-17 PROCEDURE — 6360000004 HC RX CONTRAST MEDICATION: Performed by: EMERGENCY MEDICINE

## 2024-05-17 PROCEDURE — 83735 ASSAY OF MAGNESIUM: CPT

## 2024-05-17 PROCEDURE — 74177 CT ABD & PELVIS W/CONTRAST: CPT

## 2024-05-17 PROCEDURE — 96365 THER/PROPH/DIAG IV INF INIT: CPT

## 2024-05-17 PROCEDURE — 6360000002 HC RX W HCPCS: Performed by: INTERNAL MEDICINE

## 2024-05-17 PROCEDURE — 86140 C-REACTIVE PROTEIN: CPT

## 2024-05-17 PROCEDURE — 81001 URINALYSIS AUTO W/SCOPE: CPT

## 2024-05-17 PROCEDURE — 99223 1ST HOSP IP/OBS HIGH 75: CPT | Performed by: INTERNAL MEDICINE

## 2024-05-17 PROCEDURE — 83993 ASSAY FOR CALPROTECTIN FECAL: CPT

## 2024-05-17 PROCEDURE — 87086 URINE CULTURE/COLONY COUNT: CPT

## 2024-05-17 PROCEDURE — 1100000003 HC PRIVATE W/ TELEMETRY

## 2024-05-17 RX ORDER — MAGNESIUM SULFATE IN WATER 40 MG/ML
2000 INJECTION, SOLUTION INTRAVENOUS PRN
Status: DISCONTINUED | OUTPATIENT
Start: 2024-05-17 | End: 2024-05-20 | Stop reason: HOSPADM

## 2024-05-17 RX ORDER — POTASSIUM CHLORIDE 20 MEQ/1
40 TABLET, EXTENDED RELEASE ORAL EVERY 4 HOURS
Status: COMPLETED | OUTPATIENT
Start: 2024-05-17 | End: 2024-05-17

## 2024-05-17 RX ORDER — ONDANSETRON 4 MG/1
4 TABLET, ORALLY DISINTEGRATING ORAL EVERY 8 HOURS PRN
Status: DISCONTINUED | OUTPATIENT
Start: 2024-05-17 | End: 2024-05-20 | Stop reason: HOSPADM

## 2024-05-17 RX ORDER — POLYETHYLENE GLYCOL 3350 17 G/17G
17 POWDER, FOR SOLUTION ORAL DAILY PRN
Status: DISCONTINUED | OUTPATIENT
Start: 2024-05-17 | End: 2024-05-20 | Stop reason: HOSPADM

## 2024-05-17 RX ORDER — SODIUM CHLORIDE, SODIUM LACTATE, POTASSIUM CHLORIDE, AND CALCIUM CHLORIDE .6; .31; .03; .02 G/100ML; G/100ML; G/100ML; G/100ML
1000 INJECTION, SOLUTION INTRAVENOUS ONCE
Status: COMPLETED | OUTPATIENT
Start: 2024-05-17 | End: 2024-05-17

## 2024-05-17 RX ORDER — SODIUM CHLORIDE 0.9 % (FLUSH) 0.9 %
5-40 SYRINGE (ML) INJECTION PRN
Status: DISCONTINUED | OUTPATIENT
Start: 2024-05-17 | End: 2024-05-20 | Stop reason: HOSPADM

## 2024-05-17 RX ORDER — DIPHENHYDRAMINE HYDROCHLORIDE 50 MG/ML
25 INJECTION INTRAMUSCULAR; INTRAVENOUS
Status: COMPLETED | OUTPATIENT
Start: 2024-05-17 | End: 2024-05-17

## 2024-05-17 RX ORDER — SODIUM CHLORIDE 9 MG/ML
INJECTION, SOLUTION INTRAVENOUS PRN
Status: DISCONTINUED | OUTPATIENT
Start: 2024-05-17 | End: 2024-05-20 | Stop reason: HOSPADM

## 2024-05-17 RX ORDER — MORPHINE SULFATE 4 MG/ML
4 INJECTION, SOLUTION INTRAMUSCULAR; INTRAVENOUS
Status: COMPLETED | OUTPATIENT
Start: 2024-05-17 | End: 2024-05-17

## 2024-05-17 RX ORDER — ACETAMINOPHEN 650 MG/1
650 SUPPOSITORY RECTAL EVERY 6 HOURS PRN
Status: DISCONTINUED | OUTPATIENT
Start: 2024-05-17 | End: 2024-05-20 | Stop reason: HOSPADM

## 2024-05-17 RX ORDER — POTASSIUM CHLORIDE 7.45 MG/ML
10 INJECTION INTRAVENOUS PRN
Status: DISCONTINUED | OUTPATIENT
Start: 2024-05-17 | End: 2024-05-20 | Stop reason: HOSPADM

## 2024-05-17 RX ORDER — LOSARTAN POTASSIUM 50 MG/1
100 TABLET ORAL DAILY
Status: DISCONTINUED | OUTPATIENT
Start: 2024-05-17 | End: 2024-05-20 | Stop reason: HOSPADM

## 2024-05-17 RX ORDER — INSULIN GLARGINE 100 [IU]/ML
20 INJECTION, SOLUTION SUBCUTANEOUS NIGHTLY
Status: DISCONTINUED | OUTPATIENT
Start: 2024-05-17 | End: 2024-05-20 | Stop reason: HOSPADM

## 2024-05-17 RX ORDER — ACETAMINOPHEN 325 MG/1
650 TABLET ORAL EVERY 6 HOURS PRN
Status: DISCONTINUED | OUTPATIENT
Start: 2024-05-17 | End: 2024-05-20 | Stop reason: HOSPADM

## 2024-05-17 RX ORDER — POTASSIUM CHLORIDE 7.45 MG/ML
10 INJECTION INTRAVENOUS
Status: COMPLETED | OUTPATIENT
Start: 2024-05-17 | End: 2024-05-17

## 2024-05-17 RX ORDER — ATORVASTATIN CALCIUM 40 MG/1
80 TABLET, FILM COATED ORAL DAILY
Status: DISCONTINUED | OUTPATIENT
Start: 2024-05-17 | End: 2024-05-20 | Stop reason: HOSPADM

## 2024-05-17 RX ORDER — MORPHINE SULFATE 2 MG/ML
2 INJECTION, SOLUTION INTRAMUSCULAR; INTRAVENOUS EVERY 4 HOURS PRN
Status: DISCONTINUED | OUTPATIENT
Start: 2024-05-17 | End: 2024-05-17

## 2024-05-17 RX ORDER — INSULIN LISPRO 100 [IU]/ML
0-4 INJECTION, SOLUTION INTRAVENOUS; SUBCUTANEOUS NIGHTLY
Status: DISCONTINUED | OUTPATIENT
Start: 2024-05-17 | End: 2024-05-20 | Stop reason: HOSPADM

## 2024-05-17 RX ORDER — SODIUM CHLORIDE 0.9 % (FLUSH) 0.9 %
5-40 SYRINGE (ML) INJECTION EVERY 12 HOURS SCHEDULED
Status: DISCONTINUED | OUTPATIENT
Start: 2024-05-17 | End: 2024-05-20 | Stop reason: HOSPADM

## 2024-05-17 RX ORDER — PANTOPRAZOLE SODIUM 40 MG/1
40 TABLET, DELAYED RELEASE ORAL DAILY
Status: DISCONTINUED | OUTPATIENT
Start: 2024-05-17 | End: 2024-05-20 | Stop reason: HOSPADM

## 2024-05-17 RX ORDER — DULOXETIN HYDROCHLORIDE 30 MG/1
30 CAPSULE, DELAYED RELEASE ORAL DAILY
Status: DISCONTINUED | OUTPATIENT
Start: 2024-05-17 | End: 2024-05-20 | Stop reason: HOSPADM

## 2024-05-17 RX ORDER — LOSARTAN POTASSIUM 50 MG/1
100 TABLET ORAL DAILY
Status: DISCONTINUED | OUTPATIENT
Start: 2024-05-17 | End: 2024-05-17

## 2024-05-17 RX ORDER — HYDRALAZINE HYDROCHLORIDE 50 MG/1
100 TABLET, FILM COATED ORAL EVERY 8 HOURS PRN
Status: DISCONTINUED | OUTPATIENT
Start: 2024-05-17 | End: 2024-05-20 | Stop reason: HOSPADM

## 2024-05-17 RX ORDER — ERGOCALCIFEROL 1.25 MG/1
50000 CAPSULE ORAL WEEKLY
Status: DISCONTINUED | OUTPATIENT
Start: 2024-05-29 | End: 2024-05-20 | Stop reason: HOSPADM

## 2024-05-17 RX ORDER — MAGNESIUM SULFATE IN WATER 40 MG/ML
2000 INJECTION, SOLUTION INTRAVENOUS
Status: COMPLETED | OUTPATIENT
Start: 2024-05-17 | End: 2024-05-17

## 2024-05-17 RX ORDER — SODIUM CHLORIDE, SODIUM LACTATE, POTASSIUM CHLORIDE, CALCIUM CHLORIDE 600; 310; 30; 20 MG/100ML; MG/100ML; MG/100ML; MG/100ML
INJECTION, SOLUTION INTRAVENOUS CONTINUOUS
Status: DISPENSED | OUTPATIENT
Start: 2024-05-17 | End: 2024-05-17

## 2024-05-17 RX ORDER — SODIUM CHLORIDE 9 MG/ML
INJECTION, SOLUTION INTRAVENOUS CONTINUOUS
Status: DISCONTINUED | OUTPATIENT
Start: 2024-05-17 | End: 2024-05-17

## 2024-05-17 RX ORDER — DEXTROSE MONOHYDRATE 100 MG/ML
INJECTION, SOLUTION INTRAVENOUS CONTINUOUS PRN
Status: DISCONTINUED | OUTPATIENT
Start: 2024-05-17 | End: 2024-05-20 | Stop reason: HOSPADM

## 2024-05-17 RX ORDER — GLUCAGON 1 MG
1 KIT INJECTION PRN
Status: DISCONTINUED | OUTPATIENT
Start: 2024-05-17 | End: 2024-05-20 | Stop reason: HOSPADM

## 2024-05-17 RX ORDER — HYDROMORPHONE HYDROCHLORIDE 1 MG/ML
1 INJECTION, SOLUTION INTRAMUSCULAR; INTRAVENOUS; SUBCUTANEOUS
Status: DISCONTINUED | OUTPATIENT
Start: 2024-05-17 | End: 2024-05-20 | Stop reason: HOSPADM

## 2024-05-17 RX ORDER — LORAZEPAM 2 MG/ML
1 INJECTION INTRAMUSCULAR ONCE
Status: COMPLETED | OUTPATIENT
Start: 2024-05-17 | End: 2024-05-17

## 2024-05-17 RX ORDER — INSULIN LISPRO 100 [IU]/ML
0-4 INJECTION, SOLUTION INTRAVENOUS; SUBCUTANEOUS
Status: DISCONTINUED | OUTPATIENT
Start: 2024-05-17 | End: 2024-05-20 | Stop reason: HOSPADM

## 2024-05-17 RX ORDER — ONDANSETRON 2 MG/ML
4 INJECTION INTRAMUSCULAR; INTRAVENOUS EVERY 6 HOURS PRN
Status: DISCONTINUED | OUTPATIENT
Start: 2024-05-17 | End: 2024-05-20 | Stop reason: HOSPADM

## 2024-05-17 RX ORDER — PROMETHAZINE HYDROCHLORIDE 12.5 MG/1
12.5 TABLET ORAL EVERY 12 HOURS PRN
Status: DISCONTINUED | OUTPATIENT
Start: 2024-05-17 | End: 2024-05-17

## 2024-05-17 RX ORDER — TIZANIDINE 2 MG/1
6 TABLET ORAL 2 TIMES DAILY PRN
Status: DISCONTINUED | OUTPATIENT
Start: 2024-05-17 | End: 2024-05-20 | Stop reason: HOSPADM

## 2024-05-17 RX ORDER — PROCHLORPERAZINE EDISYLATE 5 MG/ML
5 INJECTION INTRAMUSCULAR; INTRAVENOUS EVERY 6 HOURS PRN
Status: DISCONTINUED | OUTPATIENT
Start: 2024-05-17 | End: 2024-05-20 | Stop reason: HOSPADM

## 2024-05-17 RX ORDER — POTASSIUM CHLORIDE 20 MEQ/1
40 TABLET, EXTENDED RELEASE ORAL PRN
Status: DISCONTINUED | OUTPATIENT
Start: 2024-05-17 | End: 2024-05-20 | Stop reason: HOSPADM

## 2024-05-17 RX ADMIN — HYDROMORPHONE HYDROCHLORIDE 1 MG: 1 INJECTION, SOLUTION INTRAMUSCULAR; INTRAVENOUS; SUBCUTANEOUS at 18:31

## 2024-05-17 RX ADMIN — POTASSIUM CHLORIDE 40 MEQ: 1500 TABLET, EXTENDED RELEASE ORAL at 08:49

## 2024-05-17 RX ADMIN — LOSARTAN POTASSIUM 100 MG: 50 TABLET, FILM COATED ORAL at 04:19

## 2024-05-17 RX ADMIN — HYDROMORPHONE HYDROCHLORIDE 1 MG: 1 INJECTION, SOLUTION INTRAMUSCULAR; INTRAVENOUS; SUBCUTANEOUS at 14:48

## 2024-05-17 RX ADMIN — METOPROLOL TARTRATE 25 MG: 25 TABLET, FILM COATED ORAL at 08:49

## 2024-05-17 RX ADMIN — METOPROLOL TARTRATE 25 MG: 25 TABLET, FILM COATED ORAL at 04:19

## 2024-05-17 RX ADMIN — HYDROMORPHONE HYDROCHLORIDE 1 MG: 1 INJECTION, SOLUTION INTRAMUSCULAR; INTRAVENOUS; SUBCUTANEOUS at 11:45

## 2024-05-17 RX ADMIN — DULOXETINE HYDROCHLORIDE 30 MG: 30 CAPSULE, DELAYED RELEASE ORAL at 08:48

## 2024-05-17 RX ADMIN — PREGABALIN 200 MG: 75 CAPSULE ORAL at 21:29

## 2024-05-17 RX ADMIN — PREGABALIN 200 MG: 75 CAPSULE ORAL at 14:48

## 2024-05-17 RX ADMIN — ATORVASTATIN CALCIUM 80 MG: 40 TABLET, FILM COATED ORAL at 08:49

## 2024-05-17 RX ADMIN — PANTOPRAZOLE SODIUM 40 MG: 40 TABLET, DELAYED RELEASE ORAL at 08:49

## 2024-05-17 RX ADMIN — INSULIN GLARGINE 20 UNITS: 100 INJECTION, SOLUTION SUBCUTANEOUS at 21:27

## 2024-05-17 RX ADMIN — SODIUM CHLORIDE, POTASSIUM CHLORIDE, SODIUM LACTATE AND CALCIUM CHLORIDE 1000 ML: 600; 310; 30; 20 INJECTION, SOLUTION INTRAVENOUS at 04:17

## 2024-05-17 RX ADMIN — MORPHINE SULFATE 4 MG: 4 INJECTION, SOLUTION INTRAMUSCULAR; INTRAVENOUS at 02:33

## 2024-05-17 RX ADMIN — LOSARTAN POTASSIUM 100 MG: 50 TABLET, FILM COATED ORAL at 08:49

## 2024-05-17 RX ADMIN — MAGNESIUM SULFATE HEPTAHYDRATE 2000 MG: 40 INJECTION, SOLUTION INTRAVENOUS at 02:22

## 2024-05-17 RX ADMIN — POTASSIUM BICARBONATE 40 MEQ: 782 TABLET, EFFERVESCENT ORAL at 04:59

## 2024-05-17 RX ADMIN — PIPERACILLIN AND TAZOBACTAM 4500 MG: 4; .5 INJECTION, POWDER, FOR SOLUTION INTRAVENOUS at 03:35

## 2024-05-17 RX ADMIN — IOPAMIDOL 100 ML: 755 INJECTION, SOLUTION INTRAVENOUS at 02:26

## 2024-05-17 RX ADMIN — LORAZEPAM 1 MG: 2 INJECTION INTRAMUSCULAR; INTRAVENOUS at 04:32

## 2024-05-17 RX ADMIN — WATER 1000 MG: 1 INJECTION INTRAMUSCULAR; INTRAVENOUS; SUBCUTANEOUS at 08:49

## 2024-05-17 RX ADMIN — SODIUM CHLORIDE 1000 ML: 9 INJECTION, SOLUTION INTRAVENOUS at 02:22

## 2024-05-17 RX ADMIN — SODIUM CHLORIDE, PRESERVATIVE FREE 10 ML: 5 INJECTION INTRAVENOUS at 21:29

## 2024-05-17 RX ADMIN — SODIUM CHLORIDE: 9 INJECTION, SOLUTION INTRAVENOUS at 04:17

## 2024-05-17 RX ADMIN — SODIUM CHLORIDE, POTASSIUM CHLORIDE, SODIUM LACTATE AND CALCIUM CHLORIDE: 600; 310; 30; 20 INJECTION, SOLUTION INTRAVENOUS at 18:31

## 2024-05-17 RX ADMIN — POTASSIUM CHLORIDE 10 MEQ: 7.46 INJECTION, SOLUTION INTRAVENOUS at 08:45

## 2024-05-17 RX ADMIN — HYDROMORPHONE HYDROCHLORIDE 1 MG: 1 INJECTION, SOLUTION INTRAMUSCULAR; INTRAVENOUS; SUBCUTANEOUS at 08:41

## 2024-05-17 RX ADMIN — SODIUM CHLORIDE, POTASSIUM CHLORIDE, SODIUM LACTATE AND CALCIUM CHLORIDE: 600; 310; 30; 20 INJECTION, SOLUTION INTRAVENOUS at 08:43

## 2024-05-17 RX ADMIN — DIPHENHYDRAMINE HYDROCHLORIDE 25 MG: 50 INJECTION, SOLUTION INTRAMUSCULAR; INTRAVENOUS at 03:45

## 2024-05-17 RX ADMIN — SODIUM CHLORIDE, PRESERVATIVE FREE 10 ML: 5 INJECTION INTRAVENOUS at 08:50

## 2024-05-17 RX ADMIN — POTASSIUM CHLORIDE 40 MEQ: 1500 TABLET, EXTENDED RELEASE ORAL at 11:45

## 2024-05-17 RX ADMIN — POTASSIUM CHLORIDE 10 MEQ: 7.46 INJECTION, SOLUTION INTRAVENOUS at 04:59

## 2024-05-17 RX ADMIN — PREGABALIN 200 MG: 75 CAPSULE ORAL at 08:48

## 2024-05-17 RX ADMIN — METOPROLOL TARTRATE 25 MG: 25 TABLET, FILM COATED ORAL at 21:29

## 2024-05-17 ASSESSMENT — PAIN DESCRIPTION - ONSET
ONSET: ON-GOING

## 2024-05-17 ASSESSMENT — PAIN SCALES - GENERAL
PAINLEVEL_OUTOF10: 9
PAINLEVEL_OUTOF10: 3
PAINLEVEL_OUTOF10: 7
PAINLEVEL_OUTOF10: 5
PAINLEVEL_OUTOF10: 6
PAINLEVEL_OUTOF10: 8
PAINLEVEL_OUTOF10: 0
PAINLEVEL_OUTOF10: 0
PAINLEVEL_OUTOF10: 5
PAINLEVEL_OUTOF10: 0
PAINLEVEL_OUTOF10: 0
PAINLEVEL_OUTOF10: 9
PAINLEVEL_OUTOF10: 8
PAINLEVEL_OUTOF10: 0

## 2024-05-17 ASSESSMENT — PAIN DESCRIPTION - DESCRIPTORS
DESCRIPTORS: ACHING;CRAMPING
DESCRIPTORS: CRAMPING
DESCRIPTORS: ACHING;CRAMPING
DESCRIPTORS: ACHING;CRAMPING
DESCRIPTORS: CRAMPING

## 2024-05-17 ASSESSMENT — PAIN DESCRIPTION - FREQUENCY
FREQUENCY: INTERMITTENT

## 2024-05-17 ASSESSMENT — PAIN DESCRIPTION - ORIENTATION
ORIENTATION: LEFT;LOWER
ORIENTATION: LEFT
ORIENTATION: LEFT;LOWER
ORIENTATION: LEFT;LOWER
ORIENTATION: LEFT

## 2024-05-17 ASSESSMENT — PAIN DESCRIPTION - LOCATION
LOCATION: BACK
LOCATION: ABDOMEN
LOCATION: ABDOMEN
LOCATION: BACK;ABDOMEN
LOCATION: ABDOMEN
LOCATION: BACK

## 2024-05-17 ASSESSMENT — PAIN DESCRIPTION - PAIN TYPE
TYPE: ACUTE PAIN

## 2024-05-17 NOTE — ED PROVIDER NOTES
loneliness or isolation: Never       SCREENINGS         [place] Coma Scale  Eye Opening: Spontaneous  Best Verbal Response: Oriented  Best Motor Response: Obeys commands  [place] Coma Scale Score: 15                     CIWA Assessment  BP: (!) 156/110  Pulse: 100                 PHYSICAL EXAM       ED Triage Vitals [05/16/24 1655]   BP Temp Temp src Pulse Resp SpO2 Height Weight - Scale   -- -- -- -- -- -- 1.829 m (6') 124.7 kg (275 lb)       Physical Exam  Constitutional:       General: She is in acute distress.      Appearance: Normal appearance. She is obese. She is not toxic-appearing.   HENT:      Head: Normocephalic.      Nose: Nose normal.      Mouth/Throat:      Mouth: Mucous membranes are moist.   Eyes:      Extraocular Movements: Extraocular movements intact.   Cardiovascular:      Rate and Rhythm: Regular rhythm. Tachycardia present.      Pulses: Normal pulses.      Heart sounds: Normal heart sounds.   Pulmonary:      Effort: Pulmonary effort is normal.      Breath sounds: Normal breath sounds.   Abdominal:      General: Abdomen is flat.      Tenderness: There is abdominal tenderness in the left lower quadrant.   Musculoskeletal:         General: No deformity.      Cervical back: Normal range of motion and neck supple. No rigidity.   Skin:     General: Skin is warm.   Neurological:      Mental Status: She is alert and oriented to person, place, and time.   Psychiatric:         Mood and Affect: Mood normal.         Behavior: Behavior normal.         Thought Content: Thought content normal.         Judgment: Judgment normal.         DIAGNOSTIC RESULTS     EKG: All EKG's are interpreted by the Emergency Department Physician who either signs or Co-signs this chart in the absence of a cardiologist.    EKG shows sinus tachycardia with a ventricular rate of 111, MN interval 146 QRS 98 and a QTc of 524.  LVH.  No definite ST elevations depressions or T wave inversions.    RADIOLOGY:   Non-plain film images such 
Considerations (Tests not done, Shared Decision Making, Pt Expectation of Test or Tx.): none    FINAL IMPRESSION     1. Crohn's disease with rectal bleeding, unspecified gastrointestinal tract location (HCC)    2. Acute chest pain    3. Nausea and vomiting, unspecified vomiting type    4. Prolonged Q-T interval on ECG    5. Colitis    6. Pyelonephritis    7. Hypokalemia    8. Elevated blood pressure reading          DISPOSITION/PLAN   DISPOSITION Admitted 05/17/2024 04:18:52 AM      Discharge Note: The patient is stable for discharge home. The signs, symptoms, diagnosis, and discharge instructions have been discussed, understanding conveyed, and agreed upon. The patient is to follow up as recommended or return to ER should their symptoms worsen.      PATIENT REFERRED TO:  @FUD@        DISCHARGE MEDICATIONS:     Medication List        ASK your doctor about these medications      atorvastatin 80 MG tablet  Commonly known as: LIPITOR     celecoxib 200 MG capsule  Commonly known as: CeleBREX  Take 1 capsule by mouth 2 times daily     diclofenac 50 MG EC tablet  Commonly known as: VOLTAREN  Take 1 tablet by mouth 3 times daily (with meals) for 10 days     docusate sodium 100 MG capsule  Commonly known as: COLACE  TAKE 1 CAPSULE BY MOUTH TWICE A DAY     DULoxetine 30 MG extended release capsule  Commonly known as: CYMBALTA     Farxiga 10 MG tablet  Generic drug: dapagliflozin     HumaLOG KwikPen 100 UNIT/ML Sopn  Generic drug: insulin lispro (1 Unit Dial)     hydrALAZINE 100 MG tablet  Commonly known as: APRESOLINE     hydroCHLOROthiazide 25 MG tablet  Commonly known as: HYDRODIURIL     Lantus SoloStar 100 UNIT/ML injection pen  Generic drug: insulin glargine     losartan 100 MG tablet  Commonly known as: COZAAR     metoprolol tartrate 50 MG tablet  Commonly known as: LOPRESSOR  Take 0.5 tablets by mouth 2 times daily     naloxone 4 MG/0.1ML Liqd nasal spray     omeprazole 20 MG delayed release capsule  Commonly known as:

## 2024-05-17 NOTE — ED NOTES
Patient c/o nausea. Order for ODT Zofran.   Shortly after, patient vomited. Informed MD - order for phenergan as per MAR.     Patient assisted back into bed, placed on monitor. Provided with new blankets.   PICC line team has been called in. Call bell in reach. Daughter at bedside

## 2024-05-17 NOTE — ED NOTES
Multiple attempts to establish PIV.   Tech successful but CT called and informed us that PIV blew as well.   X4 staff members have attempted and unsuccessful. MD aware and will attempt USG

## 2024-05-17 NOTE — H&P
/hpf    RBC, UA 0 to 3 0 - 5 /hpf    Epithelial Cells, UA 1+ 0 - 5 /lpf    BACTERIA, URINE 3+ (A) NEG /hpf    Mucus, UA 1+ (A) NEG /lpf    Granular Casts, UA 0 to 3 NEG /lpf    Epithelial Casts, UA 0 to 3 NEG /lpf   BMP    Collection Time: 05/17/24  4:20 AM   Result Value Ref Range    Sodium 141 136 - 145 mmol/L    Potassium 2.4 (LL) 3.5 - 5.5 mmol/L    Chloride 105 100 - 111 mmol/L    CO2 25 21 - 32 mmol/L    Anion Gap 11 3.0 - 18 mmol/L    Glucose 166 (H) 74 - 99 mg/dL    BUN 14 7.0 - 18 MG/DL    Creatinine 0.84 0.6 - 1.3 MG/DL    BUN/Creatinine Ratio 17 12 - 20      Est, Glom Filt Rate 82 >60 ml/min/1.73m2    Calcium 8.5 8.5 - 10.1 MG/DL   Magnesium    Collection Time: 05/17/24  4:20 AM   Result Value Ref Range    Magnesium 2.8 (H) 1.6 - 2.6 mg/dL          Procedures/imaging: see electronic medical records for all procedures/Xrays and details which were not copied into this note but were reviewed prior to creation of Plan    Reviewed care everywhere records  Orders labs/imaging  Discussed plan with provider - Dr Yanez        DVT/GI Prophylaxis: SCD's          75 minutes were spent in total including face-to-face interview, physical examination of the patient, review of relevant prior medical records, review of current medications and labs, review of relevant diagnostic imaging, discussing treatment plan with pt / family , nursing and specialist, ordering additional studies and documentation in the record.      Gurwinder Butcher MD  5/17/2024 7:10 AM               Disclaimer: Sections of this note are dictated using utilizing voice recognition software. Minor typographical errors may be present. If questions arise, please do not hesitate to contact me or call our department.

## 2024-05-17 NOTE — FLOWSHEET NOTE
05/17/24 0330   Vital Signs   Pulse (!) 104   Respirations 15   BP (!) 185/88  (MD aware - ordered home meds to be given PO)   MAP (Calculated) 120   MAP (mmHg) 112   Oxygen Therapy   SpO2 100 %   Pulse via Oximetry 104 beats per minute

## 2024-05-17 NOTE — CONSULTS
Infectious Disease Consultation Note        Reason: right kidney pyelonephritis    Current abx Prior abx   Ceftriaxone since 5/17      Lines:       Assessment :  53 y.o.  female with history of crohn's disease on monthly IV Usetekinumab (last dose 2 weeks ago)  ; peripheral neuropathy; type 2 diabetes on insulin / ozempic; high blood pressure; history of ESBL E. coli UTI in 2019, aortic root dilatation(mild) presented to [facility] ER on 5/16/24 with lower quadrant burning pain 8 out of 10 associated with bloody loose stools and nausea vomiting.     Now with right CVA tenderness, Ct scan 5/17-  heterogeneous enhancement with multifocal loss of corticomedullary differentiation right kidney, urine analysis 5/16 moderate leukocyte Esterase, 3+ bacteria    Clinical presentation concerning for right kidney pyelonephritis    Concurrent immunosuppressants for Crohn's disease can mask the full clinical presentation    + Bloody stool-likely flareup of Crohn's disease.  Agree with ruling out infectious etiologies including C. difficile    Recommendations:    Continue ceftriaxone for now  Follow-up urine culture.  Modify antibiotics accordingly  Follow-up stool C. Difficile  Follow-up GI recommendations  Monitor CBC, temperature, clinically    Thank you for consultation request. Above plan was discussed in details with patient, and dr Lucio. Please call me if any further questions or concerns. Will continue to participate in the care of this patient.  HPI:    53 y.o.  female with history of crohn's disease on monthly IV Usetekinumab (last dose 2 weeks ago)  ; peripheral neuropathy; type 2 diabetes on insulin / ozempic; high blood pressure; history of ESBL gram-negative infection in 2018, aortic root dilatation(mild) presented to [facility] on 5/16/24 with lower quadrant burning pain 8 out of 10 associated with bloody loose stools and nausea vomiting.     Patient still that she has

## 2024-05-17 NOTE — ED NOTES
TRANSFER - OUT REPORT:    Verbal report given to Zakiya BENAVIDES RN on Priscilla Dean  being transferred to [facility] for routine progression of patient care  / IP admission     Report consisted of patient's Situation, Background, Assessment and   Recommendations(SBAR).     Information from the following report(s) Nurse Handoff Report, ED SBAR, Adult Overview, Intake/Output, MAR, Recent Results, Med Rec Status, and Cardiac Rhythm prolonged QTC Tx with mag / sinus tach  was reviewed with the receiving nurse.    [place] Fall Assessment:    Presents to emergency department  because of falls (Syncope, seizure, or loss of consciousness): No  Age > 70: No  Altered Mental Status, Intoxication with alcohol or substance confusion (Disorientation, impaired judgment, poor safety awaremess, or inability to follow instructions): No  Impaired Mobility: Ambulates or transfers with assistive devices or assistance; Unable to ambulate or transer.: No  Nursing Judgement: No          Lines:   PICC 05/17/24 Right Basilic (Active)        Opportunity for questions and clarification was provided.      Patient transported with:  Monitor and PICC line with NS 125mL / HR

## 2024-05-17 NOTE — ED NOTES
MD aware of prolonged QTC - repeat EKG done. Order for Mag. Unable to admin. No access. PICC team en route.

## 2024-05-17 NOTE — CONSULTS
[email or web address]  [phone]    GASTROENTEROLOGY CONSULT      Impression:   Pt presented to ED yesterday with c/o lower ABD pain, maroon stools, and NV with blood-tinged emesis. She has a history of Crohn's colitis and has been treated as OP by our office, off/on since 2019.     Crohn's: fistulizing Crohn's disease of the colon first diagnosed 2009, currently on Stelara q4wk injections. Last colonoscopy was 2022 which showed L-side chronic colitis with granuloma c/w Crohn's.   - CT showed pericolonic inflammation of colon distal descending to proximal sigmoid. This could be from Crohn's flare or infectious colitis   - Fecal calpro 7/2020 wnl, but pt was on treatment at the time with Humira?   - CRP elevated 1/2020 at 18, then >100 7/2020, normal 4/2022   - Stelara: missed apx 6mo of medication d/t knee surgery and loss to FU in office. Contacted office in Jan this year to report Crohn's flare sxs and was started on prednisone taper and restarted on Stelara though it seems insurance was a barrier to restarting therapy for several weeks - unclear. Pt states she has had about 2 or 3 doses of Stelara q4wks since restarting earlier this year.    Nausea/Vomiting/Hematemesis: chronic issues, seems to be worse with Crohn's flares, scan hematemesis lasted about a day and is now resolved, per patient. Not acutely concerned for active bleeding with Hgb wnl and self-resolution of hematemesis   - EGD 2022 wnl, no etiology for chronic nausea noted.    Hematochezia: likely associated with Crohn's flare, will order stool studies to rule out infectious etiology including C. Diff and CMV. Hgb is wnl          Plan:     - Stool testing for inflammation with fecal calprotectin, and to rule out infection with C. Diff and CMV  - Labs for CRP   - Pending ^^ results, treat infection if present, consider steroid taper if infection neg or possibly flex sig to re-stage disease. Will defer to oncoming GI to interpret results

## 2024-05-17 NOTE — ED NOTES
Patient moved to Rm 6.   Placed on cardiac monitor, PICC line assessed and IVF and Mag started as per MAR.     Patient assisted x1 to BSC. Provided urine sample. No BM.   Patient concerned for stress incont. Placed on purewick per patient's request.

## 2024-05-17 NOTE — ED NOTES
Call from CT. PIV infiltrated.   Informed MD - possibility of infiltration with IV contrast and mag. Verbal to remove PIV and place ice pack to site.   Patient denies any pain. Redness and swelling noted.

## 2024-05-18 ENCOUNTER — APPOINTMENT (OUTPATIENT)
Facility: HOSPITAL | Age: 54
DRG: 372 | End: 2024-05-18
Payer: COMMERCIAL

## 2024-05-18 LAB
25(OH)D3 SERPL-MCNC: 15 NG/ML (ref 30–100)
ALBUMIN SERPL-MCNC: 2.5 G/DL (ref 3.4–5)
ALBUMIN/GLOB SERPL: 0.6 (ref 0.8–1.7)
ALP SERPL-CCNC: 64 U/L (ref 45–117)
ALT SERPL-CCNC: 10 U/L (ref 13–56)
ANION GAP SERPL CALC-SCNC: 4 MMOL/L (ref 3–18)
AST SERPL-CCNC: 8 U/L (ref 10–38)
BASOPHILS # BLD: 0 K/UL (ref 0–0.1)
BASOPHILS NFR BLD: 0 % (ref 0–2)
BILIRUB SERPL-MCNC: 0.4 MG/DL (ref 0.2–1)
BUN SERPL-MCNC: 7 MG/DL (ref 7–18)
BUN/CREAT SERPL: 9 (ref 12–20)
C DIFF GDH STL QL: POSITIVE
C DIFF TOX A+B STL QL IA: POSITIVE
C DIFF TOXIN INTERPRETATION: ABNORMAL
CALCIUM SERPL-MCNC: 8.5 MG/DL (ref 8.5–10.1)
CHLORIDE SERPL-SCNC: 105 MMOL/L (ref 100–111)
CHOLEST SERPL-MCNC: 131 MG/DL
CO2 SERPL-SCNC: 29 MMOL/L (ref 21–32)
CREAT SERPL-MCNC: 0.75 MG/DL (ref 0.6–1.3)
DIFFERENTIAL METHOD BLD: ABNORMAL
EOSINOPHIL # BLD: 0.2 K/UL (ref 0–0.4)
EOSINOPHIL NFR BLD: 2 % (ref 0–5)
ERYTHROCYTE [DISTWIDTH] IN BLOOD BY AUTOMATED COUNT: 17.6 % (ref 11.6–14.5)
EST. AVERAGE GLUCOSE BLD GHB EST-MCNC: 148 MG/DL
GLOBULIN SER CALC-MCNC: 4.1 G/DL (ref 2–4)
GLUCOSE BLD STRIP.AUTO-MCNC: 104 MG/DL (ref 70–110)
GLUCOSE BLD STRIP.AUTO-MCNC: 117 MG/DL (ref 70–110)
GLUCOSE BLD STRIP.AUTO-MCNC: 120 MG/DL (ref 70–110)
GLUCOSE BLD STRIP.AUTO-MCNC: 94 MG/DL (ref 70–110)
GLUCOSE SERPL-MCNC: 100 MG/DL (ref 74–99)
HBA1C MFR BLD: 6.8 % (ref 4.2–5.6)
HCT VFR BLD AUTO: 33.7 % (ref 35–45)
HDLC SERPL-MCNC: 28 MG/DL (ref 40–60)
HDLC SERPL: 4.7 (ref 0–5)
HGB BLD-MCNC: 10.3 G/DL (ref 12–16)
IMM GRANULOCYTES # BLD AUTO: 0.1 K/UL (ref 0–0.04)
IMM GRANULOCYTES NFR BLD AUTO: 1 % (ref 0–0.5)
LDLC SERPL CALC-MCNC: 79.2 MG/DL (ref 0–100)
LIPID PANEL: ABNORMAL
LYMPHOCYTES # BLD: 1.5 K/UL (ref 0.9–3.6)
LYMPHOCYTES NFR BLD: 16 % (ref 21–52)
MAGNESIUM SERPL-MCNC: 2.4 MG/DL (ref 1.6–2.6)
MCH RBC QN AUTO: 25.7 PG (ref 24–34)
MCHC RBC AUTO-ENTMCNC: 30.6 G/DL (ref 31–37)
MCV RBC AUTO: 84 FL (ref 78–100)
MONOCYTES # BLD: 0.8 K/UL (ref 0.05–1.2)
MONOCYTES NFR BLD: 9 % (ref 3–10)
NEUTS SEG # BLD: 6.4 K/UL (ref 1.8–8)
NEUTS SEG NFR BLD: 72 % (ref 40–73)
NRBC # BLD: 0 K/UL (ref 0–0.01)
NRBC BLD-RTO: 0 PER 100 WBC
PLATELET # BLD AUTO: 284 K/UL (ref 135–420)
PMV BLD AUTO: 11.4 FL (ref 9.2–11.8)
POTASSIUM SERPL-SCNC: 3.1 MMOL/L (ref 3.5–5.5)
PROT SERPL-MCNC: 6.6 G/DL (ref 6.4–8.2)
RBC # BLD AUTO: 4.01 M/UL (ref 4.2–5.3)
SODIUM SERPL-SCNC: 138 MMOL/L (ref 136–145)
TRIGL SERPL-MCNC: 119 MG/DL
TSH SERPL DL<=0.05 MIU/L-ACNC: 0.6 UIU/ML (ref 0.36–3.74)
VLDLC SERPL CALC-MCNC: 23.8 MG/DL
WBC # BLD AUTO: 8.9 K/UL (ref 4.6–13.2)

## 2024-05-18 PROCEDURE — 94761 N-INVAS EAR/PLS OXIMETRY MLT: CPT

## 2024-05-18 PROCEDURE — 2580000003 HC RX 258: Performed by: FAMILY MEDICINE

## 2024-05-18 PROCEDURE — 6370000000 HC RX 637 (ALT 250 FOR IP): Performed by: INTERNAL MEDICINE

## 2024-05-18 PROCEDURE — 80053 COMPREHEN METABOLIC PANEL: CPT

## 2024-05-18 PROCEDURE — 84443 ASSAY THYROID STIM HORMONE: CPT

## 2024-05-18 PROCEDURE — 83735 ASSAY OF MAGNESIUM: CPT

## 2024-05-18 PROCEDURE — 6360000002 HC RX W HCPCS: Performed by: FAMILY MEDICINE

## 2024-05-18 PROCEDURE — 82962 GLUCOSE BLOOD TEST: CPT

## 2024-05-18 PROCEDURE — 76770 US EXAM ABDO BACK WALL COMP: CPT

## 2024-05-18 PROCEDURE — 83036 HEMOGLOBIN GLYCOSYLATED A1C: CPT

## 2024-05-18 PROCEDURE — 80061 LIPID PANEL: CPT

## 2024-05-18 PROCEDURE — 2580000003 HC RX 258: Performed by: INTERNAL MEDICINE

## 2024-05-18 PROCEDURE — 6360000002 HC RX W HCPCS: Performed by: INTERNAL MEDICINE

## 2024-05-18 PROCEDURE — 1100000003 HC PRIVATE W/ TELEMETRY

## 2024-05-18 PROCEDURE — 85025 COMPLETE CBC W/AUTO DIFF WBC: CPT

## 2024-05-18 RX ORDER — LACTOBACILLUS RHAMNOSUS GG 10B CELL
1 CAPSULE ORAL
Status: DISCONTINUED | OUTPATIENT
Start: 2024-05-19 | End: 2024-05-20 | Stop reason: HOSPADM

## 2024-05-18 RX ADMIN — SODIUM CHLORIDE, PRESERVATIVE FREE 10 ML: 5 INJECTION INTRAVENOUS at 12:10

## 2024-05-18 RX ADMIN — HYDROMORPHONE HYDROCHLORIDE 1 MG: 1 INJECTION, SOLUTION INTRAMUSCULAR; INTRAVENOUS; SUBCUTANEOUS at 23:49

## 2024-05-18 RX ADMIN — HYDROMORPHONE HYDROCHLORIDE 1 MG: 1 INJECTION, SOLUTION INTRAMUSCULAR; INTRAVENOUS; SUBCUTANEOUS at 00:39

## 2024-05-18 RX ADMIN — PREGABALIN 200 MG: 75 CAPSULE ORAL at 12:18

## 2024-05-18 RX ADMIN — VANCOMYCIN HYDROCHLORIDE 125 MG: 1 INJECTION, POWDER, LYOPHILIZED, FOR SOLUTION INTRAVENOUS at 17:05

## 2024-05-18 RX ADMIN — HYDROMORPHONE HYDROCHLORIDE 1 MG: 1 INJECTION, SOLUTION INTRAMUSCULAR; INTRAVENOUS; SUBCUTANEOUS at 15:17

## 2024-05-18 RX ADMIN — METOPROLOL TARTRATE 25 MG: 25 TABLET, FILM COATED ORAL at 20:39

## 2024-05-18 RX ADMIN — HYDROMORPHONE HYDROCHLORIDE 1 MG: 1 INJECTION, SOLUTION INTRAMUSCULAR; INTRAVENOUS; SUBCUTANEOUS at 09:16

## 2024-05-18 RX ADMIN — HYDROMORPHONE HYDROCHLORIDE 0.5 MG: 1 INJECTION, SOLUTION INTRAMUSCULAR; INTRAVENOUS; SUBCUTANEOUS at 12:16

## 2024-05-18 RX ADMIN — PREGABALIN 200 MG: 75 CAPSULE ORAL at 09:04

## 2024-05-18 RX ADMIN — WATER 1000 MG: 1 INJECTION INTRAMUSCULAR; INTRAVENOUS; SUBCUTANEOUS at 09:03

## 2024-05-18 RX ADMIN — PREGABALIN 200 MG: 75 CAPSULE ORAL at 20:33

## 2024-05-18 RX ADMIN — INSULIN GLARGINE 20 UNITS: 100 INJECTION, SOLUTION SUBCUTANEOUS at 20:33

## 2024-05-18 RX ADMIN — ATORVASTATIN CALCIUM 80 MG: 40 TABLET, FILM COATED ORAL at 09:00

## 2024-05-18 RX ADMIN — VANCOMYCIN HYDROCHLORIDE 125 MG: 1 INJECTION, POWDER, LYOPHILIZED, FOR SOLUTION INTRAVENOUS at 23:49

## 2024-05-18 RX ADMIN — HYDROMORPHONE HYDROCHLORIDE 1 MG: 1 INJECTION, SOLUTION INTRAMUSCULAR; INTRAVENOUS; SUBCUTANEOUS at 06:14

## 2024-05-18 RX ADMIN — LOSARTAN POTASSIUM 100 MG: 50 TABLET, FILM COATED ORAL at 09:05

## 2024-05-18 RX ADMIN — METOPROLOL TARTRATE 25 MG: 25 TABLET, FILM COATED ORAL at 09:04

## 2024-05-18 RX ADMIN — VANCOMYCIN HYDROCHLORIDE 125 MG: 1 INJECTION, POWDER, LYOPHILIZED, FOR SOLUTION INTRAVENOUS at 15:17

## 2024-05-18 RX ADMIN — DULOXETINE HYDROCHLORIDE 30 MG: 30 CAPSULE, DELAYED RELEASE ORAL at 09:03

## 2024-05-18 RX ADMIN — HYDROMORPHONE HYDROCHLORIDE 1 MG: 1 INJECTION, SOLUTION INTRAMUSCULAR; INTRAVENOUS; SUBCUTANEOUS at 20:33

## 2024-05-18 RX ADMIN — PANTOPRAZOLE SODIUM 40 MG: 40 TABLET, DELAYED RELEASE ORAL at 09:04

## 2024-05-18 RX ADMIN — SODIUM CHLORIDE, PRESERVATIVE FREE 10 ML: 5 INJECTION INTRAVENOUS at 20:33

## 2024-05-18 ASSESSMENT — PAIN SCALES - GENERAL
PAINLEVEL_OUTOF10: 7
PAINLEVEL_OUTOF10: 0
PAINLEVEL_OUTOF10: 7
PAINLEVEL_OUTOF10: 6
PAINLEVEL_OUTOF10: 7
PAINLEVEL_OUTOF10: 7
PAINLEVEL_OUTOF10: 4
PAINLEVEL_OUTOF10: 7
PAINLEVEL_OUTOF10: 3
PAINLEVEL_OUTOF10: 0
PAINLEVEL_OUTOF10: 6
PAINLEVEL_OUTOF10: 7

## 2024-05-18 ASSESSMENT — PAIN DESCRIPTION - FREQUENCY
FREQUENCY: INTERMITTENT

## 2024-05-18 ASSESSMENT — PAIN - FUNCTIONAL ASSESSMENT
PAIN_FUNCTIONAL_ASSESSMENT: ACTIVITIES ARE NOT PREVENTED

## 2024-05-18 ASSESSMENT — PAIN DESCRIPTION - PAIN TYPE
TYPE: ACUTE PAIN

## 2024-05-18 ASSESSMENT — PAIN DESCRIPTION - ORIENTATION
ORIENTATION: LEFT
ORIENTATION: LEFT
ORIENTATION: LOWER;MID
ORIENTATION: LEFT
ORIENTATION: MID;LOWER

## 2024-05-18 ASSESSMENT — PAIN SCALES - WONG BAKER
WONGBAKER_NUMERICALRESPONSE: NO HURT
WONGBAKER_NUMERICALRESPONSE: NO HURT

## 2024-05-18 ASSESSMENT — PAIN DESCRIPTION - DIRECTION
RADIATING_TOWARDS: ABDOMEN

## 2024-05-18 ASSESSMENT — PAIN DESCRIPTION - DESCRIPTORS
DESCRIPTORS: CRAMPING
DESCRIPTORS: ACHING
DESCRIPTORS: ACHING
DESCRIPTORS: BURNING;CRAMPING
DESCRIPTORS: ACHING
DESCRIPTORS: ACHING;CRAMPING
DESCRIPTORS: THROBBING
DESCRIPTORS: ACHING;CRAMPING
DESCRIPTORS: ACHING;CRAMPING

## 2024-05-18 ASSESSMENT — PAIN DESCRIPTION - LOCATION
LOCATION: ABDOMEN

## 2024-05-18 ASSESSMENT — PAIN DESCRIPTION - ONSET
ONSET: GRADUAL
ONSET: GRADUAL
ONSET: ON-GOING
ONSET: ON-GOING
ONSET: SUDDEN

## 2024-05-19 LAB
ALBUMIN SERPL-MCNC: 2.7 G/DL (ref 3.4–5)
ALBUMIN/GLOB SERPL: 0.6 (ref 0.8–1.7)
ALP SERPL-CCNC: 76 U/L (ref 45–117)
ALT SERPL-CCNC: 9 U/L (ref 13–56)
ANION GAP SERPL CALC-SCNC: 5 MMOL/L (ref 3–18)
AST SERPL-CCNC: 12 U/L (ref 10–38)
BACTERIA SPEC CULT: ABNORMAL
BASOPHILS # BLD: 0.1 K/UL (ref 0–0.1)
BASOPHILS NFR BLD: 1 % (ref 0–2)
BILIRUB SERPL-MCNC: 0.4 MG/DL (ref 0.2–1)
BUN SERPL-MCNC: 7 MG/DL (ref 7–18)
BUN/CREAT SERPL: 8 (ref 12–20)
CALCIUM SERPL-MCNC: 8.6 MG/DL (ref 8.5–10.1)
CC UR VC: ABNORMAL
CHLORIDE SERPL-SCNC: 103 MMOL/L (ref 100–111)
CO2 SERPL-SCNC: 29 MMOL/L (ref 21–32)
CREAT SERPL-MCNC: 0.87 MG/DL (ref 0.6–1.3)
DIFFERENTIAL METHOD BLD: ABNORMAL
EOSINOPHIL # BLD: 0.5 K/UL (ref 0–0.4)
EOSINOPHIL NFR BLD: 5 % (ref 0–5)
ERYTHROCYTE [DISTWIDTH] IN BLOOD BY AUTOMATED COUNT: 17.7 % (ref 11.6–14.5)
GLOBULIN SER CALC-MCNC: 4.2 G/DL (ref 2–4)
GLUCOSE BLD STRIP.AUTO-MCNC: 106 MG/DL (ref 70–110)
GLUCOSE BLD STRIP.AUTO-MCNC: 130 MG/DL (ref 70–110)
GLUCOSE BLD STRIP.AUTO-MCNC: 137 MG/DL (ref 70–110)
GLUCOSE BLD STRIP.AUTO-MCNC: 147 MG/DL (ref 70–110)
GLUCOSE SERPL-MCNC: 124 MG/DL (ref 74–99)
HCT VFR BLD AUTO: 35.8 % (ref 35–45)
HGB BLD-MCNC: 11.2 G/DL (ref 12–16)
IMM GRANULOCYTES # BLD AUTO: 0.1 K/UL (ref 0–0.04)
IMM GRANULOCYTES NFR BLD AUTO: 1 % (ref 0–0.5)
LYMPHOCYTES # BLD: 1.6 K/UL (ref 0.9–3.6)
LYMPHOCYTES NFR BLD: 18 % (ref 21–52)
MAGNESIUM SERPL-MCNC: 2.3 MG/DL (ref 1.6–2.6)
MCH RBC QN AUTO: 26.3 PG (ref 24–34)
MCHC RBC AUTO-ENTMCNC: 31.3 G/DL (ref 31–37)
MCV RBC AUTO: 84 FL (ref 78–100)
MONOCYTES # BLD: 0.6 K/UL (ref 0.05–1.2)
MONOCYTES NFR BLD: 7 % (ref 3–10)
NEUTS SEG # BLD: 6 K/UL (ref 1.8–8)
NEUTS SEG NFR BLD: 68 % (ref 40–73)
NRBC # BLD: 0 K/UL (ref 0–0.01)
NRBC BLD-RTO: 0 PER 100 WBC
PLATELET # BLD AUTO: 279 K/UL (ref 135–420)
PMV BLD AUTO: 11.5 FL (ref 9.2–11.8)
POTASSIUM SERPL-SCNC: 3.2 MMOL/L (ref 3.5–5.5)
PROT SERPL-MCNC: 6.9 G/DL (ref 6.4–8.2)
RBC # BLD AUTO: 4.26 M/UL (ref 4.2–5.3)
SERVICE CMNT-IMP: ABNORMAL
SODIUM SERPL-SCNC: 137 MMOL/L (ref 136–145)
WBC # BLD AUTO: 8.8 K/UL (ref 4.6–13.2)

## 2024-05-19 PROCEDURE — 2580000003 HC RX 258: Performed by: FAMILY MEDICINE

## 2024-05-19 PROCEDURE — 83735 ASSAY OF MAGNESIUM: CPT

## 2024-05-19 PROCEDURE — 80053 COMPREHEN METABOLIC PANEL: CPT

## 2024-05-19 PROCEDURE — 1100000003 HC PRIVATE W/ TELEMETRY

## 2024-05-19 PROCEDURE — 6360000002 HC RX W HCPCS: Performed by: FAMILY MEDICINE

## 2024-05-19 PROCEDURE — 2580000003 HC RX 258: Performed by: INTERNAL MEDICINE

## 2024-05-19 PROCEDURE — 94761 N-INVAS EAR/PLS OXIMETRY MLT: CPT

## 2024-05-19 PROCEDURE — 6370000000 HC RX 637 (ALT 250 FOR IP): Performed by: INTERNAL MEDICINE

## 2024-05-19 PROCEDURE — 6360000002 HC RX W HCPCS: Performed by: INTERNAL MEDICINE

## 2024-05-19 PROCEDURE — 6370000000 HC RX 637 (ALT 250 FOR IP): Performed by: FAMILY MEDICINE

## 2024-05-19 PROCEDURE — 82962 GLUCOSE BLOOD TEST: CPT

## 2024-05-19 PROCEDURE — 85025 COMPLETE CBC W/AUTO DIFF WBC: CPT

## 2024-05-19 RX ADMIN — SODIUM CHLORIDE, PRESERVATIVE FREE 10 ML: 5 INJECTION INTRAVENOUS at 20:04

## 2024-05-19 RX ADMIN — PREGABALIN 200 MG: 75 CAPSULE ORAL at 08:46

## 2024-05-19 RX ADMIN — HYDROMORPHONE HYDROCHLORIDE 1 MG: 1 INJECTION, SOLUTION INTRAMUSCULAR; INTRAVENOUS; SUBCUTANEOUS at 09:40

## 2024-05-19 RX ADMIN — HYDROMORPHONE HYDROCHLORIDE 1 MG: 1 INJECTION, SOLUTION INTRAMUSCULAR; INTRAVENOUS; SUBCUTANEOUS at 20:04

## 2024-05-19 RX ADMIN — PANTOPRAZOLE SODIUM 40 MG: 40 TABLET, DELAYED RELEASE ORAL at 08:47

## 2024-05-19 RX ADMIN — LOSARTAN POTASSIUM 100 MG: 50 TABLET, FILM COATED ORAL at 08:47

## 2024-05-19 RX ADMIN — METOPROLOL TARTRATE 25 MG: 25 TABLET, FILM COATED ORAL at 08:47

## 2024-05-19 RX ADMIN — WATER 1000 MG: 1 INJECTION INTRAMUSCULAR; INTRAVENOUS; SUBCUTANEOUS at 08:47

## 2024-05-19 RX ADMIN — INSULIN GLARGINE 20 UNITS: 100 INJECTION, SOLUTION SUBCUTANEOUS at 20:30

## 2024-05-19 RX ADMIN — METOPROLOL TARTRATE 25 MG: 25 TABLET, FILM COATED ORAL at 20:04

## 2024-05-19 RX ADMIN — PREGABALIN 200 MG: 75 CAPSULE ORAL at 13:18

## 2024-05-19 RX ADMIN — VANCOMYCIN HYDROCHLORIDE 125 MG: 1 INJECTION, POWDER, LYOPHILIZED, FOR SOLUTION INTRAVENOUS at 16:23

## 2024-05-19 RX ADMIN — VANCOMYCIN HYDROCHLORIDE 125 MG: 1 INJECTION, POWDER, LYOPHILIZED, FOR SOLUTION INTRAVENOUS at 06:13

## 2024-05-19 RX ADMIN — SODIUM CHLORIDE, PRESERVATIVE FREE 10 ML: 5 INJECTION INTRAVENOUS at 08:54

## 2024-05-19 RX ADMIN — HYDROMORPHONE HYDROCHLORIDE 1 MG: 1 INJECTION, SOLUTION INTRAMUSCULAR; INTRAVENOUS; SUBCUTANEOUS at 06:13

## 2024-05-19 RX ADMIN — PREGABALIN 200 MG: 75 CAPSULE ORAL at 20:30

## 2024-05-19 RX ADMIN — HYDROMORPHONE HYDROCHLORIDE 1 MG: 1 INJECTION, SOLUTION INTRAMUSCULAR; INTRAVENOUS; SUBCUTANEOUS at 16:24

## 2024-05-19 RX ADMIN — HYDROMORPHONE HYDROCHLORIDE 1 MG: 1 INJECTION, SOLUTION INTRAMUSCULAR; INTRAVENOUS; SUBCUTANEOUS at 23:42

## 2024-05-19 RX ADMIN — HYDROMORPHONE HYDROCHLORIDE 1 MG: 1 INJECTION, SOLUTION INTRAMUSCULAR; INTRAVENOUS; SUBCUTANEOUS at 13:19

## 2024-05-19 RX ADMIN — ATORVASTATIN CALCIUM 80 MG: 40 TABLET, FILM COATED ORAL at 08:47

## 2024-05-19 RX ADMIN — DULOXETINE HYDROCHLORIDE 30 MG: 30 CAPSULE, DELAYED RELEASE ORAL at 08:47

## 2024-05-19 RX ADMIN — Medication 1 CAPSULE: at 08:47

## 2024-05-19 RX ADMIN — POTASSIUM CHLORIDE 40 MEQ: 1500 TABLET, EXTENDED RELEASE ORAL at 06:12

## 2024-05-19 RX ADMIN — VANCOMYCIN HYDROCHLORIDE 125 MG: 1 INJECTION, POWDER, LYOPHILIZED, FOR SOLUTION INTRAVENOUS at 11:22

## 2024-05-19 RX ADMIN — HYDROMORPHONE HYDROCHLORIDE 1 MG: 1 INJECTION, SOLUTION INTRAMUSCULAR; INTRAVENOUS; SUBCUTANEOUS at 03:14

## 2024-05-19 RX ADMIN — VANCOMYCIN HYDROCHLORIDE 125 MG: 1 INJECTION, POWDER, LYOPHILIZED, FOR SOLUTION INTRAVENOUS at 23:42

## 2024-05-19 ASSESSMENT — PAIN DESCRIPTION - FREQUENCY
FREQUENCY: INTERMITTENT

## 2024-05-19 ASSESSMENT — PAIN SCALES - GENERAL
PAINLEVEL_OUTOF10: 7
PAINLEVEL_OUTOF10: 4
PAINLEVEL_OUTOF10: 0
PAINLEVEL_OUTOF10: 0
PAINLEVEL_OUTOF10: 4
PAINLEVEL_OUTOF10: 7
PAINLEVEL_OUTOF10: 0
PAINLEVEL_OUTOF10: 0
PAINLEVEL_OUTOF10: 7
PAINLEVEL_OUTOF10: 3
PAINLEVEL_OUTOF10: 5
PAINLEVEL_OUTOF10: 7
PAINLEVEL_OUTOF10: 0
PAINLEVEL_OUTOF10: 7
PAINLEVEL_OUTOF10: 4

## 2024-05-19 ASSESSMENT — PAIN DESCRIPTION - LOCATION
LOCATION: ABDOMEN

## 2024-05-19 ASSESSMENT — PAIN DESCRIPTION - PAIN TYPE
TYPE: ACUTE PAIN

## 2024-05-19 ASSESSMENT — PAIN - FUNCTIONAL ASSESSMENT
PAIN_FUNCTIONAL_ASSESSMENT: ACTIVITIES ARE NOT PREVENTED

## 2024-05-19 ASSESSMENT — PAIN DESCRIPTION - DESCRIPTORS
DESCRIPTORS: ACHING
DESCRIPTORS: CRAMPING
DESCRIPTORS: ACHING;CRAMPING
DESCRIPTORS: ACHING
DESCRIPTORS: ACHING;CRAMPING
DESCRIPTORS: CRAMPING
DESCRIPTORS: CRAMPING
DESCRIPTORS: ACHING
DESCRIPTORS: CRAMPING

## 2024-05-19 ASSESSMENT — PAIN DESCRIPTION - DIRECTION
RADIATING_TOWARDS: ABDOMEN

## 2024-05-19 ASSESSMENT — PAIN DESCRIPTION - ORIENTATION
ORIENTATION: LEFT
ORIENTATION: MID;POSTERIOR
ORIENTATION: LEFT
ORIENTATION: MID;LOWER
ORIENTATION: LEFT
ORIENTATION: MID
ORIENTATION: LEFT
ORIENTATION: LOWER;MID

## 2024-05-19 ASSESSMENT — PAIN DESCRIPTION - ONSET
ONSET: ON-GOING

## 2024-05-20 VITALS
HEART RATE: 68 BPM | RESPIRATION RATE: 18 BRPM | DIASTOLIC BLOOD PRESSURE: 95 MMHG | WEIGHT: 275 LBS | SYSTOLIC BLOOD PRESSURE: 142 MMHG | OXYGEN SATURATION: 100 % | HEIGHT: 72 IN | TEMPERATURE: 98.5 F | BODY MASS INDEX: 37.25 KG/M2

## 2024-05-20 LAB
ALBUMIN SERPL-MCNC: 2.5 G/DL (ref 3.4–5)
ALBUMIN/GLOB SERPL: 0.6 (ref 0.8–1.7)
ALP SERPL-CCNC: 71 U/L (ref 45–117)
ALT SERPL-CCNC: 9 U/L (ref 13–56)
ANION GAP SERPL CALC-SCNC: 4 MMOL/L (ref 3–18)
AST SERPL-CCNC: 8 U/L (ref 10–38)
BASOPHILS # BLD: 0 K/UL (ref 0–0.1)
BASOPHILS NFR BLD: 0 % (ref 0–2)
BILIRUB SERPL-MCNC: 0.3 MG/DL (ref 0.2–1)
BUN SERPL-MCNC: 5 MG/DL (ref 7–18)
BUN/CREAT SERPL: 6 (ref 12–20)
CALCIUM SERPL-MCNC: 8.5 MG/DL (ref 8.5–10.1)
CHLORIDE SERPL-SCNC: 106 MMOL/L (ref 100–111)
CMV DNA SERPL NAA+PROBE-ACNC: NEGATIVE IU/ML
CMV DNA SERPL NAA+PROBE-LOG IU: NORMAL LOG10 IU/ML
CO2 SERPL-SCNC: 27 MMOL/L (ref 21–32)
CREAT SERPL-MCNC: 0.81 MG/DL (ref 0.6–1.3)
DIFFERENTIAL METHOD BLD: ABNORMAL
EOSINOPHIL # BLD: 0.3 K/UL (ref 0–0.4)
EOSINOPHIL NFR BLD: 4 % (ref 0–5)
ERYTHROCYTE [DISTWIDTH] IN BLOOD BY AUTOMATED COUNT: 17.9 % (ref 11.6–14.5)
GLOBULIN SER CALC-MCNC: 4.3 G/DL (ref 2–4)
GLUCOSE BLD STRIP.AUTO-MCNC: 117 MG/DL (ref 70–110)
GLUCOSE BLD STRIP.AUTO-MCNC: 122 MG/DL (ref 70–110)
GLUCOSE SERPL-MCNC: 170 MG/DL (ref 74–99)
HCT VFR BLD AUTO: 36.6 % (ref 35–45)
HGB BLD-MCNC: 11.2 G/DL (ref 12–16)
IMM GRANULOCYTES # BLD AUTO: 0.1 K/UL (ref 0–0.04)
IMM GRANULOCYTES NFR BLD AUTO: 1 % (ref 0–0.5)
LYMPHOCYTES # BLD: 1.4 K/UL (ref 0.9–3.6)
LYMPHOCYTES NFR BLD: 15 % (ref 21–52)
MAGNESIUM SERPL-MCNC: 2 MG/DL (ref 1.6–2.6)
MCH RBC QN AUTO: 25.7 PG (ref 24–34)
MCHC RBC AUTO-ENTMCNC: 30.6 G/DL (ref 31–37)
MCV RBC AUTO: 83.9 FL (ref 78–100)
MONOCYTES # BLD: 0.6 K/UL (ref 0.05–1.2)
MONOCYTES NFR BLD: 7 % (ref 3–10)
NEUTS SEG # BLD: 6.6 K/UL (ref 1.8–8)
NEUTS SEG NFR BLD: 72 % (ref 40–73)
NRBC # BLD: 0 K/UL (ref 0–0.01)
NRBC BLD-RTO: 0 PER 100 WBC
PLATELET # BLD AUTO: 345 K/UL (ref 135–420)
PMV BLD AUTO: 10.8 FL (ref 9.2–11.8)
POTASSIUM SERPL-SCNC: 3 MMOL/L (ref 3.5–5.5)
PROT SERPL-MCNC: 6.8 G/DL (ref 6.4–8.2)
RBC # BLD AUTO: 4.36 M/UL (ref 4.2–5.3)
SODIUM SERPL-SCNC: 137 MMOL/L (ref 136–145)
WBC # BLD AUTO: 9.1 K/UL (ref 4.6–13.2)

## 2024-05-20 PROCEDURE — 2580000003 HC RX 258: Performed by: FAMILY MEDICINE

## 2024-05-20 PROCEDURE — 97535 SELF CARE MNGMENT TRAINING: CPT

## 2024-05-20 PROCEDURE — 80053 COMPREHEN METABOLIC PANEL: CPT

## 2024-05-20 PROCEDURE — 82962 GLUCOSE BLOOD TEST: CPT

## 2024-05-20 PROCEDURE — 97161 PT EVAL LOW COMPLEX 20 MIN: CPT

## 2024-05-20 PROCEDURE — 6360000002 HC RX W HCPCS: Performed by: FAMILY MEDICINE

## 2024-05-20 PROCEDURE — 6370000000 HC RX 637 (ALT 250 FOR IP): Performed by: FAMILY MEDICINE

## 2024-05-20 PROCEDURE — 85025 COMPLETE CBC W/AUTO DIFF WBC: CPT

## 2024-05-20 PROCEDURE — 6360000002 HC RX W HCPCS: Performed by: INTERNAL MEDICINE

## 2024-05-20 PROCEDURE — 6370000000 HC RX 637 (ALT 250 FOR IP): Performed by: INTERNAL MEDICINE

## 2024-05-20 PROCEDURE — 94761 N-INVAS EAR/PLS OXIMETRY MLT: CPT

## 2024-05-20 PROCEDURE — 97165 OT EVAL LOW COMPLEX 30 MIN: CPT

## 2024-05-20 PROCEDURE — 83735 ASSAY OF MAGNESIUM: CPT

## 2024-05-20 PROCEDURE — 2580000003 HC RX 258: Performed by: INTERNAL MEDICINE

## 2024-05-20 RX ORDER — CIPROFLOXACIN 500 MG/1
500 TABLET, FILM COATED ORAL EVERY 12 HOURS SCHEDULED
Status: DISCONTINUED | OUTPATIENT
Start: 2024-05-20 | End: 2024-05-20 | Stop reason: HOSPADM

## 2024-05-20 RX ORDER — VANCOMYCIN HYDROCHLORIDE 125 MG/1
125 CAPSULE ORAL 4 TIMES DAILY
Qty: 40 CAPSULE | Refills: 0 | Status: SHIPPED | OUTPATIENT
Start: 2024-05-20 | End: 2024-06-01

## 2024-05-20 RX ORDER — LACTOBACILLUS RHAMNOSUS GG 10B CELL
1 CAPSULE ORAL
Qty: 15 CAPSULE | Refills: 0 | Status: SHIPPED | OUTPATIENT
Start: 2024-05-20

## 2024-05-20 RX ORDER — CIPROFLOXACIN 500 MG/1
500 TABLET, FILM COATED ORAL EVERY 12 HOURS SCHEDULED
Qty: 12 TABLET | Refills: 0 | Status: SHIPPED | OUTPATIENT
Start: 2024-05-20 | End: 2024-05-26

## 2024-05-20 RX ADMIN — HYDRALAZINE HYDROCHLORIDE 100 MG: 50 TABLET ORAL at 04:20

## 2024-05-20 RX ADMIN — LOSARTAN POTASSIUM 100 MG: 50 TABLET, FILM COATED ORAL at 09:19

## 2024-05-20 RX ADMIN — SODIUM CHLORIDE, PRESERVATIVE FREE 10 ML: 5 INJECTION INTRAVENOUS at 09:21

## 2024-05-20 RX ADMIN — HYDROMORPHONE HYDROCHLORIDE 1 MG: 1 INJECTION, SOLUTION INTRAMUSCULAR; INTRAVENOUS; SUBCUTANEOUS at 03:31

## 2024-05-20 RX ADMIN — HYDROMORPHONE HYDROCHLORIDE 1 MG: 1 INJECTION, SOLUTION INTRAMUSCULAR; INTRAVENOUS; SUBCUTANEOUS at 07:03

## 2024-05-20 RX ADMIN — VANCOMYCIN HYDROCHLORIDE 125 MG: 1 INJECTION, POWDER, LYOPHILIZED, FOR SOLUTION INTRAVENOUS at 12:12

## 2024-05-20 RX ADMIN — METOPROLOL TARTRATE 25 MG: 25 TABLET, FILM COATED ORAL at 09:19

## 2024-05-20 RX ADMIN — DULOXETINE HYDROCHLORIDE 30 MG: 30 CAPSULE, DELAYED RELEASE ORAL at 09:19

## 2024-05-20 RX ADMIN — VANCOMYCIN HYDROCHLORIDE 125 MG: 1 INJECTION, POWDER, LYOPHILIZED, FOR SOLUTION INTRAVENOUS at 06:05

## 2024-05-20 RX ADMIN — WATER 1000 MG: 1 INJECTION INTRAMUSCULAR; INTRAVENOUS; SUBCUTANEOUS at 09:19

## 2024-05-20 RX ADMIN — PREGABALIN 200 MG: 75 CAPSULE ORAL at 09:19

## 2024-05-20 RX ADMIN — Medication 1 CAPSULE: at 09:19

## 2024-05-20 RX ADMIN — PREGABALIN 200 MG: 75 CAPSULE ORAL at 12:11

## 2024-05-20 RX ADMIN — HYDROMORPHONE HYDROCHLORIDE 1 MG: 1 INJECTION, SOLUTION INTRAMUSCULAR; INTRAVENOUS; SUBCUTANEOUS at 09:47

## 2024-05-20 RX ADMIN — PANTOPRAZOLE SODIUM 40 MG: 40 TABLET, DELAYED RELEASE ORAL at 09:19

## 2024-05-20 RX ADMIN — WATER 1 MG: 1 INJECTION INTRAMUSCULAR; INTRAVENOUS; SUBCUTANEOUS at 06:05

## 2024-05-20 RX ADMIN — CIPROFLOXACIN HYDROCHLORIDE 500 MG: 500 TABLET, FILM COATED ORAL at 09:18

## 2024-05-20 RX ADMIN — ATORVASTATIN CALCIUM 80 MG: 40 TABLET, FILM COATED ORAL at 09:19

## 2024-05-20 RX ADMIN — HYDROMORPHONE HYDROCHLORIDE 1 MG: 1 INJECTION, SOLUTION INTRAMUSCULAR; INTRAVENOUS; SUBCUTANEOUS at 13:12

## 2024-05-20 ASSESSMENT — PAIN - FUNCTIONAL ASSESSMENT
PAIN_FUNCTIONAL_ASSESSMENT: ACTIVITIES ARE NOT PREVENTED

## 2024-05-20 ASSESSMENT — PAIN DESCRIPTION - FREQUENCY
FREQUENCY: INTERMITTENT

## 2024-05-20 ASSESSMENT — PAIN DESCRIPTION - LOCATION
LOCATION: ABDOMEN

## 2024-05-20 ASSESSMENT — PAIN DESCRIPTION - PAIN TYPE
TYPE: ACUTE PAIN
TYPE: CHRONIC PAIN
TYPE: ACUTE PAIN
TYPE: CHRONIC PAIN
TYPE: ACUTE PAIN

## 2024-05-20 ASSESSMENT — PAIN DESCRIPTION - ONSET
ONSET: ON-GOING

## 2024-05-20 ASSESSMENT — PAIN DESCRIPTION - DESCRIPTORS
DESCRIPTORS: CRAMPING
DESCRIPTORS: ACHING
DESCRIPTORS: CRAMPING
DESCRIPTORS: ACHING
DESCRIPTORS: CRAMPING
DESCRIPTORS: ACHING
DESCRIPTORS: CRAMPING

## 2024-05-20 ASSESSMENT — PAIN DESCRIPTION - ORIENTATION
ORIENTATION: MID
ORIENTATION: LEFT
ORIENTATION: MID
ORIENTATION: LEFT
ORIENTATION: MID
ORIENTATION: LEFT
ORIENTATION: MID

## 2024-05-20 ASSESSMENT — PAIN SCALES - GENERAL
PAINLEVEL_OUTOF10: 7
PAINLEVEL_OUTOF10: 4
PAINLEVEL_OUTOF10: 5
PAINLEVEL_OUTOF10: 7
PAINLEVEL_OUTOF10: 4
PAINLEVEL_OUTOF10: 5

## 2024-05-20 ASSESSMENT — PAIN DESCRIPTION - DIRECTION
RADIATING_TOWARDS: ABDOMEN

## 2024-05-20 NOTE — DISCHARGE SUMMARY
3 times daily for 90 days. Max Daily Amount: 600 mg     promethazine 12.5 MG tablet  Commonly known as: PHENERGAN     Stelara 90 MG/ML Sosy prefilled syringe  Generic drug: ustekinumab     tiZANidine 6 MG capsule  Commonly known as: ZANAFLEX     vitamin D 1.25 MG (51323 UT) Caps capsule  Commonly known as: ERGOCALCIFEROL            STOP taking these medications      celecoxib 200 MG capsule  Commonly known as: CeleBREX     diclofenac 50 MG EC tablet  Commonly known as: VOLTAREN     docusate sodium 100 MG capsule  Commonly known as: COLACE     hydroCHLOROthiazide 25 MG tablet  Commonly known as: HYDRODIURIL     naloxone 4 MG/0.1ML Liqd nasal spray     SITagliptin 100 MG tablet  Commonly known as: JANUVIA               Where to Get Your Medications        Information about where to get these medications is not yet available    Ask your nurse or doctor about these medications  ciprofloxacin 500 MG tablet  lactobacillus capsule  vancomycin 50 mg/mL oral solution          Activity: activity as tolerated  Diet: diabetic diet  Wound Care: none needed       Follow-up Information     Zachery Lucio MD. Schedule an appointment as soon as possible   for a visit in 1 week(s).    Specialty: Family Medicine  Contact information:  [address] [facility] [id number] [phone]             Kristian Shetty MD .    Specialty: Gastroenterology  Contact information:  [address]  [facility] [place] [address]  [phone]             Kristian Shetty MD .    Specialty: Gastroenterology  Contact information:  [address]  [facility] [id number]             Magan Siegel MD. Schedule an appointment as soon as possible for a   visit in 1 month(s).    Specialties: Cardiology, Internal Medicine  Contact information:  [address]  [place] [address]  [phone]                     Time for discharge more than 44 minutes included review chart med reconciliation print ABT

## 2024-05-20 NOTE — DISCHARGE INSTRUCTIONS
DISCHARGE SUMMARY from Nurse    PATIENT INSTRUCTIONS:  What to do at Home:  Recommended activity: activity as tolerated    If you experience any of the following symptoms increased pain, increased weakness: please follow up with Primary care provider or return to ED.    *  Please give a list of your current medications to your Primary Care Provider.    *  Please update this list whenever your medications are discontinued, doses are      changed, or new medications (including over-the-counter products) are added.    *  Please carry medication information at all times in case of emergency situations.    These are general instructions for a healthy lifestyle:    No smoking/ No tobacco products/ Avoid exposure to second hand smoke  Surgeon General's Warning:  Quitting smoking now greatly reduces serious risk to your health.    Obesity, smoking, and sedentary lifestyle greatly increases your risk for illness    A healthy diet, regular physical exercise & weight monitoring are important for maintaining a healthy lifestyle    You may be retaining fluid if you have a history of heart failure or if you experience any of the following symptoms:  Weight gain of 3 pounds or more overnight or 5 pounds in a week, increased swelling in our hands or feet or shortness of breath while lying flat in bed.  Please call your doctor as soon as you notice any of these symptoms; do not wait until your next office visit.        The discharge information has been reviewed with the patient.  The patient verbalized understanding.  Discharge medications reviewed with the patient and appropriate educational materials and side effects teaching were provided.  Patient armband removed and shredded   ___________________________________________________________________________________________________________________________________

## 2024-05-20 NOTE — PLAN OF CARE
Problem: Discharge Planning  Goal: Discharge to home or other facility with appropriate resources  5/18/2024 1126 by Zonia Sexton RN  Outcome: Progressing  5/18/2024 0136 by Zakiya Garcia RN  Outcome: Progressing  Flowsheets (Taken 5/17/2024 1928)  Discharge to home or other facility with appropriate resources: Identify barriers to discharge with patient and caregiver     Problem: Pain  Goal: Verbalizes/displays adequate comfort level or baseline comfort level  5/18/2024 1126 by Zonia Sexton RN  Outcome: Progressing  5/18/2024 0136 by Zakiya Garcia RN  Outcome: Progressing     Problem: Safety - Adult  Goal: Free from fall injury  5/18/2024 1126 by Zonia Sexton RN  Outcome: Progressing  5/18/2024 0136 by Zakiya Garcia RN  Outcome: Progressing     Problem: Chronic Conditions and Co-morbidities  Goal: Patient's chronic conditions and co-morbidity symptoms are monitored and maintained or improved  5/18/2024 1126 by Zonia Sexton RN  Outcome: Progressing  5/18/2024 0136 by Zakiya Garcia RN  Outcome: Progressing  Flowsheets (Taken 5/17/2024 1928)  Care Plan - Patient's Chronic Conditions and Co-Morbidity Symptoms are Monitored and Maintained or Improved: Monitor and assess patient's chronic conditions and comorbid symptoms for stability, deterioration, or improvement     Problem: Skin/Tissue Integrity  Goal: Absence of new skin breakdown  Description: 1.  Monitor for areas of redness and/or skin breakdown  2.  Assess vascular access sites hourly  3.  Every 4-6 hours minimum:  Change oxygen saturation probe site  4.  Every 4-6 hours:  If on nasal continuous positive airway pressure, respiratory therapy assess nares and determine need for appliance change or resting period.  5/18/2024 1126 by Zonia Sexton RN  Outcome: Progressing  5/18/2024 0136 by Zakiya Garcia RN  Outcome: Progressing     
  Problem: Discharge Planning  Goal: Discharge to home or other facility with appropriate resources  5/18/2024 2123 by Trupti Maciel RN  Outcome: Progressing  Flowsheets (Taken 5/18/2024 2000)  Discharge to home or other facility with appropriate resources: Identify barriers to discharge with patient and caregiver  5/18/2024 1126 by Zonia Sexton RN  Outcome: Progressing     Problem: Pain  Goal: Verbalizes/displays adequate comfort level or baseline comfort level  5/18/2024 2123 by Trupti Maciel RN  Outcome: Progressing  5/18/2024 1126 by Zonia Sexton RN  Outcome: Progressing     Problem: Safety - Adult  Goal: Free from fall injury  5/18/2024 2123 by Trupti Maciel RN  Outcome: Progressing  5/18/2024 1126 by Zonia Sexton RN  Outcome: Progressing     Problem: Chronic Conditions and Co-morbidities  Goal: Patient's chronic conditions and co-morbidity symptoms are monitored and maintained or improved  5/18/2024 2123 by Trupti Maciel RN  Outcome: Progressing  Flowsheets (Taken 5/18/2024 2000)  Care Plan - Patient's Chronic Conditions and Co-Morbidity Symptoms are Monitored and Maintained or Improved: Monitor and assess patient's chronic conditions and comorbid symptoms for stability, deterioration, or improvement  5/18/2024 1126 by Zonia Sexton RN  Outcome: Progressing     Problem: Skin/Tissue Integrity  Goal: Absence of new skin breakdown  Description: 1.  Monitor for areas of redness and/or skin breakdown  2.  Assess vascular access sites hourly  3.  Every 4-6 hours minimum:  Change oxygen saturation probe site  4.  Every 4-6 hours:  If on nasal continuous positive airway pressure, respiratory therapy assess nares and determine need for appliance change or resting period.  5/18/2024 2123 by Trupti Maciel RN  Outcome: Progressing  5/18/2024 1126 by Zonia Sexton RN  Outcome: Progressing     
  Problem: Discharge Planning  Goal: Discharge to home or other facility with appropriate resources  5/19/2024 2116 by Trupti Maciel RN  Outcome: Progressing  Flowsheets (Taken 5/19/2024 2000)  Discharge to home or other facility with appropriate resources: Identify barriers to discharge with patient and caregiver  5/19/2024 1758 by Zonia Sexton RN  Outcome: Progressing     Problem: Pain  Goal: Verbalizes/displays adequate comfort level or baseline comfort level  5/19/2024 2116 by Trupti Maciel RN  Outcome: Progressing  5/19/2024 1758 by Zonia Sexton RN  Outcome: Progressing     Problem: Safety - Adult  Goal: Free from fall injury  5/19/2024 2116 by Trupti Maciel RN  Outcome: Progressing  5/19/2024 1758 by Zonia eSxton RN  Outcome: Adequate for Discharge     Problem: Chronic Conditions and Co-morbidities  Goal: Patient's chronic conditions and co-morbidity symptoms are monitored and maintained or improved  5/19/2024 2116 by Trupti Maciel RN  Outcome: Progressing  Flowsheets (Taken 5/19/2024 2000)  Care Plan - Patient's Chronic Conditions and Co-Morbidity Symptoms are Monitored and Maintained or Improved: Monitor and assess patient's chronic conditions and comorbid symptoms for stability, deterioration, or improvement  5/19/2024 1758 by Zonia Sexton RN  Outcome: Adequate for Discharge     Problem: Skin/Tissue Integrity  Goal: Absence of new skin breakdown  Description: 1.  Monitor for areas of redness and/or skin breakdown  2.  Assess vascular access sites hourly  3.  Every 4-6 hours minimum:  Change oxygen saturation probe site  4.  Every 4-6 hours:  If on nasal continuous positive airway pressure, respiratory therapy assess nares and determine need for appliance change or resting period.  5/19/2024 2116 by Trupti Maciel RN  Outcome: Progressing  5/19/2024 1758 by Zonia Sexton RN  Outcome: Adequate for Discharge     
  Problem: Discharge Planning  Goal: Discharge to home or other facility with appropriate resources  Outcome: Progressing     Problem: Pain  Goal: Verbalizes/displays adequate comfort level or baseline comfort level  Outcome: Progressing     Problem: Safety - Adult  Goal: Free from fall injury  Outcome: Adequate for Discharge     Problem: Chronic Conditions and Co-morbidities  Goal: Patient's chronic conditions and co-morbidity symptoms are monitored and maintained or improved  Outcome: Adequate for Discharge     Problem: Skin/Tissue Integrity  Goal: Absence of new skin breakdown  Description: 1.  Monitor for areas of redness and/or skin breakdown  2.  Assess vascular access sites hourly  3.  Every 4-6 hours minimum:  Change oxygen saturation probe site  4.  Every 4-6 hours:  If on nasal continuous positive airway pressure, respiratory therapy assess nares and determine need for appliance change or resting period.  Outcome: Adequate for Discharge     
  Problem: Discharge Planning  Goal: Discharge to home or other facility with appropriate resources  Outcome: Progressing     Problem: Pain  Goal: Verbalizes/displays adequate comfort level or baseline comfort level  Outcome: Progressing     Problem: Safety - Adult  Goal: Free from fall injury  Outcome: Progressing     
  Problem: Discharge Planning  Goal: Discharge to home or other facility with appropriate resources  Outcome: Progressing  Flowsheets (Taken 5/17/2024 1928)  Discharge to home or other facility with appropriate resources: Identify barriers to discharge with patient and caregiver     Problem: Pain  Goal: Verbalizes/displays adequate comfort level or baseline comfort level  Outcome: Progressing     Problem: Safety - Adult  Goal: Free from fall injury  Outcome: Progressing     Problem: Chronic Conditions and Co-morbidities  Goal: Patient's chronic conditions and co-morbidity symptoms are monitored and maintained or improved  Outcome: Progressing  Flowsheets (Taken 5/17/2024 1928)  Care Plan - Patient's Chronic Conditions and Co-Morbidity Symptoms are Monitored and Maintained or Improved: Monitor and assess patient's chronic conditions and comorbid symptoms for stability, deterioration, or improvement     Problem: Skin/Tissue Integrity  Goal: Absence of new skin breakdown  Description: 1.  Monitor for areas of redness and/or skin breakdown  2.  Assess vascular access sites hourly  3.  Every 4-6 hours minimum:  Change oxygen saturation probe site  4.  Every 4-6 hours:  If on nasal continuous positive airway pressure, respiratory therapy assess nares and determine need for appliance change or resting period.  Outcome: Progressing     
  Problem: Pain  Goal: Verbalizes/displays adequate comfort level or baseline comfort level  5/20/2024 1108 by Zonia Sexton RN  Outcome: Progressing  5/19/2024 2116 by Trupti Maciel RN  Outcome: Progressing     Problem: Discharge Planning  Goal: Discharge to home or other facility with appropriate resources  5/20/2024 1108 by Zonia Sexton RN  Outcome: Adequate for Discharge  5/19/2024 2116 by Trupti Maciel RN  Outcome: Progressing  Flowsheets (Taken 5/19/2024 2000)  Discharge to home or other facility with appropriate resources: Identify barriers to discharge with patient and caregiver     Problem: Safety - Adult  Goal: Free from fall injury  5/20/2024 1108 by Zonia Sexton RN  Outcome: Adequate for Discharge  5/19/2024 2116 by Trupti Maciel RN  Outcome: Progressing     Problem: Chronic Conditions and Co-morbidities  Goal: Patient's chronic conditions and co-morbidity symptoms are monitored and maintained or improved  5/20/2024 1108 by Zonia Sexton RN  Outcome: Adequate for Discharge  5/19/2024 2116 by Trupti Maciel RN  Outcome: Progressing  Flowsheets (Taken 5/19/2024 2000)  Care Plan - Patient's Chronic Conditions and Co-Morbidity Symptoms are Monitored and Maintained or Improved: Monitor and assess patient's chronic conditions and comorbid symptoms for stability, deterioration, or improvement     Problem: Skin/Tissue Integrity  Goal: Absence of new skin breakdown  Description: 1.  Monitor for areas of redness and/or skin breakdown  2.  Assess vascular access sites hourly  3.  Every 4-6 hours minimum:  Change oxygen saturation probe site  4.  Every 4-6 hours:  If on nasal continuous positive airway pressure, respiratory therapy assess nares and determine need for appliance change or resting period.  5/20/2024 1108 by Zonia Sexton RN  Outcome: Adequate for Discharge  5/19/2024 2116 by Trupti Maciel RN  Outcome: Progressing     
Independent  Critical Behavior:  Orientation  Overall Orientation Status: Within Normal Limits  Orientation Level: Oriented X4     Strength:    Strength: Within functional limits (except left knee extension grossly 3/5)    Tone & Sensation:   Tone: Normal  Sensation: Intact    Range Of Motion:  AROM: Within functional limits (except left knee extension)  PROM: Within functional limits    Functional Mobility:  Bed Mobili   Bed Mobility Training  Bed Mobility Training: Yes  Supine to Sit: Modified independent  Sit to Supine: Modified independent  Scooting: Modified independent  Transfers:  Transfer Training  Transfer Training: Yes  Sit to Stand: Modified independent  Stand to Sit: Modified independent  Balance:   Balance  Sitting: Intact  Standing: Impaired  Standing - Static: Good  Standing - Dynamic: Fair (plus with RW for support)  Ambulation/Gait Training:  Gait  Gait Training: Yes  Overall Level of Assistance: Modified independent  Distance (ft): 40 Feet  Assistive Device: Walker, rolling  Base of Support: Widened  Gait Abnormalities: Decreased step clearance       Therapeutic Exercises/Neuromuscular Re-education:   Ankle pumps, LAQ, quad set x5    Pain:  Intensity Pre-treatment: 0/10   Intensity Post-treatment: 0/10  Scale: Numeric Rating Scale  Location:  n/a    Activity Tolerance:   Activity Tolerance: Patient tolerated evaluation without incident  Please refer to the flowsheet for vital signs taken during this treatment.    After treatment:   []         Patient left in no apparent distress sitting up in chair  [x]         Patient left in no apparent distress in bed  [x]         Call bell left within reach  [x]         Nursing notified  []         Caregiver present  []         Bed alarm activated  []         SCDs applied    COMMUNICATION/EDUCATION:   Patient Education  Education Given To: Patient  Education Provided: Plan of Care;Role of Therapy;Transfer Training;Fall Prevention Strategies;Home Exercise

## 2024-05-20 NOTE — PROGRESS NOTES
[phone]    Gastroenterology follow up-Progress note    Impression:  1. Probable Crohn's disease flare stable clearly improved today   2. Abd pain , n/v, hematemesis hematochezia likely due to above with no further bleeding , ? MW tear  3. Anemia multifactorial , need to confirm b12 and iron status   4. ? Pyelonephritis on CT with evidence of UTI on UA, on abx     Plan:  1. C diff still pending??  2. Will start a course of prednisone (40mg /d for a week then reduce by 10mg every week) if c diff documented negative and UTI adequately treated .    3. Restart stelara as out pt   4. Advance diet     Chief Complaint: n/v abd pain diarrhea bleeding       Subjective:  N/V abd pain and diarrhea now improved .  Tolerating po well.      ROS: Denies any fevers, chills, rash.       Patient Active Problem List   Diagnosis    BMI 50.0-59.9, adult ([facility])    Accelerated hypertension    Dehiscence of incision, initial encounter    Type 2 diabetes mellitus with diabetic neuropathy ([facility])    Status post open reduction with internal fixation (ORIF) of fracture of ankle    Obesity, morbid ([facility])    Controlled type 2 diabetes mellitus without complication, with long-term current use of insulin ([facility])    Pulmonary emboli ([facility])    Crohn's colitis ([facility])    Wound of left ankle    Closed left ankle fracture, with delayed healing, subsequent encounter    Arthritis of knee, left    Instability of knee joint, left    Varus deformity, not elsewhere classified, left knee    Class 3 severe obesity due to excess calories with serious comorbidity and body mass index (BMI) of 45.0 to 49.9 in adult ([facility])    Closed Colles' fracture of right radius with routine healing    Closed displaced fracture of shaft of third metacarpal bone of right hand with routine healing    Colitis    Hypokalemia    Pyelonephritis    ESBL E. coli carrier         /83   Pulse 77   Temp 97.4 °F (36.3 °C) (Oral)   Resp 17   Ht 1.829 m (6')   Wt 124.7 kg (275 lb)  
4 Eyes Skin Assessment     NAME:  Priscilla Dean  YOB: 1970  MEDICAL RECORD NUMBER:  [id number]    The patient is being assessed for  Shift Handoff    I agree that at least one RN has performed a thorough Head to Toe Skin Assessment on the patient. ALL assessment sites listed below have been assessed.      Areas assessed by both nurses:    Head, Face, Ears, Shoulders, Back, Chest, Arms, Elbows, Hands, Sacrum. Buttock, Coccyx, Ischium, Legs. Feet and Heels, and Under Medical Devices         Does the Patient have a Wound? No noted wound(s)       Jose Alfredo Prevention initiated by RN: No  Wound Care Orders initiated by RN: No    Pressure Injury (Stage 3,4, Unstageable, DTI, NWPT, and Complex wounds) if present, place Wound referral order by RN under : No    New Ostomies, if present place, Ostomy referral order under : No     Nurse 1 eSignature: Electronically signed by MAGDALENO WEBB RN on 5/19/24 at 6:59 AM EDT    **SHARE this note so that the co-signing nurse can place an eSignature**    Nurse 2 eSignature: Electronically signed by Zonia Sexton RN on 5/19/24 at 7:30 AM EDT    
4 Eyes Skin Assessment     NAME:  Priscilla Dean  YOB: 1970  MEDICAL RECORD NUMBER:  [id number]    The patient is being assessed for  Shift Handoff    I agree that at least one RN has performed a thorough Head to Toe Skin Assessment on the patient. ALL assessment sites listed below have been assessed.      Areas assessed by both nurses:    Head, Face, Ears, Shoulders, Back, Chest, Arms, Elbows, Hands, Sacrum. Buttock, Coccyx, Ischium, Legs. Feet and Heels, and Under Medical Devices         Does the Patient have a Wound? No noted wound(s)       Jose Alfredo Prevention initiated by RN: No  Wound Care Orders initiated by RN: No    Pressure Injury (Stage 3,4, Unstageable, DTI, NWPT, and Complex wounds) if present, place Wound referral order by RN under : No    New Ostomies, if present place, Ostomy referral order under : No     Nurse 1 eSignature: Electronically signed by MAGDALENO WEBB RN on 5/20/24 at 7:00 AM EDT    **SHARE this note so that the co-signing nurse can place an eSignature**    Nurse 2 eSignature: Electronically signed by Zonia Sexton RN on 5/20/24 at 7:28 AM EDT    
4 Eyes Skin Assessment     NAME:  Priscilla Dean  YOB: 1970  MEDICAL RECORD NUMBER:  [id number]    The patient is being assessed for  Shift Handoff    I agree that at least one RN has performed a thorough Head to Toe Skin Assessment on the patient. ALL assessment sites listed below have been assessed.      Areas assessed by both nurses:    Head, Face, Ears, Shoulders, Back, Chest, Arms, Elbows, Hands, Sacrum. Buttock, Coccyx, Ischium, and Legs. Feet and Heels        Does the Patient have a Wound? No noted wound(s)       Jose Alfredo Prevention initiated by RN: No  Wound Care Orders initiated by RN: No    Pressure Injury (Stage 3,4, Unstageable, DTI, NWPT, and Complex wounds) if present, place Wound referral order by RN under : No    New Ostomies, if present place, Ostomy referral order under : No     Nurse 1 eSignature: Electronically signed by Zonia Sexton RN on 5/19/24 at 5:53 PM EDT    **SHARE this note so that the co-signing nurse can place an eSignature**    Nurse 2 eSignature: Electronically signed by MAGDALENO WEBB, RN on 5/19/24 at 7:13 PM EDT   
4 Eyes Skin Assessment     NAME:  Priscilla Dean  YOB: 1970  MEDICAL RECORD NUMBER:  [id number]    The patient is being assessed for  Shift Handoff    I agree that at least one RN has performed a thorough Head to Toe Skin Assessment on the patient. ALL assessment sites listed below have been assessed.      Areas assessed by both nurses:    Head, Face, Ears, Shoulders, Back, Chest, Arms, Elbows, Hands, Sacrum. Buttock, Coccyx, Ischium, Legs. Feet and Heels, and Under Medical Devices         Does the Patient have a Wound? No noted wound(s)       Jose Alfredo Prevention initiated by RN: No  Wound Care Orders initiated by RN: No    Pressure Injury (Stage 3,4, Unstageable, DTI, NWPT, and Complex wounds) if present, place Wound referral order by RN under : No    New Ostomies, if present place, Ostomy referral order under : No       Nurse 1 eSignature: Electronically signed by Karlee Cedillo RN on 5/17/24 at 6:54 PM EDT    **SHARE this note so that the co-signing nurse can place an eSignature**    Nurse 2 eSignature: {Esignature:[id number]}    
4 Eyes Skin Assessment     NAME:  Priscilla Dean  YOB: 1970  MEDICAL RECORD NUMBER:  [id number]    The patient is being assessed for  Shift Handoff    I agree that at least one RN has performed a thorough Head to Toe Skin Assessment on the patient. ALL assessment sites listed below have been assessed.      Areas assessed by both nurses:    Head, Face, Ears, Shoulders, Back, Chest, Arms, Elbows, Hands, Sacrum. Buttock, Coccyx, Ischium, Legs. Feet and Heels, and Under Medical Devices         Does the Patient have a Wound? No noted wound(s)       Jose Alfredo Prevention initiated by RN: Yes  Wound Care Orders initiated by RN: No    Pressure Injury (Stage 3,4, Unstageable, DTI, NWPT, and Complex wounds) if present, place Wound referral order by RN under : No    New Ostomies, if present place, Ostomy referral order under : No     Nurse 1 eSignature: Electronically signed by Zakiya Garcia RN on 5/18/24 at 6:44 AM EDT    **SHARE this note so that the co-signing nurse can place an eSignature**    Nurse 2 eSignature: Electronically signed by Zonia Sexton RN on 5/18/24 at 7:30 AM EDT    
4 Eyes Skin Assessment     NAME:  Priscilla Dean  YOB: 1970  MEDICAL RECORD NUMBER:  [id number]    The patient is being assessed for  Shift Handoff    I agree that at least one RN has performed a thorough Head to Toe Skin Assessment on the patient. ALL assessment sites listed below have been assessed.      Areas assessed by both nurses:    Head, Face, Ears, Shoulders, Back, Chest, Arms, Elbows, Hands, Sacrum. Buttock, Coccyx, Ischium, and Legs. Feet and Heels        Does the Patient have a Wound? No noted wound(s)       Jose Alfredo Prevention initiated by RN: Yes  Wound Care Orders initiated by RN: No    Pressure Injury (Stage 3,4, Unstageable, DTI, NWPT, and Complex wounds) if present, place Wound referral order by RN under : No    New Ostomies, if present place, Ostomy referral order under : No     Nurse 1 eSignature: Electronically signed by Zonia Sexton RN on 5/18/24 at 5:38 PM EDT    **SHARE this note so that the co-signing nurse can place an eSignature**    Nurse 2 eSignature: Electronically signed by MAGDALENO WEBB, RN on 5/18/24 at 7:48 PM EDT eye  
4 Eyes Skin Assessment     NAME:  Priscilla Dean  YOB: 1970  MEDICAL RECORD NUMBER:  [id number]    The patient is being assessed for  Shift Handoff    I agree that at least one RN has performed a thorough Head to Toe Skin Assessment on the patient. ALL assessment sites listed below have been assessed.      Areas assessed by both nurses:    Head, Face, Ears, Shoulders, Back, Chest, Arms, Elbows, Hands, Sacrum. Buttock, Coccyx, Ischium, Legs. Feet and Heels, and Under Medical Devices         Does the Patient have a Wound? No noted wound(s)       Jose Alfredo Prevention initiated by RN: No  Wound Care Orders initiated by RN: No    Pressure Injury (Stage 3,4, Unstageable, DTI, NWPT, and Complex wounds) if present, place Wound referral order by RN under : No    New Ostomies, if present place, Ostomy referral order under : No     Nurse 1 eSignature: Electronically signed by Karlee Cedillo RN on 5/17/24 at 7:44 AM EDT    **SHARE this note so that the co-signing nurse can place an eSignature**    Nurse 2 eSignature: Electronically signed by Zakiya Garcia RN on 5/17/24 at 7:45 AM EDT    
Brief Update    Patient admitted by colleague this morning.  Care to be transferred to Dr. Lucio who will see in the morning.  GI consulted.     Mallorie Mckenna, DO    
Dilaudid 1 mg wasted with Shabbir Whitaker RN in the omnicell. Medication dropped and not available to give.  
Infectious Disease progress Note        Reason: right kidney pyelonephritis    Current abx Prior abx   Ceftriaxone since 5/17  Po vancomycin since 5/18      Lines:       Assessment :  53 y.o.  female with history of crohn's disease on monthly IV Usetekinumab (last dose 2 weeks ago)  ; peripheral neuropathy; type 2 diabetes on insulin / ozempic; high blood pressure; history of ESBL E. coli UTI in 2019, aortic root dilatation(mild) presented to [facility] ER on 5/16/24 with lower quadrant burning pain 8 out of 10 associated with bloody loose stools and nausea vomiting.     Now with right CVA tenderness, Ct scan 5/17-  heterogeneous enhancement with multifocal loss of corticomedullary differentiation right kidney, urine analysis 5/16 moderate leukocyte Esterase, 3+ bacteria    Clinical presentation concerning for right kidney pyelonephritis    Urine cx: > 10,000 colonies of e.coli  Susceptibilities reviewed.     Concurrent immunosuppressants for Crohn's disease can mask the full clinical presentation    + Bloody stool-likely flareup of Crohn's disease.  Agree with ruling out infectious etiologies including C. Difficile    Clinically better. No diarrhea    Recommendations:    D/c ceftriaxone - start po ciprofloxacin till 5/26, po vancomycin till 5/31  Recommend Probiotics  Follow-up GI recommendations  Monitor CBC, temperature, clinically    Addendum: 15:00 pm  Was informed by pharmacist that po vancomycin needs prior authorization. Made multiple attempts to call prior authorization line. Po vancomycin copay $91. Patient wiling to pay the copay.      Above plan was discussed in details with patient, and dr Lucio. All questions answered to their full satisfaction. Spent additional 31 minutes in management and evaluation of this patient. >50% time spent in counselling and coordination of care.       Please call me if any further questions or concerns. Will continue to participate in the care of 
OCCUPATIONAL THERAPY EVALUATION/DISCHARGE    Patient: Priscilla Dean (53 y.o. female)  Date: 5/20/2024  Primary Diagnosis: Hypokalemia [E87.6]  Colitis [K52.9]  Elevated blood pressure reading [R03.0]  Pyelonephritis [N12]  Prolonged Q-T interval on ECG [R94.31]  Acute chest pain [R07.9]  Crohn's disease with rectal bleeding, unspecified gastrointestinal tract location (HCC) [K50.911]  Nausea and vomiting, unspecified vomiting type [R11.2]  Precautions: Isolation, Contact Precautions (pt with L quad tendon tear; pending sx in July),  ,  ,  ,  ,  ,  ,    PLOF: Patient was independent with self-care and used a cane for functional mobility when she was at work and a rolling walker around the home. Patient in healthcare, currently works teaching students.      ASSESSMENT AND RECOMMENDATIONS:  Pt cleared to participate in OT evaluation by RN. Pt supine in bed, alert, and agreeable to participate with spouse present. MD also briefly present. Patient modified independent for bed mobility and able to sit EOB with good balance for ADLs. Patient independent for donning gown onto back side and modified independent for doffing/ donning BLE socks. Additional time needed for LLE d/t quad tendon tear. Patient supervision for sit <> stand and for functional mobility using no AD in preparation for ADLs. Pt presents with good static standing and fair dynamic standing balance, however will defer to PT for functional balance and functional mobility tasks. Based on the objective data described below, pt presents with no deficits that impede pt function with ADLs. At this time pt is safe to d/c home with family support from selfcare standpoint. Pt left all needs met and call bell in reach.      Maximum therapeutic gains met at current level of care and patient will be discharged from occupational therapy at this time.    Further Equipment Recommendations for Discharge: Pt has all DME    [facility]: AM-PAC Inpatient Daily Activity Raw Score: 
Patient's PICC has developed a clot. Paging on-call MD for cath analia orders  
Received patient to room 206 at 6:30 am from [facility] ER in stable condition. Vital signs  checked, skin assessment completed with the charge nurse Jovanni and noted to be intact. The tele monitor was connected, gown changed pt was oriented to the room, call bell within reach, and  bed in low position. The  hospitalist was notified of pt's arrival to the unit. and bedside change of shift report was given to MICHELLE Desir.  
TRANSFER - IN REPORT:    Verbal report received from MICHELLE Slater on Priscilla Dean  being received from  ER for routine progression of patient care      Report consisted of patient's Situation, Background, Assessment and   Recommendations(SBAR).     Information from the following report(s) Nurse Handoff Report, ED Encounter Summary, ED SBAR, Intake/Output, and MAR was reviewed with the receiving nurse.    Opportunity for questions and clarification was provided.      Assessment completed upon patient's arrival to unit and care assume.        
Verbal and written discharge instructions given to patient. Questions and answers provided. No further needs identified.  
24 hour   Intake 180 ml   Output --   Net 180 ml       CBC w/Diff    Lab Results   Component Value Date/Time    WBC 8.8 05/19/2024 03:37 AM    RBC 4.26 05/19/2024 03:37 AM    RBC 4.35 10/10/2023 12:01 PM    HGB 11.2 (L) 05/19/2024 03:37 AM    HCT 35.8 05/19/2024 03:37 AM    MCV 84.0 05/19/2024 03:37 AM    MCH 26.3 05/19/2024 03:37 AM    MCHC 31.3 05/19/2024 03:37 AM    RDW 17.7 (H) 05/19/2024 03:37 AM     05/19/2024 03:37 AM    MPV 11.5 05/19/2024 03:37 AM    No results found for: \"MONO\", \"BANDS\", \"BASOS\", \"METAS\", \"PRO\"   Basic Metabolic Profile   Recent Labs     05/19/24  0337      K 3.2*      CO2 29   BUN 7   GLUCOSE 124*   MG 2.3        Hepatic Function    Lab Results   Component Value Date/Time    ALT 9 05/19/2024 03:37 AM    No components found for: \"SGOT\", \"GPT\", \"DBILI\", \"TBIL\"       Coags   Recent Labs     05/16/24  1734   INR 1.2*               TAL BACA MD    [facility]  [email or web address]  Phone: [phone]      
[facility]   [phone]   
Results:       Chemistry Recent Labs     05/16/24  1734 05/17/24  0420 05/18/24  0530   GLUCOSE 153* 166* 100*    141 138   K 3.4* 2.4* 3.1*    105 105   CO2 25 25 29   BUN 18 14 7   CREATININE 1.15 0.84 0.75   CALCIUM 9.6 8.5 8.5   BILITOT 0.6  --  0.4   ALT 11*  --  10*   AST 24  --  8*   ALKPHOS 91  --  64   GLOB 5.8*  --  4.1*      CBC w/Diff Recent Labs     05/16/24  1734 05/18/24  0530   WBC 14.4* 8.9   RBC 5.14 4.01*   HGB 13.2 10.3*   HCT 42.5 33.7*    284      Cardiac Enzymes No results for input(s): \"CKTOTAL\", \"CKMB\", \"CKMBINDEX\", \"TROPONINI\" in the last 72 hours.    Invalid input(s): \"ARACELY\"   Coagulation Recent Labs     05/16/24 1734   PROTIME 15.4*   INR 1.2*       Lipid Panel Lab Results   Component Value Date/Time    CHOL 131 05/18/2024 05:30 AM    TRIG 119 05/18/2024 05:30 AM    HDL 28 05/18/2024 05:30 AM    VLDL 23.8 05/18/2024 05:30 AM    CHOLHDLRATIO 4.7 05/18/2024 05:30 AM      BNP No results for input(s): \"BNP\", \"PROBNP\", \"NTPROBNP\" in the last 72 hours.   Liver Enzymes Recent Labs     05/18/24  0530   BILITOT 0.4   ALKPHOS 64   AST 8*   ALT 10*      Thyroid Studies Lab Results   Component Value Date/Time    TSH 1.560 07/22/2019 12:00 AM          Procedures/imaging: see electronic medical records for all procedures/Xrays and details which were not copied into this note but were reviewed prior to creation of Plan    Medications:   Current Facility-Administered Medications   Medication Dose Route Frequency    vancomycin (VANCOCIN) 50 mg/mL oral solution 125 mg  125 mg Oral 4 times per day    [START ON 5/19/2024] lactobacillus (CULTURELLE) capsule 1 capsule  1 capsule Oral Daily with breakfast    atorvastatin (LIPITOR) tablet 80 mg  80 mg Oral Daily    DULoxetine (CYMBALTA) extended release capsule 30 mg  30 mg Oral Daily    hydrALAZINE (APRESOLINE) tablet 100 mg  100 mg Oral Q8H PRN    losartan (COZAAR) tablet 100 mg  100 mg Oral Daily    metoprolol tartrate (LOPRESSOR) tablet 25 
capsule  1 capsule Oral Daily with breakfast    atorvastatin (LIPITOR) tablet 80 mg  80 mg Oral Daily    DULoxetine (CYMBALTA) extended release capsule 30 mg  30 mg Oral Daily    hydrALAZINE (APRESOLINE) tablet 100 mg  100 mg Oral Q8H PRN    losartan (COZAAR) tablet 100 mg  100 mg Oral Daily    metoprolol tartrate (LOPRESSOR) tablet 25 mg  25 mg Oral BID    pantoprazole (PROTONIX) tablet 40 mg  40 mg Oral Daily    pregabalin (LYRICA) capsule 200 mg  200 mg Oral TID    tiZANidine (ZANAFLEX) tablet 6 mg  6 mg Oral BID PRN    [START ON 5/29/2024] vitamin D (ERGOCALCIFEROL) capsule 50,000 Units  50,000 Units Oral Weekly    sodium chloride flush 0.9 % injection 5-40 mL  5-40 mL IntraVENous 2 times per day    sodium chloride flush 0.9 % injection 5-40 mL  5-40 mL IntraVENous PRN    0.9 % sodium chloride infusion   IntraVENous PRN    potassium chloride (KLOR-CON M) extended release tablet 40 mEq  40 mEq Oral PRN    Or    potassium bicarb-citric acid (EFFER-K) effervescent tablet 40 mEq  40 mEq Oral PRN    Or    potassium chloride 10 mEq/100 mL IVPB (Peripheral Line)  10 mEq IntraVENous PRN    magnesium sulfate 2000 mg in 50 mL IVPB premix  2,000 mg IntraVENous PRN    ondansetron (ZOFRAN-ODT) disintegrating tablet 4 mg  4 mg Oral Q8H PRN    Or    ondansetron (ZOFRAN) injection 4 mg  4 mg IntraVENous Q6H PRN    polyethylene glycol (GLYCOLAX) packet 17 g  17 g Oral Daily PRN    acetaminophen (TYLENOL) tablet 650 mg  650 mg Oral Q6H PRN    Or    acetaminophen (TYLENOL) suppository 650 mg  650 mg Rectal Q6H PRN    insulin lispro (HUMALOG,ADMELOG) injection vial 0-4 Units  0-4 Units SubCUTAneous TID WC    insulin lispro (HUMALOG,ADMELOG) injection vial 0-4 Units  0-4 Units SubCUTAneous Nightly    glucose chewable tablet 16 g  4 tablet Oral PRN    dextrose bolus 10% 125 mL  125 mL IntraVENous PRN    Or    dextrose bolus 10% 250 mL  250 mL IntraVENous PRN    Glucagon Emergency SOLR 1 mg  1 mg SubCUTAneous PRN    dextrose 10 %

## 2024-05-20 NOTE — CARE COORDINATION
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    Discharge order noted for today. Orders received. No other needs identified at this time. Case management remains available as needed.    Leti Rico BSW   Case Management

## 2024-05-22 ENCOUNTER — HOSPITAL ENCOUNTER (EMERGENCY)
Facility: HOSPITAL | Age: 54
Discharge: LEFT BEFORE DISCHARGE | End: 2024-05-22

## 2024-05-23 LAB — CALPROTECTIN STL-MCNT: 2490 UG/G (ref 0–120)

## 2024-06-07 ENCOUNTER — OFFICE VISIT (OUTPATIENT)
Age: 54
End: 2024-06-07

## 2024-06-07 DIAGNOSIS — G89.29 CHRONIC PAIN OF LEFT KNEE: Primary | ICD-10-CM

## 2024-06-07 DIAGNOSIS — M22.8X2 MALTRACKING OF LEFT PATELLA: ICD-10-CM

## 2024-06-07 DIAGNOSIS — S83.519A PARTIAL TEAR OF ANTERIOR CRUCIATE LIGAMENT OF KNEE: ICD-10-CM

## 2024-06-07 DIAGNOSIS — Z96.652 PRESENCE OF ARTIFICIAL KNEE JOINT, LEFT: ICD-10-CM

## 2024-06-07 DIAGNOSIS — M25.562 CHRONIC PAIN OF LEFT KNEE: Primary | ICD-10-CM

## 2024-06-07 DIAGNOSIS — Z01.818 PRE-OP TESTING: Primary | ICD-10-CM

## 2024-06-07 NOTE — PROGRESS NOTES
fevers or chills.  No wound problems.    Patient does remind she has a history of Crohn's with some steroid use intermittently.  I do think that the steroid use has contributed to her tear as well as patellar maltracking.    Patient denies recent fevers, chills, chest pain, SOB, or injuries.   No recent systemic changes noted.  A 12-point review of systems is performed today.  Pertinent positives are noted.  All other systems reviewed and otherwise are negative.    Physical exam: General: Alert and oriented x3, nad.  well-developed, well nourished.  normal affect, AF.  NC/AT, EOMI, neck supple, trachea midline, no JVD present. Breathing is non-labored.  Examination of the lower extremities reveals pain-free range of motion the hips are there is no pain to palpation the trochanter bursa.  Negative straight leg raise.  Negative calf tenderness but negative Homans.  No signs of DVT present.  The left knee reveals skin intact with there is no erythema ecchymosis noted.  There are no signs for infection or cellulitis present.  She does have a defect noted to the medial retinaculum with some patellar tilt.  Stability is good.    Radiographs obtained the office today 6/7/2024 at the [place] location including a skyline shows that she has patellar maltracking of the left knee.    Assessment: #1 status post left total replacement, #2 patellar maltracking left knee    Plan: At this point, we discussed treatment options.  I have recommended surgical intervention for her patellar maltracking.  The alternatives, risk, complications, expected normal discussed.  The patient understands wish to proceed.  She is placed into a hinged knee brace.    Care plan outlined and precautions discussed. Radiographs and Results were reviewed with the patient.  Medications were reviewed with the patient. All of pt's questions and concerns were addressed.  Increasing symptoms and return precautions associated with chief complaint and evaluation

## 2024-06-12 ENCOUNTER — HOSPITAL ENCOUNTER (OUTPATIENT)
Facility: HOSPITAL | Age: 54
Discharge: HOME | End: 2024-06-15

## 2024-06-12 LAB — LABCORP SPECIMEN COLLECTION: NORMAL

## 2024-06-12 PROCEDURE — 99001 SPECIMEN HANDLING PT-LAB: CPT

## 2024-06-13 LAB
APTT PPP: 28 SEC (ref 24–33)
BASOPHILS # BLD AUTO: 0 X10E3/UL (ref 0–0.2)
BASOPHILS NFR BLD AUTO: 1 %
BUN SERPL-MCNC: 15 MG/DL (ref 6–24)
BUN/CREAT SERPL: 24 (ref 9–23)
CALCIUM SERPL-MCNC: 9 MG/DL (ref 8.7–10.2)
CHLORIDE SERPL-SCNC: 105 MMOL/L (ref 96–106)
CO2 SERPL-SCNC: 24 MMOL/L (ref 20–29)
CREAT SERPL-MCNC: 0.63 MG/DL (ref 0.57–1)
EGFRCR SERPLBLD CKD-EPI 2021: 105 ML/MIN/1.73
EOSINOPHIL # BLD AUTO: 0.3 X10E3/UL (ref 0–0.4)
EOSINOPHIL NFR BLD AUTO: 5 %
ERYTHROCYTE [DISTWIDTH] IN BLOOD BY AUTOMATED COUNT: 18.3 % (ref 11.7–15.4)
GLUCOSE SERPL-MCNC: 91 MG/DL (ref 70–99)
HCT VFR BLD AUTO: 34 % (ref 34–46.6)
HGB BLD-MCNC: 10.9 G/DL (ref 11.1–15.9)
IMM GRANULOCYTES # BLD AUTO: 0 X10E3/UL (ref 0–0.1)
IMM GRANULOCYTES NFR BLD AUTO: 0 %
INR PPP: 1 (ref 0.9–1.2)
LYMPHOCYTES # BLD AUTO: 1.3 X10E3/UL (ref 0.7–3.1)
LYMPHOCYTES NFR BLD AUTO: 27 %
MCH RBC QN AUTO: 26.4 PG (ref 26.6–33)
MCHC RBC AUTO-ENTMCNC: 32.1 G/DL (ref 31.5–35.7)
MCV RBC AUTO: 82 FL (ref 79–97)
MONOCYTES # BLD AUTO: 0.3 X10E3/UL (ref 0.1–0.9)
MONOCYTES NFR BLD AUTO: 7 %
NEUTROPHILS # BLD AUTO: 2.8 X10E3/UL (ref 1.4–7)
NEUTROPHILS NFR BLD AUTO: 60 %
PLATELET # BLD AUTO: 276 X10E3/UL (ref 150–450)
POTASSIUM SERPL-SCNC: 4.1 MMOL/L (ref 3.5–5.2)
PROTHROMBIN TIME: 10.9 SEC (ref 9.1–12)
RBC # BLD AUTO: 4.13 X10E6/UL (ref 3.77–5.28)
SODIUM SERPL-SCNC: 142 MMOL/L (ref 134–144)
SPECIMEN STATUS REPORT: NORMAL
WBC # BLD AUTO: 4.7 X10E3/UL (ref 3.4–10.8)

## 2024-07-08 RX ORDER — ONDANSETRON 4 MG/1
4 TABLET, FILM COATED ORAL EVERY 8 HOURS PRN
Qty: 30 TABLET | Refills: 2 | Status: SHIPPED | OUTPATIENT
Start: 2024-07-08

## 2024-07-09 ENCOUNTER — TELEPHONE (OUTPATIENT)
Age: 54
End: 2024-07-09

## 2024-07-10 DIAGNOSIS — E11.40 TYPE 2 DIABETES MELLITUS WITH DIABETIC NEUROPATHY, WITH LONG-TERM CURRENT USE OF INSULIN (HCC): ICD-10-CM

## 2024-07-10 DIAGNOSIS — Z79.4 TYPE 2 DIABETES MELLITUS WITH DIABETIC NEUROPATHY, WITH LONG-TERM CURRENT USE OF INSULIN (HCC): ICD-10-CM

## 2024-07-10 RX ORDER — PREGABALIN 200 MG/1
200 CAPSULE ORAL 3 TIMES DAILY
Qty: 270 CAPSULE | Refills: 0 | Status: SHIPPED | OUTPATIENT
Start: 2024-07-10 | End: 2024-10-08

## 2024-07-10 NOTE — TELEPHONE ENCOUNTER
Patient is requesting refill on Lyrica however patient has not been seen since 6/14/23.  Will send message to Dr. Cobian for consideration.  Medication was not pended

## 2024-07-11 NOTE — TELEPHONE ENCOUNTER
Contacted patient, name and  verified. Patient was advised refill sent and need to be seen in office prior to anymore refills.  Patient stated she has an appointment next week, appointment time verified with patient.  No further questions.

## 2024-07-30 ENCOUNTER — PREP FOR PROCEDURE (OUTPATIENT)
Age: 54
End: 2024-07-30

## 2024-07-30 DIAGNOSIS — M22.8X2 PATELLAR MALTRACKING, LEFT: ICD-10-CM

## 2024-07-31 NOTE — PROGRESS NOTES
Patient is in the supine position.   The body was positioned using the following devices: safety strap.  The left arm was positioned using the following devices: safety strap, arm board and self-adhering foam.  The right arm was positioned using the following devices: safety strap, arm board and self-adhering foam.  The left leg was positioned using the following devices: safety strap, knee roll and self-adhering foam.  The right leg wa s positioned using the following devices: safety strap, knee roll and self-adhering foam.  The patient was positioned by Kym Bradley Siddons, Diane K Progress Note:  Patient unable to void. Bladder scanned for 549. Order for donaldson and rapaflo. Consulted pharmacy about equivalent dose for rapaflo 8 mg.

## 2024-08-12 DIAGNOSIS — M22.8X2 PATELLAR MALTRACKING, LEFT: ICD-10-CM

## 2024-08-12 DIAGNOSIS — Z01.818 PRE-OP TESTING: Primary | ICD-10-CM

## 2024-08-13 DIAGNOSIS — Z01.818 PRE-OP TESTING: ICD-10-CM

## 2024-08-13 DIAGNOSIS — M22.8X2 PATELLAR MALTRACKING, LEFT: ICD-10-CM

## 2024-08-20 NOTE — PERIOP NOTE
Instructions for your surgery at [facility]      Today's Date:  8/20/2024      Patient's Name:  Priscilla Dean           Surgery Date:  8/26              Please enter the main entrance of the hospital and check-in at the front security desk located in the lobby. They will direct you to the area to report for your surgery.     Do NOT eat or drink anything, including candy, gum, or ice chips after midnight prior to your surgery, unless you have specific instructions from your surgeon or anesthesia provider to do so.  Brush your teeth before coming to the hospital. You may swish with water, but do not swallow.  No smoking/Vaping/E-Cigarettes 24 hours prior to the day of surgery.  No alcohol 24 hours prior to the day of surgery.  No recreational drugs for one week prior to the day of surgery.  Bring Photo ID, Insurance information, and Co-pay if required on day of surgery.  Bring in pertinent legal documents, such as, Medical Power of , DNR, Advance Directive, etc.  Leave all valuables, including money/purse, at home.  Remove all jewelry, including ALL body piercings, nail polish, acrylic nails, and makeup (including mascara); no lotions, powders, deodorant, or perfume/cologne/after shave on the skin.  Follow instruction for Hibiclens washes and CHG wipes from surgeon's office.   Glasses and dentures may be worn to the hospital. They must be removed prior to surgery. Please bring case/container for glasses or dentures.   Contact lenses should not be worn on day of surgery.   Call your doctor's office if symptoms of a cold or illness develop within 24-48 hours prior to your surgery.  Call your doctor's office if you have any questions concerning insurance or co-pays.  15. AN ADULT (relative or friend 18 years or older) MUST DRIVE YOU HOME AFTER YOUR SURGERY.  16. Please make arrangements for a responsible adult (18 years or older) to be with you for 24 hours after your surgery.   17. TWO

## 2024-08-21 ENCOUNTER — HOSPITAL ENCOUNTER (OUTPATIENT)
Facility: HOSPITAL | Age: 54
Discharge: HOME | End: 2024-08-24
Payer: COMMERCIAL

## 2024-08-21 LAB
ANION GAP SERPL CALC-SCNC: 6 MMOL/L (ref 3–18)
APTT PPP: 25.5 SEC (ref 23–36.4)
BASOPHILS # BLD: 0 K/UL (ref 0–0.1)
BASOPHILS NFR BLD: 1 % (ref 0–2)
BUN SERPL-MCNC: 21 MG/DL (ref 7–18)
BUN/CREAT SERPL: 17 (ref 12–20)
CALCIUM SERPL-MCNC: 9.1 MG/DL (ref 8.5–10.1)
CHLORIDE SERPL-SCNC: 108 MMOL/L (ref 100–111)
CO2 SERPL-SCNC: 28 MMOL/L (ref 21–32)
CREAT SERPL-MCNC: 1.22 MG/DL (ref 0.6–1.3)
DIFFERENTIAL METHOD BLD: ABNORMAL
EOSINOPHIL # BLD: 0.3 K/UL (ref 0–0.4)
EOSINOPHIL NFR BLD: 6 % (ref 0–5)
ERYTHROCYTE [DISTWIDTH] IN BLOOD BY AUTOMATED COUNT: 17.1 % (ref 11.6–14.5)
GLUCOSE SERPL-MCNC: 111 MG/DL (ref 74–99)
HCT VFR BLD AUTO: 38.9 % (ref 35–45)
HGB BLD-MCNC: 12.1 G/DL (ref 12–16)
IMM GRANULOCYTES # BLD AUTO: 0 K/UL (ref 0–0.04)
IMM GRANULOCYTES NFR BLD AUTO: 0 % (ref 0–0.5)
INR PPP: 1.1 (ref 0.9–1.1)
LYMPHOCYTES # BLD: 1.1 K/UL (ref 0.9–3.6)
LYMPHOCYTES NFR BLD: 22 % (ref 21–52)
MCH RBC QN AUTO: 26.8 PG (ref 24–34)
MCHC RBC AUTO-ENTMCNC: 31.1 G/DL (ref 31–37)
MCV RBC AUTO: 86.3 FL (ref 78–100)
MONOCYTES # BLD: 0.4 K/UL (ref 0.05–1.2)
MONOCYTES NFR BLD: 7 % (ref 3–10)
NEUTS SEG # BLD: 3.3 K/UL (ref 1.8–8)
NEUTS SEG NFR BLD: 64 % (ref 40–73)
NRBC # BLD: 0 K/UL (ref 0–0.01)
NRBC BLD-RTO: 0 PER 100 WBC
PLATELET # BLD AUTO: 132 K/UL (ref 135–420)
POTASSIUM SERPL-SCNC: 3.8 MMOL/L (ref 3.5–5.5)
PROTHROMBIN TIME: 14.5 SEC (ref 11.9–14.9)
RBC # BLD AUTO: 4.51 M/UL (ref 4.2–5.3)
SODIUM SERPL-SCNC: 142 MMOL/L (ref 136–145)
WBC # BLD AUTO: 5.2 K/UL (ref 4.6–13.2)

## 2024-08-21 PROCEDURE — 85610 PROTHROMBIN TIME: CPT

## 2024-08-21 PROCEDURE — 36415 COLL VENOUS BLD VENIPUNCTURE: CPT

## 2024-08-21 PROCEDURE — 80048 BASIC METABOLIC PNL TOTAL CA: CPT

## 2024-08-21 PROCEDURE — 85730 THROMBOPLASTIN TIME PARTIAL: CPT

## 2024-08-21 PROCEDURE — 85025 COMPLETE CBC W/AUTO DIFF WBC: CPT

## 2024-08-23 ENCOUNTER — ANESTHESIA EVENT (OUTPATIENT)
Facility: HOSPITAL | Age: 54
End: 2024-08-23
Payer: COMMERCIAL

## 2024-08-26 ENCOUNTER — APPOINTMENT (OUTPATIENT)
Facility: HOSPITAL | Age: 54
End: 2024-08-26
Attending: ORTHOPAEDIC SURGERY
Payer: COMMERCIAL

## 2024-08-26 ENCOUNTER — ANESTHESIA (OUTPATIENT)
Facility: HOSPITAL | Age: 54
End: 2024-08-26
Payer: COMMERCIAL

## 2024-08-26 ENCOUNTER — HOSPITAL ENCOUNTER (OUTPATIENT)
Facility: HOSPITAL | Age: 54
Setting detail: OBSERVATION
Discharge: HOME | End: 2024-08-26
Attending: ORTHOPAEDIC SURGERY | Admitting: ORTHOPAEDIC SURGERY
Payer: COMMERCIAL

## 2024-08-26 VITALS
RESPIRATION RATE: 16 BRPM | TEMPERATURE: 97.4 F | DIASTOLIC BLOOD PRESSURE: 75 MMHG | OXYGEN SATURATION: 99 % | HEIGHT: 71 IN | WEIGHT: 287.04 LBS | SYSTOLIC BLOOD PRESSURE: 124 MMHG | BODY MASS INDEX: 40.19 KG/M2 | HEART RATE: 77 BPM

## 2024-08-26 DIAGNOSIS — M22.8X2 PATELLAR MALTRACKING, LEFT: Primary | ICD-10-CM

## 2024-08-26 LAB
EST. AVERAGE GLUCOSE BLD GHB EST-MCNC: 114 MG/DL
GLUCOSE BLD STRIP.AUTO-MCNC: 114 MG/DL (ref 70–110)
GLUCOSE BLD STRIP.AUTO-MCNC: 87 MG/DL (ref 70–110)
HBA1C MFR BLD: 5.6 % (ref 4.2–5.6)
HCG UR QL: NEGATIVE

## 2024-08-26 PROCEDURE — 2500000003 HC RX 250 WO HCPCS: Performed by: PHYSICIAN ASSISTANT

## 2024-08-26 PROCEDURE — 6360000002 HC RX W HCPCS: Performed by: NURSE ANESTHETIST, CERTIFIED REGISTERED

## 2024-08-26 PROCEDURE — 6360000002 HC RX W HCPCS

## 2024-08-26 PROCEDURE — 6370000000 HC RX 637 (ALT 250 FOR IP): Performed by: NURSE ANESTHETIST, CERTIFIED REGISTERED

## 2024-08-26 PROCEDURE — 73562 X-RAY EXAM OF KNEE 3: CPT

## 2024-08-26 PROCEDURE — 2720000010 HC SURG SUPPLY STERILE: Performed by: ORTHOPAEDIC SURGERY

## 2024-08-26 PROCEDURE — 2580000003 HC RX 258: Performed by: ORTHOPAEDIC SURGERY

## 2024-08-26 PROCEDURE — 7100000001 HC PACU RECOVERY - ADDTL 15 MIN: Performed by: ORTHOPAEDIC SURGERY

## 2024-08-26 PROCEDURE — 6370000000 HC RX 637 (ALT 250 FOR IP): Performed by: ORTHOPAEDIC SURGERY

## 2024-08-26 PROCEDURE — 2500000003 HC RX 250 WO HCPCS: Performed by: ORTHOPAEDIC SURGERY

## 2024-08-26 PROCEDURE — 97161 PT EVAL LOW COMPLEX 20 MIN: CPT

## 2024-08-26 PROCEDURE — 6360000002 HC RX W HCPCS: Performed by: PHYSICIAN ASSISTANT

## 2024-08-26 PROCEDURE — 97535 SELF CARE MNGMENT TRAINING: CPT

## 2024-08-26 PROCEDURE — 3700000000 HC ANESTHESIA ATTENDED CARE: Performed by: ORTHOPAEDIC SURGERY

## 2024-08-26 PROCEDURE — 6360000002 HC RX W HCPCS: Performed by: ORTHOPAEDIC SURGERY

## 2024-08-26 PROCEDURE — 97530 THERAPEUTIC ACTIVITIES: CPT

## 2024-08-26 PROCEDURE — 2580000003 HC RX 258: Performed by: PHYSICIAN ASSISTANT

## 2024-08-26 PROCEDURE — 81025 URINE PREGNANCY TEST: CPT

## 2024-08-26 PROCEDURE — 6370000000 HC RX 637 (ALT 250 FOR IP): Performed by: PHYSICIAN ASSISTANT

## 2024-08-26 PROCEDURE — 3600000003 HC SURGERY LEVEL 3 BASE: Performed by: ORTHOPAEDIC SURGERY

## 2024-08-26 PROCEDURE — 2580000003 HC RX 258: Performed by: NURSE ANESTHETIST, CERTIFIED REGISTERED

## 2024-08-26 PROCEDURE — 97165 OT EVAL LOW COMPLEX 30 MIN: CPT

## 2024-08-26 PROCEDURE — 82962 GLUCOSE BLOOD TEST: CPT

## 2024-08-26 PROCEDURE — 2500000003 HC RX 250 WO HCPCS: Performed by: NURSE ANESTHETIST, CERTIFIED REGISTERED

## 2024-08-26 PROCEDURE — 3600000013 HC SURGERY LEVEL 3 ADDTL 15MIN: Performed by: ORTHOPAEDIC SURGERY

## 2024-08-26 PROCEDURE — G0378 HOSPITAL OBSERVATION PER HR: HCPCS

## 2024-08-26 PROCEDURE — 2709999900 HC NON-CHARGEABLE SUPPLY: Performed by: ORTHOPAEDIC SURGERY

## 2024-08-26 PROCEDURE — A4217 STERILE WATER/SALINE, 500 ML: HCPCS | Performed by: ORTHOPAEDIC SURGERY

## 2024-08-26 PROCEDURE — 27425 LAT RETINACULAR RELEASE OPEN: CPT | Performed by: ORTHOPAEDIC SURGERY

## 2024-08-26 PROCEDURE — C1713 ANCHOR/SCREW BN/BN,TIS/BN: HCPCS | Performed by: ORTHOPAEDIC SURGERY

## 2024-08-26 PROCEDURE — L1830 KO IMMOB CANVAS LONG PRE OTS: HCPCS | Performed by: ORTHOPAEDIC SURGERY

## 2024-08-26 PROCEDURE — 7100000000 HC PACU RECOVERY - FIRST 15 MIN: Performed by: ORTHOPAEDIC SURGERY

## 2024-08-26 PROCEDURE — 6360000002 HC RX W HCPCS: Performed by: ANESTHESIOLOGY

## 2024-08-26 PROCEDURE — 3700000001 HC ADD 15 MINUTES (ANESTHESIA): Performed by: ORTHOPAEDIC SURGERY

## 2024-08-26 PROCEDURE — 97116 GAIT TRAINING THERAPY: CPT

## 2024-08-26 PROCEDURE — 64447 NJX AA&/STRD FEMORAL NRV IMG: CPT | Performed by: ANESTHESIOLOGY

## 2024-08-26 PROCEDURE — 83036 HEMOGLOBIN GLYCOSYLATED A1C: CPT

## 2024-08-26 PROCEDURE — C9290 INJ, BUPIVACAINE LIPOSOME: HCPCS | Performed by: PHYSICIAN ASSISTANT

## 2024-08-26 RX ORDER — ACETAMINOPHEN 500 MG
1000 TABLET ORAL
Status: COMPLETED | OUTPATIENT
Start: 2024-08-26 | End: 2024-08-26

## 2024-08-26 RX ORDER — NALOXONE HYDROCHLORIDE 0.4 MG/ML
INJECTION, SOLUTION INTRAMUSCULAR; INTRAVENOUS; SUBCUTANEOUS PRN
Status: DISCONTINUED | OUTPATIENT
Start: 2024-08-26 | End: 2024-08-26 | Stop reason: HOSPADM

## 2024-08-26 RX ORDER — CEFADROXIL 500 MG/1
500 CAPSULE ORAL 2 TIMES DAILY
Qty: 10 CAPSULE | Refills: 0 | Status: SHIPPED | OUTPATIENT
Start: 2024-08-26 | End: 2024-08-31

## 2024-08-26 RX ORDER — OXYCODONE HYDROCHLORIDE 5 MG/1
5 TABLET ORAL EVERY 4 HOURS PRN
Qty: 42 TABLET | Refills: 0 | Status: SHIPPED | OUTPATIENT
Start: 2024-08-26 | End: 2024-09-02

## 2024-08-26 RX ORDER — TAMSULOSIN HYDROCHLORIDE 0.4 MG/1
0.4 CAPSULE ORAL
Status: COMPLETED | OUTPATIENT
Start: 2024-08-26 | End: 2024-08-26

## 2024-08-26 RX ORDER — OXYCODONE HYDROCHLORIDE 5 MG/1
5 TABLET ORAL
Status: COMPLETED | OUTPATIENT
Start: 2024-08-26 | End: 2024-08-26

## 2024-08-26 RX ORDER — ROPIVACAINE HYDROCHLORIDE 2 MG/ML
INJECTION, SOLUTION EPIDURAL; INFILTRATION; PERINEURAL
Status: COMPLETED
Start: 2024-08-26 | End: 2024-08-26

## 2024-08-26 RX ORDER — FENTANYL CITRATE 50 UG/ML
100 INJECTION, SOLUTION INTRAMUSCULAR; INTRAVENOUS ONCE
Status: COMPLETED | OUTPATIENT
Start: 2024-08-26 | End: 2024-08-26

## 2024-08-26 RX ORDER — ACETAMINOPHEN 325 MG/1
650 TABLET ORAL
Status: COMPLETED | OUTPATIENT
Start: 2024-08-26 | End: 2024-08-26

## 2024-08-26 RX ORDER — FAMOTIDINE 20 MG/1
20 TABLET, FILM COATED ORAL ONCE
Status: COMPLETED | OUTPATIENT
Start: 2024-08-26 | End: 2024-08-26

## 2024-08-26 RX ORDER — OXYCODONE HYDROCHLORIDE 10 MG/1
10 TABLET ORAL EVERY 6 HOURS PRN
Status: DISCONTINUED | OUTPATIENT
Start: 2024-08-26 | End: 2024-08-26

## 2024-08-26 RX ORDER — CELECOXIB 100 MG/1
200 CAPSULE ORAL
Status: COMPLETED | OUTPATIENT
Start: 2024-08-26 | End: 2024-08-26

## 2024-08-26 RX ORDER — METOCLOPRAMIDE HYDROCHLORIDE 5 MG/ML
10 INJECTION INTRAMUSCULAR; INTRAVENOUS
Status: DISCONTINUED | OUTPATIENT
Start: 2024-08-26 | End: 2024-08-26 | Stop reason: HOSPADM

## 2024-08-26 RX ORDER — ONDANSETRON 4 MG/1
4 TABLET, FILM COATED ORAL EVERY 8 HOURS PRN
Qty: 30 TABLET | Refills: 2 | Status: SHIPPED | OUTPATIENT
Start: 2024-08-26

## 2024-08-26 RX ORDER — ROPIVACAINE HYDROCHLORIDE 2 MG/ML
INJECTION, SOLUTION EPIDURAL; INFILTRATION; PERINEURAL
Status: COMPLETED | OUTPATIENT
Start: 2024-08-26 | End: 2024-08-26

## 2024-08-26 RX ORDER — LIDOCAINE HYDROCHLORIDE 10 MG/ML
1 INJECTION, SOLUTION EPIDURAL; INFILTRATION; INTRACAUDAL; PERINEURAL
Status: COMPLETED | OUTPATIENT
Start: 2024-08-26 | End: 2024-08-26

## 2024-08-26 RX ORDER — CELECOXIB 200 MG/1
200 CAPSULE ORAL 2 TIMES DAILY
Qty: 60 CAPSULE | Refills: 2 | Status: SHIPPED | OUTPATIENT
Start: 2024-08-26

## 2024-08-26 RX ORDER — KETOROLAC TROMETHAMINE 15 MG/ML
15 INJECTION, SOLUTION INTRAMUSCULAR; INTRAVENOUS ONCE
Status: COMPLETED | OUTPATIENT
Start: 2024-08-26 | End: 2024-08-26

## 2024-08-26 RX ORDER — EPHEDRINE SULFATE/0.9% NACL/PF 25 MG/5 ML
SYRINGE (ML) INTRAVENOUS PRN
Status: DISCONTINUED | OUTPATIENT
Start: 2024-08-26 | End: 2024-08-26 | Stop reason: SDUPTHER

## 2024-08-26 RX ORDER — ACETAMINOPHEN 500 MG
1000 TABLET ORAL EVERY 8 HOURS PRN
Qty: 90 TABLET | Refills: 1 | Status: SHIPPED | OUTPATIENT
Start: 2024-08-26

## 2024-08-26 RX ORDER — DOCUSATE SODIUM 100 MG/1
100 CAPSULE, LIQUID FILLED ORAL 2 TIMES DAILY
Qty: 60 CAPSULE | Refills: 1 | Status: SHIPPED | OUTPATIENT
Start: 2024-08-26 | End: 2024-10-25

## 2024-08-26 RX ORDER — MAGNESIUM HYDROXIDE 1200 MG/15ML
LIQUID ORAL CONTINUOUS PRN
Status: COMPLETED | OUTPATIENT
Start: 2024-08-26 | End: 2024-08-26

## 2024-08-26 RX ORDER — ONDANSETRON 2 MG/ML
4 INJECTION INTRAMUSCULAR; INTRAVENOUS
Status: COMPLETED | OUTPATIENT
Start: 2024-08-26 | End: 2024-08-26

## 2024-08-26 RX ORDER — SUCCINYLCHOLINE/SOD CL,ISO/PF 100 MG/5ML
SYRINGE (ML) INTRAVENOUS PRN
Status: DISCONTINUED | OUTPATIENT
Start: 2024-08-26 | End: 2024-08-26 | Stop reason: SDUPTHER

## 2024-08-26 RX ORDER — FENTANYL CITRATE 50 UG/ML
25 INJECTION, SOLUTION INTRAMUSCULAR; INTRAVENOUS EVERY 5 MIN PRN
Status: DISCONTINUED | OUTPATIENT
Start: 2024-08-26 | End: 2024-08-26 | Stop reason: HOSPADM

## 2024-08-26 RX ORDER — SODIUM CHLORIDE, SODIUM LACTATE, POTASSIUM CHLORIDE, CALCIUM CHLORIDE 600; 310; 30; 20 MG/100ML; MG/100ML; MG/100ML; MG/100ML
INJECTION, SOLUTION INTRAVENOUS CONTINUOUS
Status: DISCONTINUED | OUTPATIENT
Start: 2024-08-26 | End: 2024-08-26 | Stop reason: HOSPADM

## 2024-08-26 RX ORDER — ASPIRIN 81 MG/1
81 TABLET ORAL 2 TIMES DAILY
Qty: 60 TABLET | Refills: 3 | Status: SHIPPED | OUTPATIENT
Start: 2024-08-26

## 2024-08-26 RX ORDER — MIDAZOLAM HYDROCHLORIDE 2 MG/2ML
2 INJECTION, SOLUTION INTRAMUSCULAR; INTRAVENOUS ONCE
Status: COMPLETED | OUTPATIENT
Start: 2024-08-26 | End: 2024-08-26

## 2024-08-26 RX ORDER — VANCOMYCIN HYDROCHLORIDE 1 G/20ML
INJECTION, POWDER, LYOPHILIZED, FOR SOLUTION INTRAVENOUS PRN
Status: DISCONTINUED | OUTPATIENT
Start: 2024-08-26 | End: 2024-08-26 | Stop reason: ALTCHOICE

## 2024-08-26 RX ORDER — ROPIVACAINE HYDROCHLORIDE 5 MG/ML
30 INJECTION, SOLUTION EPIDURAL; INFILTRATION; PERINEURAL ONCE
Status: DISCONTINUED | OUTPATIENT
Start: 2024-08-26 | End: 2024-08-26 | Stop reason: HOSPADM

## 2024-08-26 RX ORDER — OXYCODONE HYDROCHLORIDE 10 MG/1
10 TABLET ORAL EVERY 6 HOURS PRN
Status: DISCONTINUED | OUTPATIENT
Start: 2024-08-26 | End: 2024-08-26 | Stop reason: HOSPADM

## 2024-08-26 RX ORDER — ONDANSETRON 2 MG/ML
INJECTION INTRAMUSCULAR; INTRAVENOUS PRN
Status: DISCONTINUED | OUTPATIENT
Start: 2024-08-26 | End: 2024-08-26 | Stop reason: SDUPTHER

## 2024-08-26 RX ORDER — ROCURONIUM BROMIDE 10 MG/ML
INJECTION, SOLUTION INTRAVENOUS PRN
Status: DISCONTINUED | OUTPATIENT
Start: 2024-08-26 | End: 2024-08-26 | Stop reason: SDUPTHER

## 2024-08-26 RX ORDER — PREGABALIN 75 MG/1
75 CAPSULE ORAL
Status: COMPLETED | OUTPATIENT
Start: 2024-08-26 | End: 2024-08-26

## 2024-08-26 RX ORDER — PROPOFOL 10 MG/ML
INJECTION, EMULSION INTRAVENOUS PRN
Status: DISCONTINUED | OUTPATIENT
Start: 2024-08-26 | End: 2024-08-26 | Stop reason: SDUPTHER

## 2024-08-26 RX ORDER — LIDOCAINE HYDROCHLORIDE 20 MG/ML
INJECTION, SOLUTION EPIDURAL; INFILTRATION; INTRACAUDAL; PERINEURAL PRN
Status: DISCONTINUED | OUTPATIENT
Start: 2024-08-26 | End: 2024-08-26 | Stop reason: SDUPTHER

## 2024-08-26 RX ORDER — MEPERIDINE HYDROCHLORIDE 25 MG/ML
12.5 INJECTION INTRAMUSCULAR; INTRAVENOUS; SUBCUTANEOUS EVERY 5 MIN PRN
Status: DISCONTINUED | OUTPATIENT
Start: 2024-08-26 | End: 2024-08-26 | Stop reason: HOSPADM

## 2024-08-26 RX ADMIN — ROPIVACAINE HYDROCHLORIDE 60 MG: 2 INJECTION EPIDURAL; INFILTRATION; PERINEURAL at 12:15

## 2024-08-26 RX ADMIN — EPHEDRINE SULFATE 10 MG: 5 INJECTION INTRAVENOUS at 13:12

## 2024-08-26 RX ADMIN — Medication 100 MG: at 12:34

## 2024-08-26 RX ADMIN — EPHEDRINE SULFATE 10 MG: 5 INJECTION INTRAVENOUS at 12:53

## 2024-08-26 RX ADMIN — EPHEDRINE SULFATE 10 MG: 5 INJECTION INTRAVENOUS at 12:49

## 2024-08-26 RX ADMIN — FENTANYL CITRATE 25 MCG: 50 INJECTION, SOLUTION INTRAMUSCULAR; INTRAVENOUS at 14:14

## 2024-08-26 RX ADMIN — OXYCODONE HYDROCHLORIDE 10 MG: 10 TABLET ORAL at 16:31

## 2024-08-26 RX ADMIN — TAMSULOSIN HYDROCHLORIDE 0.4 MG: 0.4 CAPSULE ORAL at 11:27

## 2024-08-26 RX ADMIN — KETOROLAC TROMETHAMINE 15 MG: 15 INJECTION, SOLUTION INTRAMUSCULAR; INTRAVENOUS at 14:55

## 2024-08-26 RX ADMIN — WATER 3000 MG: 1 INJECTION INTRAMUSCULAR; INTRAVENOUS; SUBCUTANEOUS at 12:37

## 2024-08-26 RX ADMIN — ROCURONIUM BROMIDE 30 MG: 10 INJECTION, SOLUTION INTRAVENOUS at 12:42

## 2024-08-26 RX ADMIN — ONDANSETRON 4 MG: 2 INJECTION INTRAMUSCULAR; INTRAVENOUS at 13:30

## 2024-08-26 RX ADMIN — SODIUM CHLORIDE, SODIUM LACTATE, POTASSIUM CHLORIDE, AND CALCIUM CHLORIDE: 600; 310; 30; 20 INJECTION, SOLUTION INTRAVENOUS at 13:29

## 2024-08-26 RX ADMIN — ROPIVACAINE HYDROCHLORIDE 30 ML: 2 INJECTION, SOLUTION EPIDURAL; INFILTRATION at 12:11

## 2024-08-26 RX ADMIN — PREGABALIN 75 MG: 75 CAPSULE ORAL at 11:27

## 2024-08-26 RX ADMIN — KETOROLAC TROMETHAMINE: 15 INJECTION, SOLUTION INTRAMUSCULAR; INTRAVENOUS at 13:05

## 2024-08-26 RX ADMIN — ROCURONIUM BROMIDE 10 MG: 10 INJECTION, SOLUTION INTRAVENOUS at 12:34

## 2024-08-26 RX ADMIN — LIDOCAINE HYDROCHLORIDE 100 MG: 20 INJECTION, SOLUTION EPIDURAL; INFILTRATION; INTRACAUDAL; PERINEURAL at 12:34

## 2024-08-26 RX ADMIN — LIDOCAINE HYDROCHLORIDE 1 ML: 10 INJECTION, SOLUTION EPIDURAL; INFILTRATION; INTRACAUDAL; PERINEURAL at 12:24

## 2024-08-26 RX ADMIN — SUGAMMADEX 400 MG: 100 INJECTION, SOLUTION INTRAVENOUS at 13:11

## 2024-08-26 RX ADMIN — EPHEDRINE SULFATE 10 MG: 5 INJECTION INTRAVENOUS at 13:15

## 2024-08-26 RX ADMIN — CELECOXIB 200 MG: 100 CAPSULE ORAL at 11:27

## 2024-08-26 RX ADMIN — ONDANSETRON 4 MG: 2 INJECTION INTRAMUSCULAR; INTRAVENOUS at 14:58

## 2024-08-26 RX ADMIN — FENTANYL CITRATE 25 MCG: 50 INJECTION, SOLUTION INTRAMUSCULAR; INTRAVENOUS at 14:08

## 2024-08-26 RX ADMIN — FENTANYL CITRATE 100 MCG: 50 INJECTION, SOLUTION INTRAMUSCULAR; INTRAVENOUS at 12:14

## 2024-08-26 RX ADMIN — SODIUM CHLORIDE, SODIUM LACTATE, POTASSIUM CHLORIDE, AND CALCIUM CHLORIDE: 600; 310; 30; 20 INJECTION, SOLUTION INTRAVENOUS at 11:35

## 2024-08-26 RX ADMIN — ACETAMINOPHEN 1000 MG: 500 TABLET ORAL at 11:27

## 2024-08-26 RX ADMIN — ACETAMINOPHEN 325MG 325 MG: 325 TABLET ORAL at 14:21

## 2024-08-26 RX ADMIN — OXYCODONE HYDROCHLORIDE 5 MG: 5 TABLET ORAL at 14:21

## 2024-08-26 RX ADMIN — MIDAZOLAM 2 MG: 1 INJECTION INTRAMUSCULAR; INTRAVENOUS at 12:14

## 2024-08-26 RX ADMIN — TRANEXAMIC ACID 1000 MG: 100 INJECTION, SOLUTION INTRAVENOUS at 12:22

## 2024-08-26 RX ADMIN — PROPOFOL 200 MG: 10 INJECTION, EMULSION INTRAVENOUS at 12:34

## 2024-08-26 RX ADMIN — FENTANYL CITRATE 25 MCG: 50 INJECTION, SOLUTION INTRAMUSCULAR; INTRAVENOUS at 14:03

## 2024-08-26 RX ADMIN — FAMOTIDINE 20 MG: 20 TABLET ORAL at 11:27

## 2024-08-26 ASSESSMENT — PAIN SCALES - GENERAL
PAINLEVEL_OUTOF10: 6
PAINLEVEL_OUTOF10: 7
PAINLEVEL_OUTOF10: 4
PAINLEVEL_OUTOF10: 6
PAINLEVEL_OUTOF10: 3
PAINLEVEL_OUTOF10: 5
PAINLEVEL_OUTOF10: 4

## 2024-08-26 ASSESSMENT — PAIN DESCRIPTION - LOCATION
LOCATION: KNEE

## 2024-08-26 ASSESSMENT — PAIN DESCRIPTION - DESCRIPTORS
DESCRIPTORS: THROBBING
DESCRIPTORS: BURNING
DESCRIPTORS: THROBBING
DESCRIPTORS: THROBBING
DESCRIPTORS: BURNING

## 2024-08-26 ASSESSMENT — PAIN - FUNCTIONAL ASSESSMENT
PAIN_FUNCTIONAL_ASSESSMENT: 0-10
PAIN_FUNCTIONAL_ASSESSMENT: ACTIVITIES ARE NOT PREVENTED
PAIN_FUNCTIONAL_ASSESSMENT: 0-10

## 2024-08-26 ASSESSMENT — PAIN DESCRIPTION - ORIENTATION
ORIENTATION: LEFT

## 2024-08-26 NOTE — PROGRESS NOTES
Patient discharged for home. Assisted with getting dressed and mobilized via wheelchair to [facility]. Paperwork given with discharge instructions and patient expressed understanding of the instructions presented. Medication collected from pharmacy be her spouse at bedside and in her possession. Patient left unit in jovial mood, all personal effects accounted for.

## 2024-08-26 NOTE — OP NOTE
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    PROCEDURES PERFORMED:  Revision left knee with lateral release scar tissue excision and rebalancing of patella and repair/realignment of proximal quad tendon.    SURGEON:  Vish Orlando MD    ASSISTANT:       1. BILL Olivarez, first assistant.     2. Roderick Ledezma, second assistant.    ANESTHESIA:  Preoperative nerve block with light general.    BILL Olivarez was the first assistant and assisted with all phases of surgery commencing with patient positioning, patient prep, patient drape, leg positioning during surgery, retracting, assisting with the surgery itself, closure, dressing placement, and transfer.    ESTIMATED BLOOD LOSS:  Minimal.    SPECIMENS REMOVED:  None.    INTRAOPERATIVE FINDINGS:  Same.     COMPLICATIONS:  None.    IMPLANTS:  None.    INDICATIONS:  Same.    DESCRIPTION OF PROCEDURE:  The patient is a very nice lady, who has had a couple of falls and she was diagnosed with some patellar maltracking.  She was still able to maintain straight leg raise with some weakness, however, on touch of lag, and we felt she would benefit from revision with lateral release, rebalancing of the patella, and getting it to track straight.  All risks and benefits were described and the patient elected to proceed.  Antibiotics confirmed given by Anesthesia at the time-out and time-out performed.  We used the proximal two-thirds of the incision, maintained full-thickness flaps and delineated the

## 2024-08-26 NOTE — DISCHARGE SUMMARY
8/26/2024  9:36 AM    8/26/2024, 1:37 PM    Primary Dx:left Orthopedic / Rheumatologic: Total Knee Replacement, revision, soft tissue  Secondary Dx: Etiological Diagnoses: none    HPI:  Pt had a previous TKA of their left knee and had a fall injuring her soft tissue in the knee causing patellar maltracking.  She failed conservative treatment.  Due to the current findings and affected activity of daily living surgical intervention is indicated.  The alternatives, risks, complications as well as expected outcome were discussed, the patient understands and wishes to proceed with surgery    Past Medical History:   Diagnosis Date    Bilateral knee pain     Crohn's colitis (HCC)     Diabetes (HCC)     IDDM    HTN (hypertension)     Hypercholesterolemia     Pulmonary embolism (HCC) 03/2019    Stool color black     crohns         Current Facility-Administered Medications:     lactated ringers IV soln infusion, , IntraVENous, Continuous, Felecia Waters APRN - CRNA, Last Rate: 125 mL/hr at 08/26/24 1222, New Bag at 08/26/24 1329    ROPivacaine (NAROPIN) 0.5% injection 30 mL, 30 mL, Other, Once, Felecia Waters APRN - CRNA    tranexamic acid (CYKLOKAPRON) 1,000 mg in sodium chloride 0.9 % 110 mL IVPB (mini-bag), 1,000 mg, IntraVENous, Q2H, Karl Olivarez PA-C, 1,000 mg at 08/26/24 1222    Facility-Administered Medications Ordered in Other Encounters:     rocuronium (ZEMURON) injection, , IntraVENous, PRN, Kimberly Ruiz APRN - CRNA, 30 mg at 08/26/24 1242    lidocaine PF 2 % injection, , IntraVENous, PRN, Kimberly Ruiz, EDUIN - CRNA, 100 mg at 08/26/24 1234    propofol infusion, , IntraVENous, PRN, Kimberly Ruiz APRN - CRNA, 200 mg at 08/26/24 1234    succinylcholine chloride (ANECTINE) injection, , IntraVENous, PRN, Kimberly Ruiz APRN - CRNA, 100 mg at 08/26/24 1234    ePHEDrine injection, , IntraVENous, PRN, Kimberly Ruiz, EDUIN - CRNA, 10 mg at 08/26/24 1315    sugammadex (BRIDION) 200 MG/2ML injection,

## 2024-08-26 NOTE — ANESTHESIA PROCEDURE NOTES
Peripheral Block    Patient location during procedure: pre-op  Reason for block: post-op pain management and at surgeon's request  Start time: 8/26/2024 12:11 PM  End time: 8/26/2024 12:21 PM  Staffing  Performed: anesthesiologist   Anesthesiologist: Desi Vasquez MD  Performed by: Desi Vasquez MD  Authorized by: Desi Vasquez MD    Preanesthetic Checklist  Completed: patient identified, IV checked, site marked, risks and benefits discussed, surgical/procedural consents, equipment checked, pre-op evaluation, timeout performed, anesthesia consent given, oxygen available, monitors applied/VS acknowledged, fire risk safety assessment completed and verbalized and blood product R/B/A discussed and consented  Peripheral Block   Patient position: supine  Prep: ChloraPrep  Provider prep: sterile gloves  Patient monitoring: cardiac monitor, continuous pulse ox, frequent blood pressure checks, IV access and oxygen  Block type: Femoral  Adductor canal  Laterality: left  Injection technique: single-shot  Guidance: ultrasound guided  Local infiltration: lidocaine  Infiltration strength: 1 %  Local infiltration: lidocaine  Dose: 2 mL    Needle   Needle type: insulated echogenic nerve stimulator needle   Needle gauge: 21 G  Needle localization: ultrasound guidance  Needle length: 10 cm  Assessment   Injection assessment: negative aspiration for heme, no paresthesia on injection, local visualized surrounding nerve on ultrasound, no intravascular symptoms and low pressure verified by pressure monitor  Paresthesia pain: none  Slow fractionated injection: yes  Hemodynamics: stable  Outcomes: uncomplicated    Medications Administered  ropivacaine (NAROPIN) injection 0.2% - Perineural   30 mL - 8/26/2024 12:11:00 PM

## 2024-08-26 NOTE — PROGRESS NOTES
Patient's social support, diagnosis, medical stability, and prior level of function should also be taken into consideration.     SUBJECTIVE:   Patient stated “I almost fell today going down my step coming for surgery”    OBJECTIVE DATA SUMMARY:     Past Medical History:   Diagnosis Date    Bilateral knee pain     Crohn's colitis (HCC)     Diabetes (HCC)     IDDM    HTN (hypertension)     Hypercholesterolemia     Pulmonary embolism (HCC) 03/2019    Stool color black     crohns     Past Surgical History:   Procedure Laterality Date    ANKLE FRACTURE SURGERY Left 2018    COLONOSCOPY N/A 8/31/2022    COLONOSCOPY/ Biopsies performed by JASMINE Shetty MD at [facility] ENDOSCOPY    COLONOSCOPY N/A 7/12/2022    COLONOSCOPY performed by JASMINE Shetty MD at [facility] ENDOSCOPY    COLONOSCOPY N/A 9/22/2020    COLONOSCOPY with bx's performed by JASMINE Shetty MD at [facility] ENDOSCOPY    COLONOSCOPY N/A 10/21/2019    COLONOSCOPY w/ bxs performed by Wayne Cheema MD at [facility] ENDOSCOPY    FOREARM SURGERY Right 12/12/2023    RIGHT DISTAL RADIUS OPEN REDUCTION INTERNAL FIXATION, RIGHT THIRD METACARPAL OPEN REDUCTION INTERNAL FIXATION;MINI C-ARM, SUPRACLAVICULAR BLOCK; [ACUMED ORTHO] performed by Brett Mcneill DO at [facility] MAIN OR    TOTAL KNEE ARTHROPLASTY Left 10/23/2023    LEFT TOTAL KNEE REPLACEMENT performed by Vish Orlando MD at [facility] MAIN OR    TUBAL LIGATION      btl       Home Situation:   Social/Functional History  Lives With: Spouse  Type of Home: House  Home Layout: One level  Home Access: Stairs to enter without rails  Entrance Stairs - Number of Steps: 1  Bathroom Shower/Tub: Tub/Shower unit  Bathroom Toilet: Handicap height  Bathroom Equipment: Shower chair  Home Equipment: Cane, Walker - Standard  Receives Help From: Family  [x]  Right hand dominant   []  Left hand dominant    Cognitive/Behavioral Status:  Orientation  Overall Orientation Status: Within Normal Limits  Orientation Level: Oriented X4    Skin: Intact  Edema: None  of Care;Precautions;Energy Conservation;Fall Prevention Strategies;Equipment;Transfer Training;Family Education  Education Method: Verbal;Demonstration;Teach Back  Barriers to Learning: None  Education Outcome: Verbalized understanding;Demonstrated understanding    Thank you for this referral.  Nahun Castro OTR/L  Minutes: 35    Eval Complexity: Decision Making: Low Complexity

## 2024-08-26 NOTE — ANESTHESIA POSTPROCEDURE EVALUATION
Department of Anesthesiology  Postprocedure Note    Patient: Priscilla Dean  MRN: [id number]  YOB: 1970  Date of evaluation: 8/26/2024    Procedure Summary       Date: 08/26/24 Room / Location: [facility] MAIN 07 / [facility] MAIN OR    Anesthesia Start: 1222 Anesthesia Stop: 1345    Procedure: LEFT TOTA KNEE REVISION (Left: Knee) Diagnosis:       Patellar maltracking, left      (Patellar maltracking, left [M22.8X2])    Surgeons: Vish Orlando MD Responsible Provider: Desi Vasquez MD    Anesthesia Type: General, Regional ASA Status: 3            Anesthesia Type: General, Regional    Ulises Phase I: Ulises Score: 10    Ulises Phase II:      Anesthesia Post Evaluation    Patient location during evaluation: PACU  Patient participation: complete - patient participated  Level of consciousness: sleepy but conscious  Pain score: 0  Airway patency: patent  Nausea & Vomiting: no nausea and no vomiting  Cardiovascular status: blood pressure returned to baseline  Respiratory status: acceptable  Hydration status: euvolemic  Pain management: adequate    No notable events documented.

## 2024-08-26 NOTE — CARE COORDINATION
08/26/24 1800   IMM Letter   Observation Status Letter given to Patient/Family/Significant other/Guardian/POA/by: Cristian Taylor RN CM, Copy to Patient, Original on Hard Chart.   Notice of Medicare Non-Coverage Letter date given: 08/26/24   Notice of Medicare Non-Coverage Time Given 1800

## 2024-08-26 NOTE — DISCHARGE INSTRUCTIONS
Knee immobilizer on at all times  Ice to left knee 20 min/hour  Ok to take ace bandage off  Ok to shower- dressing is waterproof  Rx to [facility] out patient pharmacy  Follow up in office in one week for dressing change.

## 2024-08-26 NOTE — BRIEF OP NOTE
Brief Postoperative Note      Patient: Priscilla Dean  YOB: 1970  MRN: [id number]    Date of Procedure: 8/26/2024    Pre-Op Diagnosis Codes:      * Patellar maltracking, left [M22.8X2]    Post-Op Diagnosis: Same       Procedure(s):  LEFT TOTA KNEE REVISION,lateral release, scar excision    Surgeon(s):  Vish Orlando MD    Assistant:  Surgical Assistant: Roderick Ledezma  Physician Assistant: Karl Olivarez PA-C    Anesthesia: General    Estimated Blood Loss (mL): less than 50     Complications: None    Specimens:   * No specimens in log *    Implants:  * No implants in log *      Drains:   [REMOVED] Urinary Catheter 10/23/23 [place] (Removed)       [REMOVED] Urinary Catheter 10/24/23 [place] (Removed)       [REMOVED] External Urinary Catheter (Removed)       Findings:  Infection Present At Time Of Surgery (PATOS) (choose all levels that have infection present):  No infection present  Other Findings: same    Electronically signed by VISH ORLANDO MD on 8/26/2024 at 1:09 PM

## 2024-08-26 NOTE — PERIOP NOTE
Patient /Family /Designee has been informed that [facility] is not responsible for patient belongings per policy and the signed [facility] Patient Agreement document.  Personal items should be sent home or checked in with security.  Patient /Family /Designee selected the following action:                            [x]  Send personal items home with a family member or friend                                                 []  Check in personal items with security, excluding clothing                            []  Maintain personal items at the bedside, against recommendation                                 by Skinny Myles [facility]                                   ** If patient /family /designee chooses to maintain personal items at the bedside,                                      Complete the patient belongings inventory in the EMR.

## 2024-08-26 NOTE — PROGRESS NOTES
Physical Therapy  PHYSICAL THERAPY EVALUATION/DISCHARGE    Patient: Priscilla Dean (53 y.o. female)  Date: 8/26/2024  Primary Diagnosis: Patellar maltracking, left [M22.8X2]  Procedure(s) (LRB):  LEFT TOTA KNEE REVISION (Left) Day of Surgery   Precautions: Weight Bearing, Isolation, Contact Precautions, Fall Risk,  , Left Lower Extremity Weight Bearing: Weight Bearing As Tolerated,  ,  ,  ,  ,    PLOF: Pt lives with her  in a single story home, one step to enter.  Pt was independent with all mobility, has a standard walker.      ASSESSMENT AND RECOMMENDATIONS:  Patient is 54 yo female admitted to hospital for L TKA revision and presents today POD 0 and agreeable to therapy. Patient was educated on weight bearing status, role of therapy, TE (see below), and equipment in room including role of knee immobilizer, towel roll, and ice sleeve. Patient demonstrated fair SLR and transferred to sitting EOB for objective assessment. Patient was given demo with instruction on sit <> stand transfer and gait training. Patient transferred to standing with RW and SBA and ambulated 30ft throughout room with supervision.  Pt was able to safely negotiate 1 step with use of RW.  Pt educated on how to properly desean/doff knee immobilizer and demonstrated understanding.  At conclusion of session patient transferred to supine in bed and was left resting with call bell by the side and knee immobilizer donned. Patient instructed to call for assistance if they needed to get up for any reason and denied need for further assistance.  Pt will be discharged from all acute care PT services at this time.      Patient does not require further skilled physical therapy intervention at this level of care.    Further Equipment Recommendations for Discharge: rolling walker    [facility]: AM-PAC Inpatient Mobility Raw Score : 21      Current research shows that an AM-PAC score of 18 (14 without stairs) or greater is associated with a discharge to the  jenkins left within reach  [x]         Nursing notified  [x]         Caregiver/ present  []         Bed alarm activated  []         SCDs applied    COMMUNICATION/EDUCATION:   Patient Education  Education Given To: Patient;Family  Education Provided: Role of Therapy;Plan of Care;Home Exercise Program;Transfer Training;Fall Prevention Strategies;Precautions  Education Method: Demonstration;Verbal;Teach Back  Barriers to Learning: None  Education Outcome: Verbalized understanding;Demonstrated understanding    Thank you for this referral.  Jenni Shields, PT  Minutes: 37      Eval Complexity: Decision Making: Low Complexity

## 2024-08-26 NOTE — PLAN OF CARE
Problem: Chronic Conditions and Co-morbidities  Goal: Patient's chronic conditions and co-morbidity symptoms are monitored and maintained or improved  Outcome: Progressing     Problem: Safety - Adult  Goal: Free from fall injury  Outcome: Progressing     Problem: Pain  Goal: Verbalizes/displays adequate comfort level or baseline comfort level  Outcome: Progressing     Problem: Discharge Planning  Goal: Discharge to home or other facility with appropriate resources  Outcome: Progressing     Problem: ABCDS Injury Assessment  Goal: Absence of physical injury  Outcome: Progressing

## 2024-08-26 NOTE — H&P
HISTORY & PHYSICAL        Patient: Priscilla Dean                MRN: [id number]       SSN: [id number]  YOB: 1970        AGE: 53 y.o.        SEX: female  There is no height or weight on file to calculate BMI.     PCP: Zachery Lucio MD  08/21/24        CC: left knee patellar maltracking, sp TKA  Problem List Items Addressed This Visit    None  Visit Diagnoses         Chronic pain of left knee    -  Primary     Relevant Orders     [76164] Knee 1-2V (Completed)     Presence of artificial knee joint, left         Partial tear of anterior cruciate ligament of knee         Relevant Orders     DME Order for (Specify) as OP                   HPI:  The patient is a pleasant 53 y.o. whom had a TKA of their left knee and has developed patellar maltracking.  She has failed conservative treatment including but not limited to NSAIDS, cortisone injections, viscosupplementation, PT, and pain medicine.  Due to the current findings and affected activities of daily living, surgical intervention is indicated.  The alternatives, risks, complications, as well as expected outcome were discussed.  These include but are not limited to infection, blood loss, need for blood transfusion, neurovascular damage, DVT, PE,  post-op stiffness and pain, leg length discrepancy, dislocation, anesthetic complications, prothesis longevity, need for more surgery, MI, stroke, and even death.  The patient understands and wishes to proceed with surgery.       Past Medical History        Past Medical History:   Diagnosis Date    Bilateral knee pain      Crohn's colitis (HCC)      Diabetes (HCC)       IDDM    HTN (hypertension)      Hypercholesterolemia      Pulmonary embolism (HCC) 03/2019    Stool color black       crohns              Current Medication      Current Outpatient Medications:     pregabalin (LYRICA) 200 MG capsule, Take 1 capsule by mouth 3 times daily for 90 days. Max Daily Amount: 600 mg, Disp: 270 capsule, Rfl: 0

## 2024-08-26 NOTE — PERIOP NOTE
TRANSFER - OUT REPORT:    Verbal report given to Lician on Priscilla Dean  being transferred to room 305 for routine progression of patient care       Report consisted of patient's Situation, Background, Assessment and   Recommendations(SBAR).     Information from the following report(s) Nurse Handoff Report, Surgery Report, Intake/Output, MAR, Recent Results, Med Rec Status, Cardiac Rhythm NsR, Neuro Assessment, and Event Log was reviewed with the receiving nurse.           Lines:   Peripheral IV 08/26/24 Posterior;Right Hand (Active)   Site Assessment Clean, dry & intact 08/26/24 1438   Line Status Infusing 08/26/24 1438   Phlebitis Assessment No symptoms 08/26/24 1438   Infiltration Assessment 0 08/26/24 1438        Opportunity for questions and clarification was provided.      Patient transported with:  Tech

## 2024-08-26 NOTE — INTERVAL H&P NOTE
Update History & Physical    The patient's History and Physical of August 26, 2024 was reviewed with the patient and I examined the patient. There was no change. The surgical site was confirmed by the patient and me.     Plan: The risks, benefits, expected outcome, and alternative to the recommended procedure have been discussed with the patient. Patient understands and wants to proceed with the procedure.     Electronically signed by JOSIAH FELICIANO MD on 8/26/2024 at 11:40 AM

## 2024-08-26 NOTE — ANESTHESIA PRE PROCEDURE
Department of Anesthesiology  Preprocedure Note       Name:  Priscilla Dean   Age:  53 y.o.  :  1970                                          MRN:  [id number]         Date:  2024      Surgeon: Surgeon(s):  Vish Orlando MD    Procedure: Procedure(s):  LEFT KNEE REVISION PATELLAR MALTRACKING; RUELAS TO ASSIST; [SMITH&NEPHEW ORTHO] NERVE BLOCK; ERAS    Medications prior to admission:   Prior to Admission medications    Medication Sig Start Date End Date Taking? Authorizing Provider   OZEMPIC, 0.25 OR 0.5 MG/DOSE, 2 MG/3ML SOPN Inject 1 mL into the skin every 7 days Every Lalit 6/10/23  Yes Imer Tran MD   pregabalin (LYRICA) 200 MG capsule Take 1 capsule by mouth 3 times daily for 90 days. Max Daily Amount: 600 mg 7/10/24 10/8/24  Sheila Cobian MD   ondansetron (ZOFRAN) 4 MG tablet TAKE 1 TABLET BY MOUTH EVERY 8 HOURS AS NEEDED FOR NAUSEA AND VOMITING 24   Karl Ruelas PA-C   metoprolol tartrate (LOPRESSOR) 25 MG tablet Take 1 tablet by mouth 2 times daily    Imer Tran MD   lactobacillus (CULTURELLE) capsule Take 1 capsule by mouth daily (with breakfast) 24   Zachery Lucio MD   vitamin D (ERGOCALCIFEROL) 1.25 MG (98426 UT) CAPS capsule Take 1 capsule by mouth once a week Every Wed    Imer Tran MD   atorvastatin (LIPITOR) 80 MG tablet Take 1 tablet by mouth daily    Imer Tran MD   tiZANidine (ZANAFLEX) 6 MG capsule Take 1 capsule by mouth 2 times daily as needed for Muscle spasms    Imer Tran MD   promethazine (PHENERGAN) 12.5 MG tablet Take 1 tablet by mouth every 12 hours as needed for Nausea    Imer Tran MD   DULoxetine (CYMBALTA) 30 MG extended release capsule Take 1 capsule by mouth daily  Patient not taking: Reported on 2024    Imer Tran MD   ustekinumab (STELARA) 90 MG/ML SOSY prefilled syringe Inject 1 mL into the skin Once every 6 weeks    Imer Tran MD   FARXIGA 10 MG

## 2024-08-26 NOTE — CARE COORDINATION
Met with Patient at bedside. CM introduces self and explains role. Patient is alert and oriented x 4. Hipaa verified. Patient agrees to complete initial  assessment.     DCP: Home with [facility] and supportive Boyfriend, who will transport her home via POV. RW delivered to bedside and Tub Transfer Bench ordered to be delivered to her verified address on file.     No further CM needs identified. CM will remain avaliable should a need arise.        08/26/24 1800   Service Assessment   Patient Orientation Alert and Oriented;Person;Place;Situation;Self   Cognition Alert   History Provided By Patient   Primary Caregiver Self   Accompanied By/Relationship Boyfriend   Support Systems Spouse/Significant Other;Family Members;/   Patient's Healthcare Decision Maker is: Legal Next of Kin   PCP Verified by CM Yes   Last Visit to PCP Within last 3 months   Prior Functional Level Independent in ADLs/IADLs   Current Functional Level Assistance with the following:;Shopping;Housework;Other (see comment)  (Transportation)   Can patient return to prior living arrangement Yes   Ability to make needs known: Good   Family able to assist with home care needs: Yes   Would you like for me to discuss the discharge plan with any other family members/significant others, and if so, who? Yes  (\"May discuss with My boyfriend in the room.\")   Financial Resources None   Community Resources None   CM/SW Referral DME   Social/Functional History   Lives With Significant other   Type of Home House   Home Layout One level   Home Access Stairs to enter without rails   Entrance Stairs - Number of Steps 1   Bathroom Shower/Tub Shower chair without back   Bathroom Toilet Handicap height   Bathroom Equipment Shower chair;None   Bathroom Accessibility Accessible   Home Equipment Walker - Standard   Receives Help From Family   ADL Assistance Needs assistance   Bath Modified independent   Dressing Modified independent

## 2024-08-27 ENCOUNTER — TELEPHONE (OUTPATIENT)
Age: 54
End: 2024-08-27

## 2024-08-27 NOTE — TELEPHONE ENCOUNTER
Patient had Left Knee Rev/Patellar Maltrackng on 8/26/24 and would like to know if she is to wear the brace at all times or can she take it off to shower and sleep.  Is also asking if she is to have HH/PT or Outpatient PT. She said the Hosp did not have orders to set her up.    Please call her at [phone]

## 2024-08-28 NOTE — TELEPHONE ENCOUNTER
Attempted to call patient to inform per jacque dang brace can be taken off to shower, not pt at this time and do not bend knee. Patient voicemail was full .

## 2024-09-09 ENCOUNTER — TELEPHONE (OUTPATIENT)
Age: 54
End: 2024-09-09

## 2024-09-09 DIAGNOSIS — G89.18 POST-OP PAIN: Primary | ICD-10-CM

## 2024-09-09 RX ORDER — OXYCODONE HYDROCHLORIDE 5 MG/1
5 TABLET ORAL EVERY 4 HOURS PRN
Qty: 42 TABLET | Refills: 0 | Status: SHIPPED | OUTPATIENT
Start: 2024-09-09 | End: 2024-09-16

## 2024-09-13 ENCOUNTER — OFFICE VISIT (OUTPATIENT)
Age: 54
End: 2024-09-13

## 2024-09-13 DIAGNOSIS — Z96.652 STATUS POST REVISION OF TOTAL REPLACEMENT OF LEFT KNEE: Primary | ICD-10-CM

## 2024-09-13 PROCEDURE — 99024 POSTOP FOLLOW-UP VISIT: CPT | Performed by: PHYSICIAN ASSISTANT

## 2024-09-13 RX ORDER — OXYCODONE HYDROCHLORIDE 5 MG/1
5 TABLET ORAL EVERY 4 HOURS PRN
Qty: 42 TABLET | Refills: 0 | Status: SHIPPED | OUTPATIENT
Start: 2024-09-13 | End: 2024-09-20

## 2024-09-16 ENCOUNTER — TELEPHONE (OUTPATIENT)
Age: 54
End: 2024-09-16

## 2024-09-16 DIAGNOSIS — G89.18 POST-OP PAIN: Primary | ICD-10-CM

## 2024-09-19 RX ORDER — HYDROCODONE BITARTRATE AND ACETAMINOPHEN 7.5; 325 MG/1; MG/1
1-2 TABLET ORAL EVERY 8 HOURS PRN
Qty: 42 TABLET | Refills: 0 | Status: SHIPPED | OUTPATIENT
Start: 2024-09-19 | End: 2024-09-26

## 2024-10-07 ENCOUNTER — TELEPHONE (OUTPATIENT)
Age: 54
End: 2024-10-07

## 2024-10-07 DIAGNOSIS — G89.18 POST-OP PAIN: Primary | ICD-10-CM

## 2024-10-07 NOTE — TELEPHONE ENCOUNTER
Patient is asking if pain medication for her left knee can be sent to the [facility] pharmacy on [address] [facility].    Patient can be reaches at [phone].

## 2024-10-08 RX ORDER — HYDROCODONE BITARTRATE AND ACETAMINOPHEN 7.5; 325 MG/1; MG/1
1 TABLET ORAL EVERY 6 HOURS PRN
Qty: 28 TABLET | Refills: 0 | Status: SHIPPED | OUTPATIENT
Start: 2024-10-08 | End: 2024-10-15

## 2024-10-10 ENCOUNTER — OFFICE VISIT (OUTPATIENT)
Age: 54
End: 2024-10-10

## 2024-10-10 VITALS — HEIGHT: 70 IN | BODY MASS INDEX: 41.19 KG/M2

## 2024-10-10 DIAGNOSIS — Z96.652 STATUS POST REVISION OF TOTAL REPLACEMENT OF LEFT KNEE: Primary | ICD-10-CM

## 2024-10-10 PROCEDURE — 99024 POSTOP FOLLOW-UP VISIT: CPT | Performed by: PHYSICIAN ASSISTANT

## 2024-10-10 NOTE — PROGRESS NOTES
12/12/2023    RIGHT DISTAL RADIUS OPEN REDUCTION INTERNAL FIXATION, RIGHT THIRD METACARPAL OPEN REDUCTION INTERNAL FIXATION;MINI C-ARM, SUPRACLAVICULAR BLOCK; [ACUMED ORTHO] performed by Brett Mcneill DO at [facility] MAIN OR    REVISION TOTAL KNEE ARTHROPLASTY Left 8/26/2024    LEFT TOTA KNEE REVISION performed by Vish Orlando MD at [facility] MAIN OR    TOTAL KNEE ARTHROPLASTY Left 10/23/2023    LEFT TOTAL KNEE REPLACEMENT performed by Vish Orlando MD at [facility] MAIN OR    TUBAL LIGATION      btl       Patient seen evaluated today for her left knee.  She is status post revision left knee replacement for soft tissue defect and patellar maltracking.  She is doing well.  She been noncompliant with her brace as unfortunately is sliding down on her and she has been bending her knee.  She denies any troubles the wound.  No fevers or chills.  No injuries or falls.    Patient denies recent fevers, chills, chest pain, SOB, or injuries.   No recent systemic changes noted.  A 12-point review of systems is performed today.  Pertinent positives are noted.  All other systems reviewed and otherwise are negative.    Physical exam: General: Alert and oriented x3, nad.  well-developed, well nourished.  normal affect, AF.  NC/AT, EOMI, neck supple, trachea midline, no JVD present. Breathing is non-labored.  Examination of the left knee reveals skin intact.  The surgical wound is healed nicely.  There is no erythema or ecchymosis noted.  There are no signs of infection or cellulitis present.  She is able to hold a straight leg raise without complications and no defects noted in the extensor mechanism.  She easily flexes to 90.  Negative calf tenderness.  Negative Homans.  No signs of DVT present.    Assessment: Status post revision soft tissue left knee    Plan: At this point, we discussed treatment options.  The knee brace was open 0-90.  She is instructed not to pass this.  She is instructed on strengthening exercises including quad sets,

## 2024-11-05 ENCOUNTER — TELEPHONE (OUTPATIENT)
Age: 54
End: 2024-11-05

## 2024-11-05 DIAGNOSIS — Z96.652 STATUS POST REVISION OF TOTAL REPLACEMENT OF LEFT KNEE: Primary | ICD-10-CM

## 2024-11-06 RX ORDER — HYDROCODONE BITARTRATE AND ACETAMINOPHEN 7.5; 325 MG/1; MG/1
1 TABLET ORAL EVERY 6 HOURS PRN
Qty: 28 TABLET | Refills: 0 | Status: SHIPPED | OUTPATIENT
Start: 2024-11-06 | End: 2024-11-13

## 2024-12-03 ENCOUNTER — TELEPHONE (OUTPATIENT)
Age: 54
End: 2024-12-03

## 2024-12-03 DIAGNOSIS — E11.40 TYPE 2 DIABETES MELLITUS WITH DIABETIC NEUROPATHY, WITH LONG-TERM CURRENT USE OF INSULIN (HCC): ICD-10-CM

## 2024-12-03 DIAGNOSIS — Z79.4 TYPE 2 DIABETES MELLITUS WITH DIABETIC NEUROPATHY, WITH LONG-TERM CURRENT USE OF INSULIN (HCC): ICD-10-CM

## 2024-12-03 RX ORDER — PREGABALIN 200 MG/1
200 CAPSULE ORAL 3 TIMES DAILY
Qty: 90 CAPSULE | Refills: 0 | Status: SHIPPED | OUTPATIENT
Start: 2024-12-03 | End: 2025-01-02

## 2024-12-12 ENCOUNTER — TELEPHONE (OUTPATIENT)
Age: 54
End: 2024-12-12

## 2024-12-12 NOTE — TELEPHONE ENCOUNTER
Informed patient of AMANDEEP Pedro message.  Patient states \"ok, well I will talk to him on the 24th, at my appointment.\"

## 2024-12-12 NOTE — TELEPHONE ENCOUNTER
Patients reports having knee pain on the \"side of her knee\" and was wondering if we could refill a prior prescription for   HYDROcodone-acetaminophen (NORCO) 7.5-325 MG per tablet.  Please call in to [facility] on [address].

## 2024-12-26 ENCOUNTER — HOSPITAL ENCOUNTER (EMERGENCY)
Facility: HOSPITAL | Age: 54
Discharge: HOME | End: 2024-12-29
Payer: COMMERCIAL

## 2024-12-26 ENCOUNTER — HOSPITAL ENCOUNTER (INPATIENT)
Facility: HOSPITAL | Age: 54
LOS: 7 days | Discharge: HOME HEALTH | End: 2025-01-03
Attending: STUDENT IN AN ORGANIZED HEALTH CARE EDUCATION/TRAINING PROGRAM | Admitting: INTERNAL MEDICINE
Payer: COMMERCIAL

## 2024-12-26 DIAGNOSIS — N30.00 ACUTE CYSTITIS WITHOUT HEMATURIA: ICD-10-CM

## 2024-12-26 DIAGNOSIS — K50.10 CROHN'S DISEASE OF COLON WITHOUT COMPLICATION (HCC): ICD-10-CM

## 2024-12-26 DIAGNOSIS — R70.0 ELEVATED ERYTHROCYTE SEDIMENTATION RATE: ICD-10-CM

## 2024-12-26 DIAGNOSIS — Z86.19 H/O CLOSTRIDIUM DIFFICILE INFECTION: Chronic | ICD-10-CM

## 2024-12-26 DIAGNOSIS — R79.82 ELEVATED C-REACTIVE PROTEIN (CRP): ICD-10-CM

## 2024-12-26 DIAGNOSIS — R41.82 ALTERED MENTAL STATUS, UNSPECIFIED ALTERED MENTAL STATUS TYPE: Primary | ICD-10-CM

## 2024-12-26 DIAGNOSIS — K52.9 COLITIS: ICD-10-CM

## 2024-12-26 DIAGNOSIS — M22.8X2 PATELLAR MALTRACKING, LEFT: ICD-10-CM

## 2024-12-26 DIAGNOSIS — M25.362 INSTABILITY OF KNEE JOINT, LEFT: ICD-10-CM

## 2024-12-26 LAB
ALBUMIN SERPL-MCNC: 2.9 G/DL (ref 3.4–5)
ALBUMIN/GLOB SERPL: 0.5 (ref 0.8–1.7)
ALP SERPL-CCNC: 85 U/L (ref 45–117)
ALT SERPL-CCNC: 10 U/L (ref 13–56)
AMPHET UR QL SCN: NEGATIVE
ANION GAP SERPL CALC-SCNC: 8 MMOL/L (ref 3–18)
APAP SERPL-MCNC: <2 UG/ML (ref 10–30)
APPEARANCE UR: ABNORMAL
AST SERPL-CCNC: 33 U/L (ref 10–38)
BACTERIA URNS QL MICRO: ABNORMAL /HPF
BARBITURATES UR QL SCN: NEGATIVE
BENZODIAZ UR QL: NEGATIVE
BILIRUB SERPL-MCNC: 0.8 MG/DL (ref 0.2–1)
BILIRUB UR QL: NEGATIVE
BUN SERPL-MCNC: 11 MG/DL (ref 7–18)
BUN/CREAT SERPL: 12 (ref 12–20)
CALCIUM SERPL-MCNC: 9.1 MG/DL (ref 8.5–10.1)
CANNABINOIDS UR QL SCN: POSITIVE
CHLORIDE SERPL-SCNC: 99 MMOL/L (ref 100–111)
CO2 SERPL-SCNC: 26 MMOL/L (ref 21–32)
COCAINE UR QL SCN: NEGATIVE
COLOR UR: YELLOW
CREAT SERPL-MCNC: 0.93 MG/DL (ref 0.6–1.3)
CRP SERPL-MCNC: 10.8 MG/DL (ref 0–0.3)
EPITH CASTS URNS QL MICRO: ABNORMAL /LPF (ref 0–5)
ERYTHROCYTE [DISTWIDTH] IN BLOOD BY AUTOMATED COUNT: 16.4 % (ref 11.6–14.5)
ERYTHROCYTE [SEDIMENTATION RATE] IN BLOOD: >130 MM/HR (ref 0–30)
ETHANOL SERPL-MCNC: <3 MG/DL (ref 0–3)
GLOBULIN SER CALC-MCNC: 5.9 G/DL (ref 2–4)
GLUCOSE SERPL-MCNC: 142 MG/DL (ref 74–99)
GLUCOSE UR STRIP.AUTO-MCNC: 100 MG/DL
HCT VFR BLD AUTO: 36.3 % (ref 35–45)
HGB BLD-MCNC: 11.4 G/DL (ref 12–16)
HGB UR QL STRIP: ABNORMAL
KETONES UR QL STRIP.AUTO: 40 MG/DL
LEUKOCYTE ESTERASE UR QL STRIP.AUTO: ABNORMAL
Lab: ABNORMAL
MCH RBC QN AUTO: 23.9 PG (ref 24–34)
MCHC RBC AUTO-ENTMCNC: 31.4 G/DL (ref 31–37)
MCV RBC AUTO: 76.3 FL (ref 78–100)
METHADONE UR QL: NEGATIVE
NITRITE UR QL STRIP.AUTO: NEGATIVE
NRBC # BLD: 0 K/UL (ref 0–0.01)
NRBC BLD-RTO: 0 PER 100 WBC
OPIATES UR QL: POSITIVE
PCP UR QL: NEGATIVE
PH UR STRIP: 7 (ref 5–8)
PLATELET # BLD AUTO: 253 K/UL (ref 135–420)
PMV BLD AUTO: 12.9 FL (ref 9.2–11.8)
POTASSIUM SERPL-SCNC: 3.7 MMOL/L (ref 3.5–5.5)
PROT SERPL-MCNC: 8.8 G/DL (ref 6.4–8.2)
PROT UR STRIP-MCNC: ABNORMAL MG/DL
RBC # BLD AUTO: 4.76 M/UL (ref 4.2–5.3)
RBC #/AREA URNS HPF: ABNORMAL /HPF (ref 0–5)
SALICYLATES SERPL-MCNC: <1.7 MG/DL (ref 2.8–20)
SODIUM SERPL-SCNC: 133 MMOL/L (ref 136–145)
SP GR UR REFRACTOMETRY: >1.03 (ref 1–1.04)
UROBILINOGEN UR QL STRIP.AUTO: 0.2 EU/DL (ref 0.2–1)
WBC # BLD AUTO: 10 K/UL (ref 4.6–13.2)
WBC URNS QL MICRO: ABNORMAL /HPF (ref 0–5)

## 2024-12-26 PROCEDURE — 86140 C-REACTIVE PROTEIN: CPT

## 2024-12-26 PROCEDURE — 81001 URINALYSIS AUTO W/SCOPE: CPT

## 2024-12-26 PROCEDURE — 82077 ASSAY SPEC XCP UR&BREATH IA: CPT

## 2024-12-26 PROCEDURE — 87077 CULTURE AEROBIC IDENTIFY: CPT

## 2024-12-26 PROCEDURE — 93005 ELECTROCARDIOGRAM TRACING: CPT | Performed by: STUDENT IN AN ORGANIZED HEALTH CARE EDUCATION/TRAINING PROGRAM

## 2024-12-26 PROCEDURE — 80143 DRUG ASSAY ACETAMINOPHEN: CPT

## 2024-12-26 PROCEDURE — 80179 DRUG ASSAY SALICYLATE: CPT

## 2024-12-26 PROCEDURE — 85652 RBC SED RATE AUTOMATED: CPT

## 2024-12-26 PROCEDURE — 96375 TX/PRO/DX INJ NEW DRUG ADDON: CPT

## 2024-12-26 PROCEDURE — 87186 SC STD MICRODIL/AGAR DIL: CPT

## 2024-12-26 PROCEDURE — 80053 COMPREHEN METABOLIC PANEL: CPT

## 2024-12-26 PROCEDURE — 85027 COMPLETE CBC AUTOMATED: CPT

## 2024-12-26 PROCEDURE — 70450 CT HEAD/BRAIN W/O DYE: CPT

## 2024-12-26 PROCEDURE — 80307 DRUG TEST PRSMV CHEM ANLYZR: CPT

## 2024-12-26 PROCEDURE — 96365 THER/PROPH/DIAG IV INF INIT: CPT

## 2024-12-26 PROCEDURE — 6360000002 HC RX W HCPCS: Performed by: STUDENT IN AN ORGANIZED HEALTH CARE EDUCATION/TRAINING PROGRAM

## 2024-12-26 PROCEDURE — 74177 CT ABD & PELVIS W/CONTRAST: CPT

## 2024-12-26 PROCEDURE — 99285 EMERGENCY DEPT VISIT HI MDM: CPT

## 2024-12-26 PROCEDURE — 6360000004 HC RX CONTRAST MEDICATION: Performed by: STUDENT IN AN ORGANIZED HEALTH CARE EDUCATION/TRAINING PROGRAM

## 2024-12-26 PROCEDURE — 87086 URINE CULTURE/COLONY COUNT: CPT

## 2024-12-26 RX ORDER — CIPROFLOXACIN 2 MG/ML
400 INJECTION, SOLUTION INTRAVENOUS ONCE
Status: COMPLETED | OUTPATIENT
Start: 2024-12-26 | End: 2024-12-27

## 2024-12-26 RX ORDER — MORPHINE SULFATE 4 MG/ML
4 INJECTION, SOLUTION INTRAMUSCULAR; INTRAVENOUS
Status: COMPLETED | OUTPATIENT
Start: 2024-12-26 | End: 2024-12-26

## 2024-12-26 RX ORDER — METRONIDAZOLE 500 MG/100ML
500 INJECTION, SOLUTION INTRAVENOUS
Status: COMPLETED | OUTPATIENT
Start: 2024-12-26 | End: 2024-12-27

## 2024-12-26 RX ORDER — ACETAMINOPHEN 325 MG/1
650 TABLET ORAL
Status: ACTIVE | OUTPATIENT
Start: 2024-12-26 | End: 2024-12-27

## 2024-12-26 RX ORDER — IOPAMIDOL 612 MG/ML
100 INJECTION, SOLUTION INTRAVASCULAR
Status: COMPLETED | OUTPATIENT
Start: 2024-12-26 | End: 2024-12-26

## 2024-12-26 RX ADMIN — IOPAMIDOL 100 ML: 612 INJECTION, SOLUTION INTRAVENOUS at 20:29

## 2024-12-26 RX ADMIN — MORPHINE SULFATE 4 MG: 4 INJECTION, SOLUTION INTRAMUSCULAR; INTRAVENOUS at 20:37

## 2024-12-26 RX ADMIN — METRONIDAZOLE 500 MG: 500 INJECTION, SOLUTION INTRAVENOUS at 23:30

## 2024-12-26 ASSESSMENT — PAIN - FUNCTIONAL ASSESSMENT: PAIN_FUNCTIONAL_ASSESSMENT: 0-10

## 2024-12-26 ASSESSMENT — PAIN SCALES - GENERAL: PAINLEVEL_OUTOF10: 10

## 2024-12-26 ASSESSMENT — PAIN DESCRIPTION - LOCATION
LOCATION: OTHER (COMMENT)
LOCATION: GENERALIZED

## 2024-12-26 NOTE — ED PROVIDER NOTES
EMERGENCY DEPARTMENT HISTORY AND PHYSICAL EXAM    2:40 AM      Date: 12/26/2024  Patient Name: Priscilla Dean    History of Presenting Illness     Chief Complaint   Patient presents with    Altered Mental Status       History From: Patient and Patient's Daughter    Priscilla Dean is a 54 y.o. female   HPI  54-year-old female with history of what seems to be type 2 diabetes, obesity, PE not on anticoagulation, Crohn's colitis, hyperlipidemia, hypertension who presents altered mental status.  As per daughter for the past couple weeks patient has had severe diarrhea and loose stools.  Has been requiring  depends.  Per daughter she is evaluated today.  Acting outside of her normal baseline.  Did not have any situational awareness.  Altered mental state.  Then proceeded to bring the patient to the hospital.  They did endorse that patient had gummy bears at bedside.  Most likely cannabinoid edibles.  Unable to assess ROS as patient is altered at this time.  Patient constantly staying \"hey baby\".         Nursing Notes were all reviewed and agreed with or any disagreements were addressed in the HPI.    PCP: Zachery Lucio MD    Current Facility-Administered Medications   Medication Dose Route Frequency Provider Last Rate Last Admin    aspirin EC tablet 81 mg  81 mg Oral Daily Aniket Townsend MD        atorvastatin (LIPITOR) tablet 80 mg  80 mg Oral Daily Aniket Townsend MD        lactobacillus (CULTURELLE) capsule 1 capsule  1 capsule Oral Daily with breakfast Aniket Townsend MD        losartan (COZAAR) tablet 100 mg  100 mg Oral Daily Aniket Townsend MD        metoprolol tartrate (LOPRESSOR) tablet 25 mg  25 mg Oral BID Aniket Townsend MD        hydrALAZINE (APRESOLINE) tablet 100 mg  100 mg Oral TID Aniket Townsend MD        [Held by provider] pregabalin (LYRICA) capsule 200 mg  200 mg Oral TID Aniket Townsend MD        tiZANidine (ZANAFLEX) tablet 6 mg  6 mg Oral BID PRN  erythrocyte sedimentation rate        Disposition: admission     No follow-up provider specified.     Disclaimer: Sections of this note are dictated using utilizing voice recognition software.  Minor typographical errors may be present. If questions arise, please do not hesitate to contact me or call our department.          Dusty Jessica MD  12/27/24 [id number]

## 2024-12-26 NOTE — ED NOTES
Found pt attempting to get out of bed and no idea where she is going. Asked pt to sit back on bed. Pt tried to keep walking.

## 2024-12-27 PROBLEM — G93.41 ACUTE METABOLIC ENCEPHALOPATHY: Status: ACTIVE | Noted: 2024-12-27

## 2024-12-27 PROBLEM — Z86.19 H/O CLOSTRIDIUM DIFFICILE INFECTION: Chronic | Status: ACTIVE | Noted: 2024-12-27

## 2024-12-27 PROBLEM — R41.82 AMS (ALTERED MENTAL STATUS): Status: ACTIVE | Noted: 2024-12-27

## 2024-12-27 PROBLEM — N30.00 ACUTE CYSTITIS WITHOUT HEMATURIA: Status: ACTIVE | Noted: 2024-12-27

## 2024-12-27 LAB
ALBUMIN SERPL-MCNC: 2.9 G/DL (ref 3.4–5)
ALBUMIN/GLOB SERPL: 0.5 (ref 0.8–1.7)
ALP SERPL-CCNC: 88 U/L (ref 45–117)
ALT SERPL-CCNC: 7 U/L (ref 13–56)
ANION GAP SERPL CALC-SCNC: 11 MMOL/L (ref 3–18)
AST SERPL-CCNC: 9 U/L (ref 10–38)
BILIRUB SERPL-MCNC: 0.6 MG/DL (ref 0.2–1)
BUN SERPL-MCNC: 6 MG/DL (ref 7–18)
BUN/CREAT SERPL: 7 (ref 12–20)
C DIFF TOXIN INTERPRETATION: ABNORMAL
C. DIFFICILE TOXIN MOLECULAR: POSITIVE
CALCIUM SERPL-MCNC: 8.9 MG/DL (ref 8.5–10.1)
CHLORIDE SERPL-SCNC: 99 MMOL/L (ref 100–111)
CO2 SERPL-SCNC: 28 MMOL/L (ref 21–32)
CREAT SERPL-MCNC: 0.85 MG/DL (ref 0.6–1.3)
EKG ATRIAL RATE: 75 BPM
EKG DIAGNOSIS: NORMAL
EKG P AXIS: -15 DEGREES
EKG P-R INTERVAL: 146 MS
EKG Q-T INTERVAL: 470 MS
EKG QRS DURATION: 112 MS
EKG QTC CALCULATION (BAZETT): 524 MS
EKG R AXIS: 40 DEGREES
EKG T AXIS: 22 DEGREES
EKG VENTRICULAR RATE: 75 BPM
GLOBULIN SER CALC-MCNC: 5.8 G/DL (ref 2–4)
GLUCOSE BLD STRIP.AUTO-MCNC: 126 MG/DL (ref 70–110)
GLUCOSE BLD STRIP.AUTO-MCNC: 140 MG/DL (ref 70–110)
GLUCOSE BLD STRIP.AUTO-MCNC: 146 MG/DL (ref 70–110)
GLUCOSE BLD STRIP.AUTO-MCNC: 155 MG/DL (ref 70–110)
GLUCOSE BLD STRIP.AUTO-MCNC: 185 MG/DL (ref 70–110)
GLUCOSE SERPL-MCNC: 154 MG/DL (ref 74–99)
MAGNESIUM SERPL-MCNC: 1.9 MG/DL (ref 1.6–2.6)
POTASSIUM SERPL-SCNC: 2.3 MMOL/L (ref 3.5–5.5)
PROCALCITONIN SERPL-MCNC: 0.06 NG/ML
PROT SERPL-MCNC: 8.7 G/DL (ref 6.4–8.2)
REFLEX: ABNORMAL
RV AG STL QL IA: NEGATIVE
SODIUM SERPL-SCNC: 138 MMOL/L (ref 136–145)
WBC #/AREA STL HPF: NORMAL /HPF (ref 0–4)

## 2024-12-27 PROCEDURE — 36415 COLL VENOUS BLD VENIPUNCTURE: CPT

## 2024-12-27 PROCEDURE — 97535 SELF CARE MNGMENT TRAINING: CPT

## 2024-12-27 PROCEDURE — 6370000000 HC RX 637 (ALT 250 FOR IP): Performed by: HEALTH CARE PROVIDER

## 2024-12-27 PROCEDURE — 93010 ELECTROCARDIOGRAM REPORT: CPT | Performed by: INTERNAL MEDICINE

## 2024-12-27 PROCEDURE — 97530 THERAPEUTIC ACTIVITIES: CPT

## 2024-12-27 PROCEDURE — 97162 PT EVAL MOD COMPLEX 30 MIN: CPT

## 2024-12-27 PROCEDURE — 1100000000 HC RM PRIVATE

## 2024-12-27 PROCEDURE — 87449 NOS EACH ORGANISM AG IA: CPT

## 2024-12-27 PROCEDURE — 97166 OT EVAL MOD COMPLEX 45 MIN: CPT

## 2024-12-27 PROCEDURE — 89055 LEUKOCYTE ASSESSMENT FECAL: CPT

## 2024-12-27 PROCEDURE — 80053 COMPREHEN METABOLIC PANEL: CPT

## 2024-12-27 PROCEDURE — 87493 C DIFF AMPLIFIED PROBE: CPT

## 2024-12-27 PROCEDURE — 6370000000 HC RX 637 (ALT 250 FOR IP): Performed by: INTERNAL MEDICINE

## 2024-12-27 PROCEDURE — 92610 EVALUATE SWALLOWING FUNCTION: CPT

## 2024-12-27 PROCEDURE — 2500000003 HC RX 250 WO HCPCS: Performed by: INTERNAL MEDICINE

## 2024-12-27 PROCEDURE — 6360000002 HC RX W HCPCS: Performed by: INTERNAL MEDICINE

## 2024-12-27 PROCEDURE — 2580000003 HC RX 258: Performed by: INTERNAL MEDICINE

## 2024-12-27 PROCEDURE — 87427 SHIGA-LIKE TOXIN AG IA: CPT

## 2024-12-27 PROCEDURE — 87324 CLOSTRIDIUM AG IA: CPT

## 2024-12-27 PROCEDURE — 84145 PROCALCITONIN (PCT): CPT

## 2024-12-27 PROCEDURE — 94761 N-INVAS EAR/PLS OXIMETRY MLT: CPT

## 2024-12-27 PROCEDURE — 92526 ORAL FUNCTION THERAPY: CPT

## 2024-12-27 PROCEDURE — 87425 ROTAVIRUS AG IA: CPT

## 2024-12-27 PROCEDURE — 83735 ASSAY OF MAGNESIUM: CPT

## 2024-12-27 PROCEDURE — 82962 GLUCOSE BLOOD TEST: CPT

## 2024-12-27 PROCEDURE — 99223 1ST HOSP IP/OBS HIGH 75: CPT | Performed by: INTERNAL MEDICINE

## 2024-12-27 PROCEDURE — 83993 ASSAY FOR CALPROTECTIN FECAL: CPT

## 2024-12-27 PROCEDURE — 6370000000 HC RX 637 (ALT 250 FOR IP): Performed by: STUDENT IN AN ORGANIZED HEALTH CARE EDUCATION/TRAINING PROGRAM

## 2024-12-27 RX ORDER — TIZANIDINE 2 MG/1
6 TABLET ORAL 2 TIMES DAILY PRN
Status: DISCONTINUED | OUTPATIENT
Start: 2024-12-27 | End: 2025-01-03 | Stop reason: HOSPADM

## 2024-12-27 RX ORDER — ONDANSETRON 4 MG/1
4 TABLET, ORALLY DISINTEGRATING ORAL
Status: ACTIVE | OUTPATIENT
Start: 2024-12-27 | End: 2024-12-27

## 2024-12-27 RX ORDER — POTASSIUM CHLORIDE 1500 MG/1
40 TABLET, EXTENDED RELEASE ORAL PRN
Status: DISCONTINUED | OUTPATIENT
Start: 2024-12-27 | End: 2025-01-03 | Stop reason: HOSPADM

## 2024-12-27 RX ORDER — HYDRALAZINE HYDROCHLORIDE 50 MG/1
100 TABLET, FILM COATED ORAL 3 TIMES DAILY
Status: DISCONTINUED | OUTPATIENT
Start: 2024-12-27 | End: 2025-01-03 | Stop reason: HOSPADM

## 2024-12-27 RX ORDER — ACETAMINOPHEN 325 MG/1
650 TABLET ORAL EVERY 6 HOURS PRN
Status: DISCONTINUED | OUTPATIENT
Start: 2024-12-27 | End: 2025-01-03 | Stop reason: HOSPADM

## 2024-12-27 RX ORDER — QUETIAPINE FUMARATE 25 MG/1
50 TABLET, FILM COATED ORAL ONCE
Status: COMPLETED | OUTPATIENT
Start: 2024-12-27 | End: 2024-12-27

## 2024-12-27 RX ORDER — METRONIDAZOLE 500 MG/100ML
500 INJECTION, SOLUTION INTRAVENOUS EVERY 8 HOURS
Status: DISCONTINUED | OUTPATIENT
Start: 2024-12-27 | End: 2024-12-27

## 2024-12-27 RX ORDER — SODIUM CHLORIDE, SODIUM LACTATE, POTASSIUM CHLORIDE, CALCIUM CHLORIDE 600; 310; 30; 20 MG/100ML; MG/100ML; MG/100ML; MG/100ML
INJECTION, SOLUTION INTRAVENOUS CONTINUOUS
Status: DISPENSED | OUTPATIENT
Start: 2024-12-27 | End: 2024-12-28

## 2024-12-27 RX ORDER — SODIUM CHLORIDE 0.9 % (FLUSH) 0.9 %
5-40 SYRINGE (ML) INJECTION PRN
Status: DISCONTINUED | OUTPATIENT
Start: 2024-12-27 | End: 2025-01-03 | Stop reason: HOSPADM

## 2024-12-27 RX ORDER — ONDANSETRON 2 MG/ML
4 INJECTION INTRAMUSCULAR; INTRAVENOUS EVERY 6 HOURS PRN
Status: DISCONTINUED | OUTPATIENT
Start: 2024-12-27 | End: 2025-01-03 | Stop reason: HOSPADM

## 2024-12-27 RX ORDER — LACTOBACILLUS RHAMNOSUS GG 10B CELL
1 CAPSULE ORAL
Status: DISCONTINUED | OUTPATIENT
Start: 2024-12-27 | End: 2025-01-03 | Stop reason: HOSPADM

## 2024-12-27 RX ORDER — ENOXAPARIN SODIUM 100 MG/ML
30 INJECTION SUBCUTANEOUS 2 TIMES DAILY
Status: DISCONTINUED | OUTPATIENT
Start: 2024-12-27 | End: 2025-01-03 | Stop reason: HOSPADM

## 2024-12-27 RX ORDER — LOSARTAN POTASSIUM 50 MG/1
100 TABLET ORAL DAILY
Status: DISCONTINUED | OUTPATIENT
Start: 2024-12-27 | End: 2025-01-03 | Stop reason: HOSPADM

## 2024-12-27 RX ORDER — INSULIN LISPRO 100 [IU]/ML
0-8 INJECTION, SOLUTION INTRAVENOUS; SUBCUTANEOUS
Status: DISCONTINUED | OUTPATIENT
Start: 2024-12-27 | End: 2025-01-03 | Stop reason: HOSPADM

## 2024-12-27 RX ORDER — INSULIN GLARGINE 100 [IU]/ML
15 INJECTION, SOLUTION SUBCUTANEOUS NIGHTLY
Status: DISCONTINUED | OUTPATIENT
Start: 2024-12-27 | End: 2025-01-03 | Stop reason: HOSPADM

## 2024-12-27 RX ORDER — POTASSIUM CHLORIDE 7.45 MG/ML
10 INJECTION INTRAVENOUS PRN
Status: DISCONTINUED | OUTPATIENT
Start: 2024-12-27 | End: 2025-01-03 | Stop reason: HOSPADM

## 2024-12-27 RX ORDER — POLYETHYLENE GLYCOL 3350 17 G/17G
17 POWDER, FOR SOLUTION ORAL DAILY PRN
Status: DISCONTINUED | OUTPATIENT
Start: 2024-12-27 | End: 2025-01-03 | Stop reason: HOSPADM

## 2024-12-27 RX ORDER — METOPROLOL TARTRATE 25 MG/1
25 TABLET, FILM COATED ORAL 2 TIMES DAILY
Status: DISCONTINUED | OUTPATIENT
Start: 2024-12-27 | End: 2025-01-03 | Stop reason: HOSPADM

## 2024-12-27 RX ORDER — MAGNESIUM SULFATE IN WATER 40 MG/ML
2000 INJECTION, SOLUTION INTRAVENOUS PRN
Status: DISCONTINUED | OUTPATIENT
Start: 2024-12-27 | End: 2025-01-03 | Stop reason: HOSPADM

## 2024-12-27 RX ORDER — CHOLESTYRAMINE LIGHT 4 G/5.7G
1 POWDER, FOR SUSPENSION ORAL 2 TIMES DAILY
Status: DISCONTINUED | OUTPATIENT
Start: 2024-12-27 | End: 2025-01-03 | Stop reason: HOSPADM

## 2024-12-27 RX ORDER — ACETAMINOPHEN 650 MG/1
650 SUPPOSITORY RECTAL EVERY 6 HOURS PRN
Status: DISCONTINUED | OUTPATIENT
Start: 2024-12-27 | End: 2025-01-03 | Stop reason: HOSPADM

## 2024-12-27 RX ORDER — ATORVASTATIN CALCIUM 40 MG/1
80 TABLET, FILM COATED ORAL DAILY
Status: DISCONTINUED | OUTPATIENT
Start: 2024-12-27 | End: 2025-01-03 | Stop reason: HOSPADM

## 2024-12-27 RX ORDER — ASPIRIN 81 MG/1
81 TABLET ORAL DAILY
Status: DISCONTINUED | OUTPATIENT
Start: 2024-12-27 | End: 2025-01-03 | Stop reason: HOSPADM

## 2024-12-27 RX ORDER — SODIUM CHLORIDE 9 MG/ML
INJECTION, SOLUTION INTRAVENOUS PRN
Status: DISCONTINUED | OUTPATIENT
Start: 2024-12-27 | End: 2025-01-03 | Stop reason: HOSPADM

## 2024-12-27 RX ORDER — LORAZEPAM 2 MG/ML
1 INJECTION INTRAMUSCULAR EVERY 6 HOURS PRN
Status: DISCONTINUED | OUTPATIENT
Start: 2024-12-27 | End: 2025-01-03 | Stop reason: HOSPADM

## 2024-12-27 RX ORDER — ALPRAZOLAM 0.5 MG
0.5 TABLET ORAL 2 TIMES DAILY PRN
Status: DISCONTINUED | OUTPATIENT
Start: 2024-12-27 | End: 2025-01-03 | Stop reason: HOSPADM

## 2024-12-27 RX ORDER — LORAZEPAM 1 MG/1
1 TABLET ORAL ONCE
Status: COMPLETED | OUTPATIENT
Start: 2024-12-27 | End: 2024-12-27

## 2024-12-27 RX ORDER — ONDANSETRON 4 MG/1
4 TABLET, ORALLY DISINTEGRATING ORAL EVERY 8 HOURS PRN
Status: DISCONTINUED | OUTPATIENT
Start: 2024-12-27 | End: 2025-01-03 | Stop reason: HOSPADM

## 2024-12-27 RX ORDER — DEXTROSE MONOHYDRATE 100 MG/ML
INJECTION, SOLUTION INTRAVENOUS CONTINUOUS PRN
Status: DISCONTINUED | OUTPATIENT
Start: 2024-12-27 | End: 2025-01-03 | Stop reason: HOSPADM

## 2024-12-27 RX ORDER — POTASSIUM CHLORIDE 1500 MG/1
40 TABLET, EXTENDED RELEASE ORAL 3 TIMES DAILY
Status: DISPENSED | OUTPATIENT
Start: 2024-12-27 | End: 2024-12-28

## 2024-12-27 RX ORDER — HYDRALAZINE HYDROCHLORIDE 20 MG/ML
10 INJECTION INTRAMUSCULAR; INTRAVENOUS EVERY 6 HOURS PRN
Status: DISCONTINUED | OUTPATIENT
Start: 2024-12-27 | End: 2025-01-03 | Stop reason: HOSPADM

## 2024-12-27 RX ORDER — SODIUM CHLORIDE 0.9 % (FLUSH) 0.9 %
5-40 SYRINGE (ML) INJECTION EVERY 12 HOURS SCHEDULED
Status: DISCONTINUED | OUTPATIENT
Start: 2024-12-27 | End: 2025-01-03 | Stop reason: HOSPADM

## 2024-12-27 RX ADMIN — ALPRAZOLAM 0.5 MG: 0.5 TABLET ORAL at 15:30

## 2024-12-27 RX ADMIN — ASPIRIN 81 MG: 81 TABLET, COATED ORAL at 09:16

## 2024-12-27 RX ADMIN — LOSARTAN POTASSIUM 100 MG: 50 TABLET, FILM COATED ORAL at 09:16

## 2024-12-27 RX ADMIN — METOPROLOL TARTRATE 25 MG: 25 TABLET, FILM COATED ORAL at 09:16

## 2024-12-27 RX ADMIN — CIPROFLOXACIN 400 MG: 200 INJECTION, SOLUTION INTRAVENOUS at 03:28

## 2024-12-27 RX ADMIN — ALPRAZOLAM 0.5 MG: 0.5 TABLET ORAL at 21:16

## 2024-12-27 RX ADMIN — LORAZEPAM 1 MG: 1 TABLET ORAL at 00:43

## 2024-12-27 RX ADMIN — QUETIAPINE FUMARATE 50 MG: 25 TABLET ORAL at 21:16

## 2024-12-27 RX ADMIN — ENOXAPARIN SODIUM 30 MG: 100 INJECTION SUBCUTANEOUS at 09:27

## 2024-12-27 RX ADMIN — SODIUM CHLORIDE, POTASSIUM CHLORIDE, SODIUM LACTATE AND CALCIUM CHLORIDE: 600; 310; 30; 20 INJECTION, SOLUTION INTRAVENOUS at 06:08

## 2024-12-27 RX ADMIN — ANTI-FUNGAL POWDER MICONAZOLE NITRATE TALC FREE: 1.42 POWDER TOPICAL at 21:25

## 2024-12-27 RX ADMIN — INSULIN GLARGINE 15 UNITS: 100 INJECTION, SOLUTION SUBCUTANEOUS at 03:59

## 2024-12-27 RX ADMIN — LORAZEPAM 1 MG: 2 INJECTION INTRAMUSCULAR; INTRAVENOUS at 20:01

## 2024-12-27 RX ADMIN — ATORVASTATIN CALCIUM 80 MG: 40 TABLET, FILM COATED ORAL at 09:16

## 2024-12-27 RX ADMIN — METRONIDAZOLE 500 MG: 500 INJECTION, SOLUTION INTRAVENOUS at 09:26

## 2024-12-27 RX ADMIN — INSULIN GLARGINE 15 UNITS: 100 INJECTION, SOLUTION SUBCUTANEOUS at 21:30

## 2024-12-27 RX ADMIN — INSULIN LISPRO 2 UNITS: 100 INJECTION, SOLUTION INTRAVENOUS; SUBCUTANEOUS at 15:35

## 2024-12-27 RX ADMIN — HYDRALAZINE HYDROCHLORIDE 100 MG: 50 TABLET ORAL at 15:30

## 2024-12-27 RX ADMIN — SODIUM CHLORIDE, PRESERVATIVE FREE 10 ML: 5 INJECTION INTRAVENOUS at 10:12

## 2024-12-27 RX ADMIN — LORAZEPAM 1 MG: 2 INJECTION INTRAMUSCULAR; INTRAVENOUS at 11:10

## 2024-12-27 RX ADMIN — HYDRALAZINE HYDROCHLORIDE 10 MG: 20 INJECTION INTRAMUSCULAR; INTRAVENOUS at 05:06

## 2024-12-27 RX ADMIN — METOPROLOL TARTRATE 25 MG: 25 TABLET, FILM COATED ORAL at 03:59

## 2024-12-27 RX ADMIN — POTASSIUM CHLORIDE 40 MEQ: 1500 TABLET, EXTENDED RELEASE ORAL at 15:30

## 2024-12-27 RX ADMIN — VANCOMYCIN HYDROCHLORIDE 125 MG: 1 INJECTION, POWDER, LYOPHILIZED, FOR SOLUTION INTRAVENOUS at 17:58

## 2024-12-27 RX ADMIN — HYDRALAZINE HYDROCHLORIDE 100 MG: 50 TABLET ORAL at 21:17

## 2024-12-27 RX ADMIN — TIZANIDINE 6 MG: 2 TABLET ORAL at 21:16

## 2024-12-27 RX ADMIN — SODIUM CHLORIDE, PRESERVATIVE FREE 10 ML: 5 INJECTION INTRAVENOUS at 21:17

## 2024-12-27 RX ADMIN — METOPROLOL TARTRATE 25 MG: 25 TABLET, FILM COATED ORAL at 21:16

## 2024-12-27 RX ADMIN — CHOLESTYRAMINE 4 G: 4 POWDER, FOR SUSPENSION ORAL at 21:17

## 2024-12-27 RX ADMIN — POTASSIUM CHLORIDE 40 MEQ: 1500 TABLET, EXTENDED RELEASE ORAL at 10:06

## 2024-12-27 RX ADMIN — WATER 2000 MG: 1 INJECTION INTRAMUSCULAR; INTRAVENOUS; SUBCUTANEOUS at 04:58

## 2024-12-27 RX ADMIN — Medication 1 CAPSULE: at 09:16

## 2024-12-27 RX ADMIN — CHOLESTYRAMINE 4 G: 4 POWDER, FOR SUSPENSION ORAL at 09:17

## 2024-12-27 RX ADMIN — POTASSIUM CHLORIDE 10 MEQ: 7.45 INJECTION INTRAVENOUS at 20:12

## 2024-12-27 RX ADMIN — POTASSIUM CHLORIDE 40 MEQ: 1500 TABLET, EXTENDED RELEASE ORAL at 21:17

## 2024-12-27 RX ADMIN — ENOXAPARIN SODIUM 30 MG: 100 INJECTION SUBCUTANEOUS at 21:16

## 2024-12-27 ASSESSMENT — PAIN SCALES - GENERAL
PAINLEVEL_OUTOF10: 0

## 2024-12-27 NOTE — PROGRESS NOTES
MD on call notified that patient is uncooperative.  Can we have a sitter order and something to calm her down.  Nurse has not been able to leave the room yet.  She is not redirectable.     New order received for Sitter and Ativan 1 mg IV Q 6 hours.

## 2024-12-27 NOTE — PROGRESS NOTES
4 Eyes Skin Assessment     NAME:  Priscilla Dean  YOB: 1970  MEDICAL RECORD NUMBER:  [id number]    The patient is being assessed for  Admission    I agree that at least one RN has performed a thorough Head to Toe Skin Assessment on the patient. ALL assessment sites listed below have been assessed.      Areas assessed by both nurses:    Head, Face, Ears, Shoulders, Back, Chest, Arms, Elbows, Hands, Sacrum. Buttock, Coccyx, Ischium, and Legs. Feet and Heels        Does the Patient have a Wound? No noted wound(s)  Perineum excoriation with some redness       Jose Alfredo Prevention initiated by RN: Yes  Wound Care Orders initiated by RN: No    Pressure Injury (Stage 3,4, Unstageable, DTI, NWPT, and Complex wounds) if present, place Wound referral order by RN under : No    New Ostomies, if present place, Ostomy referral order under : No     Nurse 1 eSignature: Electronically signed by Hallie Guadaluep RN on 12/27/24 at 7:29 AM EST    **SHARE this note so that the co-signing nurse can place an eSignature**    Nurse 2 eSignature: Electronically signed by Caroline Zhu RN on 12/27/24 at 7:30 AM EST

## 2024-12-27 NOTE — PLAN OF CARE
Problem: Chronic Conditions and Co-morbidities  Goal: Patient's chronic conditions and co-morbidity symptoms are monitored and maintained or improved  Outcome: Progressing  Flowsheets  Taken 12/27/2024 1055 by Keenan Chambers RN  Care Plan - Patient's Chronic Conditions and Co-Morbidity Symptoms are Monitored and Maintained or Improved: Monitor and assess patient's chronic conditions and comorbid symptoms for stability, deterioration, or improvement  Taken 12/27/2024 0557 by Hallie Guadalupe RN  Care Plan - Patient's Chronic Conditions and Co-Morbidity Symptoms are Monitored and Maintained or Improved:   Monitor and assess patient's chronic conditions and comorbid symptoms for stability, deterioration, or improvement   Collaborate with multidisciplinary team to address chronic and comorbid conditions and prevent exacerbation or deterioration  Note: Monitor patient vitals and labs; Medicate per MAR.     Problem: Pain  Goal: Verbalizes/displays adequate comfort level or baseline comfort level  12/27/2024 1055 by Keenan Chambers RN  Outcome: Progressing  Flowsheets (Taken 12/27/2024 0840 by Hallie Guadalupe RN)  Verbalizes/displays adequate comfort level or baseline comfort level:   Assess pain using appropriate pain scale   Encourage patient to monitor pain and request assistance   Administer analgesics based on type and severity of pain and evaluate response   Implement non-pharmacological measures as appropriate and evaluate response  Note: Patient to notify nurse of pain via rounding.  12/27/2024 0840 by Hallie Guadalupe RN  Outcome: Progressing  Flowsheets (Taken 12/27/2024 0840)  Verbalizes/displays adequate comfort level or baseline comfort level:   Assess pain using appropriate pain scale   Encourage patient to monitor pain and request assistance   Administer analgesics based on type and severity of pain and evaluate response   Implement non-pharmacological measures as appropriate and evaluate

## 2024-12-27 NOTE — PLAN OF CARE
Problem: Pain  Goal: Verbalizes/displays adequate comfort level or baseline comfort level  Outcome: Progressing  Flowsheets (Taken 12/27/2024 0840)  Verbalizes/displays adequate comfort level or baseline comfort level:   Assess pain using appropriate pain scale   Encourage patient to monitor pain and request assistance   Administer analgesics based on type and severity of pain and evaluate response   Implement non-pharmacological measures as appropriate and evaluate response     Problem: Safety - Adult  Goal: Free from fall injury  Outcome: Progressing  Flowsheets (Taken 12/27/2024 0840)  Free From Fall Injury: Instruct family/caregiver on patient safety     Problem: Infection - Adult  Goal: Absence of infection at discharge  Flowsheets (Taken 12/27/2024 0840)  Absence of infection at discharge:   Assess and monitor for signs and symptoms of infection   Monitor lab/diagnostic results   Monitor all insertion sites i.e., indwelling lines, tubes and drains

## 2024-12-27 NOTE — PROGRESS NOTES
54-year-old female admitted to emergency room with history of abdominal pain, patient has a history of chronic Crohn's colitis on Stelara, chronic and recurrent C. difficile colitis, diabetes hypertension , now being admitted with acute mental status changes from acute metabolic acidosis.  CT abdomen suggestive of colitis.  GI has been consulted currently on antibiotic empirically C. difficile results are pending.  For now continue current medications, discussed with GI, high suspicion for C. difficile at this point.

## 2024-12-27 NOTE — ED NOTES
Bedside and Verbal shift change report given to Murali (oncoming nurse) by Baylee (offgoing nurse). Report included the following information ED Encounter Summary, ED SBAR, and Cardiac Rhythm

## 2024-12-27 NOTE — PLAN OF CARE
Problem: Occupational Therapy - Adult  Goal: By Discharge: Performs self-care activities at highest level of function for planned discharge setting.  See evaluation for individualized goals.  Description: Occupational Therapy Goals:  Initiated 12/27/2024 to be met within 7-10 days.    1.  Patient will perform self-feeding and grooming with modified independence.   2.  Patient will perform bathing with contact guard assistance using adaptive equipment.  3.  Patient will perform upper body dressing and lower body dressing  with contact guard assistance using adaptive equipment.  4.  Patient will perform toilet transfers with contact guard assistance using RW with good balance.  5.  Patient will perform all aspects of toileting with contact guard assistance.  6.  Patient will participate in upper extremity therapeutic exercise/activities with contact guard assistance for 8-10 minutes to increase strength/endurance for ADLs.    7.  Patient will utilize energy conservation techniques during functional activities with min verbal cues.    PLOF: independent with ADLs and functional mobility        Outcome: Progressing   OCCUPATIONAL THERAPY EVALUATION    Patient: Priscilla Dean (54 y.o. female)  Date: 12/27/2024  Primary Diagnosis: Elevated C-reactive protein (CRP) [R79.82]  Colitis [K52.9]  Elevated erythrocyte sedimentation rate [R70.0]  Acute cystitis without hematuria [N30.00]  Altered mental status, unspecified altered mental status type [R41.82]  Acute metabolic encephalopathy [G93.41]  AMS (altered mental status) [R41.82]       Precautions: Isolation, Skin, Fall Risk,  ,  ,  ,  ,  ,  ,      ASSESSMENT :  Pt requires repetition for following directions throughout tx session d/t confusion. Pt presents with decreased strength, balance and safety awareness, which inhibits her ability to perform self care tasks and functional mobility. Bed mobility: Min A supine <-> sit edge of bed. STS with Min A, pt demonstrates Fair

## 2024-12-27 NOTE — CONSULTS
[email or web address]  [phone]    GASTROENTEROLOGY CONSULT      Impression:   1. Diarrhea - CT w/ acute L sided colitis, suspect C diff vs Crohn's flare - awaiting stool studies  - Hx C diff - tx w/ oral vancomycin 5/2024, 10/2024 - states she never improved, however does demonstrate confusion - reliability of history provided is uncertain  - Sed rate elevated at >130, CRP 10.8  2. Crohn's colitis - initially dx 2009, no history of small bowel or upper GI tract involvement, previously treated w/ prednisone, Asocol, azathioprine, Humira - dosing increased to weekly in late 2019 due to low adalimumab level and Ab, 9/2020 Humira stopped, Started Stelara due to active disease w/ stricture at sigmoid colon. Stelara was increased to q4 week dosing in late 2022 when colonoscopy showed ongoing active disease in L colon.    - Hx enteric fistula between sigmoid and ileum    3. AMS - acute metabolic encephalopathy  4. UDS + - opiates and THC  5. Hypokalemia - K 2.3  6. UTI      Plan:     1. Await stool studies. Recommend oral vancomycin while awaiting results.  If C diff positive, recommend consultation w/ ID and start Dificid - She has had multiple episodes of C diff in past, most recently 10/2024  2. Continue medical management per primary team  3. Treatment of UTI, repletion of electrolytes per primary team  4. Monitor CRP daily  5. If C diff negative, recommend starting corticosteroids. Further recommendations pending clinical course.  6. Unknown when last Stelara dose given - recommend contacting family to try to determine when she is due for this - attempt to avoid lapse in treatment.       Chief Complaint: Diarrhea, Crohn's disease      HPI:  Priscilla Dean is a 54 y.o. female who I am being asked to see in consultation for an opinion regarding Diarrhea, Crohn's disease. She is a poor historian secondary to confusion. She has a history of fistulizing Crohn's colitis first diagnosed in 2009.  Follows with [facility] since  10/21/2019    COLONOSCOPY w/ bxs performed by Wayne Cheema MD at [facility] ENDOSCOPY    FOREARM SURGERY Right 12/12/2023    RIGHT DISTAL RADIUS OPEN REDUCTION INTERNAL FIXATION, RIGHT THIRD METACARPAL OPEN REDUCTION INTERNAL FIXATION;MINI C-ARM, SUPRACLAVICULAR BLOCK; [ACUMED ORTHO] performed by Brett Mcneill DO at [facility] MAIN OR    REVISION TOTAL KNEE ARTHROPLASTY Left 8/26/2024    LEFT TOTA KNEE REVISION performed by Vish Orlando MD at [facility] MAIN OR    TOTAL KNEE ARTHROPLASTY Left 10/23/2023    LEFT TOTAL KNEE REPLACEMENT performed by Vish Orlando MD at [facility] MAIN OR    TUBAL LIGATION      btl       Social HX:   Social History     Socioeconomic History    Marital status:      Spouse name: Not on file    Number of children: Not on file    Years of education: Not on file    Highest education level: Not on file   Occupational History    Not on file   Tobacco Use    Smoking status: Never    Smokeless tobacco: Never   Vaping Use    Vaping status: Never Used   Substance and Sexual Activity    Alcohol use: Never    Drug use: Never    Sexual activity: Not on file   Other Topics Concern    Not on file   Social History Narrative    Not on file     Social Determinants of Health     Financial Resource Strain: Not on file   Food Insecurity: No Food Insecurity (8/26/2024)    Hunger Vital Sign     Worried About Running Out of Food in the Last Year: Never true     Ran Out of Food in the Last Year: Never true   Transportation Needs: No Transportation Needs (8/26/2024)    PRAPARE - Transportation     Lack of Transportation (Medical): No     Lack of Transportation (Non-Medical): No   Physical Activity: Not on file   Stress: Not on file   Social Connections: Feeling Socially Integrated (11/22/2023)    OASIS : Social Isolation     Frequency of experiencing loneliness or isolation: Never   Intimate Partner Violence: Not on file   Housing Stability: Low Risk  (8/26/2024)    Housing Stability Vital Sign     Unable to Pay

## 2024-12-27 NOTE — PLAN OF CARE
Problem: Physical Therapy - Adult  Goal: By Discharge: Performs mobility at highest level of function for planned discharge setting.  See evaluation for individualized goals.  Description: Initiated  12/27/24  to be met within 7-10 days.    1.  Patient will move from supine to sit and sit to supine , scoot up and down, and roll side to side in bed with independence.    2.  Patient will transfer from bed to chair and chair to bed with modified independence using the least restrictive device.  3.  Patient will perform sit to stand with modified independence.  4.  Patient will ambulate with supervision/set-up for 50 feet with the least restrictive device.   5.  Patient will ascend/descend 4 stairs with B handrail(s) with supervision/set-up.    PLOF: Poor historian, PLOF gathered from chart review. Lives alone and ind prior to admission.     Outcome: Progressing   PHYSICAL THERAPY EVALUATION    Patient: Priscilla Dean (54 y.o. female)  Date: 12/27/2024  Primary Diagnosis: Elevated C-reactive protein (CRP) [R79.82]  Colitis [K52.9]  Elevated erythrocyte sedimentation rate [R70.0]  Acute cystitis without hematuria [N30.00]  Altered mental status, unspecified altered mental status type [R41.82]  Acute metabolic encephalopathy [G93.41]  AMS (altered mental status) [R41.82]       Precautions: Isolation, Skin, Fall Risk,  ,  ,  ,  ,  ,  ,      ASSESSMENT :  Pt cleared by nursing. Pt received in bed in [facility] and educated on PT role and evaluation process,agreeable. Sitter presents and seen alongside OT to maximize pt safety. Pt CGA to EOB with fair seated balance. Poor command following and requires frequent redirecting throughout session. Sit to stand min A for safety and cueing, reports urge for BM, BSC near and is able to only take 2-3 steps before pt stops following commands and becomes unsafe for transfer, pt beings walking forward towards wall with no regard for obstacles or cues. Bed is brought right behind pt and mod to  Dynamic: Fair (-)          Ambulation/Gait Training:                       Gait  Gait Training: No                     Pain:  Intensity Pre-treatment: 0/10   Intensity Post-treatment: 0/10  Scale: Numeric Rating Scale    Activity Tolerance:   Activity Tolerance: Treatment limited secondary to decreased cognition  Please refer to the flowsheet for vital signs taken during this treatment.    After treatment:   []         Patient left in no apparent distress sitting up in chair  [x]         Patient left in no apparent distress in bed  [x]         Call bell left within reach  [x]         Nursing notified  []         Caregiver present  [x]         Bed alarm activated  []         SCDs applied    COMMUNICATION/EDUCATION:   Patient Education  Education Given To: Patient  Education Provided: Role of Therapy;Plan of Care;Transfer Training;Mobility Training;Fall Prevention Strategies;Equipment;Orientation;Energy Conservation  Education Method: Demonstration;Verbal;Teach Back  Barriers to Learning: Cognition  Education Outcome: Continued education needed    Thank you for this referral.  Eleonora Lee, PT  Minutes: 24      Eval Complexity: Decision Making: Medium Complexity

## 2024-12-27 NOTE — PLAN OF CARE
Problem: SLP Adult - Impaired Swallowing  Goal: By Discharge: Advance to least restrictive diet without signs or symptoms of aspiration for planned discharge setting.  See evaluation for individualized goals.  Description: Patient will:  1. Tolerate PO trials with 0 s/s overt distress in 4/5 trials  2. Utilize compensatory swallow strategies/maneuvers (decrease bite/sip, size/rate, alt. liq/sol) with min cues in 4/5 trials  3. Perform oral-motor/laryngeal exercises to increase oropharyngeal swallow function with min cues  4. Complete an objective swallow study (i.e., MBSS) to assess swallow integrity, r/o aspiration, and determine of safest LRD, min A as indicated/ordered by MD     Rec:     Full thin liquids  Aspiration precautions  HOB >45 during po intake, remain >30 for 30-45 minutes after po   Small bites/sips; slow feeding rate   Oral care TID  Meds per pt preference    Outcome: Progressing  SPEECH LANGUAGE PATHOLOGY BEDSIDE SWALLOW EVALUATION/TREATMENT    Patient: Priscilla Dean (54 y.o. female)  Date: 12/27/2024  Primary Diagnosis: Elevated C-reactive protein (CRP) [R79.82]  Colitis [K52.9]  Elevated erythrocyte sedimentation rate [R70.0]  Acute cystitis without hematuria [N30.00]  Altered mental status, unspecified altered mental status type [R41.82]  Acute metabolic encephalopathy [G93.41]  AMS (altered mental status) [R41.82]       Precautions: Aspiration  PLOF: As per H&P  ASSESSMENT:  Based on the objective data described below, the patient presents with oropharyngeal swallow fxn that appears WNL, however Pt showing esophageal like symptoms that warrant further assessment. Pt confused, only able to answer orientation questions given binary choices, and answering mostly Y/N questions. Pt tolerated thin liquids + straw consecutive swallows without overt s/s of aspiration. Pt initially tolerated puree solids via tsp, however following the swallow gagged and then orally expelled puree into a napkin. Pt  hospitalization     SUBJECTIVE:   Patient stated, \"probably [place].\"    OBJECTIVE:     Past Medical History:   Diagnosis Date    Bilateral knee pain     Crohn's colitis (HCC)     Diabetes (HCC)     IDDM    HTN (hypertension)     Hypercholesterolemia     Pulmonary embolism (HCC) 03/2019    Stool color black     crohns     Past Surgical History:   Procedure Laterality Date    ANKLE FRACTURE SURGERY Left 2018    COLONOSCOPY N/A 8/31/2022    COLONOSCOPY/ Biopsies performed by JASMINE Shetty MD at [facility] ENDOSCOPY    COLONOSCOPY N/A 7/12/2022    COLONOSCOPY performed by JASMINE Shetty MD at [facility] ENDOSCOPY    COLONOSCOPY N/A 9/22/2020    COLONOSCOPY with bx's performed by JASMINE Shetty MD at [facility] ENDOSCOPY    COLONOSCOPY N/A 10/21/2019    COLONOSCOPY w/ bxs performed by Wayne Cheema MD at [facility] ENDOSCOPY    FOREARM SURGERY Right 12/12/2023    RIGHT DISTAL RADIUS OPEN REDUCTION INTERNAL FIXATION, RIGHT THIRD METACARPAL OPEN REDUCTION INTERNAL FIXATION;MINI C-ARM, SUPRACLAVICULAR BLOCK; [ACUMED ORTHO] performed by Brett Mcneill DO at [facility] MAIN OR    REVISION TOTAL KNEE ARTHROPLASTY Left 8/26/2024    LEFT TOTA KNEE REVISION performed by Vish Orlando MD at [facility] MAIN OR    TOTAL KNEE ARTHROPLASTY Left 10/23/2023    LEFT TOTAL KNEE REPLACEMENT performed by Vish Orlando MD at [facility] MAIN OR    TUBAL LIGATION      btl     Prior Level of Function/Home Situation:  Social/Functional History  Lives With: Alone  Type of Home: House  Home Layout: One level  Home Access: Level entry  Bathroom Shower/Tub: Tub/Shower unit  Bathroom Toilet: Standard  Bathroom Equipment: Grab bars in shower  Bathroom Accessibility: Accessible  Home Equipment: Cane, Walker - Rolling  Receives Help From: Family  Prior Level of Assist for ADLs: Independent  Prior Level of Assist for Homemaking: Independent  Homemaking Responsibilities: Yes  Prior Level of Assist for Ambulation: Independent household ambulator, with or without device  Prior Level of

## 2024-12-27 NOTE — PROGRESS NOTES
Patient becoming increasingly more difficult to redirect. This nurse has attempted to redirect patient because she needs to urinate. This nurse has explained that she has a purewick in and patient continues to state \"okay\" then attempts to get out of the bed to urinate. Patient is agitated that she is unable to get up to use the bedside commode. Patient also suffers from excoriated genitalia and perineum. Patient continues to exclaim \"It hurts!\" When she is wiped, when we take her blood pressure, and when we attempt to move her in the bed.

## 2024-12-27 NOTE — PROGRESS NOTES
Received pt from ED via stretcher, she is alert and oriented to self, she denies any pain, ELIZABETH still high at 170/95, call light within pt's reach, will continue to monitor.    Pt restless and unable to follow directions. She is always getting out of bed, bed alarm devise assist in preventing pt's fall. Charge nurse MICHELLE Major assisted in getting new orders from attending physician, assigned sitter at bedside

## 2024-12-27 NOTE — H&P
Hospitalist Admission History and Physical    NAME:  Priscilla Dean   :   1970   MRN:   [id number]     PCP:  Zachery Lucio MD  Date/Time:  2024 1:08 AM  Subjective:   CHIEF COMPLAINT: Confusion diarrhea    HISTORY OF PRESENT ILLNESS:     Priscilla is a 54 y.o.   female with the history of Crohn's colitis on Stelara hypertension diabetes on insulin , ESBL UTI, history of C. difficile colitis came in with confusion altered mental status;  As per ED notes patient has been having severe diarrhea loose stools the past few weeks.  Wearing depends at home.;  Has been progressively getting confused at home.  Patient does smoke marijuana and ET edibles.  In the ED CT repeating the same words over and over.    ED workup;, head CT was negative.  Abdominal CT with left-sided severe colitis.  UA looked dirty looks a UTI    Patient has been started on Cipro Flagyl.  Getting admitted for further workup and treatment GI has been consulted;        Past Medical History:   Diagnosis Date    Bilateral knee pain     Crohn's colitis (HCC)     Diabetes (HCC)     IDDM    HTN (hypertension)     Hypercholesterolemia     Pulmonary embolism (HCC) 2019    Stool color black     crohns        Past Surgical History:   Procedure Laterality Date    ANKLE FRACTURE SURGERY Left 2018    COLONOSCOPY N/A 2022    COLONOSCOPY/ Biopsies performed by JASMINE Shetty MD at [facility] ENDOSCOPY    COLONOSCOPY N/A 2022    COLONOSCOPY performed by JASMINE Shetty MD at [facility] ENDOSCOPY    COLONOSCOPY N/A 2020    COLONOSCOPY with bx's performed by JASMINE Shetty MD at [facility] ENDOSCOPY    COLONOSCOPY N/A 10/21/2019    COLONOSCOPY w/ bxs performed by Wayne Cheema MD at [facility] ENDOSCOPY    FOREARM SURGERY Right 2023    RIGHT DISTAL RADIUS OPEN REDUCTION INTERNAL FIXATION, RIGHT THIRD METACARPAL OPEN REDUCTION INTERNAL FIXATION;MINI C-ARM, SUPRACLAVICULAR BLOCK; [ACUMED ORTHO] performed by Brett Mcneill DO at [facility] MAIN

## 2024-12-28 ENCOUNTER — APPOINTMENT (OUTPATIENT)
Facility: HOSPITAL | Age: 54
End: 2024-12-28
Payer: COMMERCIAL

## 2024-12-28 ENCOUNTER — APPOINTMENT (OUTPATIENT)
Facility: HOSPITAL | Age: 54
End: 2024-12-28
Attending: STUDENT IN AN ORGANIZED HEALTH CARE EDUCATION/TRAINING PROGRAM
Payer: COMMERCIAL

## 2024-12-28 LAB
ALBUMIN SERPL-MCNC: 2.6 G/DL (ref 3.4–5)
ALBUMIN/GLOB SERPL: 0.5 (ref 0.8–1.7)
ALP SERPL-CCNC: 73 U/L (ref 45–117)
ALT SERPL-CCNC: 8 U/L (ref 13–56)
AMMONIA PLAS-SCNC: 28 UMOL/L (ref 11–32)
ANION GAP SERPL CALC-SCNC: 6 MMOL/L (ref 3–18)
AST SERPL-CCNC: 7 U/L (ref 10–38)
BASOPHILS # BLD: 0 K/UL (ref 0–0.1)
BASOPHILS NFR BLD: 1 % (ref 0–2)
BILIRUB SERPL-MCNC: 0.8 MG/DL (ref 0.2–1)
BUN SERPL-MCNC: 9 MG/DL (ref 7–18)
BUN/CREAT SERPL: 6 (ref 12–20)
CALCIUM SERPL-MCNC: 9 MG/DL (ref 8.5–10.1)
CHLORIDE SERPL-SCNC: 106 MMOL/L (ref 100–111)
CO2 SERPL-SCNC: 28 MMOL/L (ref 21–32)
CREAT SERPL-MCNC: 1.41 MG/DL (ref 0.6–1.3)
DIFFERENTIAL METHOD BLD: ABNORMAL
EOSINOPHIL # BLD: 0 K/UL (ref 0–0.4)
EOSINOPHIL NFR BLD: 1 % (ref 0–5)
ERYTHROCYTE [DISTWIDTH] IN BLOOD BY AUTOMATED COUNT: 16.7 % (ref 11.6–14.5)
EST. AVERAGE GLUCOSE BLD GHB EST-MCNC: 143 MG/DL
GLOBULIN SER CALC-MCNC: 4.8 G/DL (ref 2–4)
GLUCOSE BLD STRIP.AUTO-MCNC: 119 MG/DL (ref 70–110)
GLUCOSE BLD STRIP.AUTO-MCNC: 127 MG/DL (ref 70–110)
GLUCOSE BLD STRIP.AUTO-MCNC: 138 MG/DL (ref 70–110)
GLUCOSE BLD STRIP.AUTO-MCNC: 144 MG/DL (ref 70–110)
GLUCOSE SERPL-MCNC: 140 MG/DL (ref 74–99)
HBA1C MFR BLD: 6.6 % (ref 4.2–5.6)
HCT VFR BLD AUTO: 35 % (ref 35–45)
HGB BLD-MCNC: 11 G/DL (ref 12–16)
IMM GRANULOCYTES # BLD AUTO: 0 K/UL (ref 0–0.04)
IMM GRANULOCYTES NFR BLD AUTO: 1 % (ref 0–0.5)
LYMPHOCYTES # BLD: 1.3 K/UL (ref 0.9–3.6)
LYMPHOCYTES NFR BLD: 20 % (ref 21–52)
MAGNESIUM SERPL-MCNC: 2.1 MG/DL (ref 1.6–2.6)
MCH RBC QN AUTO: 24.7 PG (ref 24–34)
MCHC RBC AUTO-ENTMCNC: 31.4 G/DL (ref 31–37)
MCV RBC AUTO: 78.5 FL (ref 78–100)
MONOCYTES # BLD: 0.7 K/UL (ref 0.05–1.2)
MONOCYTES NFR BLD: 10 % (ref 3–10)
NEUTS SEG # BLD: 4.5 K/UL (ref 1.8–8)
NEUTS SEG NFR BLD: 68 % (ref 40–73)
NRBC # BLD: 0 K/UL (ref 0–0.01)
NRBC BLD-RTO: 0 PER 100 WBC
PLATELET # BLD AUTO: 364 K/UL (ref 135–420)
PMV BLD AUTO: 11.8 FL (ref 9.2–11.8)
POTASSIUM SERPL-SCNC: 2.8 MMOL/L (ref 3.5–5.5)
POTASSIUM SERPL-SCNC: 3.3 MMOL/L (ref 3.5–5.5)
PROT SERPL-MCNC: 7.4 G/DL (ref 6.4–8.2)
RBC # BLD AUTO: 4.46 M/UL (ref 4.2–5.3)
SODIUM SERPL-SCNC: 140 MMOL/L (ref 136–145)
WBC # BLD AUTO: 6.6 K/UL (ref 4.6–13.2)

## 2024-12-28 PROCEDURE — 51798 US URINE CAPACITY MEASURE: CPT

## 2024-12-28 PROCEDURE — 2500000003 HC RX 250 WO HCPCS: Performed by: STUDENT IN AN ORGANIZED HEALTH CARE EDUCATION/TRAINING PROGRAM

## 2024-12-28 PROCEDURE — 2500000003 HC RX 250 WO HCPCS: Performed by: INTERNAL MEDICINE

## 2024-12-28 PROCEDURE — 93970 EXTREMITY STUDY: CPT

## 2024-12-28 PROCEDURE — 80053 COMPREHEN METABOLIC PANEL: CPT

## 2024-12-28 PROCEDURE — 82962 GLUCOSE BLOOD TEST: CPT

## 2024-12-28 PROCEDURE — 6370000000 HC RX 637 (ALT 250 FOR IP): Performed by: INTERNAL MEDICINE

## 2024-12-28 PROCEDURE — 6360000002 HC RX W HCPCS: Performed by: INTERNAL MEDICINE

## 2024-12-28 PROCEDURE — 6370000000 HC RX 637 (ALT 250 FOR IP): Performed by: STUDENT IN AN ORGANIZED HEALTH CARE EDUCATION/TRAINING PROGRAM

## 2024-12-28 PROCEDURE — 99232 SBSQ HOSP IP/OBS MODERATE 35: CPT | Performed by: STUDENT IN AN ORGANIZED HEALTH CARE EDUCATION/TRAINING PROGRAM

## 2024-12-28 PROCEDURE — 94761 N-INVAS EAR/PLS OXIMETRY MLT: CPT

## 2024-12-28 PROCEDURE — 84132 ASSAY OF SERUM POTASSIUM: CPT

## 2024-12-28 PROCEDURE — 36415 COLL VENOUS BLD VENIPUNCTURE: CPT

## 2024-12-28 PROCEDURE — 83735 ASSAY OF MAGNESIUM: CPT

## 2024-12-28 PROCEDURE — 83036 HEMOGLOBIN GLYCOSYLATED A1C: CPT

## 2024-12-28 PROCEDURE — 6360000002 HC RX W HCPCS: Performed by: HEALTH CARE PROVIDER

## 2024-12-28 PROCEDURE — 85025 COMPLETE CBC W/AUTO DIFF WBC: CPT

## 2024-12-28 PROCEDURE — 73560 X-RAY EXAM OF KNEE 1 OR 2: CPT

## 2024-12-28 PROCEDURE — 1100000000 HC RM PRIVATE

## 2024-12-28 PROCEDURE — 6360000002 HC RX W HCPCS: Performed by: STUDENT IN AN ORGANIZED HEALTH CARE EDUCATION/TRAINING PROGRAM

## 2024-12-28 PROCEDURE — 82140 ASSAY OF AMMONIA: CPT

## 2024-12-28 RX ORDER — POTASSIUM CHLORIDE 7.45 MG/ML
10 INJECTION INTRAVENOUS
Status: COMPLETED | OUTPATIENT
Start: 2024-12-28 | End: 2024-12-29

## 2024-12-28 RX ORDER — QUETIAPINE FUMARATE 25 MG/1
25 TABLET, FILM COATED ORAL 2 TIMES DAILY
Status: DISCONTINUED | OUTPATIENT
Start: 2024-12-28 | End: 2024-12-31

## 2024-12-28 RX ADMIN — POTASSIUM CHLORIDE 10 MEQ: 7.45 INJECTION INTRAVENOUS at 23:22

## 2024-12-28 RX ADMIN — INSULIN GLARGINE 15 UNITS: 100 INJECTION, SOLUTION SUBCUTANEOUS at 21:51

## 2024-12-28 RX ADMIN — POTASSIUM CHLORIDE 10 MEQ: 7.45 INJECTION INTRAVENOUS at 08:25

## 2024-12-28 RX ADMIN — POTASSIUM CHLORIDE 10 MEQ: 7.45 INJECTION INTRAVENOUS at 11:02

## 2024-12-28 RX ADMIN — ALPRAZOLAM 0.5 MG: 0.5 TABLET ORAL at 23:34

## 2024-12-28 RX ADMIN — VANCOMYCIN HYDROCHLORIDE 125 MG: 1 INJECTION, POWDER, LYOPHILIZED, FOR SOLUTION INTRAVENOUS at 13:21

## 2024-12-28 RX ADMIN — ENOXAPARIN SODIUM 30 MG: 100 INJECTION SUBCUTANEOUS at 21:51

## 2024-12-28 RX ADMIN — ENOXAPARIN SODIUM 30 MG: 100 INJECTION SUBCUTANEOUS at 08:30

## 2024-12-28 RX ADMIN — POTASSIUM CHLORIDE 10 MEQ: 7.45 INJECTION INTRAVENOUS at 05:32

## 2024-12-28 RX ADMIN — HYDRALAZINE HYDROCHLORIDE 100 MG: 50 TABLET ORAL at 13:21

## 2024-12-28 RX ADMIN — LORAZEPAM 1 MG: 2 INJECTION INTRAMUSCULAR; INTRAVENOUS at 19:28

## 2024-12-28 RX ADMIN — POTASSIUM CHLORIDE 10 MEQ: 7.45 INJECTION INTRAVENOUS at 06:35

## 2024-12-28 RX ADMIN — POTASSIUM CHLORIDE 10 MEQ: 7.45 INJECTION INTRAVENOUS at 22:02

## 2024-12-28 RX ADMIN — ANTI-FUNGAL POWDER MICONAZOLE NITRATE TALC FREE: 1.42 POWDER TOPICAL at 08:22

## 2024-12-28 RX ADMIN — SODIUM CHLORIDE, PRESERVATIVE FREE 10 ML: 5 INJECTION INTRAVENOUS at 21:57

## 2024-12-28 RX ADMIN — ANTI-FUNGAL POWDER MICONAZOLE NITRATE TALC FREE: 1.42 POWDER TOPICAL at 22:12

## 2024-12-28 RX ADMIN — METOPROLOL TARTRATE 25 MG: 25 TABLET, FILM COATED ORAL at 08:18

## 2024-12-28 RX ADMIN — LOSARTAN POTASSIUM 100 MG: 50 TABLET, FILM COATED ORAL at 08:20

## 2024-12-28 RX ADMIN — CHOLESTYRAMINE 4 G: 4 POWDER, FOR SUSPENSION ORAL at 21:50

## 2024-12-28 RX ADMIN — QUETIAPINE FUMARATE 25 MG: 25 TABLET ORAL at 19:31

## 2024-12-28 RX ADMIN — ASPIRIN 81 MG: 81 TABLET, COATED ORAL at 08:18

## 2024-12-28 RX ADMIN — CHOLESTYRAMINE 4 G: 4 POWDER, FOR SUSPENSION ORAL at 08:20

## 2024-12-28 RX ADMIN — ACETAMINOPHEN 650 MG: 325 TABLET ORAL at 23:33

## 2024-12-28 RX ADMIN — ATORVASTATIN CALCIUM 80 MG: 40 TABLET, FILM COATED ORAL at 08:19

## 2024-12-28 RX ADMIN — FIDAXOMICIN 200 MG: 200 TABLET, FILM COATED ORAL at 18:24

## 2024-12-28 RX ADMIN — Medication 1 CAPSULE: at 08:20

## 2024-12-28 RX ADMIN — WATER 1000 MG: 1 INJECTION INTRAMUSCULAR; INTRAVENOUS; SUBCUTANEOUS at 16:44

## 2024-12-28 RX ADMIN — TIZANIDINE 6 MG: 2 TABLET ORAL at 19:31

## 2024-12-28 RX ADMIN — VANCOMYCIN HYDROCHLORIDE 125 MG: 1 INJECTION, POWDER, LYOPHILIZED, FOR SOLUTION INTRAVENOUS at 00:45

## 2024-12-28 RX ADMIN — VANCOMYCIN HYDROCHLORIDE 125 MG: 1 INJECTION, POWDER, LYOPHILIZED, FOR SOLUTION INTRAVENOUS at 05:32

## 2024-12-28 RX ADMIN — POTASSIUM CHLORIDE 10 MEQ: 7.45 INJECTION INTRAVENOUS at 13:29

## 2024-12-28 RX ADMIN — QUETIAPINE FUMARATE 25 MG: 25 TABLET ORAL at 13:21

## 2024-12-28 ASSESSMENT — PAIN DESCRIPTION - LOCATION
LOCATION: LEG
LOCATION: BACK

## 2024-12-28 ASSESSMENT — PAIN DESCRIPTION - DESCRIPTORS: DESCRIPTORS: ACHING

## 2024-12-28 ASSESSMENT — PAIN SCALES - GENERAL
PAINLEVEL_OUTOF10: 0
PAINLEVEL_OUTOF10: 8
PAINLEVEL_OUTOF10: 0
PAINLEVEL_OUTOF10: 6
PAINLEVEL_OUTOF10: 0
PAINLEVEL_OUTOF10: 0
PAINLEVEL_OUTOF10: 5

## 2024-12-28 NOTE — PROGRESS NOTES
There is generalized tenderness.     Extremities: Normal range of motion.  (Left leg tenderness)  Pulses: Distal pulses are intact.    Neurological: Patient is alert.  (ANO x 1).    Skin:  Warm and dry.          Labs/Imaging/Diagnostics    Labs:  CBC:  Recent Labs     12/26/24 1847 12/28/24  0320   WBC 10.0 6.6   RBC 4.76 4.46   HGB 11.4* 11.0*   HCT 36.3 35.0   MCV 76.3* 78.5   RDW 16.4* 16.7*    364     CHEMISTRIES:  Recent Labs     12/26/24 1847 12/27/24  0859 12/28/24  0320   * 138 140   K 3.7 2.3* 2.8*   CL 99* 99* 106   CO2 26 28 28   BUN 11 6* 9   CREATININE 0.93 0.85 1.41*   GLUCOSE 142* 154* 140*   MG  --  1.9 2.1     PT/INR:No results for input(s): \"PROTIME\", \"INR\" in the last 72 hours.  APTT:No results for input(s): \"APTT\" in the last 72 hours.  LIVER PROFILE:  Recent Labs     12/26/24 1847 12/27/24  0859 12/28/24  0320   AST 33 9* 7*   ALT 10* 7* 8*   BILITOT 0.8 0.6 0.8   ALKPHOS 85 88 73       Imaging:  XR KNEE LEFT (1-2 VIEWS)    Result Date: 12/28/2024  1.  Left total knee arthroplasty   without complication. Electronically signed by Atul HOBSON Post    CT ABDOMEN PELVIS W IV CONTRAST Additional Contrast? None    Result Date: 12/26/2024  Acute colitis throughout the left colon, sigmoid colon, and rectum. Chronic stable left adrenal nodule, likely adenoma, not requiring additional follow-up. Electronically signed by Vicky Ramos    CT HEAD WO CONTRAST    Result Date: 12/26/2024  No acute intracranial findings. Electronically signed by Vicky Ramos      Current Medications:  Current Facility-Administered Medications: QUEtiapine (SEROQUEL) tablet 25 mg, 25 mg, Oral, BID  Fidaxomicin (DIFICID) tablet 200 mg, 200 mg, Oral, BID  cefTRIAXone (ROCEPHIN) 1,000 mg in sterile water 10 mL IV syringe, 1,000 mg, IntraVENous, Q24H  aspirin EC tablet 81 mg, 81 mg, Oral, Daily  atorvastatin (LIPITOR) tablet 80 mg, 80 mg, Oral, Daily  lactobacillus (CULTURELLE) capsule 1 capsule, 1 capsule, Oral,  Daily with breakfast  losartan (COZAAR) tablet 100 mg, 100 mg, Oral, Daily  metoprolol tartrate (LOPRESSOR) tablet 25 mg, 25 mg, Oral, BID  hydrALAZINE (APRESOLINE) tablet 100 mg, 100 mg, Oral, TID  [Held by provider] pregabalin (LYRICA) capsule 200 mg, 200 mg, Oral, TID  tiZANidine (ZANAFLEX) tablet 6 mg, 6 mg, Oral, BID PRN  sodium chloride flush 0.9 % injection 5-40 mL, 5-40 mL, IntraVENous, 2 times per day  sodium chloride flush 0.9 % injection 5-40 mL, 5-40 mL, IntraVENous, PRN  0.9 % sodium chloride infusion, , IntraVENous, PRN  potassium chloride (KLOR-CON M) extended release tablet 40 mEq, 40 mEq, Oral, PRN **OR** potassium bicarb-citric acid (EFFER-K) effervescent tablet 40 mEq, 40 mEq, Oral, PRN **OR** potassium chloride 10 mEq/100 mL IVPB (Peripheral Line), 10 mEq, IntraVENous, PRN  magnesium sulfate 2000 mg in 50 mL IVPB premix, 2,000 mg, IntraVENous, PRN  enoxaparin Sodium (LOVENOX) injection 30 mg, 30 mg, SubCUTAneous, BID  ondansetron (ZOFRAN-ODT) disintegrating tablet 4 mg, 4 mg, Oral, Q8H PRN **OR** ondansetron (ZOFRAN) injection 4 mg, 4 mg, IntraVENous, Q6H PRN  polyethylene glycol (GLYCOLAX) packet 17 g, 17 g, Oral, Daily PRN  acetaminophen (TYLENOL) tablet 650 mg, 650 mg, Oral, Q6H PRN **OR** acetaminophen (TYLENOL) suppository 650 mg, 650 mg, Rectal, Q6H PRN  cholestyramine light packet 4 g, 1 packet, Oral, BID  glucose chewable tablet 16 g, 4 tablet, Oral, PRN  dextrose bolus 10% 125 mL, 125 mL, IntraVENous, PRN **OR** dextrose bolus 10% 250 mL, 250 mL, IntraVENous, PRN  glucagon injection 1 mg, 1 mg, SubCUTAneous, PRN  dextrose 10 % infusion, , IntraVENous, Continuous PRN  insulin lispro (HUMALOG,ADMELOG) injection vial 0-8 Units, 0-8 Units, SubCUTAneous, 4x Daily AC & HS  insulin glargine (LANTUS) injection vial 15 Units, 15 Units, SubCUTAneous, Nightly  hydrALAZINE (APRESOLINE) injection 10 mg, 10 mg, IntraVENous, Q6H PRN  LORazepam (ATIVAN) injection 1 mg, 1 mg, IntraVENous, Q6H

## 2024-12-28 NOTE — PROGRESS NOTES
Several attempts were made to notify family member regarding application of restraints. All efforts were futile

## 2024-12-28 NOTE — PROGRESS NOTES
Consult received. Chart reviewed   Presentation c/w recurrent c.diff colitis   Prior h/o po vancomycin use  Greater than 100,000 colonies of gram negative rods in urine cx alongwith pyuria concerning for UTI  Mentation remains altered today. No BM today.   Recommendations   - d/c po vancomycin. Start fidaxomicin  - start ceftriaxone for uti  - follow up urine cx. Modify abx accordingly   -will attempt to give short course of antibiotics for UTI since concurrent c.diff infection   Full consult to follow. Dr Brandi Alonzo will see patient on 12/30. Please call me if any new questions in the interim. Thanks

## 2024-12-28 NOTE — PROGRESS NOTES
Comprehensive Nutrition Assessment    Type and Reason for Visit:  Initial, Positive nutrition screen    Nutrition Recommendations/Plan:   Continue full liquid diet per SLP as tolerated, advance as tolerated when medically feasible.  Order Glucerna (each provides 220 kcal, 10g protein) BID  Daily wts.  Continue to monitor tolerance of PO, compliance of oral supplements, weight, labs, and plan of care during admission.     Malnutrition Assessment:  Malnutrition Status:  At risk for malnutrition (NFPE/nutr hx needed) (12/28/24 1605)    Context:  Chronic Illness     Findings of the 6 clinical characteristics of malnutrition:  Energy Intake:  Unable to assess  Weight Loss:  Greater than 20% over 1 year     Body Fat Loss:  Unable to assess     Muscle Mass Loss:  Unable to assess    Fluid Accumulation:  Mild     Strength:  Not Performed    Nutrition History and Allergies:      Past Medical History:   Diagnosis Date    Bilateral knee pain     Crohn's colitis (HCC)     Diabetes (HCC)     IDDM    HTN (hypertension)     Hypercholesterolemia     Pulmonary embolism (HCC) 03/2019    Stool color black     crohns     Unable to meet with pt at bedside due to pt being down for a scan.    Weight Hx: Unable to obtain wt hx. Per EMR, wt change: 72 lb (23%) x 1 year - significant.   Wt Readings from Last 10 Encounters:   12/27/24 109.8 kg (242 lb 1 oz)   08/26/24 130.2 kg (287 lb 0.6 oz)   06/19/24 126.1 kg (278 lb)   05/16/24 124.7 kg (275 lb)   01/08/24 (!) 142.4 kg (314 lb)   01/04/24 (!) 139.7 kg (308 lb)   12/12/23 (!) 140.1 kg (308 lb 12.8 oz)   12/01/23 134.3 kg (296 lb)   11/28/23 134.3 kg (296 lb)   10/23/23 (!) 161.9 kg (357 lb)     NFPE: unable to perform NFPE. Food Allergies: NKFA    Nutrition Assessment:    Admitted for AMS, diarrhea. Pt seen for - Positive Nutrition Screen: MST: unsure, MST: poor appetite. Pt continues to present with confusion per RN. History of Crohn's colitis. Per MD note, pt has been having severe  diarrhea over the last few weeks. RN reports eating some of meal on tray (26-50% per flowsheets). Will order ONS due to pt currently on FLD per SLP. Will continue to monitor per protocol.    Nutrition Related Findings:    Pertinent Meds:  Insulin, probiotic Pertinent Labs:  Recent Labs     12/27/24  0859 12/28/24  0320   GLUCOSE 154* 140*   BUN 6* 9   CREATININE 0.85 1.41*    140   K 2.3* 2.8*   CL 99* 106   CO2 28 28   CALCIUM 8.9 9.0   MG 1.9 2.1     Recent Labs     12/27/24  0348 12/27/24  0707 12/27/24  1127 12/27/24  1804 12/27/24  2058 12/28/24  0642 12/28/24  1104   POCGLU 146* 155* 185* 126* 140* 144* 138*     Last BM: Last BM (including prior to admit): 12/28/24    Skin: Wound Type: None Incision on left knee      Edema: Edema: Generalized  Edema Generalized: Trace      Current Nutrition Intake & Therapies:    Average Meal Intake: 26-50%  Average Supplements Intake: None Ordered  ADULT DIET; Full Liquid; 3 carb choices (45 gm/meal)    Anthropometric Measures:  Height: 177.8 cm (5' 10\")  Ideal Body Weight (IBW): 150 lbs (68 kg)    Admission Body Weight: 109.8 kg (242 lb 1 oz)  Current Body Weight: 109.8 kg (242 lb 1 oz), 161.4 % IBW. Weight Source: Bed scale  Current BMI (kg/m2): 34.7  Weight Adjustment For: No Adjustment  BMI Categories: Obese Class 1 (BMI 30.0-34.9)    Estimated Daily Nutrient Needs:  Energy Requirements Based On: Formula  Weight Used for Energy Requirements: Current  Energy (kcal/day): 3443-5468 (MSJ xAF 1.1-1.3)  Weight Used for Protein Requirements: Current  Protein (g/day):  (0.8-1)  Method Used for Fluid Requirements: 1 ml/kcal  Fluid (ml/day): 4718-5284    Nutrition Diagnosis:   Inadequate oral intake related to cognitive or neurological impairment, swallowing difficulty as evidenced by intake 26-50% (client on full liquid diet)  Unintended weight loss related to altered GI function as evidenced by weight loss (23% x1 year)    Nutrition Interventions:   Food and/or

## 2024-12-28 NOTE — PLAN OF CARE
Problem: Pain  Goal: Verbalizes/displays adequate comfort level or baseline comfort level  12/27/2024 2245 by Paige Fajardo RN  Outcome: Progressing  12/27/2024 1055 by Keenan Chambers RN  Outcome: Progressing  Flowsheets (Taken 12/27/2024 0840 by Hallie Guadalupe RN)  Verbalizes/displays adequate comfort level or baseline comfort level:   Assess pain using appropriate pain scale   Encourage patient to monitor pain and request assistance   Administer analgesics based on type and severity of pain and evaluate response   Implement non-pharmacological measures as appropriate and evaluate response  Note: Patient to notify nurse of pain via rounding.     Problem: Safety - Adult  Goal: Free from fall injury  12/27/2024 2245 by Paige Fajardo RN  Outcome: Progressing  12/27/2024 1055 by Keenan Chambers RN  Outcome: Progressing  Note: Patient 1:1. Monitoring patient.

## 2024-12-28 NOTE — PROGRESS NOTES
MD on call notified that patient is uncooperative violent and pulling IV out Ativan given not effective can she have a restraints and some seroquel or somethiing for behaviors.     New order received for restraints and Seroquel 50 mg once.

## 2024-12-28 NOTE — PLAN OF CARE
Problem: Chronic Conditions and Co-morbidities  Goal: Patient's chronic conditions and co-morbidity symptoms are monitored and maintained or improved  Outcome: Progressing  Flowsheets (Taken 12/28/2024 0812)  Care Plan - Patient's Chronic Conditions and Co-Morbidity Symptoms are Monitored and Maintained or Improved: Monitor and assess patient's chronic conditions and comorbid symptoms for stability, deterioration, or improvement     Problem: Pain  Goal: Verbalizes/displays adequate comfort level or baseline comfort level  Outcome: Progressing  Flowsheets (Taken 12/27/2024 0840 by Hallie Guadalupe RN)  Verbalizes/displays adequate comfort level or baseline comfort level:   Assess pain using appropriate pain scale   Encourage patient to monitor pain and request assistance   Administer analgesics based on type and severity of pain and evaluate response   Implement non-pharmacological measures as appropriate and evaluate response     Problem: Safety - Adult  Goal: Free from fall injury  Outcome: Progressing  Flowsheets  Taken 12/28/2024 1849  Free From Fall Injury: Based on caregiver fall risk screen, instruct family/caregiver to ask for assistance with transferring infant if caregiver noted to have fall risk factors  Taken 12/28/2024 1342  Free From Fall Injury: Instruct family/caregiver on patient safety     Problem: Skin/Tissue Integrity  Goal: Absence of new skin breakdown  Description: 1.  Monitor for areas of redness and/or skin breakdown  2.  Assess vascular access sites hourly  3.  Every 4-6 hours minimum:  Change oxygen saturation probe site  4.  Every 4-6 hours:  If on nasal continuous positive airway pressure, respiratory therapy assess nares and determine need for appliance change or resting period.  Outcome: Progressing  Note: Offload the skin       Problem: Skin/Tissue Integrity - Adult  Goal: Skin integrity remains intact  Outcome: Progressing  Flowsheets  Taken 12/28/2024 1342  Skin Integrity  Remains Intact: Monitor for areas of redness and/or skin breakdown  Taken 12/28/2024 0812  Skin Integrity Remains Intact: Monitor for areas of redness and/or skin breakdown     Problem: Infection - Adult  Goal: Absence of infection at discharge  Outcome: Progressing  Flowsheets (Taken 12/28/2024 0812)  Absence of infection at discharge: Assess and monitor for signs and symptoms of infection     Problem: Infection - Adult  Goal: Absence of infection during hospitalization  Outcome: Progressing  Flowsheets (Taken 12/28/2024 0812)  Absence of infection during hospitalization: Assess and monitor for signs and symptoms of infection     Problem: Safety - Medical Restraint  Goal: Remains free of injury from restraints (Restraint for Interference with Medical Device)  Description: INTERVENTIONS:  1. Determine that other, less restrictive measures have been tried or would not be effective before applying the restraint  2. Evaluate the patient's condition at the time of restraint application  3. Inform patient/family regarding the reason for restraint  4. Q2H: Monitor safety, psychosocial status, comfort, nutrition and hydration  Outcome: Progressing  Flowsheets  Taken 12/28/2024 0800 by Garo West RN  Remains free of injury from restraints (restraint for interference with medical device): Every 2 hours: Monitor safety, psychosocial status, comfort, nutrition and hydration  Taken 12/28/2024 0600 by Paige Fajardo RN  Remains free of injury from restraints (restraint for interference with medical device): Every 2 hours: Monitor safety, psychosocial status, comfort, nutrition and hydration     Problem: Nutrition Deficit:  Goal: Optimize nutritional status  Outcome: Progressing  Flowsheets (Taken 12/28/2024 1849)  Nutrient intake appropriate for improving, restoring, or maintaining nutritional needs:   Assess nutritional status and recommend course of action   Recommend appropriate diets, oral nutritional supplements,

## 2024-12-28 NOTE — PROGRESS NOTES
[phone]          Impression:   1. Diarrhea - CT w/ acute L sided colitis, suspect recurrent C diff on a background of Crohn's disease  - Hx C diff - tx w/ oral vancomycin 5/2024, 10/2024 - states she never improved, however does demonstrate confusion - reliability of history provided is uncertain  -Currently on oral vancomycin.  - Sed rate elevated at >130, CRP 10.8  -no BM today per sitter.   2. Crohn's colitis - initially dx 2009, no history of small bowel or upper GI tract involvement, previously treated w/ prednisone, Asocol, azathioprine, Humira - dosing increased to weekly in late 2019 due to low adalimumab level and Ab, 9/2020 Humira stopped, Started Stelara due to active disease w/ stricture at sigmoid colon. Stelara was increased to q4 week dosing in late 2022 when colonoscopy showed ongoing active disease in L colon.    - Hx enteric fistula between sigmoid and ileum    3. AMS - acute metabolic encephalopathy  4. UDS + - opiates and THC  5. Hypokalemia - K 2.3  6. UTI        Plan:      1. Recommend continuing oral vancomycin, recommend consultation w/ ID and start Dificid - She has had multiple episodes of C diff in past, most recently 10/2024-likely will consider FMT (Rebyota or Vowst) at the end of the course to prevent further recurrence  2. Continue medical management per primary team  3. Treatment of UTI, repletion of electrolytes per primary team  4. Monitor CRP daily  5. Recommend holding corticosteroids. Further recommendations pending clinical course.  6. Unknown when last Stelara dose given - recommend contacting family to try to determine when she is due for this - attempt to avoid lapse in treatment. Likely will plan restaging of Crohn's in a few months as outpatient.       Chief Complaint: Diarrhea, Crohn's disease     Subjective:  confused. Sitter at bedside. Per sitter-no bm today    ROS: unable to obtain.       Patient Active Problem List   Diagnosis    BMI 50.0-59.9, adult     Accelerated hypertension    Dehiscence of incision, initial encounter    Type 2 diabetes mellitus with diabetic neuropathy (HCC)    Status post open reduction with internal fixation (ORIF) of fracture of ankle    Obesity, morbid    Controlled type 2 diabetes mellitus without complication, with long-term current use of insulin (HCC)    Pulmonary emboli (HCC)    Crohn's colitis (HCC)    Wound of left ankle    Closed left ankle fracture, with delayed healing, subsequent encounter    Arthritis of knee, left    Instability of knee joint, left    Varus deformity, not elsewhere classified, left knee    Class 3 severe obesity due to excess calories with serious comorbidity and body mass index (BMI) of 45.0 to 49.9 in adult    Closed Colles' fracture of right radius with routine healing    Closed displaced fracture of shaft of third metacarpal bone of right hand with routine healing    Colitis    Hypokalemia    Pyelonephritis    ESBL E. coli carrier    Patellar maltracking, left    AMS (altered mental status)    Acute metabolic encephalopathy    Acute cystitis without hematuria    H/O Clostridium difficile infection         /69   Pulse 79   Temp 97.9 °F (36.6 °C) (Oral)   Resp 18   Ht 1.778 m (5' 10\")   Wt 109.8 kg (242 lb 1 oz)   SpO2 99%   BMI 34.73 kg/m²       Physical exam not performed today      Intake/Output Summary (Last 24 hours) at 12/28/2024 1017  Last data filed at 12/28/2024 0809  Gross per 24 hour   Intake 370 ml   Output --   Net 370 ml       CBC w/Diff    Lab Results   Component Value Date/Time    WBC 6.6 12/28/2024 03:20 AM    RBC 4.46 12/28/2024 03:20 AM    RBC 4.35 10/10/2023 12:01 PM    HGB 11.0 (L) 12/28/2024 03:20 AM    HCT 35.0 12/28/2024 03:20 AM    MCV 78.5 12/28/2024 03:20 AM    MCH 24.7 12/28/2024 03:20 AM    MCHC 31.4 12/28/2024 03:20 AM    RDW 16.7 (H) 12/28/2024 03:20 AM     12/28/2024 03:20 AM    MPV 11.8 12/28/2024 03:20 AM    No results found for: \"MONO\", \"BANDS\", \"BASOS\",

## 2024-12-29 LAB
ANION GAP SERPL CALC-SCNC: 6 MMOL/L (ref 3–18)
BUN SERPL-MCNC: 13 MG/DL (ref 7–18)
BUN/CREAT SERPL: 7 (ref 12–20)
CALCIUM SERPL-MCNC: 9 MG/DL (ref 8.5–10.1)
CHLORIDE SERPL-SCNC: 107 MMOL/L (ref 100–111)
CO2 SERPL-SCNC: 24 MMOL/L (ref 21–32)
CREAT SERPL-MCNC: 1.9 MG/DL (ref 0.6–1.3)
E COLI SXT STL QL IA: NEGATIVE
ERYTHROCYTE [DISTWIDTH] IN BLOOD BY AUTOMATED COUNT: 17.2 % (ref 11.6–14.5)
FOLATE SERPL-MCNC: 7.6 NG/ML (ref 3.1–17.5)
GLUCOSE BLD STRIP.AUTO-MCNC: 118 MG/DL (ref 70–110)
GLUCOSE BLD STRIP.AUTO-MCNC: 81 MG/DL (ref 70–110)
GLUCOSE BLD STRIP.AUTO-MCNC: 95 MG/DL (ref 70–110)
GLUCOSE SERPL-MCNC: 91 MG/DL (ref 74–99)
HCT VFR BLD AUTO: 34.5 % (ref 35–45)
HGB BLD-MCNC: 10.4 G/DL (ref 12–16)
MCH RBC QN AUTO: 23.7 PG (ref 24–34)
MCHC RBC AUTO-ENTMCNC: 30.1 G/DL (ref 31–37)
MCV RBC AUTO: 78.8 FL (ref 78–100)
NRBC # BLD: 0 K/UL (ref 0–0.01)
NRBC BLD-RTO: 0 PER 100 WBC
PLATELET # BLD AUTO: 305 K/UL (ref 135–420)
PMV BLD AUTO: 11.7 FL (ref 9.2–11.8)
POTASSIUM SERPL-SCNC: 3.7 MMOL/L (ref 3.5–5.5)
RBC # BLD AUTO: 4.38 M/UL (ref 4.2–5.3)
SODIUM SERPL-SCNC: 137 MMOL/L (ref 136–145)
SPECIMEN SOURCE: NORMAL
TSH SERPL DL<=0.05 MIU/L-ACNC: 1.46 UIU/ML (ref 0.36–3.74)
VIT B12 SERPL-MCNC: 719 PG/ML (ref 211–911)
WBC # BLD AUTO: 6.4 K/UL (ref 4.6–13.2)

## 2024-12-29 PROCEDURE — 82962 GLUCOSE BLOOD TEST: CPT

## 2024-12-29 PROCEDURE — 2500000003 HC RX 250 WO HCPCS: Performed by: INTERNAL MEDICINE

## 2024-12-29 PROCEDURE — 6360000002 HC RX W HCPCS: Performed by: STUDENT IN AN ORGANIZED HEALTH CARE EDUCATION/TRAINING PROGRAM

## 2024-12-29 PROCEDURE — 99232 SBSQ HOSP IP/OBS MODERATE 35: CPT | Performed by: STUDENT IN AN ORGANIZED HEALTH CARE EDUCATION/TRAINING PROGRAM

## 2024-12-29 PROCEDURE — 6370000000 HC RX 637 (ALT 250 FOR IP): Performed by: INTERNAL MEDICINE

## 2024-12-29 PROCEDURE — 6360000002 HC RX W HCPCS: Performed by: HEALTH CARE PROVIDER

## 2024-12-29 PROCEDURE — 82746 ASSAY OF FOLIC ACID SERUM: CPT

## 2024-12-29 PROCEDURE — 80048 BASIC METABOLIC PNL TOTAL CA: CPT

## 2024-12-29 PROCEDURE — 6370000000 HC RX 637 (ALT 250 FOR IP): Performed by: STUDENT IN AN ORGANIZED HEALTH CARE EDUCATION/TRAINING PROGRAM

## 2024-12-29 PROCEDURE — 94761 N-INVAS EAR/PLS OXIMETRY MLT: CPT

## 2024-12-29 PROCEDURE — 85027 COMPLETE CBC AUTOMATED: CPT

## 2024-12-29 PROCEDURE — 6360000002 HC RX W HCPCS: Performed by: INTERNAL MEDICINE

## 2024-12-29 PROCEDURE — 2500000003 HC RX 250 WO HCPCS: Performed by: STUDENT IN AN ORGANIZED HEALTH CARE EDUCATION/TRAINING PROGRAM

## 2024-12-29 PROCEDURE — 36415 COLL VENOUS BLD VENIPUNCTURE: CPT

## 2024-12-29 PROCEDURE — 1100000000 HC RM PRIVATE

## 2024-12-29 PROCEDURE — 84443 ASSAY THYROID STIM HORMONE: CPT

## 2024-12-29 PROCEDURE — 2580000003 HC RX 258: Performed by: STUDENT IN AN ORGANIZED HEALTH CARE EDUCATION/TRAINING PROGRAM

## 2024-12-29 PROCEDURE — 82607 VITAMIN B-12: CPT

## 2024-12-29 RX ORDER — TRAMADOL HYDROCHLORIDE 50 MG/1
100 TABLET ORAL EVERY 6 HOURS PRN
Status: DISCONTINUED | OUTPATIENT
Start: 2024-12-29 | End: 2025-01-02

## 2024-12-29 RX ORDER — TRAMADOL HYDROCHLORIDE 50 MG/1
50 TABLET ORAL EVERY 6 HOURS PRN
Status: DISCONTINUED | OUTPATIENT
Start: 2024-12-29 | End: 2025-01-02

## 2024-12-29 RX ORDER — SODIUM CHLORIDE, SODIUM LACTATE, POTASSIUM CHLORIDE, CALCIUM CHLORIDE 600; 310; 30; 20 MG/100ML; MG/100ML; MG/100ML; MG/100ML
INJECTION, SOLUTION INTRAVENOUS CONTINUOUS
Status: DISCONTINUED | OUTPATIENT
Start: 2024-12-29 | End: 2024-12-30

## 2024-12-29 RX ADMIN — QUETIAPINE FUMARATE 25 MG: 25 TABLET ORAL at 08:56

## 2024-12-29 RX ADMIN — TRAMADOL HYDROCHLORIDE 100 MG: 50 TABLET, COATED ORAL at 12:59

## 2024-12-29 RX ADMIN — ASPIRIN 81 MG: 81 TABLET, COATED ORAL at 08:56

## 2024-12-29 RX ADMIN — WATER 1000 MG: 1 INJECTION INTRAMUSCULAR; INTRAVENOUS; SUBCUTANEOUS at 23:03

## 2024-12-29 RX ADMIN — ENOXAPARIN SODIUM 30 MG: 100 INJECTION SUBCUTANEOUS at 09:07

## 2024-12-29 RX ADMIN — PREGABALIN 200 MG: 50 CAPSULE ORAL at 22:01

## 2024-12-29 RX ADMIN — QUETIAPINE FUMARATE 25 MG: 25 TABLET ORAL at 22:02

## 2024-12-29 RX ADMIN — HYDRALAZINE HYDROCHLORIDE 100 MG: 50 TABLET ORAL at 22:01

## 2024-12-29 RX ADMIN — SODIUM CHLORIDE, PRESERVATIVE FREE 10 ML: 5 INJECTION INTRAVENOUS at 22:03

## 2024-12-29 RX ADMIN — INSULIN GLARGINE 15 UNITS: 100 INJECTION, SOLUTION SUBCUTANEOUS at 22:01

## 2024-12-29 RX ADMIN — PREGABALIN 200 MG: 50 CAPSULE ORAL at 13:57

## 2024-12-29 RX ADMIN — DICLOFENAC SODIUM 2 G: 10 GEL TOPICAL at 17:25

## 2024-12-29 RX ADMIN — METOPROLOL TARTRATE 25 MG: 25 TABLET, FILM COATED ORAL at 08:55

## 2024-12-29 RX ADMIN — SODIUM CHLORIDE, POTASSIUM CHLORIDE, SODIUM LACTATE AND CALCIUM CHLORIDE: 600; 310; 30; 20 INJECTION, SOLUTION INTRAVENOUS at 10:21

## 2024-12-29 RX ADMIN — ATORVASTATIN CALCIUM 80 MG: 40 TABLET, FILM COATED ORAL at 08:55

## 2024-12-29 RX ADMIN — SODIUM CHLORIDE, PRESERVATIVE FREE 10 ML: 5 INJECTION INTRAVENOUS at 09:11

## 2024-12-29 RX ADMIN — HYDRALAZINE HYDROCHLORIDE 100 MG: 50 TABLET ORAL at 13:09

## 2024-12-29 RX ADMIN — LORAZEPAM 1 MG: 2 INJECTION INTRAMUSCULAR; INTRAVENOUS at 02:06

## 2024-12-29 RX ADMIN — METOPROLOL TARTRATE 25 MG: 25 TABLET, FILM COATED ORAL at 22:01

## 2024-12-29 RX ADMIN — CHOLESTYRAMINE 4 G: 4 POWDER, FOR SUSPENSION ORAL at 08:58

## 2024-12-29 RX ADMIN — ANTI-FUNGAL POWDER MICONAZOLE NITRATE TALC FREE: 1.42 POWDER TOPICAL at 09:08

## 2024-12-29 RX ADMIN — CHOLESTYRAMINE 4 G: 4 POWDER, FOR SUSPENSION ORAL at 22:02

## 2024-12-29 RX ADMIN — Medication 1 CAPSULE: at 08:55

## 2024-12-29 RX ADMIN — FIDAXOMICIN 200 MG: 200 TABLET, FILM COATED ORAL at 22:02

## 2024-12-29 RX ADMIN — ANTI-FUNGAL POWDER MICONAZOLE NITRATE TALC FREE: 1.42 POWDER TOPICAL at 22:04

## 2024-12-29 RX ADMIN — POTASSIUM CHLORIDE 10 MEQ: 7.45 INJECTION INTRAVENOUS at 00:24

## 2024-12-29 RX ADMIN — LOSARTAN POTASSIUM 100 MG: 50 TABLET, FILM COATED ORAL at 08:57

## 2024-12-29 RX ADMIN — HYDRALAZINE HYDROCHLORIDE 100 MG: 50 TABLET ORAL at 08:57

## 2024-12-29 RX ADMIN — POTASSIUM CHLORIDE 10 MEQ: 7.45 INJECTION INTRAVENOUS at 01:25

## 2024-12-29 RX ADMIN — FIDAXOMICIN 200 MG: 200 TABLET, FILM COATED ORAL at 08:57

## 2024-12-29 RX ADMIN — ENOXAPARIN SODIUM 30 MG: 100 INJECTION SUBCUTANEOUS at 22:01

## 2024-12-29 RX ADMIN — DICLOFENAC SODIUM 2 G: 10 GEL TOPICAL at 22:05

## 2024-12-29 ASSESSMENT — PAIN - FUNCTIONAL ASSESSMENT: PAIN_FUNCTIONAL_ASSESSMENT: PREVENTS OR INTERFERES SOME ACTIVE ACTIVITIES AND ADLS

## 2024-12-29 ASSESSMENT — PAIN SCALES - GENERAL
PAINLEVEL_OUTOF10: 0
PAINLEVEL_OUTOF10: 0
PAINLEVEL_OUTOF10: 9
PAINLEVEL_OUTOF10: 0

## 2024-12-29 ASSESSMENT — PAIN DESCRIPTION - ONSET: ONSET: GRADUAL

## 2024-12-29 ASSESSMENT — PAIN DESCRIPTION - FREQUENCY: FREQUENCY: INTERMITTENT

## 2024-12-29 ASSESSMENT — PAIN DESCRIPTION - DESCRIPTORS: DESCRIPTORS: ACHING

## 2024-12-29 ASSESSMENT — PAIN DESCRIPTION - PAIN TYPE: TYPE: ACUTE PAIN

## 2024-12-29 ASSESSMENT — PAIN DESCRIPTION - ORIENTATION: ORIENTATION: LOWER

## 2024-12-29 ASSESSMENT — PAIN DESCRIPTION - DIRECTION: RADIATING_TOWARDS: DENIES

## 2024-12-29 ASSESSMENT — PAIN DESCRIPTION - LOCATION: LOCATION: ABDOMEN

## 2024-12-29 NOTE — PROGRESS NOTES
[phone]          Impression:   1. Diarrhea - CT w/ acute L sided colitis, suspect recurrent C diff on a background of Crohn's disease  - Hx C diff - tx w/ oral vancomycin 5/2024, 10/2024 - states she never improved, however does demonstrate confusion - reliability of history provided is uncertain  - was on oral vancomycin, changed to Fidaxomicin on 12/28  - Sed rate elevated at >130, CRP 10.8  -no BM today per sitter.   2. Crohn's colitis - initially dx 2009, no history of small bowel or upper GI tract involvement, previously treated w/ prednisone, Asocol, azathioprine, Humira - dosing increased to weekly in late 2019 due to low adalimumab level and Ab, 9/2020 Humira stopped, Started Stelara due to active disease w/ stricture at sigmoid colon. Stelara was increased to q4 week dosing in late 2022 when colonoscopy showed ongoing active disease in L colon.    - Hx enteric fistula between sigmoid and ileum    3. AMS - acute metabolic encephalopathy  4. UDS + - opiates and THC  5. Hypokalemia - K 2.3  6. UTI        Plan:      1. Continue Dificid - She has had multiple episodes of C diff in past, most recently 10/2024-likely will consider FMT (Rebyota or Vowst) at the end of the course to prevent further recurrence-will coordinate this as outpatient.   2. Continue medical management per primary team  3. Treatment of UTI, repletion of electrolytes per primary team  4. Monitor CRP daily  5. Recommend holding corticosteroids. Further recommendations pending clinical course.  6. Unknown when last Stelara dose given - recommend contacting family to try to determine when she is due for this - attempt to avoid lapse in treatment. Likely will plan restaging of Crohn's in a few months as outpatient.     Discussed with daughter at bedside      Chief Complaint: Diarrhea, Crohn's disease     Subjective:  confused.     ROS: unable to obtain due to confusion      Patient Active Problem List   Diagnosis    BMI 50.0-59.9,  10/10/2023 12:01 PM    HGB 10.4 (L) 12/29/2024 04:34 AM    HCT 34.5 (L) 12/29/2024 04:34 AM    MCV 78.8 12/29/2024 04:34 AM    MCH 23.7 (L) 12/29/2024 04:34 AM    MCHC 30.1 (L) 12/29/2024 04:34 AM    RDW 17.2 (H) 12/29/2024 04:34 AM     12/29/2024 04:34 AM    MPV 11.7 12/29/2024 04:34 AM    No results found for: \"MONO\", \"BANDS\", \"BASOS\", \"METAS\", \"PRO\"   Basic Metabolic Profile   Recent Labs     12/28/24  0320 12/28/24  1734 12/29/24  0434     --  137   K 2.8*   < > 3.7     --  107   CO2 28 --  24   BUN 9  --  13   GLUCOSE 140*  --  91   MG 2.1  --   --     < > = values in this interval not displayed.        Hepatic Function    Lab Results   Component Value Date/Time    ALT 8 12/28/2024 03:20 AM    No components found for: \"SGOT\", \"GPT\", \"DBILI\", \"TBIL\"       Coags   No results for input(s): \"INR\", \"APTT\" in the last 72 hours.    Invalid input(s): \"PTP\"            Brian Maya MD    [facility]  [email or web address]  Phone: [phone]

## 2024-12-29 NOTE — PROGRESS NOTES
[facility]ist [facility]  Progress Note  Date:2024       Room:[facility]  Patient Name:Priscilla Dean     YOB: 1970     Age:54 y.o.        Subjective    Subjective:  Symptoms:  Stable.    Diet:  Adequate intake.    Activity level: Impaired due to pain.    Pain:  She reports pain is unchanged.  Pain is partially controlled.       Review of Systems   Gastrointestinal:  Positive for abdominal pain.   Musculoskeletal:  Positive for myalgias.     Patient is ANO x 2.  Patient's son and daughter at bedside.  Patient is still very confused.  She knows she is at the hospital.  She does not understand why she is here.  She has pain in her knees.  There is no psych history.    Disposition: Pending neuro eval. Likely stable for discharge to SNF on .    Objective         Vitals Last 24 Hours:  TEMPERATURE:  Temp  Av.8 °F (36.6 °C)  Min: 97.2 °F (36.2 °C)  Max: 98.2 °F (36.8 °C)  RESPIRATIONS RANGE: Resp  Av.8  Min: 17  Max: 18  PULSE OXIMETRY RANGE: SpO2  Av %  Min: 97 %  Max: 100 %  PULSE RANGE: Pulse  Av.2  Min: 69  Max: 99  BLOOD PRESSURE RANGE: Systolic (24hrs), Av , Min:94 , Max:137     ; Diastolic (24hrs), Av, Min:50, Max:76      I/O (24Hr):    Intake/Output Summary (Last 24 hours) at 2024 0953  Last data filed at 2024 0300  Gross per 24 hour   Intake 480 ml   Output --   Net 480 ml     Objective:  General Appearance:  Uncomfortable.    Vital signs: (most recent): Blood pressure 113/73, pulse 87, temperature 97.9 °F (36.6 °C), temperature source Axillary, resp. rate 18, height 1.778 m (5' 10\"), weight 109.8 kg (242 lb 1 oz), SpO2 100%.    Output: Producing urine.    HEENT: Normal HEENT exam.    Lungs:  Normal effort and normal respiratory rate.    Heart: Normal rate.  Regular rhythm.    Abdomen: Abdomen is soft and flat.  Bowel sounds are normal.   There is generalized tenderness.     Extremities: Normal range of motion.  (Left leg  []Hep SQ  []SCDs  []Coumadin   []Heparin gtt   []Xarelto   []Eliquis    Leonor Kaur DO, MBA, MS [facility] Systemist

## 2024-12-29 NOTE — PLAN OF CARE
Problem: Pain  Goal: Verbalizes/displays adequate comfort level or baseline comfort level  12/29/2024 0338 by Rosy Nguyen RN  Outcome: Progressing     Problem: Safety - Adult  Goal: Free from fall injury  12/29/2024 0338 by Rosy Nguyen RN  Outcome: Progressing  Note: Safety measures in place per protocol. Call bell within reach, bed in lowest position, and bed alarm in place.       Problem: Skin/Tissue Integrity  Goal: Absence of new skin breakdown  Description: 1.  Monitor for areas of redness and/or skin breakdown  2.  Assess vascular access sites hourly  3.  Every 4-6 hours minimum:  Change oxygen saturation probe site  4.  Every 4-6 hours:  If on nasal continuous positive airway pressure, respiratory therapy assess nares and determine need for appliance change or resting period.  12/29/2024 0338 by Rosy Nguyen RN  Outcome: Progressing  Note: Will monitor for skin integrity. Assist pt with turning and repositioning.      Problem: Infection - Adult  Goal: Absence of infection at discharge  12/29/2024 0338 by Rosy Nguyen RN  Outcome: Progressing  Note: Laboratory results and WBC's checked for s/s of infection.       Problem: Infection - Adult  Goal: Absence of infection during hospitalization  12/28/2024 1849 by Gaor West RN  Outcome: Progressing  Flowsheets (Taken 12/28/2024 0812)  Absence of infection during hospitalization: Assess and monitor for signs and symptoms of infection

## 2024-12-29 NOTE — PLAN OF CARE
Problem: Chronic Conditions and Co-morbidities  Goal: Patient's chronic conditions and co-morbidity symptoms are monitored and maintained or improved  Flowsheets (Taken 12/29/2024 0743)  Care Plan - Patient's Chronic Conditions and Co-Morbidity Symptoms are Monitored and Maintained or Improved: Monitor and assess patient's chronic conditions and comorbid symptoms for stability, deterioration, or improvement     Problem: Pain  Goal: Verbalizes/displays adequate comfort level or baseline comfort level  12/29/2024 1540 by Garo West RN  Flowsheets (Taken 12/27/2024 0840 by Hallie Guadalupe RN)  Verbalizes/displays adequate comfort level or baseline comfort level:   Assess pain using appropriate pain scale   Encourage patient to monitor pain and request assistance   Administer analgesics based on type and severity of pain and evaluate response   Implement non-pharmacological measures as appropriate and evaluate response     Problem: Safety - Adult  Goal: Free from fall injury  12/29/2024 1540 by Garo West RN  Flowsheets (Taken 12/29/2024 0756)  Free From Fall Injury: Instruct family/caregiver on patient safety  Note: Bedside  maintained     Problem: Skin/Tissue Integrity  Goal: Absence of new skin breakdown  Description: 1.  Monitor for areas of redness and/or skin breakdown  2.  Assess vascular access sites hourly  3.  Every 4-6 hours minimum:  Change oxygen saturation probe site  4.  Every 4-6 hours:  If on nasal continuous positive airway pressure, respiratory therapy assess nares and determine need for appliance change or resting period.  12/29/2024 1540 by Garo West RN  Note: Assess skin and put a dressing for wound prevention     Problem: Skin/Tissue Integrity - Adult  Goal: Skin integrity remains intact  Flowsheets  Taken 12/29/2024 1540  Skin Integrity Remains Intact: Monitor for areas of redness and/or skin breakdown  Taken 12/29/2024 0756  Skin Integrity

## 2024-12-29 NOTE — PROGRESS NOTES
4 Eyes Skin Assessment     NAME:  Priscilla Dean  YOB: 1970  MEDICAL RECORD NUMBER:  [id number]    The patient is being assessed for  Shift Handoff    I agree that at least one RN has performed a thorough Head to Toe Skin Assessment on the patient. ALL assessment sites listed below have been assessed.      Areas assessed by both nurses:    Head, Face, Ears, Shoulders, Back, Chest, Arms, Elbows, Hands, Sacrum. Buttock, Coccyx, Ischium, Legs. Feet and Heels, and Under Medical Devices         Does the Patient have a Wound? No noted wound(s)       Jose Alfredo Prevention initiated by RN: Yes  Wound Care Orders initiated by RN: No    Pressure Injury (Stage 3,4, Unstageable, DTI, NWPT, and Complex wounds) if present, place Wound referral order by RN under : No    New Ostomies, if present place, Ostomy referral order under : No     Nurse 1 eSignature: Electronically signed by Rosy Nguyen RN on 12/29/24 at 6:34 AM EST    **SHARE this note so that the co-signing nurse can place an eSignature**    Nurse 2 eSignature: Electronically signed by Garo Saenz RN on 12/29/24 at 7:21 PM EST

## 2024-12-29 NOTE — PROGRESS NOTES
4 Eyes Skin Assessment     NAME:  Priscilla Dena  YOB: 1970  MEDICAL RECORD NUMBER:  [id number]    The patient is being assessed for  Shift Handoff    I agree that at least one RN has performed a thorough Head to Toe Skin Assessment on the patient. ALL assessment sites listed below have been assessed.      Areas assessed by both nurses:    Head, Face, Ears, Shoulders, Back, Chest, Arms, Elbows, Hands, Sacrum. Buttock, Coccyx, Ischium, Legs. Feet and Heels, and Under Medical Devices         Does the Patient have a Wound? No noted wound(s)       Jose Alfredo Prevention initiated by RN: Yes  Wound Care Orders initiated by RN: No    Pressure Injury (Stage 3,4, Unstageable, DTI, NWPT, and Complex wounds) if present, place Wound referral order by RN under : No    New Ostomies, if present place, Ostomy referral order under : No     Nurse 1 eSignature: Electronically signed by Garo Saenz RN on 12/28/24 at 8:11 PM EST    **SHARE this note so that the co-signing nurse can place an eSignature**    Nurse 2 eSignature: Electronically signed by Rosy Nguyen RN on 12/29/24 at 4:01 AM EST

## 2024-12-30 LAB
ANION GAP SERPL CALC-SCNC: 5 MMOL/L (ref 3–18)
BACTERIA SPEC CULT: ABNORMAL
BUN SERPL-MCNC: 12 MG/DL (ref 7–18)
BUN/CREAT SERPL: 12 (ref 12–20)
CALCIUM SERPL-MCNC: 9 MG/DL (ref 8.5–10.1)
CC UR VC: ABNORMAL
CHLORIDE SERPL-SCNC: 108 MMOL/L (ref 100–111)
CO2 SERPL-SCNC: 24 MMOL/L (ref 21–32)
CREAT SERPL-MCNC: 1.02 MG/DL (ref 0.6–1.3)
ECHO BSA: 2.53 M2
GLUCOSE BLD STRIP.AUTO-MCNC: 100 MG/DL (ref 70–110)
GLUCOSE BLD STRIP.AUTO-MCNC: 101 MG/DL (ref 70–110)
GLUCOSE BLD STRIP.AUTO-MCNC: 106 MG/DL (ref 70–110)
GLUCOSE BLD STRIP.AUTO-MCNC: 111 MG/DL (ref 70–110)
GLUCOSE SERPL-MCNC: 88 MG/DL (ref 74–99)
POTASSIUM SERPL-SCNC: 3.8 MMOL/L (ref 3.5–5.5)
SERVICE CMNT-IMP: ABNORMAL
SODIUM SERPL-SCNC: 137 MMOL/L (ref 136–145)

## 2024-12-30 PROCEDURE — 6360000002 HC RX W HCPCS: Performed by: INTERNAL MEDICINE

## 2024-12-30 PROCEDURE — 82962 GLUCOSE BLOOD TEST: CPT

## 2024-12-30 PROCEDURE — 6370000000 HC RX 637 (ALT 250 FOR IP): Performed by: INTERNAL MEDICINE

## 2024-12-30 PROCEDURE — 36415 COLL VENOUS BLD VENIPUNCTURE: CPT

## 2024-12-30 PROCEDURE — 80048 BASIC METABOLIC PNL TOTAL CA: CPT

## 2024-12-30 PROCEDURE — 93970 EXTREMITY STUDY: CPT | Performed by: SURGERY

## 2024-12-30 PROCEDURE — 6370000000 HC RX 637 (ALT 250 FOR IP): Performed by: STUDENT IN AN ORGANIZED HEALTH CARE EDUCATION/TRAINING PROGRAM

## 2024-12-30 PROCEDURE — 2500000003 HC RX 250 WO HCPCS: Performed by: INTERNAL MEDICINE

## 2024-12-30 PROCEDURE — 6360000002 HC RX W HCPCS: Performed by: STUDENT IN AN ORGANIZED HEALTH CARE EDUCATION/TRAINING PROGRAM

## 2024-12-30 PROCEDURE — 1100000000 HC RM PRIVATE

## 2024-12-30 PROCEDURE — 99232 SBSQ HOSP IP/OBS MODERATE 35: CPT | Performed by: STUDENT IN AN ORGANIZED HEALTH CARE EDUCATION/TRAINING PROGRAM

## 2024-12-30 PROCEDURE — 2500000003 HC RX 250 WO HCPCS: Performed by: STUDENT IN AN ORGANIZED HEALTH CARE EDUCATION/TRAINING PROGRAM

## 2024-12-30 RX ORDER — AMITRIPTYLINE HYDROCHLORIDE 50 MG/1
25 TABLET ORAL NIGHTLY
Status: DISCONTINUED | OUTPATIENT
Start: 2024-12-30 | End: 2025-01-03 | Stop reason: HOSPADM

## 2024-12-30 RX ADMIN — HYDRALAZINE HYDROCHLORIDE 100 MG: 50 TABLET ORAL at 08:28

## 2024-12-30 RX ADMIN — QUETIAPINE FUMARATE 25 MG: 25 TABLET ORAL at 21:38

## 2024-12-30 RX ADMIN — PREGABALIN 200 MG: 50 CAPSULE ORAL at 21:30

## 2024-12-30 RX ADMIN — SODIUM CHLORIDE, PRESERVATIVE FREE 10 ML: 5 INJECTION INTRAVENOUS at 08:29

## 2024-12-30 RX ADMIN — METOPROLOL TARTRATE 25 MG: 25 TABLET, FILM COATED ORAL at 21:37

## 2024-12-30 RX ADMIN — TRAMADOL HYDROCHLORIDE 100 MG: 50 TABLET, COATED ORAL at 21:36

## 2024-12-30 RX ADMIN — TRAMADOL HYDROCHLORIDE 100 MG: 50 TABLET, COATED ORAL at 08:28

## 2024-12-30 RX ADMIN — QUETIAPINE FUMARATE 25 MG: 25 TABLET ORAL at 08:27

## 2024-12-30 RX ADMIN — CHOLESTYRAMINE 4 G: 4 POWDER, FOR SUSPENSION ORAL at 08:29

## 2024-12-30 RX ADMIN — WATER 1000 MG: 1 INJECTION INTRAMUSCULAR; INTRAVENOUS; SUBCUTANEOUS at 16:29

## 2024-12-30 RX ADMIN — METOPROLOL TARTRATE 25 MG: 25 TABLET, FILM COATED ORAL at 08:27

## 2024-12-30 RX ADMIN — FIDAXOMICIN 200 MG: 200 TABLET, FILM COATED ORAL at 08:29

## 2024-12-30 RX ADMIN — ASPIRIN 81 MG: 81 TABLET, COATED ORAL at 08:28

## 2024-12-30 RX ADMIN — HYDRALAZINE HYDROCHLORIDE 100 MG: 50 TABLET ORAL at 14:23

## 2024-12-30 RX ADMIN — ATORVASTATIN CALCIUM 80 MG: 40 TABLET, FILM COATED ORAL at 08:28

## 2024-12-30 RX ADMIN — ANTI-FUNGAL POWDER MICONAZOLE NITRATE TALC FREE: 1.42 POWDER TOPICAL at 21:46

## 2024-12-30 RX ADMIN — SODIUM CHLORIDE, PRESERVATIVE FREE 10 ML: 5 INJECTION INTRAVENOUS at 21:44

## 2024-12-30 RX ADMIN — PREGABALIN 200 MG: 50 CAPSULE ORAL at 08:28

## 2024-12-30 RX ADMIN — ENOXAPARIN SODIUM 30 MG: 100 INJECTION SUBCUTANEOUS at 21:30

## 2024-12-30 RX ADMIN — DICLOFENAC SODIUM 2 G: 10 GEL TOPICAL at 16:22

## 2024-12-30 RX ADMIN — DICLOFENAC SODIUM 2 G: 10 GEL TOPICAL at 21:47

## 2024-12-30 RX ADMIN — DICLOFENAC SODIUM 2 G: 10 GEL TOPICAL at 08:30

## 2024-12-30 RX ADMIN — FIDAXOMICIN 200 MG: 200 TABLET, FILM COATED ORAL at 21:30

## 2024-12-30 RX ADMIN — ENOXAPARIN SODIUM 30 MG: 100 INJECTION SUBCUTANEOUS at 08:29

## 2024-12-30 RX ADMIN — ANTI-FUNGAL POWDER MICONAZOLE NITRATE TALC FREE: 1.42 POWDER TOPICAL at 08:29

## 2024-12-30 RX ADMIN — HYDRALAZINE HYDROCHLORIDE 100 MG: 50 TABLET ORAL at 21:37

## 2024-12-30 RX ADMIN — AMITRIPTYLINE HYDROCHLORIDE 25 MG: 50 TABLET, FILM COATED ORAL at 21:38

## 2024-12-30 RX ADMIN — Medication 1 CAPSULE: at 08:27

## 2024-12-30 RX ADMIN — INSULIN GLARGINE 15 UNITS: 100 INJECTION, SOLUTION SUBCUTANEOUS at 21:30

## 2024-12-30 RX ADMIN — TRAMADOL HYDROCHLORIDE 100 MG: 50 TABLET, COATED ORAL at 14:28

## 2024-12-30 RX ADMIN — LOSARTAN POTASSIUM 100 MG: 50 TABLET, FILM COATED ORAL at 08:28

## 2024-12-30 RX ADMIN — PREGABALIN 200 MG: 50 CAPSULE ORAL at 14:23

## 2024-12-30 RX ADMIN — CHOLESTYRAMINE 4 G: 4 POWDER, FOR SUSPENSION ORAL at 21:41

## 2024-12-30 ASSESSMENT — PAIN SCALES - GENERAL
PAINLEVEL_OUTOF10: 0
PAINLEVEL_OUTOF10: 0
PAINLEVEL_OUTOF10: 2
PAINLEVEL_OUTOF10: 7
PAINLEVEL_OUTOF10: 7
PAINLEVEL_OUTOF10: 0
PAINLEVEL_OUTOF10: 3
PAINLEVEL_OUTOF10: 3
PAINLEVEL_OUTOF10: 4
PAINLEVEL_OUTOF10: 3
PAINLEVEL_OUTOF10: 8
PAINLEVEL_OUTOF10: 8
PAINLEVEL_OUTOF10: 3
PAINLEVEL_OUTOF10: 8

## 2024-12-30 ASSESSMENT — PAIN DESCRIPTION - DESCRIPTORS
DESCRIPTORS: ACHING
DESCRIPTORS: THROBBING;SHARP

## 2024-12-30 ASSESSMENT — ENCOUNTER SYMPTOMS: ABDOMINAL PAIN: 0

## 2024-12-30 ASSESSMENT — PAIN SCALES - WONG BAKER
WONGBAKER_NUMERICALRESPONSE: HURTS A LITTLE BIT

## 2024-12-30 ASSESSMENT — PAIN DESCRIPTION - LOCATION
LOCATION: KNEE
LOCATION: ABDOMEN
LOCATION: KNEE
LOCATION: ABDOMEN;KNEE
LOCATION: KNEE

## 2024-12-30 ASSESSMENT — PAIN DESCRIPTION - ORIENTATION: ORIENTATION: RIGHT;LEFT

## 2024-12-30 ASSESSMENT — PAIN - FUNCTIONAL ASSESSMENT: PAIN_FUNCTIONAL_ASSESSMENT: ACTIVITIES ARE NOT PREVENTED

## 2024-12-30 ASSESSMENT — PAIN DESCRIPTION - ONSET: ONSET: GRADUAL

## 2024-12-30 ASSESSMENT — PAIN DESCRIPTION - PAIN TYPE: TYPE: ACUTE PAIN

## 2024-12-30 ASSESSMENT — PAIN DESCRIPTION - FREQUENCY: FREQUENCY: INTERMITTENT

## 2024-12-30 NOTE — CONSULTS
kg/m²     GENERAL: In no apparent distress.   EXTREMITIES: Muscle tone is normal.  HEAD:   The patient is normocephalic.    NEUROLOGIC EXAMINATION  Mental status: Awake, alert, oriented to self, place; not oriented to time; follows simple commands.  No gaze deviation.  No subtle  seizure-like activity.  No meningeal signs.  Speech and languge: fluent, coherent; not coherent.  CN: VFF, EOMI, PERRLA, face sensation intact , no facial asymmetry noted, palate elevation symmetric bilat, SS+SCM 5/5 bilat, tongue midline  Motor: tone normal throughout; symmetric upper and lower extremity movements.  Sensory: Responds to pain bilaterally.  Coordination: Unable to assess.  Gait: not tested     Narrative & Impression  EXAM: CT HEAD WO CONTRAST     CLINICAL INDICATION/HISTORY: AMS. Increased confusion.     COMPARISON: None     TECHNIQUE: Axial imaging of the head from the skull base to the vertex without  IV contrast and reconstructed into coronal and sagittal planes.     All CT scans at this facility are performed using dose optimization technique as  appropriate to a performed exam, to include automated exposure control,  adjustment of the MA and/or kV according to patient size (including appropriate  matching for site-specific examinations) or use of  iterative reconstruction  technique.     FINDINGS:      Brain: Normal.     Extra-axial spaces: Normal for age.     Ventricular system: Normal for age.     Intracranial hemorrhage: None.     Midline shift: None.     Sinuses/mastoid air cells: Clear.     Calvarium: Normal.     IMPRESSION:  No acute intracranial findings.        Impression:  A 54 years old female patient with above medical problems was brought to the emergency room for altered mental status.  Had severe diarrhea for about 2 weeks.  Found to have C. difficile infection.  On antibiotics.  Diarrhea is getting better.  She continues to have the AMS.  CT scan of the brain did not show any acute changes.  Her  creatinine is gradually increasing.  No obvious focal neurodeficits.  Meningeal signs.  AMS is most likely from metabolic encephalopathy.       Plan:   -Treatment of infection per the primary team  -Follow metabolic panels and correct any changes  -Consider additional imaging studies if any worsening  -We will follow patient as needed  -call for questions.          PLEASE NOTE:   This document has been produced using voice recognition software. Unrecognized errors in transcription may be present.

## 2024-12-30 NOTE — PLAN OF CARE
Problem: Pain  Goal: Verbalizes/displays adequate comfort level or baseline comfort level  12/30/2024 0538 by Rosy Nguyen RN  Outcome: Progressing  Note: Will administer PRN analgesic per MD order and reassess pain using the appropriate pain scale.     Problem: Safety - Adult  Goal: Free from fall injury  12/30/2024 0538 by Rosy Nguyen RN  Outcome: Progressing  Note: Safety measures in place per protocol. Call bell within reach, bed in lowest position, and bed alarm in place.     Problem: Skin/Tissue Integrity  Goal: Absence of new skin breakdown  Description: 1.  Monitor for areas of redness and/or skin breakdown  2.  Assess vascular access sites hourly  3.  Every 4-6 hours minimum:  Change oxygen saturation probe site  4.  Every 4-6 hours:  If on nasal continuous positive airway pressure, respiratory therapy assess nares and determine need for appliance change or resting period.  12/30/2024 0538 by Rosy Nguyen RN  Outcome: Progressing  Note: Skin integrity and skin break down assessed. Repositioned every 2 hours. Pt kept clean and dry throughout shift.     Problem: Infection - Adult  Goal: Absence of infection at discharge  12/30/2024 0538 by Rosy Nguyen RN  Outcome: Progressing  Note: Will monitor lab results, v/s, and for s/s of an infection.

## 2024-12-30 NOTE — PLAN OF CARE
Problem: Safety - Medical Restraint  Goal: Remains free of injury from restraints (Restraint for Interference with Medical Device)  Description: INTERVENTIONS:  1. Determine that other, less restrictive measures have been tried or would not be effective before applying the restraint  2. Evaluate the patient's condition at the time of restraint application  3. Inform patient/family regarding the reason for restraint  4. Q2H: Monitor safety, psychosocial status, comfort, nutrition and hydration  12/30/2024 1851 by Zohra Norwood RN  Outcome: Completed  12/30/2024 1003 by Elzbieta Mares RN  Outcome: Progressing  Flowsheets  Taken 12/30/2024 1003 by Elzbieta Mares RN  Remains free of injury from restraints (restraint for interference with medical device):   Determine that other, less restrictive measures have been tried or would not be effective before applying the restraint   Evaluate the patient's condition at the time of restraint application  Taken 12/30/2024 0800 by Elzbieta Mares RN  Remains free of injury from restraints (restraint for interference with medical device): Every 2 hours: Monitor safety, psychosocial status, comfort, nutrition and hydration  Taken 12/30/2024 0600 by Rosy Nguyen RN  Remains free of injury from restraints (restraint for interference with medical device): Every 2 hours: Monitor safety, psychosocial status, comfort, nutrition and hydration  Taken 12/30/2024 0400 by Rosy Nguyen RN  Remains free of injury from restraints (restraint for interference with medical device): Every 2 hours: Monitor safety, psychosocial status, comfort, nutrition and hydration  Taken 12/30/2024 0200 by Rosy Nguyen RN  Remains free of injury from restraints (restraint for interference with medical device): Every 2 hours: Monitor safety, psychosocial status, comfort, nutrition and hydration  Taken 12/30/2024 0000 by Rosy Nguyen RN  Remains free of injury from restraints (restraint for

## 2024-12-30 NOTE — PROGRESS NOTES
PRN  miconazole (MICOTIN) 2 % powder, , Topical, BID      Assessment//Plan           Hospital Problems             Last Modified POA    * (Principal) AMS (altered mental status) 12/27/2024 Yes    Accelerated hypertension 12/27/2024 Yes    Type 2 diabetes mellitus with diabetic neuropathy (HCC) 12/27/2024 Yes    Controlled type 2 diabetes mellitus without complication, with long-term current use of insulin (HCC) 12/27/2024 Yes    Crohn's colitis (HCC) 12/27/2024 Yes    Class 3 severe obesity due to excess calories with serious comorbidity and body mass index (BMI) of 45.0 to 49.9 in adult 12/27/2024 Yes    ESBL E. coli carrier 12/27/2024 Yes    Acute metabolic encephalopathy 12/27/2024 Yes    Acute cystitis without hematuria 12/27/2024 Yes    H/O Clostridium difficile infection (Chronic) 12/27/2024 Yes     Assessment:    Condition: In stable condition.  Unchanged.   (    #Acute encephalopathy likely due to C. difficile versus UTI, resolved  #C. difficile colitis, recurrent  #Acute cystitis without hematuria.  Urine culture growing E coli  #NICK, baseline creatinine 0.8, likely due to from GI losses, resolved  #Left leg pain. Venous duplex neg for DVT  #Crohn's colitis.  On Stelara  #Type 2 diabetes, A1c 6.6).     Plan:   Consults: gastroenterology (Infectious disease).  Regular diet.  Add antibiotic, start antibiotics, administer medications as ordered and give fluids.   (    - Continue fidaxomicin  - Continue probiotics and Questran  - Replace electrolytes as needed  - Start ceftriaxone likely short-term for treatment of UTI in the setting of C. difficile  - Continue Seroquel 25 mg twice daily for agitation  - Since patient has improved significantly will likely discharge home instead of SNF).       Case discussed with:  [x]Patient  [x]Family  [x]Nursing  [x]Case Management  DVT Prophylaxis:  [x]Lovenox  []Hep SQ  []SCDs  []Coumadin   []Heparin gtt   []Xarelto   []Eliquis    Leonor Kaur DO, VALERIANO, MS  [facility]  [facility]

## 2024-12-30 NOTE — PROGRESS NOTES
HISTORY AND EXAM   54 years old female patient with medical history of diabetes, pulm embolism on anticoagulation, Crohn's disease, hyperlipidemia, hypertension was admitted to the hospital for altered mental status and diarrhea.  It was reported that patient had diarrhea for about 2 weeks.  Was found to have C. difficile.  On antibiotics: Fidaxomicin and ceftriaxone, diarrhea is getting better, ct brain showed no acute changes.    Patient followed today almost at baseline, patient reported that her daughter found her confused few days ago which never happened in the past, patient denies remember seeing neurology in the hospital but now she remember everything, patient follow commands appropriate, hx of neuropathy and treated by neurology, patient last year had left knee surgery and since that time she is week in left leg not able to walk, have sleep issues and wake up frequently holding her breath treated for c-diff in the past but always had diarrhea, patient denies memory issues.               Social History     Socioeconomic History    Marital status:      Spouse name: Not on file    Number of children: Not on file    Years of education: Not on file    Highest education level: Not on file   Occupational History    Not on file   Tobacco Use    Smoking status: Never    Smokeless tobacco: Never   Vaping Use    Vaping status: Never Used   Substance and Sexual Activity    Alcohol use: Never    Drug use: Never    Sexual activity: Not on file   Other Topics Concern    Not on file   Social History Narrative    Not on file     Social Determinants of Health     Financial Resource Strain: Not on file   Food Insecurity: No Food Insecurity (8/26/2024)    Hunger Vital Sign     Worried About Running Out of Food in the Last Year: Never true     Ran Out of Food in the Last Year: Never true   Transportation Needs: No Transportation Needs (8/26/2024)    PRAPARE - Transportation     Lack of Transportation (Medical): No      by Wayne Cheema MD at [facility] ENDOSCOPY    FOREARM SURGERY Right 12/12/2023    RIGHT DISTAL RADIUS OPEN REDUCTION INTERNAL FIXATION, RIGHT THIRD METACARPAL OPEN REDUCTION INTERNAL FIXATION;MINI C-ARM, SUPRACLAVICULAR BLOCK; [ACUMED ORTHO] performed by Brett Mcneill DO at [facility] MAIN OR    REVISION TOTAL KNEE ARTHROPLASTY Left 8/26/2024    LEFT TOTA KNEE REVISION performed by Vish Orlando MD at [facility] MAIN OR    TOTAL KNEE ARTHROPLASTY Left 10/23/2023    LEFT TOTAL KNEE REPLACEMENT performed by Vish Orlando MD at [facility] MAIN OR    TUBAL LIGATION      btl       Allergies   Allergen Reactions    Metronidazole Other (See Comments)     Neuropathy         Patient Active Problem List   Diagnosis    BMI 50.0-59.9, adult    Accelerated hypertension    Dehiscence of incision, initial encounter    Type 2 diabetes mellitus with diabetic neuropathy (HCC)    Status post open reduction with internal fixation (ORIF) of fracture of ankle    Obesity, morbid    Controlled type 2 diabetes mellitus without complication, with long-term current use of insulin (HCC)    Pulmonary emboli (HCC)    Crohn's colitis (HCC)    Wound of left ankle    Closed left ankle fracture, with delayed healing, subsequent encounter    Arthritis of knee, left    Instability of knee joint, left    Varus deformity, not elsewhere classified, left knee    Class 3 severe obesity due to excess calories with serious comorbidity and body mass index (BMI) of 45.0 to 49.9 in adult    Closed Colles' fracture of right radius with routine healing    Closed displaced fracture of shaft of third metacarpal bone of right hand with routine healing    Colitis    Hypokalemia    Pyelonephritis    ESBL E. coli carrier    Patellar maltracking, left    AMS (altered mental status)    Acute metabolic encephalopathy    Acute cystitis without hematuria    H/O Clostridium difficile infection         Review of Systems:   As above       PHYSICAL EXAMINATION:      VITAL SIGNS:  /67

## 2024-12-30 NOTE — PLAN OF CARE
Problem: Chronic Conditions and Co-morbidities  Goal: Patient's chronic conditions and co-morbidity symptoms are monitored and maintained or improved  Outcome: Progressing  Flowsheets (Taken 12/30/2024 1003)  Care Plan - Patient's Chronic Conditions and Co-Morbidity Symptoms are Monitored and Maintained or Improved:   Collaborate with multidisciplinary team to address chronic and comorbid conditions and prevent exacerbation or deterioration   Monitor and assess patient's chronic conditions and comorbid symptoms for stability, deterioration, or improvement     Problem: Pain  Goal: Verbalizes/displays adequate comfort level or baseline comfort level  12/30/2024 1003 by Elzbieta Mares RN  Outcome: Progressing  Flowsheets (Taken 12/30/2024 1003)  Verbalizes/displays adequate comfort level or baseline comfort level:   Encourage patient to monitor pain and request assistance   Assess pain using appropriate pain scale   Administer analgesics based on type and severity of pain and evaluate response   Implement non-pharmacological measures as appropriate and evaluate response  12/30/2024 0538 by Rosy Nguyen RN  Outcome: Progressing  Note: Will administer PRN analgesic per MD order and reassess pain using the appropriate pain scale.     Problem: Safety - Adult  Goal: Free from fall injury  12/30/2024 1003 by Elzbieta Mares RN  Outcome: Progressing  Flowsheets (Taken 12/30/2024 1003)  Free From Fall Injury: Instruct family/caregiver on patient safety  12/30/2024 0538 by Rosy Nguyen RN  Outcome: Progressing  Note: Safety measures in place per protocol. Call bell within reach, bed in lowest position, and bed alarm in place.     Problem: Skin/Tissue Integrity  Goal: Absence of new skin breakdown  Description: 1.  Monitor for areas of redness and/or skin breakdown  2.  Assess vascular access sites hourly  3.  Every 4-6 hours minimum:  Change oxygen saturation probe site  4.  Every 4-6 hours:  If on nasal continuous  Deficit:  Goal: Optimize nutritional status  Outcome: Progressing  Flowsheets (Taken 12/30/2024 1003)  Nutrient intake appropriate for improving, restoring, or maintaining nutritional needs:   Assess nutritional status and recommend course of action   Recommend appropriate diets, oral nutritional supplements, and vitamin/mineral supplements   Order, calculate, and assess calorie counts as needed   Recommend, monitor, and adjust tube feedings and TPN/PPN based on assessed needs

## 2024-12-30 NOTE — PROGRESS NOTES
4 Eyes Skin Assessment     NAME:  Priscilla Dean  YOB: 1970  MEDICAL RECORD NUMBER:  [id number]    The patient is being assessed for  Shift Handoff    I agree that at least one RN has performed a thorough Head to Toe Skin Assessment on the patient. ALL assessment sites listed below have been assessed.      Areas assessed by both nurses:    Head, Face, Ears, Shoulders, Back, Chest, Arms, Elbows, Hands, Sacrum. Buttock, Coccyx, Ischium, and Legs. Feet and Heels        Does the Patient have a Wound? No noted wound(s)       Jose Alfredo Prevention initiated by RN: Yes  Wound Care Orders initiated by RN: No    Pressure Injury (Stage 3,4, Unstageable, DTI, NWPT, and Complex wounds) if present, place Wound referral order by RN under : No    New Ostomies, if present place, Ostomy referral order under : No     Nurse 1 eSignature: Electronically signed by Garo Saenz RN on 12/29/24 at 7:59 PM EST    **SHARE this note so that the co-signing nurse can place an eSignature**    Nurse 2 eSignature: Electronically signed by Rosy Nguyen RN on 12/30/24 at 5:53 AM EST

## 2024-12-30 NOTE — PROGRESS NOTES
[phone]          Impression:   1. Diarrhea - CT w/ acute L sided colitis, suspect recurrent C diff on a background of Crohn's disease  - Hx C diff - tx w/ oral vancomycin 5/2024, 10/2024 - states she never improved, however does demonstrate confusion - reliability of history provided is uncertain  - was on oral vancomycin, changed to Fidaxomicin on 12/28  - Sed rate elevated at >130, CRP 10.8  -no BM today per sitter.   2. Crohn's colitis - initially dx 2009, no history of small bowel or upper GI tract involvement, previously treated w/ prednisone, Asocol, azathioprine, Humira - dosing increased to weekly in late 2019 due to low adalimumab level and Ab, 9/2020 Humira stopped, Started Stelara due to active disease w/ stricture at sigmoid colon. Stelara was increased to q4 week dosing in late 2022 when colonoscopy showed ongoing active disease in L colon.    - Hx enteric fistula between sigmoid and ileum    3. AMS - acute metabolic encephalopathy  4. UDS + - opiates and THC  5. Hypokalemia - K 2.3  6. UTI        Plan:      1. Continue Dificid - She has had multiple episodes of C diff in past, most recently 10/2024-likely will consider FMT (Rebyota or [facility]) at the end of the course to prevent further recurrence-will coordinate this as outpatient.   2. Continue medical management per primary team  3. Treatment of UTI, repletion of electrolytes per primary team/ID  4. Monitor CRP daily  5. Recommend holding corticosteroids. Further recommendations pending clinical course.  6. Stelara dose due tomorrow - discussed w/ Dr. Maya - no strong data/recommendations on holding vs continuing in this situation - recommend dose be brought from home so it may be self administered  7. Appreciate ID input        Chief Complaint: Diarrhea, Crohn's disease     Subjective:  marked improvement in confusion since yesterday - Pt A+Ox4, pleasant, does not recall details of how she got to hospital. Endorses persistent diarrhea and  incontinence of stool during sleep. Very appreciative for care she is receiving.     ROS: +diarrhea, abdominal pain, denies fever, chills      Patient Active Problem List   Diagnosis    BMI 50.0-59.9, adult    Accelerated hypertension    Dehiscence of incision, initial encounter    Type 2 diabetes mellitus with diabetic neuropathy (HCC)    Status post open reduction with internal fixation (ORIF) of fracture of ankle    Obesity, morbid    Controlled type 2 diabetes mellitus without complication, with long-term current use of insulin (HCC)    Pulmonary emboli (HCC)    Crohn's colitis (HCC)    Wound of left ankle    Closed left ankle fracture, with delayed healing, subsequent encounter    Arthritis of knee, left    Instability of knee joint, left    Varus deformity, not elsewhere classified, left knee    Class 3 severe obesity due to excess calories with serious comorbidity and body mass index (BMI) of 45.0 to 49.9 in adult    Closed Colles' fracture of right radius with routine healing    Closed displaced fracture of shaft of third metacarpal bone of right hand with routine healing    Colitis    Hypokalemia    Pyelonephritis    ESBL E. coli carrier    Patellar maltracking, left    AMS (altered mental status)    Acute metabolic encephalopathy    Acute cystitis without hematuria    H/O Clostridium difficile infection         /67   Pulse 79   Temp 98.6 °F (37 °C) (Oral)   Resp 16   Ht 1.778 m (5' 10\")   Wt 109.8 kg (242 lb 1 oz)   SpO2 99%   BMI 34.73 kg/m²       /67   Pulse 79   Temp 98.6 °F (37 °C) (Oral)   Resp 16   Ht 1.778 m (5' 10\")   Wt 109.8 kg (242 lb 1 oz)   SpO2 99%   BMI 34.73 kg/m²     Skin: warm and dry, no rash or erythema  Head: normocephalic and atraumatic  Eyes: extraocular eye movements intact  ENT: hearing grossly normal bilaterally  GI: grossly normal, not distended      Intake/Output Summary (Last 24 hours) at 12/30/2024 1208  Last data filed at 12/30/2024 0961  Gross per

## 2024-12-30 NOTE — PROGRESS NOTES
4 Eyes Skin Assessment     NAME:  Priscilla Dean  YOB: 1970  MEDICAL RECORD NUMBER:  [id number]    The patient is being assessed for  Shift Handoff    I agree that at least one RN has performed a thorough Head to Toe Skin Assessment on the patient. ALL assessment sites listed below have been assessed.      Areas assessed by both nurses:    Head, Face, Ears, Shoulders, Back, Chest, Arms, Elbows, Hands, Sacrum. Buttock, Coccyx, Ischium, Legs. Feet and Heels, and Under Medical Devices         Does the Patient have a Wound? No noted wound(s)       Jose Alfredo Prevention initiated by RN: Yes  Wound Care Orders initiated by RN: No    Pressure Injury (Stage 3,4, Unstageable, DTI, NWPT, and Complex wounds) if present, place Wound referral order by RN under : No    New Ostomies, if present place, Ostomy referral order under : No     Nurse 1 eSignature: Electronically signed by Rosy Nguyen RN on 12/30/24 at 6:55 AM EST    **SHARE this note so that the co-signing nurse can place an eSignature**    Nurse 2 eSignature: {Esignature:[id number]}

## 2024-12-30 NOTE — CONSULTS
Infectious Disease Consultation Note        Reason:recurrent c. Diff colitis and ESBL uti    Current abx Prior abx   12/28: fidaxomycin and ceftriaxone       Lines:  none       Assessment :  Ms. Dean is a 54 year old woman with crohns disease, DM, HTN, history of ESBL e coli UTI and prior c. Diff admitted with AMS and found to have c. Diff colitis and UTI.    Diarrhea - c. Diff and crohns disease, per history it doesn't seem that she had good control of crohns.  Prior c. Diff infection 5/24.  No peripheral or fecal leukocytosis, but elevated inflammatory markers and normal procal.      Urine culture with E. Coli, resistant to bactrim and amp.      AMS: likely metabolic encephalopathy - resolved      Hemoglobin A1C   Date Value Ref Range Status   12/28/2024 6.6 (H) 4.2 - 5.6 % Final     Comment:     (NOTE)  HbA1C Interpretive Ranges  <5.7              Normal  5.7 - 6.4         Consider Prediabetes  >6.5              Consider Diabetes           Recommendations:   Continue fidaxomycin  Continue ceftriaxone - 5 days total for UTI, stop 1/2  Appreciate GI input    Advance Care planning: discussed  with patient/surrogate decision maker ; patient wasn’t able to name a surrogate decision maker or provide an advance care plan    Thank you for consultation request. Above plan was discussed in details with patient. Please call me if any further questions or concerns. Will continue to participate in the care of this patient.  HPI:  The patient is 54 year old female with history of crohns disease on Stelara, htn, dm, WSBL E. Coli UTI and c.dff who presented to ED with AMS on 12/27.  CT abd with severe left sided colitis.  Initially on cipro and flagyl then to fidaxomycin and ceftriaxone.      Today she reports continued watery bowel movements multiple times per day.  Denies abdominal pain and decreased appetite.        Past Medical History:   Diagnosis Date    Bilateral knee pain     Crohn's colitis (HCC)     Diabetes (HCC)

## 2024-12-31 PROBLEM — E44.0 MODERATE PROTEIN-CALORIE MALNUTRITION (HCC): Status: ACTIVE | Noted: 2024-12-31

## 2024-12-31 LAB
ANION GAP SERPL CALC-SCNC: 3 MMOL/L (ref 3–18)
BUN SERPL-MCNC: 12 MG/DL (ref 7–18)
BUN/CREAT SERPL: 11 (ref 12–20)
CALCIUM SERPL-MCNC: 8.2 MG/DL (ref 8.5–10.1)
CALPROTECTIN STL-MCNT: 1200 UG/G (ref 0–120)
CHLORIDE SERPL-SCNC: 103 MMOL/L (ref 100–111)
CO2 SERPL-SCNC: 26 MMOL/L (ref 21–32)
CREAT SERPL-MCNC: 1.14 MG/DL (ref 0.6–1.3)
ERYTHROCYTE [DISTWIDTH] IN BLOOD BY AUTOMATED COUNT: 17.3 % (ref 11.6–14.5)
GLUCOSE BLD STRIP.AUTO-MCNC: 114 MG/DL (ref 70–110)
GLUCOSE BLD STRIP.AUTO-MCNC: 122 MG/DL (ref 70–110)
GLUCOSE BLD STRIP.AUTO-MCNC: 135 MG/DL (ref 70–110)
GLUCOSE BLD STRIP.AUTO-MCNC: 136 MG/DL (ref 70–110)
GLUCOSE SERPL-MCNC: 155 MG/DL (ref 74–99)
HCT VFR BLD AUTO: 35.2 % (ref 35–45)
HGB BLD-MCNC: 10.6 G/DL (ref 12–16)
MCH RBC QN AUTO: 23.5 PG (ref 24–34)
MCHC RBC AUTO-ENTMCNC: 30.1 G/DL (ref 31–37)
MCV RBC AUTO: 78 FL (ref 78–100)
NRBC # BLD: 0 K/UL (ref 0–0.01)
NRBC BLD-RTO: 0 PER 100 WBC
PLATELET # BLD AUTO: 372 K/UL (ref 135–420)
PMV BLD AUTO: 11.8 FL (ref 9.2–11.8)
POTASSIUM SERPL-SCNC: 3.6 MMOL/L (ref 3.5–5.5)
RBC # BLD AUTO: 4.51 M/UL (ref 4.2–5.3)
SODIUM SERPL-SCNC: 132 MMOL/L (ref 136–145)
WBC # BLD AUTO: 9.4 K/UL (ref 4.6–13.2)

## 2024-12-31 PROCEDURE — 97110 THERAPEUTIC EXERCISES: CPT

## 2024-12-31 PROCEDURE — 6370000000 HC RX 637 (ALT 250 FOR IP): Performed by: STUDENT IN AN ORGANIZED HEALTH CARE EDUCATION/TRAINING PROGRAM

## 2024-12-31 PROCEDURE — 92526 ORAL FUNCTION THERAPY: CPT

## 2024-12-31 PROCEDURE — 6360000002 HC RX W HCPCS: Performed by: STUDENT IN AN ORGANIZED HEALTH CARE EDUCATION/TRAINING PROGRAM

## 2024-12-31 PROCEDURE — 6370000000 HC RX 637 (ALT 250 FOR IP): Performed by: INTERNAL MEDICINE

## 2024-12-31 PROCEDURE — 80048 BASIC METABOLIC PNL TOTAL CA: CPT

## 2024-12-31 PROCEDURE — 82962 GLUCOSE BLOOD TEST: CPT

## 2024-12-31 PROCEDURE — 99232 SBSQ HOSP IP/OBS MODERATE 35: CPT | Performed by: STUDENT IN AN ORGANIZED HEALTH CARE EDUCATION/TRAINING PROGRAM

## 2024-12-31 PROCEDURE — 36415 COLL VENOUS BLD VENIPUNCTURE: CPT

## 2024-12-31 PROCEDURE — 2500000003 HC RX 250 WO HCPCS: Performed by: STUDENT IN AN ORGANIZED HEALTH CARE EDUCATION/TRAINING PROGRAM

## 2024-12-31 PROCEDURE — 97116 GAIT TRAINING THERAPY: CPT

## 2024-12-31 PROCEDURE — 2500000003 HC RX 250 WO HCPCS: Performed by: INTERNAL MEDICINE

## 2024-12-31 PROCEDURE — 97535 SELF CARE MNGMENT TRAINING: CPT

## 2024-12-31 PROCEDURE — 1100000000 HC RM PRIVATE

## 2024-12-31 PROCEDURE — 6360000002 HC RX W HCPCS: Performed by: INTERNAL MEDICINE

## 2024-12-31 PROCEDURE — 85027 COMPLETE CBC AUTOMATED: CPT

## 2024-12-31 PROCEDURE — 97530 THERAPEUTIC ACTIVITIES: CPT

## 2024-12-31 RX ORDER — MULTIVITAMIN WITH IRON
1 TABLET ORAL DAILY
Status: DISCONTINUED | OUTPATIENT
Start: 2024-12-31 | End: 2025-01-03 | Stop reason: HOSPADM

## 2024-12-31 RX ADMIN — DICLOFENAC SODIUM 2 G: 10 GEL TOPICAL at 15:37

## 2024-12-31 RX ADMIN — INSULIN GLARGINE 15 UNITS: 100 INJECTION, SOLUTION SUBCUTANEOUS at 21:38

## 2024-12-31 RX ADMIN — QUETIAPINE FUMARATE 25 MG: 25 TABLET ORAL at 10:26

## 2024-12-31 RX ADMIN — ENOXAPARIN SODIUM 30 MG: 100 INJECTION SUBCUTANEOUS at 21:45

## 2024-12-31 RX ADMIN — Medication 1 CAPSULE: at 10:25

## 2024-12-31 RX ADMIN — THERA TABS 1 TABLET: TAB at 15:36

## 2024-12-31 RX ADMIN — FIDAXOMICIN 200 MG: 200 TABLET, FILM COATED ORAL at 21:38

## 2024-12-31 RX ADMIN — ANTI-FUNGAL POWDER MICONAZOLE NITRATE TALC FREE: 1.42 POWDER TOPICAL at 10:40

## 2024-12-31 RX ADMIN — TRAMADOL HYDROCHLORIDE 100 MG: 50 TABLET, COATED ORAL at 10:25

## 2024-12-31 RX ADMIN — HYDRALAZINE HYDROCHLORIDE 100 MG: 50 TABLET ORAL at 10:25

## 2024-12-31 RX ADMIN — ASPIRIN 81 MG: 81 TABLET, COATED ORAL at 10:26

## 2024-12-31 RX ADMIN — WATER 1000 MG: 1 INJECTION INTRAMUSCULAR; INTRAVENOUS; SUBCUTANEOUS at 15:37

## 2024-12-31 RX ADMIN — PREGABALIN 200 MG: 50 CAPSULE ORAL at 10:24

## 2024-12-31 RX ADMIN — AMITRIPTYLINE HYDROCHLORIDE 25 MG: 50 TABLET, FILM COATED ORAL at 21:34

## 2024-12-31 RX ADMIN — LOSARTAN POTASSIUM 100 MG: 50 TABLET, FILM COATED ORAL at 10:25

## 2024-12-31 RX ADMIN — ATORVASTATIN CALCIUM 80 MG: 40 TABLET, FILM COATED ORAL at 10:25

## 2024-12-31 RX ADMIN — PREGABALIN 200 MG: 50 CAPSULE ORAL at 21:34

## 2024-12-31 RX ADMIN — ANTI-FUNGAL POWDER MICONAZOLE NITRATE TALC FREE: 1.42 POWDER TOPICAL at 21:41

## 2024-12-31 RX ADMIN — HYDRALAZINE HYDROCHLORIDE 100 MG: 50 TABLET ORAL at 15:36

## 2024-12-31 RX ADMIN — PREGABALIN 200 MG: 50 CAPSULE ORAL at 15:36

## 2024-12-31 RX ADMIN — CHOLESTYRAMINE 4 G: 4 POWDER, FOR SUSPENSION ORAL at 10:26

## 2024-12-31 RX ADMIN — HYDRALAZINE HYDROCHLORIDE 100 MG: 50 TABLET ORAL at 21:34

## 2024-12-31 RX ADMIN — TRAMADOL HYDROCHLORIDE 100 MG: 50 TABLET, COATED ORAL at 21:33

## 2024-12-31 RX ADMIN — TRAMADOL HYDROCHLORIDE 50 MG: 50 TABLET, COATED ORAL at 16:46

## 2024-12-31 RX ADMIN — SODIUM CHLORIDE, PRESERVATIVE FREE 10 ML: 5 INJECTION INTRAVENOUS at 10:26

## 2024-12-31 RX ADMIN — FIDAXOMICIN 200 MG: 200 TABLET, FILM COATED ORAL at 10:26

## 2024-12-31 RX ADMIN — CHOLESTYRAMINE 4 G: 4 POWDER, FOR SUSPENSION ORAL at 21:39

## 2024-12-31 RX ADMIN — SODIUM CHLORIDE, PRESERVATIVE FREE 10 ML: 5 INJECTION INTRAVENOUS at 21:39

## 2024-12-31 RX ADMIN — METOPROLOL TARTRATE 25 MG: 25 TABLET, FILM COATED ORAL at 10:26

## 2024-12-31 RX ADMIN — DICLOFENAC SODIUM 2 G: 10 GEL TOPICAL at 21:41

## 2024-12-31 RX ADMIN — ENOXAPARIN SODIUM 30 MG: 100 INJECTION SUBCUTANEOUS at 10:26

## 2024-12-31 RX ADMIN — METOPROLOL TARTRATE 25 MG: 25 TABLET, FILM COATED ORAL at 21:39

## 2024-12-31 ASSESSMENT — PAIN SCALES - GENERAL
PAINLEVEL_OUTOF10: 0
PAINLEVEL_OUTOF10: 0
PAINLEVEL_OUTOF10: 6
PAINLEVEL_OUTOF10: 0
PAINLEVEL_OUTOF10: 6
PAINLEVEL_OUTOF10: 7

## 2024-12-31 ASSESSMENT — PAIN - FUNCTIONAL ASSESSMENT
PAIN_FUNCTIONAL_ASSESSMENT: ACTIVITIES ARE NOT PREVENTED

## 2024-12-31 ASSESSMENT — PAIN DESCRIPTION - LOCATION
LOCATION: KNEE
LOCATION: HEAD;ABDOMEN
LOCATION: KNEE
LOCATION: HEAD;KNEE
LOCATION: KNEE
LOCATION: KNEE

## 2024-12-31 ASSESSMENT — PAIN DESCRIPTION - ORIENTATION
ORIENTATION: RIGHT
ORIENTATION: LEFT
ORIENTATION: LEFT
ORIENTATION: RIGHT;LEFT
ORIENTATION: LEFT

## 2024-12-31 ASSESSMENT — PAIN DESCRIPTION - DESCRIPTORS
DESCRIPTORS: ACHING
DESCRIPTORS: ACHING
DESCRIPTORS: ACHING;DISCOMFORT

## 2024-12-31 ASSESSMENT — ENCOUNTER SYMPTOMS: ABDOMINAL PAIN: 0

## 2024-12-31 NOTE — PLAN OF CARE
Problem: SLP Adult - Impaired Swallowing  Goal: By Discharge: Advance to least restrictive diet without signs or symptoms of aspiration for planned discharge setting.  See evaluation for individualized goals.  Description: Patient will:  1. Tolerate PO trials with 0 s/s overt distress in 4/5 trials  2. Utilize compensatory swallow strategies/maneuvers (decrease bite/sip, size/rate, alt. liq/sol) with min cues in 4/5 trials  3. Perform oral-motor/laryngeal exercises to increase oropharyngeal swallow function with min cues  4. Complete an objective swallow study (i.e., MBSS) to assess swallow integrity, r/o aspiration, and determine of safest LRD, min A as indicated/ordered by MD     Rec:     Regular solid diet with thin liquids  Aspiration precautions  HOB >45 during po intake, remain >30 for 30-45 minutes after po   Small bites/sips; slow feeding rate   Oral care TID  Meds per pt preference      Outcome: Progressing   SPEECH LANGUAGE PATHOLOGY DYSPHAGIA TREATMENT    Patient: Priscilla Dean (54 y.o. female)  Date: 12/31/2024  Diagnosis: Elevated C-reactive protein (CRP) [R79.82]  Colitis [K52.9]  Elevated erythrocyte sedimentation rate [R70.0]  Acute cystitis without hematuria [N30.00]  Altered mental status, unspecified altered mental status type [R41.82]  Acute metabolic encephalopathy [G93.41]  AMS (altered mental status) [R41.82] AMS (altered mental status)      Precautions: Standard, aspiration  PLOF: As per H&P      ASSESSMENT:  Pt was seen for follow up dysphagia management. Pt tolerated reg solid, puree, and thin liquids +/- straw consecutive swallows without any overt s/sx of aspiration. Mastication was appropriate with timely a-p transit. Positive oral clearance observed across all trials. D/w GI. Pt safe for initiation of reg solid diet with thin liquids, aspiration precautions, oral care TID, and meds as tolerated. ST will continue to follow x 1-2 visits to ensure safety of diet tolerance.    Progression  Trials  Neuromuscular Estim Used: No  Assessment Method(s): Observation, Palpation  Patient Position: 55 at HOB  Vocal Quality: No Impairment  Consistency Presented: Regular, Thin  How Presented: Self-fed/presented, Straw, Successive Swallows  Bolus Acceptance: No impairment  Bolus Formation/Control: No impairment  Type of Impairment: Oral holding  Propulsion: No impairment  Oral Residue: None  Initiation of Swallow: No impairment  Laryngeal Elevation: Functional  Aspiration Signs/Symptoms: None  Pharyngeal Phase Characteristics: No impairment, issues, or problems  Effective Modifications: None  Cues for Modifications: None         DYSPHAGIA DIAGNOSIS: Dysphagia Diagnosis: Swallow function appears WFL    PAIN:  Intensity Pre-treatment: 0/10   Intensity Post-treatment: 0/10  Scale: Numeric Rating Scale    After treatment:   []              Patient left in no apparent distress sitting up in chair  [x]              Patient left in no apparent distress in bed  [x]              Call bell left within reach  [x]              Nursing notified  []              Family present  []              Caregiver present  []              Bed alarm activated    COMMUNICATION/EDUCATION:   [x]            Aspiration precautions; swallow safety; compensatory techniques provided via demonstration, verbalization and teach back of comprehension  [x]         Patient/family have participated as able in goal setting and plan of care.  []            Patient/family agree to work toward stated goals and plan of care.  []            Patient understands intent and goals of therapy, neutral about participation.  []            Patient unable to participate in goal setting/plan of care secondary to cognition, hearing/vision deficits; education ongoing with interdisciplinary staff   []            Handout regarding diet recommendations and thickener instructions provided.  [x]         Posted safety precautions in patient's room.    Thank you for this

## 2024-12-31 NOTE — PROGRESS NOTES
pk  Lovenox  Dificid  Apresoline  Humalog  Lantus  Culturelle  Cozaar  Micotin  Seroquel   Pertinent Labs: Na 132 L, GFR 56, A1c 6.6%  Recent Labs     12/30/24  0314 12/31/24  0436   GLUCOSE 88 155*   BUN 12 12   CREATININE 1.02 1.14    132*   K 3.8 3.6    103   CO2 24 26   CALCIUM 9.0 8.2*     Recent Labs     12/29/24  1254 12/29/24  1749 12/29/24  2134 12/30/24  0626 12/30/24  1151 12/30/24  1633 12/30/24  2130 12/31/24  0825   POCGLU 118* 81 95 106 101 100 111* 114*       Last BM: 12/30/24    Skin: Wound Type: Surgical Incision     Edema: Generalized trace      Current Nutrition Intake & Therapies:    Average Meal Intake: 26-50%  Average Supplements Intake: None Ordered  ADULT ORAL NUTRITION SUPPLEMENT; Breakfast, Dinner; Diabetic Oral Supplement  ADULT DIET; Regular    Anthropometric Measures:  Height: 177.8 cm (5' 10\")  Ideal Body Weight (IBW): 150 lbs (68 kg)    Admission Body Weight: 109.8 kg (242 lb 1 oz)  Current Body Weight: 109.8 kg (242 lb 1 oz), 161.4 % IBW. Weight Source: Bed scale  Current BMI (kg/m2): 34.7    Weight Adjustment For: No Adjustment    BMI Categories: Obese Class 1 (BMI 30.0-34.9)    Estimated Daily Nutrient Needs:  Energy Requirements Based On: Formula  Weight Used for Energy Requirements: Current  Energy (kcal/day): 6348-1990 (MSJ xAF 1.1-1.3)  Weight Used for Protein Requirements: Current  Protein (g/day):  (0.8-1)  Method Used for Fluid Requirements: 1 ml/kcal  Fluid (ml/day): 6468-4970    Nutrition Diagnosis:   Inadequate oral intake related to cognitive or neurological impairment as evidenced by intake 51-75%, poor intake prior to admission    Unintended weight loss related to altered GI function as evidenced by weight loss (-23% x12 months)    Nutrition Interventions:   Food and/or Nutrient Delivery: Continue Current Diet, Start Oral Nutrition Supplement, Vitamin Supplement (diet advance to Regular at time of note)  Nutrition Education/Counseling: No  recommendation at this time  Coordination of Nutrition Care: Continue to monitor while inpatient  Plan of Care discussed with: Pt    Goals:  Previous Goal Met: Progressing toward Goal(s)  Goals: PO intake 75% or greater, by next RD assessment       Nutrition Monitoring and Evaluation:   Behavioral-Environmental Outcomes: None Identified  Food/Nutrient Intake Outcomes: Food and Nutrient Intake, Supplement Intake, Vitamin/Mineral Intake  Physical Signs/Symptoms Outcomes: Biochemical Data, Chewing or Swallowing, GI Status, Fluid Status or Edema, Diarrhea, Nutrition Focused Physical Findings, Skin, Weight    Discharge Planning:    Continue current diet, Continue Oral Nutrition Supplement     Teresa Rob MS, RDN, LDN  Contact: [phone]    Available via [employer]

## 2024-12-31 NOTE — PROGRESS NOTES
[email or web address]  [phone]    Gastroenterology Progress Note    Impression:  1. Diarrhea - 12/26 CT w/ acute L sided colitis, suspect recurrent C diff on a background of Crohn's disease and recent C. Diff infections. Was on PO vancoymycin, switched to fidaxomicin 12/28.  2. Hx C diff - tx w/ oral vancomycin 5/2024, 10/2024  2. Crohn's colitis - initially dx 2009, no history of small bowel or upper GI tract involvement, previously treated w/ prednisone, Asocol, azathioprine, Humira - dosing increased to weekly in late 2019 due to low adalimumab level and Ab. 9/2020 Humira stopped, Started Stelara due to active disease w/ stricture at sigmoid colon. Stelara was increased to q4 week dosing in late 2022 when colonoscopy showed ongoing active disease in L colon.    - Hx enteric fistula between sigmoid and ileum    3. AMS - acute metabolic encephalopathy  4. UDS + - opiates and THC  5. Hypokalemia - K 2.3  6. UTI    Plan:  1. Continue Dificid - She has had multiple episodes of C diff in past, most recently 10/2024. Attempting to obtain FMT ([facility]) approval, task set to office to help arrange this - this would be done as an OP and requires insurance authorization. Patient would like to f/u as OP w/ Dr. Maya.   2.Treatment of UTI, repletion of electrolytes per primary team/ID  3. Monitor CRP daily  4. Recommend holding corticosteroids. Further recommendations pending clinical course.  5. Stelara dose due today, patient will have daughter bring from home for administration.   6. Appreciate ID input  7. Continue medical management per primary.     Chief Complaint: diarrhea, crohn's      Subjective:  reports 9-12 loose non-bloody BM in last 24 hours with associated mild lower abd discomfort. Mentation now at baseline.     ROS: Denies any fevers, chills, nausea, vomiting.       General: well nourished, no acute distress  Skin: no rashes, ulcers, or icterus.  Eyes: conjunctiva normal, EOM normal   HEENT: normocephalic,

## 2024-12-31 NOTE — PROGRESS NOTES
Infectious Disease Consultation Note        Reason:recurrent c. Diff colitis and ESBL uti    Current abx Prior abx   12/28: fidaxomycin and ceftriaxone       Lines:  none       Assessment :  Ms. Dean is a 54 year old woman with crohns disease, DM, HTN, history of ESBL e coli UTI and prior c. Diff admitted with AMS and found to have c. Diff colitis and UTI.    Diarrhea - c. Diff and crohns disease, per history it doesn't seem that she had good control of crohns.  Prior c. Diff infection 5/24.  No peripheral or fecal leukocytosis, but elevated inflammatory markers and normal procal.      Urine culture with E. Coli, resistant to bactrim and amp.      AMS: likely metabolic encephalopathy - resolved    Improved today, continues with frequent bowel movements, however they are less in volume.    1/1: had 15+ bowel movement in the last 24 hours with fecal incontinence and worsening right sided abd pain.      Hemoglobin A1C   Date Value Ref Range Status   12/28/2024 6.6 (H) 4.2 - 5.6 % Final     Comment:     (NOTE)  HbA1C Interpretive Ranges  <5.7              Normal  5.7 - 6.4         Consider Prediabetes  >6.5              Consider Diabetes           Recommendations:  Continue fidaxonmycin  Add metronidazole 500mg IV q 8 hours ( reports allergy to metronidazole, but states it is due to peripheral neuropathy while hospitalized a few years ago.  She would like to try it to see if it helps.    She may benefit from bezlotoxumab as well  Recommend close follow up with GI   Uncomplicated UTI: ceftriaxone, last dose tomorrow (may help improve c. Diff)  Appreciate GI input, attempting to get fecal transplant    Advance Care planning: discussed  with patient/surrogate decision maker ; patient wasn’t able to name a surrogate decision maker or provide an advance care plan    Thank you for consultation request. Above plan was discussed in details with patient. Please call me if any further questions or concerns. Will continue to  Recent Labs     12/29/24  0434 12/31/24  0436   WBC 6.4 9.4   RBC 4.38 4.51   HGB 10.4* 10.6*   HCT 34.5* 35.2    372      Microbiology Results       Procedure Component Value Units Date/Time    Clostridium Difficile Toxin/Antigen [[id number]]  (Abnormal) Collected: 12/27/24 0520    Order Status: Completed Specimen: Stool Updated: 12/27/24 1455     Reflex       See Reflex order for C. difficile (DNA)           C Diff Toxin Interpretation       Indeterminate for Toxigenic C. difficile, DNA confirmation to follow.          Fecal leukocytes [[id number]] Collected: 12/27/24 0520    Order Status: Completed Specimen: Stool Updated: 12/27/24 1337     Fecal Leukocytes 0 TO 4 /HPF     C. difficile toxin Molecular [[id number]]  (Abnormal) Collected: 12/27/24 0520    Order Status: Completed Specimen: Stool Updated: 12/27/24 1455     C. difficile toxin Molecular Positive        Comment: This specimen is positive for toxigenic C difficile by DNA amplification.  Repeat testing is not recommended, samples received within 7 days of this positive result will be rejected. Assay is not approved to test for cure, since nucleic acids may persist after effective treatment and may prompt false positive results.  CALLED TO AND CORRECTLY REPEATED BY:  AGUSTÍN STAFFORD RN 4N ON [id number] AT 1453 TO MORGAN         Clostridium Difficile Toxin/Antigen [[id number]]     Order Status: Canceled Specimen: Stool     Culture, Urine [[id number]]  (Abnormal)  (Susceptibility) Collected: 12/26/24 2159    Order Status: Completed Specimen: Urine, clean catch Updated: 12/30/24 0911     Special Requests NO SPECIAL REQUESTS        [place] count --        >100,000  COLONIES/mL       Culture Escherichia coli       Susceptibility        Escherichia coli      BACTERIAL SUSCEPTIBILITY PANEL CODEY      amikacin <=2 ug/mL Sensitive      ampicillin >=32 ug/mL Resistant      ampicillin-sulbactam >=32 ug/mL Resistant      ceFAZolin 16 ug/mL Sensitive      cefepime <=1 ug/mL

## 2024-12-31 NOTE — PROGRESS NOTES
[facility]  Progress Note  Date:2024       Room:[facility]  Patient Name:Priscilla Dean     YOB: 1970     Age:54 y.o.        Subjective    Subjective:  Symptoms:  Improved.    Diet:  Adequate intake.    Activity level: Normal.    Pain:  She reports pain is unchanged.  Pain is partially controlled.       Review of Systems   Gastrointestinal:  Negative for abdominal pain.   Musculoskeletal:  Negative for myalgias.     Patient's mentation is at baseline. Patient has had multiple episodes of diarrhea since yesterday.    Disposition: Likely stable for discharge home on . Pending improvement in diarrhea.     Objective         Vitals Last 24 Hours:  TEMPERATURE:  Temp  Av.6 °F (37 °C)  Min: 98.2 °F (36.8 °C)  Max: 99 °F (37.2 °C)  RESPIRATIONS RANGE: Resp  Av.8  Min: 18  Max: 20  PULSE OXIMETRY RANGE: SpO2  Av.6 %  Min: 96 %  Max: 100 %  PULSE RANGE: Pulse  Av.2  Min: 81  Max: 100  BLOOD PRESSURE RANGE: Systolic (24hrs), Av , Min:110 , Max:142     ; Diastolic (24hrs), Av, Min:65, Max:81      I/O (24Hr):    Intake/Output Summary (Last 24 hours) at 2024 1642  Last data filed at 2024 2148  Gross per 24 hour   Intake 720 ml   Output --   Net 720 ml     Objective:  General Appearance:  Uncomfortable.    Vital signs: (most recent): Blood pressure 123/67, pulse 84, temperature 98.6 °F (37 °C), temperature source Oral, resp. rate 20, height 1.778 m (5' 10\"), weight 109.8 kg (242 lb 1 oz), SpO2 98%.    Output: Producing urine.    HEENT: Normal HEENT exam.    Lungs:  Normal effort and normal respiratory rate.    Heart: Normal rate.  Regular rhythm.    Abdomen: Abdomen is soft and flat.  Bowel sounds are normal.   There is generalized tenderness.     Extremities: Normal range of motion.    Pulses: Distal pulses are intact.    Neurological: Patient is alert and oriented to person, place and time.    Skin:  Warm and dry.

## 2024-12-31 NOTE — PLAN OF CARE
Problem: Physical Therapy - Adult  Goal: By Discharge: Performs mobility at highest level of function for planned discharge setting.  See evaluation for individualized goals.  Description: Initiated  12/27/24  to be met within 7-10 days.    1.  Patient will move from supine to sit and sit to supine , scoot up and down, and roll side to side in bed with independence.    2.  Patient will transfer from bed to chair and chair to bed with modified independence using the least restrictive device.  3.  Patient will perform sit to stand with modified independence.  4.  Patient will ambulate with supervision/set-up for 50 feet with the least restrictive device.   5.  Patient will ascend/descend 4 stairs with B handrail(s) with supervision/set-up.    PLOF: Poor historian, PLOF gathered from chart review. Lives alone and ind prior to admission.     Outcome: Progressing   PHYSICAL THERAPY TREATMENT    Patient: Priscilla Dean (54 y.o. female)  Date: 12/31/2024  Diagnosis: Elevated C-reactive protein (CRP) [R79.82]  Colitis [K52.9]  Elevated erythrocyte sedimentation rate [R70.0]  Acute cystitis without hematuria [N30.00]  Altered mental status, unspecified altered mental status type [R41.82]  Acute metabolic encephalopathy [G93.41]  AMS (altered mental status) [R41.82] AMS (altered mental status)      Precautions: Isolation, Skin, Fall Risk,     ASSESSMENT:  Pt cleared by nursing prior to session. Pt was supine in bed, in NAD and agreeable to treatment. Pt seen with OT to maximize participation and pt safety. Pt provided stand by assistance to safely transfer to EOB. Pt transferred to standing at walker with CGA. Pt provided CGA for ambulation with RW over 15 ft distance. Pt demoed safe navigate of tight spaces and obstacles independently. Pt returned to EOB and performed x 5 sit to stand with stand by assist. Pt completed below therex to maximize strength and balance needed for safe functional mobility. Pt would continue to  Full  Right Side Weight Bearing: Full  Overall Level of Assistance: Contact-guard assistance  Distance (ft): 15 Feet  Assistive Device: Walker, rolling  Speed/Julia: Slow  Gait Abnormalities: Decreased step clearance    Therapeutic Exercises:     EXERCISE   Sets   Reps   Active Active Assist   Passive Self ROM   Comments   Ankle Pumps 1 10  [x] [] [] []    Long Arc Quads 1 10 [x] [] [] []    Standing Marching 1 10 [x] [] [] []    Weight shifts side to side 1 5 [x] [] [] []    Sit to stand 1 5 [x] [] [] []        Pain:  Intensity Pre-treatment: 0/10   Intensity Post-treatment: 0/10  Scale: Numeric Rating Scale    Activity Tolerance:   Activity Tolerance: Patient tolerated evaluation without incident;Patient tolerated treatment well  Please refer to the flowsheet for vital signs taken during this treatment.  After treatment:   [] Patient left in no apparent distress sitting up in chair  [x] Patient left in no apparent distress in bed  [x] Call bell left within reach  [x] Nursing notified  [] Caregiver present  [] Bed alarm activated  [] SCDs applied      COMMUNICATION/EDUCATION:   Patient Education  Education Given To: Patient  Education Provided: Role of Therapy;Plan of Care;Mobility Training;Fall Prevention Strategies;Home Exercise Program  Education Method: Demonstration;Verbal;Teach Back  Barriers to Learning: None  Education Outcome: Continued education needed;Verbalized understanding;Demonstrated understanding      Del Cornelius PT  Minutes: 23

## 2024-12-31 NOTE — PLAN OF CARE
Problem: Chronic Conditions and Co-morbidities  Goal: Patient's chronic conditions and co-morbidity symptoms are monitored and maintained or improved  Outcome: Progressing  Note: Monitor vital signs every four hours       Problem: Pain  Goal: Verbalizes/displays adequate comfort level or baseline comfort level  Outcome: Progressing   Assess pain using appropriate pain scale   Administer analgesics based on type and severity of pain and evaluate response   Implement non-pharmacological measures as appropriate and evaluate response     Problem: Safety - Adult  Goal: Free from fall injury  Outcome: Progressing  Note: Round on patient every two hours  Encourage patient to utilize the call bell when assistance is needed      Problem: Skin/Tissue Integrity  Goal: Absence of new skin breakdown  Description: 1.  Monitor for areas of redness and/or skin breakdown  Outcome: Progressing  Note: Encourage patient to turn frequently while in bed     Problem: Skin/Tissue Integrity - Adult  Goal: Skin integrity remains intact  Outcome: Progressing   Monitor for areas of redness and/or skin breakdown     Problem: Infection - Adult  Goal: Absence of infection at discharge  Outcome: Progressing   Monitor lab/diagnostic results   Monitor all insertion sites i.e., indwelling lines, tubes and drains     Problem: Infection - Adult  Goal: Absence of infection during hospitalization  Outcome: Progressing   Assess and monitor for signs and symptoms of infection   Monitor all insertion sites i.e., indwelling lines, tubes and drains     Problem: Nutrition Deficit:  Goal: Optimize nutritional status  Outcome: Progressing   Recommend appropriate diets, oral nutritional supplements, and vitamin/mineral supplements

## 2024-12-31 NOTE — PLAN OF CARE
Problem: Occupational Therapy - Adult  Goal: By Discharge: Performs self-care activities at highest level of function for planned discharge setting.  See evaluation for individualized goals.  Description: Occupational Therapy Goals:  Initiated 12/27/2024 to be met within 7-10 days.    1.  Patient will perform self-feeding and grooming with modified independence.   2.  Patient will perform bathing with contact guard assistance using adaptive equipment.  3.  Patient will perform upper body dressing and lower body dressing  with contact guard assistance using adaptive equipment.  4.  Patient will perform toilet transfers with contact guard assistance using RW with good balance.  5.  Patient will perform all aspects of toileting with contact guard assistance.  6.  Patient will participate in upper extremity therapeutic exercise/activities with contact guard assistance for 8-10 minutes to increase strength/endurance for ADLs.    7.  Patient will utilize energy conservation techniques during functional activities with min verbal cues.    PLOF: independent with ADLs and functional mobility        Outcome: Progressing   OCCUPATIONAL THERAPY TREATMENT    Patient: Priscilla Dean (54 y.o. female)  Date: 12/31/2024  Diagnosis: Elevated C-reactive protein (CRP) [R79.82]  Colitis [K52.9]  Elevated erythrocyte sedimentation rate [R70.0]  Acute cystitis without hematuria [N30.00]  Altered mental status, unspecified altered mental status type [R41.82]  Acute metabolic encephalopathy [G93.41]  AMS (altered mental status) [R41.82] AMS (altered mental status)      Precautions: Isolation, Skin, Fall Risk,  ,  ,  ,  ,  ,  ,      Chart, occupational therapy assessment, plan of care, and goals were reviewed.  ASSESSMENT:  PT co tx to maximize pt safety and participation. Pt presented supine in bed upon entry and agreeable for participation. She came to EOB SBA in prep for functional tasks. Once sitting, she donned her socks SBA with leg

## 2024-12-31 NOTE — PROGRESS NOTES
Follow up visit     12/31/2024 10:23 AM    Subjective:     Patient feel better, no new complains    Medications:    Current Facility-Administered Medications   Medication Dose Route Frequency Provider Last Rate Last Admin    amitriptyline (ELAVIL) tablet 25 mg  25 mg Oral Nightly Vincent, Omoyemi Ajoke, DO   25 mg at 12/30/24 2138    traMADol (ULTRAM) tablet 50 mg  50 mg Oral Q6H PRN Vincent, Omoyemi Ajoke, DO        Or    traMADol (ULTRAM) tablet 100 mg  100 mg Oral Q6H PRN Vincent, Omoyemi Ajoke, DO   100 mg at 12/30/24 2136    diclofenac sodium (VOLTAREN) 1 % gel 2 g  2 g Topical TID Vincent, Omoyemi Ajoke, DO   2 g at 12/30/24 2147    QUEtiapine (SEROQUEL) tablet 25 mg  25 mg Oral BID Vincent, Omoyemi Ajoke, DO   25 mg at 12/30/24 2138    Fidaxomicin (DIFICID) tablet 200 mg  200 mg Oral BID Vivien Siegel MD   200 mg at 12/30/24 2130    cefTRIAXone (ROCEPHIN) 1,000 mg in sterile water 10 mL IV syringe  1,000 mg IntraVENous Q24H Vincent, Omoyemi Ajoke, DO   1,000 mg at 12/30/24 1629    aspirin EC tablet 81 mg  81 mg Oral Daily Aniket Townsend MD   81 mg at 12/30/24 0828    atorvastatin (LIPITOR) tablet 80 mg  80 mg Oral Daily Aniket Townsend MD   80 mg at 12/30/24 0828    lactobacillus (CULTURELLE) capsule 1 capsule  1 capsule Oral Daily with breakfast Aniket Townsend MD   1 capsule at 12/30/24 0827    losartan (COZAAR) tablet 100 mg  100 mg Oral Daily Aniket Townsend MD   100 mg at 12/30/24 0828    metoprolol tartrate (LOPRESSOR) tablet 25 mg  25 mg Oral BID Aniket Townsend MD   25 mg at 12/30/24 2137    hydrALAZINE (APRESOLINE) tablet 100 mg  100 mg Oral TID Aniket Townsend MD   100 mg at 12/30/24 2137    pregabalin (LYRICA) capsule 200 mg  200 mg Oral TID Leonor Kaur DO   200 mg at 12/30/24 2130    tiZANidine (ZANAFLEX) tablet 6 mg  6 mg Oral BID Aniket Smith MD   6 mg at 12/28/24 1931    sodium chloride flush 0.9 % injection 5-40 mL  (HUMALOG,ADMELOG) injection vial 0-8 Units  0-8 Units SubCUTAneous 4x Daily AC & HS Aniket Townsend MD   2 Units at 12/27/24 1535    insulin glargine (LANTUS) injection vial 15 Units  15 Units SubCUTAneous Nightly Aniket Townsend MD   15 Units at 12/30/24 2130    hydrALAZINE (APRESOLINE) injection 10 mg  10 mg IntraVENous Q6H PRN Aniket Townsend MD   10 mg at 12/27/24 0506    LORazepam (ATIVAN) injection 1 mg  1 mg IntraVENous Q6H PRN Aniket Townsend MD   1 mg at 12/29/24 0206    ALPRAZolam (XANAX) tablet 0.5 mg  0.5 mg Oral BID PRN Ross Leach MD   0.5 mg at 12/28/24 2334    miconazole (MICOTIN) 2 % powder   Topical BID Ross Leach MD   Given at 12/30/24 2146       Vital signs:  /81   Pulse 81   Temp 98.2 °F (36.8 °C) (Oral)   Resp 20   Ht 1.778 m (5' 10\")   Wt 109.8 kg (242 lb 1 oz)   SpO2 98%   BMI 34.73 kg/m²     Review of Systems:   As above       Patient Active Problem List   Diagnosis    BMI 50.0-59.9, adult    Accelerated hypertension    Dehiscence of incision, initial encounter    Type 2 diabetes mellitus with diabetic neuropathy (HCC)    Status post open reduction with internal fixation (ORIF) of fracture of ankle    Obesity, morbid    Controlled type 2 diabetes mellitus without complication, with long-term current use of insulin (HCC)    Pulmonary emboli (HCC)    Crohn's colitis (HCC)    Wound of left ankle    Closed left ankle fracture, with delayed healing, subsequent encounter    Arthritis of knee, left    Instability of knee joint, left    Varus deformity, not elsewhere classified, left knee    Class 3 severe obesity due to excess calories with serious comorbidity and body mass index (BMI) of 45.0 to 49.9 in adult    Closed Colles' fracture of right radius with routine healing    Closed displaced fracture of shaft of third metacarpal bone of right hand with routine healing    Colitis    Hypokalemia    Pyelonephritis    ESBL E. coli carrier    Patellar

## 2025-01-01 LAB
ANION GAP SERPL CALC-SCNC: 7 MMOL/L (ref 3–18)
B PERT DNA SPEC QL NAA+PROBE: NOT DETECTED
BORDETELLA PARAPERTUSSIS BY PCR: NOT DETECTED
BUN SERPL-MCNC: 9 MG/DL (ref 7–18)
BUN/CREAT SERPL: 10 (ref 12–20)
C PNEUM DNA SPEC QL NAA+PROBE: NOT DETECTED
CALCIUM SERPL-MCNC: 8.3 MG/DL (ref 8.5–10.1)
CHLORIDE SERPL-SCNC: 109 MMOL/L (ref 100–111)
CO2 SERPL-SCNC: 22 MMOL/L (ref 21–32)
CREAT SERPL-MCNC: 0.94 MG/DL (ref 0.6–1.3)
ERYTHROCYTE [DISTWIDTH] IN BLOOD BY AUTOMATED COUNT: 17.5 % (ref 11.6–14.5)
FLUAV H3 RNA SPEC QL NAA+PROBE: DETECTED
FLUBV RNA SPEC QL NAA+PROBE: NOT DETECTED
GLUCOSE BLD STRIP.AUTO-MCNC: 101 MG/DL (ref 70–110)
GLUCOSE BLD STRIP.AUTO-MCNC: 102 MG/DL (ref 70–110)
GLUCOSE BLD STRIP.AUTO-MCNC: 102 MG/DL (ref 70–110)
GLUCOSE BLD STRIP.AUTO-MCNC: 95 MG/DL (ref 70–110)
GLUCOSE SERPL-MCNC: 101 MG/DL (ref 74–99)
HADV DNA SPEC QL NAA+PROBE: NOT DETECTED
HCOV 229E RNA SPEC QL NAA+PROBE: NOT DETECTED
HCOV HKU1 RNA SPEC QL NAA+PROBE: NOT DETECTED
HCOV NL63 RNA SPEC QL NAA+PROBE: NOT DETECTED
HCOV OC43 RNA SPEC QL NAA+PROBE: NOT DETECTED
HCT VFR BLD AUTO: 35.1 % (ref 35–45)
HGB BLD-MCNC: 10.9 G/DL (ref 12–16)
HMPV RNA SPEC QL NAA+PROBE: NOT DETECTED
HPIV1 RNA SPEC QL NAA+PROBE: NOT DETECTED
HPIV2 RNA SPEC QL NAA+PROBE: NOT DETECTED
HPIV3 RNA SPEC QL NAA+PROBE: NOT DETECTED
HPIV4 RNA SPEC QL NAA+PROBE: NOT DETECTED
M PNEUMO DNA SPEC QL NAA+PROBE: NOT DETECTED
MCH RBC QN AUTO: 24.2 PG (ref 24–34)
MCHC RBC AUTO-ENTMCNC: 31.1 G/DL (ref 31–37)
MCV RBC AUTO: 78 FL (ref 78–100)
NRBC # BLD: 0 K/UL (ref 0–0.01)
NRBC BLD-RTO: 0 PER 100 WBC
PLATELET # BLD AUTO: 334 K/UL (ref 135–420)
PMV BLD AUTO: 12.4 FL (ref 9.2–11.8)
POTASSIUM SERPL-SCNC: 3.7 MMOL/L (ref 3.5–5.5)
RBC # BLD AUTO: 4.5 M/UL (ref 4.2–5.3)
RSV RNA SPEC QL NAA+PROBE: NOT DETECTED
RV+EV RNA SPEC QL NAA+PROBE: NOT DETECTED
SARS-COV-2 RNA RESP QL NAA+PROBE: NOT DETECTED
SODIUM SERPL-SCNC: 138 MMOL/L (ref 136–145)
WBC # BLD AUTO: 6.2 K/UL (ref 4.6–13.2)

## 2025-01-01 PROCEDURE — 6370000000 HC RX 637 (ALT 250 FOR IP): Performed by: INTERNAL MEDICINE

## 2025-01-01 PROCEDURE — 82962 GLUCOSE BLOOD TEST: CPT

## 2025-01-01 PROCEDURE — 85027 COMPLETE CBC AUTOMATED: CPT

## 2025-01-01 PROCEDURE — 1100000000 HC RM PRIVATE

## 2025-01-01 PROCEDURE — 2500000003 HC RX 250 WO HCPCS: Performed by: INTERNAL MEDICINE

## 2025-01-01 PROCEDURE — 99232 SBSQ HOSP IP/OBS MODERATE 35: CPT | Performed by: STUDENT IN AN ORGANIZED HEALTH CARE EDUCATION/TRAINING PROGRAM

## 2025-01-01 PROCEDURE — 0202U NFCT DS 22 TRGT SARS-COV-2: CPT

## 2025-01-01 PROCEDURE — 94761 N-INVAS EAR/PLS OXIMETRY MLT: CPT

## 2025-01-01 PROCEDURE — 92526 ORAL FUNCTION THERAPY: CPT

## 2025-01-01 PROCEDURE — 36415 COLL VENOUS BLD VENIPUNCTURE: CPT

## 2025-01-01 PROCEDURE — 2500000003 HC RX 250 WO HCPCS: Performed by: STUDENT IN AN ORGANIZED HEALTH CARE EDUCATION/TRAINING PROGRAM

## 2025-01-01 PROCEDURE — 6360000002 HC RX W HCPCS: Performed by: STUDENT IN AN ORGANIZED HEALTH CARE EDUCATION/TRAINING PROGRAM

## 2025-01-01 PROCEDURE — 80048 BASIC METABOLIC PNL TOTAL CA: CPT

## 2025-01-01 PROCEDURE — 6370000000 HC RX 637 (ALT 250 FOR IP): Performed by: STUDENT IN AN ORGANIZED HEALTH CARE EDUCATION/TRAINING PROGRAM

## 2025-01-01 PROCEDURE — 6360000002 HC RX W HCPCS: Performed by: INTERNAL MEDICINE

## 2025-01-01 RX ORDER — METRONIDAZOLE 500 MG/100ML
500 INJECTION, SOLUTION INTRAVENOUS EVERY 8 HOURS
Status: DISCONTINUED | OUTPATIENT
Start: 2025-01-01 | End: 2025-01-03 | Stop reason: HOSPADM

## 2025-01-01 RX ORDER — OSELTAMIVIR PHOSPHATE 75 MG/1
75 CAPSULE ORAL 2 TIMES DAILY
Status: DISCONTINUED | OUTPATIENT
Start: 2025-01-01 | End: 2025-01-03 | Stop reason: HOSPADM

## 2025-01-01 RX ORDER — BENZONATATE 100 MG/1
200 CAPSULE ORAL 3 TIMES DAILY PRN
Status: DISCONTINUED | OUTPATIENT
Start: 2025-01-01 | End: 2025-01-03 | Stop reason: HOSPADM

## 2025-01-01 RX ADMIN — INSULIN GLARGINE 15 UNITS: 100 INJECTION, SOLUTION SUBCUTANEOUS at 21:02

## 2025-01-01 RX ADMIN — ENOXAPARIN SODIUM 30 MG: 100 INJECTION SUBCUTANEOUS at 21:02

## 2025-01-01 RX ADMIN — ASPIRIN 81 MG: 81 TABLET, COATED ORAL at 08:45

## 2025-01-01 RX ADMIN — CHOLESTYRAMINE 4 G: 4 POWDER, FOR SUSPENSION ORAL at 21:04

## 2025-01-01 RX ADMIN — LOSARTAN POTASSIUM 100 MG: 50 TABLET, FILM COATED ORAL at 08:45

## 2025-01-01 RX ADMIN — DICLOFENAC SODIUM 2 G: 10 GEL TOPICAL at 13:11

## 2025-01-01 RX ADMIN — HYDRALAZINE HYDROCHLORIDE 100 MG: 50 TABLET ORAL at 08:45

## 2025-01-01 RX ADMIN — SODIUM CHLORIDE, PRESERVATIVE FREE 10 ML: 5 INJECTION INTRAVENOUS at 21:05

## 2025-01-01 RX ADMIN — TRAMADOL HYDROCHLORIDE 100 MG: 50 TABLET, COATED ORAL at 03:29

## 2025-01-01 RX ADMIN — FIDAXOMICIN 200 MG: 200 TABLET, FILM COATED ORAL at 21:01

## 2025-01-01 RX ADMIN — METRONIDAZOLE 500 MG: 500 INJECTION, SOLUTION INTRAVENOUS at 10:47

## 2025-01-01 RX ADMIN — THERA TABS 1 TABLET: TAB at 08:45

## 2025-01-01 RX ADMIN — METOPROLOL TARTRATE 25 MG: 25 TABLET, FILM COATED ORAL at 21:01

## 2025-01-01 RX ADMIN — METRONIDAZOLE 500 MG: 500 INJECTION, SOLUTION INTRAVENOUS at 17:58

## 2025-01-01 RX ADMIN — DICLOFENAC SODIUM 2 G: 10 GEL TOPICAL at 08:46

## 2025-01-01 RX ADMIN — ANTI-FUNGAL POWDER MICONAZOLE NITRATE TALC FREE: 1.42 POWDER TOPICAL at 09:30

## 2025-01-01 RX ADMIN — ANTI-FUNGAL POWDER MICONAZOLE NITRATE TALC FREE: 1.42 POWDER TOPICAL at 21:06

## 2025-01-01 RX ADMIN — FIDAXOMICIN 200 MG: 200 TABLET, FILM COATED ORAL at 08:45

## 2025-01-01 RX ADMIN — ACETAMINOPHEN 650 MG: 325 TABLET ORAL at 11:12

## 2025-01-01 RX ADMIN — ENOXAPARIN SODIUM 30 MG: 100 INJECTION SUBCUTANEOUS at 08:45

## 2025-01-01 RX ADMIN — DICLOFENAC SODIUM 2 G: 10 GEL TOPICAL at 21:06

## 2025-01-01 RX ADMIN — PREGABALIN 200 MG: 50 CAPSULE ORAL at 21:02

## 2025-01-01 RX ADMIN — PREGABALIN 200 MG: 50 CAPSULE ORAL at 13:11

## 2025-01-01 RX ADMIN — AMITRIPTYLINE HYDROCHLORIDE 25 MG: 50 TABLET, FILM COATED ORAL at 21:02

## 2025-01-01 RX ADMIN — PREGABALIN 200 MG: 50 CAPSULE ORAL at 08:45

## 2025-01-01 RX ADMIN — Medication 1 CAPSULE: at 08:45

## 2025-01-01 RX ADMIN — HYDRALAZINE HYDROCHLORIDE 100 MG: 50 TABLET ORAL at 13:11

## 2025-01-01 RX ADMIN — TRAMADOL HYDROCHLORIDE 100 MG: 50 TABLET, COATED ORAL at 16:01

## 2025-01-01 RX ADMIN — OSELTAMIVIR PHOSPHATE 75 MG: 75 CAPSULE ORAL at 17:54

## 2025-01-01 RX ADMIN — HYDRALAZINE HYDROCHLORIDE 100 MG: 50 TABLET ORAL at 21:02

## 2025-01-01 RX ADMIN — TIZANIDINE 6 MG: 2 TABLET ORAL at 21:02

## 2025-01-01 RX ADMIN — SODIUM CHLORIDE, PRESERVATIVE FREE 10 ML: 5 INJECTION INTRAVENOUS at 09:31

## 2025-01-01 RX ADMIN — METOPROLOL TARTRATE 25 MG: 25 TABLET, FILM COATED ORAL at 08:45

## 2025-01-01 RX ADMIN — WATER 1000 MG: 1 INJECTION INTRAMUSCULAR; INTRAVENOUS; SUBCUTANEOUS at 16:03

## 2025-01-01 RX ADMIN — ACETAMINOPHEN 650 MG: 325 TABLET ORAL at 21:03

## 2025-01-01 RX ADMIN — BENZONATATE 200 MG: 100 CAPSULE ORAL at 21:08

## 2025-01-01 RX ADMIN — CHOLESTYRAMINE 4 G: 4 POWDER, FOR SUSPENSION ORAL at 08:45

## 2025-01-01 RX ADMIN — BENZONATATE 200 MG: 100 CAPSULE ORAL at 13:11

## 2025-01-01 RX ADMIN — TRAMADOL HYDROCHLORIDE 100 MG: 50 TABLET, COATED ORAL at 09:31

## 2025-01-01 RX ADMIN — ATORVASTATIN CALCIUM 80 MG: 40 TABLET, FILM COATED ORAL at 08:45

## 2025-01-01 RX ADMIN — TRAMADOL HYDROCHLORIDE 100 MG: 50 TABLET, COATED ORAL at 23:04

## 2025-01-01 ASSESSMENT — PAIN DESCRIPTION - FREQUENCY
FREQUENCY: INTERMITTENT
FREQUENCY: INTERMITTENT

## 2025-01-01 ASSESSMENT — PAIN DESCRIPTION - DIRECTION
RADIATING_TOWARDS: DENIES
RADIATING_TOWARDS: DENIES

## 2025-01-01 ASSESSMENT — PAIN SCALES - GENERAL
PAINLEVEL_OUTOF10: 0
PAINLEVEL_OUTOF10: 3
PAINLEVEL_OUTOF10: 5
PAINLEVEL_OUTOF10: 7
PAINLEVEL_OUTOF10: 7
PAINLEVEL_OUTOF10: 8
PAINLEVEL_OUTOF10: 3
PAINLEVEL_OUTOF10: 8

## 2025-01-01 ASSESSMENT — PAIN DESCRIPTION - LOCATION
LOCATION: KNEE
LOCATION: ABDOMEN;KNEE
LOCATION: KNEE
LOCATION: KNEE
LOCATION: ABDOMEN;LEG

## 2025-01-01 ASSESSMENT — PAIN - FUNCTIONAL ASSESSMENT
PAIN_FUNCTIONAL_ASSESSMENT: ACTIVITIES ARE NOT PREVENTED
PAIN_FUNCTIONAL_ASSESSMENT: ACTIVITIES ARE NOT PREVENTED

## 2025-01-01 ASSESSMENT — PAIN DESCRIPTION - ORIENTATION
ORIENTATION: RIGHT
ORIENTATION: RIGHT;LEFT
ORIENTATION: RIGHT
ORIENTATION: LEFT
ORIENTATION: MID;LEFT

## 2025-01-01 ASSESSMENT — PAIN DESCRIPTION - ONSET
ONSET: AWAKENED FROM SLEEP
ONSET: AWAKENED FROM SLEEP

## 2025-01-01 ASSESSMENT — PAIN DESCRIPTION - PAIN TYPE
TYPE: ACUTE PAIN;CHRONIC PAIN
TYPE: ACUTE PAIN

## 2025-01-01 ASSESSMENT — ENCOUNTER SYMPTOMS: ABDOMINAL PAIN: 0

## 2025-01-01 ASSESSMENT — PAIN DESCRIPTION - DESCRIPTORS
DESCRIPTORS: ACHING
DESCRIPTORS: BURNING;THROBBING

## 2025-01-01 NOTE — PROGRESS NOTES
Infectious Disease Consultation Note        Reason:recurrent c. Diff colitis and ESBL uti    Current abx Prior abx   12/28: fidaxomycin and ceftriaxone       Lines:  none       Assessment :  Ms. Dean is a 54 year old woman with crohns disease, DM, HTN, history of ESBL e coli UTI and prior c. Diff admitted with AMS and found to have c. Diff colitis and UTI.    Diarrhea - c. Diff and crohns disease, per history it doesn't seem that she had good control of crohns.  Prior c. Diff infection 5/24.  No peripheral or fecal leukocytosis, but elevated inflammatory markers and normal procal.      Urine culture with E. Coli, resistant to bactrim and amp.      AMS: likely metabolic encephalopathy - resolved    Improved today, continues with frequent bowel movements, however they are less in volume.    1/1: had 15+ bowel movement in the last 24 hours with fecal incontinence and worsening right sided abd pain.      Hemoglobin A1C   Date Value Ref Range Status   12/28/2024 6.6 (H) 4.2 - 5.6 % Final     Comment:     (NOTE)  HbA1C Interpretive Ranges  <5.7              Normal  5.7 - 6.4         Consider Prediabetes  >6.5              Consider Diabetes           Recommendations:  Continue fidaxonmycin  Add metronidazole 500mg IV q 8 hours ( reports allergy to metronidazole, but states it is due to peripheral neuropathy while hospitalized a few years ago.  She would like to try it to see if it helps.    She may benefit from bezlotoxumab as well  Recommend close follow up with GI   Uncomplicated UTI: ceftriaxone, last dose tomorrow (may help improve c. Diff)  Appreciate GI input, attempting to get fecal transplant    Advance Care planning: discussed  with patient/surrogate decision maker ; patient wasn’t able to name a surrogate decision maker or provide an advance care plan    Thank you for consultation request. Above plan was discussed in details with patient. Please call me if any further questions or concerns. Will continue to

## 2025-01-01 NOTE — PLAN OF CARE
Problem: SLP Adult - Impaired Swallowing  Goal: By Discharge: Advance to least restrictive diet without signs or symptoms of aspiration for planned discharge setting.  See evaluation for individualized goals.  Description: Patient will:  1. Tolerate PO trials with 0 s/s overt distress in 4/5 trials  2. Utilize compensatory swallow strategies/maneuvers (decrease bite/sip, size/rate, alt. liq/sol) with min cues in 4/5 trials  3. Perform oral-motor/laryngeal exercises to increase oropharyngeal swallow function with min cues  4. Complete an objective swallow study (i.e., MBSS) to assess swallow integrity, r/o aspiration, and determine of safest LRD, min A as indicated/ordered by MD     Rec:     Regular solid diet with thin liquids  Aspiration precautions  HOB >45 during po intake, remain >30 for 30-45 minutes after po   Small bites/sips; slow feeding rate   Oral care TID  Meds per pt preference      Outcome: Progressing   SPEECH LANGUAGE PATHOLOGY DYSPHAGIA TREATMENT    Patient: Priscilla Dean (54 y.o. female)  Date: 1/1/2025  Diagnosis: Elevated C-reactive protein (CRP) [R79.82]  Colitis [K52.9]  Elevated erythrocyte sedimentation rate [R70.0]  Acute cystitis without hematuria [N30.00]  Altered mental status, unspecified altered mental status type [R41.82]  Acute metabolic encephalopathy [G93.41]  AMS (altered mental status) [R41.82] AMS (altered mental status)      Precautions: Standard, aspiration  PLOF: As per H&P      ASSESSMENT:  Pt was seen for follow up dysphagia management. Pt tolerated reg solids and thin liquids without any overt s/sx of aspiration. Laryngeal elevation was functional/timely to palpation. Pt with no c/o dysphagia. Rec to continue reg solid diet with thin liquids, aspiration precautions, oral care TID, and meds as tolerated. ST will continue to follow for further dysphagia management.     Progression toward goals:  [x]         Improving appropriately and progressing toward goals  []          Improving slowly and progressing toward goals  []         Not making progress toward goals and plan of care will be adjusted     PLAN:  Recommendations and Planned Interventions:       Patient continues to benefit from skilled intervention to address the above impairments. Continue treatment per established plan of care.    Frequency/Duration: Patient will be followed by speech-language pathology x 1-2 more visits address goals.    Discharge Recommendations: D/C Recommendations: No follow up therapy recommended post discharge     SUBJECTIVE:   Patient stated “I'm doing great.”.    OBJECTIVE:   Daily Assessment:  Baseline Assessment  Respiratory Status: Room air  O2 Device: None (Room air)  Communication Observation:  WFL  Follows Directions: Simple  Current Liquid Diet : Full, Thin  Dentition: Adequate  Patient Positioning: Upright in bed  Baseline Vocal Quality: Normal  Volitional Cough: Strong    Orientation:  Person, Place, Time, and Situation    Dysphagia Treatment:  Oral Assessment:  Oral Motor   Labial: No impairment  Dentition: Natural, Intact  Oral Hygiene: Moist, Clean  Lingual: No impairment  Mandible: No impairment        P.O. Trials:  PO Trials  Neuromuscular Estim Used: No  Assessment Method(s): Observation, Palpation  Patient Position: 55 at HOB  Vocal Quality: No Impairment  Consistency Presented: Regular, Thin  How Presented: Self-fed/presented, Straw, Successive Swallows  Bolus Acceptance: No impairment  Bolus Formation/Control: No impairment  Type of Impairment: Oral holding  Propulsion: No impairment  Oral Residue: None  Initiation of Swallow: No impairment  Laryngeal Elevation: Functional  Aspiration Signs/Symptoms: None  Pharyngeal Phase Characteristics: No impairment, issues, or problems  Effective Modifications: None  Cues for Modifications: None          DYSPHAGIA DIAGNOSIS: Dysphagia Diagnosis: Swallow function appears WFL    PAIN:  Intensity Pre-treatment: 0/10   Intensity Post-treatment:

## 2025-01-01 NOTE — PROGRESS NOTES
[email or web address]  [phone]    Gastroenterology Progress Note    Impression:  1. Diarrhea - 12/26 CT w/ acute L sided colitis, suspect recurrent C diff on a background of Crohn's disease and recent C. Diff infections. Was on PO vancoymycin, switched to fidaxomicin 12/28.  2. Hx C diff - tx w/ oral vancomycin 5/2024, 10/2024  2. Crohn's colitis - initially dx 2009, no history of small bowel or upper GI tract involvement, previously treated w/ prednisone, Asocol, azathioprine, Humira - dosing increased to weekly in late 2019 due to low adalimumab level and Ab. 9/2020 Humira stopped, Started Stelara due to active disease w/ stricture at sigmoid colon. Stelara was increased to q4 week dosing in late 2022 when colonoscopy showed ongoing active disease in L colon.    - Hx enteric fistula between sigmoid and ileum    3. AMS - acute metabolic encephalopathy  4. UDS + - opiates and THC  5. Hypokalemia - K 2.3  6. UTI    Plan:  1. Continue Dificid - She has had multiple episodes of C diff in past, most recently 10/2024. Attempting to obtain FMT ([place]) approval, task set to office to help arrange this - this would be done as an OP and requires insurance authorization. .   2.Treatment of UTI, repletion of electrolytes per primary team/ID  3. Monitor CRP daily  4. Recommend holding corticosteroids. Further recommendations pending clinical course.  5. Stelara doses yesterday  6. Appreciate ID input  7. Continue medical management per primary.     Chief Complaint: diarrhea, crohn's      Subjective:  reports 9-12 loose non-bloody BM in last 24 hours with associated mild lower abd discomfort. Mentation now at baseline.     ROS: Denies any fevers, chills, nausea, vomiting.       General: well nourished, no acute distress  Skin: no rashes, ulcers, or icterus.  Eyes: conjunctiva normal, EOM normal   HEENT: normocephalic, atraumatic  Pulmonary: breath effort normal w/o accessory muscle use.   Abdominal: non-distended, soft,

## 2025-01-01 NOTE — PLAN OF CARE
Problem: Pain  Goal: Verbalizes/displays adequate comfort level or baseline comfort level  Outcome: Progressing     Problem: Safety - Adult  Goal: Free from fall injury  Outcome: Progressing     Problem: Infection - Adult  Goal: Absence of infection at discharge  Outcome: Progressing

## 2025-01-01 NOTE — PROGRESS NOTES
[facility] Hospitalist [facility]  Progress Note  Date:2025       Room:[facility]  Patient Name:Priscilla Dean     YOB: 1970     Age:54 y.o.        Subjective    Subjective:  Symptoms:  Stable.    Diet:  Adequate intake.    Activity level: Normal.    Pain:  Pain is partially controlled.       Review of Systems   Gastrointestinal:  Negative for abdominal pain.   Musculoskeletal:  Negative for myalgias.     Patient continues to have diarrhea. She started coughing today and spiked a fever of 101.7. Daughter brought Stelara yesterday. She takes the medication every 4 weeks.    Disposition: Likely stable for discharge home on . Pending improvement in diarrhea.     Objective         Vitals Last 24 Hours:  TEMPERATURE:  Temp  Av.4 °F (37.4 °C)  Min: 98.1 °F (36.7 °C)  Max: 101.7 °F (38.7 °C)  RESPIRATIONS RANGE: Resp  Av.4  Min: 16  Max: 17  PULSE OXIMETRY RANGE: SpO2  Av.3 %  Min: 98 %  Max: 99 %  PULSE RANGE: Pulse  Av.7  Min: 99  Max: 105  BLOOD PRESSURE RANGE: Systolic (24hrs), Av , Min:100 , Max:129     ; Diastolic (24hrs), Av, Min:61, Max:79      I/O (24Hr):    Intake/Output Summary (Last 24 hours) at 2025 1700  Last data filed at 2025 1220  Gross per 24 hour   Intake 805 ml   Output --   Net 805 ml     Objective:  General Appearance:  Uncomfortable.    Vital signs: (most recent): Blood pressure 129/79, pulse (!) 105, temperature 99.7 °F (37.6 °C), temperature source Oral, resp. rate 17, height 1.778 m (5' 10\"), weight 109.8 kg (242 lb 1 oz), SpO2 98%.    Output: Producing urine.    HEENT: Normal HEENT exam.    Lungs:  Normal effort and normal respiratory rate.    Heart: Normal rate.  Regular rhythm.    Abdomen: Abdomen is soft and flat.  Bowel sounds are normal.   There is generalized tenderness.     Extremities: Normal range of motion.    Pulses: Distal pulses are intact.    Neurological: Patient is alert and oriented to person, place and  1/2  - Started Tamiflu  - Placed HH order and RW).       Case discussed with:  [x]Patient  []Family  [x]Nursing  []Case Management  DVT Prophylaxis:  [x]Lovenox  []Hep SQ  []SCDs  []Coumadin   []Heparin gtt   []Xarelto   []Eliquis    Fabricei Chapo Kaur DO, VALERIANO, MS  [facility]  Hospitalist

## 2025-01-01 NOTE — PLAN OF CARE
Problem: Chronic Conditions and Co-morbidities  Goal: Patient's chronic conditions and co-morbidity symptoms are monitored and maintained or improved  12/31/2024 2017 by Debbie Brown RN  Outcome: Progressing  Flowsheets (Taken 12/31/2024 0810)  Care Plan - Patient's Chronic Conditions and Co-Morbidity Symptoms are Monitored and Maintained or Improved:   Monitor and assess patient's chronic conditions and comorbid symptoms for stability, deterioration, or improvement   Collaborate with multidisciplinary team to address chronic and comorbid conditions and prevent exacerbation or deterioration   Update acute care plan with appropriate goals if chronic or comorbid symptoms are exacerbated and prevent overall improvement and discharge     Problem: Pain  Goal: Verbalizes/displays adequate comfort level or baseline comfort level  12/31/2024 2017 by Debbie Brown RN  Outcome: Progressing  Flowsheets (Taken 12/31/2024 0322 by Sandra Medina, RN)  Verbalizes/displays adequate comfort level or baseline comfort level:   Assess pain using appropriate pain scale   Administer analgesics based on type and severity of pain and evaluate response   Implement non-pharmacological measures as appropriate and evaluate response     Problem: Safety - Adult  Goal: Free from fall injury  12/31/2024 2017 by Debbie Brown, RN  Outcome: Progressing  Flowsheets (Taken 12/31/2024 0810)  Free From Fall Injury: Instruct family/caregiver on patient safety     Problem: Skin/Tissue Integrity  Goal: Absence of new skin breakdown  Description: 1.  Monitor for areas of redness and/or skin breakdown  2.  Assess vascular access sites hourly  3.  Every 4-6 hours minimum:  Change oxygen saturation probe site  4.  Every 4-6 hours:  If on nasal continuous positive airway pressure, respiratory therapy assess nares and determine need for appliance change or resting period.  12/31/2024 2017 by Debbie Brown, RN  Outcome: Progressing  12/31/2024 1954  12/31/2024 2017)  Return Mobility to Safest Level of Function:   Assess patient stability and activity tolerance for standing, transferring and ambulating with or without assistive devices   Assist with transfers and ambulation using safe patient handling equipment as needed   Ensure adequate protection for wounds/incisions during mobilization     Problem: Musculoskeletal - Adult  Goal: Maintain proper alignment of affected body part  Outcome: Progressing  Flowsheets (Taken 12/31/2024 2017)  Maintain proper alignment of affected body part: Support and protect limb and body alignment per provider's orders     Problem: Musculoskeletal - Adult  Goal: Return ADL status to a safe level of function  Outcome: Progressing  Flowsheets (Taken 12/31/2024 2017)  Return ADL Status to a Safe Level of Function:   Administer medication as ordered   Assess activities of daily living deficits and provide assistive devices as needed     Problem: Gastrointestinal - Adult  Goal: Maintains or returns to baseline bowel function  Outcome: Progressing  Flowsheets (Taken 12/31/2024 2017)  Maintains or returns to baseline bowel function:   Assess bowel function   Encourage oral fluids to ensure adequate hydration   Administer ordered medications as needed   Encourage mobilization and activity     Problem: Metabolic/Fluid and Electrolytes - Adult  Goal: Electrolytes maintained within normal limits  Outcome: Progressing  Flowsheets (Taken 12/31/2024 2017)  Electrolytes maintained within normal limits: Monitor labs and assess patient for signs and symptoms of electrolyte imbalances     Problem: Metabolic/Fluid and Electrolytes - Adult  Goal: Hemodynamic stability and optimal renal function maintained  Outcome: Progressing  Flowsheets (Taken 12/31/2024 2017)  Hemodynamic stability and optimal renal function maintained: Monitor labs and assess for signs and symptoms of volume excess or deficit     Problem: Metabolic/Fluid and Electrolytes -

## 2025-01-02 LAB
ANION GAP SERPL CALC-SCNC: 4 MMOL/L (ref 3–18)
BUN SERPL-MCNC: 9 MG/DL (ref 7–18)
BUN/CREAT SERPL: 9 (ref 12–20)
CALCIUM SERPL-MCNC: 8.2 MG/DL (ref 8.5–10.1)
CHLORIDE SERPL-SCNC: 107 MMOL/L (ref 100–111)
CO2 SERPL-SCNC: 26 MMOL/L (ref 21–32)
CREAT SERPL-MCNC: 1.04 MG/DL (ref 0.6–1.3)
ERYTHROCYTE [DISTWIDTH] IN BLOOD BY AUTOMATED COUNT: 17.5 % (ref 11.6–14.5)
GLUCOSE BLD STRIP.AUTO-MCNC: 103 MG/DL (ref 70–110)
GLUCOSE BLD STRIP.AUTO-MCNC: 107 MG/DL (ref 70–110)
GLUCOSE BLD STRIP.AUTO-MCNC: 107 MG/DL (ref 70–110)
GLUCOSE BLD STRIP.AUTO-MCNC: 119 MG/DL (ref 70–110)
GLUCOSE BLD STRIP.AUTO-MCNC: 124 MG/DL (ref 70–110)
GLUCOSE SERPL-MCNC: 102 MG/DL (ref 74–99)
HCT VFR BLD AUTO: 35.5 % (ref 35–45)
HGB BLD-MCNC: 10.6 G/DL (ref 12–16)
MAGNESIUM SERPL-MCNC: 1.8 MG/DL (ref 1.6–2.6)
MCH RBC QN AUTO: 23.8 PG (ref 24–34)
MCHC RBC AUTO-ENTMCNC: 29.9 G/DL (ref 31–37)
MCV RBC AUTO: 79.8 FL (ref 78–100)
NRBC # BLD: 0 K/UL (ref 0–0.01)
NRBC BLD-RTO: 0 PER 100 WBC
PLATELET # BLD AUTO: 309 K/UL (ref 135–420)
PMV BLD AUTO: 11.6 FL (ref 9.2–11.8)
POTASSIUM SERPL-SCNC: 3.2 MMOL/L (ref 3.5–5.5)
RBC # BLD AUTO: 4.45 M/UL (ref 4.2–5.3)
SODIUM SERPL-SCNC: 137 MMOL/L (ref 136–145)
WBC # BLD AUTO: 5.6 K/UL (ref 4.6–13.2)

## 2025-01-02 PROCEDURE — 6360000002 HC RX W HCPCS: Performed by: INTERNAL MEDICINE

## 2025-01-02 PROCEDURE — 6360000002 HC RX W HCPCS: Performed by: STUDENT IN AN ORGANIZED HEALTH CARE EDUCATION/TRAINING PROGRAM

## 2025-01-02 PROCEDURE — 6370000000 HC RX 637 (ALT 250 FOR IP): Performed by: INTERNAL MEDICINE

## 2025-01-02 PROCEDURE — 2500000003 HC RX 250 WO HCPCS: Performed by: INTERNAL MEDICINE

## 2025-01-02 PROCEDURE — 2500000003 HC RX 250 WO HCPCS: Performed by: STUDENT IN AN ORGANIZED HEALTH CARE EDUCATION/TRAINING PROGRAM

## 2025-01-02 PROCEDURE — 36415 COLL VENOUS BLD VENIPUNCTURE: CPT

## 2025-01-02 PROCEDURE — 85027 COMPLETE CBC AUTOMATED: CPT

## 2025-01-02 PROCEDURE — 6370000000 HC RX 637 (ALT 250 FOR IP): Performed by: STUDENT IN AN ORGANIZED HEALTH CARE EDUCATION/TRAINING PROGRAM

## 2025-01-02 PROCEDURE — 99232 SBSQ HOSP IP/OBS MODERATE 35: CPT | Performed by: STUDENT IN AN ORGANIZED HEALTH CARE EDUCATION/TRAINING PROGRAM

## 2025-01-02 PROCEDURE — 82962 GLUCOSE BLOOD TEST: CPT

## 2025-01-02 PROCEDURE — 1100000000 HC RM PRIVATE

## 2025-01-02 PROCEDURE — 83735 ASSAY OF MAGNESIUM: CPT

## 2025-01-02 PROCEDURE — 80048 BASIC METABOLIC PNL TOTAL CA: CPT

## 2025-01-02 RX ORDER — OXYCODONE AND ACETAMINOPHEN 5; 325 MG/1; MG/1
2 TABLET ORAL EVERY 6 HOURS PRN
Status: DISCONTINUED | OUTPATIENT
Start: 2025-01-02 | End: 2025-01-03 | Stop reason: HOSPADM

## 2025-01-02 RX ORDER — GUAIFENESIN 600 MG/1
600 TABLET, EXTENDED RELEASE ORAL 2 TIMES DAILY
Status: DISCONTINUED | OUTPATIENT
Start: 2025-01-02 | End: 2025-01-03 | Stop reason: HOSPADM

## 2025-01-02 RX ORDER — OXYCODONE AND ACETAMINOPHEN 5; 325 MG/1; MG/1
1 TABLET ORAL EVERY 6 HOURS PRN
Status: DISCONTINUED | OUTPATIENT
Start: 2025-01-02 | End: 2025-01-03 | Stop reason: HOSPADM

## 2025-01-02 RX ADMIN — METRONIDAZOLE 500 MG: 500 INJECTION, SOLUTION INTRAVENOUS at 17:35

## 2025-01-02 RX ADMIN — GUAIFENESIN 600 MG: 600 TABLET ORAL at 22:39

## 2025-01-02 RX ADMIN — Medication 1 CAPSULE: at 09:19

## 2025-01-02 RX ADMIN — PREGABALIN 200 MG: 50 CAPSULE ORAL at 22:37

## 2025-01-02 RX ADMIN — CHOLESTYRAMINE 4 G: 4 POWDER, FOR SUSPENSION ORAL at 09:19

## 2025-01-02 RX ADMIN — METOPROLOL TARTRATE 25 MG: 25 TABLET, FILM COATED ORAL at 09:19

## 2025-01-02 RX ADMIN — PREGABALIN 200 MG: 50 CAPSULE ORAL at 15:12

## 2025-01-02 RX ADMIN — ATORVASTATIN CALCIUM 80 MG: 40 TABLET, FILM COATED ORAL at 09:19

## 2025-01-02 RX ADMIN — METRONIDAZOLE 500 MG: 500 INJECTION, SOLUTION INTRAVENOUS at 09:35

## 2025-01-02 RX ADMIN — GUAIFENESIN 600 MG: 600 TABLET ORAL at 12:55

## 2025-01-02 RX ADMIN — FIDAXOMICIN 200 MG: 200 TABLET, FILM COATED ORAL at 09:18

## 2025-01-02 RX ADMIN — DICLOFENAC SODIUM 2 G: 10 GEL TOPICAL at 15:13

## 2025-01-02 RX ADMIN — HYDRALAZINE HYDROCHLORIDE 100 MG: 50 TABLET ORAL at 15:12

## 2025-01-02 RX ADMIN — ENOXAPARIN SODIUM 30 MG: 100 INJECTION SUBCUTANEOUS at 09:19

## 2025-01-02 RX ADMIN — HYDRALAZINE HYDROCHLORIDE 100 MG: 50 TABLET ORAL at 09:19

## 2025-01-02 RX ADMIN — OXYCODONE HYDROCHLORIDE AND ACETAMINOPHEN 2 TABLET: 5; 325 TABLET ORAL at 12:54

## 2025-01-02 RX ADMIN — DICLOFENAC SODIUM 2 G: 10 GEL TOPICAL at 09:22

## 2025-01-02 RX ADMIN — TRAMADOL HYDROCHLORIDE 100 MG: 50 TABLET, COATED ORAL at 07:11

## 2025-01-02 RX ADMIN — BENZONATATE 200 MG: 100 CAPSULE ORAL at 09:18

## 2025-01-02 RX ADMIN — METRONIDAZOLE 500 MG: 500 INJECTION, SOLUTION INTRAVENOUS at 01:54

## 2025-01-02 RX ADMIN — ASPIRIN 81 MG: 81 TABLET, COATED ORAL at 09:19

## 2025-01-02 RX ADMIN — METOPROLOL TARTRATE 25 MG: 25 TABLET, FILM COATED ORAL at 22:38

## 2025-01-02 RX ADMIN — SODIUM CHLORIDE, PRESERVATIVE FREE 10 ML: 5 INJECTION INTRAVENOUS at 22:41

## 2025-01-02 RX ADMIN — ANTI-FUNGAL POWDER MICONAZOLE NITRATE TALC FREE: 1.42 POWDER TOPICAL at 09:23

## 2025-01-02 RX ADMIN — OXYCODONE HYDROCHLORIDE AND ACETAMINOPHEN 2 TABLET: 5; 325 TABLET ORAL at 19:11

## 2025-01-02 RX ADMIN — OSELTAMIVIR PHOSPHATE 75 MG: 75 CAPSULE ORAL at 09:19

## 2025-01-02 RX ADMIN — OSELTAMIVIR PHOSPHATE 75 MG: 75 CAPSULE ORAL at 22:38

## 2025-01-02 RX ADMIN — FIDAXOMICIN 200 MG: 200 TABLET, FILM COATED ORAL at 22:38

## 2025-01-02 RX ADMIN — DICLOFENAC SODIUM 2 G: 10 GEL TOPICAL at 22:41

## 2025-01-02 RX ADMIN — LOSARTAN POTASSIUM 100 MG: 50 TABLET, FILM COATED ORAL at 09:18

## 2025-01-02 RX ADMIN — WATER 1000 MG: 1 INJECTION INTRAMUSCULAR; INTRAVENOUS; SUBCUTANEOUS at 17:23

## 2025-01-02 RX ADMIN — SODIUM CHLORIDE, PRESERVATIVE FREE 10 ML: 5 INJECTION INTRAVENOUS at 09:20

## 2025-01-02 RX ADMIN — THERA TABS 1 TABLET: TAB at 09:19

## 2025-01-02 RX ADMIN — PREGABALIN 200 MG: 50 CAPSULE ORAL at 09:19

## 2025-01-02 RX ADMIN — ENOXAPARIN SODIUM 30 MG: 100 INJECTION SUBCUTANEOUS at 22:39

## 2025-01-02 RX ADMIN — CHOLESTYRAMINE 4 G: 4 POWDER, FOR SUSPENSION ORAL at 22:40

## 2025-01-02 RX ADMIN — INSULIN GLARGINE 15 UNITS: 100 INJECTION, SOLUTION SUBCUTANEOUS at 22:40

## 2025-01-02 RX ADMIN — ANTI-FUNGAL POWDER MICONAZOLE NITRATE TALC FREE: 1.42 POWDER TOPICAL at 22:43

## 2025-01-02 RX ADMIN — HYDRALAZINE HYDROCHLORIDE 100 MG: 50 TABLET ORAL at 22:39

## 2025-01-02 RX ADMIN — AMITRIPTYLINE HYDROCHLORIDE 25 MG: 50 TABLET, FILM COATED ORAL at 22:38

## 2025-01-02 ASSESSMENT — PAIN DESCRIPTION - LOCATION
LOCATION: KNEE
LOCATION: ABDOMEN
LOCATION: KNEE
LOCATION: ABDOMEN

## 2025-01-02 ASSESSMENT — PAIN DESCRIPTION - ORIENTATION
ORIENTATION: RIGHT
ORIENTATION: ANTERIOR
ORIENTATION: RIGHT;LEFT
ORIENTATION: RIGHT

## 2025-01-02 ASSESSMENT — PAIN SCALES - GENERAL
PAINLEVEL_OUTOF10: 4
PAINLEVEL_OUTOF10: 9
PAINLEVEL_OUTOF10: 9
PAINLEVEL_OUTOF10: 7
PAINLEVEL_OUTOF10: 0
PAINLEVEL_OUTOF10: 0
PAINLEVEL_OUTOF10: 3
PAINLEVEL_OUTOF10: 4

## 2025-01-02 ASSESSMENT — PAIN DESCRIPTION - DESCRIPTORS
DESCRIPTORS: BURNING;THROBBING
DESCRIPTORS: ACHING
DESCRIPTORS: SHARP

## 2025-01-02 ASSESSMENT — PAIN - FUNCTIONAL ASSESSMENT: PAIN_FUNCTIONAL_ASSESSMENT: ACTIVITIES ARE NOT PREVENTED

## 2025-01-02 ASSESSMENT — ENCOUNTER SYMPTOMS: ABDOMINAL PAIN: 0

## 2025-01-02 NOTE — PLAN OF CARE
Problem: Infection - Adult  Goal: Absence of infection during hospitalization  Outcome: Not Progressing  Flowsheets  Taken 1/1/2025 1925  Absence of infection during hospitalization: Assess and monitor for signs and symptoms of infection  Taken 1/1/2025 0838  Absence of infection during hospitalization: Assess and monitor for signs and symptoms of infection  Note: psitive with influenza A. Started on Tamiflu     Problem: Gastrointestinal - Adult  Goal: Maintains or returns to baseline bowel function  Outcome: Not Progressing  Flowsheets  Taken 1/1/2025 1925  Maintains or returns to baseline bowel function: Assess bowel function  Taken 1/1/2025 0838  Maintains or returns to baseline bowel function: Assess bowel function  Note: Still has bouts of diarrhea     Problem: Pain  Goal: Verbalizes/displays adequate comfort level or baseline comfort level  Outcome: Progressing  Flowsheets  Taken 1/1/2025 1925  Verbalizes/displays adequate comfort level or baseline comfort level: Encourage patient to monitor pain and request assistance  Taken 1/1/2025 1101  Verbalizes/displays adequate comfort level or baseline comfort level: Encourage patient to monitor pain and request assistance  Taken 1/1/2025 0838  Verbalizes/displays adequate comfort level or baseline comfort level: Encourage patient to monitor pain and request assistance  Note: Pain relieved after taking tramadol. Latest pain scale 3/10     Problem: Safety - Adult  Goal: Free from fall injury  Outcome: Progressing  Flowsheets (Taken 1/1/2025 0838)  Free From Fall Injury: Instruct family/caregiver on patient safety  Note: Pt remained free from any form fall, injury or harm     Problem: Metabolic/Fluid and Electrolytes - Adult  Goal: Glucose maintained within prescribed range  Outcome: Progressing  Flowsheets  Taken 1/1/2025 1925  Glucose maintained within prescribed range: Monitor blood glucose as ordered  Taken 1/1/2025 0838  Glucose maintained within prescribed  range: Monitor blood glucose as ordered  Note: Blood sugar within normal limits. No insulin needed at this time     Problem: Respiratory - Adult  Goal: Achieves optimal ventilation and oxygenation  Outcome: Progressing  Flowsheets (Taken 1/1/2025 1925)  Achieves optimal ventilation and oxygenation: Assess for changes in respiratory status  Note: Good respiratory rate and saturation, not in distress. But still with cough     Problem: Infection - Adult  Goal: Absence of infection during hospitalization  Outcome: Not Progressing  Flowsheets  Taken 1/1/2025 1925  Absence of infection during hospitalization: Assess and monitor for signs and symptoms of infection  Taken 1/1/2025 0838  Absence of infection during hospitalization: Assess and monitor for signs and symptoms of infection  Note: psitive with influenza A. Started on Tamiflu     Problem: Gastrointestinal - Adult  Goal: Maintains or returns to baseline bowel function  Outcome: Not Progressing  Flowsheets  Taken 1/1/2025 1925  Maintains or returns to baseline bowel function: Assess bowel function  Taken 1/1/2025 0838  Maintains or returns to baseline bowel function: Assess bowel function  Note: Still has bouts of diarrhea

## 2025-01-02 NOTE — PROGRESS NOTES
Physical Therapy  Pt not seen for skilled PT due to:    []  Nausea/vomiting  []  Eating  []  Pain  []  Pt lethargic  []  Off Unit  Other: Pt refused @ 10:36 AM, pt reported she \"feels like she got hit by a truck\" and stated she had the flu. She requested to skip PT today.    Will f/u later as schedule allows. Thank you.  Del Cornelius, PT, DPT

## 2025-01-02 NOTE — PLAN OF CARE
Problem: Infection - Adult  Goal: Absence of infection at discharge  Outcome: Progressing  Flowsheets (Taken 1/1/2025 0838 by Santosh French RN)  Absence of infection at discharge: Assess and monitor for signs and symptoms of infection     Problem: Gastrointestinal - Adult  Goal: Maintains or returns to baseline bowel function  1/1/2025 2127 by Paige Fajardo RN  Outcome: Progressing  1/1/2025 1925 by Santosh French RN  Outcome: Not Progressing  Flowsheets  Taken 1/1/2025 1925  Maintains or returns to baseline bowel function: Assess bowel function  Taken 1/1/2025 0838  Maintains or returns to baseline bowel function: Assess bowel function  Note: Still has bouts of diarrhea     Problem: Pain  Goal: Verbalizes/displays adequate comfort level or baseline comfort level  1/1/2025 2127 by Paige Fajardo RN  Outcome: Progressing  1/1/2025 1925 by Santosh French RN  Outcome: Progressing  Flowsheets  Taken 1/1/2025 1925  Verbalizes/displays adequate comfort level or baseline comfort level: Encourage patient to monitor pain and request assistance  Taken 1/1/2025 1101  Verbalizes/displays adequate comfort level or baseline comfort level: Encourage patient to monitor pain and request assistance  Taken 1/1/2025 0838  Verbalizes/displays adequate comfort level or baseline comfort level: Encourage patient to monitor pain and request assistance  Note: Pain relieved after taking tramadol. Latest pain scale 3/10     Problem: Respiratory - Adult  Goal: Achieves optimal ventilation and oxygenation  1/1/2025 2127 by Paige Fajardo RN  Outcome: Progressing  1/1/2025 1925 by Santosh French RN  Outcome: Progressing  Flowsheets (Taken 1/1/2025 1925)  Achieves optimal ventilation and oxygenation: Assess for changes in respiratory status  Note: Good respiratory rate and saturation, not in distress. But still with cough

## 2025-01-02 NOTE — PROGRESS NOTES
effort normal w/o accessory muscle use.   Abdominal: non-distended, soft, mild BLQ TTP, non-acute  Psychiatric: within normal limits for recent and remote events.    Patient Active Problem List   Diagnosis    BMI 50.0-59.9, adult    Accelerated hypertension    Dehiscence of incision, initial encounter    Type 2 diabetes mellitus with diabetic neuropathy (HCC)    Status post open reduction with internal fixation (ORIF) of fracture of ankle    Obesity, morbid    Controlled type 2 diabetes mellitus without complication, with long-term current use of insulin (HCC)    Pulmonary emboli (HCC)    Crohn's colitis (HCC)    Wound of left ankle    Closed left ankle fracture, with delayed healing, subsequent encounter    Arthritis of knee, left    Instability of knee joint, left    Varus deformity, not elsewhere classified, left knee    Class 3 severe obesity due to excess calories with serious comorbidity and body mass index (BMI) of 45.0 to 49.9 in adult    Closed Colles' fracture of right radius with routine healing    Closed displaced fracture of shaft of third metacarpal bone of right hand with routine healing    Colitis    Hypokalemia    Pyelonephritis    ESBL E. coli carrier    Patellar maltracking, left    AMS (altered mental status)    Acute metabolic encephalopathy    Acute cystitis without hematuria    H/O Clostridium difficile infection    Moderate protein-calorie malnutrition (HCC)         /75   Pulse 86   Temp 99.5 °F (37.5 °C) (Oral)   Resp 17   Ht 1.778 m (5' 10\")   Wt 109.8 kg (242 lb 1 oz)   SpO2 97%   BMI 34.73 kg/m²         Intake/Output Summary (Last 24 hours) at 1/2/2025 1107  Last data filed at 1/1/2025 1800  Gross per 24 hour   Intake 700 ml   Output --   Net 700 ml       CBC w/Diff    Lab Results   Component Value Date/Time    WBC 5.6 01/02/2025 06:10 AM    RBC 4.45 01/02/2025 06:10 AM    RBC 4.35 10/10/2023 12:01 PM    HGB 10.6 (L) 01/02/2025 06:10 AM    HCT 35.5 01/02/2025 06:10 AM    MCV  79.8 01/02/2025 06:10 AM    MCH 23.8 (L) 01/02/2025 06:10 AM    MCHC 29.9 (L) 01/02/2025 06:10 AM    RDW 17.5 (H) 01/02/2025 06:10 AM     01/02/2025 06:10 AM    MPV 11.6 01/02/2025 06:10 AM    No results found for: \"MONO\", \"BANDS\", \"BASOS\", \"METAS\", \"PRO\"   Basic Metabolic Profile   Recent Labs     01/02/25  0610      K 3.2*      CO2 26   BUN 9   GLUCOSE 102*   MG 1.8        Hepatic Function    No results found for: \"TP\" No components found for: \"SGOT\", \"GPT\", \"DBILI\", \"TBIL\"       Coags   No results for input(s): \"INR\", \"APTT\" in the last 72 hours.    Invalid input(s): \"PTP\"            AMANDEEP Devine.   01/02/25, 11:07 AM   [facility]  [email or web address]  Phone: [phone]

## 2025-01-02 NOTE — PROGRESS NOTES
4 Eyes Skin Assessment     NAME:  Priscilla Dean  YOB: 1970  MEDICAL RECORD NUMBER:  [id number]    The patient is being assessed for  Shift Handoff    I agree that at least one RN has performed a thorough Head to Toe Skin Assessment on the patient. ALL assessment sites listed below have been assessed.      Areas assessed by both nurses:    Head, Face, Ears, Shoulders, Back, Chest, Arms, Elbows, Hands, Sacrum. Buttock, Coccyx, Ischium, Legs. Feet and Heels, Under Medical Devices , and Other ***        Does the Patient have a Wound? No noted wound(s)       Jose Alfredo Prevention initiated by RN: Yes  Wound Care Orders initiated by RN: No    Pressure Injury (Stage 3,4, Unstageable, DTI, NWPT, and Complex wounds) if present, place Wound referral order by RN under : No    New Ostomies, if present place, Ostomy referral order under : No     Nurse 1 eSignature: Electronically signed by Santosh French RN on 1/1/25 at 7:24 PM EST    **SHARE this note so that the co-signing nurse can place an eSignature**    Nurse 2 eSignature: {Esignature:[id number]}

## 2025-01-02 NOTE — PROGRESS NOTES
Infectious Disease Consultation Note        Reason:recurrent c. Diff colitis and ESBL uti    Current abx Prior abx   12/28: fidaxomycin and ceftriaxone       Lines:  none       Assessment :  Ms. Dean is a 54 year old woman with crohns disease, DM, HTN, history of ESBL e coli UTI and prior c. Diff admitted with AMS and found to have c. Diff colitis and UTI.    Diarrhea - c. Diff and crohns disease, per history it doesn't seem that she had good control of crohns.  Prior c. Diff infection 5/24.  No peripheral or fecal leukocytosis, but elevated inflammatory markers and normal procal.      Urine culture with E. Coli, resistant to bactrim and amp.      AMS: likely metabolic encephalopathy - resolved    Improved today, continues with frequent bowel movements, however they are less in volume.    1/1: had 15+ bowel movement in the last 24 hours with fecal incontinence and worsening right sided abd pain.      1/2: fever overnight, flu positive, improved bowel movements    Hemoglobin A1C   Date Value Ref Range Status   12/28/2024 6.6 (H) 4.2 - 5.6 % Final     Comment:     (NOTE)  HbA1C Interpretive Ranges  <5.7              Normal  5.7 - 6.4         Consider Prediabetes  >6.5              Consider Diabetes           Recommendations:  Continue fidaxomycin and metronidazole (reports allergy to metronidazole, but states it is due to peripheral neuropathy while hospitalized a few years ago.  She would like to try it to see if it helps.   Continue oseltamivir  for total of 5 days   She may benefit from bezlotoxumab as well  Recommend close follow up with GI   Uncomplicated UTI: last dose ceftriaxone today   Appreciate GI input, attempting to get fecal transplant    Advance Care planning: discussed  with patient/surrogate decision maker ; patient wasn’t able to name a surrogate decision maker or provide an advance care plan    Thank you for consultation request. Above plan was discussed in details with patient. Please call me if    Chemistry Recent Labs     12/31/24  0436 01/01/25  0757 01/02/25  0610   GLUCOSE 155* 101* 102*   * 138 137   K 3.6 3.7 3.2*    109 107   CO2 26 22 26   BUN 12 9 9   CREATININE 1.14 0.94 1.04      CBC w/Diff Recent Labs     12/31/24  0436 01/01/25  1225 01/02/25  0610   WBC 9.4 6.2 5.6   RBC 4.51 4.50 4.45   HGB 10.6* 10.9* 10.6*   HCT 35.2 35.1 35.5    334 309      Microbiology Results       Procedure Component Value Units Date/Time    Respiratory Panel, Molecular, with COVID-19 (Restricted: peds pts or suitable admitted adults) [[id number]]  (Abnormal) Collected: 01/01/25 1315    Order Status: Completed Specimen: Nasopharyngeal Updated: 01/01/25 1439     Adenovirus by PCR Not detected        Coronavirus 229E by PCR Not detected        Coronavirus HKU1 by PCR Not detected        Coronavirus NL63 by PCR Not detected        Coronavirus OC43 by PCR Not detected        SARS-CoV-2, PCR Not detected        Human Metapneumovirus by PCR Not detected        Rhinovirus Enterovirus PCR Not detected        Influenza A H3 by PCR Detected        Influenza B PCR Not detected        Parainfluenza 1 PCR Not detected        Parainfluenza 2 PCR Not detected        Parainfluenza 3 PCR Not detected        Parainfluenza 4 PCR Not detected        Respiratory Syncytial Virus by PCR Not detected        Bordetella parapertussis by PCR Not detected        Bordetella pertussis by PCR Not detected        Chlamydophila Pneumonia PCR Not detected        Mycoplasma pneumo by PCR Not detected       Clostridium Difficile Toxin/Antigen [[id number]]  (Abnormal) Collected: 12/27/24 0520    Order Status: Completed Specimen: Stool Updated: 12/27/24 1450     Reflex       See Reflex order for C. difficile (DNA)           C Diff Toxin Interpretation       Indeterminate for Toxigenic C. difficile, DNA confirmation to follow.          Fecal leukocytes [[id number]] Collected: 12/27/24 0520    Order Status: Completed Specimen: Stool

## 2025-01-02 NOTE — PROGRESS NOTES
[facility] Hospitalist Group  Progress Note  Date:2025       Room:[facility]  Patient Name:Priscilla Dean     YOB: 1970     Age:54 y.o.        Subjective    Subjective:  Symptoms:  Stable.    Diet:  Adequate intake.    Activity level: Normal.    Pain:  Pain is partially controlled.       Review of Systems   Gastrointestinal:  Negative for abdominal pain.   Musculoskeletal:  Negative for myalgias.     Patient continues to have diarrhea that only improved slightly. She has right side abdominal pain.    Disposition: Likely stable for discharge home with home health on 1/3. Pending improvement in diarrhea.     Objective         Vitals Last 24 Hours:  TEMPERATURE:  Temp  Av.8 °F (37.7 °C)  Min: 98.5 °F (36.9 °C)  Max: 101.3 °F (38.5 °C)  RESPIRATIONS RANGE: Resp  Av.9  Min: 16  Max: 18  PULSE OXIMETRY RANGE: SpO2  Av.7 %  Min: 96 %  Max: 99 %  PULSE RANGE: Pulse  Av.3  Min: 77  Max: 127  BLOOD PRESSURE RANGE: Systolic (24hrs), Av , Min:106 , Max:129     ; Diastolic (24hrs), Av, Min:51, Max:83      I/O (24Hr):    Intake/Output Summary (Last 24 hours) at 2025 1720  Last data filed at 2025 1800  Gross per 24 hour   Intake 450 ml   Output --   Net 450 ml     Objective:  General Appearance:  Uncomfortable.    Vital signs: (most recent): Blood pressure 126/71, pulse 77, temperature 99 °F (37.2 °C), temperature source Oral, resp. rate 18, height 1.778 m (5' 10\"), weight 109.8 kg (242 lb 1 oz), SpO2 99%.    Output: Producing urine.    HEENT: Normal HEENT exam.    Lungs:  Normal effort and normal respiratory rate.    Heart: Normal rate.  Regular rhythm.    Abdomen: Abdomen is soft and flat.  Bowel sounds are normal.   There is generalized tenderness.     Extremities: Normal range of motion.    Pulses: Distal pulses are intact.    Neurological: Patient is alert and oriented to person, place and time.    Skin:  Warm and dry.   Nightly  diclofenac sodium (VOLTAREN) 1 % gel 2 g, 2 g, Topical, TID  Fidaxomicin (DIFICID) tablet 200 mg, 200 mg, Oral, BID  cefTRIAXone (ROCEPHIN) 1,000 mg in sterile water 10 mL IV syringe, 1,000 mg, IntraVENous, Q24H  aspirin EC tablet 81 mg, 81 mg, Oral, Daily  atorvastatin (LIPITOR) tablet 80 mg, 80 mg, Oral, Daily  lactobacillus (CULTURELLE) capsule 1 capsule, 1 capsule, Oral, Daily with breakfast  losartan (COZAAR) tablet 100 mg, 100 mg, Oral, Daily  metoprolol tartrate (LOPRESSOR) tablet 25 mg, 25 mg, Oral, BID  hydrALAZINE (APRESOLINE) tablet 100 mg, 100 mg, Oral, TID  pregabalin (LYRICA) capsule 200 mg, 200 mg, Oral, TID  tiZANidine (ZANAFLEX) tablet 6 mg, 6 mg, Oral, BID PRN  sodium chloride flush 0.9 % injection 5-40 mL, 5-40 mL, IntraVENous, 2 times per day  sodium chloride flush 0.9 % injection 5-40 mL, 5-40 mL, IntraVENous, PRN  0.9 % sodium chloride infusion, , IntraVENous, PRN  potassium chloride (KLOR-CON M) extended release tablet 40 mEq, 40 mEq, Oral, PRN **OR** potassium bicarb-citric acid (EFFER-K) effervescent tablet 40 mEq, 40 mEq, Oral, PRN **OR** potassium chloride 10 mEq/100 mL IVPB (Peripheral Line), 10 mEq, IntraVENous, PRN  magnesium sulfate 2000 mg in 50 mL IVPB premix, 2,000 mg, IntraVENous, PRN  enoxaparin Sodium (LOVENOX) injection 30 mg, 30 mg, SubCUTAneous, BID  ondansetron (ZOFRAN-ODT) disintegrating tablet 4 mg, 4 mg, Oral, Q8H PRN **OR** ondansetron (ZOFRAN) injection 4 mg, 4 mg, IntraVENous, Q6H PRN  polyethylene glycol (GLYCOLAX) packet 17 g, 17 g, Oral, Daily PRN  acetaminophen (TYLENOL) tablet 650 mg, 650 mg, Oral, Q6H PRN **OR** acetaminophen (TYLENOL) suppository 650 mg, 650 mg, Rectal, Q6H PRN  cholestyramine light packet 4 g, 1 packet, Oral, BID  glucose chewable tablet 16 g, 4 tablet, Oral, PRN  dextrose bolus 10% 125 mL, 125 mL, IntraVENous, PRN **OR** dextrose bolus 10% 250 mL, 250 mL, IntraVENous, PRN  glucagon injection 1 mg, 1 mg, SubCUTAneous, PRN  dextrose 10 %

## 2025-01-03 VITALS
BODY MASS INDEX: 34.65 KG/M2 | OXYGEN SATURATION: 96 % | WEIGHT: 242.06 LBS | HEIGHT: 70 IN | SYSTOLIC BLOOD PRESSURE: 113 MMHG | TEMPERATURE: 97.8 F | HEART RATE: 85 BPM | RESPIRATION RATE: 20 BRPM | DIASTOLIC BLOOD PRESSURE: 60 MMHG

## 2025-01-03 LAB
ANION GAP SERPL CALC-SCNC: 3 MMOL/L (ref 3–18)
BUN SERPL-MCNC: 7 MG/DL (ref 7–18)
BUN/CREAT SERPL: 8 (ref 12–20)
CALCIUM SERPL-MCNC: 8.2 MG/DL (ref 8.5–10.1)
CHLORIDE SERPL-SCNC: 109 MMOL/L (ref 100–111)
CO2 SERPL-SCNC: 26 MMOL/L (ref 21–32)
CREAT SERPL-MCNC: 0.87 MG/DL (ref 0.6–1.3)
ERYTHROCYTE [DISTWIDTH] IN BLOOD BY AUTOMATED COUNT: 17.4 % (ref 11.6–14.5)
GLUCOSE BLD STRIP.AUTO-MCNC: 106 MG/DL (ref 70–110)
GLUCOSE BLD STRIP.AUTO-MCNC: 89 MG/DL (ref 70–110)
GLUCOSE SERPL-MCNC: 112 MG/DL (ref 74–99)
HCT VFR BLD AUTO: 32.9 % (ref 35–45)
HGB BLD-MCNC: 10 G/DL (ref 12–16)
MAGNESIUM SERPL-MCNC: 1.7 MG/DL (ref 1.6–2.6)
MCH RBC QN AUTO: 23.9 PG (ref 24–34)
MCHC RBC AUTO-ENTMCNC: 30.4 G/DL (ref 31–37)
MCV RBC AUTO: 78.7 FL (ref 78–100)
NRBC # BLD: 0 K/UL (ref 0–0.01)
NRBC BLD-RTO: 0 PER 100 WBC
PLATELET # BLD AUTO: 288 K/UL (ref 135–420)
PMV BLD AUTO: 11.1 FL (ref 9.2–11.8)
POTASSIUM SERPL-SCNC: 3.1 MMOL/L (ref 3.5–5.5)
RBC # BLD AUTO: 4.18 M/UL (ref 4.2–5.3)
SODIUM SERPL-SCNC: 138 MMOL/L (ref 136–145)
WBC # BLD AUTO: 5 K/UL (ref 4.6–13.2)

## 2025-01-03 PROCEDURE — 6370000000 HC RX 637 (ALT 250 FOR IP): Performed by: INTERNAL MEDICINE

## 2025-01-03 PROCEDURE — 2500000003 HC RX 250 WO HCPCS: Performed by: STUDENT IN AN ORGANIZED HEALTH CARE EDUCATION/TRAINING PROGRAM

## 2025-01-03 PROCEDURE — 2500000003 HC RX 250 WO HCPCS: Performed by: INTERNAL MEDICINE

## 2025-01-03 PROCEDURE — 85027 COMPLETE CBC AUTOMATED: CPT

## 2025-01-03 PROCEDURE — 97116 GAIT TRAINING THERAPY: CPT

## 2025-01-03 PROCEDURE — 36415 COLL VENOUS BLD VENIPUNCTURE: CPT

## 2025-01-03 PROCEDURE — 6370000000 HC RX 637 (ALT 250 FOR IP): Performed by: STUDENT IN AN ORGANIZED HEALTH CARE EDUCATION/TRAINING PROGRAM

## 2025-01-03 PROCEDURE — 82962 GLUCOSE BLOOD TEST: CPT

## 2025-01-03 PROCEDURE — 83735 ASSAY OF MAGNESIUM: CPT

## 2025-01-03 PROCEDURE — 94761 N-INVAS EAR/PLS OXIMETRY MLT: CPT

## 2025-01-03 PROCEDURE — 99239 HOSP IP/OBS DSCHRG MGMT >30: CPT | Performed by: STUDENT IN AN ORGANIZED HEALTH CARE EDUCATION/TRAINING PROGRAM

## 2025-01-03 PROCEDURE — 97164 PT RE-EVAL EST PLAN CARE: CPT

## 2025-01-03 PROCEDURE — 6360000002 HC RX W HCPCS: Performed by: INTERNAL MEDICINE

## 2025-01-03 PROCEDURE — 80048 BASIC METABOLIC PNL TOTAL CA: CPT

## 2025-01-03 PROCEDURE — 6360000002 HC RX W HCPCS: Performed by: STUDENT IN AN ORGANIZED HEALTH CARE EDUCATION/TRAINING PROGRAM

## 2025-01-03 RX ORDER — MAGNESIUM SULFATE HEPTAHYDRATE 40 MG/ML
2000 INJECTION, SOLUTION INTRAVENOUS ONCE
Status: COMPLETED | OUTPATIENT
Start: 2025-01-03 | End: 2025-01-03

## 2025-01-03 RX ORDER — BENZONATATE 200 MG/1
200 CAPSULE ORAL 3 TIMES DAILY PRN
Qty: 21 CAPSULE | Refills: 0 | Status: SHIPPED | OUTPATIENT
Start: 2025-01-03 | End: 2025-01-10

## 2025-01-03 RX ORDER — OSELTAMIVIR PHOSPHATE 75 MG/1
75 CAPSULE ORAL 2 TIMES DAILY
Qty: 6 CAPSULE | Refills: 0 | Status: SHIPPED | OUTPATIENT
Start: 2025-01-03 | End: 2025-01-06

## 2025-01-03 RX ORDER — INSULIN GLARGINE 100 [IU]/ML
15 INJECTION, SOLUTION SUBCUTANEOUS NIGHTLY
Qty: 5 ADJUSTABLE DOSE PRE-FILLED PEN SYRINGE | Refills: 3 | Status: SHIPPED | OUTPATIENT
Start: 2025-01-03

## 2025-01-03 RX ORDER — CHOLESTYRAMINE LIGHT 4 G/5.7G
1 POWDER, FOR SUSPENSION ORAL 2 TIMES DAILY
Qty: 60 PACKET | Refills: 3 | Status: SHIPPED | OUTPATIENT
Start: 2025-01-03

## 2025-01-03 RX ORDER — VANCOMYCIN HYDROCHLORIDE 125 MG/1
CAPSULE ORAL
Qty: 40 CAPSULE | Refills: 0 | Status: SHIPPED | OUTPATIENT
Start: 2025-01-03 | End: 2025-02-17

## 2025-01-03 RX ORDER — OXYCODONE AND ACETAMINOPHEN 10; 325 MG/1; MG/1
1 TABLET ORAL EVERY 6 HOURS PRN
Qty: 12 TABLET | Refills: 0 | Status: SHIPPED | OUTPATIENT
Start: 2025-01-03 | End: 2025-01-06

## 2025-01-03 RX ADMIN — FIDAXOMICIN 200 MG: 200 TABLET, FILM COATED ORAL at 09:12

## 2025-01-03 RX ADMIN — OXYCODONE HYDROCHLORIDE AND ACETAMINOPHEN 2 TABLET: 5; 325 TABLET ORAL at 12:22

## 2025-01-03 RX ADMIN — LOSARTAN POTASSIUM 100 MG: 50 TABLET, FILM COATED ORAL at 09:12

## 2025-01-03 RX ADMIN — MAGNESIUM SULFATE HEPTAHYDRATE 2000 MG: 40 INJECTION, SOLUTION INTRAVENOUS at 12:21

## 2025-01-03 RX ADMIN — CHOLESTYRAMINE 4 G: 4 POWDER, FOR SUSPENSION ORAL at 09:12

## 2025-01-03 RX ADMIN — ANTI-FUNGAL POWDER MICONAZOLE NITRATE TALC FREE: 1.42 POWDER TOPICAL at 09:13

## 2025-01-03 RX ADMIN — ATORVASTATIN CALCIUM 80 MG: 40 TABLET, FILM COATED ORAL at 09:12

## 2025-01-03 RX ADMIN — METRONIDAZOLE 500 MG: 500 INJECTION, SOLUTION INTRAVENOUS at 01:37

## 2025-01-03 RX ADMIN — PREGABALIN 200 MG: 50 CAPSULE ORAL at 13:56

## 2025-01-03 RX ADMIN — METRONIDAZOLE 500 MG: 500 INJECTION, SOLUTION INTRAVENOUS at 11:00

## 2025-01-03 RX ADMIN — Medication 1 CAPSULE: at 09:12

## 2025-01-03 RX ADMIN — HYDRALAZINE HYDROCHLORIDE 100 MG: 50 TABLET ORAL at 09:11

## 2025-01-03 RX ADMIN — DICLOFENAC SODIUM 2 G: 10 GEL TOPICAL at 09:13

## 2025-01-03 RX ADMIN — OXYCODONE HYDROCHLORIDE AND ACETAMINOPHEN 2 TABLET: 5; 325 TABLET ORAL at 01:29

## 2025-01-03 RX ADMIN — GUAIFENESIN 600 MG: 600 TABLET ORAL at 09:12

## 2025-01-03 RX ADMIN — ASPIRIN 81 MG: 81 TABLET, COATED ORAL at 12:22

## 2025-01-03 RX ADMIN — THERA TABS 1 TABLET: TAB at 09:12

## 2025-01-03 RX ADMIN — BENZONATATE 200 MG: 100 CAPSULE ORAL at 12:22

## 2025-01-03 RX ADMIN — METOPROLOL TARTRATE 25 MG: 25 TABLET, FILM COATED ORAL at 09:12

## 2025-01-03 RX ADMIN — POTASSIUM CHLORIDE 40 MEQ: 1500 TABLET, EXTENDED RELEASE ORAL at 12:26

## 2025-01-03 RX ADMIN — PREGABALIN 200 MG: 50 CAPSULE ORAL at 09:12

## 2025-01-03 RX ADMIN — OXYCODONE HYDROCHLORIDE AND ACETAMINOPHEN 2 TABLET: 5; 325 TABLET ORAL at 06:50

## 2025-01-03 RX ADMIN — ENOXAPARIN SODIUM 30 MG: 100 INJECTION SUBCUTANEOUS at 09:11

## 2025-01-03 RX ADMIN — BENZONATATE 200 MG: 100 CAPSULE ORAL at 01:30

## 2025-01-03 RX ADMIN — OSELTAMIVIR PHOSPHATE 75 MG: 75 CAPSULE ORAL at 09:12

## 2025-01-03 RX ADMIN — SODIUM CHLORIDE, PRESERVATIVE FREE 10 ML: 5 INJECTION INTRAVENOUS at 12:30

## 2025-01-03 RX ADMIN — HYDRALAZINE HYDROCHLORIDE 100 MG: 50 TABLET ORAL at 13:56

## 2025-01-03 ASSESSMENT — PAIN DESCRIPTION - ORIENTATION
ORIENTATION: LEFT
ORIENTATION: LEFT
ORIENTATION: RIGHT;LEFT
ORIENTATION: LEFT
ORIENTATION: LEFT

## 2025-01-03 ASSESSMENT — PAIN DESCRIPTION - DESCRIPTORS
DESCRIPTORS: ACHING
DESCRIPTORS: ACHING;DISCOMFORT
DESCRIPTORS: ACHING;DISCOMFORT
DESCRIPTORS: ACHING

## 2025-01-03 ASSESSMENT — PAIN DESCRIPTION - LOCATION
LOCATION: KNEE

## 2025-01-03 ASSESSMENT — PAIN SCALES - GENERAL
PAINLEVEL_OUTOF10: 7
PAINLEVEL_OUTOF10: 3
PAINLEVEL_OUTOF10: 7
PAINLEVEL_OUTOF10: 0
PAINLEVEL_OUTOF10: 0
PAINLEVEL_OUTOF10: 5
PAINLEVEL_OUTOF10: 7

## 2025-01-03 ASSESSMENT — PAIN - FUNCTIONAL ASSESSMENT
PAIN_FUNCTIONAL_ASSESSMENT: ACTIVITIES ARE NOT PREVENTED

## 2025-01-03 ASSESSMENT — PAIN DESCRIPTION - FREQUENCY
FREQUENCY: INTERMITTENT
FREQUENCY: INTERMITTENT

## 2025-01-03 ASSESSMENT — PAIN DESCRIPTION - PAIN TYPE
TYPE: CHRONIC PAIN
TYPE: CHRONIC PAIN

## 2025-01-03 ASSESSMENT — PAIN DESCRIPTION - ONSET
ONSET: GRADUAL
ONSET: GRADUAL

## 2025-01-03 NOTE — PROGRESS NOTES
[email or web address]  [phone]    Gastroenterology Progress Note    Impression:  1. Diarrhea - 12/26 CT w/ acute L sided colitis, suspect recurrent C diff on a background of Crohn's disease and recent C. Diff infections. Was on PO vancoymycin, switched to fidaxomicin 12/28. Flagyl added 1/1 due to ongoing diarrhea, cholestyramine light 2 times daily  2. Hx C diff - tx w/ oral vancomycin 5/2024, 10/2024  2. Crohn's colitis - initially dx 2009, no history of small bowel or upper GI tract involvement, previously treated w/ prednisone, Asocol, azathioprine, Humira - dosing increased to weekly in late 2019 due to low adalimumab level and Ab. 9/2020 Humira stopped, Started Stelara due to active disease w/ stricture at sigmoid colon. Stelara was increased to q4 week dosing in late 2022 when colonoscopy showed ongoing active disease in L colon.    - Hx enteric fistula between sigmoid and ileum    3. AMS - acute metabolic encephalopathy  4. UDS + - opiates and THC  5. Hypokalemia - K 2.3  6. UTI  7. Influenza - dx 1/1/25 started Tamiflu    Plan:  1. Discussed w/ Dr. Alonzo. Unfortunately Dificid is denied by insurance and cost prohibitive (>$5000) therefore patient will be sent home on extended vancomycin taper for ~6 weeks.   2. Obtaining outpatient authorization for Rebyota to prevent recurrence, to be administered 24-72hrs after last dose antibiotics.   3. Judicious future use of antibiotics due to risk of C.diff recurrence. If future antibiotics are needed, decision should be made in conjunction w/ ID and recommend oral vancomycin prophylaxis for duration of therapy + 1 additional week.   4. Follow up with GI as outpatient, Scheduled to see Dr. Maya 2/26/25 @ 4:00PM at [facility] office [address] [facility] [address], [place] [address].   5. Continue cholestyramine - can be changed to colestipol 1g TID before meals at discharge    Note plans for discharge today.   Thank you for this consultation and the opportunity to  105/58   Pulse 78   Temp 98.1 °F (36.7 °C) (Oral)   Resp 16   Ht 1.778 m (5' 10\")   Wt 109.8 kg (242 lb 1 oz)   SpO2 96%   BMI 34.73 kg/m²         Intake/Output Summary (Last 24 hours) at 1/3/2025 1432  Last data filed at 1/3/2025 0645  Gross per 24 hour   Intake 1221.24 ml   Output --   Net 1221.24 ml       CBC w/Diff    Lab Results   Component Value Date/Time    WBC 5.0 01/03/2025 04:49 AM    RBC 4.18 (L) 01/03/2025 04:49 AM    RBC 4.35 10/10/2023 12:01 PM    HGB 10.0 (L) 01/03/2025 04:49 AM    HCT 32.9 (L) 01/03/2025 04:49 AM    MCV 78.7 01/03/2025 04:49 AM    MCH 23.9 (L) 01/03/2025 04:49 AM    MCHC 30.4 (L) 01/03/2025 04:49 AM    RDW 17.4 (H) 01/03/2025 04:49 AM     01/03/2025 04:49 AM    MPV 11.1 01/03/2025 04:49 AM    No results found for: \"MONO\", \"BANDS\", \"BASOS\", \"METAS\", \"PRO\"   Basic Metabolic Profile   Recent Labs     01/03/25  0449      K 3.1*      CO2 26   BUN 7   GLUCOSE 112*   MG 1.7        Hepatic Function    No results found for: \"TP\" No components found for: \"SGOT\", \"GPT\", \"DBILI\", \"TBIL\"       Coags   No results for input(s): \"INR\", \"APTT\" in the last 72 hours.    Invalid input(s): \"PTP\"            AMANDEEP Devine.   01/03/25, 2:32 PM   [facility]  [email or web address]  Phone: [phone]

## 2025-01-03 NOTE — PROGRESS NOTES
Infectious Disease Consultation Note        Reason:recurrent c. Diff colitis and ESBL uti    Current abx Prior abx   12/28: fidaxomycin   1/2: osteltamivir   1/2: flagly IV Ceftriaxone      Lines:  none       Assessment :  Ms. Dean is a 54 year old woman with crohns disease, DM, HTN, history of ESBL e coli UTI and prior c. Diff admitted with AMS and found to have c. Diff colitis and UTI.    Diarrhea - c. Diff and crohns disease, per history it doesn't seem that she had good control of crohns.  Prior c. Diff infection 5/24.  No peripheral or fecal leukocytosis, but elevated inflammatory markers and normal procal.      Urine culture with E. Coli, resistant to bactrim and amp.      AMS: likely metabolic encephalopathy - resolved    Improved today, continues with frequent bowel movements, however they are less in volume.    1/1: had 15+ bowel movement in the last 24 hours with fecal incontinence and worsening right sided abd pain.      1/2: fever overnight, flu positive, improved bowel movements    1/3: patient feeling much better with only 4 bowels movement    Hemoglobin A1C   Date Value Ref Range Status   12/28/2024 6.6 (H) 4.2 - 5.6 % Final     Comment:     (NOTE)  HbA1C Interpretive Ranges  <5.7              Normal  5.7 - 6.4         Consider Prediabetes  >6.5              Consider Diabetes           Recommendations:  Continue fidaxomycin and metronidazole (reports allergy to metronidazole, but states it is due to peripheral neuropathy while hospitalized a few years ago.  She would like to try it to see if it helps.   Continue oseltamivir  for total of 5 days   Recommend close follow up with GI   Appreciate GI input, attempting to get fecal transplant  On discharge recommend sending out with long vanc taper if she cannot afford fidaxomycin.    Vanc taper:   --10 days PO vanc 125mg 4 times per day  --1 week PO vanc 125mg 3 times per day  --1 week PO vanc 125mg 2 times per day  --1 week PO vanc 125mg daily  --1 week  10/23/2023    LEFT TOTAL KNEE REPLACEMENT performed by Vish Orlando MD at [facility] MAIN OR    TUBAL LIGATION      btl       [unfilled]    Current Facility-Administered Medications   Medication Dose Route Frequency    magnesium sulfate 2000 mg in water 50 mL IVPB  2,000 mg IntraVENous Once    oxyCODONE-acetaminophen (PERCOCET) 5-325 MG per tablet 1 tablet  1 tablet Oral Q6H PRN    Or    oxyCODONE-acetaminophen (PERCOCET) 5-325 MG per tablet 2 tablet  2 tablet Oral Q6H PRN    guaiFENesin (MUCINEX) extended release tablet 600 mg  600 mg Oral BID    metroNIDAZOLE (FLAGYL) 500 mg in 0.9% NaCl 100 mL IVPB premix  500 mg IntraVENous Q8H    benzonatate (TESSALON) capsule 200 mg  200 mg Oral TID PRN    oseltamivir (TAMIFLU) capsule 75 mg  75 mg Oral BID    multivitamin 1 tablet  1 tablet Oral Daily    amitriptyline (ELAVIL) tablet 25 mg  25 mg Oral Nightly    diclofenac sodium (VOLTAREN) 1 % gel 2 g  2 g Topical TID    Fidaxomicin (DIFICID) tablet 200 mg  200 mg Oral BID    cefTRIAXone (ROCEPHIN) 1,000 mg in sterile water 10 mL IV syringe  1,000 mg IntraVENous Q24H    aspirin EC tablet 81 mg  81 mg Oral Daily    atorvastatin (LIPITOR) tablet 80 mg  80 mg Oral Daily    lactobacillus (CULTURELLE) capsule 1 capsule  1 capsule Oral Daily with breakfast    losartan (COZAAR) tablet 100 mg  100 mg Oral Daily    metoprolol tartrate (LOPRESSOR) tablet 25 mg  25 mg Oral BID    hydrALAZINE (APRESOLINE) tablet 100 mg  100 mg Oral TID    pregabalin (LYRICA) capsule 200 mg  200 mg Oral TID    tiZANidine (ZANAFLEX) tablet 6 mg  6 mg Oral BID PRN    sodium chloride flush 0.9 % injection 5-40 mL  5-40 mL IntraVENous 2 times per day    sodium chloride flush 0.9 % injection 5-40 mL  5-40 mL IntraVENous PRN    0.9 % sodium chloride infusion   IntraVENous PRN    potassium chloride (KLOR-CON M) extended release tablet 40 mEq  40 mEq Oral PRN    Or    potassium bicarb-citric acid (EFFER-K) effervescent tablet 40 mEq  40 mEq Oral PRN    Or

## 2025-01-03 NOTE — DISCHARGE INSTRUCTIONS
more overnight or 5 pounds in a week, increased swelling in our hands or feet or shortness of breath while lying flat in bed.  Please call your doctor as soon as you notice any of these symptoms; do not wait until your next office visit.    Patient armband removed and shredded   Thank you for enrolling in [employer]. Please follow the instructions below to securely access your online medical record. [employer] allows you to send messages to your doctor, view your test results, renew your prescriptions, schedule appointments, and more.     How Do I Sign Up?  In your Internet browser, go to [email or web address]  Click on the Sign Up Now link in the Sign In box. You will see the New Member Sign Up page.  Enter your [employer] Access Code exactly as it appears below. You will not need to use this code after you’ve completed the sign-up process. If you do not sign up before the expiration date, you must request a new code.  [employer] Access Code: Activation code not generated  Current [employer] Status: Active    Enter your Social Security Number ([id number]) and Date of Birth (mm/dd/yyyy) as indicated and click Submit. You will be taken to the next sign-up page.  Create a [employer] ID. This will be your [employer] login ID and cannot be changed, so think of one that is secure and easy to remember.  Create a [employer] password. You can change your password at any time.  Enter your Password Reset Question and Answer. This can be used at a later time if you forget your password.   Enter your e-mail address. You will receive e-mail notification when new information is available in [employer].  Click Sign Up. You can now view your medical record.     Additional Information  If you have questions, please contact your physician practice where you receive care. Remember, [employer] is NOT to be used for urgent needs. For medical emergencies, dial 911.   The discharge information has been reviewed with the {PATIENT PARENT  GUARDIAN:[id number]}.  The {PATIENT PARENT GUARDIAN:[id number]} verbalized understanding.  Discharge medications reviewed with the {Dishcarge meds reviewed with:[id number]} and appropriate educational materials and side effects teaching were provided.  ___________________________________________________________________________________________________________________________________

## 2025-01-03 NOTE — PROGRESS NOTES
4 Eyes Skin Assessment     NAME:  Priscilla Dean  YOB: 1970  MEDICAL RECORD NUMBER:  [id number]    The patient is being assessed for  Shift Handoff    I agree that at least one RN has performed a thorough Head to Toe Skin Assessment on the patient. ALL assessment sites listed below have been assessed.      Areas assessed by both nurses:    Head, Face, Ears, Shoulders, Back, Chest, Arms, Elbows, Hands, Sacrum. Buttock, Coccyx, Ischium, Legs. Feet and Heels, and Under Medical Devices         Does the Patient have a Wound? No noted wound(s)       Jose Alfredo Prevention initiated by RN: Yes  Wound Care Orders initiated by RN: Yes    Pressure Injury (Stage 3,4, Unstageable, DTI, NWPT, and Complex wounds) if present, place Wound referral order by RN under : No    New Ostomies, if present place, Ostomy referral order under : No     Nurse 1 eSignature: Electronically signed by Rosy Nguyen RN on 1/3/25 at 7:17 AM EST    **SHARE this note so that the co-signing nurse can place an eSignature**    Nurse 2 eSignature: {Esignature:[id number]}

## 2025-01-03 NOTE — PLAN OF CARE
Problem: Physical Therapy - Adult  Goal: By Discharge: Performs mobility at highest level of function for planned discharge setting.  See evaluation for individualized goals.  Description: Initiated  12/27/24  to be met within 7-10 days.    1.  Patient will move from supine to sit and sit to supine , scoot up and down, and roll side to side in bed with independence.    2.  Patient will transfer from bed to chair and chair to bed with modified independence using the least restrictive device.  3.  Patient will perform sit to stand with modified independence.  4.  Patient will ambulate with supervision/set-up for 50 feet with the least restrictive device.   5.  Patient will ascend/descend 4 stairs with B handrail(s) with supervision/set-up.    PLOF: Poor historian, PLOF gathered from chart review. Lives alone and ind prior to admission.     Outcome: Progressing     PHYSICAL THERAPY RE-EVALUATION    Patient: Priscilla Dean (54 y.o. female)  Date: 1/3/2025  Primary Diagnosis: Elevated C-reactive protein (CRP) [R79.82]  Colitis [K52.9]  Elevated erythrocyte sedimentation rate [R70.0]  Acute cystitis without hematuria [N30.00]  Altered mental status, unspecified altered mental status type [R41.82]  Acute metabolic encephalopathy [G93.41]  AMS (altered mental status) [R41.82]       Precautions: Fall Risk, Isolation,  ,  ,  ,  ,  ,  ,      ASSESSMENT :  Patient received OOB in restroom on her own. She is fully oriented and reports feeling much better. She ambulates around the room with supervision and antalgic gait. States she uses cane due to chronic knee issues also uses wheeled walker for longer distances. She demonstrates goods safety awareness ambulating in the room using RW. Educated on activity pacing due to decreased endurance and fatigue from illness. Patient returns to bed and left with all needs in reach at end of session.     DEFICITS/IMPAIRMENTS:    , Body Structures, Functions, Activity Limitations Requiring  to address strength, balance, and mobility deficits.   Past Medical History:   Diagnosis Date    Bilateral knee pain     Crohn's colitis (HCC)     Diabetes (HCC)     IDDM    HTN (hypertension)     Hypercholesterolemia     Pulmonary embolism (HCC) 03/2019    Stool color black     crohns     Past Surgical History:   Procedure Laterality Date    ANKLE FRACTURE SURGERY Left 2018    COLONOSCOPY N/A 8/31/2022    COLONOSCOPY/ Biopsies performed by JASMINE Shetty MD at [facility] ENDOSCOPY    COLONOSCOPY N/A 7/12/2022    COLONOSCOPY performed by JASMINE Shetty MD at [facility] ENDOSCOPY    COLONOSCOPY N/A 9/22/2020    COLONOSCOPY with bx's performed by JASMINE Shetty MD at [facility] ENDOSCOPY    COLONOSCOPY N/A 10/21/2019    COLONOSCOPY w/ bxs performed by Wayne Cheema MD at [facility] ENDOSCOPY    FOREARM SURGERY Right 12/12/2023    RIGHT DISTAL RADIUS OPEN REDUCTION INTERNAL FIXATION, RIGHT THIRD METACARPAL OPEN REDUCTION INTERNAL FIXATION;MINI C-ARM, SUPRACLAVICULAR BLOCK; [ACUMED ORTHO] performed by Brett Mcneill DO at [facility] MAIN OR    REVISION TOTAL KNEE ARTHROPLASTY Left 8/26/2024    LEFT TOTA KNEE REVISION performed by Vish Orlando MD at [facility] MAIN OR    TOTAL KNEE ARTHROPLASTY Left 10/23/2023    LEFT TOTAL KNEE REPLACEMENT performed by Vish Orlando MD at [facility] MAIN OR    TUBAL LIGATION      btl       Home Situation:  Social/Functional History  Lives With: Alone  Type of Home: House  Home Layout: One level  Home Access: Level entry  Bathroom Shower/Tub: Tub/Shower unit  Bathroom Toilet: Standard  Bathroom Equipment: Grab bars in shower  Bathroom Accessibility: Accessible  Home Equipment: Cane, Walker - Rolling  Receives Help From: Family  Prior Level of Assist for ADLs: Independent  Prior Level of Assist for Homemaking: Independent  Homemaking Responsibilities: Yes  Prior Level of Assist for Ambulation: Independent household ambulator, with or without device  Prior Level of Assist for Transfers: Independent  Active : Yes  Mode

## 2025-01-05 NOTE — DISCHARGE SUMMARY
Disp-20 tablet, R-0Normal           CONTINUE these medications which have CHANGED    Details   insulin glargine (LANTUS SOLOSTAR) 100 UNIT/ML injection pen Inject 15 Units into the skin nightly, Disp-5 Adjustable Dose Pre-filled Pen Syringe, R-3Normal           CONTINUE these medications which have NOT CHANGED    Details   pregabalin (LYRICA) 200 MG capsule Take 1 capsule by mouth 3 times daily for 30 days. Max Daily Amount: 600 mg, Disp-90 capsule, R-0Normal      aspirin (ASPIRIN 81) 81 MG EC tablet Take 1 tablet by mouth in the morning and at bedtime, Disp-60 tablet, R-3Normal      celecoxib (CELEBREX) 200 MG capsule Take 1 capsule by mouth 2 times daily, Disp-60 capsule, R-2Normal      acetaminophen (TYLENOL) 500 MG tablet Take 2 tablets by mouth every 8 hours as needed for Pain, Disp-90 tablet, R-1Normal      ondansetron (ZOFRAN) 4 MG tablet TAKE 1 TABLET BY MOUTH EVERY 8 HOURS AS NEEDED FOR NAUSEA AND VOMITING, Disp-30 tablet, R-2Normal      metoprolol tartrate (LOPRESSOR) 25 MG tablet Take 1 tablet by mouth 2 times dailyHistorical Med      lactobacillus (CULTURELLE) capsule Take 1 capsule by mouth daily (with breakfast), Disp-15 capsule, R-0Print      vitamin D (ERGOCALCIFEROL) 1.25 MG (08113 UT) CAPS capsule Take 1 capsule by mouth once a week Every WedHistorical Med      atorvastatin (LIPITOR) 80 MG tablet Take 1 tablet by mouth dailyHistorical Med      tiZANidine (ZANAFLEX) 6 MG capsule Take 1 capsule by mouth 2 times daily as needed for Muscle spasmsHistorical Med      promethazine (PHENERGAN) 12.5 MG tablet Take 1 tablet by mouth every 12 hours as needed for NauseaHistorical Med      ustekinumab (STELARA) 90 MG/ML SOSY prefilled syringe Inject 1 mL into the skin Once every 6 weeksHistorical Med      FARXIGA 10 MG tablet Take 1 tablet by mouth daily, DAWHistorical Med      HUMALOG KWIKPEN 100 UNIT/ML SOPN Inject 0-20 Units into the skin 3 times daily (before meals), DAWHistorical Med      omeprazole

## 2025-02-02 ENCOUNTER — APPOINTMENT (OUTPATIENT)
Facility: HOSPITAL | Age: 55
End: 2025-02-02
Payer: COMMERCIAL

## 2025-02-02 ENCOUNTER — HOSPITAL ENCOUNTER (EMERGENCY)
Facility: HOSPITAL | Age: 55
Discharge: ANOTHER HOSPITAL | End: 2025-02-02
Attending: STUDENT IN AN ORGANIZED HEALTH CARE EDUCATION/TRAINING PROGRAM
Payer: COMMERCIAL

## 2025-02-02 VITALS
WEIGHT: 243 LBS | HEIGHT: 72 IN | DIASTOLIC BLOOD PRESSURE: 106 MMHG | RESPIRATION RATE: 16 BRPM | HEART RATE: 74 BPM | OXYGEN SATURATION: 97 % | TEMPERATURE: 98.7 F | SYSTOLIC BLOOD PRESSURE: 210 MMHG | BODY MASS INDEX: 32.91 KG/M2

## 2025-02-02 DIAGNOSIS — I16.1 HYPERTENSIVE EMERGENCY: ICD-10-CM

## 2025-02-02 DIAGNOSIS — R07.9 CHEST PAIN, UNSPECIFIED TYPE: ICD-10-CM

## 2025-02-02 DIAGNOSIS — I71.03 DISSECTION OF THORACOABDOMINAL AORTA (HCC): Primary | ICD-10-CM

## 2025-02-02 LAB
ANION GAP SERPL CALC-SCNC: 6 MMOL/L (ref 3–18)
BASOPHILS # BLD: 0.03 K/UL (ref 0–0.1)
BASOPHILS NFR BLD: 0.3 % (ref 0–2)
BUN SERPL-MCNC: 11 MG/DL (ref 7–18)
BUN/CREAT SERPL: 15 (ref 12–20)
CALCIUM SERPL-MCNC: 7.8 MG/DL (ref 8.5–10.1)
CHLORIDE SERPL-SCNC: 111 MMOL/L (ref 100–111)
CO2 SERPL-SCNC: 23 MMOL/L (ref 21–32)
CREAT SERPL-MCNC: 0.72 MG/DL (ref 0.6–1.3)
DIFFERENTIAL METHOD BLD: ABNORMAL
EOSINOPHIL # BLD: 0.18 K/UL (ref 0–0.4)
EOSINOPHIL NFR BLD: 1.9 % (ref 0–5)
ERYTHROCYTE [DISTWIDTH] IN BLOOD BY AUTOMATED COUNT: 22 % (ref 11.6–14.5)
GLUCOSE SERPL-MCNC: 101 MG/DL (ref 74–99)
HCT VFR BLD AUTO: 38.8 % (ref 35–45)
HGB BLD-MCNC: 12 G/DL (ref 12–16)
IMM GRANULOCYTES # BLD AUTO: 0.14 K/UL (ref 0–0.04)
IMM GRANULOCYTES NFR BLD AUTO: 1.4 % (ref 0–0.5)
LYMPHOCYTES # BLD: 1.68 K/UL (ref 0.9–3.6)
LYMPHOCYTES NFR BLD: 17.4 % (ref 21–52)
MAGNESIUM SERPL-MCNC: 1.7 MG/DL (ref 1.6–2.6)
MCH RBC QN AUTO: 24.5 PG (ref 24–34)
MCHC RBC AUTO-ENTMCNC: 30.9 G/DL (ref 31–37)
MCV RBC AUTO: 79.2 FL (ref 78–100)
MONOCYTES # BLD: 0.51 K/UL (ref 0.05–1.2)
MONOCYTES NFR BLD: 5.3 % (ref 3–10)
NEUTS SEG # BLD: 7.13 K/UL (ref 1.8–8)
NEUTS SEG NFR BLD: 73.7 % (ref 40–73)
NRBC # BLD: 0 K/UL (ref 0–0.01)
NRBC BLD-RTO: 0 PER 100 WBC
PLATELET # BLD AUTO: 202 K/UL (ref 135–420)
POTASSIUM SERPL-SCNC: 3 MMOL/L (ref 3.5–5.5)
RBC # BLD AUTO: 4.9 M/UL (ref 4.2–5.3)
SODIUM SERPL-SCNC: 140 MMOL/L (ref 136–145)
TROPONIN I SERPL HS-MCNC: 15 NG/L (ref 0–54)
TROPONIN I SERPL HS-MCNC: 7 NG/L (ref 0–54)
WBC # BLD AUTO: 9.7 K/UL (ref 4.6–13.2)

## 2025-02-02 PROCEDURE — 85025 COMPLETE CBC W/AUTO DIFF WBC: CPT

## 2025-02-02 PROCEDURE — 96368 THER/DIAG CONCURRENT INF: CPT

## 2025-02-02 PROCEDURE — 84484 ASSAY OF TROPONIN QUANT: CPT

## 2025-02-02 PROCEDURE — 6370000000 HC RX 637 (ALT 250 FOR IP): Performed by: STUDENT IN AN ORGANIZED HEALTH CARE EDUCATION/TRAINING PROGRAM

## 2025-02-02 PROCEDURE — 71275 CT ANGIOGRAPHY CHEST: CPT

## 2025-02-02 PROCEDURE — 74174 CTA ABD&PLVS W/CONTRAST: CPT

## 2025-02-02 PROCEDURE — 96367 TX/PROPH/DG ADDL SEQ IV INF: CPT

## 2025-02-02 PROCEDURE — 93005 ELECTROCARDIOGRAM TRACING: CPT | Performed by: STUDENT IN AN ORGANIZED HEALTH CARE EDUCATION/TRAINING PROGRAM

## 2025-02-02 PROCEDURE — 71045 X-RAY EXAM CHEST 1 VIEW: CPT

## 2025-02-02 PROCEDURE — 96375 TX/PRO/DX INJ NEW DRUG ADDON: CPT

## 2025-02-02 PROCEDURE — 83735 ASSAY OF MAGNESIUM: CPT

## 2025-02-02 PROCEDURE — 80048 BASIC METABOLIC PNL TOTAL CA: CPT

## 2025-02-02 PROCEDURE — 96365 THER/PROPH/DIAG IV INF INIT: CPT

## 2025-02-02 PROCEDURE — 6360000004 HC RX CONTRAST MEDICATION: Performed by: EMERGENCY MEDICINE

## 2025-02-02 PROCEDURE — 2580000003 HC RX 258: Performed by: STUDENT IN AN ORGANIZED HEALTH CARE EDUCATION/TRAINING PROGRAM

## 2025-02-02 PROCEDURE — 6360000002 HC RX W HCPCS: Performed by: EMERGENCY MEDICINE

## 2025-02-02 PROCEDURE — 99285 EMERGENCY DEPT VISIT HI MDM: CPT

## 2025-02-02 PROCEDURE — 2500000003 HC RX 250 WO HCPCS: Performed by: STUDENT IN AN ORGANIZED HEALTH CARE EDUCATION/TRAINING PROGRAM

## 2025-02-02 PROCEDURE — 6360000002 HC RX W HCPCS: Performed by: STUDENT IN AN ORGANIZED HEALTH CARE EDUCATION/TRAINING PROGRAM

## 2025-02-02 PROCEDURE — 6360000004 HC RX CONTRAST MEDICATION: Performed by: STUDENT IN AN ORGANIZED HEALTH CARE EDUCATION/TRAINING PROGRAM

## 2025-02-02 RX ORDER — NICARDIPINE HYDROCHLORIDE 0.1 MG/ML
2.5-15 INJECTION INTRAVENOUS CONTINUOUS
Status: DISCONTINUED | OUTPATIENT
Start: 2025-02-02 | End: 2025-02-02

## 2025-02-02 RX ORDER — LABETALOL HYDROCHLORIDE 5 MG/ML
10 INJECTION, SOLUTION INTRAVENOUS
Status: COMPLETED | OUTPATIENT
Start: 2025-02-02 | End: 2025-02-02

## 2025-02-02 RX ORDER — ESMOLOL HYDROCHLORIDE 10 MG/ML
25-300 INJECTION, SOLUTION INTRAVENOUS CONTINUOUS
Status: DISCONTINUED | OUTPATIENT
Start: 2025-02-02 | End: 2025-02-03 | Stop reason: HOSPADM

## 2025-02-02 RX ORDER — HYDRALAZINE HYDROCHLORIDE 20 MG/ML
10 INJECTION INTRAMUSCULAR; INTRAVENOUS
Status: DISCONTINUED | OUTPATIENT
Start: 2025-02-02 | End: 2025-02-02

## 2025-02-02 RX ORDER — IOPAMIDOL 755 MG/ML
80 INJECTION, SOLUTION INTRAVASCULAR
Status: COMPLETED | OUTPATIENT
Start: 2025-02-02 | End: 2025-02-02

## 2025-02-02 RX ORDER — POTASSIUM CHLORIDE 1500 MG/1
40 TABLET, EXTENDED RELEASE ORAL
Status: COMPLETED | OUTPATIENT
Start: 2025-02-02 | End: 2025-02-02

## 2025-02-02 RX ORDER — LANOLIN ALCOHOL/MO/W.PET/CERES
400 CREAM (GRAM) TOPICAL ONCE
Status: COMPLETED | OUTPATIENT
Start: 2025-02-02 | End: 2025-02-02

## 2025-02-02 RX ORDER — MORPHINE SULFATE 4 MG/ML
4 INJECTION, SOLUTION INTRAMUSCULAR; INTRAVENOUS
Status: COMPLETED | OUTPATIENT
Start: 2025-02-02 | End: 2025-02-02

## 2025-02-02 RX ORDER — NICARDIPINE HYDROCHLORIDE 0.1 MG/ML
2.5-15 INJECTION INTRAVENOUS CONTINUOUS
Status: DISCONTINUED | OUTPATIENT
Start: 2025-02-02 | End: 2025-02-03 | Stop reason: HOSPADM

## 2025-02-02 RX ORDER — IOPAMIDOL 755 MG/ML
100 INJECTION, SOLUTION INTRAVASCULAR
Status: COMPLETED | OUTPATIENT
Start: 2025-02-02 | End: 2025-02-02

## 2025-02-02 RX ORDER — NITROGLYCERIN 20 MG/100ML
5-200 INJECTION INTRAVENOUS CONTINUOUS
Status: DISCONTINUED | OUTPATIENT
Start: 2025-02-02 | End: 2025-02-02

## 2025-02-02 RX ADMIN — IOPAMIDOL 80 ML: 755 INJECTION, SOLUTION INTRAVENOUS at 21:11

## 2025-02-02 RX ADMIN — HYDROMORPHONE HYDROCHLORIDE 0.5 MG: 1 INJECTION, SOLUTION INTRAMUSCULAR; INTRAVENOUS; SUBCUTANEOUS at 19:50

## 2025-02-02 RX ADMIN — MORPHINE SULFATE 4 MG: 4 INJECTION, SOLUTION INTRAMUSCULAR; INTRAVENOUS at 18:07

## 2025-02-02 RX ADMIN — ESMOLOL HYDROCHLORIDE IN SODIUM CHLORIDE 50 MCG/KG/MIN: 10 INJECTION INTRAVENOUS at 21:38

## 2025-02-02 RX ADMIN — IOPAMIDOL 80 ML: 755 INJECTION, SOLUTION INTRAVENOUS at 21:12

## 2025-02-02 RX ADMIN — NITROGLYCERIN 5 MCG/MIN: 20 INJECTION INTRAVENOUS at 20:07

## 2025-02-02 RX ADMIN — LABETALOL HYDROCHLORIDE 10 MG: 5 INJECTION, SOLUTION INTRAVENOUS at 18:31

## 2025-02-02 RX ADMIN — NICARDIPINE HYDROCHLORIDE 5 MG/HR: 0.1 INJECTION INTRAVENOUS at 21:29

## 2025-02-02 RX ADMIN — Medication 400 MG: at 18:31

## 2025-02-02 RX ADMIN — FAMOTIDINE 20 MG: 10 INJECTION, SOLUTION INTRAVENOUS at 18:06

## 2025-02-02 RX ADMIN — POTASSIUM CHLORIDE 40 MEQ: 1500 TABLET, EXTENDED RELEASE ORAL at 18:31

## 2025-02-02 ASSESSMENT — PAIN DESCRIPTION - LOCATION
LOCATION: CHEST

## 2025-02-02 ASSESSMENT — PAIN SCALES - GENERAL
PAINLEVEL_OUTOF10: 7
PAINLEVEL_OUTOF10: 8
PAINLEVEL_OUTOF10: 7

## 2025-02-02 ASSESSMENT — PAIN DESCRIPTION - DESCRIPTORS: DESCRIPTORS: SHARP

## 2025-02-02 ASSESSMENT — PAIN DESCRIPTION - ORIENTATION: ORIENTATION: MID

## 2025-02-02 ASSESSMENT — PAIN - FUNCTIONAL ASSESSMENT: PAIN_FUNCTIONAL_ASSESSMENT: 0-10

## 2025-02-02 NOTE — ED PROVIDER NOTES
EMERGENCY DEPARTMENT HISTORY AND PHYSICAL EXAM      Date: 2/2/2025  Patient Name: Priscilla Dean    History of Presenting Illness     Chief Complaint   Patient presents with    Chest Pain       History (Context): Priscilla Dean is a 54 y.o. female  presents to the ED today with chief complaint of chest pain.  Patient states chest pain began approximate 1 hour prior to arrival in the emergency department.  States initially located to the left side of her neck and radiated down her chest.  Described as sharp and achy, without any leaving or exacerbating factors, nonexertional, and further nonradiating.  Says she has not had similar complaints in the past.  Denies any inciting event.  Admits to associated diaphoresis but further denies any nausea, vomiting, abdominal pain, or dyspnea.    Per EMS patient was provided with aspirin prior to arrival      PCP: Zachery Lucio MD    Current Facility-Administered Medications   Medication Dose Route Frequency Provider Last Rate Last Admin    nitroGLYCERIN 200 mcg/mL in dextrose 5%  5-200 mcg/min IntraVENous Continuous Darrius Frausto Jr., DO        HYDROmorphone (DILAUDID) injection 0.5 mg  0.5 mg IntraVENous NOW Darrius Frausto Jr., DO        iopamidol (ISOVUE-370) 76 % injection 100 mL  100 mL IntraVENous ONCE PRN Darrius Frausto Jr., DO         Current Outpatient Medications   Medication Sig Dispense Refill    insulin glargine (LANTUS SOLOSTAR) 100 UNIT/ML injection pen Inject 15 Units into the skin nightly 5 Adjustable Dose Pre-filled Pen Syringe 3    cholestyramine light 4 g packet Take 1 packet by mouth 2 times daily 60 packet 3    vancomycin (VANCOCIN) 125 MG capsule Take 1 capsule by mouth 4 times daily for 10 days, THEN 1 capsule 3 times daily for 7 days, THEN 1 capsule in the morning and at bedtime for 7 days, THEN 1 capsule daily for 7 days, THEN 1 capsule every 48 hours for 7 days, THEN 1 capsule every 72 hours for 7 days. 40 capsule 0    pregabalin

## 2025-02-03 NOTE — ED PROVIDER NOTES
I received the patient in turnover from Dr. Frausto at the end of his shift.  The patient is a 54-year-old woman who presented to the ED with chest pain.  She is also very hypertensive.  At this time she is on a nitro drip because she did not really respond to labetalol.  At this time we are awaiting a CTA of her chest and I anticipate admission.  The patient is currently on a nitro drip.  DDX includes ACS vs Aortic dissection.  [employer] called us to look at the CTA in real time.  Appears to have a Type A Aortic dissection that goes all the way down to her abdomen.  We ordered a CTA of the abdomen and pelvis to see exactly how far down it goes.      I have changed the patient to nicardipine.      I have called the transfer and spoke with Dr. Nura Ventura and he requested that we send him video images.  Based on his review, he thinks that it might be a type B.  He asked for more images.  I have sent those.  Additionally, I have ordered nicardipine and esmolol because the patient's blood pressure is refractory to nitroglycerin.  The patient is mentating normally.  She does have insight into her illness because she is a nursing instructor and a registered nurse.  She does have pulses in all 4 extremities.  She does have cold feet but she states that it is chronic.    We are waiting for the final determination as to whether or not this is surgical the patient needs to go to cardiothoracic surgery versus going to vascular surgery for medical management.    According to CT surgery and radiology, they believe that it is a type B dissection.  The patient will be rerouted from cardiothoracic surgery to vascular surgery ICU.  I am waiting for the name of the accepting.  I received a call back from Dr. Messer stating that this is a Type B dissection and that Dr. Barbosa is the accepting and the patient will go to the VICU.      Recent Results (from the past 24 hour(s))   CBC with Auto Differential    Collection Time:

## 2025-02-03 NOTE — ED NOTES
Transported pt to CT. Pt tolerated CT well. Blood pressure trended down to 155/129. Pt alert and oriented.

## 2025-02-03 NOTE — ED NOTES
EMS transferred pt to stretcher. Pt tolerated well. Nurse Edita to ride with transport. Pt belongings given to EMS.( Shoes,coat, purse, pt has cell phone on her.)

## 2025-02-03 NOTE — ED TRIAGE NOTES
Patient brought in by medics complaining of chest pain she reports that she had pain in her left jaw that radiated to her  chest. Patient noted to be hypertensive.

## 2025-02-04 LAB
EKG ATRIAL RATE: 59 BPM
EKG DIAGNOSIS: NORMAL
EKG P AXIS: 60 DEGREES
EKG P-R INTERVAL: 142 MS
EKG Q-T INTERVAL: 456 MS
EKG QRS DURATION: 90 MS
EKG QTC CALCULATION (BAZETT): 451 MS
EKG R AXIS: 4 DEGREES
EKG T AXIS: 41 DEGREES
EKG VENTRICULAR RATE: 59 BPM

## 2025-02-04 PROCEDURE — 93010 ELECTROCARDIOGRAM REPORT: CPT | Performed by: INTERNAL MEDICINE

## 2025-02-10 ENCOUNTER — TELEPHONE (OUTPATIENT)
Age: 55
End: 2025-02-10

## 2025-02-10 NOTE — TELEPHONE ENCOUNTER
Patient requesting refill on pregablin, I did scheduled her for a follow up appointment on 4/2.says she is currently in [facility] & not sure when she will get released.

## 2025-02-21 ENCOUNTER — HOSPITAL ENCOUNTER (INPATIENT)
Facility: HOSPITAL | Age: 55
LOS: 4 days | Discharge: HOME HEALTH | DRG: 065 | End: 2025-02-26
Attending: EMERGENCY MEDICINE | Admitting: STUDENT IN AN ORGANIZED HEALTH CARE EDUCATION/TRAINING PROGRAM
Payer: COMMERCIAL

## 2025-02-21 ENCOUNTER — APPOINTMENT (OUTPATIENT)
Facility: HOSPITAL | Age: 55
DRG: 065 | End: 2025-02-21
Payer: COMMERCIAL

## 2025-02-21 ENCOUNTER — HOSPITAL ENCOUNTER (EMERGENCY)
Facility: HOSPITAL | Age: 55
Discharge: HOME | DRG: 065 | End: 2025-02-24
Payer: COMMERCIAL

## 2025-02-21 DIAGNOSIS — I63.9 CEREBROVASCULAR ACCIDENT (CVA), UNSPECIFIED MECHANISM (HCC): ICD-10-CM

## 2025-02-21 DIAGNOSIS — R40.4 TRANSIENT ALTERATION OF AWARENESS: ICD-10-CM

## 2025-02-21 DIAGNOSIS — G93.40 ENCEPHALOPATHY ACUTE: ICD-10-CM

## 2025-02-21 DIAGNOSIS — I63.9 ACUTE CVA (CEREBROVASCULAR ACCIDENT) (HCC): ICD-10-CM

## 2025-02-21 DIAGNOSIS — R11.2 NAUSEA AND VOMITING, UNSPECIFIED VOMITING TYPE: Primary | ICD-10-CM

## 2025-02-21 LAB
ALBUMIN SERPL-MCNC: 2.7 G/DL (ref 3.4–5)
ALBUMIN/GLOB SERPL: 0.4 (ref 0.8–1.7)
ALP SERPL-CCNC: 77 U/L (ref 45–117)
ALT SERPL-CCNC: 12 U/L (ref 13–56)
ANION GAP SERPL CALC-SCNC: 8 MMOL/L (ref 3–18)
APPEARANCE UR: CLEAR
APTT PPP: 30.8 SEC (ref 23–36.4)
AST SERPL-CCNC: 26 U/L (ref 10–38)
BACTERIA URNS QL MICRO: ABNORMAL /HPF
BASOPHILS # BLD: 0.02 K/UL (ref 0–0.1)
BASOPHILS NFR BLD: 0.2 % (ref 0–2)
BILIRUB SERPL-MCNC: 0.8 MG/DL (ref 0.2–1)
BILIRUB UR QL: NEGATIVE
BUN SERPL-MCNC: 8 MG/DL (ref 7–18)
BUN/CREAT SERPL: 9 (ref 12–20)
CALCIUM SERPL-MCNC: 8.9 MG/DL (ref 8.5–10.1)
CHLORIDE SERPL-SCNC: 100 MMOL/L (ref 100–111)
CO2 SERPL-SCNC: 26 MMOL/L (ref 21–32)
COLOR UR: YELLOW
CREAT SERPL-MCNC: 0.93 MG/DL (ref 0.6–1.3)
DIFFERENTIAL METHOD BLD: ABNORMAL
EOSINOPHIL # BLD: 0.03 K/UL (ref 0–0.4)
EOSINOPHIL NFR BLD: 0.4 % (ref 0–5)
EPITH CASTS URNS QL MICRO: ABNORMAL /LPF (ref 0–5)
ERYTHROCYTE [DISTWIDTH] IN BLOOD BY AUTOMATED COUNT: 22.7 % (ref 11.6–14.5)
FLUAV RNA SPEC QL NAA+PROBE: NOT DETECTED
FLUBV RNA SPEC QL NAA+PROBE: NOT DETECTED
GLOBULIN SER CALC-MCNC: 6.5 G/DL (ref 2–4)
GLUCOSE BLD STRIP.AUTO-MCNC: 147 MG/DL (ref 70–110)
GLUCOSE SERPL-MCNC: 160 MG/DL (ref 74–99)
GLUCOSE UR STRIP.AUTO-MCNC: 250 MG/DL
HCT VFR BLD AUTO: 30.7 % (ref 35–45)
HGB BLD-MCNC: 9.9 G/DL (ref 12–16)
HGB UR QL STRIP: NEGATIVE
IMM GRANULOCYTES # BLD AUTO: 0.03 K/UL (ref 0–0.04)
IMM GRANULOCYTES NFR BLD AUTO: 0.4 % (ref 0–0.5)
INR PPP: 1.2 (ref 0.9–1.1)
KETONES UR QL STRIP.AUTO: ABNORMAL MG/DL
LEUKOCYTE ESTERASE UR QL STRIP.AUTO: ABNORMAL
LYMPHOCYTES # BLD: 0.59 K/UL (ref 0.9–3.6)
LYMPHOCYTES NFR BLD: 7.3 % (ref 21–52)
MCH RBC QN AUTO: 26.6 PG (ref 24–34)
MCHC RBC AUTO-ENTMCNC: 32.2 G/DL (ref 31–37)
MCV RBC AUTO: 82.5 FL (ref 78–100)
MONOCYTES # BLD: 0.47 K/UL (ref 0.05–1.2)
MONOCYTES NFR BLD: 5.8 % (ref 3–10)
NEUTS SEG # BLD: 6.9 K/UL (ref 1.8–8)
NEUTS SEG NFR BLD: 85.9 % (ref 40–73)
NITRITE UR QL STRIP.AUTO: NEGATIVE
NRBC # BLD: 0 K/UL (ref 0–0.01)
NRBC BLD-RTO: 0 PER 100 WBC
NT PRO BNP: 1466 PG/ML (ref 0–900)
PH UR STRIP: 6.5 (ref 5–8)
PLATELET # BLD AUTO: 346 K/UL (ref 135–420)
PMV BLD AUTO: 10.5 FL (ref 9.2–11.8)
POTASSIUM SERPL-SCNC: 3.5 MMOL/L (ref 3.5–5.5)
PROT SERPL-MCNC: 9.2 G/DL (ref 6.4–8.2)
PROT UR STRIP-MCNC: 30 MG/DL
PROTHROMBIN TIME: 15.8 SEC (ref 11.9–14.9)
RBC # BLD AUTO: 3.72 M/UL (ref 4.2–5.3)
RBC #/AREA URNS HPF: ABNORMAL /HPF (ref 0–5)
SARS-COV-2 RNA RESP QL NAA+PROBE: NOT DETECTED
SODIUM SERPL-SCNC: 134 MMOL/L (ref 136–145)
SOURCE: NORMAL
SP GR UR REFRACTOMETRY: >1.03 (ref 1–1.03)
TROPONIN I SERPL HS-MCNC: 4 NG/L (ref 0–54)
UROBILINOGEN UR QL STRIP.AUTO: 1 EU/DL (ref 0.2–1)
WBC # BLD AUTO: 8 K/UL (ref 4.6–13.2)
WBC URNS QL MICRO: ABNORMAL /HPF (ref 0–4)

## 2025-02-21 PROCEDURE — 96376 TX/PRO/DX INJ SAME DRUG ADON: CPT

## 2025-02-21 PROCEDURE — 70450 CT HEAD/BRAIN W/O DYE: CPT

## 2025-02-21 PROCEDURE — 85025 COMPLETE CBC W/AUTO DIFF WBC: CPT

## 2025-02-21 PROCEDURE — 81001 URINALYSIS AUTO W/SCOPE: CPT

## 2025-02-21 PROCEDURE — 93005 ELECTROCARDIOGRAM TRACING: CPT | Performed by: EMERGENCY MEDICINE

## 2025-02-21 PROCEDURE — 71045 X-RAY EXAM CHEST 1 VIEW: CPT

## 2025-02-21 PROCEDURE — 85730 THROMBOPLASTIN TIME PARTIAL: CPT

## 2025-02-21 PROCEDURE — 96375 TX/PRO/DX INJ NEW DRUG ADDON: CPT

## 2025-02-21 PROCEDURE — 6360000004 HC RX CONTRAST MEDICATION: Performed by: EMERGENCY MEDICINE

## 2025-02-21 PROCEDURE — 83880 ASSAY OF NATRIURETIC PEPTIDE: CPT

## 2025-02-21 PROCEDURE — 99285 EMERGENCY DEPT VISIT HI MDM: CPT

## 2025-02-21 PROCEDURE — 71275 CT ANGIOGRAPHY CHEST: CPT

## 2025-02-21 PROCEDURE — 96374 THER/PROPH/DIAG INJ IV PUSH: CPT

## 2025-02-21 PROCEDURE — 82962 GLUCOSE BLOOD TEST: CPT

## 2025-02-21 PROCEDURE — 87636 SARSCOV2 & INF A&B AMP PRB: CPT

## 2025-02-21 PROCEDURE — 84484 ASSAY OF TROPONIN QUANT: CPT

## 2025-02-21 PROCEDURE — 6360000002 HC RX W HCPCS: Performed by: EMERGENCY MEDICINE

## 2025-02-21 PROCEDURE — 80053 COMPREHEN METABOLIC PANEL: CPT

## 2025-02-21 PROCEDURE — 85610 PROTHROMBIN TIME: CPT

## 2025-02-21 RX ORDER — MORPHINE SULFATE 2 MG/ML
2 INJECTION, SOLUTION INTRAMUSCULAR; INTRAVENOUS
Status: COMPLETED | OUTPATIENT
Start: 2025-02-21 | End: 2025-02-21

## 2025-02-21 RX ORDER — MORPHINE SULFATE 2 MG/ML
2 INJECTION, SOLUTION INTRAMUSCULAR; INTRAVENOUS
Status: COMPLETED | OUTPATIENT
Start: 2025-02-22 | End: 2025-02-21

## 2025-02-21 RX ORDER — ONDANSETRON 2 MG/ML
4 INJECTION INTRAMUSCULAR; INTRAVENOUS EVERY 6 HOURS PRN
Status: DISCONTINUED | OUTPATIENT
Start: 2025-02-21 | End: 2025-02-22

## 2025-02-21 RX ORDER — IOPAMIDOL 755 MG/ML
100 INJECTION, SOLUTION INTRAVASCULAR
Status: COMPLETED | OUTPATIENT
Start: 2025-02-21 | End: 2025-02-21

## 2025-02-21 RX ADMIN — ONDANSETRON 4 MG: 2 INJECTION, SOLUTION INTRAMUSCULAR; INTRAVENOUS at 22:45

## 2025-02-21 RX ADMIN — MORPHINE SULFATE 2 MG: 2 INJECTION, SOLUTION INTRAMUSCULAR; INTRAVENOUS at 22:45

## 2025-02-21 RX ADMIN — IOPAMIDOL 100 ML: 755 INJECTION, SOLUTION INTRAVENOUS at 20:25

## 2025-02-21 RX ADMIN — MORPHINE SULFATE 2 MG: 2 INJECTION, SOLUTION INTRAMUSCULAR; INTRAVENOUS at 23:47

## 2025-02-21 ASSESSMENT — PAIN SCALES - GENERAL
PAINLEVEL_OUTOF10: 6
PAINLEVEL_OUTOF10: 6
PAINLEVEL_OUTOF10: 4
PAINLEVEL_OUTOF10: 7

## 2025-02-21 ASSESSMENT — PAIN DESCRIPTION - DESCRIPTORS
DESCRIPTORS: ACHING

## 2025-02-21 ASSESSMENT — PAIN DESCRIPTION - LOCATION
LOCATION: ABDOMEN

## 2025-02-21 ASSESSMENT — LIFESTYLE VARIABLES
HOW MANY STANDARD DRINKS CONTAINING ALCOHOL DO YOU HAVE ON A TYPICAL DAY: PATIENT DOES NOT DRINK
HOW OFTEN DO YOU HAVE A DRINK CONTAINING ALCOHOL: NEVER

## 2025-02-21 ASSESSMENT — PAIN - FUNCTIONAL ASSESSMENT: PAIN_FUNCTIONAL_ASSESSMENT: 0-10

## 2025-02-22 ENCOUNTER — APPOINTMENT (OUTPATIENT)
Facility: HOSPITAL | Age: 55
DRG: 065 | End: 2025-02-22
Payer: COMMERCIAL

## 2025-02-22 PROBLEM — I63.9 ACUTE CVA (CEREBROVASCULAR ACCIDENT) (HCC): Status: ACTIVE | Noted: 2025-02-22

## 2025-02-22 LAB
ANION GAP SERPL CALC-SCNC: 6 MMOL/L (ref 3–18)
B PERT DNA SPEC QL NAA+PROBE: NOT DETECTED
BORDETELLA PARAPERTUSSIS BY PCR: NOT DETECTED
BUN SERPL-MCNC: 10 MG/DL (ref 7–18)
BUN/CREAT SERPL: 13 (ref 12–20)
C PNEUM DNA SPEC QL NAA+PROBE: NOT DETECTED
CALCIUM SERPL-MCNC: 9.6 MG/DL (ref 8.5–10.1)
CHLORIDE SERPL-SCNC: 104 MMOL/L (ref 100–111)
CO2 SERPL-SCNC: 27 MMOL/L (ref 21–32)
CREAT SERPL-MCNC: 0.77 MG/DL (ref 0.6–1.3)
CRP SERPL-MCNC: 14.9 MG/DL (ref 0–0.3)
EKG ATRIAL RATE: 108 BPM
EKG ATRIAL RATE: 98 BPM
EKG DIAGNOSIS: NORMAL
EKG DIAGNOSIS: NORMAL
EKG P AXIS: 57 DEGREES
EKG P AXIS: 60 DEGREES
EKG P-R INTERVAL: 144 MS
EKG P-R INTERVAL: 154 MS
EKG Q-T INTERVAL: 368 MS
EKG Q-T INTERVAL: 390 MS
EKG QRS DURATION: 102 MS
EKG QRS DURATION: 98 MS
EKG QTC CALCULATION (BAZETT): 493 MS
EKG QTC CALCULATION (BAZETT): 497 MS
EKG R AXIS: -18 DEGREES
EKG R AXIS: -7 DEGREES
EKG T AXIS: 55 DEGREES
EKG T AXIS: 58 DEGREES
EKG VENTRICULAR RATE: 108 BPM
EKG VENTRICULAR RATE: 98 BPM
FLUAV SUBTYP SPEC NAA+PROBE: NOT DETECTED
FLUBV RNA SPEC QL NAA+PROBE: NOT DETECTED
GLUCOSE BLD STRIP.AUTO-MCNC: 123 MG/DL (ref 70–110)
GLUCOSE BLD STRIP.AUTO-MCNC: 135 MG/DL (ref 70–110)
GLUCOSE BLD STRIP.AUTO-MCNC: 156 MG/DL (ref 70–110)
GLUCOSE SERPL-MCNC: 123 MG/DL (ref 74–99)
HADV DNA SPEC QL NAA+PROBE: NOT DETECTED
HCOV 229E RNA SPEC QL NAA+PROBE: NOT DETECTED
HCOV HKU1 RNA SPEC QL NAA+PROBE: NOT DETECTED
HCOV NL63 RNA SPEC QL NAA+PROBE: NOT DETECTED
HCOV OC43 RNA SPEC QL NAA+PROBE: NOT DETECTED
HMPV RNA SPEC QL NAA+PROBE: NOT DETECTED
HPIV1 RNA SPEC QL NAA+PROBE: NOT DETECTED
HPIV2 RNA SPEC QL NAA+PROBE: NOT DETECTED
HPIV3 RNA SPEC QL NAA+PROBE: NOT DETECTED
HPIV4 RNA SPEC QL NAA+PROBE: NOT DETECTED
L PNEUMO AG UR QL IA: NEGATIVE
M PNEUMO DNA SPEC QL NAA+PROBE: NOT DETECTED
POTASSIUM SERPL-SCNC: 3.2 MMOL/L (ref 3.5–5.5)
RSV RNA SPEC QL NAA+PROBE: NOT DETECTED
RV+EV RNA SPEC QL NAA+PROBE: NOT DETECTED
S PNEUM AG UR QL: NEGATIVE
SARS-COV-2 RNA RESP QL NAA+PROBE: NOT DETECTED
SODIUM SERPL-SCNC: 137 MMOL/L (ref 136–145)

## 2025-02-22 PROCEDURE — 36415 COLL VENOUS BLD VENIPUNCTURE: CPT

## 2025-02-22 PROCEDURE — 94761 N-INVAS EAR/PLS OXIMETRY MLT: CPT

## 2025-02-22 PROCEDURE — 93010 ELECTROCARDIOGRAM REPORT: CPT | Performed by: INTERNAL MEDICINE

## 2025-02-22 PROCEDURE — 93005 ELECTROCARDIOGRAM TRACING: CPT | Performed by: EMERGENCY MEDICINE

## 2025-02-22 PROCEDURE — 0202U NFCT DS 22 TRGT SARS-COV-2: CPT

## 2025-02-22 PROCEDURE — 99223 1ST HOSP IP/OBS HIGH 75: CPT | Performed by: STUDENT IN AN ORGANIZED HEALTH CARE EDUCATION/TRAINING PROGRAM

## 2025-02-22 PROCEDURE — 6370000000 HC RX 637 (ALT 250 FOR IP): Performed by: STUDENT IN AN ORGANIZED HEALTH CARE EDUCATION/TRAINING PROGRAM

## 2025-02-22 PROCEDURE — 80048 BASIC METABOLIC PNL TOTAL CA: CPT

## 2025-02-22 PROCEDURE — 6360000002 HC RX W HCPCS: Performed by: STUDENT IN AN ORGANIZED HEALTH CARE EDUCATION/TRAINING PROGRAM

## 2025-02-22 PROCEDURE — 87040 BLOOD CULTURE FOR BACTERIA: CPT

## 2025-02-22 PROCEDURE — 87449 NOS EACH ORGANISM AG IA: CPT

## 2025-02-22 PROCEDURE — 1100000003 HC PRIVATE W/ TELEMETRY

## 2025-02-22 PROCEDURE — 86140 C-REACTIVE PROTEIN: CPT

## 2025-02-22 PROCEDURE — 87086 URINE CULTURE/COLONY COUNT: CPT

## 2025-02-22 PROCEDURE — 82962 GLUCOSE BLOOD TEST: CPT

## 2025-02-22 PROCEDURE — 2500000003 HC RX 250 WO HCPCS: Performed by: STUDENT IN AN ORGANIZED HEALTH CARE EDUCATION/TRAINING PROGRAM

## 2025-02-22 RX ORDER — SODIUM CHLORIDE 0.9 % (FLUSH) 0.9 %
5-40 SYRINGE (ML) INJECTION EVERY 12 HOURS SCHEDULED
Status: DISCONTINUED | OUTPATIENT
Start: 2025-02-22 | End: 2025-02-26 | Stop reason: HOSPADM

## 2025-02-22 RX ORDER — ASPIRIN 300 MG/1
300 SUPPOSITORY RECTAL DAILY
Status: DISCONTINUED | OUTPATIENT
Start: 2025-02-22 | End: 2025-02-22

## 2025-02-22 RX ORDER — ACETAMINOPHEN 325 MG/1
650 TABLET ORAL EVERY 6 HOURS PRN
Status: DISCONTINUED | OUTPATIENT
Start: 2025-02-22 | End: 2025-02-26 | Stop reason: HOSPADM

## 2025-02-22 RX ORDER — POTASSIUM CHLORIDE 1500 MG/1
40 TABLET, EXTENDED RELEASE ORAL PRN
Status: DISCONTINUED | OUTPATIENT
Start: 2025-02-22 | End: 2025-02-26 | Stop reason: HOSPADM

## 2025-02-22 RX ORDER — INSULIN GLARGINE 100 [IU]/ML
15 INJECTION, SOLUTION SUBCUTANEOUS NIGHTLY
Status: DISCONTINUED | OUTPATIENT
Start: 2025-02-22 | End: 2025-02-26 | Stop reason: HOSPADM

## 2025-02-22 RX ORDER — DEXTROSE MONOHYDRATE 100 MG/ML
INJECTION, SOLUTION INTRAVENOUS CONTINUOUS PRN
Status: DISCONTINUED | OUTPATIENT
Start: 2025-02-22 | End: 2025-02-26 | Stop reason: HOSPADM

## 2025-02-22 RX ORDER — ACETAMINOPHEN 650 MG/1
650 SUPPOSITORY RECTAL EVERY 6 HOURS PRN
Status: DISCONTINUED | OUTPATIENT
Start: 2025-02-22 | End: 2025-02-26 | Stop reason: HOSPADM

## 2025-02-22 RX ORDER — ATORVASTATIN CALCIUM 40 MG/1
80 TABLET, FILM COATED ORAL NIGHTLY
Status: DISCONTINUED | OUTPATIENT
Start: 2025-02-22 | End: 2025-02-22

## 2025-02-22 RX ORDER — SODIUM CHLORIDE 0.9 % (FLUSH) 0.9 %
5-40 SYRINGE (ML) INJECTION PRN
Status: DISCONTINUED | OUTPATIENT
Start: 2025-02-22 | End: 2025-02-26 | Stop reason: HOSPADM

## 2025-02-22 RX ORDER — LABETALOL HYDROCHLORIDE 5 MG/ML
10 INJECTION, SOLUTION INTRAVENOUS EVERY 10 MIN PRN
Status: DISCONTINUED | OUTPATIENT
Start: 2025-02-22 | End: 2025-02-26 | Stop reason: HOSPADM

## 2025-02-22 RX ORDER — ASPIRIN 81 MG/1
81 TABLET ORAL 2 TIMES DAILY
Status: DISCONTINUED | OUTPATIENT
Start: 2025-02-22 | End: 2025-02-23

## 2025-02-22 RX ORDER — DIAZEPAM 10 MG/2ML
5 INJECTION, SOLUTION INTRAMUSCULAR; INTRAVENOUS EVERY 4 HOURS PRN
Status: DISCONTINUED | OUTPATIENT
Start: 2025-02-22 | End: 2025-02-26 | Stop reason: HOSPADM

## 2025-02-22 RX ORDER — CHOLESTYRAMINE LIGHT 4 G/5.7G
1 POWDER, FOR SUSPENSION ORAL 2 TIMES DAILY
Status: DISCONTINUED | OUTPATIENT
Start: 2025-02-22 | End: 2025-02-26 | Stop reason: HOSPADM

## 2025-02-22 RX ORDER — SODIUM CHLORIDE 9 MG/ML
INJECTION, SOLUTION INTRAVENOUS PRN
Status: DISCONTINUED | OUTPATIENT
Start: 2025-02-22 | End: 2025-02-26 | Stop reason: HOSPADM

## 2025-02-22 RX ORDER — POLYETHYLENE GLYCOL 3350 17 G/17G
17 POWDER, FOR SOLUTION ORAL DAILY PRN
Status: DISCONTINUED | OUTPATIENT
Start: 2025-02-22 | End: 2025-02-25

## 2025-02-22 RX ORDER — INSULIN LISPRO 100 [IU]/ML
0-8 INJECTION, SOLUTION INTRAVENOUS; SUBCUTANEOUS
Status: DISCONTINUED | OUTPATIENT
Start: 2025-02-22 | End: 2025-02-26 | Stop reason: HOSPADM

## 2025-02-22 RX ORDER — PROMETHAZINE HYDROCHLORIDE 12.5 MG/1
12.5 TABLET ORAL EVERY 12 HOURS PRN
Status: DISCONTINUED | OUTPATIENT
Start: 2025-02-22 | End: 2025-02-26 | Stop reason: HOSPADM

## 2025-02-22 RX ORDER — ATORVASTATIN CALCIUM 40 MG/1
80 TABLET, FILM COATED ORAL NIGHTLY
Status: DISCONTINUED | OUTPATIENT
Start: 2025-02-22 | End: 2025-02-26 | Stop reason: HOSPADM

## 2025-02-22 RX ORDER — MAGNESIUM SULFATE IN WATER 40 MG/ML
2000 INJECTION, SOLUTION INTRAVENOUS PRN
Status: DISCONTINUED | OUTPATIENT
Start: 2025-02-22 | End: 2025-02-26 | Stop reason: HOSPADM

## 2025-02-22 RX ORDER — PANTOPRAZOLE SODIUM 40 MG/1
40 TABLET, DELAYED RELEASE ORAL
Status: DISCONTINUED | OUTPATIENT
Start: 2025-02-22 | End: 2025-02-26 | Stop reason: HOSPADM

## 2025-02-22 RX ORDER — ASPIRIN 81 MG/1
81 TABLET, CHEWABLE ORAL DAILY
Status: DISCONTINUED | OUTPATIENT
Start: 2025-02-22 | End: 2025-02-22

## 2025-02-22 RX ORDER — POTASSIUM CHLORIDE 7.45 MG/ML
10 INJECTION INTRAVENOUS PRN
Status: DISCONTINUED | OUTPATIENT
Start: 2025-02-22 | End: 2025-02-26 | Stop reason: HOSPADM

## 2025-02-22 RX ORDER — ONDANSETRON 2 MG/ML
4 INJECTION INTRAMUSCULAR; INTRAVENOUS
Status: DISCONTINUED | OUTPATIENT
Start: 2025-02-22 | End: 2025-02-22

## 2025-02-22 RX ORDER — INSULIN LISPRO 100 [IU]/ML
5 INJECTION, SOLUTION INTRAVENOUS; SUBCUTANEOUS
Status: DISCONTINUED | OUTPATIENT
Start: 2025-02-22 | End: 2025-02-26 | Stop reason: HOSPADM

## 2025-02-22 RX ORDER — MORPHINE SULFATE 2 MG/ML
2 INJECTION, SOLUTION INTRAMUSCULAR; INTRAVENOUS
Status: CANCELLED | OUTPATIENT
Start: 2025-02-22 | End: 2025-02-22

## 2025-02-22 RX ADMIN — SODIUM CHLORIDE, PRESERVATIVE FREE 10 ML: 5 INJECTION INTRAVENOUS at 14:44

## 2025-02-22 RX ADMIN — ASPIRIN 81 MG: 81 TABLET, COATED ORAL at 13:02

## 2025-02-22 RX ADMIN — ACETAMINOPHEN 650 MG: 325 TABLET ORAL at 21:11

## 2025-02-22 RX ADMIN — ATORVASTATIN CALCIUM 80 MG: 40 TABLET, FILM COATED ORAL at 21:12

## 2025-02-22 RX ADMIN — ASPIRIN 81 MG: 81 TABLET, COATED ORAL at 21:11

## 2025-02-22 RX ADMIN — SODIUM CHLORIDE, PRESERVATIVE FREE 10 ML: 5 INJECTION INTRAVENOUS at 21:12

## 2025-02-22 RX ADMIN — WATER 1000 MG: 1 INJECTION INTRAMUSCULAR; INTRAVENOUS; SUBCUTANEOUS at 16:32

## 2025-02-22 RX ADMIN — ACETAMINOPHEN 650 MG: 325 TABLET ORAL at 12:53

## 2025-02-22 RX ADMIN — CHOLESTYRAMINE 4 G: 4 POWDER, FOR SUSPENSION ORAL at 13:02

## 2025-02-22 RX ADMIN — PREGABALIN 200 MG: 75 CAPSULE ORAL at 13:36

## 2025-02-22 RX ADMIN — PREGABALIN 200 MG: 75 CAPSULE ORAL at 21:11

## 2025-02-22 RX ADMIN — PROMETHAZINE HYDROCHLORIDE 12.5 MG: 12.5 TABLET ORAL at 18:26

## 2025-02-22 RX ADMIN — DIAZEPAM 5 MG: 5 INJECTION, SOLUTION INTRAMUSCULAR; INTRAVENOUS at 14:43

## 2025-02-22 RX ADMIN — INSULIN GLARGINE 15 UNITS: 100 INJECTION, SOLUTION SUBCUTANEOUS at 22:16

## 2025-02-22 RX ADMIN — CHOLESTYRAMINE 4 G: 4 POWDER, FOR SUSPENSION ORAL at 21:11

## 2025-02-22 RX ADMIN — PANTOPRAZOLE SODIUM 40 MG: 40 TABLET, DELAYED RELEASE ORAL at 13:02

## 2025-02-22 ASSESSMENT — PAIN SCALES - GENERAL
PAINLEVEL_OUTOF10: 5
PAINLEVEL_OUTOF10: 3
PAINLEVEL_OUTOF10: 0
PAINLEVEL_OUTOF10: 7
PAINLEVEL_OUTOF10: 4
PAINLEVEL_OUTOF10: 0

## 2025-02-22 ASSESSMENT — PAIN DESCRIPTION - FREQUENCY: FREQUENCY: INTERMITTENT

## 2025-02-22 ASSESSMENT — PAIN DESCRIPTION - DESCRIPTORS
DESCRIPTORS: CRAMPING
DESCRIPTORS: DISCOMFORT
DESCRIPTORS: DULL

## 2025-02-22 ASSESSMENT — PAIN DESCRIPTION - LOCATION
LOCATION: ABDOMEN

## 2025-02-22 ASSESSMENT — PAIN DESCRIPTION - ORIENTATION
ORIENTATION: MID
ORIENTATION: MID

## 2025-02-22 ASSESSMENT — PAIN DESCRIPTION - PAIN TYPE: TYPE: ACUTE PAIN

## 2025-02-22 ASSESSMENT — PAIN DESCRIPTION - ONSET: ONSET: GRADUAL

## 2025-02-22 NOTE — ED NOTES
Safe note: Case Dors to me by Dr. Trevino.  Patient had a aortic dissection repair a week ago.  She presented to ER with progressive symptoms going on for the past few days.    Consultation occurred with Dr. Shakeel Ochoa at [facility] for possible transfer.  Dr. Perry reviewed the CT scan patient aortic dissection that was repaired.  He states her aortic dissection repair was healing fine and there is no indication for transfer to the surgical unit [facility].  He is a patient be treated as a regular stroke hospitalist at [facility].    Consultation: Dr. Jace Tran.  He accepted patient for admission to telemetry     Matthew Dean MD  02/22/25 0116

## 2025-02-22 NOTE — ED PROVIDER NOTES
EMERGENCY DEPARTMENT HISTORY AND PHYSICAL EXAM      Patient Name: Priscilla Dean  MRN: [id number]  YOB: 1970  Provider: Shelbie Trevino MD  PCP: Zachery Lucio MD   Time/Date of evaluation: 8:09 PM EST on 2/21/25    History of Presenting Illness     Chief Complaint   Patient presents with    Vomiting    Nausea       History Provided By: Patient and Patient's Family     History (Narative):   Priscilla Dean is a 54 y.o. female with a PMHX of Crohn's disease, diabetes, hypertension, hyperlipidemia, pulmonary embolism and a recent aortic dissection requiring repair at [facility] On February 11, 2025  who presents to the emergency department  by POV C/O altered mental status, nausea, vomiting for the past 24 hours.        Past History     Past Medical History:  Past Medical History:   Diagnosis Date    Bilateral knee pain     Crohn's colitis (HCC)     Diabetes (HCC)     IDDM    HTN (hypertension)     Hypercholesterolemia     Pulmonary embolism (HCC) 03/2019    Stool color black     crohns       Past Surgical History:  Past Surgical History:   Procedure Laterality Date    ANKLE FRACTURE SURGERY Left 2018    COLONOSCOPY N/A 8/31/2022    COLONOSCOPY/ Biopsies performed by JASMINE Shetty MD at [facility] ENDOSCOPY    COLONOSCOPY N/A 7/12/2022    COLONOSCOPY performed by JASMINE Shetty MD at [facility] ENDOSCOPY    COLONOSCOPY N/A 9/22/2020    COLONOSCOPY with bx's performed by JASMINE Shetty MD at [facility] ENDOSCOPY    COLONOSCOPY N/A 10/21/2019    COLONOSCOPY w/ bxs performed by Wayne Cheema MD at [facility] ENDOSCOPY    FOREARM SURGERY Right 12/12/2023    RIGHT DISTAL RADIUS OPEN REDUCTION INTERNAL FIXATION, RIGHT THIRD METACARPAL OPEN REDUCTION INTERNAL FIXATION;MINI C-ARM, SUPRACLAVICULAR BLOCK; [ACUMED ORTHO] performed by Brett Mcneill DO at [facility] MAIN OR    REVISION TOTAL KNEE ARTHROPLASTY Left 8/26/2024    LEFT TOTA KNEE REVISION performed by Vish Orlando MD at [facility] MAIN OR    TOTAL KNEE ARTHROPLASTY Left

## 2025-02-22 NOTE — CONSENT
Endorsed note:    Case endorsed patient to meet with workup for CVA.  CT scan showed patient had a recent CVA.  Patient did not meet criteria for tPA secondary to CVA occurred outside of the window.      Consultation care with hospitalist Dr. Jace Tran.  He accepted patient for admission to telemetry.

## 2025-02-22 NOTE — FLOWSHEET NOTE
02/22/25 0700   Vital Signs   Pulse (!) 113   BP (!) 123/104   MAP (Calculated) 110   MAP (mmHg) 108   Oxygen Therapy   SpO2 99 %   Pulse via Oximetry 113 beats per minute     The vitals were provided to [facility].

## 2025-02-22 NOTE — ED NOTES
Patient assisted to standing position, using a can. Gait very unsteady and she was assisted back to the bed.

## 2025-02-22 NOTE — H&P
History and Physical        Subjective     Chief Complaint   Patient presents with    Vomiting    Nausea     HPI: Priscilla Dean is a 54 y.o. female with a PMHx Crohn's colitis on Stelara, hypertension, diabetes on insulin, ESBL UTI, history of recurrent C. difficile who presented to [facility] for confusion and altered mental status.  Patient fully alert and oriented able to contribute to history, no family were present to further help with history.  Patient reports that she was initially confused yesterday however she is at her baseline presently.  She presently endorses abdominal pain, dysuria and denies any additional complaints such as chest pain, shortness of breath, dizziness, headaches nausea or diarrhea.  She reports she completed a course of p.o. vancomycin due to recurrent C. difficile.  No recent changes in medications, she is compliant with her home medications.  Patient denies tobacco/illicit drug /alcohol use.    Workup in the ED revealed head CT with new oval 5 mm hypodensity superior right cerebellar cortex new compared to December 2024, no hemorrhage or other acute intracranial abnormalities.  CT chest abdomen pelvis pending prior to acceptance to [facility].  Lab nonacute.  COVID flu negative.  Hospitalist team asked to admit for further evaluation of stroke rule out.    PMHx:  Past Medical History:   Diagnosis Date    Bilateral knee pain     Crohn's colitis (HCC)     Diabetes (HCC)     IDDM    HTN (hypertension)     Hypercholesterolemia     Pulmonary embolism (HCC) 03/2019    Stool color black     crohns       PSurgHx:  Past Surgical History:   Procedure Laterality Date    ANKLE FRACTURE SURGERY Left 2018    COLONOSCOPY N/A 8/31/2022    COLONOSCOPY/ Biopsies performed by JASMINE Shetty MD at [facility] ENDOSCOPY    COLONOSCOPY N/A 7/12/2022    COLONOSCOPY performed by JASMINE Shetty MD at [facility] ENDOSCOPY    COLONOSCOPY N/A 9/22/2020    COLONOSCOPY with bx's performed by JASMINE Shetty MD at [facility] ENDOSCOPY

## 2025-02-22 NOTE — ED NOTES
Patient had an incontinent episode of urine ten minutes prior to MMT arrival. RN cleansed this patient and changed the linen and applied chux pad. Patient updated on means of transport and patient leaving soon. Report given to MMT.

## 2025-02-23 ENCOUNTER — APPOINTMENT (OUTPATIENT)
Facility: HOSPITAL | Age: 55
DRG: 065 | End: 2025-02-23
Payer: COMMERCIAL

## 2025-02-23 LAB
ALBUMIN SERPL-MCNC: 2.5 G/DL (ref 3.4–5)
ALBUMIN/GLOB SERPL: 0.7 (ref 0.8–1.7)
ALP SERPL-CCNC: 50 U/L (ref 45–117)
ALT SERPL-CCNC: 19 U/L (ref 13–56)
ANION GAP SERPL CALC-SCNC: 3 MMOL/L (ref 3–18)
ANION GAP SERPL CALC-SCNC: 7 MMOL/L (ref 3–18)
AST SERPL-CCNC: 11 U/L (ref 10–38)
BASOPHILS # BLD: 0.01 K/UL (ref 0–0.1)
BASOPHILS NFR BLD: 0.1 % (ref 0–2)
BILIRUB SERPL-MCNC: 0.4 MG/DL (ref 0.2–1)
BUN SERPL-MCNC: 10 MG/DL (ref 7–18)
BUN SERPL-MCNC: 60 MG/DL (ref 7–18)
BUN/CREAT SERPL: 14 (ref 12–20)
BUN/CREAT SERPL: 34 (ref 12–20)
CALCIUM SERPL-MCNC: 8.4 MG/DL (ref 8.5–10.1)
CALCIUM SERPL-MCNC: 8.8 MG/DL (ref 8.5–10.1)
CHLORIDE SERPL-SCNC: 104 MMOL/L (ref 100–111)
CHLORIDE SERPL-SCNC: 109 MMOL/L (ref 100–111)
CHOLEST SERPL-MCNC: 126 MG/DL
CO2 SERPL-SCNC: 27 MMOL/L (ref 21–32)
CO2 SERPL-SCNC: 32 MMOL/L (ref 21–32)
CREAT SERPL-MCNC: 0.73 MG/DL (ref 0.6–1.3)
CREAT SERPL-MCNC: 1.74 MG/DL (ref 0.6–1.3)
DIFFERENTIAL METHOD BLD: ABNORMAL
EOSINOPHIL # BLD: 0.36 K/UL (ref 0–0.4)
EOSINOPHIL NFR BLD: 3.3 % (ref 0–5)
ERYTHROCYTE [DISTWIDTH] IN BLOOD BY AUTOMATED COUNT: 17.6 % (ref 11.6–14.5)
ERYTHROCYTE [DISTWIDTH] IN BLOOD BY AUTOMATED COUNT: 22.2 % (ref 11.6–14.5)
EST. AVERAGE GLUCOSE BLD GHB EST-MCNC: 105 MG/DL
GLOBULIN SER CALC-MCNC: 3.4 G/DL (ref 2–4)
GLUCOSE BLD STRIP.AUTO-MCNC: 113 MG/DL (ref 70–110)
GLUCOSE BLD STRIP.AUTO-MCNC: 126 MG/DL (ref 70–110)
GLUCOSE BLD STRIP.AUTO-MCNC: 133 MG/DL (ref 70–110)
GLUCOSE BLD STRIP.AUTO-MCNC: 98 MG/DL (ref 70–110)
GLUCOSE SERPL-MCNC: 108 MG/DL (ref 74–99)
GLUCOSE SERPL-MCNC: 144 MG/DL (ref 74–99)
HBA1C MFR BLD: 5.3 % (ref 4.2–5.6)
HCT VFR BLD AUTO: 27.5 % (ref 35–45)
HCT VFR BLD AUTO: 31 % (ref 35–45)
HDLC SERPL-MCNC: 55 MG/DL (ref 40–60)
HDLC SERPL: 2.3 (ref 0–5)
HGB BLD-MCNC: 8.3 G/DL (ref 12–16)
HGB BLD-MCNC: 9.6 G/DL (ref 12–16)
IMM GRANULOCYTES # BLD AUTO: 0.05 K/UL (ref 0–0.04)
IMM GRANULOCYTES NFR BLD AUTO: 0.5 % (ref 0–0.5)
LDLC SERPL CALC-MCNC: 42.4 MG/DL (ref 0–100)
LIPID PANEL: NORMAL
LYMPHOCYTES # BLD: 0.92 K/UL (ref 0.9–3.6)
LYMPHOCYTES NFR BLD: 8.5 % (ref 21–52)
MAGNESIUM SERPL-MCNC: 2 MG/DL (ref 1.6–2.6)
MCH RBC QN AUTO: 26 PG (ref 24–34)
MCH RBC QN AUTO: 27.8 PG (ref 24–34)
MCHC RBC AUTO-ENTMCNC: 30.2 G/DL (ref 31–37)
MCHC RBC AUTO-ENTMCNC: 31 G/DL (ref 31–37)
MCV RBC AUTO: 84 FL (ref 78–100)
MCV RBC AUTO: 92 FL (ref 78–100)
MONOCYTES # BLD: 0.38 K/UL (ref 0.05–1.2)
MONOCYTES NFR BLD: 3.5 % (ref 3–10)
NEUTS SEG # BLD: 9.07 K/UL (ref 1.8–8)
NEUTS SEG NFR BLD: 84.1 % (ref 40–73)
NRBC # BLD: 0 K/UL (ref 0–0.01)
NRBC # BLD: 0 K/UL (ref 0–0.01)
NRBC BLD-RTO: 0 PER 100 WBC
NRBC BLD-RTO: 0 PER 100 WBC
PLATELET # BLD AUTO: 224 K/UL (ref 135–420)
PLATELET # BLD AUTO: 370 K/UL (ref 135–420)
PMV BLD AUTO: 11 FL (ref 9.2–11.8)
PMV BLD AUTO: 9.5 FL (ref 9.2–11.8)
POTASSIUM SERPL-SCNC: 2.8 MMOL/L (ref 3.5–5.5)
POTASSIUM SERPL-SCNC: 4 MMOL/L (ref 3.5–5.5)
PROT SERPL-MCNC: 5.9 G/DL (ref 6.4–8.2)
RBC # BLD AUTO: 2.99 M/UL (ref 4.2–5.3)
RBC # BLD AUTO: 3.69 M/UL (ref 4.2–5.3)
SODIUM SERPL-SCNC: 138 MMOL/L (ref 136–145)
SODIUM SERPL-SCNC: 144 MMOL/L (ref 136–145)
T4 FREE SERPL-MCNC: 1 NG/DL (ref 0.7–1.5)
TRIGL SERPL-MCNC: 143 MG/DL
TSH SERPL DL<=0.05 MIU/L-ACNC: 49.3 UIU/ML (ref 0.36–3.74)
VLDLC SERPL CALC-MCNC: 28.6 MG/DL
WBC # BLD AUTO: 10.8 K/UL (ref 4.6–13.2)
WBC # BLD AUTO: 8.1 K/UL (ref 4.6–13.2)

## 2025-02-23 PROCEDURE — 94761 N-INVAS EAR/PLS OXIMETRY MLT: CPT

## 2025-02-23 PROCEDURE — 85025 COMPLETE CBC W/AUTO DIFF WBC: CPT

## 2025-02-23 PROCEDURE — 70450 CT HEAD/BRAIN W/O DYE: CPT

## 2025-02-23 PROCEDURE — 82962 GLUCOSE BLOOD TEST: CPT

## 2025-02-23 PROCEDURE — 51798 US URINE CAPACITY MEASURE: CPT

## 2025-02-23 PROCEDURE — 87449 NOS EACH ORGANISM AG IA: CPT

## 2025-02-23 PROCEDURE — 84439 ASSAY OF FREE THYROXINE: CPT

## 2025-02-23 PROCEDURE — 97530 THERAPEUTIC ACTIVITIES: CPT

## 2025-02-23 PROCEDURE — 99222 1ST HOSP IP/OBS MODERATE 55: CPT | Performed by: PSYCHIATRY & NEUROLOGY

## 2025-02-23 PROCEDURE — 87493 C DIFF AMPLIFIED PROBE: CPT

## 2025-02-23 PROCEDURE — 6370000000 HC RX 637 (ALT 250 FOR IP): Performed by: STUDENT IN AN ORGANIZED HEALTH CARE EDUCATION/TRAINING PROGRAM

## 2025-02-23 PROCEDURE — 80061 LIPID PANEL: CPT

## 2025-02-23 PROCEDURE — 84443 ASSAY THYROID STIM HORMONE: CPT

## 2025-02-23 PROCEDURE — 6360000002 HC RX W HCPCS: Performed by: STUDENT IN AN ORGANIZED HEALTH CARE EDUCATION/TRAINING PROGRAM

## 2025-02-23 PROCEDURE — 97165 OT EVAL LOW COMPLEX 30 MIN: CPT

## 2025-02-23 PROCEDURE — 92610 EVALUATE SWALLOWING FUNCTION: CPT

## 2025-02-23 PROCEDURE — 83735 ASSAY OF MAGNESIUM: CPT

## 2025-02-23 PROCEDURE — 2500000003 HC RX 250 WO HCPCS: Performed by: STUDENT IN AN ORGANIZED HEALTH CARE EDUCATION/TRAINING PROGRAM

## 2025-02-23 PROCEDURE — 97162 PT EVAL MOD COMPLEX 30 MIN: CPT

## 2025-02-23 PROCEDURE — 85027 COMPLETE CBC AUTOMATED: CPT

## 2025-02-23 PROCEDURE — 83036 HEMOGLOBIN GLYCOSYLATED A1C: CPT

## 2025-02-23 PROCEDURE — 2580000003 HC RX 258: Performed by: HEALTH CARE PROVIDER

## 2025-02-23 PROCEDURE — 87324 CLOSTRIDIUM AG IA: CPT

## 2025-02-23 PROCEDURE — 36415 COLL VENOUS BLD VENIPUNCTURE: CPT

## 2025-02-23 PROCEDURE — 80053 COMPREHEN METABOLIC PANEL: CPT

## 2025-02-23 PROCEDURE — 97116 GAIT TRAINING THERAPY: CPT

## 2025-02-23 PROCEDURE — 1100000003 HC PRIVATE W/ TELEMETRY

## 2025-02-23 PROCEDURE — 99233 SBSQ HOSP IP/OBS HIGH 50: CPT | Performed by: STUDENT IN AN ORGANIZED HEALTH CARE EDUCATION/TRAINING PROGRAM

## 2025-02-23 RX ORDER — MECOBALAMIN 5000 MCG
5 TABLET,DISINTEGRATING ORAL NIGHTLY
Status: DISCONTINUED | OUTPATIENT
Start: 2025-02-23 | End: 2025-02-26 | Stop reason: HOSPADM

## 2025-02-23 RX ORDER — ZOLPIDEM TARTRATE 5 MG/1
5 TABLET ORAL NIGHTLY PRN
Status: DISCONTINUED | OUTPATIENT
Start: 2025-02-23 | End: 2025-02-26 | Stop reason: HOSPADM

## 2025-02-23 RX ORDER — ASPIRIN 81 MG/1
81 TABLET ORAL DAILY
Status: DISCONTINUED | OUTPATIENT
Start: 2025-02-24 | End: 2025-02-26 | Stop reason: HOSPADM

## 2025-02-23 RX ORDER — SODIUM CHLORIDE, SODIUM LACTATE, POTASSIUM CHLORIDE, CALCIUM CHLORIDE 600; 310; 30; 20 MG/100ML; MG/100ML; MG/100ML; MG/100ML
INJECTION, SOLUTION INTRAVENOUS CONTINUOUS
Status: DISCONTINUED | OUTPATIENT
Start: 2025-02-23 | End: 2025-02-25

## 2025-02-23 RX ORDER — ONDANSETRON 2 MG/ML
4 INJECTION INTRAMUSCULAR; INTRAVENOUS EVERY 6 HOURS PRN
Status: DISCONTINUED | OUTPATIENT
Start: 2025-02-23 | End: 2025-02-26 | Stop reason: HOSPADM

## 2025-02-23 RX ORDER — PREGABALIN 50 MG/1
150 CAPSULE ORAL 2 TIMES DAILY
Status: DISCONTINUED | OUTPATIENT
Start: 2025-02-24 | End: 2025-02-26 | Stop reason: HOSPADM

## 2025-02-23 RX ADMIN — WATER 1000 MG: 1 INJECTION INTRAMUSCULAR; INTRAVENOUS; SUBCUTANEOUS at 15:22

## 2025-02-23 RX ADMIN — ZOLPIDEM TARTRATE 5 MG: 5 TABLET ORAL at 21:26

## 2025-02-23 RX ADMIN — SODIUM CHLORIDE, POTASSIUM CHLORIDE, SODIUM LACTATE AND CALCIUM CHLORIDE: 600; 310; 30; 20 INJECTION, SOLUTION INTRAVENOUS at 07:21

## 2025-02-23 RX ADMIN — POTASSIUM CHLORIDE 10 MEQ: 7.46 INJECTION, SOLUTION INTRAVENOUS at 08:39

## 2025-02-23 RX ADMIN — SODIUM CHLORIDE, PRESERVATIVE FREE 10 ML: 5 INJECTION INTRAVENOUS at 21:27

## 2025-02-23 RX ADMIN — ONDANSETRON 4 MG: 2 INJECTION, SOLUTION INTRAMUSCULAR; INTRAVENOUS at 21:26

## 2025-02-23 RX ADMIN — POTASSIUM CHLORIDE 10 MEQ: 7.46 INJECTION, SOLUTION INTRAVENOUS at 10:00

## 2025-02-23 RX ADMIN — ASPIRIN 81 MG: 81 TABLET, COATED ORAL at 08:40

## 2025-02-23 RX ADMIN — ATORVASTATIN CALCIUM 80 MG: 40 TABLET, FILM COATED ORAL at 21:28

## 2025-02-23 RX ADMIN — POTASSIUM CHLORIDE 10 MEQ: 7.46 INJECTION, SOLUTION INTRAVENOUS at 07:21

## 2025-02-23 RX ADMIN — SODIUM CHLORIDE, PRESERVATIVE FREE 10 ML: 5 INJECTION INTRAVENOUS at 08:40

## 2025-02-23 RX ADMIN — PREGABALIN 200 MG: 75 CAPSULE ORAL at 08:40

## 2025-02-23 RX ADMIN — PANTOPRAZOLE SODIUM 40 MG: 40 TABLET, DELAYED RELEASE ORAL at 07:27

## 2025-02-23 RX ADMIN — INSULIN GLARGINE 15 UNITS: 100 INJECTION, SOLUTION SUBCUTANEOUS at 21:21

## 2025-02-23 RX ADMIN — PROMETHAZINE HYDROCHLORIDE 12.5 MG: 12.5 TABLET ORAL at 18:34

## 2025-02-23 RX ADMIN — POTASSIUM CHLORIDE 10 MEQ: 7.46 INJECTION, SOLUTION INTRAVENOUS at 17:51

## 2025-02-23 RX ADMIN — CHOLESTYRAMINE 4 G: 4 POWDER, FOR SUSPENSION ORAL at 08:40

## 2025-02-23 RX ADMIN — POTASSIUM CHLORIDE 10 MEQ: 7.46 INJECTION, SOLUTION INTRAVENOUS at 04:55

## 2025-02-23 RX ADMIN — POTASSIUM CHLORIDE 10 MEQ: 7.46 INJECTION, SOLUTION INTRAVENOUS at 16:40

## 2025-02-23 ASSESSMENT — PAIN SCALES - GENERAL
PAINLEVEL_OUTOF10: 0

## 2025-02-23 NOTE — PLAN OF CARE
Problem: Safety - Adult  Goal: Free from fall injury  Outcome: Progressing  Note: Patient to utilize call bell when needing to ambulate.     Problem: Chronic Conditions and Co-morbidities  Goal: Patient's chronic conditions and co-morbidity symptoms are monitored and maintained or improved  Outcome: Progressing  Flowsheets (Taken 2/23/2025 1516)  Care Plan - Patient's Chronic Conditions and Co-Morbidity Symptoms are Monitored and Maintained or Improved: Monitor and assess patient's chronic conditions and comorbid symptoms for stability, deterioration, or improvement  Note: Monitor labs and vitals; Medicate per MAR.     Problem: Discharge Planning  Goal: Discharge to home or other facility with appropriate resources  Outcome: Progressing  Note: Identify barriers prior to discharge.     Problem: Pain  Goal: Verbalizes/displays adequate comfort level or baseline comfort level  Outcome: Progressing  Note: Patient to verbalize pain via call bell or during rounding.     Problem: Physical Therapy - Adult  Goal: By Discharge: Performs mobility at highest level of function for planned discharge setting.  See evaluation for individualized goals.  Description: Initiated  2/23/25  to be met within 7-10 days.    1.  Patient will move from supine to sit and sit to supine , scoot up and down, and roll side to side in bed with independence.    2.  Patient will transfer from bed to chair and chair to bed with modified independence using the least restrictive device.  3.  Patient will perform sit to stand with modified independence.  4.  Patient will ambulate with modified independence for 100 feet with the least restrictive device.   5.  Patient will ascend/descend 1 stairs with 0 handrail(s) with modified independence.    PLOF: Lives alone in a 1 story home with level entry. Mod I SPC prior to admission.    2/23/2025 1003 by Eleonora Lee, PT  Outcome: Progressing

## 2025-02-23 NOTE — PLAN OF CARE
Problem: Safety - Adult  Goal: Free from fall injury  Note: [place]; Standard safety precautions.     Problem: Chronic Conditions and Co-morbidities  Goal: Patient's chronic conditions and co-morbidity symptoms are monitored and maintained or improved  Note: Monitor labs and vitals; Medicate per MAR.     Problem: Discharge Planning  Goal: Discharge to home or other facility with appropriate resources  Note: Identify barriers prior to discharge.     Problem: Pain  Goal: Verbalizes/displays adequate comfort level or baseline comfort level  Note: Patient to report pain via call bell or during rounding     Problem: Confusion  Goal: Confusion, delirium, dementia, or psychosis is improved or at baseline  Description: INTERVENTIONS:  1. Assess for possible contributors to thought disturbance, including medications, impaired vision or hearing, underlying metabolic abnormalities, dehydration, psychiatric diagnoses, and notify attending LIP  2. [place] high risk fall precautions, as indicated  3. Provide frequent short contacts to provide reality reorientation, refocusing and direction  4. Decrease environmental stimuli, including noise as appropriate  5. Monitor and intervene to maintain adequate nutrition, hydration, elimination, sleep and activity  6. If unable to ensure safety without constant attention obtain sitter and review sitter guidelines with assigned personnel  7. Initiate Psychosocial CNS and Spiritual Care consult, as indicated  Flowsheets (Taken 2/22/2025 2101)  Effect of thought disturbance (confusion, delirium, dementia, or psychosis) are managed with adequate functional status: Assess for contributors to thought disturbance, including medications, impaired vision or hearing, underlying metabolic abnormalities, dehydration, psychiatric diagnoses, notify LIP  Note: NIHSS      show

## 2025-02-23 NOTE — PLAN OF CARE
Problem: SLP Adult - Impaired Swallowing  Goal: By Discharge: Advance to least restrictive diet without signs or symptoms of aspiration for planned discharge setting.  See evaluation for individualized goals.  Description: Patient will:  1. Tolerate PO trials with 0 s/s overt distress in 4/5 trials  2. Utilize compensatory swallow strategies/maneuvers (decrease bite/sip, size/rate, alt. liq/sol) with min cues in 4/5 trials    Rec:     Regular diet with thin liquids  Aspiration precautions  HOB >45 during po intake, remain >30 for 30-45 minutes after po   Small bites/sips; alternate liquid/solid with slow feeding rate   Oral care TID  Meds per pt preference  Outcome: Progressing   SPEECH LANGUAGE PATHOLOGY BEDSIDE SWALLOW EVALUATION/TREATMENT    Patient: Priscilla Dean (54 y.o. female)  Date: 2/23/2025  Primary Diagnosis: Encephalopathy acute [G93.40]  Acute CVA (cerebrovascular accident) (HCC) [I63.9]  Nausea and vomiting, unspecified vomiting type [R11.2]       Precautions: Aspiration  PLOF: As per H&P  ASSESSMENT:  Based on the objective data described below, the patient presents with oropharyngeal swallow fxn essentially WFL. Strength, ROM, and coordination of the orofacial musculature were all found to be WFL. Pt tolerated reg solid, puree, and thin liquids +/- straw consecutive swallows without any overt s/sx of aspiration. Mastication was appropriate with timely a-p transit. Positive oral clearance observed across all trials. Laryngeal elevation was functional/timely to palpation with all PO trials. Speech production was fluent; no dysarthria observed. Expressive/receptive speech production appeared WFL to informal observation. Pt communicated in sentences of appropriate form, content, and use. Social conversation appropriate. Pt safe for continuation of reg solid diet with thin liquids, aspiration precautions, oral care TID, and meds as tolerated. ST will continue to follow x 1-2 visits to ensure safety of

## 2025-02-23 NOTE — PLAN OF CARE
Problem: Physical Therapy - Adult  Goal: By Discharge: Performs mobility at highest level of function for planned discharge setting.  See evaluation for individualized goals.  Description: Initiated  2/23/25  to be met within 7-10 days.    1.  Patient will move from supine to sit and sit to supine , scoot up and down, and roll side to side in bed with independence.    2.  Patient will transfer from bed to chair and chair to bed with modified independence using the least restrictive device.  3.  Patient will perform sit to stand with modified independence.  4.  Patient will ambulate with modified independence for 100 feet with the least restrictive device.   5.  Patient will ascend/descend 1 stairs with 0 handrail(s) with modified independence.    PLOF: Lives alone in a 1 story home with level entry. Mod I SPC prior to admission.    Outcome: Progressing   PHYSICAL THERAPY EVALUATION    Patient: Priscilla Dean (54 y.o. female)  Date: 2/23/2025  Primary Diagnosis: Encephalopathy acute [G93.40]  Acute CVA (cerebrovascular accident) (HCC) [I63.9]  Nausea and vomiting, unspecified vomiting type [R11.2]       Precautions: Isolation, Contact Precautions, Fall Risk,  ,  ,  ,  ,  ,  ,      ASSESSMENT :  Pt cleared by nursing. Pt received in bed in NAD and educated on PT role and evaluation process,agreeable. AOx4, though requires increase time to answer questions. Sup to sit supervision and good seated balance at EOB, RLE 4+/5 for knee extension, hip flexion, and ankle DF/PF; LLE 3+/5 knee extension, hip flexion, and 4/5 ankle DF/PF (PMHx + for L TKA and quad tendon repair/revision per pt). Sit to stand CGA and ambulates within room with SPC with min A at trunk for safety. Pt's overall movement is slow, and displayed good safety awareness this date. Educated pt on safety and able to recall back that she needs to call for assistance for OOB/OOC mobility. Educated on exercises to complete throughout day. Pt is set up for

## 2025-02-23 NOTE — CARE COORDINATION
Met with patient in room. HIPAA verified. Initial case management admission assessment completed with patient's permission. Patient Demographics verified. Patient's preference for disposition at discharge is home with possible home health services. HOME HEALTH referrals sent in [facility] proactively. Patient states that her daughter will transport her home at time of discharge.       02/23/25 1039   Service Assessment   Patient Orientation Alert and Oriented   Cognition Alert   History Provided By Patient   Primary Caregiver Self   Accompanied By/Relationship no one at this time   Patient's Healthcare Decision Maker is: Legal Next of Kin   PCP Verified by CM Yes   Last Visit to PCP Within last 3 months   Prior Functional Level Independent in ADLs/IADLs   Current Functional Level Independent in ADLs/IADLs   Can patient return to prior living arrangement Yes   Ability to make needs known: Good   Family able to assist with home care needs: Yes   Would you like for me to discuss the discharge plan with any other family members/significant others, and if so, who? Yes  (father)   Financial Resources Other (Comment)   Community Resources None   Social/Functional History   Lives With Alone   Type of Home House   Home Layout One level   Home Access Level entry   Bathroom Shower/Tub Tub/Shower unit   Bathroom Toilet Standard   Bathroom Equipment Shower chair   Bathroom Accessibility Accessible   Home Equipment Cane;Walker - Rolling   Receives Help From Family   Prior Level of Assist for ADLs Independent   Prior Level of Assist for Homemaking Independent   Homemaking Responsibilities Yes   Ambulation Assistance Independent   Prior Level of Assist for Transfers Independent   Active  Yes   Mode of Transportation Car   Occupation Full time employment   Type of Occupation 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  Out on [place] after having an aortic dissection at [facility] at the begining of February.   Discharge Planning   Type of

## 2025-02-24 ENCOUNTER — APPOINTMENT (OUTPATIENT)
Facility: HOSPITAL | Age: 55
DRG: 065 | End: 2025-02-24
Attending: STUDENT IN AN ORGANIZED HEALTH CARE EDUCATION/TRAINING PROGRAM
Payer: COMMERCIAL

## 2025-02-24 ENCOUNTER — APPOINTMENT (OUTPATIENT)
Facility: HOSPITAL | Age: 55
DRG: 065 | End: 2025-02-24
Payer: COMMERCIAL

## 2025-02-24 ENCOUNTER — APPOINTMENT (OUTPATIENT)
Facility: HOSPITAL | Age: 55
DRG: 065 | End: 2025-02-24
Attending: PSYCHIATRY & NEUROLOGY
Payer: COMMERCIAL

## 2025-02-24 LAB
ANION GAP SERPL CALC-SCNC: 4 MMOL/L (ref 3–18)
BACTERIA SPEC CULT: NORMAL
BASOPHILS # BLD: 0.05 K/UL (ref 0–0.1)
BASOPHILS NFR BLD: 0.7 % (ref 0–2)
BUN SERPL-MCNC: 9 MG/DL (ref 7–18)
BUN/CREAT SERPL: 12 (ref 12–20)
C DIFF TOXIN INTERPRETATION: ABNORMAL
C. DIFFICILE TOXIN MOLECULAR: POSITIVE
CALCIUM SERPL-MCNC: 8.8 MG/DL (ref 8.5–10.1)
CC UR VC: NORMAL
CHLORIDE SERPL-SCNC: 104 MMOL/L (ref 100–111)
CO2 SERPL-SCNC: 29 MMOL/L (ref 21–32)
CREAT SERPL-MCNC: 0.76 MG/DL (ref 0.6–1.3)
DIFFERENTIAL METHOD BLD: ABNORMAL
ECHO AO ASC DIAM: 3.5 CM
ECHO AO ASCENDING AORTA INDEX: 1.54 CM/M2
ECHO AO ROOT DIAM: 3.3 CM
ECHO AO ROOT INDEX: 1.45 CM/M2
ECHO AV PEAK GRADIENT: 9 MMHG
ECHO AV PEAK VELOCITY: 1.5 M/S
ECHO AV VELOCITY RATIO: 0.67
ECHO BSA: 2.32 M2
ECHO LA DIAMETER INDEX: 1.45 CM/M2
ECHO LA DIAMETER: 3.3 CM
ECHO LA TO AORTIC ROOT RATIO: 1
ECHO LA VOL A-L A2C: 53 ML (ref 22–52)
ECHO LA VOL A-L A4C: 71 ML (ref 22–52)
ECHO LA VOL BP: 58 ML (ref 22–52)
ECHO LA VOL MOD A2C: 49 ML (ref 22–52)
ECHO LA VOL MOD A4C: 65 ML (ref 22–52)
ECHO LA VOL/BSA BIPLANE: 25 ML/M2 (ref 16–34)
ECHO LA VOLUME AREA LENGTH: 62 ML
ECHO LA VOLUME INDEX A-L A2C: 23 ML/M2 (ref 16–34)
ECHO LA VOLUME INDEX A-L A4C: 31 ML/M2 (ref 16–34)
ECHO LA VOLUME INDEX AREA LENGTH: 27 ML/M2 (ref 16–34)
ECHO LA VOLUME INDEX MOD A2C: 21 ML/M2 (ref 16–34)
ECHO LA VOLUME INDEX MOD A4C: 29 ML/M2 (ref 16–34)
ECHO LV E' LATERAL VELOCITY: 5.35 CM/S
ECHO LV E' SEPTAL VELOCITY: 8.12 CM/S
ECHO LV EF PHYSICIAN: 57 %
ECHO LV FRACTIONAL SHORTENING: 27 % (ref 28–44)
ECHO LV INTERNAL DIMENSION DIASTOLE INDEX: 2.24 CM/M2
ECHO LV INTERNAL DIMENSION DIASTOLIC: 5.1 CM (ref 3.9–5.3)
ECHO LV INTERNAL DIMENSION SYSTOLIC INDEX: 1.62 CM/M2
ECHO LV INTERNAL DIMENSION SYSTOLIC: 3.7 CM
ECHO LV IVSD: 1.2 CM (ref 0.6–0.9)
ECHO LV MASS 2D: 241.2 G (ref 67–162)
ECHO LV MASS INDEX 2D: 105.8 G/M2 (ref 43–95)
ECHO LV POSTERIOR WALL DIASTOLIC: 1.2 CM (ref 0.6–0.9)
ECHO LV RELATIVE WALL THICKNESS RATIO: 0.47
ECHO LVOT PEAK GRADIENT: 4 MMHG
ECHO LVOT PEAK VELOCITY: 1 M/S
ECHO MV A VELOCITY: 0.73 M/S
ECHO MV E DECELERATION TIME (DT): 203.9 MS
ECHO MV E VELOCITY: 0.76 M/S
ECHO MV E/A RATIO: 1.04
ECHO MV E/E' LATERAL: 14.21
ECHO MV E/E' RATIO (AVERAGED): 11.78
ECHO MV E/E' SEPTAL: 9.36
ECHO PV MAX VELOCITY: 1.1 M/S
ECHO PV PEAK GRADIENT: 5 MMHG
EOSINOPHIL # BLD: 0.08 K/UL (ref 0–0.4)
EOSINOPHIL NFR BLD: 1.1 % (ref 0–5)
ERYTHROCYTE [DISTWIDTH] IN BLOOD BY AUTOMATED COUNT: 22.2 % (ref 11.6–14.5)
GLUCOSE BLD STRIP.AUTO-MCNC: 171 MG/DL (ref 70–110)
GLUCOSE BLD STRIP.AUTO-MCNC: 75 MG/DL (ref 70–110)
GLUCOSE BLD STRIP.AUTO-MCNC: 76 MG/DL (ref 70–110)
GLUCOSE BLD STRIP.AUTO-MCNC: 87 MG/DL (ref 70–110)
GLUCOSE BLD STRIP.AUTO-MCNC: 94 MG/DL (ref 70–110)
GLUCOSE SERPL-MCNC: 80 MG/DL (ref 74–99)
HCT VFR BLD AUTO: 28.3 % (ref 35–45)
HGB BLD-MCNC: 8.6 G/DL (ref 12–16)
IMM GRANULOCYTES # BLD AUTO: 0.04 K/UL (ref 0–0.04)
IMM GRANULOCYTES NFR BLD AUTO: 0.6 % (ref 0–0.5)
LYMPHOCYTES # BLD: 1.11 K/UL (ref 0.9–3.6)
LYMPHOCYTES NFR BLD: 15.7 % (ref 21–52)
MAGNESIUM SERPL-MCNC: 2.7 MG/DL (ref 1.6–2.6)
MCH RBC QN AUTO: 25.8 PG (ref 24–34)
MCHC RBC AUTO-ENTMCNC: 30.4 G/DL (ref 31–37)
MCV RBC AUTO: 85 FL (ref 78–100)
MONOCYTES # BLD: 0.53 K/UL (ref 0.05–1.2)
MONOCYTES NFR BLD: 7.5 % (ref 3–10)
NEUTS SEG # BLD: 5.24 K/UL (ref 1.8–8)
NEUTS SEG NFR BLD: 74.4 % (ref 40–73)
NRBC # BLD: 0 K/UL (ref 0–0.01)
NRBC BLD-RTO: 0 PER 100 WBC
PLATELET # BLD AUTO: 363 K/UL (ref 135–420)
PMV BLD AUTO: 10.9 FL (ref 9.2–11.8)
POTASSIUM SERPL-SCNC: 3.5 MMOL/L (ref 3.5–5.5)
RBC # BLD AUTO: 3.33 M/UL (ref 4.2–5.3)
REFLEX: ABNORMAL
SERVICE CMNT-IMP: NORMAL
SERVICE CMNT-IMP: NORMAL
SODIUM SERPL-SCNC: 137 MMOL/L (ref 136–145)
TROPONIN I SERPL HS-MCNC: <3 NG/L (ref 0–54)
WBC # BLD AUTO: 7.1 K/UL (ref 4.6–13.2)

## 2025-02-24 PROCEDURE — 6370000000 HC RX 637 (ALT 250 FOR IP): Performed by: STUDENT IN AN ORGANIZED HEALTH CARE EDUCATION/TRAINING PROGRAM

## 2025-02-24 PROCEDURE — 95816 EEG AWAKE AND DROWSY: CPT

## 2025-02-24 PROCEDURE — 2500000003 HC RX 250 WO HCPCS: Performed by: STUDENT IN AN ORGANIZED HEALTH CARE EDUCATION/TRAINING PROGRAM

## 2025-02-24 PROCEDURE — 82962 GLUCOSE BLOOD TEST: CPT

## 2025-02-24 PROCEDURE — 2140000001 HC CVICU INTERMEDIATE R&B

## 2025-02-24 PROCEDURE — 6360000002 HC RX W HCPCS: Performed by: STUDENT IN AN ORGANIZED HEALTH CARE EDUCATION/TRAINING PROGRAM

## 2025-02-24 PROCEDURE — 93306 TTE W/DOPPLER COMPLETE: CPT

## 2025-02-24 PROCEDURE — 71045 X-RAY EXAM CHEST 1 VIEW: CPT

## 2025-02-24 PROCEDURE — 83735 ASSAY OF MAGNESIUM: CPT

## 2025-02-24 PROCEDURE — 99231 SBSQ HOSP IP/OBS SF/LOW 25: CPT | Performed by: PSYCHIATRY & NEUROLOGY

## 2025-02-24 PROCEDURE — 93005 ELECTROCARDIOGRAM TRACING: CPT | Performed by: STUDENT IN AN ORGANIZED HEALTH CARE EDUCATION/TRAINING PROGRAM

## 2025-02-24 PROCEDURE — 36415 COLL VENOUS BLD VENIPUNCTURE: CPT

## 2025-02-24 PROCEDURE — 2580000003 HC RX 258

## 2025-02-24 PROCEDURE — 85025 COMPLETE CBC W/AUTO DIFF WBC: CPT

## 2025-02-24 PROCEDURE — 93880 EXTRACRANIAL BILAT STUDY: CPT

## 2025-02-24 PROCEDURE — 80048 BASIC METABOLIC PNL TOTAL CA: CPT

## 2025-02-24 PROCEDURE — 84484 ASSAY OF TROPONIN QUANT: CPT

## 2025-02-24 RX ORDER — MORPHINE SULFATE 2 MG/ML
2 INJECTION, SOLUTION INTRAMUSCULAR; INTRAVENOUS EVERY 4 HOURS PRN
Status: DISCONTINUED | OUTPATIENT
Start: 2025-02-24 | End: 2025-02-24

## 2025-02-24 RX ORDER — CELECOXIB 100 MG/1
200 CAPSULE ORAL 2 TIMES DAILY
Status: DISCONTINUED | OUTPATIENT
Start: 2025-02-24 | End: 2025-02-26 | Stop reason: HOSPADM

## 2025-02-24 RX ORDER — MORPHINE SULFATE 2 MG/ML
2 INJECTION, SOLUTION INTRAMUSCULAR; INTRAVENOUS EVERY 4 HOURS PRN
Status: DISCONTINUED | OUTPATIENT
Start: 2025-02-24 | End: 2025-02-26 | Stop reason: HOSPADM

## 2025-02-24 RX ORDER — KETOROLAC TROMETHAMINE 15 MG/ML
15 INJECTION, SOLUTION INTRAMUSCULAR; INTRAVENOUS EVERY 6 HOURS PRN
Status: DISCONTINUED | OUTPATIENT
Start: 2025-02-24 | End: 2025-02-25

## 2025-02-24 RX ORDER — SODIUM CHLORIDE, SODIUM LACTATE, POTASSIUM CHLORIDE, AND CALCIUM CHLORIDE .6; .31; .03; .02 G/100ML; G/100ML; G/100ML; G/100ML
1000 INJECTION, SOLUTION INTRAVENOUS ONCE
Status: COMPLETED | OUTPATIENT
Start: 2025-02-24 | End: 2025-02-24

## 2025-02-24 RX ORDER — LIDOCAINE 4 G/G
1 PATCH TOPICAL DAILY
Status: DISCONTINUED | OUTPATIENT
Start: 2025-02-24 | End: 2025-02-26 | Stop reason: HOSPADM

## 2025-02-24 RX ORDER — SODIUM CHLORIDE, SODIUM LACTATE, POTASSIUM CHLORIDE, AND CALCIUM CHLORIDE .6; .31; .03; .02 G/100ML; G/100ML; G/100ML; G/100ML
INJECTION, SOLUTION INTRAVENOUS CONTINUOUS PRN
Status: COMPLETED | OUTPATIENT
Start: 2025-02-24 | End: 2025-02-24

## 2025-02-24 RX ADMIN — CELECOXIB 200 MG: 100 CAPSULE ORAL at 20:31

## 2025-02-24 RX ADMIN — INSULIN GLARGINE 15 UNITS: 100 INJECTION, SOLUTION SUBCUTANEOUS at 21:07

## 2025-02-24 RX ADMIN — KETOROLAC TROMETHAMINE 15 MG: 15 INJECTION, SOLUTION INTRAMUSCULAR; INTRAVENOUS at 07:31

## 2025-02-24 RX ADMIN — PREGABALIN 150 MG: 50 CAPSULE ORAL at 20:31

## 2025-02-24 RX ADMIN — PROMETHAZINE HYDROCHLORIDE 12.5 MG: 12.5 TABLET ORAL at 00:04

## 2025-02-24 RX ADMIN — WATER 1000 MG: 1 INJECTION INTRAMUSCULAR; INTRAVENOUS; SUBCUTANEOUS at 16:04

## 2025-02-24 RX ADMIN — SODIUM CHLORIDE, SODIUM LACTATE, POTASSIUM CHLORIDE, AND CALCIUM CHLORIDE 1000 ML: 600; 310; 30; 20 INJECTION, SOLUTION INTRAVENOUS at 07:19

## 2025-02-24 RX ADMIN — Medication 5 MG: at 00:04

## 2025-02-24 RX ADMIN — Medication 5 MG: at 20:33

## 2025-02-24 RX ADMIN — ATORVASTATIN CALCIUM 80 MG: 40 TABLET, FILM COATED ORAL at 20:31

## 2025-02-24 RX ADMIN — ZOLPIDEM TARTRATE 5 MG: 5 TABLET ORAL at 20:31

## 2025-02-24 RX ADMIN — VANCOMYCIN HYDROCHLORIDE 125 MG: 1 INJECTION, POWDER, LYOPHILIZED, FOR SOLUTION INTRAVENOUS at 23:08

## 2025-02-24 RX ADMIN — SODIUM CHLORIDE, SODIUM LACTATE, POTASSIUM CHLORIDE, AND CALCIUM CHLORIDE 1000 ML: .6; .31; .03; .02 INJECTION, SOLUTION INTRAVENOUS at 07:21

## 2025-02-24 RX ADMIN — ONDANSETRON 4 MG: 2 INJECTION, SOLUTION INTRAMUSCULAR; INTRAVENOUS at 16:58

## 2025-02-24 RX ADMIN — VANCOMYCIN HYDROCHLORIDE 125 MG: 1 INJECTION, POWDER, LYOPHILIZED, FOR SOLUTION INTRAVENOUS at 17:00

## 2025-02-24 RX ADMIN — MORPHINE SULFATE 2 MG: 2 INJECTION, SOLUTION INTRAMUSCULAR; INTRAVENOUS at 20:32

## 2025-02-24 RX ADMIN — SODIUM CHLORIDE, PRESERVATIVE FREE 10 ML: 5 INJECTION INTRAVENOUS at 10:34

## 2025-02-24 RX ADMIN — ONDANSETRON 4 MG: 2 INJECTION, SOLUTION INTRAMUSCULAR; INTRAVENOUS at 10:00

## 2025-02-24 RX ADMIN — SODIUM CHLORIDE, PRESERVATIVE FREE 10 ML: 5 INJECTION INTRAVENOUS at 20:35

## 2025-02-24 RX ADMIN — CELECOXIB 200 MG: 100 CAPSULE ORAL at 16:03

## 2025-02-24 RX ADMIN — MORPHINE SULFATE 2 MG: 2 INJECTION, SOLUTION INTRAMUSCULAR; INTRAVENOUS at 12:07

## 2025-02-24 RX ADMIN — MORPHINE SULFATE 2 MG: 2 INJECTION, SOLUTION INTRAMUSCULAR; INTRAVENOUS at 16:05

## 2025-02-24 ASSESSMENT — PAIN DESCRIPTION - DESCRIPTORS
DESCRIPTORS: CRUSHING
DESCRIPTORS: PRESSURE
DESCRIPTORS: ACHING
DESCRIPTORS: ACHING;DISCOMFORT
DESCRIPTORS: PRESSURE
DESCRIPTORS: CRUSHING

## 2025-02-24 ASSESSMENT — PAIN DESCRIPTION - FREQUENCY
FREQUENCY: INTERMITTENT
FREQUENCY: CONTINUOUS
FREQUENCY: INTERMITTENT
FREQUENCY: INTERMITTENT

## 2025-02-24 ASSESSMENT — PAIN DESCRIPTION - PAIN TYPE
TYPE: ACUTE PAIN

## 2025-02-24 ASSESSMENT — PAIN DESCRIPTION - LOCATION
LOCATION: CHEST;BACK
LOCATION: CHEST

## 2025-02-24 ASSESSMENT — PAIN SCALES - GENERAL
PAINLEVEL_OUTOF10: 0
PAINLEVEL_OUTOF10: 4
PAINLEVEL_OUTOF10: 0
PAINLEVEL_OUTOF10: 0
PAINLEVEL_OUTOF10: 8
PAINLEVEL_OUTOF10: 8
PAINLEVEL_OUTOF10: 7
PAINLEVEL_OUTOF10: 7
PAINLEVEL_OUTOF10: 8
PAINLEVEL_OUTOF10: 5

## 2025-02-24 ASSESSMENT — PAIN DESCRIPTION - ONSET
ONSET: PROGRESSIVE
ONSET: SUDDEN
ONSET: PROGRESSIVE

## 2025-02-24 ASSESSMENT — PAIN DESCRIPTION - ORIENTATION
ORIENTATION: ANTERIOR
ORIENTATION: MID
ORIENTATION: ANTERIOR
ORIENTATION: LEFT;ANTERIOR
ORIENTATION: RIGHT
ORIENTATION: RIGHT

## 2025-02-24 ASSESSMENT — PAIN - FUNCTIONAL ASSESSMENT
PAIN_FUNCTIONAL_ASSESSMENT: PREVENTS OR INTERFERES SOME ACTIVE ACTIVITIES AND ADLS
PAIN_FUNCTIONAL_ASSESSMENT: PREVENTS OR INTERFERES SOME ACTIVE ACTIVITIES AND ADLS
PAIN_FUNCTIONAL_ASSESSMENT: ACTIVITIES ARE NOT PREVENTED
PAIN_FUNCTIONAL_ASSESSMENT: PREVENTS OR INTERFERES SOME ACTIVE ACTIVITIES AND ADLS
PAIN_FUNCTIONAL_ASSESSMENT: PREVENTS OR INTERFERES SOME ACTIVE ACTIVITIES AND ADLS

## 2025-02-24 ASSESSMENT — PAIN DESCRIPTION - DIRECTION: RADIATING_TOWARDS: BACK

## 2025-02-24 NOTE — SIGNIFICANT EVENT
[facility] Medicine  Rapid/Medical Response Team Note      Patient:    Priscilla Dean 54 y.o. female, : 1970  Patient MRN: [id number]    Admission Date: 2025   Admission Diagnosis: Encephalopathy acute [G93.40]  Acute CVA (cerebrovascular accident) (HCC) [I63.9]  Nausea and vomiting, unspecified vomiting type [R11.2]      RAPID RESPONSE  Rapid response called for: Chest pain    Responded to RRT just before 8 AM.  Patient with pleuritic type chest pain.    EKG was normal sinus rate and rhythm; no ischemic changes observed.    Stat portable CXR that was significant for elevated left hemidiaphragm with small left pleural effusion and atelectasis; seem to be unchanged from previous image.    BP taken with machine was elevated on left side and decreased on right side.  Verified with manual BP readings.  Readings bilaterally when taken manually were low (96/73, 84/60).  Gave 1 L bolus of fluid.    Stat troponin flat    Called patient primary hospitalist.  Ordered Toradol for pain (avoid morphine for soft BP readings).  Overnight attending took over at this point.    Responded to another RRT about an hour later.  No changes in presentation from original rapid; reported continued chest pain with no relief.  Daytime attending to call    OBJECTIVE    RRT/MRT start time:               RRT/MRT end time:  Vitals:    25 1207   BP:    Pulse:    Resp: (!) 8   Temp:    SpO2:         Physical Exam:  Gen: Uncomfortable plan and acute distress, very anxious (repeatedly said \"I am sorry\")  HEENT: Normocephalic and atraumatic  CV: Regular rate and rhythm with no murmurs rubs or gallops, +2 radial pulses on the right, +1 pulses on the left  Pulm: Clear to auscultation bilaterally, no wheezes/crackles  Abd: Soft, nondistended, nontender  MSK: no clubbing, no edema  Skin: warm, dry, intact, no rash  Neuro: No focal or sensory deficits appreciated  Psych: Alert and oriented to person place and time      ASSESSMENT/PLAN

## 2025-02-24 NOTE — PLAN OF CARE
Problem: Safety - Adult  Goal: Free from fall injury  2/24/2025 0455 by Tonya Ackerman RN  Outcome: Progressing  2/23/2025 1516 by Keenan Chambers RN  Outcome: Progressing  Note: Patient to utilize call bell when needing to ambulate.     Problem: Chronic Conditions and Co-morbidities  Goal: Patient's chronic conditions and co-morbidity symptoms are monitored and maintained or improved  2/24/2025 0455 by Tonya Ackerman RN  Outcome: Progressing  2/23/2025 1516 by Keenan Chambers RN  Outcome: Progressing  Flowsheets (Taken 2/23/2025 1516)  Care Plan - Patient's Chronic Conditions and Co-Morbidity Symptoms are Monitored and Maintained or Improved: Monitor and assess patient's chronic conditions and comorbid symptoms for stability, deterioration, or improvement  Note: Monitor labs and vitals; Medicate per MAR.     Problem: Discharge Planning  Goal: Discharge to home or other facility with appropriate resources  2/24/2025 0455 by Tonya Ackerman RN  Outcome: Progressing  2/23/2025 1516 by Keenan Chambers RN  Outcome: Progressing  Note: Identify barriers prior to discharge.     Problem: Pain  Goal: Verbalizes/displays adequate comfort level or baseline comfort level  2/24/2025 0455 by Tonya Ackerman RN  Outcome: Progressing  2/23/2025 1516 by Keenan Chambers RN  Outcome: Progressing  Note: Patient to verbalize pain via call bell or during rounding.     Problem: Confusion  Goal: Confusion, delirium, dementia, or psychosis is improved or at baseline  Description: INTERVENTIONS:  1. Assess for possible contributors to thought disturbance, including medications, impaired vision or hearing, underlying metabolic abnormalities, dehydration, psychiatric diagnoses, and notify attending LIP  2. [place] high risk fall precautions, as indicated  3. Provide frequent short contacts to provide reality reorientation, refocusing and direction  4. Decrease environmental stimuli, including noise as appropriate  5. Monitor and intervene to

## 2025-02-24 NOTE — SIGNIFICANT EVENT
[facility]  Rapid/Medical Response Team Note      Patient:    Priscilla Dean 54 y.o. female, : 1970  Patient MRN: [id number]    Admission Date: 2025   Admission Diagnosis: Encephalopathy acute [G93.40]  Acute CVA (cerebrovascular accident) (HCC) [I63.9]  Nausea and vomiting, unspecified vomiting type [R11.2]      RAPID RESPONSE  Rapid response called for: Chest pain    Patient admitted to [facility] for stroke protocol. Patient with acute uncomplicated type B aortic dissection  -Status post embolization  per vascular surgery at [facility]    OBJECTIVE    RRT/MRT start time:               RRT/MRT end time:  Vitals:    25 0500   BP:    Pulse: 82   Resp:    Temp:    SpO2:         Physical Exam:  Gen: NAD, comfortable, obese ***  HEENT: ***normocephalic, atraumatic, MMM, no thyromegaly, no JVD  CV: ***RRR, S1/S2 present without M/R/G, +2 radial pulses, well-perfused, PMI not displaced  Pulm: ***CTAB, no wheezes, no crackles  Abd: ***S/NT/ND, no rebound, no guarding, no hepatosplenomegaly   MSK: ***no clubbing, no edema  Skin: warm, dry, intact, no rash  Neuro: ***CN II-XII grossly intact, no focal deficits appreciated   Psych: ***appropriate, alert, oriented x3      ASSESSMENT/PLAN AND DISPOSITION  Priscilla Dean is a 54 y.o. year old female admitted for Encephalopathy acute [G93.40]  Acute CVA (cerebrovascular accident) (HCC) [I63.9]  Nausea and vomiting, unspecified vomiting type [R11.2]  Rapid Response Team/ Medical Response Team Called For: Chest pain    In setting of chest pain completed the below:    Medications Administered During Rapid:  Held Morphine 2 mg IV, hold at hypotensive.   LR Bolus 1L    EKG:NSR RR 89 no ST wave changes    Labs: Troponin    Imaging:       > CXR: pending          Chest pain    Plan:  -Chest Xray  Troponin  Morphine if BP can tolerate    Disposition: 4S  Patient condition: stable      Attending Michael Eng DO notified of rapid response and is in agreement with

## 2025-02-25 ENCOUNTER — PROCEDURE VISIT (OUTPATIENT)
Age: 55
End: 2025-02-25

## 2025-02-25 DIAGNOSIS — Z79.4 TYPE 2 DIABETES MELLITUS WITH DIABETIC NEUROPATHY, WITH LONG-TERM CURRENT USE OF INSULIN (HCC): Primary | ICD-10-CM

## 2025-02-25 DIAGNOSIS — E11.40 TYPE 2 DIABETES MELLITUS WITH DIABETIC NEUROPATHY, WITH LONG-TERM CURRENT USE OF INSULIN (HCC): Primary | ICD-10-CM

## 2025-02-25 PROBLEM — A04.71 RECURRENT CLOSTRIDIOIDES DIFFICILE DIARRHEA: Status: ACTIVE | Noted: 2025-02-25

## 2025-02-25 PROBLEM — N39.0 URINARY TRACT INFECTION WITHOUT HEMATURIA: Status: ACTIVE | Noted: 2025-02-25

## 2025-02-25 PROBLEM — A49.8 CLOSTRIDIUM DIFFICILE INFECTION: Status: RESOLVED | Noted: 2025-02-25 | Resolved: 2025-02-25

## 2025-02-25 PROBLEM — A49.8 CLOSTRIDIUM DIFFICILE INFECTION: Status: ACTIVE | Noted: 2025-02-25

## 2025-02-25 LAB
ANION GAP SERPL CALC-SCNC: 3 MMOL/L (ref 3–18)
BASOPHILS # BLD: 0.06 K/UL (ref 0–0.1)
BASOPHILS NFR BLD: 0.9 % (ref 0–2)
BUN SERPL-MCNC: 9 MG/DL (ref 7–18)
BUN/CREAT SERPL: 12 (ref 12–20)
CALCIUM SERPL-MCNC: 8.4 MG/DL (ref 8.5–10.1)
CHLORIDE SERPL-SCNC: 106 MMOL/L (ref 100–111)
CHOLEST SERPL-MCNC: 155 MG/DL
CO2 SERPL-SCNC: 30 MMOL/L (ref 21–32)
CREAT SERPL-MCNC: 0.74 MG/DL (ref 0.6–1.3)
DIFFERENTIAL METHOD BLD: ABNORMAL
ECHO BSA: 2.32 M2
EKG ATRIAL RATE: 89 BPM
EKG DIAGNOSIS: NORMAL
EKG P AXIS: 62 DEGREES
EKG P-R INTERVAL: 144 MS
EKG Q-T INTERVAL: 406 MS
EKG QRS DURATION: 106 MS
EKG QTC CALCULATION (BAZETT): 493 MS
EKG R AXIS: 4 DEGREES
EKG T AXIS: 43 DEGREES
EKG VENTRICULAR RATE: 89 BPM
EOSINOPHIL # BLD: 0.34 K/UL (ref 0–0.4)
EOSINOPHIL NFR BLD: 5.3 % (ref 0–5)
ERYTHROCYTE [DISTWIDTH] IN BLOOD BY AUTOMATED COUNT: 22 % (ref 11.6–14.5)
GLUCOSE BLD STRIP.AUTO-MCNC: 123 MG/DL (ref 70–110)
GLUCOSE BLD STRIP.AUTO-MCNC: 146 MG/DL (ref 70–110)
GLUCOSE BLD STRIP.AUTO-MCNC: 160 MG/DL (ref 70–110)
GLUCOSE BLD STRIP.AUTO-MCNC: 95 MG/DL (ref 70–110)
GLUCOSE SERPL-MCNC: 81 MG/DL (ref 74–99)
HCT VFR BLD AUTO: 30.3 % (ref 35–45)
HDLC SERPL-MCNC: 46 MG/DL (ref 40–60)
HDLC SERPL: 3.4 (ref 0–5)
HGB BLD-MCNC: 8.9 G/DL (ref 12–16)
IMM GRANULOCYTES # BLD AUTO: 0.03 K/UL (ref 0–0.04)
IMM GRANULOCYTES NFR BLD AUTO: 0.5 % (ref 0–0.5)
LDLC SERPL CALC-MCNC: 93.2 MG/DL (ref 0–100)
LIPID PANEL: NORMAL
LYMPHOCYTES # BLD: 1.13 K/UL (ref 0.9–3.6)
LYMPHOCYTES NFR BLD: 17.7 % (ref 21–52)
MCH RBC QN AUTO: 25.5 PG (ref 24–34)
MCHC RBC AUTO-ENTMCNC: 29.4 G/DL (ref 31–37)
MCV RBC AUTO: 86.8 FL (ref 78–100)
MONOCYTES # BLD: 0.62 K/UL (ref 0.05–1.2)
MONOCYTES NFR BLD: 9.7 % (ref 3–10)
NEUTS SEG # BLD: 4.22 K/UL (ref 1.8–8)
NEUTS SEG NFR BLD: 65.9 % (ref 40–73)
NRBC # BLD: 0 K/UL (ref 0–0.01)
NRBC BLD-RTO: 0 PER 100 WBC
PLATELET # BLD AUTO: 351 K/UL (ref 135–420)
PMV BLD AUTO: 11 FL (ref 9.2–11.8)
POTASSIUM SERPL-SCNC: 3.4 MMOL/L (ref 3.5–5.5)
RBC # BLD AUTO: 3.49 M/UL (ref 4.2–5.3)
SODIUM SERPL-SCNC: 139 MMOL/L (ref 136–145)
T4 FREE SERPL-MCNC: 1.8 NG/DL (ref 0.7–1.5)
TRIGL SERPL-MCNC: 79 MG/DL
TSH SERPL DL<=0.05 MIU/L-ACNC: 1.21 UIU/ML (ref 0.36–3.74)
VAS LEFT BULB EDV: 13.1 CM/S
VAS LEFT BULB PSV: 42.8 CM/S
VAS LEFT CCA DIST EDV: 10.7 CM/S
VAS LEFT CCA DIST PSV: 64 CM/S
VAS LEFT CCA MID EDV: 13.01 CM/S
VAS LEFT CCA MID PSV: 72.14 CM/S
VAS LEFT CCA PROX EDV: 24.6 CM/S
VAS LEFT CCA PROX PSV: 189.4 CM/S
VAS LEFT ECA EDV: 0 CM/S
VAS LEFT ECA PSV: 68.8 CM/S
VAS LEFT ICA DIST EDV: 22.9 CM/S
VAS LEFT ICA DIST PSV: 111.7 CM/S
VAS LEFT ICA MID EDV: 27.5 CM/S
VAS LEFT ICA MID PSV: 86.2 CM/S
VAS LEFT ICA PROX EDV: 15.3 CM/S
VAS LEFT ICA PROX PSV: 52.7 CM/S
VAS LEFT ICA/CCA PSV: 1.75 NO UNITS
VAS LEFT SUBCLAVIAN PROX EDV: 0 CM/S
VAS LEFT SUBCLAVIAN PROX PSV: 23.4 CM/S
VAS RIGHT BULB EDV: 7.8 CM/S
VAS RIGHT BULB PSV: 54 CM/S
VAS RIGHT CCA DIST EDV: 8.9 CM/S
VAS RIGHT CCA DIST PSV: 54.2 CM/S
VAS RIGHT CCA MID EDV: 11.72 CM/S
VAS RIGHT CCA MID PSV: 62.14 CM/S
VAS RIGHT CCA PROX EDV: 8.3 CM/S
VAS RIGHT CCA PROX PSV: 97.3 CM/S
VAS RIGHT ECA EDV: 6 CM/S
VAS RIGHT ECA PSV: 109.1 CM/S
VAS RIGHT ICA DIST EDV: 36.3 CM/S
VAS RIGHT ICA DIST PSV: 161.3 CM/S
VAS RIGHT ICA MID EDV: 10.8 CM/S
VAS RIGHT ICA MID PSV: 34.9 CM/S
VAS RIGHT ICA PROX EDV: 8.6 CM/S
VAS RIGHT ICA PROX PSV: 28.9 CM/S
VAS RIGHT ICA/CCA PSV: 3 NO UNITS
VAS RIGHT SUBCLAVIAN PROX EDV: 17.3 CM/S
VAS RIGHT SUBCLAVIAN PROX PSV: 120.4 CM/S
VAS RIGHT VERTEBRAL EDV: 16.44 CM/S
VAS RIGHT VERTEBRAL PSV: 83 CM/S
VLDLC SERPL CALC-MCNC: 15.8 MG/DL
WBC # BLD AUTO: 6.4 K/UL (ref 4.6–13.2)

## 2025-02-25 PROCEDURE — 84443 ASSAY THYROID STIM HORMONE: CPT

## 2025-02-25 PROCEDURE — 6370000000 HC RX 637 (ALT 250 FOR IP): Performed by: STUDENT IN AN ORGANIZED HEALTH CARE EDUCATION/TRAINING PROGRAM

## 2025-02-25 PROCEDURE — 92526 ORAL FUNCTION THERAPY: CPT

## 2025-02-25 PROCEDURE — 94761 N-INVAS EAR/PLS OXIMETRY MLT: CPT

## 2025-02-25 PROCEDURE — 82962 GLUCOSE BLOOD TEST: CPT

## 2025-02-25 PROCEDURE — 6360000002 HC RX W HCPCS: Performed by: STUDENT IN AN ORGANIZED HEALTH CARE EDUCATION/TRAINING PROGRAM

## 2025-02-25 PROCEDURE — 85025 COMPLETE CBC W/AUTO DIFF WBC: CPT

## 2025-02-25 PROCEDURE — 99233 SBSQ HOSP IP/OBS HIGH 50: CPT | Performed by: STUDENT IN AN ORGANIZED HEALTH CARE EDUCATION/TRAINING PROGRAM

## 2025-02-25 PROCEDURE — 36415 COLL VENOUS BLD VENIPUNCTURE: CPT

## 2025-02-25 PROCEDURE — 6370000000 HC RX 637 (ALT 250 FOR IP): Performed by: INTERNAL MEDICINE

## 2025-02-25 PROCEDURE — 2580000003 HC RX 258: Performed by: HEALTH CARE PROVIDER

## 2025-02-25 PROCEDURE — 80048 BASIC METABOLIC PNL TOTAL CA: CPT

## 2025-02-25 PROCEDURE — 2500000003 HC RX 250 WO HCPCS: Performed by: STUDENT IN AN ORGANIZED HEALTH CARE EDUCATION/TRAINING PROGRAM

## 2025-02-25 PROCEDURE — 80061 LIPID PANEL: CPT

## 2025-02-25 PROCEDURE — 84439 ASSAY OF FREE THYROXINE: CPT

## 2025-02-25 PROCEDURE — 2140000001 HC CVICU INTERMEDIATE R&B

## 2025-02-25 RX ORDER — LACTOBACILLUS RHAMNOSUS GG 10B CELL
1 CAPSULE ORAL
Status: DISCONTINUED | OUTPATIENT
Start: 2025-02-25 | End: 2025-02-26 | Stop reason: HOSPADM

## 2025-02-25 RX ORDER — TIZANIDINE 2 MG/1
6 TABLET ORAL 2 TIMES DAILY PRN
Status: DISCONTINUED | OUTPATIENT
Start: 2025-02-25 | End: 2025-02-26 | Stop reason: HOSPADM

## 2025-02-25 RX ADMIN — Medication 1 CAPSULE: at 14:13

## 2025-02-25 RX ADMIN — VANCOMYCIN HYDROCHLORIDE 125 MG: 1 INJECTION, POWDER, LYOPHILIZED, FOR SOLUTION INTRAVENOUS at 17:47

## 2025-02-25 RX ADMIN — VANCOMYCIN HYDROCHLORIDE 125 MG: 1 INJECTION, POWDER, LYOPHILIZED, FOR SOLUTION INTRAVENOUS at 23:28

## 2025-02-25 RX ADMIN — ATORVASTATIN CALCIUM 80 MG: 40 TABLET, FILM COATED ORAL at 20:06

## 2025-02-25 RX ADMIN — MORPHINE SULFATE 2 MG: 2 INJECTION, SOLUTION INTRAMUSCULAR; INTRAVENOUS at 20:06

## 2025-02-25 RX ADMIN — PREGABALIN 150 MG: 50 CAPSULE ORAL at 08:38

## 2025-02-25 RX ADMIN — VANCOMYCIN HYDROCHLORIDE 125 MG: 1 INJECTION, POWDER, LYOPHILIZED, FOR SOLUTION INTRAVENOUS at 12:01

## 2025-02-25 RX ADMIN — MORPHINE SULFATE 2 MG: 2 INJECTION, SOLUTION INTRAMUSCULAR; INTRAVENOUS at 12:01

## 2025-02-25 RX ADMIN — MORPHINE SULFATE 2 MG: 2 INJECTION, SOLUTION INTRAMUSCULAR; INTRAVENOUS at 03:11

## 2025-02-25 RX ADMIN — SODIUM CHLORIDE, PRESERVATIVE FREE 10 ML: 5 INJECTION INTRAVENOUS at 08:37

## 2025-02-25 RX ADMIN — TIZANIDINE 6 MG: 2 TABLET ORAL at 17:47

## 2025-02-25 RX ADMIN — KETOROLAC TROMETHAMINE 15 MG: 15 INJECTION, SOLUTION INTRAMUSCULAR; INTRAVENOUS at 05:30

## 2025-02-25 RX ADMIN — Medication 5 MG: at 20:06

## 2025-02-25 RX ADMIN — ASPIRIN 81 MG: 81 TABLET, COATED ORAL at 08:38

## 2025-02-25 RX ADMIN — CELECOXIB 200 MG: 100 CAPSULE ORAL at 08:38

## 2025-02-25 RX ADMIN — ZOLPIDEM TARTRATE 5 MG: 5 TABLET ORAL at 20:06

## 2025-02-25 RX ADMIN — VANCOMYCIN HYDROCHLORIDE 125 MG: 1 INJECTION, POWDER, LYOPHILIZED, FOR SOLUTION INTRAVENOUS at 05:30

## 2025-02-25 RX ADMIN — POTASSIUM CHLORIDE 40 MEQ: 1500 TABLET, EXTENDED RELEASE ORAL at 06:54

## 2025-02-25 RX ADMIN — INSULIN GLARGINE 15 UNITS: 100 INJECTION, SOLUTION SUBCUTANEOUS at 20:07

## 2025-02-25 RX ADMIN — PANTOPRAZOLE SODIUM 40 MG: 40 TABLET, DELAYED RELEASE ORAL at 05:30

## 2025-02-25 RX ADMIN — SODIUM CHLORIDE, PRESERVATIVE FREE 10 ML: 5 INJECTION INTRAVENOUS at 20:08

## 2025-02-25 RX ADMIN — MORPHINE SULFATE 2 MG: 2 INJECTION, SOLUTION INTRAMUSCULAR; INTRAVENOUS at 06:54

## 2025-02-25 RX ADMIN — PREGABALIN 150 MG: 50 CAPSULE ORAL at 20:06

## 2025-02-25 RX ADMIN — SODIUM CHLORIDE, POTASSIUM CHLORIDE, SODIUM LACTATE AND CALCIUM CHLORIDE: 600; 310; 30; 20 INJECTION, SOLUTION INTRAVENOUS at 07:01

## 2025-02-25 RX ADMIN — CELECOXIB 200 MG: 100 CAPSULE ORAL at 20:06

## 2025-02-25 ASSESSMENT — PAIN DESCRIPTION - FREQUENCY
FREQUENCY: INTERMITTENT

## 2025-02-25 ASSESSMENT — PAIN SCALES - WONG BAKER
WONGBAKER_NUMERICALRESPONSE: HURTS A LITTLE BIT
WONGBAKER_NUMERICALRESPONSE: NO HURT

## 2025-02-25 ASSESSMENT — PAIN DESCRIPTION - ONSET
ONSET: PROGRESSIVE

## 2025-02-25 ASSESSMENT — PAIN SCALES - GENERAL
PAINLEVEL_OUTOF10: 5
PAINLEVEL_OUTOF10: 0
PAINLEVEL_OUTOF10: 2
PAINLEVEL_OUTOF10: 7
PAINLEVEL_OUTOF10: 0
PAINLEVEL_OUTOF10: 3
PAINLEVEL_OUTOF10: 7
PAINLEVEL_OUTOF10: 7
PAINLEVEL_OUTOF10: 4
PAINLEVEL_OUTOF10: 6
PAINLEVEL_OUTOF10: 0
PAINLEVEL_OUTOF10: 0
PAINLEVEL_OUTOF10: 7

## 2025-02-25 ASSESSMENT — PAIN DESCRIPTION - DESCRIPTORS
DESCRIPTORS: DISCOMFORT;ACHING
DESCRIPTORS: DISCOMFORT
DESCRIPTORS: ACHING;DISCOMFORT
DESCRIPTORS: ACHING
DESCRIPTORS: DISCOMFORT;ACHING

## 2025-02-25 ASSESSMENT — PAIN - FUNCTIONAL ASSESSMENT
PAIN_FUNCTIONAL_ASSESSMENT: PREVENTS OR INTERFERES SOME ACTIVE ACTIVITIES AND ADLS
PAIN_FUNCTIONAL_ASSESSMENT: ACTIVITIES ARE NOT PREVENTED
PAIN_FUNCTIONAL_ASSESSMENT: PREVENTS OR INTERFERES SOME ACTIVE ACTIVITIES AND ADLS
PAIN_FUNCTIONAL_ASSESSMENT: ACTIVITIES ARE NOT PREVENTED
PAIN_FUNCTIONAL_ASSESSMENT: ACTIVITIES ARE NOT PREVENTED
PAIN_FUNCTIONAL_ASSESSMENT: PREVENTS OR INTERFERES SOME ACTIVE ACTIVITIES AND ADLS
PAIN_FUNCTIONAL_ASSESSMENT: PREVENTS OR INTERFERES SOME ACTIVE ACTIVITIES AND ADLS

## 2025-02-25 ASSESSMENT — PAIN DESCRIPTION - LOCATION
LOCATION: CHEST

## 2025-02-25 ASSESSMENT — PAIN DESCRIPTION - ORIENTATION
ORIENTATION: ANTERIOR
ORIENTATION: MID
ORIENTATION: UPPER;MID
ORIENTATION: ANTERIOR
ORIENTATION: ANTERIOR
ORIENTATION: LEFT
ORIENTATION: MID

## 2025-02-25 ASSESSMENT — PAIN DESCRIPTION - PAIN TYPE
TYPE: ACUTE PAIN

## 2025-02-25 NOTE — PLAN OF CARE
Problem: SLP Adult - Impaired Swallowing  Goal: By Discharge: Advance to least restrictive diet without signs or symptoms of aspiration for planned discharge setting.  See evaluation for individualized goals.  Description: Patient will:  1. Tolerate PO trials with 0 s/s overt distress in 4/5 trials- met  2. Utilize compensatory swallow strategies/maneuvers (decrease bite/sip, size/rate, alt. liq/sol) with min cues in 4/5 trials- met    Rec:     Regular diet with thin liquids  Aspiration precautions  HOB >45 during po intake, remain >30 for 30-45 minutes after po   Small bites/sips; alternate liquid/solid with slow feeding rate   Oral care TID  Meds per pt preference  Outcome: Completed   SPEECH LANGUAGE PATHOLOGY DYSPHAGIA TREATMENT & DISCHARGE    Patient: Priscilla Dean (54 y.o. female)  Date: 2/25/2025  Diagnosis: Encephalopathy acute [G93.40]  Acute CVA (cerebrovascular accident) ([facility]) [I63.9]  Nausea and vomiting, unspecified vomiting type [R11.2] Acute CVA (cerebrovascular accident) ([facility])      Precautions: Aspiration  PLOF: As per H&P     ASSESSMENT:  Pt was seen at bedside for follow up dysphagia management. She tolerated reg solids and serial swallows of thin via straw without any overt s/sx of aspiration. Laryngeal elevation was functional/timely to palpation. Mastication was functional with positive oral clearance. Rec reg solid diet with thin liquids, aspiration precautions, oral care TID, and meds as tolerated. No further skilled SLP services are indicated at this time. Please re-consult if needed.     Progression toward goals:  [x]         Goals met/approximated  []         Not making progress/Not appropriate - will d/c POC     PLAN:  Recommendations and Planned Interventions:  Maximum therapeutic gains met; safest, least restrictive diet achieved. D/C ST intervention at this time.      SUBJECTIVE:   Patient stated “I'm doing great eating and drinking.”    OBJECTIVE:   Daily Assessment:  Baseline

## 2025-02-25 NOTE — PLAN OF CARE
Problem: Safety - Adult  Goal: Free from fall injury  Outcome: Progressing  Flowsheets (Taken 2/25/2025 0410)  Free From Fall Injury: Instruct family/caregiver on patient safety  Note: Patient to remain free of any falls or injuries during length of stay. Safety interventions implemented: non-skid yellow socks on, bed alarm on, safe exit side of the bed established, and call light within reach.      Problem: Chronic Conditions and Co-morbidities  Goal: Patient's chronic conditions and co-morbidity symptoms are monitored and maintained or improved  Outcome: Progressing  Flowsheets  Taken 2/25/2025 0410  Care Plan - Patient's Chronic Conditions and Co-Morbidity Symptoms are Monitored and Maintained or Improved:   Monitor and assess patient's chronic conditions and comorbid symptoms for stability, deterioration, or improvement   Collaborate with multidisciplinary team to address chronic and comorbid conditions and prevent exacerbation or deterioration  Taken 2/24/2025 2000  Care Plan - Patient's Chronic Conditions and Co-Morbidity Symptoms are Monitored and Maintained or Improved:   Monitor and assess patient's chronic conditions and comorbid symptoms for stability, deterioration, or improvement   Collaborate with multidisciplinary team to address chronic and comorbid conditions and prevent exacerbation or deterioration  Note: Patient to be assessed via continuous telemetry monitoring, vitals, labs, and physical assessments. Patient currently receiving oral vancomycin for treatment of C.Diff. Speech and PT have signed off on patient.     Problem: Discharge Planning  Goal: Discharge to home or other facility with appropriate resources  Outcome: Progressing  Flowsheets  Taken 2/25/2025 0410  Discharge to home or other facility with appropriate resources:   Arrange for needed discharge resources and transportation as appropriate   Identify barriers to discharge with patient and caregiver  Taken 2/24/2025 2000  Discharge

## 2025-02-25 NOTE — PLAN OF CARE
Problem: Safety - Adult  Goal: Free from fall injury  2/25/2025 0933 by Marga Ely RN  Outcome: Progressing  Flowsheets (Taken 2/25/2025 0933)  Free From Fall Injury: Instruct family/caregiver on patient safety  Note: Encouraged pt to use the call bell for assistance . Call bell within pt's reach     Problem: Chronic Conditions and Co-morbidities  Goal: Patient's chronic conditions and co-morbidity symptoms are monitored and maintained or improved  2/25/2025 0933 by Marga Ely RN  Outcome: Progressing  Flowsheets (Taken 2/25/2025 0800)  Care Plan - Patient's Chronic Conditions and Co-Morbidity Symptoms are Monitored and Maintained or Improved:   Monitor and assess patient's chronic conditions and comorbid symptoms for stability, deterioration, or improvement   Collaborate with multidisciplinary team to address chronic and comorbid conditions and prevent exacerbation or deterioration   Update acute care plan with appropriate goals if chronic or comorbid symptoms are exacerbated and prevent overall improvement and discharge     Problem: Discharge Planning  Goal: Discharge to home or other facility with appropriate resources  2/25/2025 0933 by Marga Ely RN  Outcome: Progressing  Flowsheets (Taken 2/25/2025 0800)  Discharge to home or other facility with appropriate resources: Identify barriers to discharge with patient and caregiver     Problem: Pain  Goal: Verbalizes/displays adequate comfort level or baseline comfort level  2/25/2025 0933 by Marga Ely RN  Outcome: Progressing  Flowsheets (Taken 2/25/2025 0933)  Verbalizes/displays adequate comfort level or baseline comfort level:   Encourage patient to monitor pain and request assistance   Assess pain using appropriate pain scale   Administer analgesics based on type and severity of pain and evaluate response   Implement non-pharmacological measures as appropriate and evaluate response   Consider cultural and social influences

## 2025-02-25 NOTE — ED PROVIDER NOTES
Pt care assumed from Dr. Shelbie Trevino , ED provider. Pt complaint(s), current treatment plan, progression and available diagnostic results have been discussed thoroughly. The patient was seen and evaluated on her shift.   Rounding occurred: Yes  Intended Disposition: admission  Pending hospitalist consultation:    Case discussion: cased discussed with Dr. Jace Law.  He accepted patient for admission.      Matthew Dean MD  02/25/25 0716

## 2025-02-25 NOTE — PROGRESS NOTES
Routine EEG    A routine EEG was carried out utilizing the international 10-20 electrode placement system.    Description of the record:  The waking background is an 8 to 9 Hz occipitally dominant alpha frequency.  Patient became drowsy but no sleep was seen.  Photic stimulation was unremarkable.  No epileptiform features were present.    Impression:  Normal awake and drowsy EEG.

## 2025-02-26 VITALS
DIASTOLIC BLOOD PRESSURE: 56 MMHG | HEIGHT: 72 IN | WEIGHT: 234 LBS | HEART RATE: 63 BPM | SYSTOLIC BLOOD PRESSURE: 123 MMHG | RESPIRATION RATE: 16 BRPM | BODY MASS INDEX: 31.69 KG/M2 | OXYGEN SATURATION: 99 % | TEMPERATURE: 97.9 F

## 2025-02-26 LAB
ANION GAP SERPL CALC-SCNC: 5 MMOL/L (ref 3–18)
BASOPHILS # BLD: 0.06 K/UL (ref 0–0.1)
BASOPHILS NFR BLD: 1 % (ref 0–2)
BUN SERPL-MCNC: 11 MG/DL (ref 7–18)
BUN/CREAT SERPL: 16 (ref 12–20)
CALCIUM SERPL-MCNC: 8.3 MG/DL (ref 8.5–10.1)
CHLORIDE SERPL-SCNC: 107 MMOL/L (ref 100–111)
CO2 SERPL-SCNC: 27 MMOL/L (ref 21–32)
CREAT SERPL-MCNC: 0.67 MG/DL (ref 0.6–1.3)
DIFFERENTIAL METHOD BLD: ABNORMAL
EOSINOPHIL # BLD: 0.48 K/UL (ref 0–0.4)
EOSINOPHIL NFR BLD: 8.2 % (ref 0–5)
ERYTHROCYTE [DISTWIDTH] IN BLOOD BY AUTOMATED COUNT: 22.2 % (ref 11.6–14.5)
GLUCOSE BLD STRIP.AUTO-MCNC: 107 MG/DL (ref 70–110)
GLUCOSE BLD STRIP.AUTO-MCNC: 95 MG/DL (ref 70–110)
GLUCOSE SERPL-MCNC: 94 MG/DL (ref 74–99)
HCT VFR BLD AUTO: 28.3 % (ref 35–45)
HGB BLD-MCNC: 8.4 G/DL (ref 12–16)
IMM GRANULOCYTES # BLD AUTO: 0.02 K/UL (ref 0–0.04)
IMM GRANULOCYTES NFR BLD AUTO: 0.3 % (ref 0–0.5)
LYMPHOCYTES # BLD: 1.1 K/UL (ref 0.9–3.6)
LYMPHOCYTES NFR BLD: 18.9 % (ref 21–52)
MCH RBC QN AUTO: 25.8 PG (ref 24–34)
MCHC RBC AUTO-ENTMCNC: 29.7 G/DL (ref 31–37)
MCV RBC AUTO: 86.8 FL (ref 78–100)
MONOCYTES # BLD: 0.53 K/UL (ref 0.05–1.2)
MONOCYTES NFR BLD: 9.1 % (ref 3–10)
NEUTS SEG # BLD: 3.64 K/UL (ref 1.8–8)
NEUTS SEG NFR BLD: 62.5 % (ref 40–73)
NRBC # BLD: 0 K/UL (ref 0–0.01)
NRBC BLD-RTO: 0 PER 100 WBC
PLATELET # BLD AUTO: 332 K/UL (ref 135–420)
PMV BLD AUTO: 10.6 FL (ref 9.2–11.8)
POTASSIUM SERPL-SCNC: 3.6 MMOL/L (ref 3.5–5.5)
RBC # BLD AUTO: 3.26 M/UL (ref 4.2–5.3)
SODIUM SERPL-SCNC: 139 MMOL/L (ref 136–145)
WBC # BLD AUTO: 5.8 K/UL (ref 4.6–13.2)

## 2025-02-26 PROCEDURE — 6370000000 HC RX 637 (ALT 250 FOR IP): Performed by: INTERNAL MEDICINE

## 2025-02-26 PROCEDURE — 6360000002 HC RX W HCPCS: Performed by: STUDENT IN AN ORGANIZED HEALTH CARE EDUCATION/TRAINING PROGRAM

## 2025-02-26 PROCEDURE — 82962 GLUCOSE BLOOD TEST: CPT

## 2025-02-26 PROCEDURE — 2500000003 HC RX 250 WO HCPCS: Performed by: STUDENT IN AN ORGANIZED HEALTH CARE EDUCATION/TRAINING PROGRAM

## 2025-02-26 PROCEDURE — 80048 BASIC METABOLIC PNL TOTAL CA: CPT

## 2025-02-26 PROCEDURE — 85025 COMPLETE CBC W/AUTO DIFF WBC: CPT

## 2025-02-26 PROCEDURE — 6370000000 HC RX 637 (ALT 250 FOR IP): Performed by: STUDENT IN AN ORGANIZED HEALTH CARE EDUCATION/TRAINING PROGRAM

## 2025-02-26 PROCEDURE — 36415 COLL VENOUS BLD VENIPUNCTURE: CPT

## 2025-02-26 PROCEDURE — 94761 N-INVAS EAR/PLS OXIMETRY MLT: CPT

## 2025-02-26 PROCEDURE — 99239 HOSP IP/OBS DSCHRG MGMT >30: CPT | Performed by: STUDENT IN AN ORGANIZED HEALTH CARE EDUCATION/TRAINING PROGRAM

## 2025-02-26 RX ADMIN — CHOLESTYRAMINE 4 G: 4 POWDER, FOR SUSPENSION ORAL at 08:32

## 2025-02-26 RX ADMIN — ONDANSETRON 4 MG: 2 INJECTION, SOLUTION INTRAMUSCULAR; INTRAVENOUS at 02:59

## 2025-02-26 RX ADMIN — MORPHINE SULFATE 2 MG: 2 INJECTION, SOLUTION INTRAMUSCULAR; INTRAVENOUS at 00:06

## 2025-02-26 RX ADMIN — ASPIRIN 81 MG: 81 TABLET, COATED ORAL at 08:31

## 2025-02-26 RX ADMIN — Medication 1 CAPSULE: at 08:32

## 2025-02-26 RX ADMIN — CELECOXIB 200 MG: 100 CAPSULE ORAL at 08:31

## 2025-02-26 RX ADMIN — PANTOPRAZOLE SODIUM 40 MG: 40 TABLET, DELAYED RELEASE ORAL at 05:53

## 2025-02-26 RX ADMIN — MORPHINE SULFATE 2 MG: 2 INJECTION, SOLUTION INTRAMUSCULAR; INTRAVENOUS at 08:44

## 2025-02-26 RX ADMIN — VANCOMYCIN HYDROCHLORIDE 125 MG: 1 INJECTION, POWDER, LYOPHILIZED, FOR SOLUTION INTRAVENOUS at 12:10

## 2025-02-26 RX ADMIN — VANCOMYCIN HYDROCHLORIDE 125 MG: 1 INJECTION, POWDER, LYOPHILIZED, FOR SOLUTION INTRAVENOUS at 05:53

## 2025-02-26 RX ADMIN — PREGABALIN 150 MG: 50 CAPSULE ORAL at 08:31

## 2025-02-26 RX ADMIN — TIZANIDINE 6 MG: 2 TABLET ORAL at 05:59

## 2025-02-26 RX ADMIN — MORPHINE SULFATE 2 MG: 2 INJECTION, SOLUTION INTRAMUSCULAR; INTRAVENOUS at 04:00

## 2025-02-26 RX ADMIN — SODIUM CHLORIDE, PRESERVATIVE FREE 10 ML: 5 INJECTION INTRAVENOUS at 08:33

## 2025-02-26 ASSESSMENT — PAIN SCALES - GENERAL
PAINLEVEL_OUTOF10: 7
PAINLEVEL_OUTOF10: 0
PAINLEVEL_OUTOF10: 7
PAINLEVEL_OUTOF10: 0
PAINLEVEL_OUTOF10: 0
PAINLEVEL_OUTOF10: 6

## 2025-02-26 ASSESSMENT — PAIN DESCRIPTION - ONSET
ONSET: GRADUAL

## 2025-02-26 ASSESSMENT — PAIN - FUNCTIONAL ASSESSMENT
PAIN_FUNCTIONAL_ASSESSMENT: PREVENTS OR INTERFERES SOME ACTIVE ACTIVITIES AND ADLS

## 2025-02-26 ASSESSMENT — PAIN DESCRIPTION - DESCRIPTORS
DESCRIPTORS: ACHING

## 2025-02-26 ASSESSMENT — PAIN DESCRIPTION - PAIN TYPE
TYPE: ACUTE PAIN

## 2025-02-26 ASSESSMENT — PAIN DESCRIPTION - ORIENTATION
ORIENTATION: ANTERIOR;MID
ORIENTATION: LEFT
ORIENTATION: ANTERIOR;MID

## 2025-02-26 ASSESSMENT — PAIN DESCRIPTION - LOCATION
LOCATION: CHEST

## 2025-02-26 ASSESSMENT — PAIN DESCRIPTION - FREQUENCY
FREQUENCY: INTERMITTENT

## 2025-02-26 NOTE — DISCHARGE SUMMARY
Discharge Summary    Patient: Priscilla Dean MRN: [id number]  CSN: [id number]    YOB: 1970  Age: 54 y.o.  Sex: female    DOA: 2/21/2025 LOS:  LOS: 4 days   Discharge Date: 2/26/2025      Admission Diagnosis: Encephalopathy acute [G93.40]  Acute CVA (cerebrovascular accident) (HCC) [I63.9]  Nausea and vomiting, unspecified vomiting type [R11.2]    Discharge Diagnosis: Acute CVA  Atypical chest pain  Hypokalemia  Treated uncomplicated type B aortic dissection  C. difficile colitis repeat  Acute cystitis  Crohn's colitis  Type 2 diabetes mellitus    Discharge Condition: Stable    Discharge Disposition: Home    PHYSICAL EXAM    Visit Vitals  BP (!) 123/56   Pulse 63   Temp 97.9 °F (36.6 °C) (Oral)   Resp 16   Ht 1.829 m (6')   Wt 106.1 kg (234 lb)   SpO2 99%   BMI 31.74 kg/m²       General: Alert, cooperative, no acute distress    HEENT: NC, Atraumatic.  PERRLA, EOMI. Anicteric sclerae.  Lungs:  CTA Bilaterally. No Wheezing/Rhonchi/Rales.  Heart:  Regular  rhythm,  No murmur, No Rubs, No Gallops  Abdomen: Soft, Non distended, Non tender.  +Bowel sounds, no HSM  Extremities: No c/c/e  Psych:   Good insight. Not anxious or agitated.  Neurologic:  CN 2-12 grossly intact, oriented X 4.  No acute neurological deficits,     Hospital Course By Problem:   Patient presented to the emergency room secondary to confusion and altered mental status.  Of note, patient with recurrent C. difficile infection times multiple.  Patient worked up in the emergency room which did not show obvious findings though it did elucidate a 5 mm hypodensity in the CT head.  Patient admitted for further workup and treatment.  Neurology also consulted.  Given overall findings, patient diagnosed with new acute CVA.  Patient worked up in the usual manner for this.  Patient also found to have repeat C. difficile for which she was started on appropriate treatment, infectious disease consulted, and patient subsequently able to

## 2025-02-26 NOTE — CONSULTS
Consult Note            Date:2/23/2025        Patient Name:Priscilla Dean     YOB: 1970     Age:54 y.o.    Consults    Chief Complaint     Chief Complaint   Patient presents with    Vomiting    Nausea          History Obtained From   patient    History of Present Illness   Patient Presentation: Priscilla Dean    Chief Complaint:  - Neurological symptoms following aortic dissection surgery approximately 3 weeks ago, including confusion, screaming, forgetting location, and not acting like himself.    Associated Symptoms:  - Irritability and repeating himself  - Current feeling of confusion, though believes speech has improved  - Weakness and numbness, and speech difficulties  - Difficulty walking since the surgery    Past Medical History:  - Aortic dissection (surgery 3 weeks ago)  - Diabetes  - Hypertension  - Hyperlipidemia  -Neuropathy-followed by Dr. Cobian in the neurology clinic      Social History:  - Denies smoking, alcohol use, and drug use    Review of Systems:  - Neurological: Confusion, screaming, forgetting location, not acting like usual self, irritability, feeling confused, speech issues  - Musculoskeletal: Weakness in left arm and leg    Past Medical History     Past Medical History:   Diagnosis Date    Bilateral knee pain     Crohn's colitis (HCC)     Diabetes (HCC)     IDDM    HTN (hypertension)     Hypercholesterolemia     Pulmonary embolism (HCC) 03/2019    Stool color black     crohns        Past Surgical History     Past Surgical History:   Procedure Laterality Date    ANKLE FRACTURE SURGERY Left 2018    COLONOSCOPY N/A 8/31/2022    COLONOSCOPY/ Biopsies performed by JASMINE Shetty MD at [facility] ENDOSCOPY    COLONOSCOPY N/A 7/12/2022    COLONOSCOPY performed by JASMINE Shetty MD at [facility] ENDOSCOPY    COLONOSCOPY N/A 9/22/2020    COLONOSCOPY with bx's performed by JASMINE Shetty MD at [facility] ENDOSCOPY    COLONOSCOPY N/A 10/21/2019    COLONOSCOPY w/ bxs performed by Wayne Cheema MD at [facility] 
Infectious Disease Consultation Note        Reason: recurrent c.difficile infection, UTI    Current abx Prior abx   Oral vancomycin since 2/24  Ceftriaxone since 2/22      Lines:       Assessment :  54 year old woman with crohns disease on Stelara, DM, HTN, history of ESBL e coli UTI and prior c. Diff presented to [facility] on 2/21/2025 with altered mental status.    History of recurrent C. difficile colitis-May 2024, December 2024    Clinical presentation consistent with probable UTI, c.difficile infection.    Currently patient doesn't have evidence of worsening c.difficile colitis. However, patient is colonized with c.difficile as noted stool study 2/23/2025 (positive stool C. difficile PCR).  Current antibiotics given for probable urinary tract infection will put patient at risk for recurrent C. difficile colitis.  Hence recommend to use short course of prophylactic oral vancomycin    Recommendations:    Discontinue ceftriaxone since completed adequate treatment for uncomplicated cystitis-risk of prolonged antibiotics outweigh the benefit at this time  Recommend oral vancomycin till 3/4/25  Follow up with GI as outpatient for Crohn's , recurrent c.difficile infection  Start probiotics    Thank you for consultation request. Above plan was discussed in details with patient,  and dr Eng. Please call me if any further questions or concerns. Will continue to participate in the care of this patient.  HPI:  54 year old woman with crohns disease on Stelara, DM, HTN, history of ESBL e coli UTI and prior c. Diff presented to [facility] on 2/21/2025 with altered mental status.  Workup in the ED revealed head CT with new oval 5 mm hypodensity superior right cerebellar cortex new compared to December 2024, no hemorrhage or other acute intracranial abnormalities.  Unable to obtain MRI of the brain due to recent aortic dissection and embolization surgery.  Plans for outpatient follow-up with neurology.  While in the 
yesterday or today so far.  She notes that she sees improvement when on IV vancomycin than with oral dosing. Denies rectal bleeding. Current n/v, abdominal pain. .    PMH:   Past Medical History:   Diagnosis Date    Bilateral knee pain     Crohn's colitis (HCC)     Diabetes (HCC)     IDDM    HTN (hypertension)     Hypercholesterolemia     Pulmonary embolism (HCC) 03/2019    Stool color black     crohns       PSH:   Past Surgical History:   Procedure Laterality Date    ANKLE FRACTURE SURGERY Left 2018    COLONOSCOPY N/A 8/31/2022    COLONOSCOPY/ Biopsies performed by JASMINE Shetty MD at [facility] ENDOSCOPY    COLONOSCOPY N/A 7/12/2022    COLONOSCOPY performed by JASMINE Shetty MD at [facility] ENDOSCOPY    COLONOSCOPY N/A 9/22/2020    COLONOSCOPY with bx's performed by JASMINE Shetty MD at [facility] ENDOSCOPY    COLONOSCOPY N/A 10/21/2019    COLONOSCOPY w/ bxs performed by Wayne Cheema MD at [facility] ENDOSCOPY    FOREARM SURGERY Right 12/12/2023    RIGHT DISTAL RADIUS OPEN REDUCTION INTERNAL FIXATION, RIGHT THIRD METACARPAL OPEN REDUCTION INTERNAL FIXATION;MINI C-ARM, SUPRACLAVICULAR BLOCK; [ACUMED ORTHO] performed by Brett Mcneill DO at [facility] MAIN OR    REVISION TOTAL KNEE ARTHROPLASTY Left 8/26/2024    LEFT TOTA KNEE REVISION performed by Vish Orlando MD at [facility] MAIN OR    TOTAL KNEE ARTHROPLASTY Left 10/23/2023    LEFT TOTAL KNEE REPLACEMENT performed by Vish Orlando MD at [facility] MAIN OR    TUBAL LIGATION      btl       Social HX:   Social History     Socioeconomic History    Marital status:      Spouse name: Not on file    Number of children: Not on file    Years of education: Not on file    Highest education level: Not on file   Occupational History    Not on file   Tobacco Use    Smoking status: Never    Smokeless tobacco: Never   Vaping Use    Vaping status: Never Used   Substance and Sexual Activity    Alcohol use: Never    Drug use: Never    Sexual activity: Not on file   Other Topics Concern    Not on file

## 2025-02-26 NOTE — CARE COORDINATION
Discharge orders noted.  Met with pt.      1036:  Sent message to Dr. Palomino for home health orders.    1215    Discharge order noted for today. Pt has been accepted to [facility]. Met with patient  and are agreeable to the transition plan today. Transport has been arranged through family. Patient's discharge summary and home health  orders have been forwarded to [facility] via [facility]. Updated bedside RN, Anibal,  to the transition plan.  Discharge information has been documented on the AVS.           KAREN Holcomb RN  Care Management

## 2025-02-26 NOTE — PROGRESS NOTES
Physician Progress Note      PATIENT:               BRYN STAFFORD  CSN #:                  [id number]  :                       1970  ADMIT DATE:       2025 5:54 PM  DISCH DATE:  RESPONDING  PROVIDER #:        Michael Eng DO          QUERY TEXT:    Pt admitted with new   CVA. Pt noted to have left sided  weakness.  If   possible, please document in progress notes and discharge summary the   relationship, if any, between:    The medical record reflects the following:  Risk Factors: recent  aortic  dissection.  Clinical Indicators: per  neuro  consult- \"  presents with confusion,   irritability, and neurological deficits, including right facial droop, right   arm drift, and left-sided weakness in both arm and leg.  Treatment: PT,  OT    Thank you  very  much  Options provided:  -- left  sided  weakness due to CVA  -- left  sided  weakness unrelated   to CVA  -- Other - I will add my own diagnosis  -- Disagree - Not applicable / Not valid  -- Disagree - Clinically unable to determine / Unknown  -- Refer to Clinical Documentation Reviewer    PROVIDER RESPONSE TEXT:    This patient has left sided weakness due to CVA.    Query created by: Natalia Kimble on 2025 1:13 PM      Electronically signed by:  Michael Eng DO 2025 5:23 PM          
.4 Eyes Skin Assessment     NAME:  Priscilla Dean  YOB: 1970  MEDICAL RECORD NUMBER:  [id number]    The patient is being assessed for  {Reason for Assessment:55262}    I agree that at least one RN has performed a thorough Head to Toe Skin Assessment on the patient. ALL assessment sites listed below have been assessed.      Areas assessed by both nurses:    Head, Face, Ears, Shoulders, Back, Chest, Arms, Elbows, Hands, Sacrum. Buttock, Coccyx, Ischium, and Legs. Feet and Heels        Does the Patient have a Wound? Yes wound(s) were present on assessment. LDA wound assessment was Initiated and completed by RN       Jose Alfredo Prevention initiated by RN: No  Wound Care Orders initiated by RN: No    Pressure Injury (Stage 3,4, Unstageable, DTI, NWPT, and Complex wounds) if present, place Wound referral order by RN under : No    New Ostomies, if present place, Ostomy referral order under : No     Nurse 1 eSignature: Electronically signed by Keenan Chambers RN on 2/22/25 at 8:44 PM EST    **SHARE this note so that the co-signing nurse can place an eSignature**    Nurse 2 eSignature: {Esignature:[id number]}   
EEG completed 2/24/2025.    
MRI screening form needs to be filled out and faxed to  [phone] BEFORE MRI can be scheduled.  If unable to obtain information from patient , MPOA needs to be contacted .   If patient is claustrophobic or will needs pain meds, please have ordered in advance in order to facilitate exam.      If POA is unavailable or unsure of patient history, screening X-rays will need to be ordered.   
Occupational Therapy  OCCUPATIONAL THERAPY EVALUATION/DISCHARGE    Patient: Priscilla Dean (54 y.o. female)  Date: 2/23/2025  Primary Diagnosis: Encephalopathy acute [G93.40]  Acute CVA (cerebrovascular accident) (HCC) [I63.9]  Nausea and vomiting, unspecified vomiting type [R11.2]  Precautions: Isolation, Contact Precautions, Fall Risk  PLOF: Lives in a 1 story home with father, independent, cane/walker      ASSESSMENT AND RECOMMENDATIONS:  Pt cleared for OT evaluation and pt agreeable to participate. Pt presents at baseline functioning for self care tasks. Pt requires supervision for LB tasks and functional mobility due to intermittent confusion. At time of eval, pt is alert and orientated, no confusion. No acute deficits warranting continued occupational therapy services at current level of care. Will sign off at this time.     Further Equipment Recommendations for Discharge: None    AMPAC: AM-PAC Inpatient Daily Activity Raw Score: 21    At this time and based on an AM-PAC score, no further OT is recommended upon discharge.  Recommend patient returns to prior setting with prior services.    This AMPA score should be considered in conjunction with interdisciplinary team recommendations to determine the most appropriate discharge setting. Patient's social support, diagnosis, medical stability, and prior level of function should also be taken into consideration.     SUBJECTIVE:   Patient stated “I hate that sitter thing.”    OBJECTIVE DATA SUMMARY:     Past Medical History:   Diagnosis Date    Bilateral knee pain     Crohn's colitis (HCC)     Diabetes (HCC)     IDDM    HTN (hypertension)     Hypercholesterolemia     Pulmonary embolism (HCC) 03/2019    Stool color black     crohns     Past Surgical History:   Procedure Laterality Date    ANKLE FRACTURE SURGERY Left 2018    COLONOSCOPY N/A 8/31/2022    COLONOSCOPY/ Biopsies performed by JASMINE Shetty MD at [facility] ENDOSCOPY    COLONOSCOPY N/A 7/12/2022    COLONOSCOPY 
Patient confused; Patient has made attempts to get out of bed. Nursing sup aware that avasure was requested. Avasure number 1  
Pt still having on-going chest pain at 8 to 10. Bp current bp 84/60 after bolus.  Md pagethang and supervisor made aware of this situation.  RRT called due to chest pain  
Pt unable to have MRI due to carotid embolization placed x 1 week ago. Per Protocol must be placed for 6 weeks before MRI . Also pt unable to answer simple questions as where she is and what year it is. Pt should not have filled out her own safety form. Pt omitted embolization surgery and cardiac disection surgery that happened one week ago at [facility].  RN aware .   
RRT called for chest pain 8 out of 10  Patient reported she had been having the pain earlier in the night but \"didn't want to bother anyone \"  patient remained on the floor with bolus and Toradol for pain      
She reports continued chest pain.  She is transferred over to the CV unit for further monitoring.  She denies any focal neurologic symptoms at this point.  She has chronic neuropathic pain in both lower extremities secondary to her peripheral neuropathy.  She has chronic gait instability as a consequence of this as well.  She had an aortic dissection repair done about a week ago.  A follow-up carotid ultrasound has been performed, but the results are pending.  Patient was seen in the emergency room for mental status changes.  CT of the brain showed a right cerebellar hypodensity which was previously noted in December of last year.  She currently is without any focal neurologic complaints, headache, nausea, dizziness, double vision, slurred speech or swallowing difficulty.    MRI could not be done    Assessment:  Reported transient confusion.  She seems to be in a normal mental status at this point.  She has an incidental finding of a chronic infarct in the cerebellum.  She is currently on aspirin and a statin.  My preliminary review of the carotid ultrasound shows moderately severe stenosis on the right and minimal on the left.  Follow-up with CT surgery can be arranged for outpatient management.  
[facility] Hospitalist [facility]  Progress Note    Patient: Priscilla Dean Age: 54 y.o. : 1970 MR#: [id number] SSN: [id number]  Date/Time: 2025     Chief Complaint   Patient presents with    Vomiting    Nausea     Subjective:   Patient seen and examined.  Morning events reviewed discussed with patient's extensively.  Endorses substernal chest pain.  Transferred to stepdown for closer monitoring, otherwise denies additional complaints.  Care plan and questions were addressed to the best of my ability.  No family at bedside.    Assessment/Plan:   Acute CVA  -Stroke protocol.  Neurology consulted.  Appreciate assistance in care. CT head x2 with 5mm cerebellar lesion,   Follow-up neurology recommendations, continue ASA, statin.  Follow-up lipid panel other labs.  A1c within normal limits  -Follow-up TTE carotid ultrasound with prelim read of moderately severe right stenosis minimal on the left, will require CT surgery outpatient follow-up.  Unable to obtain MRI of the brain secondary to recent cardiac dissection and embolization surgery,    -Telemetry.  No further chemical DVT prophylaxis. SCDs.  -Follow-up EEG  -PT/SLP cleared patient for home.   -Will require outpatient neurology follow-up, patient follows with Dr. Cobian     Atypical chest pain  EKG without significant changes.  Troponin within normal limits, clinical course consistent with musculoskeletal etiology doubt ACS, will not initiate heparin drip at this time.   -follow-up echo results as ordered for reasons above.  Chest x-ray nonacute.  Suspect likely musculoskeletal will order NSAIDs, lidocaine patch and other supportive care.    Hypokalemia-replete per protocol.  Telemetry.  Mag level wnl    Acute uncomplicated type B aortic dissection  -Status post embolization  per vascular surgery at .  Continue aspirin daily.      Hx C. difficile colitis  -Completed course of p.o. vancomycin, repeat C. difficile testing 
[facility] Hospitalist Group  Progress Note    Patient: Priscilla Dean Age: 54 y.o. : 1970 MR#: [id number] SSN: [id number]  Date/Time: 2025     Chief Complaint   Patient presents with    Vomiting    Nausea       Subjective:   Patient seen and examined. No acute events overnight. In good spirits, no new complaints. Denies chest pain, abdominal pain, shortness of breath.  No diarrhea.  Not much of appetite today.  Updated family at bedside.  Questions addressed.   Assessment/Plan:   Acute CVA  -Stroke protocol.  Neurology consulted.  Appreciate assistance in care. CT head with 5mm cerebellar lesion, repeat CT head pending.  Follow-up neurology recommendations, continue ASA, statin.  Follow-up lipid panel other labs.  A1c within normal limits  -Follow-up TTE.  Unable to obtain MRI of the brain secondary to recent cardiac dissection and embolization surgery,    -Telemetry.  No further chemical DVT prophylaxis. SCDs.  -PT/SLP cleared patient for home.   -Will require outpatient neurology follow-up, patient follows with Dr. Cobian     Hypokalemia-replete per protocol.  Telemetry.  Mag level pending    Acute uncomplicated type B aortic dissection  -Status post embolization  per vascular surgery at [facility].  Continue aspirin daily.      Hx C. difficile colitis  -Completed course of p.o. vancomycin, repeat C. difficile testing.  Acute cystitis  -Follow-up urine culture, on Rocephin.  Crohn's colitis.  On Stelara (patient to bring from home)  Type 2 diabetes  -A1c 5.3, controlled though has not been 3 months since prior A1c in .. Accu-Cheks SSI.  Monitor and adjust accordingly    Dispo plan: Home once medically stable anticipated discharge date -    I spent 50 minutes with the patient in face-to-face consultation, of which greater than 50% was spent in counseling and coordination of care as described above.      Discussed with patient's family at bedside. Questions were 
[facility] Hospitalist Group  Progress Note    Patient: Priscilla Dean Age: 54 y.o. : 1970 MR#: [id number] SSN: [id number]  Date/Time: 2025     Chief Complaint   Patient presents with    Vomiting    Nausea     Subjective:   Patient seen and examined.  No acute events overnight.  Patient reports feeling much better.  Endorses musculoskeletal chest pain 2 out of 10 frequency, reports 3 episodes of diarrhea otherwise no additional complaints.  Tolerating diet appropriately    Care plan and questions were addressed to the best of my ability.  No family at bedside.    Assessment/Plan:   Acute CVA  -Stroke protocol.  Neurology consulted.  Appreciate assistance in care. CT head x2 with 5mm cerebellar lesion,   Follow-up neurology recommendations, continue ASA, statin.  Follow-up lipid panel other labs.  A1c within normal limits.  -TTE results reviewed  -Follow-up carotid ultrasound with prelim read of moderately severe right stenosis minimal on the left, will require CT surgery outpatient follow-up.  Unable to obtain MRI of the brain secondary to recent cardiac dissection and embolization surgery,    -Telemetry.  No further chemical DVT prophylaxis. SCDs.   -EEG normal  -PT/SLP cleared patient for home.   -Will require outpatient neurology follow-up, patient follows with Dr. Cobian     Atypical chest pain  EKG without significant changes.  Troponin within normal limits, clinical course consistent with musculoskeletal etiology doubt ACS, will not initiate heparin drip at this time.   Chest x-ray nonacute.  Suspect likely musculoskeletal, continue Celebrex, lidocaine patch and other supportive care.    Hypokalemia-replete per protocol.  Telemetry.  Mag level wnl    Acute uncomplicated type B aortic dissection  -Status post embolization  per vascular surgery at [facility].  Continue aspirin daily.      Hx C. difficile colitis  -Completed course of p.o. vancomycin,.  C. difficile testing 
Speech-Language Pathology    SLP evaluation orders received and chart reviewed. Attempted SLP eval. Currently, patient is DMITRI. ST will follow up as pt's medical condition and schedule allow.    Thank you for this referral,     Kalee Velez M.S., CF-SLP  
Spiritual Health History and Assessment/Progress Note  [facility]    Spiritual/Emotional Needs,  ,  ,      Name: Priscilla Dean MRN: [id number]    Age: 54 y.o.     Sex: female   Language: English   Amish: Mormonism   Acute CVA (cerebrovascular accident) (HCC)     Date: 2/26/2025            Total Time Calculated: 8 min              Spiritual Assessment began in [facility] 2 CV STEPDOWN        Referral/Consult From: Rounding   Encounter Overview/Reason: Spiritual/Emotional Needs  Service Provided For: Patient    Gertrude, Belief, Meaning:   Patient has beliefs or practices that help with coping during difficult times  Family/Friends No family/friends present      Importance and Influence:  Patient has spiritual/personal beliefs that influence decisions regarding their health  Family/Friends No family/friends present    Community:  Patient feels well-supported. Support system includes: Parent/s and Extended family  Family/Friends No family/friends present    Assessment and Plan of Care:     Patient Interventions include: Facilitated expression of thoughts and feelings, Explored spiritual coping/struggle/distress, Affirmed coping skills/support systems, and Provided sacramental/Amish ritual  Family/Friends Interventions include: No family/friends present    Patient Plan of Care: Spiritual Care available upon further referral  Family/Friends Plan of Care: No family/friends present    Electronically signed by Chaplain Suzanna on 2/26/2025 at 11:54 AM   
TRANSFER - OUT REPORT:    Verbal report given to MICHELLE Estrada on Priscilla Dean  being transferred to cvt stepdown for urgent transfer       Report consisted of patient's Situation, Background, Assessment and   Recommendations(SBAR).     Information from the following report(s) Nurse Handoff Report and Recent Results was reviewed with the receiving nurse.           Lines:   Peripheral IV 02/21/25 Right Antecubital (Active)   Site Assessment Clean, dry & intact 02/23/25 0839   Line Status Normal saline locked 02/22/25 1253   Line Care Connections checked and tightened 02/23/25 0839   Phlebitis Assessment No symptoms 02/23/25 0839   Infiltration Assessment 0 02/23/25 0839   Alcohol Cap Used Yes 02/23/25 0839   Dressing Status Clean, dry & intact 02/23/25 0839   Dressing Type Transparent 02/23/25 0839        Opportunity for questions and clarification was provided.      Patient transported with:  Tech        
[[id number]] Collected: 02/23/25 1114    Order Status: Completed Specimen: Nares Updated: 02/24/25 2138     Special Requests NO SPECIAL REQUESTS        Culture MRSA NOT PRESENT               Screening of patient nares for MRSA is for surveillance purposes and, if positive, to facilitate isolation considerations in high risk settings. It is not intended for automatic decolonization interventions per se as regimens are not sufficiently effective to warrant routine use.      Culture, Blood 1 [[id number]] Collected: 02/22/25 1907    Order Status: Completed Specimen: Blood Updated: 02/26/25 0657     Special Requests NO SPECIAL REQUESTS        Culture NO GROWTH 4 DAYS       Culture, Blood 2 [[id number]] Collected: 02/22/25 1900    Order Status: Completed Specimen: Blood Updated: 02/26/25 0657     Special Requests NO SPECIAL REQUESTS        Culture NO GROWTH 4 DAYS       Respiratory Panel, Molecular, with COVID-19 (Restricted: peds pts or suitable admitted adults) [[id number]] Collected: 02/22/25 1700    Order Status: Completed Specimen: Nasopharyngeal Updated: 02/22/25 1822     Adenovirus by PCR Not detected        Coronavirus 229E by PCR Not detected        Coronavirus HKU1 by PCR Not detected        Coronavirus NL63 by PCR Not detected        Coronavirus OC43 by PCR Not detected        SARS-CoV-2, PCR Not detected        Human Metapneumovirus by PCR Not detected        Rhinovirus Enterovirus PCR Not detected        Influenza A by PCR Not detected        Influenza B PCR Not detected        Parainfluenza 1 PCR Not detected        Parainfluenza 2 PCR Not detected        Parainfluenza 3 PCR Not detected        Parainfluenza 4 PCR Not detected        Respiratory Syncytial Virus by PCR Not detected        Bordetella parapertussis by PCR Not detected        Bordetella pertussis by PCR Not detected        Chlamydophila Pneumonia PCR Not detected        Mycoplasma pneumo by PCR Not detected       Culture, Urine [[id number]]

## 2025-02-26 NOTE — PLAN OF CARE
Problem: Safety - Adult  Goal: Free from fall injury  Outcome: Progressing  Flowsheets (Taken 2/25/2025 0933 by Marga Ely RN)  Free From Fall Injury: Instruct family/caregiver on patient safety  Note: Patient to remain free of any falls or injuries during length of stay. Safety interventions implemented: non-skid yellow socks on, bed alarm on, safe exit side of the bed established, and call light within reach.     Problem: Chronic Conditions and Co-morbidities  Goal: Patient's chronic conditions and co-morbidity symptoms are monitored and maintained or improved  Outcome: Progressing  Flowsheets  Taken 2/26/2025 0153  Care Plan - Patient's Chronic Conditions and Co-Morbidity Symptoms are Monitored and Maintained or Improved:   Monitor and assess patient's chronic conditions and comorbid symptoms for stability, deterioration, or improvement   Collaborate with multidisciplinary team to address chronic and comorbid conditions and prevent exacerbation or deterioration  Taken 2/25/2025 1950  Care Plan - Patient's Chronic Conditions and Co-Morbidity Symptoms are Monitored and Maintained or Improved:   Collaborate with multidisciplinary team to address chronic and comorbid conditions and prevent exacerbation or deterioration   Monitor and assess patient's chronic conditions and comorbid symptoms for stability, deterioration, or improvement  Note: Patient to be assessed via vitals, continuous telemetry, labs, and physical assessments. Patient on oral vancomycin for treatment of C.Diff. EEG results need to be interpreted to patient.      Problem: Discharge Planning  Goal: Discharge to home or other facility with appropriate resources  Outcome: Progressing  Flowsheets  Taken 2/26/2025 0153  Discharge to home or other facility with appropriate resources: Identify barriers to discharge with patient and caregiver  Taken 2/25/2025 1950  Discharge to home or other facility with appropriate resources:   Arrange for

## 2025-02-28 LAB
BACTERIA SPEC CULT: NORMAL
BACTERIA SPEC CULT: NORMAL
SERVICE CMNT-IMP: NORMAL
SERVICE CMNT-IMP: NORMAL

## 2025-03-04 ENCOUNTER — OFFICE VISIT (OUTPATIENT)
Age: 55
End: 2025-03-04
Payer: COMMERCIAL

## 2025-03-04 VITALS
BODY MASS INDEX: 31.83 KG/M2 | SYSTOLIC BLOOD PRESSURE: 149 MMHG | TEMPERATURE: 97 F | HEART RATE: 79 BPM | DIASTOLIC BLOOD PRESSURE: 82 MMHG | HEIGHT: 72 IN | WEIGHT: 235 LBS | OXYGEN SATURATION: 96 %

## 2025-03-04 DIAGNOSIS — E11.40 TYPE 2 DIABETES MELLITUS WITH DIABETIC NEUROPATHY, WITH LONG-TERM CURRENT USE OF INSULIN (HCC): ICD-10-CM

## 2025-03-04 DIAGNOSIS — M47.816 LUMBAR SPONDYLOSIS: ICD-10-CM

## 2025-03-04 DIAGNOSIS — I65.21 STENOSIS OF RIGHT CAROTID ARTERY: ICD-10-CM

## 2025-03-04 DIAGNOSIS — G47.33 OSA (OBSTRUCTIVE SLEEP APNEA): ICD-10-CM

## 2025-03-04 DIAGNOSIS — I63.89 CEREBROVASCULAR ACCIDENT (CVA) DUE TO OTHER MECHANISM (HCC): ICD-10-CM

## 2025-03-04 DIAGNOSIS — G89.0 CENTRAL PAIN SYNDROME: ICD-10-CM

## 2025-03-04 DIAGNOSIS — I63.9 ACUTE CVA (CEREBROVASCULAR ACCIDENT) (HCC): Primary | ICD-10-CM

## 2025-03-04 DIAGNOSIS — Z79.4 TYPE 2 DIABETES MELLITUS WITH DIABETIC NEUROPATHY, WITH LONG-TERM CURRENT USE OF INSULIN (HCC): ICD-10-CM

## 2025-03-04 DIAGNOSIS — M47.812 CERVICAL SPONDYLOSIS: ICD-10-CM

## 2025-03-04 DIAGNOSIS — R26.89 ABNORMALITY OF GAIT DUE TO IMPAIRMENT OF BALANCE: ICD-10-CM

## 2025-03-04 PROCEDURE — 99215 OFFICE O/P EST HI 40 MIN: CPT | Performed by: PSYCHIATRY & NEUROLOGY

## 2025-03-04 PROCEDURE — G2211 COMPLEX E/M VISIT ADD ON: HCPCS | Performed by: PSYCHIATRY & NEUROLOGY

## 2025-03-04 PROCEDURE — 3079F DIAST BP 80-89 MM HG: CPT | Performed by: PSYCHIATRY & NEUROLOGY

## 2025-03-04 PROCEDURE — 3044F HG A1C LEVEL LT 7.0%: CPT | Performed by: PSYCHIATRY & NEUROLOGY

## 2025-03-04 PROCEDURE — 3077F SYST BP >= 140 MM HG: CPT | Performed by: PSYCHIATRY & NEUROLOGY

## 2025-03-04 RX ORDER — AMLODIPINE BESYLATE 10 MG/1
10 TABLET ORAL DAILY
COMMUNITY
Start: 2025-02-14

## 2025-03-04 RX ORDER — PREGABALIN 225 MG/1
225 CAPSULE ORAL 3 TIMES DAILY
Qty: 90 CAPSULE | Refills: 5 | Status: SHIPPED | OUTPATIENT
Start: 2025-03-04 | End: 2025-08-31

## 2025-03-04 RX ORDER — DOXEPIN HYDROCHLORIDE 10 MG/1
20 CAPSULE ORAL NIGHTLY
Qty: 60 CAPSULE | Refills: 3 | Status: SHIPPED | OUTPATIENT
Start: 2025-03-04

## 2025-03-04 NOTE — PATIENT INSTRUCTIONS
Continue aspirin and atorvastatin.  Monitor blood pressure.  Increased dose of pregabalin to 225 mg three times per day for neuropathy pain.  Start doxepin at bedtime for neuropathy pain. Take one capsule at night daily. If tolerated, you may increase to 2 capsules daily at night. May cause sedation or dry mouth.  MRI cervical and lumbar spine.  30 day heart monitor.  Sleep Medicine referral.  Vascular surgery referral  The patient is advised to begin progressive daily aerobic exercise program, follow a low fat, low cholesterol diet, reduce salt in diet and cooking, reduce exposure to stress, continue current medications, and continue current healthy lifestyle patterns  Mindfulness techniques such as meditation or relaxation..     Signs and Symptoms of stroke  Sudden numbness or weakness in the face, arm, or leg, especially on one side of the body.  Sudden confusion, trouble speaking, or difficulty understanding speech.  Sudden trouble seeing in one or both eyes.  Sudden trouble walking, dizziness, loss of balance, or lack of coordination.  Sudden severe headache with no known cause.  Call 9-1-1 right away if you or someone else has any of these symptoms.

## 2025-03-04 NOTE — PROGRESS NOTES
Priscilla Dean is a 54 y.o. female (: 1970) presenting to address:    Chief Complaint   Patient presents with    Follow-Up from Hospital     CVA 25       Medication list and allergies have been reviewed with Priscilla Dean and updated as of today's date.     I have gone over all Medical, Surgical and Social History with Priscilla Dean and updated/added the information accordingly.     
represents a significant change. Clinical correlation is recommended.  99th Percentile:    Women:  0-54 ng/L                                                               Men:  0-78 ng/L     No results displayed because visit has over 200 results.           Medical problems and test results were reviewed with the patient today.       ASSESSMENT and PLAN    Recent stroke.  Recent acute ischemic stroke discharged from the hospital on February 27, 2025.  She presented with transient confusion facial weakness left side numbness gait difficulty.  Symptoms have not worsened but they still persist.  She is still waiting insurance authorization to begin home physical therapy.  The patient did have recent aortic dissection repair.  Unable to get MRI yet.CT scan of the head showed new superior right cerebellar cortical likely infarct.  And stable tiny lucency in the right basal ganglia and left internal capsule likely chronic infarcts  Echocardiogram and telemetry monitoring was normal.  For now continue to work on aggressive risk factor modification, blood pressure control aspirin and statin.  Will order 30-day event monitor to further evaluate for possible cardioembolic etiology.  Also note of bilateral carotid stenosis.  But less than 50% on the left but moderate 50 to 69% on the right.  Will refer to vascular surgery  2.  Diabetic neuropathy.  Patient now likely has a component of central pain syndrome as she has new left face arm numbness as well.  Symptoms are bothersome and are no longer responding to  Pregabalin 200 mg 3 times daily.Will increase dose to 225 mg 3 times daily.  And add low-dose doxepin.  Patient counseled to monitor for side effects.  3.  Central pain syndrome.  Patient may have a component of central pain syndrome, contributing to neuropathic left-sided pain status post stroke  4.  MIGUEL.  Elevated BMI snoring daytime fatigue will refer to sleep medicine for further evaluation as MIGUEL is an independent

## 2025-03-11 ENCOUNTER — TELEPHONE (OUTPATIENT)
Age: 55
End: 2025-03-11

## 2025-04-04 ENCOUNTER — TELEPHONE (OUTPATIENT)
Age: 55
End: 2025-04-04

## 2025-04-04 NOTE — TELEPHONE ENCOUNTER
Stefany the nurse from [facility] called asking did  want the patient to be schedule with them due to her being a patient with [facility].   Contact:[phone]

## 2025-04-07 ENCOUNTER — TELEPHONE (OUTPATIENT)
Age: 55
End: 2025-04-07

## 2025-04-07 NOTE — TELEPHONE ENCOUNTER
Patient called stating she was told by her pharmacy her pregablin dosage need to be switched back to her original due to them not having the 225mg    Waiting to schedule

## 2025-04-10 DIAGNOSIS — Z79.4 TYPE 2 DIABETES MELLITUS WITH DIABETIC NEUROPATHY, WITH LONG-TERM CURRENT USE OF INSULIN (HCC): Primary | ICD-10-CM

## 2025-04-10 DIAGNOSIS — E11.40 TYPE 2 DIABETES MELLITUS WITH DIABETIC NEUROPATHY, WITH LONG-TERM CURRENT USE OF INSULIN (HCC): Primary | ICD-10-CM

## 2025-04-10 RX ORDER — PREGABALIN 200 MG/1
200 CAPSULE ORAL 3 TIMES DAILY
Qty: 90 CAPSULE | Refills: 5 | Status: SHIPPED | OUTPATIENT
Start: 2025-04-10 | End: 2025-10-07

## 2025-04-24 ENCOUNTER — OFFICE VISIT (OUTPATIENT)
Age: 55
End: 2025-04-24
Payer: COMMERCIAL

## 2025-04-24 VITALS
OXYGEN SATURATION: 97 % | BODY MASS INDEX: 31.56 KG/M2 | WEIGHT: 233 LBS | SYSTOLIC BLOOD PRESSURE: 129 MMHG | DIASTOLIC BLOOD PRESSURE: 79 MMHG | HEIGHT: 72 IN | TEMPERATURE: 97.8 F | HEART RATE: 60 BPM

## 2025-04-24 DIAGNOSIS — I63.89 CEREBROVASCULAR ACCIDENT (CVA) DUE TO OTHER MECHANISM (HCC): Primary | ICD-10-CM

## 2025-04-24 DIAGNOSIS — G89.0 CENTRAL PAIN SYNDROME: ICD-10-CM

## 2025-04-24 DIAGNOSIS — S64.12XA INJURY OF LEFT MEDIAN NERVE AT HAND LEVEL, INITIAL ENCOUNTER: ICD-10-CM

## 2025-04-24 DIAGNOSIS — I65.21 STENOSIS OF RIGHT CAROTID ARTERY: ICD-10-CM

## 2025-04-24 PROCEDURE — G2211 COMPLEX E/M VISIT ADD ON: HCPCS | Performed by: PSYCHIATRY & NEUROLOGY

## 2025-04-24 PROCEDURE — 99215 OFFICE O/P EST HI 40 MIN: CPT | Performed by: PSYCHIATRY & NEUROLOGY

## 2025-04-24 PROCEDURE — 3074F SYST BP LT 130 MM HG: CPT | Performed by: PSYCHIATRY & NEUROLOGY

## 2025-04-24 PROCEDURE — 3078F DIAST BP <80 MM HG: CPT | Performed by: PSYCHIATRY & NEUROLOGY

## 2025-04-24 RX ORDER — BACLOFEN 10 MG/1
10 TABLET ORAL 3 TIMES DAILY PRN
Qty: 90 TABLET | Refills: 3 | Status: SHIPPED | OUTPATIENT
Start: 2025-04-24

## 2025-04-24 NOTE — PATIENT INSTRUCTIONS
I am starting baclofen to take as needed for spasms. May cause sedation.   Continue lyrica 200 mg three times per day.  Keep appointment with the hand specialist.  Pain management referral  [address]  [address]  [place] [id number] [phone]

## 2025-04-24 NOTE — PROGRESS NOTES
change. Clinical correlation is recommended.  A HS troponin value change of (+ or -) 20% or above the 99th percentile, in a 1/2/3 hr interval represents a significant change. Clinical correlation is recommended.  99th Percentile:    Women:  0-54 ng/L                                                               Men:  0-78 ng/L      Sodium 02/02/2025 140  136 - 145 mmol/L Final    Potassium 02/02/2025 3.0 (L)  3.5 - 5.5 mmol/L Final    Chloride 02/02/2025 111  100 - 111 mmol/L Final    CO2 02/02/2025 23  21 - 32 mmol/L Final    Anion Gap 02/02/2025 6  3.0 - 18 mmol/L Final    Glucose 02/02/2025 101 (H)  74 - 99 mg/dL Final    BUN 02/02/2025 11  7.0 - 18 MG/DL Final    Creatinine 02/02/2025 0.72  0.6 - 1.3 MG/DL Final    BUN/Creatinine Ratio 02/02/2025 15  12 - 20   Final    Est, Glom Filt Rate 02/02/2025 >90  >60 ml/min/1.73m2 Final    Comment:    Pediatric calculator link: https://www.kidney.org/professionals/kdoqi/gfr_calculatorped     These results are not intended for use in patients <18 years of age.     eGFR results are calculated without a race factor using  the 2021 CKD-EPI equation. Careful clinical correlation is recommended, particularly when comparing to results calculated using previous equations.  The CKD-EPI equation is less accurate in patients with extremes of muscle mass, extra-renal metabolism of creatinine, excessive creatine ingestion, or following therapy that affects renal tubular secretion.      Calcium 02/02/2025 7.8 (L)  8.5 - 10.1 MG/DL Final    Magnesium 02/02/2025 1.7  1.6 - 2.6 mg/dL Final    Troponin, High Sensitivity 02/02/2025 15  0 - 54 ng/L Final    Comment: A HS troponin value change of (+ or -) 50% or more below the 99th percentile, in a 1/2/3 hr interval represents a significant change. Clinical correlation is recommended.  A HS troponin value change of (+ or -) 20% or above the 99th percentile, in a 1/2/3 hr interval represents a significant change. Clinical correlation is

## 2025-04-30 ENCOUNTER — TELEPHONE (OUTPATIENT)
Age: 55
End: 2025-04-30

## 2025-04-30 NOTE — TELEPHONE ENCOUNTER
Returned patient's message that was left on the nurse refill line. Patient stated she was advised to call provider when she was out of her oxycodone.

## 2025-05-02 DIAGNOSIS — S64.12XA INJURY OF LEFT MEDIAN NERVE AT HAND LEVEL, INITIAL ENCOUNTER: Primary | ICD-10-CM

## 2025-05-02 RX ORDER — OXYCODONE HYDROCHLORIDE 5 MG/1
5 TABLET ORAL EVERY 6 HOURS PRN
Qty: 20 TABLET | Refills: 0 | Status: SHIPPED | OUTPATIENT
Start: 2025-05-02 | End: 2025-05-07

## 2025-05-13 ENCOUNTER — APPOINTMENT (OUTPATIENT)
Facility: HOSPITAL | Age: 55
End: 2025-05-13
Payer: COMMERCIAL

## 2025-05-13 ENCOUNTER — HOSPITAL ENCOUNTER (EMERGENCY)
Facility: HOSPITAL | Age: 55
Discharge: HOME | End: 2025-05-13
Payer: COMMERCIAL

## 2025-05-13 ENCOUNTER — HOSPITAL ENCOUNTER (EMERGENCY)
Facility: HOSPITAL | Age: 55
Discharge: HOME | End: 2025-05-16
Payer: COMMERCIAL

## 2025-05-13 VITALS
DIASTOLIC BLOOD PRESSURE: 89 MMHG | HEIGHT: 71 IN | RESPIRATION RATE: 18 BRPM | SYSTOLIC BLOOD PRESSURE: 136 MMHG | WEIGHT: 230 LBS | HEART RATE: 58 BPM | BODY MASS INDEX: 32.2 KG/M2 | TEMPERATURE: 98.4 F | OXYGEN SATURATION: 99 %

## 2025-05-13 DIAGNOSIS — S69.92XA INJURY OF LEFT WRIST, INITIAL ENCOUNTER: Primary | ICD-10-CM

## 2025-05-13 DIAGNOSIS — S00.83XA CONTUSION OF FACE, INITIAL ENCOUNTER: ICD-10-CM

## 2025-05-13 DIAGNOSIS — M50.30 DDD (DEGENERATIVE DISC DISEASE), CERVICAL: ICD-10-CM

## 2025-05-13 DIAGNOSIS — N39.0 ACUTE UTI: ICD-10-CM

## 2025-05-13 LAB
ALBUMIN SERPL-MCNC: 2.8 G/DL (ref 3.4–5)
ALBUMIN/GLOB SERPL: 0.7 (ref 0.8–1.7)
ALP SERPL-CCNC: 109 U/L (ref 45–117)
ALT SERPL-CCNC: 6 U/L (ref 10–35)
ANION GAP SERPL CALC-SCNC: 11 MMOL/L (ref 3–18)
APPEARANCE UR: CLEAR
AST SERPL-CCNC: 14 U/L (ref 10–38)
BACTERIA URNS QL MICRO: ABNORMAL /HPF
BASOPHILS # BLD: 0.03 K/UL (ref 0–0.1)
BASOPHILS NFR BLD: 0.7 % (ref 0–2)
BILIRUB SERPL-MCNC: <0.2 MG/DL (ref 0.2–1)
BILIRUB UR QL: NEGATIVE
BUN SERPL-MCNC: 10 MG/DL (ref 6–23)
BUN/CREAT SERPL: 16 (ref 12–20)
CALCIUM SERPL-MCNC: 9 MG/DL (ref 8.5–10.1)
CAOX CRY URNS QL MICRO: ABNORMAL
CHLORIDE SERPL-SCNC: 105 MMOL/L (ref 98–107)
CO2 SERPL-SCNC: 22 MMOL/L (ref 21–32)
COLOR UR: YELLOW
CREAT SERPL-MCNC: 0.64 MG/DL (ref 0.6–1.3)
DIFFERENTIAL METHOD BLD: ABNORMAL
EKG ATRIAL RATE: 55 BPM
EKG DIAGNOSIS: NORMAL
EKG P AXIS: 46 DEGREES
EKG P-R INTERVAL: 152 MS
EKG Q-T INTERVAL: 488 MS
EKG QRS DURATION: 104 MS
EKG QTC CALCULATION (BAZETT): 466 MS
EKG R AXIS: -1 DEGREES
EKG T AXIS: 48 DEGREES
EKG VENTRICULAR RATE: 55 BPM
EOSINOPHIL # BLD: 0.29 K/UL (ref 0–0.4)
EOSINOPHIL NFR BLD: 6.5 % (ref 0–5)
EPITH CASTS URNS QL MICRO: ABNORMAL /LPF (ref 0–5)
ERYTHROCYTE [DISTWIDTH] IN BLOOD BY AUTOMATED COUNT: 16.9 % (ref 11.6–14.5)
GLOBULIN SER CALC-MCNC: 4.1 G/DL (ref 2–4)
GLUCOSE SERPL-MCNC: 125 MG/DL (ref 74–108)
GLUCOSE UR STRIP.AUTO-MCNC: NEGATIVE MG/DL
HCT VFR BLD AUTO: 33.3 % (ref 35–45)
HGB BLD-MCNC: 10 G/DL (ref 12–16)
HGB UR QL STRIP: NEGATIVE
IMM GRANULOCYTES # BLD AUTO: 0.01 K/UL (ref 0–0.04)
IMM GRANULOCYTES NFR BLD AUTO: 0.2 % (ref 0–0.5)
KETONES UR QL STRIP.AUTO: NEGATIVE MG/DL
LEUKOCYTE ESTERASE UR QL STRIP.AUTO: ABNORMAL
LYMPHOCYTES # BLD: 1.19 K/UL (ref 0.9–3.6)
LYMPHOCYTES NFR BLD: 26.4 % (ref 21–52)
MCH RBC QN AUTO: 24.9 PG (ref 24–34)
MCHC RBC AUTO-ENTMCNC: 30 G/DL (ref 31–37)
MCV RBC AUTO: 83 FL (ref 78–100)
MONOCYTES # BLD: 0.45 K/UL (ref 0.05–1.2)
MONOCYTES NFR BLD: 10.1 % (ref 3–10)
NEUTS SEG # BLD: 2.52 K/UL (ref 1.8–8)
NEUTS SEG NFR BLD: 56.1 % (ref 40–73)
NITRITE UR QL STRIP.AUTO: POSITIVE
NRBC # BLD: 0 K/UL (ref 0–0.01)
NRBC BLD-RTO: 0 PER 100 WBC
PH UR STRIP: 6 (ref 5–8)
PLATELET # BLD AUTO: ABNORMAL K/UL (ref 135–420)
PLATELET COMMENT: ABNORMAL
POTASSIUM SERPL-SCNC: 4 MMOL/L (ref 3.5–5.5)
PROT SERPL-MCNC: 6.9 G/DL (ref 6.4–8.2)
PROT UR STRIP-MCNC: NEGATIVE MG/DL
RBC # BLD AUTO: 4.01 M/UL (ref 4.2–5.3)
RBC #/AREA URNS HPF: ABNORMAL /HPF (ref 0–5)
RBC MORPH BLD: ABNORMAL
SODIUM SERPL-SCNC: 137 MMOL/L (ref 136–145)
SP GR UR REFRACTOMETRY: 1.01 (ref 1–1.03)
TROPONIN T SERPL HS-MCNC: 7.5 NG/L (ref 0–14)
UROBILINOGEN UR QL STRIP.AUTO: 0.2 EU/DL (ref 0.2–1)
WBC # BLD AUTO: 4.5 K/UL (ref 4.6–13.2)
WBC URNS QL MICRO: ABNORMAL /HPF (ref 0–5)

## 2025-05-13 PROCEDURE — 70450 CT HEAD/BRAIN W/O DYE: CPT

## 2025-05-13 PROCEDURE — 96376 TX/PRO/DX INJ SAME DRUG ADON: CPT

## 2025-05-13 PROCEDURE — 93010 ELECTROCARDIOGRAM REPORT: CPT | Performed by: INTERNAL MEDICINE

## 2025-05-13 PROCEDURE — 81001 URINALYSIS AUTO W/SCOPE: CPT

## 2025-05-13 PROCEDURE — 96374 THER/PROPH/DIAG INJ IV PUSH: CPT

## 2025-05-13 PROCEDURE — 72125 CT NECK SPINE W/O DYE: CPT

## 2025-05-13 PROCEDURE — 71260 CT THORAX DX C+: CPT

## 2025-05-13 PROCEDURE — 70486 CT MAXILLOFACIAL W/O DYE: CPT

## 2025-05-13 PROCEDURE — 99285 EMERGENCY DEPT VISIT HI MDM: CPT

## 2025-05-13 PROCEDURE — 2500000003 HC RX 250 WO HCPCS: Performed by: EMERGENCY MEDICINE

## 2025-05-13 PROCEDURE — 73080 X-RAY EXAM OF ELBOW: CPT

## 2025-05-13 PROCEDURE — 73130 X-RAY EXAM OF HAND: CPT

## 2025-05-13 PROCEDURE — 84484 ASSAY OF TROPONIN QUANT: CPT

## 2025-05-13 PROCEDURE — 93005 ELECTROCARDIOGRAM TRACING: CPT | Performed by: EMERGENCY MEDICINE

## 2025-05-13 PROCEDURE — 73110 X-RAY EXAM OF WRIST: CPT

## 2025-05-13 PROCEDURE — 85025 COMPLETE CBC W/AUTO DIFF WBC: CPT

## 2025-05-13 PROCEDURE — 94761 N-INVAS EAR/PLS OXIMETRY MLT: CPT

## 2025-05-13 PROCEDURE — 6360000004 HC RX CONTRAST MEDICATION: Performed by: EMERGENCY MEDICINE

## 2025-05-13 PROCEDURE — 80053 COMPREHEN METABOLIC PANEL: CPT

## 2025-05-13 PROCEDURE — 6360000002 HC RX W HCPCS: Performed by: EMERGENCY MEDICINE

## 2025-05-13 PROCEDURE — 96375 TX/PRO/DX INJ NEW DRUG ADDON: CPT

## 2025-05-13 RX ORDER — MORPHINE SULFATE 4 MG/ML
4 INJECTION, SOLUTION INTRAMUSCULAR; INTRAVENOUS
Status: COMPLETED | OUTPATIENT
Start: 2025-05-13 | End: 2025-05-13

## 2025-05-13 RX ORDER — IOPAMIDOL 612 MG/ML
100 INJECTION, SOLUTION INTRAVASCULAR
Status: COMPLETED | OUTPATIENT
Start: 2025-05-13 | End: 2025-05-13

## 2025-05-13 RX ORDER — MORPHINE SULFATE 2 MG/ML
2 INJECTION, SOLUTION INTRAMUSCULAR; INTRAVENOUS
Refills: 0 | Status: COMPLETED | OUTPATIENT
Start: 2025-05-13 | End: 2025-05-13

## 2025-05-13 RX ORDER — OXYCODONE AND ACETAMINOPHEN 5; 325 MG/1; MG/1
1 TABLET ORAL EVERY 8 HOURS PRN
Qty: 9 TABLET | Refills: 0 | Status: SHIPPED | OUTPATIENT
Start: 2025-05-13 | End: 2025-05-16

## 2025-05-13 RX ORDER — CEFUROXIME AXETIL 250 MG/1
250 TABLET ORAL 2 TIMES DAILY
Qty: 14 TABLET | Refills: 0 | Status: SHIPPED | OUTPATIENT
Start: 2025-05-13 | End: 2025-05-20

## 2025-05-13 RX ADMIN — MORPHINE SULFATE 4 MG: 4 INJECTION, SOLUTION INTRAMUSCULAR; INTRAVENOUS at 14:03

## 2025-05-13 RX ADMIN — IOPAMIDOL 100 ML: 612 INJECTION, SOLUTION INTRAVENOUS at 15:37

## 2025-05-13 RX ADMIN — CEFTRIAXONE 1000 MG: 1 INJECTION, POWDER, FOR SOLUTION INTRAMUSCULAR; INTRAVENOUS at 17:07

## 2025-05-13 RX ADMIN — MORPHINE SULFATE 2 MG: 2 INJECTION, SOLUTION INTRAMUSCULAR; INTRAVENOUS at 17:05

## 2025-05-13 ASSESSMENT — PAIN SCALES - GENERAL
PAINLEVEL_OUTOF10: 8
PAINLEVEL_OUTOF10: 7

## 2025-05-13 ASSESSMENT — PAIN DESCRIPTION - ORIENTATION
ORIENTATION: RIGHT
ORIENTATION: PROXIMAL;LEFT;RIGHT

## 2025-05-13 ASSESSMENT — PAIN DESCRIPTION - DESCRIPTORS
DESCRIPTORS: ACHING;SORE
DESCRIPTORS: THROBBING

## 2025-05-13 ASSESSMENT — PAIN DESCRIPTION - LOCATION: LOCATION: HEAD

## 2025-05-13 ASSESSMENT — PAIN - FUNCTIONAL ASSESSMENT
PAIN_FUNCTIONAL_ASSESSMENT: ACTIVITIES ARE NOT PREVENTED
PAIN_FUNCTIONAL_ASSESSMENT: 0-10

## 2025-05-13 NOTE — ED PROVIDER NOTES
EMERGENCY DEPARTMENT HISTORY AND PHYSICAL EXAM      Date: 5/13/2025  Patient Name: Priscilla Dean    History of Presenting Illness     Chief Complaint   Patient presents with    Fall    Head Injury       History (Context): Priscilla Dean is a 54 y.o. female  presents to the ED today with chief complaint of having fallen and hit her face and head this morning.  Patient was toileting and was sitting on a raised toilet seat and got stuck she fell off of bed onto the floor.  Is on Eliquis.  Denies loss of consciousness.  Her daughter helped her get up.  Patient is status post type B aortic dissection repair.  History of diabetes hypertension GERD.      PCP: Zachery Lucio MD    Current Facility-Administered Medications   Medication Dose Route Frequency Provider Last Rate Last Admin    cefTRIAXone (ROCEPHIN) 1,000 mg in sterile water 10 mL IV syringe  1,000 mg IntraVENous NOW Rosa Moreno PA        morphine (PF) injection 2 mg  2 mg IntraVENous NOW Rosa Moreno PA         Current Outpatient Medications   Medication Sig Dispense Refill    cefUROXime (CEFTIN) 250 MG tablet Take 1 tablet by mouth 2 times daily for 7 days 14 tablet 0    oxyCODONE-acetaminophen (PERCOCET) 5-325 MG per tablet Take 1 tablet by mouth every 8 hours as needed for Pain for up to 3 days. Intended supply: 3 days. Take lowest dose possible to manage pain Max Daily Amount: 3 tablets 9 tablet 0    amitriptyline (ELAVIL) 25 MG tablet Take 1 tablet by mouth nightly      budesonide (ENTOCORT EC) 3 MG delayed release capsule Take 1 capsule by mouth every morning      buprenorphine (BELBUCA) 75 MCG FILM buccal film Place 1 Film inside cheek in the morning and 1 Film in the evening.      citalopram (CELEXA) 20 MG tablet Take 1 tablet by mouth daily      cloNIDine (CATAPRES) 0.1 MG/24HR PTWK Place 1 patch onto the skin once a week      cloNIDine (CATAPRES) 0.1 MG tablet Take 1 tablet by mouth in the morning and 1 tablet at noon and 1 tablet in the

## 2025-05-13 NOTE — ED TRIAGE NOTES
Patient arrives to ED from home, fall with head injury on eloquis. Unsure of LOC +swelling and bruising to right side of face

## 2025-05-13 NOTE — ED NOTES
Discharge medications reviewed with the patient and appropriate educational materials and side effects teaching were provided. Patient has no further questions or concerns. Patient wheeled to waiting area.

## 2025-05-20 ENCOUNTER — APPOINTMENT (OUTPATIENT)
Facility: HOSPITAL | Age: 55
End: 2025-05-20
Payer: COMMERCIAL

## 2025-05-20 ENCOUNTER — HOSPITAL ENCOUNTER (INPATIENT)
Facility: HOSPITAL | Age: 55
LOS: 4 days | Discharge: HOME HEALTH | End: 2025-05-24
Attending: EMERGENCY MEDICINE | Admitting: INTERNAL MEDICINE
Payer: COMMERCIAL

## 2025-05-20 DIAGNOSIS — R47.01 APHASIA: Primary | ICD-10-CM

## 2025-05-20 DIAGNOSIS — R09.89 SUSPECTED CEREBROVASCULAR ACCIDENT (CVA): ICD-10-CM

## 2025-05-20 DIAGNOSIS — G44.001 INTRACTABLE CLUSTER HEADACHE SYNDROME, UNSPECIFIED CHRONICITY PATTERN: ICD-10-CM

## 2025-05-20 PROBLEM — I67.4 HYPERTENSIVE ENCEPHALOPATHY: Status: ACTIVE | Noted: 2024-12-27

## 2025-05-20 LAB
ALBUMIN SERPL-MCNC: 3.2 G/DL (ref 3.4–5)
ALBUMIN/GLOB SERPL: 0.7 (ref 0.8–1.7)
ALP SERPL-CCNC: 108 U/L (ref 45–117)
ALT SERPL-CCNC: 6 U/L (ref 10–35)
ANION GAP SERPL CALC-SCNC: 13 MMOL/L (ref 3–18)
APPEARANCE UR: CLEAR
AST SERPL-CCNC: 18 U/L (ref 10–38)
BACTERIA URNS QL MICRO: NEGATIVE /HPF
BASOPHILS # BLD: 0.03 K/UL (ref 0–0.1)
BASOPHILS NFR BLD: 0.4 % (ref 0–2)
BILIRUB SERPL-MCNC: 0.3 MG/DL (ref 0.2–1)
BILIRUB UR QL: NEGATIVE
BUN SERPL-MCNC: 12 MG/DL (ref 6–23)
BUN/CREAT SERPL: 19 (ref 12–20)
CALCIUM SERPL-MCNC: 9.4 MG/DL (ref 8.5–10.1)
CHLORIDE SERPL-SCNC: 101 MMOL/L (ref 98–107)
CO2 SERPL-SCNC: 24 MMOL/L (ref 21–32)
COLOR UR: YELLOW
CREAT SERPL-MCNC: 0.64 MG/DL (ref 0.6–1.3)
DIFFERENTIAL METHOD BLD: ABNORMAL
EKG ATRIAL RATE: 91 BPM
EKG DIAGNOSIS: NORMAL
EKG P AXIS: 75 DEGREES
EKG P-R INTERVAL: 158 MS
EKG Q-T INTERVAL: 394 MS
EKG QRS DURATION: 98 MS
EKG QTC CALCULATION (BAZETT): 484 MS
EKG R AXIS: -13 DEGREES
EKG T AXIS: 66 DEGREES
EKG VENTRICULAR RATE: 91 BPM
EOSINOPHIL # BLD: 0.12 K/UL (ref 0–0.4)
EOSINOPHIL NFR BLD: 1.5 % (ref 0–5)
EPITH CASTS URNS QL MICRO: NORMAL /LPF (ref 0–5)
ERYTHROCYTE [DISTWIDTH] IN BLOOD BY AUTOMATED COUNT: 17.1 % (ref 11.6–14.5)
GLOBULIN SER CALC-MCNC: 4.5 G/DL (ref 2–4)
GLUCOSE BLD STRIP.AUTO-MCNC: 127 MG/DL (ref 70–110)
GLUCOSE SERPL-MCNC: 168 MG/DL (ref 74–108)
GLUCOSE UR STRIP.AUTO-MCNC: 100 MG/DL
HCT VFR BLD AUTO: 35.1 % (ref 35–45)
HGB BLD-MCNC: 10.8 G/DL (ref 12–16)
HGB UR QL STRIP: NEGATIVE
IMM GRANULOCYTES # BLD AUTO: 0.02 K/UL (ref 0–0.04)
IMM GRANULOCYTES NFR BLD AUTO: 0.3 % (ref 0–0.5)
INR PPP: 1 (ref 0.9–1.1)
KETONES UR QL STRIP.AUTO: NEGATIVE MG/DL
LEUKOCYTE ESTERASE UR QL STRIP.AUTO: NEGATIVE
LYMPHOCYTES # BLD: 1.21 K/UL (ref 0.9–3.6)
LYMPHOCYTES NFR BLD: 15.6 % (ref 21–52)
MAGNESIUM SERPL-MCNC: 2.1 MG/DL (ref 1.6–2.6)
MCH RBC QN AUTO: 24.9 PG (ref 24–34)
MCHC RBC AUTO-ENTMCNC: 30.8 G/DL (ref 31–37)
MCV RBC AUTO: 81.1 FL (ref 78–100)
MONOCYTES # BLD: 0.49 K/UL (ref 0.05–1.2)
MONOCYTES NFR BLD: 6.3 % (ref 3–10)
NEUTS SEG # BLD: 5.9 K/UL (ref 1.8–8)
NEUTS SEG NFR BLD: 75.9 % (ref 40–73)
NITRITE UR QL STRIP.AUTO: NEGATIVE
NRBC # BLD: 0 K/UL (ref 0–0.01)
NRBC BLD-RTO: 0 PER 100 WBC
PH UR STRIP: 8 (ref 5–8)
PLATELET # BLD AUTO: 141 K/UL (ref 135–420)
POTASSIUM SERPL-SCNC: 4.4 MMOL/L (ref 3.5–5.5)
PROCALCITONIN SERPL-MCNC: 0.07 NG/ML
PROT SERPL-MCNC: 7.8 G/DL (ref 6.4–8.2)
PROT UR STRIP-MCNC: ABNORMAL MG/DL
PROTHROMBIN TIME: 13.3 SEC (ref 11.9–14.9)
RBC # BLD AUTO: 4.33 M/UL (ref 4.2–5.3)
RBC #/AREA URNS HPF: NEGATIVE /HPF (ref 0–5)
SODIUM SERPL-SCNC: 138 MMOL/L (ref 136–145)
SP GR UR REFRACTOMETRY: 1.02 (ref 1–1.03)
TROPONIN T SERPL HS-MCNC: 7.5 NG/L (ref 0–14)
UROBILINOGEN UR QL STRIP.AUTO: 0.2 EU/DL (ref 0.2–1)
WBC # BLD AUTO: 7.8 K/UL (ref 4.6–13.2)
WBC URNS QL MICRO: NORMAL /HPF (ref 0–5)

## 2025-05-20 PROCEDURE — 85025 COMPLETE CBC W/AUTO DIFF WBC: CPT

## 2025-05-20 PROCEDURE — 70450 CT HEAD/BRAIN W/O DYE: CPT

## 2025-05-20 PROCEDURE — 70498 CT ANGIOGRAPHY NECK: CPT

## 2025-05-20 PROCEDURE — 99222 1ST HOSP IP/OBS MODERATE 55: CPT | Performed by: PSYCHIATRY & NEUROLOGY

## 2025-05-20 PROCEDURE — 81001 URINALYSIS AUTO W/SCOPE: CPT

## 2025-05-20 PROCEDURE — 99223 1ST HOSP IP/OBS HIGH 75: CPT | Performed by: PHYSICIAN ASSISTANT

## 2025-05-20 PROCEDURE — 93005 ELECTROCARDIOGRAM TRACING: CPT | Performed by: EMERGENCY MEDICINE

## 2025-05-20 PROCEDURE — 85610 PROTHROMBIN TIME: CPT

## 2025-05-20 PROCEDURE — 93010 ELECTROCARDIOGRAM REPORT: CPT | Performed by: INTERNAL MEDICINE

## 2025-05-20 PROCEDURE — 6370000000 HC RX 637 (ALT 250 FOR IP): Performed by: INTERNAL MEDICINE

## 2025-05-20 PROCEDURE — 80053 COMPREHEN METABOLIC PANEL: CPT

## 2025-05-20 PROCEDURE — 6370000000 HC RX 637 (ALT 250 FOR IP): Performed by: PHYSICIAN ASSISTANT

## 2025-05-20 PROCEDURE — 71045 X-RAY EXAM CHEST 1 VIEW: CPT

## 2025-05-20 PROCEDURE — 1100000003 HC PRIVATE W/ TELEMETRY

## 2025-05-20 PROCEDURE — 99285 EMERGENCY DEPT VISIT HI MDM: CPT

## 2025-05-20 PROCEDURE — 82962 GLUCOSE BLOOD TEST: CPT

## 2025-05-20 PROCEDURE — 97166 OT EVAL MOD COMPLEX 45 MIN: CPT

## 2025-05-20 PROCEDURE — 84145 PROCALCITONIN (PCT): CPT

## 2025-05-20 PROCEDURE — 84484 ASSAY OF TROPONIN QUANT: CPT

## 2025-05-20 PROCEDURE — 6360000004 HC RX CONTRAST MEDICATION: Performed by: EMERGENCY MEDICINE

## 2025-05-20 PROCEDURE — 83735 ASSAY OF MAGNESIUM: CPT

## 2025-05-20 RX ORDER — POLYETHYLENE GLYCOL 3350 17 G/17G
17 POWDER, FOR SOLUTION ORAL DAILY PRN
Status: DISCONTINUED | OUTPATIENT
Start: 2025-05-20 | End: 2025-05-24 | Stop reason: HOSPADM

## 2025-05-20 RX ORDER — MORPHINE SULFATE 2 MG/ML
2 INJECTION, SOLUTION INTRAMUSCULAR; INTRAVENOUS
Refills: 0 | Status: DISCONTINUED | OUTPATIENT
Start: 2025-05-20 | End: 2025-05-22

## 2025-05-20 RX ORDER — TRAZODONE HYDROCHLORIDE 50 MG/1
50 TABLET ORAL NIGHTLY
COMMUNITY
Start: 2025-05-14

## 2025-05-20 RX ORDER — ONDANSETRON 2 MG/ML
4 INJECTION INTRAMUSCULAR; INTRAVENOUS EVERY 6 HOURS PRN
Status: DISCONTINUED | OUTPATIENT
Start: 2025-05-20 | End: 2025-05-24 | Stop reason: HOSPADM

## 2025-05-20 RX ORDER — ASPIRIN 81 MG/1
81 TABLET, CHEWABLE ORAL DAILY
Status: DISCONTINUED | OUTPATIENT
Start: 2025-05-20 | End: 2025-05-24 | Stop reason: HOSPADM

## 2025-05-20 RX ORDER — ATORVASTATIN CALCIUM 40 MG/1
80 TABLET, FILM COATED ORAL NIGHTLY
Status: DISCONTINUED | OUTPATIENT
Start: 2025-05-20 | End: 2025-05-24 | Stop reason: HOSPADM

## 2025-05-20 RX ORDER — ACETAMINOPHEN 325 MG/1
650 TABLET ORAL EVERY 4 HOURS PRN
Status: DISCONTINUED | OUTPATIENT
Start: 2025-05-20 | End: 2025-05-24 | Stop reason: HOSPADM

## 2025-05-20 RX ORDER — INSULIN LISPRO 100 [IU]/ML
0-8 INJECTION, SOLUTION INTRAVENOUS; SUBCUTANEOUS
Status: DISCONTINUED | OUTPATIENT
Start: 2025-05-20 | End: 2025-05-24 | Stop reason: HOSPADM

## 2025-05-20 RX ORDER — IOPAMIDOL 755 MG/ML
80 INJECTION, SOLUTION INTRAVASCULAR
Status: COMPLETED | OUTPATIENT
Start: 2025-05-20 | End: 2025-05-20

## 2025-05-20 RX ORDER — ENOXAPARIN SODIUM 100 MG/ML
30 INJECTION SUBCUTANEOUS 2 TIMES DAILY
Status: DISCONTINUED | OUTPATIENT
Start: 2025-05-21 | End: 2025-05-24 | Stop reason: HOSPADM

## 2025-05-20 RX ORDER — DEXTROSE MONOHYDRATE 100 MG/ML
INJECTION, SOLUTION INTRAVENOUS CONTINUOUS PRN
Status: DISCONTINUED | OUTPATIENT
Start: 2025-05-20 | End: 2025-05-24 | Stop reason: HOSPADM

## 2025-05-20 RX ORDER — ONDANSETRON 4 MG/1
4 TABLET, ORALLY DISINTEGRATING ORAL EVERY 8 HOURS PRN
Status: DISCONTINUED | OUTPATIENT
Start: 2025-05-20 | End: 2025-05-24 | Stop reason: HOSPADM

## 2025-05-20 RX ORDER — LABETALOL HYDROCHLORIDE 5 MG/ML
10 INJECTION, SOLUTION INTRAVENOUS EVERY 10 MIN PRN
Status: DISCONTINUED | OUTPATIENT
Start: 2025-05-20 | End: 2025-05-24 | Stop reason: HOSPADM

## 2025-05-20 RX ORDER — DULOXETIN HYDROCHLORIDE 30 MG/1
30 CAPSULE, DELAYED RELEASE ORAL DAILY
Status: DISCONTINUED | OUTPATIENT
Start: 2025-05-21 | End: 2025-05-24 | Stop reason: HOSPADM

## 2025-05-20 RX ORDER — ASPIRIN 300 MG/1
300 SUPPOSITORY RECTAL DAILY
Status: DISCONTINUED | OUTPATIENT
Start: 2025-05-20 | End: 2025-05-24 | Stop reason: HOSPADM

## 2025-05-20 RX ORDER — OXYCODONE HYDROCHLORIDE 5 MG/1
5 TABLET ORAL EVERY 6 HOURS PRN
Refills: 0 | Status: DISCONTINUED | OUTPATIENT
Start: 2025-05-20 | End: 2025-05-24 | Stop reason: HOSPADM

## 2025-05-20 RX ADMIN — PREGABALIN 200 MG: 75 CAPSULE ORAL at 20:40

## 2025-05-20 RX ADMIN — OXYCODONE HYDROCHLORIDE 5 MG: 5 TABLET ORAL at 23:05

## 2025-05-20 RX ADMIN — IOPAMIDOL 80 ML: 755 INJECTION, SOLUTION INTRAVENOUS at 12:17

## 2025-05-20 RX ADMIN — ACETAMINOPHEN 650 MG: 325 TABLET ORAL at 21:49

## 2025-05-20 RX ADMIN — ATORVASTATIN CALCIUM 80 MG: 40 TABLET, FILM COATED ORAL at 20:39

## 2025-05-20 RX ADMIN — ASPIRIN 300 MG: 300 SUPPOSITORY RECTAL at 15:55

## 2025-05-20 RX ADMIN — LEVETIRACETAM 750 MG: 250 TABLET, FILM COATED ORAL at 20:39

## 2025-05-20 ASSESSMENT — PAIN SCALES - GENERAL
PAINLEVEL_OUTOF10: 0
PAINLEVEL_OUTOF10: 3
PAINLEVEL_OUTOF10: 8
PAINLEVEL_OUTOF10: 8
PAINLEVEL_OUTOF10: 0

## 2025-05-20 ASSESSMENT — PAIN - FUNCTIONAL ASSESSMENT
PAIN_FUNCTIONAL_ASSESSMENT: ACTIVITIES ARE NOT PREVENTED
PAIN_FUNCTIONAL_ASSESSMENT: ACTIVITIES ARE NOT PREVENTED
PAIN_FUNCTIONAL_ASSESSMENT: NONE - DENIES PAIN

## 2025-05-20 ASSESSMENT — PAIN DESCRIPTION - PAIN TYPE
TYPE: ACUTE PAIN
TYPE: ACUTE PAIN

## 2025-05-20 ASSESSMENT — PAIN DESCRIPTION - LOCATION
LOCATION: HEAD
LOCATION: HEAD

## 2025-05-20 ASSESSMENT — PAIN DESCRIPTION - ONSET
ONSET: GRADUAL
ONSET: ON-GOING

## 2025-05-20 ASSESSMENT — PAIN DESCRIPTION - FREQUENCY
FREQUENCY: INTERMITTENT
FREQUENCY: INTERMITTENT

## 2025-05-20 ASSESSMENT — PAIN DESCRIPTION - ORIENTATION
ORIENTATION: ANTERIOR
ORIENTATION: ANTERIOR

## 2025-05-20 ASSESSMENT — PAIN DESCRIPTION - DESCRIPTORS
DESCRIPTORS: ACHING;DISCOMFORT
DESCRIPTORS: ACHING;DISCOMFORT

## 2025-05-20 NOTE — H&P
History and Physical          Subjective     HPI: Priscilla Dean is a 54 y.o. female with a PMHx of crohns, DM, HTN, HLD, PE, CVA, type B Aortic Dissection repair s/p TEVAR on 2/12/2025, Cdiff, severe renal artery stenosis who presented to the ED with c/o aphasia. Patient is only able to answer yes/no questions. Per ER provider, \"Last known normal at 2 AM. When she woke up at a at 8 AM she was confused not able to speak answer questions with puffing of right side of face tremulous there as well.\" Patient denies any cp, sob, abd pain, nvd, leg pain, headache. She states no medication changes since she was last discharged from the hospital.    In the ED, BP up to 199/95, remaining vitals stable. Labs with , trop 7.5, alb 3.2, WBC 7.8, hgb 10.8. CTH negative. CTA without LVO or significant stenosis (prelim). Neurology was consulted.     Per discharge summary from 3/28/25, she originally presented to the ER with speech difficulties and AMS. Stroke work up was negative. She was treated for occluded inflow of left common carotid to left subclavian bypass: S/p thrombectomy on March 15. She completed 6 week eliquis. \"Neurology was consulted for AMS and memory lapse and performed an EEG that showed a normal recording. They started levetiracetam 750mg po BID and arranged outpatient neurology clinic follow up.\"    PMHx:  Past Medical History:   Diagnosis Date    Bilateral knee pain     Crohn's colitis (HCC)     Diabetes (HCC)     IDDM    HTN (hypertension)     Hypercholesterolemia     Pulmonary embolism (HCC) 03/2019    Stool color black     crohns       PSurgHx:  Past Surgical History:   Procedure Laterality Date    ANKLE FRACTURE SURGERY Left 2018    COLONOSCOPY N/A 8/31/2022    COLONOSCOPY/ Biopsies performed by JASMINE Shetty MD at [facility] ENDOSCOPY    COLONOSCOPY N/A 7/12/2022    COLONOSCOPY performed by JASMINE Shetty MD at [facility] ENDOSCOPY    COLONOSCOPY N/A 9/22/2020    COLONOSCOPY with bx's performed by JASMINE Shetty  m (5' 11\")   Wt 104.3 kg (230 lb)   SpO2 100%   BMI 32.08 kg/m²       General: NAD, appears stated age, alert  Skin: warm, dry, no rashes  Eyes: PERRL, sclera is non-icteric  HENT: normocephalic/atraumatic, moist mucus membranes  Respiratory: CTA with no signs of respiratory distress  Cardiovascular: RRR, no m/r/g, no cyanosis or peripheral edema of extremities  GI: soft, non-tender, normal bowel sounds  Neuro: moves all extremities, 3/5 strength LLE, 4/5 LUE, expressive aphasia  Psych: appropriate mood and affect, no visual or auditory hallucinations    Laboratory Studies:  Recent Results (from the past 24 hours)   CBC with Auto Differential    Collection Time: 05/20/25 12:36 PM   Result Value Ref Range    WBC 7.8 4.6 - 13.2 K/uL    RBC 4.33 4.20 - 5.30 M/uL    Hemoglobin 10.8 (L) 12.0 - 16.0 g/dL    Hematocrit 35.1 35.0 - 45.0 %    MCV 81.1 78.0 - 100.0 FL    MCH 24.9 24.0 - 34.0 PG    MCHC 30.8 (L) 31.0 - 37.0 g/dL    RDW 17.1 (H) 11.6 - 14.5 %    Platelets 141 135 - 420 K/uL    Nucleated RBCs 0.0 0  WBC    nRBC 0.00 0.00 - 0.01 K/uL    Neutrophils % 75.9 (H) 40.0 - 73.0 %    Lymphocytes % 15.6 (L) 21.0 - 52.0 %    Monocytes % 6.3 3.0 - 10.0 %    Eosinophils % 1.5 0.0 - 5.0 %    Basophils % 0.4 0.0 - 2.0 %    Immature Granulocytes % 0.3 0.0 - 0.5 %    Neutrophils Absolute 5.90 1.80 - 8.00 K/UL    Lymphocytes Absolute 1.21 0.90 - 3.60 K/UL    Monocytes Absolute 0.49 0.05 - 1.20 K/UL    Eosinophils Absolute 0.12 0.00 - 0.40 K/UL    Basophils Absolute 0.03 0.00 - 0.10 K/UL    Immature Granulocytes Absolute 0.02 0.00 - 0.04 K/UL    Differential Type AUTOMATED     Troponin    Collection Time: 05/20/25 12:36 PM   Result Value Ref Range    Troponin T 7.5 0 - 14 ng/L   Protime-INR    Collection Time: 05/20/25 12:36 PM   Result Value Ref Range    Protime 13.3 11.9 - 14.9 sec    INR 1.0 0.9 - 1.1     Comprehensive Metabolic Panel    Collection Time: 05/20/25 12:36 PM   Result Value Ref Range    Sodium 138 136 - 145

## 2025-05-20 NOTE — ED NOTES
TRANSFER - OUT REPORT:    Verbal report given to MICHELLE Christina  on Priscilla Dean  being transferred to ,  rm [phone] for routine progression of patient care       Report consisted of patient's Situation, Background, Assessment and   Recommendations(SBAR).     Information from the following report(s) Nurse Handoff Report, ED Encounter Summary, and ED SBAR was reviewed with the receiving nurse.    [place] Fall Assessment:    Presents to emergency department  because of falls (Syncope, seizure, or loss of consciousness): No  Age > 70: No  Altered Mental Status, Intoxication with alcohol or substance confusion (Disorientation, impaired judgment, poor safety awaremess, or inability to follow instructions): Yes  Impaired Mobility: Ambulates or transfers with assistive devices or assistance; Unable to ambulate or transer.: Yes  Nursing Judgement: Yes          Lines:   Peripheral IV 05/20/25 Left;Posterior Forearm (Active)   Site Assessment Clean, dry & intact 05/20/25 1245   Line Status Blood return noted;Flushed 05/20/25 1245   Line Care Connections checked and tightened 05/20/25 1245   Phlebitis Assessment No symptoms 05/20/25 1245   Infiltration Assessment 0 05/20/25 1245   Dressing Status New dressing applied;Clean, dry & intact 05/20/25 1245   Dressing Type Transparent 05/20/25 1245   Dressing Intervention New 05/20/25 1245       Peripheral IV 05/20/25 Right Cephalic (Active)   Site Assessment Clean, dry & intact 05/20/25 1252   Line Status Blood return noted;Brisk blood return 05/20/25 1252   Line Care Connections checked and tightened;Line pulled back 05/20/25 1252   Phlebitis Assessment No symptoms 05/20/25 1252   Infiltration Assessment 0 05/20/25 1252   Dressing Status New dressing applied;Clean, dry & intact 05/20/25 1252   Dressing Type Transparent 05/20/25 1252   Dressing Intervention New 05/20/25 1252        Opportunity for questions and clarification was provided.      Patient transported with:  Medic

## 2025-05-20 NOTE — PROGRESS NOTES
INTERIM UPDATE - 1433 EST on 5/20/2025    [facility] ([email or web address]) ER Advanced Practice Clinician (a.k.a. APC) calls requesting admission for a 54-year-old female who presents with complaint of Aphasia.    Chart Review shows that Patient has a Cerebrovascular Accident (a.k.a. CVA) in February and has Left-Sided Hemiplegia.    Patient reportedly presented today with Aphasia that was noted at 0800 EST today (5/20/2025) with Last Known Well Time (juan alberto LKWT) 0200 EST today (5/20/2025).    Plan:  Will admit Patient to [facility] Telemetry Unit for management of Suspected Acute Cerebrovascular Accident (a.k.a. CVA) versus Acute Encephalopathy possibly 2°/2 Unidentified Infection Process.

## 2025-05-20 NOTE — CONSULTS
[facility]  Department of Neurology  Chief Complaint   Patient presents with    Cerebrovascular Accident       HPI:   Priscilla Dean is a 54 y.o. female with a PMHx of crohns, DM, HTN, HLD, PE, CVA with left residual hemiparalysis, type B Aortic Dissection repair s/p TEVAR on 2/12/2025, Cdiff, severe renal artery stenosis who presented to the ED with c/o aphasia. Patient is only able to answer yes/no questions partially. Per ER provider, \"Last known normal at 2 AM. When she woke up at a at 8 AM she was confused not able to speak answer questions with puffing of right side of face tremulous there as well.\" Patient was sent to ER, where she was found hypertensive, 199/95. CTH negative. CTA without LVO or significant stenosis (prelim). Neurology was consulted.     She denied headache, or medication changes since last hospitalization prior to 3/28/2025. At that time, she originally presented to the ER with speech difficulties and AMS. Stroke work up was negative. She was treated for occluded inflow of left common carotid to left subclavian bypass: S/p thrombectomy on March 15. She completed 6 week eliquis. \"Neurology was consulted for AMS and memory lapse and performed an EEG that showed a normal recording. They started levetiracetam 750mg po BID and arranged outpatient neurology clinic follow up.\"       Past Medical History:   Diagnosis Date    Bilateral knee pain     Crohn's colitis (HCC)     Diabetes (HCC)     IDDM    HTN (hypertension)     Hypercholesterolemia     Pulmonary embolism (HCC) 03/2019    Stool color black     crohns       Past Surgical History:   Procedure Laterality Date    ANKLE FRACTURE SURGERY Left 2018    COLONOSCOPY N/A 8/31/2022    COLONOSCOPY/ Biopsies performed by JASMINE Shetty MD at [facility] ENDOSCOPY    COLONOSCOPY N/A 7/12/2022    COLONOSCOPY performed by JASMINE Shetty MD at [facility] ENDOSCOPY    COLONOSCOPY N/A 9/22/2020    COLONOSCOPY with bx's performed by JASMINE Shetty MD at [facility]

## 2025-05-20 NOTE — ED TRIAGE NOTES
Pt presents to ed via ems with c/o left sided weakness. Pt has a hx of previous CVA in February of this year. Per ems. Pt LKW was at 0200. Pt was found by family at 0800 by family with aphasia. Pt able to shake her head yes and no but has difficulty answering questions and following commands.

## 2025-05-20 NOTE — ED PROVIDER NOTES
EMERGENCY DEPARTMENT HISTORY AND PHYSICAL EXAM      Date: 5/20/2025  Patient Name: Priscilla Dean    History of Presenting Illness     Chief Complaint   Patient presents with    Cerebrovascular Accident       History (Context): Priscilla Dean is a 54 y.o. female  presents to the ED today with chief complaint of concern for stroke this morning.  Last known normal at 2 AM.  When she woke up at a at 8 AM she was confused not able to speak answer questions with puffing of right side of face tremulous there as well.  History of CVA and had left-sided deficits from there.    PCP: Zacehry Lucio MD    No current facility-administered medications for this encounter.     Current Outpatient Medications   Medication Sig Dispense Refill    traZODone (DESYREL) 50 MG tablet Take 1 tablet by mouth nightly      cefUROXime (CEFTIN) 250 MG tablet Take 1 tablet by mouth 2 times daily for 7 days 14 tablet 0    amitriptyline (ELAVIL) 25 MG tablet Take 1 tablet by mouth nightly      budesonide (ENTOCORT EC) 3 MG delayed release capsule Take 1 capsule by mouth every morning      buprenorphine (BELBUCA) 75 MCG FILM buccal film Place 1 Film inside cheek in the morning and 1 Film in the evening.      citalopram (CELEXA) 20 MG tablet Take 1 tablet by mouth daily      cloNIDine (CATAPRES) 0.1 MG/24HR PTWK Place 1 patch onto the skin once a week      cloNIDine (CATAPRES) 0.1 MG tablet Take 1 tablet by mouth in the morning and 1 tablet at noon and 1 tablet in the evening.      dicyclomine (BENTYL) 20 MG tablet Take 1 tablet by mouth 4 times daily as needed (FOR ABDOMINAL PAIN)      DULoxetine (CYMBALTA) 30 MG extended release capsule Take 1 capsule by mouth daily      ezetimibe (ZETIA) 10 MG tablet Take 1 tablet by mouth nightly      hydrALAZINE (APRESOLINE) 25 MG tablet Take 1 tablet by mouth every 8 (eight) hours      hydrOXYzine HCl (ATARAX) 25 MG tablet Take 1 tablet by mouth every 12 hours as needed for Anxiety      levETIRAcetam  Total Bilirubin 0.3 0.2 - 1.0 MG/DL    ALT 6 (L) 10 - 35 U/L    AST 18 10 - 38 U/L    Alk Phosphatase 108 45 - 117 U/L    Total Protein 7.8 6.4 - 8.2 g/dL    Albumin 3.2 (L) 3.4 - 5.0 g/dL    Globulin 4.5 (H) 2.0 - 4.0 g/dL    Albumin/Globulin Ratio 0.7 (L) 0.8 - 1.7     EKG 12 Lead - Recommend after Head CT performed    Collection Time: 05/20/25  1:07 PM   Result Value Ref Range    Ventricular Rate 91 BPM    Atrial Rate 91 BPM    P-R Interval 158 ms    QRS Duration 98 ms    Q-T Interval 394 ms    QTc Calculation (Bazett) 484 ms    P Axis 75 degrees    R Axis -13 degrees    T Axis 66 degrees    Diagnosis       Normal sinus rhythm  Left atrial enlargement  Left ventricular hypertrophy  Prolonged QT  Abnormal ECG  When compared with ECG of 13-MAY-2025 13:55,  Vent. rate has increased BY  36 BPM        Labs Reviewed   CBC WITH AUTO DIFFERENTIAL - Abnormal; Notable for the following components:       Result Value    Hemoglobin 10.8 (*)     MCHC 30.8 (*)     RDW 17.1 (*)     Neutrophils % 75.9 (*)     Lymphocytes % 15.6 (*)     All other components within normal limits   COMPREHENSIVE METABOLIC PANEL - Abnormal; Notable for the following components:    Glucose 168 (*)     ALT 6 (*)     Albumin 3.2 (*)     Globulin 4.5 (*)     Albumin/Globulin Ratio 0.7 (*)     All other components within normal limits   TROPONIN   PROTIME-INR   MAGNESIUM   URINALYSIS   PROCALCITONIN   POCT GLUCOSE       Radiologic Studies -   CTA HEAD NECK W CONTRAST   Final Result   1.  No acute large vessel occlusion identified.   2.  Atheromatous disease of the bilateral carotid siphon with mild paraclinoid   stenosis bilaterally.   3.  Postsurgical changes to the aortic arch and descending aorta with left   carotid to subclavian stent graft.   4.  Moderate origin stenosis left vertebral artery.   5.  Mild irregular contour of the right cervical ICA may represent fibromuscular   dysplasia without flow-limiting stenosis.                  Electronically

## 2025-05-20 NOTE — ED NOTES
OT in with patient at this time.    PT Re-Evaluation     Today's date: 2018  Patient name: Martha Linder  : 1976  MRN: [id number]  Referring provider: Yecenia Ndiaye MD  Dx:   Encounter Diagnosis     ICD-10-CM    1  Bilateral hip pain M25 551     M25 552                   Assessment  Impairments: abnormal or restricted ROM, activity intolerance, impaired physical strength and pain with function    Assessment details: Pt has only improved slightly in her symptoms since starting therapy  She reports feeling better after therapy but pain sets in later on during the day  Pain is less radiating and more concentrated to anterior hip, L is worse than R  Her strength and ROM are the same despite performed manuals and strengthening exercises  Recommend placing therapy on hold at this time till further diagnostic testing is performed      Understanding of Dx/Px/POC: good   Prognosis: good    Goals  STG (3-4 weeks)  1: Decrease pain 2-3 grades on VAS- not met  2: decreased pain with hip flexion- not met  3: decreased hip flexor tightness by 25%- partially met    LTG (4-6 weeks)  1: Improve FOTO 10 pts- not met  2: pt able to go up/down stairs with improved ease by 50%- not met  3: LE strength 4+/5 throughout- not met    Plan  Patient would benefit from: skilled physical therapy  Planned modality interventions: cryotherapy and ultrasound  Planned therapy interventions: manual therapy, joint mobilization, massage, abdominal trunk stabilization, neuromuscular re-education, strengthening, stretching, therapeutic activities, therapeutic exercise, home exercise program and flexibility  Frequency: 1x week  Duration in visits: 3  Duration in weeks: 3  Plan of Care beginning date: 2018  Plan of Care expiration date: 10/12/2018  Treatment plan discussed with: patient  Plan details: Recommend placing therapy on hold, continue at your discretion        Subjective Evaluation    History of Present Illness  Mechanism of injury: Pt reports she is no longer experiencing radiating shooting pain down L hip  Pain is more sharp and concentrated to anterior hip   L is still more painful than R  Only minor relief with therapy        Recurrent probem    Quality of life: good    Pain  Current pain ratin  At best pain ratin  At worst pain rating: 10  Location: L > R  Quality: radiating, sharp and dull ache  Relieving factors: ice and medications  Aggravating factors: stair climbing, walking and standing  Progression: no change (only minor improvement)    Social Support  Steps to enter house: yes  2  Stairs in house: yes   Lives in: multiple-level home  Lives with: young children and spouse    Employment status: not working  Exercise history: [employer], yoga pilates, kickboxing      Diagnostic Tests  X-ray: abnormal (CAM on R; L no increased DJD)  Treatments  Previous treatment: physical therapy  Patient Goals  Patient goals for therapy: decreased pain, increased motion, independence with ADLs/IADLs, increased strength and return to sport/leisure activities  Patient goal: want to return to exercise, decrease pain        Objective     Observations     Additional Observation Details  Pelvis level  Tenderness to palpation to L hip flexor and greater trochanter      Active Range of Motion   Left Hip   Flexion: 115 degrees WFL and with pain  External rotation (90/90): 40 degrees   Internal rotation (90/90): 30 degrees     Right Hip   Flexion: 115 degrees   External rotation (90/90): 40 degrees   Internal rotation (90/90): 30 degrees     Passive Range of Motion   Left Hip   Extension: 5 degrees     Right Hip   Extension: 5 degrees     Additional Passive Range of Motion Details  -hamstring tightness  Mild hip flexor tightness on L  + Mona's on L      Strength/Myotome Testing     Left Hip   Planes of Motion   Flexion: 4 (pain limiting)  Extension: 4  Abduction: 4+  Adduction: 4+  External rotation: 4  Internal rotation: 4+ (+ pain)    Right Hip   Planes of Motion   Flexion: 4+  Extension: 4  Abduction: 4+  Adduction: 4+  External rotation: 4  Internal rotation: 4+    Left Knee   Flexion: 4  Extension: 4+    Right Knee   Flexion: 4+  Extension: 5    Tests     Left Hip   Positive VITOR, FADIR, Mona, scour and SI compression  Elbert: Positive  Right Hip   Positive FADIR  Negative VITOR and scour  Additional Tests Details              Precautions: L hip labral debridement 7/28/17; R CAM deformity    Daily Treatment Diary     Manual  9/7 9/14 9/18 9/21 9/25 9/28       PROM BL hips TH 10 8 8 8 5       L hip flexor stretch TH 5 5 5 5 5       Lateral joint mobs  5 5 5 4 4       Gentle distraction TH 3 4 4 4 4       TPR hip flexor   4 5 4 4       Quadped MWM  Lateral distraction with band   4  `         20 23 30 30 25 22           Exercise Diary  9/7 9/14 9/18 9/21 9/25 9/28       ADD ball squeeze 10"x10 10x10" 10"x10 10"x10 10"x10 Pain!        Hip flexor isometric 5"x10 5"x10 5"x10 5"x10 np np       Hip abd isometric with strap  10x10" 10"x10 10"x10 10"x10 10"X10                                                                                                                                                                                                                         10 10 10 8 3           Modalities  9/7 9/14 9/18 9/21 9/25 9/28       MH BL hips in hookyling 10' 10' NP held 10' 10'       US to anterior hip 8' NP           CP post TE   10' 0 5(unbilled) 8             10 18         HEP:hip ER in hooklying, knee to chest, hip flex isometric, hip flexor stretch, PPT, hip add ball squeeze

## 2025-05-20 NOTE — PROGRESS NOTES
1921  Admission from ED.  Patient presents with episode of incontinence.  Global aphasia noted.   Patient unsure of medications at this time.    2000  Pts daughter at bedside, also unsure of medications.    Tele box placed on pt.

## 2025-05-20 NOTE — PLAN OF CARE
Problem: Occupational Therapy - Adult  Goal: By Discharge: Performs self-care activities at highest level of function for planned discharge setting.  See evaluation for individualized goals.  Description: Occupational Therapy Goals:  Initiated 5/20/2025 to be met within 7-10 days.    1.  Patient will perform grooming with contact guard assistance standing at sink >3 minutes.   2.  Patient will perform bathing at EOB with contact guard assistance.  3.  Patient will perform lower body dressing  with minimal assistance.  4.  Patient will perform toilet transfers with minimal assistance  5.  Patient will perform all aspects of toileting with minimal assistance.  6.  Patient will participate in upper extremity therapeutic exercise/activities with supervision/set-up for 8-10 minutes to increase strength/endurance for ADLs.    7.  Patient will utilize energy conservation techniques during functional activities with verbal and visual cues.    PLOF: Pt unable to answer PLOF questions as this time, poor historian. Per chart review, pt was previously independent in ADLs and functional mobility     Outcome: Progressing    OCCUPATIONAL THERAPY EVALUATION    Patient: Priscilla Dean (54 y.o. female)  Date: 5/20/2025  Primary Diagnosis: Aphasia [R47.01]  Precautions: Fall Risk    ASSESSMENT :  Pt cleared for OT evaluation. Pt was received lying comfortably in bed and alert. Oriented to self only, responded with \"okay\" for all other questions. Required Ollie for bed mobility to sit EOB. Once at EOB, pt began breathing rapidly and appeared scared. Unable to communicate concerns at this time. ModA to return to supine. Intermittently followed simple commands and required Ollie to scoot toward head of bed. MaxA to don socks. BP while lying in bed after movement was 194/101. Spoke with RN who is aware and has been monitoring BP. Pt left lying comfortably in bed with all needs and call bell within reach.  functional    Tone & Sensation: BUE  Tone: Normal  Sensation: Intact    Range of Motion: BUE  AROM: Generally decreased, functional     Functional Mobility and Transfers for ADLs:  Bed Mobility:  Bed Mobility Training  Bed Mobility Training: Yes  Rolling: Contact guard assistance  Supine to Sit: Minimal assistance  Sit to Supine: Partial/Moderate assistance  Scooting: Minimal assistance  Transfers:  Transfer Training  Transfer Training: No    ADL Assessment:   Feeding: Supervision  Grooming: Minimal assistance  UE Bathing: Moderate assistance  LE Bathing: Maximum assistance  UE Dressing: Moderate assistance  LE Dressing: Maximum assistance  Toileting: Maximum assistance    Pain:  Intensity Pre-treatment: 0/10   Intensity Post-treatment: 0/10  Scale: Adult Non-Verbal Pain Scale    Activity Tolerance:   Activity Tolerance: Treatment limited secondary to decreased cognition  Please refer to the flowsheet for vital signs taken during this treatment.    After treatment:   [] Patient left in no apparent distress sitting up in chair  [x] Patient left in no apparent distress in bed  [x] Call bell left within reach  [x] Nursing notified  [] Caregiver present  [] Bed/chair alarm activated  [x] No alarm    COMMUNICATION/EDUCATION:   Patient Education  Education Given To: Patient  Education Provided: Role of Therapy;Plan of Care;ADL Adaptive Strategies;Energy Conservation;Fall Prevention Strategies  Education Method: Teach Back  Barriers to Learning: Cognition  Education Outcome: Continued education needed    Thank you for this referral.  Mary Ann Rossi OT  Minutes: 13    Eval Complexity: Decision Making: Medium Complexity

## 2025-05-20 NOTE — PROGRESS NOTES
Pharmacist Review and Automatic Dose Adjustment of Prophylactic Enoxaparin    *Review reason for admission/hospital problem list*    The reviewing pharmacist has made an adjustment to the ordered enoxaparin dose or converted to UFH per the approved [facility] protocol and table as identified below.        Priscilla Dean is a 54 y.o. female.     Recent Labs     05/20/25  1236   CREATININE 0.64       Estimated Creatinine Clearance: 130 mL/min (based on SCr of 0.64 mg/dL).    Height:   Ht Readings from Last 1 Encounters:   05/20/25 1.803 m (5' 11\")     Weight:  Wt Readings from Last 1 Encounters:   05/20/25 104.3 kg (230 lb)           Plan: Based upon the patient's weight and renal function, the ordered enoxaparin dose of 40 mg daily has been changed/converted to 30 mg BID.       Thank you,  Flori Meneses [facility]

## 2025-05-21 ENCOUNTER — APPOINTMENT (OUTPATIENT)
Facility: HOSPITAL | Age: 55
End: 2025-05-21
Payer: COMMERCIAL

## 2025-05-21 PROBLEM — I10 HYPERTENSION: Chronic | Status: ACTIVE | Noted: 2025-05-21

## 2025-05-21 PROBLEM — E11.9 CONTROLLED TYPE 2 DIABETES MELLITUS WITHOUT COMPLICATION, WITH LONG-TERM CURRENT USE OF INSULIN (HCC): Chronic | Status: ACTIVE | Noted: 2019-05-16

## 2025-05-21 PROBLEM — E78.5 HYPERLIPIDEMIA: Chronic | Status: ACTIVE | Noted: 2025-05-21

## 2025-05-21 PROBLEM — Z86.711 HISTORY OF PULMONARY EMBOLISM: Chronic | Status: ACTIVE | Noted: 2025-05-21

## 2025-05-21 PROBLEM — E66.811 CLASS 1 OBESITY IN ADULT: Chronic | Status: ACTIVE | Noted: 2019-03-11

## 2025-05-21 PROBLEM — E66.811 CLASS 1 OBESITY IN ADULT: Status: ACTIVE | Noted: 2019-03-11

## 2025-05-21 PROBLEM — R09.89 SUSPECTED CEREBROVASCULAR ACCIDENT (CVA): Status: ACTIVE | Noted: 2025-05-21

## 2025-05-21 PROBLEM — Z86.711 HISTORY OF PULMONARY EMBOLISM: Status: ACTIVE | Noted: 2025-05-21

## 2025-05-21 PROBLEM — I10 HYPERTENSION: Status: ACTIVE | Noted: 2025-05-21

## 2025-05-21 PROBLEM — E78.5 HYPERLIPIDEMIA: Status: ACTIVE | Noted: 2025-05-21

## 2025-05-21 PROBLEM — Z79.4 CONTROLLED TYPE 2 DIABETES MELLITUS WITHOUT COMPLICATION, WITH LONG-TERM CURRENT USE OF INSULIN (HCC): Chronic | Status: ACTIVE | Noted: 2019-05-16

## 2025-05-21 LAB
ANION GAP SERPL CALC-SCNC: 12 MMOL/L (ref 3–18)
BUN SERPL-MCNC: 9 MG/DL (ref 6–23)
BUN/CREAT SERPL: 15 (ref 12–20)
CALCIUM SERPL-MCNC: 9.5 MG/DL (ref 8.5–10.1)
CHLORIDE SERPL-SCNC: 100 MMOL/L (ref 98–107)
CHOLEST SERPL-MCNC: 226 MG/DL
CO2 SERPL-SCNC: 26 MMOL/L (ref 21–32)
CREAT SERPL-MCNC: 0.57 MG/DL (ref 0.6–1.3)
ERYTHROCYTE [DISTWIDTH] IN BLOOD BY AUTOMATED COUNT: 17.2 % (ref 11.6–14.5)
EST. AVERAGE GLUCOSE BLD GHB EST-MCNC: 127 MG/DL
GLUCOSE BLD STRIP.AUTO-MCNC: 112 MG/DL (ref 70–110)
GLUCOSE BLD STRIP.AUTO-MCNC: 126 MG/DL (ref 70–110)
GLUCOSE BLD STRIP.AUTO-MCNC: 129 MG/DL (ref 70–110)
GLUCOSE BLD STRIP.AUTO-MCNC: 134 MG/DL (ref 70–110)
GLUCOSE SERPL-MCNC: 119 MG/DL (ref 74–108)
HBA1C MFR BLD: 6 % (ref 4.2–5.6)
HCT VFR BLD AUTO: 34.2 % (ref 35–45)
HDLC SERPL-MCNC: 54 MG/DL (ref 40–60)
HDLC SERPL: 4.2 (ref 0–5)
HGB BLD-MCNC: 10.8 G/DL (ref 12–16)
LDLC SERPL CALC-MCNC: 159 MG/DL (ref 0–100)
MCH RBC QN AUTO: 25.7 PG (ref 24–34)
MCHC RBC AUTO-ENTMCNC: 31.6 G/DL (ref 31–37)
MCV RBC AUTO: 81.2 FL (ref 78–100)
NRBC # BLD: 0 K/UL (ref 0–0.01)
NRBC BLD-RTO: 0 PER 100 WBC
PLATELET # BLD AUTO: 259 K/UL (ref 135–420)
PMV BLD AUTO: 12.8 FL (ref 9.2–11.8)
POTASSIUM SERPL-SCNC: 3.2 MMOL/L (ref 3.5–5.5)
RBC # BLD AUTO: 4.21 M/UL (ref 4.2–5.3)
SODIUM SERPL-SCNC: 138 MMOL/L (ref 136–145)
TRIGL SERPL-MCNC: 65 MG/DL (ref 0–150)
TSH, 3RD GENERATION: 1.2 UIU/ML (ref 0.27–4.2)
VLDLC SERPL CALC-MCNC: 13 MG/DL
WBC # BLD AUTO: 5.9 K/UL (ref 4.6–13.2)

## 2025-05-21 PROCEDURE — 6370000000 HC RX 637 (ALT 250 FOR IP): Performed by: INTERNAL MEDICINE

## 2025-05-21 PROCEDURE — 6360000002 HC RX W HCPCS: Performed by: INTERNAL MEDICINE

## 2025-05-21 PROCEDURE — 6370000000 HC RX 637 (ALT 250 FOR IP): Performed by: PSYCHIATRY & NEUROLOGY

## 2025-05-21 PROCEDURE — 6360000002 HC RX W HCPCS: Performed by: PHYSICIAN ASSISTANT

## 2025-05-21 PROCEDURE — 83036 HEMOGLOBIN GLYCOSYLATED A1C: CPT

## 2025-05-21 PROCEDURE — 99232 SBSQ HOSP IP/OBS MODERATE 35: CPT | Performed by: STUDENT IN AN ORGANIZED HEALTH CARE EDUCATION/TRAINING PROGRAM

## 2025-05-21 PROCEDURE — 84443 ASSAY THYROID STIM HORMONE: CPT

## 2025-05-21 PROCEDURE — 92526 ORAL FUNCTION THERAPY: CPT

## 2025-05-21 PROCEDURE — 6370000000 HC RX 637 (ALT 250 FOR IP): Performed by: PHYSICIAN ASSISTANT

## 2025-05-21 PROCEDURE — 95819 EEG AWAKE AND ASLEEP: CPT

## 2025-05-21 PROCEDURE — 95816 EEG AWAKE AND DROWSY: CPT | Performed by: PSYCHIATRY & NEUROLOGY

## 2025-05-21 PROCEDURE — 97116 GAIT TRAINING THERAPY: CPT

## 2025-05-21 PROCEDURE — 4A00X4Z MEASUREMENT OF CENTRAL NERVOUS ELECTRICAL ACTIVITY, EXTERNAL APPROACH: ICD-10-PCS | Performed by: PSYCHIATRY & NEUROLOGY

## 2025-05-21 PROCEDURE — 97161 PT EVAL LOW COMPLEX 20 MIN: CPT

## 2025-05-21 PROCEDURE — 70551 MRI BRAIN STEM W/O DYE: CPT

## 2025-05-21 PROCEDURE — 6370000000 HC RX 637 (ALT 250 FOR IP): Performed by: STUDENT IN AN ORGANIZED HEALTH CARE EDUCATION/TRAINING PROGRAM

## 2025-05-21 PROCEDURE — 97530 THERAPEUTIC ACTIVITIES: CPT

## 2025-05-21 PROCEDURE — 80061 LIPID PANEL: CPT

## 2025-05-21 PROCEDURE — 99233 SBSQ HOSP IP/OBS HIGH 50: CPT | Performed by: PSYCHIATRY & NEUROLOGY

## 2025-05-21 PROCEDURE — 82962 GLUCOSE BLOOD TEST: CPT

## 2025-05-21 PROCEDURE — 1100000003 HC PRIVATE W/ TELEMETRY

## 2025-05-21 PROCEDURE — 92610 EVALUATE SWALLOWING FUNCTION: CPT

## 2025-05-21 PROCEDURE — 36415 COLL VENOUS BLD VENIPUNCTURE: CPT

## 2025-05-21 PROCEDURE — 85027 COMPLETE CBC AUTOMATED: CPT

## 2025-05-21 PROCEDURE — 80048 BASIC METABOLIC PNL TOTAL CA: CPT

## 2025-05-21 PROCEDURE — 97535 SELF CARE MNGMENT TRAINING: CPT

## 2025-05-21 RX ORDER — MULTIVITAMIN WITH IRON
1 TABLET ORAL DAILY
Status: DISCONTINUED | OUTPATIENT
Start: 2025-05-21 | End: 2025-05-24 | Stop reason: HOSPADM

## 2025-05-21 RX ORDER — TOPIRAMATE 25 MG/1
25 TABLET, FILM COATED ORAL 2 TIMES DAILY
Status: DISCONTINUED | OUTPATIENT
Start: 2025-05-21 | End: 2025-05-24 | Stop reason: HOSPADM

## 2025-05-21 RX ADMIN — ONDANSETRON 4 MG: 2 INJECTION, SOLUTION INTRAMUSCULAR; INTRAVENOUS at 08:52

## 2025-05-21 RX ADMIN — POTASSIUM BICARBONATE 40 MEQ: 782 TABLET, EFFERVESCENT ORAL at 03:56

## 2025-05-21 RX ADMIN — PREGABALIN 200 MG: 75 CAPSULE ORAL at 21:09

## 2025-05-21 RX ADMIN — LEVETIRACETAM 750 MG: 250 TABLET, FILM COATED ORAL at 08:38

## 2025-05-21 RX ADMIN — PREGABALIN 200 MG: 75 CAPSULE ORAL at 16:03

## 2025-05-21 RX ADMIN — TOPIRAMATE 25 MG: 25 TABLET, FILM COATED ORAL at 21:09

## 2025-05-21 RX ADMIN — PREGABALIN 200 MG: 75 CAPSULE ORAL at 08:38

## 2025-05-21 RX ADMIN — TOPIRAMATE 25 MG: 25 TABLET, FILM COATED ORAL at 13:57

## 2025-05-21 RX ADMIN — MORPHINE SULFATE 2 MG: 2 INJECTION, SOLUTION INTRAMUSCULAR; INTRAVENOUS at 21:10

## 2025-05-21 RX ADMIN — THERA TABS 1 TABLET: TAB at 13:57

## 2025-05-21 RX ADMIN — ENOXAPARIN SODIUM 30 MG: 100 INJECTION SUBCUTANEOUS at 21:10

## 2025-05-21 RX ADMIN — ACETAMINOPHEN 650 MG: 325 TABLET ORAL at 02:15

## 2025-05-21 RX ADMIN — OXYCODONE HYDROCHLORIDE 5 MG: 5 TABLET ORAL at 06:56

## 2025-05-21 RX ADMIN — DULOXETINE HYDROCHLORIDE 30 MG: 30 CAPSULE, DELAYED RELEASE ORAL at 08:37

## 2025-05-21 RX ADMIN — ENOXAPARIN SODIUM 30 MG: 100 INJECTION SUBCUTANEOUS at 08:40

## 2025-05-21 RX ADMIN — MORPHINE SULFATE 2 MG: 2 INJECTION, SOLUTION INTRAMUSCULAR; INTRAVENOUS at 12:08

## 2025-05-21 RX ADMIN — MORPHINE SULFATE 2 MG: 2 INJECTION, SOLUTION INTRAMUSCULAR; INTRAVENOUS at 16:03

## 2025-05-21 RX ADMIN — MORPHINE SULFATE 2 MG: 2 INJECTION, SOLUTION INTRAMUSCULAR; INTRAVENOUS at 03:57

## 2025-05-21 RX ADMIN — ATORVASTATIN CALCIUM 80 MG: 40 TABLET, FILM COATED ORAL at 21:09

## 2025-05-21 RX ADMIN — ASPIRIN 81 MG CHEWABLE TABLET 81 MG: 81 TABLET CHEWABLE at 08:37

## 2025-05-21 RX ADMIN — MORPHINE SULFATE 2 MG: 2 INJECTION, SOLUTION INTRAMUSCULAR; INTRAVENOUS at 00:07

## 2025-05-21 ASSESSMENT — PAIN DESCRIPTION - DESCRIPTORS
DESCRIPTORS: ACHING
DESCRIPTORS: THROBBING
DESCRIPTORS: ACHING
DESCRIPTORS: THROBBING
DESCRIPTORS: THROBBING

## 2025-05-21 ASSESSMENT — PAIN SCALES - GENERAL
PAINLEVEL_OUTOF10: 4
PAINLEVEL_OUTOF10: 0
PAINLEVEL_OUTOF10: 7
PAINLEVEL_OUTOF10: 7
PAINLEVEL_OUTOF10: 8
PAINLEVEL_OUTOF10: 8
PAINLEVEL_OUTOF10: 0
PAINLEVEL_OUTOF10: 9
PAINLEVEL_OUTOF10: 10
PAINLEVEL_OUTOF10: 5
PAINLEVEL_OUTOF10: 0
PAINLEVEL_OUTOF10: 8
PAINLEVEL_OUTOF10: 7
PAINLEVEL_OUTOF10: 7
PAINLEVEL_OUTOF10: 0

## 2025-05-21 ASSESSMENT — PAIN - FUNCTIONAL ASSESSMENT
PAIN_FUNCTIONAL_ASSESSMENT: ACTIVITIES ARE NOT PREVENTED

## 2025-05-21 ASSESSMENT — PAIN DESCRIPTION - LOCATION
LOCATION: HEAD

## 2025-05-21 ASSESSMENT — PAIN DESCRIPTION - ORIENTATION
ORIENTATION: ANTERIOR
ORIENTATION: ANTERIOR
ORIENTATION: RIGHT;LEFT;ANTERIOR
ORIENTATION: ANTERIOR
ORIENTATION: RIGHT;LEFT;ANTERIOR
ORIENTATION: ANTERIOR

## 2025-05-21 ASSESSMENT — PAIN DESCRIPTION - PAIN TYPE
TYPE: ACUTE PAIN

## 2025-05-21 ASSESSMENT — PAIN DESCRIPTION - ONSET
ONSET: ON-GOING

## 2025-05-21 ASSESSMENT — PAIN DESCRIPTION - FREQUENCY
FREQUENCY: CONTINUOUS

## 2025-05-21 NOTE — PROGRESS NOTES
[facility]  Department of Neurology  Chief Complaint   Patient presents with    Cerebrovascular Accident       HPI:   Priscilla Dean is a 54 y.o. female with a PMHx of crohns, DM, HTN, HLD, PE, CVA with left residual hemiparalysis, type B Aortic Dissection repair s/p TEVAR on 2/12/2025, Cdiff, severe renal artery stenosis who presented to the ED with c/o aphasia. Patient is only able to answer yes/no questions partially. Per ER provider, \"Last known normal at 2 AM. When she woke up at a at 8 AM she was confused not able to speak answer questions with puffing of right side of face tremulous there as well.\" Patient was sent to ER, where she was found hypertensive, 199/95. CTH negative. CTA without LVO or significant stenosis (prelim). Neurology was consulted.      She denied headache, or medication changes since last hospitalization prior to 3/28/2025. At that time, she originally presented to the ER with speech difficulties and AMS. Stroke work up was negative. She was treated for occluded inflow of left common carotid to left subclavian bypass: S/p thrombectomy on March 15. She completed 6 week eliquis. \"Neurology was consulted for AMS and memory lapse and performed an EEG that showed a normal recording. They started levetiracetam 750mg po BID and arranged outpatient neurology clinic follow up.\"    Today of 5/21, patient sits on the bed, complaining of throbbing and pounding pain at forehead, with dizziness, photophobia, phonophobia, nausea. Nurse stated that her headache was 10/10 at 0:30 am, relieved down to 7/10 after iv morphine.     Physical Examination:  BP (!) 141/88   Pulse 88   Temp 97.5 °F (36.4 °C) (Axillary)   Resp 17   Ht 1.803 m (5' 10.98\")   Wt 111.5 kg (245 lb 13 oz)   SpO2 100%   BMI 34.30 kg/m²     GENERAL APPEARANCE:  The patient's not in acute distress.  EYES:  Anicteric.   NECK:  Supple.  CARDIOVASCULAR:  Pulses are present bilaterally.  LUNGS:  No respiratory distress.  evaluation  4-Clinical documentation in the EHR or other health record  5-Counseling and educating the patient or caregiver  6-Interpreting results and/or communicating results to the patient/family/caregiver  7-Care coordination  8-Ordering medications, tests, or procedures  9-Referring and communicating with other health care professionals  10-Documentation in EHR.   Signed By: Agatha Hogan MD. PhD. MS. CLINE

## 2025-05-21 NOTE — PROGRESS NOTES
[facility] Hospitalist Group  Progress Note  Date:2025       Room:[facility]  Patient Name:Priscilla Dean     YOB: 1970     Age:54 y.o.        Subjective    Subjective:  Symptoms:  Stable.    Diet:  Adequate intake.    Activity level: Normal.    Pain:  She reports no pain.       Review of Systems    No acute events overnight.  Patient resting comfortably in bed. EEG was being set up. There is no leg twitching at the moment.    Disposition: Likely stable for discharge to SNF on  vs .    Objective         Vitals Last 24 Hours:  TEMPERATURE:  Temp  Av.1 °F (36.7 °C)  Min: 97.5 °F (36.4 °C)  Max: 98.7 °F (37.1 °C)  RESPIRATIONS RANGE: Resp  Av.5  Min: 16  Max: 20  PULSE OXIMETRY RANGE: SpO2  Av.9 %  Min: 94 %  Max: 100 %  PULSE RANGE: Pulse  Av.8  Min: 74  Max: 104  BLOOD PRESSURE RANGE: Systolic (24hrs), Av , Min:124 , Max:184     ; Diastolic (24hrs), Av, Min:66, Max:95      I/O (24Hr):    Intake/Output Summary (Last 24 hours) at 2025 1652  Last data filed at 2025 1919  Gross per 24 hour   Intake 120 ml   Output --   Net 120 ml     Objective:  General Appearance:  Comfortable.    Vital signs: (most recent): Blood pressure 124/66, pulse 80, temperature 97.7 °F (36.5 °C), temperature source Axillary, resp. rate 16, height 1.803 m (5' 10.98\"), weight 111.5 kg (245 lb 13 oz), SpO2 97%.    Output: Producing urine.    HEENT: Normal HEENT exam.    Lungs:  Normal effort and normal respiratory rate.    Heart: Normal rate.  Regular rhythm.    Abdomen: Abdomen is soft and flat.  Bowel sounds are normal.     Extremities: Normal range of motion.    Neurological: Patient is alert and oriented to person, place and time.    Skin:  Warm and dry.          Labs/Imaging/Diagnostics    Labs:  CBC:  Recent Labs     25  1236 25  0141   WBC 7.8 5.9   RBC 4.33 4.21   HGB 10.8* 10.8*   HCT 35.1 34.2*   MCV 81.1 81.2   RDW 17.1* 17.2*    259

## 2025-05-21 NOTE — PROGRESS NOTES
Spiritual Health History and Assessment/Progress Note  [facility]    Spiritual/Emotional Needs, Advance Care Planning,  ,  ,      Name: Priscilla Dean MRN: [id number]    Age: 54 y.o.     Sex: female   Language: English   Baptist: Yarsanism   Aphasia     Date: 5/21/2025            Total Time Calculated: 7 min              Spiritual Assessment began in [facility]        Referral/Consult From: Multi-disciplinary team   Encounter Overview/Reason: Spiritual/Emotional Needs, Advance Care Planning  Service Provided For: Patient    Gertrude, Belief, Meaning:   Patient has beliefs or practices that help with coping during difficult times  Family/Friends No family/friends present      Importance and Influence:  Patient has spiritual/personal beliefs that influence decisions regarding their health  Family/Friends No family/friends present    Community:  Patient is connected with a spiritual community  Family/Friends No family/friends present    Assessment and Plan of Care:     Patient Interventions include: Facilitated expression of thoughts and feelings and Affirmed coping skills/support systems  Family/Friends Interventions include: No family/friends present    Patient Plan of Care: No spiritual needs identified for follow-up  Family/Friends Plan of Care: No family/friends present    Electronically signed by TOMAS Rushing on 5/21/2025 at 4:15 PM

## 2025-05-21 NOTE — PLAN OF CARE
Problem: Occupational Therapy - Adult  Goal: By Discharge: Performs self-care activities at highest level of function for planned discharge setting.  See evaluation for individualized goals.  Description: Occupational Therapy Goals:  Initiated 5/20/2025 to be met within 7-10 days.    1.  Patient will perform grooming with contact guard assistance standing at sink >3 minutes.   2.  Patient will perform bathing at EOB with contact guard assistance.  3.  Patient will perform lower body dressing  with minimal assistance.  4.  Patient will perform toilet transfers with minimal assistance  5.  Patient will perform all aspects of toileting with minimal assistance.  6.  Patient will participate in upper extremity therapeutic exercise/activities with supervision/set-up for 8-10 minutes to increase strength/endurance for ADLs.    7.  Patient will utilize energy conservation techniques during functional activities with verbal and visual cues.    PLOF: Pt unable to answer PLOF questions as this time, poor historian. Per chart review, pt was previously independent in ADLs and functional mobility     Outcome: Progressing   OCCUPATIONAL THERAPY TREATMENT    Patient: Priscilla Dean (54 y.o. female)  Date: 5/21/2025  Diagnosis: Aphasia [R47.01]  Suspected cerebrovascular accident (CVA) [R09.89] Aphasia      Precautions: Fall Risk, General Precautions    Chart, occupational therapy assessment, plan of care, and goals were reviewed.  ASSESSMENT:  Pt is pleasant and cooperative. Pt c/o HA, but agreeable to therapy. Seen for ADL retraining. (See functional levels below) Pt requires hand held assist w/functional mobility and demonstrates compensatory strategies w/container mgt and oral care task. Reviewed importance of SROM Therex LUE to increase functional use w/ADL grooming tasks.     Progression toward goals:  []          Improving appropriately and progressing toward goals  [x]          Improving slowly and progressing toward

## 2025-05-21 NOTE — PLAN OF CARE
Problem: Physical Therapy - Adult  Goal: By Discharge: Performs mobility at highest level of function for planned discharge setting.  See evaluation for individualized goals.  Description: Physical Therapy Goals:  Initiated 5/21/2025 to be met within 7-10 days.    1.  Patient will move from supine to sit and sit to supine , scoot up and down, and roll side to side in bed with modified independence.    2.  Patient will transfer from bed to chair and chair to bed with modified independence using the least restrictive device.  3.  Patient will perform sit to stand with modified independence.  4.  Patient will ambulate with modified independence for 150 feet with the least restrictive device.       PLOF: Pt lives alone in a single story home, independent with all mobility, has a RW and SPC but doesn't use them.      Outcome: Progressing     PHYSICAL THERAPY EVALUATION    Patient: Priscilla Dean (54 y.o. female)  Date: 5/21/2025  Primary Diagnosis: Aphasia [R47.01]  Suspected cerebrovascular accident (CVA) [R09.89]       Precautions: Fall Risk, General Precautions,  ,  ,  ,  ,  ,  ,      ASSESSMENT :  Pt resting in bed upon entering room, agreeable to PT evaluation.  Pt is A&Ox1 to person, reoriented to place and time.  Pt was able to follow all simple one step commands with increased time.  Supine to sit transfer with CGA and additional time, good sitting balance.  Pt with gross R LE strength 3+/5 and grossly 3/5 on the L, difficult to discern if pt had true weakness or difficulty processing directions for MMT.  Pt was able to perform STS with RW and Stephen, noted need to use the bathroom, ambulated to restroom with CGA/Stephen for balance and walker negotiation.  Pt was CGA for toilet transfer with use of grab bars and required verbal cues for sequencing of tasks in restroom and when washing her hands.  Pt declined sitting up in recliner, returned supine and positioned herself comfortably and left with all needs met and  call bell within reach.  Pt would continue to benefit from skilled PT services to address deficits listed below and optimize functional mobility.       DEFICITS/IMPAIRMENTS:    , Body Structures, Functions, Activity Limitations Requiring Skilled Therapeutic Intervention: Decreased functional mobility ;Decreased ADL status;Decreased strength;Decreased safe awareness;Decreased cognition;Decreased balance    Patient will benefit from skilled intervention to address the above impairments.  Patient's rehabilitation potential/Therapy Prognosis: Good.  Factors which may influence rehabilitation potential include:   []         None noted  [x]         Mental ability/status  [x]         Medical condition  []         Home/family situation and support systems  [x]         Safety awareness  []         Pain tolerance/management  []         Other:      PLAN :  Recommendations and Planned Interventions:   [x]           Bed Mobility Training             [x]    Neuromuscular Re-Education  [x]           Transfer Training                   []    Orthotic/Prosthetic Training  [x]           Gait Training                          []    Modalities  [x]           Therapeutic Exercises           []    Edema Management/Control  [x]           Therapeutic Activities            [x]    Family Training/Education  [x]           Patient Education  []           Other (comment):    Frequency/Duration: Patient will be followed by physical therapy to address goals, 1-2 times per day/3-5 days per week to address goals.    Further Equipment Recommendations for Discharge: Patient has all needed DME for home safety.    AMPAC: AM-PAC Inpatient Mobility Raw Score : 17      Current research shows that an AM-PAC score of 17 (13 without stairs) or less is not associated with a discharge to the patient's home setting and this patient demonstrates the potential endurance and/or tolerance for 3 hours of therapy each day at discharge.    This AMPAC score should be

## 2025-05-21 NOTE — PROGRESS NOTES
Patient c/o of localized anterior headache 8/10; opiod analgesic administered at 0656 and patient continues to c/o of headache; patient denies visual disturbances; c/o nausea without vomiting - 4mg Zofran administered

## 2025-05-21 NOTE — PROCEDURES
PROCEDURE NOTE  Date: 5/21/2025   Name: Priscilla Dean  YOB: 1970    Procedures  EEG Procedure Note  Indications: Seizure    Medications:   Current Facility-Administered Medications   Medication Dose Route Frequency Provider Last Rate Last Admin    multivitamin 1 tablet  1 tablet Oral Daily Leonor Tran, DO   1 tablet at 05/21/25 1357    topiramate (TOPAMAX) tablet 25 mg  25 mg Oral BID Agatha Hogan MD   25 mg at 05/21/25 1357    ondansetron (ZOFRAN-ODT) disintegrating tablet 4 mg  4 mg Oral Q8H PRN Nohemy Agudelo PA        Or    ondansetron (ZOFRAN) injection 4 mg  4 mg IntraVENous Q6H PRN Nohemy Agudelo PA   4 mg at 05/21/25 0852    polyethylene glycol (GLYCOLAX) packet 17 g  17 g Oral Daily PRN Nohemy Agudelo PA        aspirin chewable tablet 81 mg  81 mg Oral Daily MuluglNohemy barney PA   81 mg at 05/21/25 0837    Or    aspirin suppository 300 mg  300 mg Rectal Daily MuluglNohemy barney PA   300 mg at 05/20/25 1555    atorvastatin (LIPITOR) tablet 80 mg  80 mg Oral Nightly Nohemy Agudelo PA   80 mg at 05/20/25 2039    labetalol (NORMODYNE;TRANDATE) injection 10 mg  10 mg IntraVENous Q10 Min PRN Nohemy Agudelo PA        DULoxetine (CYMBALTA) extended release capsule 30 mg  30 mg Oral Daily Nohemy Agudelo PA   30 mg at 05/21/25 0837    pregabalin (LYRICA) capsule 200 mg  200 mg Oral TID Nohemy Agudelo PA   200 mg at 05/21/25 1603    enoxaparin Sodium (LOVENOX) injection 30 mg  30 mg SubCUTAneous BID Nohemy Agudelo PA   30 mg at 05/21/25 0840    glucose chewable tablet 16 g  4 tablet Oral PRN Nohemy Agudelo PA        dextrose bolus 10% 125 mL  125 mL IntraVENous PRN Nohemy Agudelo PA        Or    dextrose bolus 10% 250 mL  250 mL IntraVENous PRN Nohemy Agudelo PA        glucagon injection 1 mg  1 mg SubCUTAneous PRN Nohemy Agudelo, PA        dextrose 10 % infusion   IntraVENous Continuous PRN Nohemy Agudelo, PA

## 2025-05-21 NOTE — PROGRESS NOTES
Comprehensive Nutrition Assessment    Type and Reason for Visit:  Initial, Positive nutrition screen    Nutrition Recommendations/Plan:   Continue current diet as tolerated.  Order Magic Cup (each provides 290 kcal, 9g protein) once daily and Gelatein 20 (each provides 80 kcal, 20g protein) once daily  Recommend/order MVI/min supplementation daily, Daily wts.  Continue to monitor tolerance of PO, compliance of oral supplements, weight, labs, and plan of care during admission.     Malnutrition Assessment:  Malnutrition Status:  Moderate malnutrition (05/21/25 1216)    Context:  Chronic Illness     Findings of the 6 clinical characteristics of malnutrition:  Energy Intake:  75% or less estimated energy requirements for 1 month or longer  Weight Loss:  Mild weight loss (-14.6% x9 months)     Body Fat Loss:  No body fat loss     Muscle Mass Loss:  Mild muscle mass loss Temples (temporalis)  Fluid Accumulation:  No fluid accumulation     Strength:  Not Performed    Nutrition History and Allergies:      Past Medical History:   Diagnosis Date    Bilateral knee pain     Crohn's colitis (HCC)     Diabetes mellitus, type 2 (HCC)     w/ Long-Term Use of Insulin    HTN (hypertension)     Hypercholesterolemia     Pulmonary embolism (HCC) 03/2019    Stool color black     crohns     PTA: per pt decreased appetite x1-3 months pta; endorses consuming 1 medium sized meal/d. Pt reports  lbs with 100 lbs recent/unintentional weight loss in the past x6 months.     Weight Hx: 287 lbs Wt change: -42 lb (-14.6%) x 9 months - not significant.   Wt Readings from Last 10 Encounters:   05/21/25 111.5 kg (245 lb 13 oz)   05/13/25 104.3 kg (230 lb)   04/24/25 105.7 kg (233 lb)   03/04/25 106.6 kg (235 lb)   02/24/25 106.1 kg (234 lb)   02/02/25 110.2 kg (243 lb)   12/27/24 109.8 kg (242 lb 1 oz)   08/26/24 130.2 kg (287 lb 0.6 oz)   06/19/24 126.1 kg (278 lb)   05/16/24 124.7 kg (275 lb)     NFPE: NFPE detailed above. Food Allergies:

## 2025-05-21 NOTE — PLAN OF CARE
Problem: Chronic Conditions and Co-morbidities  Goal: Patient's chronic conditions and co-morbidity symptoms are monitored and maintained or improved  Outcome: Progressing  Flowsheets (Taken 5/20/2025 1920 by Cris Molina, RN)  Care Plan - Patient's Chronic Conditions and Co-Morbidity Symptoms are Monitored and Maintained or Improved:   Monitor and assess patient's chronic conditions and comorbid symptoms for stability, deterioration, or improvement   Collaborate with multidisciplinary team to address chronic and comorbid conditions and prevent exacerbation or deterioration     Problem: Discharge Planning  Goal: Discharge to home or other facility with appropriate resources  Outcome: Progressing  Flowsheets (Taken 5/20/2025 1920 by Cris Molina, RN)  Discharge to home or other facility with appropriate resources:   Identify barriers to discharge with patient and caregiver   Arrange for needed discharge resources and transportation as appropriate     Problem: Pain  Goal: Verbalizes/displays adequate comfort level or baseline comfort level  Outcome: Progressing  Flowsheets (Taken 5/21/2025 0910)  Verbalizes/displays adequate comfort level or baseline comfort level:   Encourage patient to monitor pain and request assistance   Assess pain using appropriate pain scale   Administer analgesics based on type and severity of pain and evaluate response   Consider cultural and social influences on pain and pain management   Notify Licensed Independent Practitioner if interventions unsuccessful or patient reports new pain   Implement non-pharmacological measures as appropriate and evaluate response  Note: Assess pain using appropriate pain scale. Instruct patient to report pain whether new onset or continuous     Problem: Safety - Adult  Goal: Free from fall injury  Outcome: Progressing  Flowsheets (Taken 5/21/2025 0910)  Free From Fall Injury:   Instruct family/caregiver on patient safety   Based on caregiver fall risk screen,

## 2025-05-21 NOTE — PROGRESS NOTES
Review of Systems    Physical Exam  Skin:     General: Skin is warm.      Capillary Refill: Capillary refill takes 2 to 3 seconds.      Findings: Bruising present.             Comments: Dual skin assessment completed with the following findings:  Bruising to R eye from prior fall  Scarring to L knee from surgery  Excoriation & Redness to sacrum

## 2025-05-21 NOTE — PROGRESS NOTES
MRI screening form needs to be filled out and faxed to [phone] BEFORE MRI can be scheduled.  If unable to obtain information from patient , [employer] needs to be contacted . If patient is claustrophobic or will needs pain meds, please have ordered in advance in order to facilitate exam.

## 2025-05-21 NOTE — PLAN OF CARE
Problem: SLP Adult - Impaired Swallowing  Goal: By Discharge: Advance to least restrictive diet without signs or symptoms of aspiration for planned discharge setting.  See evaluation for individualized goals.  Description: Patient will:  1. Tolerate PO trials with 0 s/s overt distress in 4/5 trials  2. Utilize compensatory swallow strategies/maneuvers (decrease bite/sip, size/rate, alt. liq/sol) with min cues in 4/5 trials    Rec:     Regular diet with thin liquids  Aspiration precautions  HOB >45 during po intake, remain >30 for 30-45 minutes after po   Small bites/sips; alternate liquid/solid with slow feeding rate   Oral care TID  Meds per pt preference    Outcome: Progressing  SPEECH LANGUAGE PATHOLOGY BEDSIDE SWALLOW EVALUATION/TREATMENT    Patient: Priscilla Dean (54 y.o. female)  Date: 5/21/2025  Primary Diagnosis: Aphasia [R47.01]  Suspected cerebrovascular accident (CVA) [R09.89]       Precautions: Aspiration  PLOF: As per H&P  ASSESSMENT:  Based on the objective data described below, the patient presents with oropharyngeal swallow fxn that appears WFL. Strength, ROM, and coordination of orofacial musculature all WNL. Pt tolerated reg solids and thin liquids + straw without overt s/s of aspiration. Functional mastication with positive oral clearance and appropriate laryngeal elevation to palpation with all PO. Pt able to participate in simple conversation regarding self with low vocal volume and given extra time. Recommend reg solids and thin liquids, aspiration precautions, oral care TID, and meds as tolerated.     TREATMENT:  Skilled therapy initiated; Educated patient on aspiration precautions and importance of compensatory swallow techniques to decrease aspiration risk (decrease rate of intake & sip/bite size, upright @HOB for all po intake and ~30 minutes after po); verbalized comprehension.    Patient will benefit from skilled intervention to address the above impairments.  Patient's rehabilitation  0/10    After treatment:   [x]            Patient left in no apparent distress in same position as upon arrival  [x]            Call bell left within reach  [x]            Nursing notified  []            Family/caregiver present    COMMUNICATION/EDUCATION:   [x]          Aspiration precautions; swallow safety; compensatory techniques provided via demonstration, verbalization and teach back of comprehension  []         Patient/family have participated as able in goal setting and plan of care.  [x]          Patient/family agree to work toward stated goals and plan of care.  []          Patient understands intent and goals of therapy, neutral about participation.  []          Patient unable to participate in goal setting/plan of care secondary to cognition, hearing/vision deficits; education ongoing with interdisciplinary staff   []          Handout regarding diet recommendations and thickener instructions provided.  [x]         Posted safety precautions in patient's room.    Thank you for this referral.    Moriah Dixon M.S. CCC-SLP  Speech Language Pathologist

## 2025-05-22 LAB
ANION GAP SERPL CALC-SCNC: 15 MMOL/L (ref 3–18)
BUN SERPL-MCNC: 26 MG/DL (ref 6–23)
BUN/CREAT SERPL: 23 (ref 12–20)
CALCIUM SERPL-MCNC: 9.4 MG/DL (ref 8.5–10.1)
CHLORIDE SERPL-SCNC: 99 MMOL/L (ref 98–107)
CO2 SERPL-SCNC: 22 MMOL/L (ref 21–32)
CREAT SERPL-MCNC: 1.14 MG/DL (ref 0.6–1.3)
ERYTHROCYTE [DISTWIDTH] IN BLOOD BY AUTOMATED COUNT: 17.2 % (ref 11.6–14.5)
GLUCOSE BLD STRIP.AUTO-MCNC: 119 MG/DL (ref 70–110)
GLUCOSE BLD STRIP.AUTO-MCNC: 137 MG/DL (ref 70–110)
GLUCOSE BLD STRIP.AUTO-MCNC: 144 MG/DL (ref 70–110)
GLUCOSE BLD STRIP.AUTO-MCNC: 151 MG/DL (ref 70–110)
GLUCOSE SERPL-MCNC: 105 MG/DL (ref 74–108)
HCT VFR BLD AUTO: 34.8 % (ref 35–45)
HGB BLD-MCNC: 10.9 G/DL (ref 12–16)
MCH RBC QN AUTO: 25.3 PG (ref 24–34)
MCHC RBC AUTO-ENTMCNC: 31.3 G/DL (ref 31–37)
MCV RBC AUTO: 80.7 FL (ref 78–100)
NRBC # BLD: 0 K/UL (ref 0–0.01)
NRBC BLD-RTO: 0 PER 100 WBC
PLATELET # BLD AUTO: 197 K/UL (ref 135–420)
PMV BLD AUTO: 13.1 FL (ref 9.2–11.8)
POTASSIUM SERPL-SCNC: 3.9 MMOL/L (ref 3.5–5.5)
RBC # BLD AUTO: 4.31 M/UL (ref 4.2–5.3)
SODIUM SERPL-SCNC: 136 MMOL/L (ref 136–145)
WBC # BLD AUTO: 6 K/UL (ref 4.6–13.2)

## 2025-05-22 PROCEDURE — 80048 BASIC METABOLIC PNL TOTAL CA: CPT

## 2025-05-22 PROCEDURE — 97530 THERAPEUTIC ACTIVITIES: CPT

## 2025-05-22 PROCEDURE — 97535 SELF CARE MNGMENT TRAINING: CPT

## 2025-05-22 PROCEDURE — 6370000000 HC RX 637 (ALT 250 FOR IP): Performed by: STUDENT IN AN ORGANIZED HEALTH CARE EDUCATION/TRAINING PROGRAM

## 2025-05-22 PROCEDURE — 99232 SBSQ HOSP IP/OBS MODERATE 35: CPT | Performed by: STUDENT IN AN ORGANIZED HEALTH CARE EDUCATION/TRAINING PROGRAM

## 2025-05-22 PROCEDURE — 6370000000 HC RX 637 (ALT 250 FOR IP): Performed by: INTERNAL MEDICINE

## 2025-05-22 PROCEDURE — 6360000002 HC RX W HCPCS: Performed by: PHYSICIAN ASSISTANT

## 2025-05-22 PROCEDURE — 1100000003 HC PRIVATE W/ TELEMETRY

## 2025-05-22 PROCEDURE — 6370000000 HC RX 637 (ALT 250 FOR IP): Performed by: PSYCHIATRY & NEUROLOGY

## 2025-05-22 PROCEDURE — 82962 GLUCOSE BLOOD TEST: CPT

## 2025-05-22 PROCEDURE — 97116 GAIT TRAINING THERAPY: CPT

## 2025-05-22 PROCEDURE — 97110 THERAPEUTIC EXERCISES: CPT

## 2025-05-22 PROCEDURE — 36415 COLL VENOUS BLD VENIPUNCTURE: CPT

## 2025-05-22 PROCEDURE — 85027 COMPLETE CBC AUTOMATED: CPT

## 2025-05-22 PROCEDURE — 6370000000 HC RX 637 (ALT 250 FOR IP): Performed by: PHYSICIAN ASSISTANT

## 2025-05-22 PROCEDURE — 6360000002 HC RX W HCPCS: Performed by: INTERNAL MEDICINE

## 2025-05-22 RX ORDER — OXYCODONE HYDROCHLORIDE 5 MG/1
10 TABLET ORAL EVERY 4 HOURS PRN
Refills: 0 | Status: DISCONTINUED | OUTPATIENT
Start: 2025-05-22 | End: 2025-05-24 | Stop reason: HOSPADM

## 2025-05-22 RX ADMIN — PREGABALIN 200 MG: 75 CAPSULE ORAL at 14:24

## 2025-05-22 RX ADMIN — ENOXAPARIN SODIUM 30 MG: 100 INJECTION SUBCUTANEOUS at 21:19

## 2025-05-22 RX ADMIN — PREGABALIN 200 MG: 75 CAPSULE ORAL at 21:20

## 2025-05-22 RX ADMIN — TOPIRAMATE 25 MG: 25 TABLET, FILM COATED ORAL at 09:21

## 2025-05-22 RX ADMIN — DULOXETINE HYDROCHLORIDE 30 MG: 30 CAPSULE, DELAYED RELEASE ORAL at 09:20

## 2025-05-22 RX ADMIN — OXYCODONE HYDROCHLORIDE 10 MG: 5 TABLET ORAL at 21:20

## 2025-05-22 RX ADMIN — MORPHINE SULFATE 2 MG: 2 INJECTION, SOLUTION INTRAMUSCULAR; INTRAVENOUS at 02:51

## 2025-05-22 RX ADMIN — TOPIRAMATE 25 MG: 25 TABLET, FILM COATED ORAL at 21:24

## 2025-05-22 RX ADMIN — ENOXAPARIN SODIUM 30 MG: 100 INJECTION SUBCUTANEOUS at 09:21

## 2025-05-22 RX ADMIN — OXYCODONE HYDROCHLORIDE 10 MG: 5 TABLET ORAL at 14:23

## 2025-05-22 RX ADMIN — PREGABALIN 200 MG: 75 CAPSULE ORAL at 09:20

## 2025-05-22 RX ADMIN — ASPIRIN 81 MG CHEWABLE TABLET 81 MG: 81 TABLET CHEWABLE at 09:21

## 2025-05-22 RX ADMIN — THERA TABS 1 TABLET: TAB at 09:20

## 2025-05-22 RX ADMIN — OXYCODONE HYDROCHLORIDE 5 MG: 5 TABLET ORAL at 09:20

## 2025-05-22 RX ADMIN — ATORVASTATIN CALCIUM 80 MG: 40 TABLET, FILM COATED ORAL at 21:20

## 2025-05-22 ASSESSMENT — PAIN SCALES - GENERAL
PAINLEVEL_OUTOF10: 4
PAINLEVEL_OUTOF10: 7
PAINLEVEL_OUTOF10: 7
PAINLEVEL_OUTOF10: 4
PAINLEVEL_OUTOF10: 4
PAINLEVEL_OUTOF10: 7
PAINLEVEL_OUTOF10: 7
PAINLEVEL_OUTOF10: 5
PAINLEVEL_OUTOF10: 7

## 2025-05-22 ASSESSMENT — PAIN DESCRIPTION - DESCRIPTORS
DESCRIPTORS: THROBBING;ACHING
DESCRIPTORS: ACHING
DESCRIPTORS: ACHING
DESCRIPTORS: THROBBING;ACHING

## 2025-05-22 ASSESSMENT — PAIN DESCRIPTION - LOCATION
LOCATION: HEAD

## 2025-05-22 ASSESSMENT — PAIN DESCRIPTION - FREQUENCY
FREQUENCY: CONTINUOUS
FREQUENCY: CONTINUOUS

## 2025-05-22 ASSESSMENT — PAIN - FUNCTIONAL ASSESSMENT
PAIN_FUNCTIONAL_ASSESSMENT: ACTIVITIES ARE NOT PREVENTED

## 2025-05-22 ASSESSMENT — PAIN DESCRIPTION - ORIENTATION
ORIENTATION: ANTERIOR

## 2025-05-22 ASSESSMENT — PAIN DESCRIPTION - ONSET
ONSET: ON-GOING
ONSET: ON-GOING

## 2025-05-22 ASSESSMENT — PAIN DESCRIPTION - PAIN TYPE
TYPE: ACUTE PAIN
TYPE: ACUTE PAIN

## 2025-05-22 NOTE — PROGRESS NOTES
ARU/IPR REFERRAL CONTACT NOTE  [facility] Physical [facility]        Thank you for the opportunity to review this patient's case for admission to [facility] Physical [facility].     Referral received: 5/22/2025 time:9:32 AM     Based on our pre-admission screening:                           [x ] Our Team/Medical Director is following this case.                Comments: Referral received from . Will review with team and follow up.      Again, Thank you for this referral. Should you have any questions please do not hesitate to call.      Sincerely,  Yelena Oviedo RN     Clinical Liaison  [facility] Physical Rehabilitation  [phone]

## 2025-05-22 NOTE — PLAN OF CARE
Problem: Chronic Conditions and Co-morbidities  Goal: Patient's chronic conditions and co-morbidity symptoms are monitored and maintained or improved  Outcome: Progressing  Flowsheets (Taken 5/20/2025 1920 by Cris Molina, RN)  Care Plan - Patient's Chronic Conditions and Co-Morbidity Symptoms are Monitored and Maintained or Improved:   Monitor and assess patient's chronic conditions and comorbid symptoms for stability, deterioration, or improvement   Collaborate with multidisciplinary team to address chronic and comorbid conditions and prevent exacerbation or deterioration     Problem: Pain  Goal: Verbalizes/displays adequate comfort level or baseline comfort level  Outcome: Progressing  Flowsheets (Taken 5/21/2025 0910 by Claudy Bob, RN)  Verbalizes/displays adequate comfort level or baseline comfort level:   Encourage patient to monitor pain and request assistance   Assess pain using appropriate pain scale   Administer analgesics based on type and severity of pain and evaluate response   Consider cultural and social influences on pain and pain management   Notify Licensed Independent Practitioner if interventions unsuccessful or patient reports new pain   Implement non-pharmacological measures as appropriate and evaluate response     Problem: Safety - Adult  Goal: Free from fall injury  Outcome: Progressing  Flowsheets (Taken 5/21/2025 0910 by Caludy Bob, RN)  Free From Fall Injury:   Instruct family/caregiver on patient safety   Based on caregiver fall risk screen, instruct family/caregiver to ask for assistance with transferring infant if caregiver noted to have fall risk factors     Problem: Skin/Tissue Integrity  Goal: Skin integrity remains intact  Description: 1.  Monitor for areas of redness and/or skin breakdown2.  Assess vascular access sites hourly3.  Every 4-6 hours minimum:  Change oxygen saturation probe site4.  Every 4-6 hours:  If on nasal continuous positive airway pressure,

## 2025-05-22 NOTE — PLAN OF CARE
Problem: Physical Therapy - Adult  Goal: By Discharge: Performs mobility at highest level of function for planned discharge setting.  See evaluation for individualized goals.  Description: Physical Therapy Goals:  Initiated 5/21/2025 to be met within 7-10 days.    1.  Patient will move from supine to sit and sit to supine , scoot up and down, and roll side to side in bed with modified independence.    2.  Patient will transfer from bed to chair and chair to bed with modified independence using the least restrictive device.  3.  Patient will perform sit to stand with modified independence.  4.  Patient will ambulate with modified independence for 150 feet with the least restrictive device.       PLOF: Pt lives alone in a single story home, independent with all mobility, has a RW and SPC but doesn't use them.      Outcome: Progressing     PHYSICAL THERAPY TREATMENT    Patient: Priscilla Dean (54 y.o. female)  Date: 5/22/2025  Diagnosis: Aphasia [R47.01]  Suspected cerebrovascular accident (CVA) [R09.89] Aphasia      Precautions: Fall Risk, General Precautions,  ,  ,  ,  ,  ,  ,      ASSESSMENT:  Pt sitting EOB upon entering room, agreeable to PT treatment.  Pt was able to perform STS with CGA and RW and ambulated throughout room, ~ 20ft, with CGA.  Pt demonstrates slow, shuffling gait but no LOB noted.  Pt very motivated to participate in further therapy and agreeable to standing exercises for b/l LE strengthening.  Pt performed STS x 5 with out use of RW demonstrating fair standing balance as well as 1x10 of the following: standing heel raises, standing marches and mini squats.  Pt declined sitting up in recliner due to being up all morning wishing to return supine, doing so with supervision and positioned herself comfortably.  Pt left with all needs met and call bell within reach.       Progression toward goals:   [x]      Improving appropriately and progressing toward goals  []      Improving slowly and

## 2025-05-22 NOTE — PROGRESS NOTES
[facility] Hospitalist Group  Progress Note  Date:2025       Room:[facility]  Patient Name:Priscilla Dean     YOB: 1970     Age:54 y.o.        Subjective    Subjective:  Symptoms:  Stable.    Diet:  Adequate intake.    Activity level: Normal.    Pain:  She reports no pain.       Review of Systems    No acute events overnight.  Patient resting comfortably in bed. Patient has a headache    Disposition: Medically stable for discharge. Pending ARU auth.    Objective         Vitals Last 24 Hours:  TEMPERATURE:  Temp  Av.6 °F (36.4 °C)  Min: 97.3 °F (36.3 °C)  Max: 97.9 °F (36.6 °C)  RESPIRATIONS RANGE: Resp  Av.7  Min: 16  Max: 19  PULSE OXIMETRY RANGE: SpO2  Av.8 %  Min: 94 %  Max: 100 %  PULSE RANGE: Pulse  Av.6  Min: 75  Max: 102  BLOOD PRESSURE RANGE: Systolic (24hrs), Av , Min:128 , Max:140     ; Diastolic (24hrs), Av, Min:57, Max:79      I/O (24Hr):    Intake/Output Summary (Last 24 hours) at 2025 1637  Last data filed at 2025 0750  Gross per 24 hour   Intake 600 ml   Output --   Net 600 ml     Objective:  General Appearance:  Comfortable.    Vital signs: (most recent): Blood pressure 135/67, pulse 75, temperature 97.3 °F (36.3 °C), temperature source Oral, resp. rate 17, height 1.803 m (5' 10.98\"), weight 111.5 kg (245 lb 13 oz), SpO2 94%.    Output: Producing urine.    HEENT: Normal HEENT exam.    Lungs:  Normal effort and normal respiratory rate.    Heart: Normal rate.  Regular rhythm.    Abdomen: Abdomen is soft and flat.  Bowel sounds are normal.     Extremities: Normal range of motion.    Neurological: Patient is alert and oriented to person, place and time.    Skin:  Warm and dry.          Labs/Imaging/Diagnostics    Labs:  CBC:  Recent Labs     25  1236 25  0141 25  0024   WBC 7.8 5.9 6.0   RBC 4.33 4.21 4.31   HGB 10.8* 10.8* 10.9*   HCT 35.1 34.2* 34.8*   MCV 81.1 81.2 80.7   RDW 17.1* 17.2* 17.2*

## 2025-05-22 NOTE — PLAN OF CARE
Problem: Occupational Therapy - Adult  Goal: By Discharge: Performs self-care activities at highest level of function for planned discharge setting.  See evaluation for individualized goals.  Description: Occupational Therapy Goals:  Initiated 5/20/2025 to be met within 7-10 days.    1.  Patient will perform grooming with contact guard assistance standing at sink >3 minutes.   2.  Patient will perform bathing at EOB with contact guard assistance.  3.  Patient will perform lower body dressing  with minimal assistance.  4.  Patient will perform toilet transfers with minimal assistance  5.  Patient will perform all aspects of toileting with minimal assistance.  6.  Patient will participate in upper extremity therapeutic exercise/activities with supervision/set-up for 8-10 minutes to increase strength/endurance for ADLs.    7.  Patient will utilize energy conservation techniques during functional activities with verbal and visual cues.    PLOF: Pt unable to answer PLOF questions as this time, poor historian. Per chart review, pt was previously independent in ADLs and functional mobility     Outcome: Progressing   OCCUPATIONAL THERAPY TREATMENT    Patient: Priscilla Dean (54 y.o. female)  Date: 5/22/2025  Diagnosis: Aphasia [R47.01]  Suspected cerebrovascular accident (CVA) [R09.89] Aphasia      Precautions: Fall Risk, General Precautions    Chart, occupational therapy assessment, plan of care, and goals were reviewed.  ASSESSMENT:  Pt sleeping upon entry. Arouses to voice. NSGBrenda, giving pt pain medication for HA. Pt request to use bathroom. Demonstrates increase independence w/functional mobility and RW. Improving dynamic standing balance as evidence by pt performing toileting ADL w/SBA. Pt tolerates standing sinkside performing hand hygiene w/no LOB. Pt returned to EOB for LUE Therex and NMR activity. Issued blue resistive sponge and encouraged to use throughout the day. Pt returned to supine and left w/all items  within reach.     ** Pt will benefit from short ARU stay to address LE ADLs and IADLs for return to PLOF **    Progression toward goals:  [x]          Improving appropriately and progressing toward goals  []          Improving slowly and progressing toward goals  []          Not making progress toward goals and plan of care will be adjusted     PLAN:  Patient continues to benefit from skilled intervention to address the above impairments.  Continue treatment per established plan of care.    Further Equipment Recommendations for Discharge: Patient has all needed DME for home safety.    AMPA: AM-PAC Inpatient Daily Activity Raw Score: 16    Current research shows that an AM-PAC score of 17 or less is not associated with a discharge to the patient's home setting and the patient demonstrates the potential endurance and/or tolerance for 3 hours of therapy each day.    This AMPA score should be considered in conjunction with interdisciplinary team recommendations to determine the most appropriate discharge setting. Patient's social support, diagnosis, medical stability, and prior level of function should also be taken into consideration.     SUBJECTIVE:   Patient stated, \"I didn't go to sleep until 4am.\"    OBJECTIVE DATA SUMMARY:   Cognitive/Behavioral Status:  Orientation  Overall Orientation Status: Within Functional Limits  Orientation Level: Oriented X4  Cognition  Overall Cognitive Status: WFL    Functional Mobility and Transfers for ADLs:   Bed Mobility:  Bed Mobility Training  Bed Mobility Training: Yes  Supine to Sit: Modified independent  Sit to Supine: Modified independent  Scooting: Modified independent     Transfers:  Transfer Training  Transfer Training: Yes  Sit to Stand: Stand by assistance    Balance:  Balance  Sitting: Intact  Sitting - Static: Good (unsupported)  Sitting - Dynamic: Good (unsupported)  Standing: Impaired;With support  Standing - Static: Fair (fair plus)  Standing - Dynamic: Fair    ADL

## 2025-05-22 NOTE — PROGRESS NOTES
ARU/IPR REFERRAL CONTACT NOTE  [facility] for Physical [facility]        Thank you for the opportunity to review this patient's case for admission to [facility] Physical [facility].        Based on our pre-admission screening:                           [X] Our Team/Medical Director is following this case.                Comments:      Admission liaison spoke with the patient regarding ARU and patient is agreeable. Insurance authorization was started. Authorization request number [id number].     Will follow up for response.      CM notified via perfect serve.         Again, Thank you for this referral. Should you have any questions please do not hesitate to call.      Sincerely,  Yelena Oviedo RN     Clinical Liaison  [facility] Physical Rehabilitation  [phone]

## 2025-05-22 NOTE — PROGRESS NOTES
Physician Progress Note      PATIENT:               BRYN STAFFORD  CSN #:                  [id number]  :                       1970  ADMIT DATE:       2025 12:24 PM  DISCH DATE:  RESPONDING  PROVIDER #:        Leonor Tran DO          QUERY TEXT:    Please clarify the patient?s nutritional status:    The clinical indicators include:  -  Moderate malnutrition (25 1216)  Context:  Chronic Illness  Findings of the 6 clinical characteristics of malnutrition:  Energy Intake:  75% or less estimated energy requirements for 1 month or   longer  Weight Loss:  Mild weight loss (-14.6% x9 months)  Body Fat Loss:  No body fat loss  Muscle Mass Loss:  Mild muscle mass loss Temples (temporalis)  Fluid Accumulation:  No fluid accumulation   Strength:  Not Performed  Options provided:  -- Protein calorie malnutrition moderate  -- Other - I will add my own diagnosis  -- Disagree - Not applicable / Not valid  -- Disagree - Clinically unable to determine / Unknown  -- Refer to Clinical Documentation Reviewer    PROVIDER RESPONSE TEXT:    This patient has moderate protein calorie malnutrition.    Query created by: Natalia Kimble on 2025 7:46 AM      Electronically signed by:  Leonor Tran DO 2025 9:15 AM

## 2025-05-23 LAB
ANION GAP SERPL CALC-SCNC: 11 MMOL/L (ref 3–18)
BUN SERPL-MCNC: 29 MG/DL (ref 6–23)
BUN/CREAT SERPL: 36 (ref 12–20)
CALCIUM SERPL-MCNC: 9.1 MG/DL (ref 8.5–10.1)
CHLORIDE SERPL-SCNC: 100 MMOL/L (ref 98–107)
CO2 SERPL-SCNC: 25 MMOL/L (ref 21–32)
CREAT SERPL-MCNC: 0.8 MG/DL (ref 0.6–1.3)
ERYTHROCYTE [DISTWIDTH] IN BLOOD BY AUTOMATED COUNT: 17.2 % (ref 11.6–14.5)
GLUCOSE BLD STRIP.AUTO-MCNC: 138 MG/DL (ref 70–110)
GLUCOSE BLD STRIP.AUTO-MCNC: 146 MG/DL (ref 70–110)
GLUCOSE BLD STRIP.AUTO-MCNC: 147 MG/DL (ref 70–110)
GLUCOSE BLD STRIP.AUTO-MCNC: 149 MG/DL (ref 70–110)
GLUCOSE SERPL-MCNC: 120 MG/DL (ref 74–108)
HCT VFR BLD AUTO: 34.9 % (ref 35–45)
HGB BLD-MCNC: 10.6 G/DL (ref 12–16)
MCH RBC QN AUTO: 24.8 PG (ref 24–34)
MCHC RBC AUTO-ENTMCNC: 30.4 G/DL (ref 31–37)
MCV RBC AUTO: 81.5 FL (ref 78–100)
NRBC # BLD: 0 K/UL (ref 0–0.01)
NRBC BLD-RTO: 0 PER 100 WBC
PLATELET # BLD AUTO: 196 K/UL (ref 135–420)
PMV BLD AUTO: 12.1 FL (ref 9.2–11.8)
POTASSIUM SERPL-SCNC: 3.6 MMOL/L (ref 3.5–5.5)
RBC # BLD AUTO: 4.28 M/UL (ref 4.2–5.3)
SODIUM SERPL-SCNC: 137 MMOL/L (ref 136–145)
WBC # BLD AUTO: 5.2 K/UL (ref 4.6–13.2)

## 2025-05-23 PROCEDURE — 94761 N-INVAS EAR/PLS OXIMETRY MLT: CPT

## 2025-05-23 PROCEDURE — 36415 COLL VENOUS BLD VENIPUNCTURE: CPT

## 2025-05-23 PROCEDURE — 6370000000 HC RX 637 (ALT 250 FOR IP): Performed by: STUDENT IN AN ORGANIZED HEALTH CARE EDUCATION/TRAINING PROGRAM

## 2025-05-23 PROCEDURE — 82962 GLUCOSE BLOOD TEST: CPT

## 2025-05-23 PROCEDURE — 6360000002 HC RX W HCPCS: Performed by: PHYSICIAN ASSISTANT

## 2025-05-23 PROCEDURE — 6370000000 HC RX 637 (ALT 250 FOR IP): Performed by: PHYSICIAN ASSISTANT

## 2025-05-23 PROCEDURE — 80048 BASIC METABOLIC PNL TOTAL CA: CPT

## 2025-05-23 PROCEDURE — 99232 SBSQ HOSP IP/OBS MODERATE 35: CPT | Performed by: STUDENT IN AN ORGANIZED HEALTH CARE EDUCATION/TRAINING PROGRAM

## 2025-05-23 PROCEDURE — 6370000000 HC RX 637 (ALT 250 FOR IP): Performed by: PSYCHIATRY & NEUROLOGY

## 2025-05-23 PROCEDURE — 1100000003 HC PRIVATE W/ TELEMETRY

## 2025-05-23 PROCEDURE — 85027 COMPLETE CBC AUTOMATED: CPT

## 2025-05-23 RX ADMIN — PREGABALIN 200 MG: 75 CAPSULE ORAL at 15:36

## 2025-05-23 RX ADMIN — ENOXAPARIN SODIUM 30 MG: 100 INJECTION SUBCUTANEOUS at 09:13

## 2025-05-23 RX ADMIN — OXYCODONE HYDROCHLORIDE 10 MG: 5 TABLET ORAL at 20:05

## 2025-05-23 RX ADMIN — ASPIRIN 81 MG CHEWABLE TABLET 81 MG: 81 TABLET CHEWABLE at 09:12

## 2025-05-23 RX ADMIN — OXYCODONE HYDROCHLORIDE 10 MG: 5 TABLET ORAL at 09:23

## 2025-05-23 RX ADMIN — TOPIRAMATE 25 MG: 25 TABLET, FILM COATED ORAL at 20:05

## 2025-05-23 RX ADMIN — THERA TABS 1 TABLET: TAB at 09:13

## 2025-05-23 RX ADMIN — OXYCODONE HYDROCHLORIDE 10 MG: 5 TABLET ORAL at 15:33

## 2025-05-23 RX ADMIN — DULOXETINE HYDROCHLORIDE 30 MG: 30 CAPSULE, DELAYED RELEASE ORAL at 09:12

## 2025-05-23 RX ADMIN — OXYCODONE HYDROCHLORIDE 10 MG: 5 TABLET ORAL at 02:40

## 2025-05-23 RX ADMIN — PREGABALIN 200 MG: 75 CAPSULE ORAL at 20:05

## 2025-05-23 RX ADMIN — ENOXAPARIN SODIUM 30 MG: 100 INJECTION SUBCUTANEOUS at 20:04

## 2025-05-23 RX ADMIN — TOPIRAMATE 25 MG: 25 TABLET, FILM COATED ORAL at 09:13

## 2025-05-23 RX ADMIN — ATORVASTATIN CALCIUM 80 MG: 40 TABLET, FILM COATED ORAL at 20:05

## 2025-05-23 RX ADMIN — PREGABALIN 200 MG: 75 CAPSULE ORAL at 09:12

## 2025-05-23 ASSESSMENT — PAIN - FUNCTIONAL ASSESSMENT
PAIN_FUNCTIONAL_ASSESSMENT: ACTIVITIES ARE NOT PREVENTED
PAIN_FUNCTIONAL_ASSESSMENT: PREVENTS OR INTERFERES SOME ACTIVE ACTIVITIES AND ADLS
PAIN_FUNCTIONAL_ASSESSMENT: ACTIVITIES ARE NOT PREVENTED
PAIN_FUNCTIONAL_ASSESSMENT: ACTIVITIES ARE NOT PREVENTED

## 2025-05-23 ASSESSMENT — PAIN SCALES - GENERAL
PAINLEVEL_OUTOF10: 5
PAINLEVEL_OUTOF10: 4
PAINLEVEL_OUTOF10: 5
PAINLEVEL_OUTOF10: 7
PAINLEVEL_OUTOF10: 0
PAINLEVEL_OUTOF10: 7
PAINLEVEL_OUTOF10: 7
PAINLEVEL_OUTOF10: 4
PAINLEVEL_OUTOF10: 0

## 2025-05-23 ASSESSMENT — PAIN DESCRIPTION - PAIN TYPE
TYPE: CHRONIC PAIN
TYPE: CHRONIC PAIN

## 2025-05-23 ASSESSMENT — PAIN DESCRIPTION - DESCRIPTORS
DESCRIPTORS: ACHING;NUMBNESS
DESCRIPTORS: DISCOMFORT
DESCRIPTORS: ACHING
DESCRIPTORS: ACHING

## 2025-05-23 ASSESSMENT — PAIN DESCRIPTION - ONSET
ONSET: ON-GOING
ONSET: ON-GOING

## 2025-05-23 ASSESSMENT — PAIN DESCRIPTION - ORIENTATION: ORIENTATION: ANTERIOR

## 2025-05-23 ASSESSMENT — PAIN DESCRIPTION - LOCATION
LOCATION: GENERALIZED
LOCATION: GENERALIZED
LOCATION: HEAD
LOCATION: GENERALIZED

## 2025-05-23 ASSESSMENT — PAIN DESCRIPTION - FREQUENCY
FREQUENCY: INTERMITTENT
FREQUENCY: INTERMITTENT

## 2025-05-23 NOTE — PLAN OF CARE
Problem: Chronic Conditions and Co-morbidities  Goal: Patient's chronic conditions and co-morbidity symptoms are monitored and maintained or improved  5/23/2025 0813 by Raiza Recio, RN  Outcome: Progressing  Note: Monitor blood pressure     Problem: Discharge Planning  Goal: Discharge to home or other facility with appropriate resources  Outcome: Progressing  Note: Patient awaiting ARU referral and authorization       Problem: Safety - Adult  Goal: Free from fall injury  5/23/2025 0813 by Raiza Recio, RN  Outcome: Progressing  Note: Hourly rounds  Fall precautions  Bed alarm on  Frequently used objects and call bell within reach     Problem: Skin/Tissue Integrity  Goal: Skin integrity remains intact  5/23/2025 0813 by aRiza Recio, RN  Outcome: Progressing  Note: Check bony area daily  Check pressure areas daily     Problem: Neurosensory - Adult  Goal: Achieves stable or improved neurological status  Outcome: Progressing  Note: Continue with NIH every shift

## 2025-05-23 NOTE — PROGRESS NOTES
4 Eyes Skin Assessment     NAME:  Priscilla Dean  YOB: 1970  MEDICAL RECORD NUMBER:  [id number]    The patient is being assessed for  Shift Handoff    I agree that at least one RN has performed a thorough Head to Toe Skin Assessment on the patient. ALL assessment sites listed below have been assessed.      Areas assessed by both nurses:    Head, Face, Ears, Shoulders, Back, Chest, Arms, Elbows, Hands, Sacrum. Buttock, Coccyx, Ischium, Legs. Feet and Heels, and Under Medical Devices         Does the Patient have a Wound? No noted wound(s)       Jose Alfredo Prevention initiated by RN: No  Wound Care Orders initiated by RN: No    Pressure Injury (Stage 3,4, Unstageable, DTI, NWPT, and Complex wounds) if present, place Wound referral order by RN under : No    New Ostomies, if present place, Ostomy referral order under : No     Nurse 1 eSignature: Electronically signed by ERICA URBINA RN on 5/23/25 at 7:34 PM EDT    **SHARE this note so that the co-signing nurse can place an eSignature**    Nurse 2 eSignature: Electronically signed by Jaz Garcia RN on 5/23/25 at 7:34 PM EDT

## 2025-05-23 NOTE — PROGRESS NOTES
ARU/IPR REFERRAL CONTACT NOTE  [facility] for Physical [facility]        Thank you for the opportunity to review this patient's case for admission to [facility] Physical [facility].        Based on our pre-admission screening:                           [X} Our Team/Medical Director is following this case.              Comments:      Admission liaison received a message that the insurance is offering a peer to peer option. Perfect serve message was sent to the provider asking if the provider would be willing to complete the peer to peer.     The direct telephone number to complete the peer to peer is [phone] and the deadline to complete the peer to peer is today at 11 am.  Provider will need the patient's name,  and subscriber ID [id number].        Again, Thank you for this referral. Should you have any questions please do not hesitate to call.      Sincerely,  Yelena Oviedo RN     Clinical Liaison  [facility] Physical Rehabilitation  [phone]

## 2025-05-23 NOTE — PROGRESS NOTES
ARU/IPR REFERRAL CONTACT NOTE  [facility] Physical [facility]      Thank you for the opportunity to review this patient's case for admission to [facility] Physical [facility].      Based on our pre-admission screening:                          [X ] This patient does not meet criteria for admission to [facility] due to:    [X] Other: Insurance DENIAL received after peer to peer. Patient has the right to a fast appeal.     Appeal has to be initiated by the patient. Per insurance, the patient has to call the customer service number on the back of her insurance card to initiate the appeal.     CM notified via perfect serve as well.          Again, Thank you for this referral. Should you have any questions please do not hesitate to call.     Sincerely,    Yelena Oviedo RN  Clinical Liaison  [facility] Physical [facility]  [phone]

## 2025-05-23 NOTE — PROGRESS NOTES
Physician Progress Note      PATIENT:               BRYN STAFFORD  CSN #:                  [id number]  :                       1970  ADMIT DATE:       2025 12:24 PM  DISCH DATE:  RESPONDING  PROVIDER #:        Leonor Tran DO          QUERY TEXT:    Please clarify in documentation  the  possible  cause  for  the  aphasia.    The clinical indicators include:  -   H&P-   Patient reportedly presented today with Aphasia.    -   EEG is negative for epileptiform discharges    -   Concern for acute CVA vs Akira's paralysis: Aphasia, LLE weakness. MRI   brain neg for stroke  #Hx right cerebellar CVA  Options provided:  -- aphasia   possibly  related to   hypertensive  encephalopathy  -- aphasia  possibly related  to migraine  headache  -- aphasia  related  to other  cause, please  name possible cause  -- Other - I will add my own diagnosis  -- Disagree - Not applicable / Not valid  -- Disagree - Clinically unable to determine / Unknown  -- Refer to Clinical Documentation Reviewer    PROVIDER RESPONSE TEXT:    aphasia possibly related to hypertensive encephalopathy    Query created by: Natalia Kimble on 2025 6:58 AM      Electronically signed by:  Leonor Tran DO 2025 9:49 AM

## 2025-05-23 NOTE — PLAN OF CARE
Problem: Chronic Conditions and Co-morbidities  Goal: Patient's chronic conditions and co-morbidity symptoms are monitored and maintained or improved  Outcome: Progressing  Flowsheets (Taken 5/20/2025 1920 by Cris Molina, RN)  Care Plan - Patient's Chronic Conditions and Co-Morbidity Symptoms are Monitored and Maintained or Improved:   Monitor and assess patient's chronic conditions and comorbid symptoms for stability, deterioration, or improvement   Collaborate with multidisciplinary team to address chronic and comorbid conditions and prevent exacerbation or deterioration     Problem: Pain  Goal: Verbalizes/displays adequate comfort level or baseline comfort level  Outcome: Progressing  Flowsheets (Taken 5/21/2025 0910 by Claudy Bob, RN)  Verbalizes/displays adequate comfort level or baseline comfort level:   Encourage patient to monitor pain and request assistance   Assess pain using appropriate pain scale   Administer analgesics based on type and severity of pain and evaluate response   Consider cultural and social influences on pain and pain management   Notify Licensed Independent Practitioner if interventions unsuccessful or patient reports new pain   Implement non-pharmacological measures as appropriate and evaluate response     Problem: Safety - Adult  Goal: Free from fall injury  Outcome: Progressing  Flowsheets (Taken 5/21/2025 0910 by Claudy Bob, RN)  Free From Fall Injury:   Instruct family/caregiver on patient safety   Based on caregiver fall risk screen, instruct family/caregiver to ask for assistance with transferring infant if caregiver noted to have fall risk factors     Problem: Skin/Tissue Integrity  Goal: Skin integrity remains intact  Description: 1.  Monitor for areas of redness and/or skin breakdown2.  Assess vascular access sites hourly3.  Every 4-6 hours minimum:  Change oxygen saturation probe site4.  Every 4-6 hours:  If on nasal continuous positive airway pressure,  respiratory therapy assess nares and determine need for appliance change or resting period  Outcome: Progressing  Flowsheets (Taken 5/20/2025 1920 by Cris Molina RN)  Skin Integrity Remains Intact: Monitor for areas of redness and/or skin breakdown

## 2025-05-23 NOTE — PROGRESS NOTES
[facility] Hospitalist [facility]  Progress Note  Date:2025       Room:[facility]  Patient Name:Priscilla Dean     YOB: 1970     Age:54 y.o.        Subjective    Subjective:  Symptoms:  Stable.    Diet:  Adequate intake.    Activity level: Normal.    Pain:  She reports no pain.       Review of Systems    No acute events overnight.  Patient resting comfortably in bed. Headache has improved    Disposition: Medically stable for discharge. ARU auth was denied. Patient will discharge home with home health on .    Objective         Vitals Last 24 Hours:  TEMPERATURE:  Temp  Av.5 °F (36.4 °C)  Min: 97.2 °F (36.2 °C)  Max: 97.7 °F (36.5 °C)  RESPIRATIONS RANGE: Resp  Av.8  Min: 16  Max: 18  PULSE OXIMETRY RANGE: SpO2  Av.2 %  Min: 95 %  Max: 100 %  PULSE RANGE: Pulse  Av.6  Min: 66  Max: 97  BLOOD PRESSURE RANGE: Systolic (24hrs), Av , Min:103 , Max:154     ; Diastolic (24hrs), Av, Min:53, Max:76      I/O (24Hr):  No intake or output data in the 24 hours ending 25 1726    Objective:  General Appearance:  Comfortable.    Vital signs: (most recent): Blood pressure (!) 152/76, pulse 71, temperature 97.5 °F (36.4 °C), resp. rate 18, height 1.803 m (5' 10.98\"), weight 111.5 kg (245 lb 13 oz), SpO2 99%.    Output: Producing urine.    HEENT: Normal HEENT exam.    Lungs:  Normal effort and normal respiratory rate.    Heart: Normal rate.  Regular rhythm.    Abdomen: Abdomen is soft and flat.  Bowel sounds are normal.     Extremities: Normal range of motion.    Neurological: Patient is alert and oriented to person, place and time.    Skin:  Warm and dry.          Labs/Imaging/Diagnostics    Labs:  CBC:  Recent Labs     25  0141 25  0024 25  0412   WBC 5.9 6.0 5.2   RBC 4.21 4.31 4.28   HGB 10.8* 10.9* 10.6*   HCT 34.2* 34.8* 34.9*   MCV 81.2 80.7 81.5   RDW 17.2* 17.2* 17.2*    197 196     CHEMISTRIES:  Recent Labs     25  0141  300 mg, 300 mg, Rectal, Daily  atorvastatin (LIPITOR) tablet 80 mg, 80 mg, Oral, Nightly  labetalol (NORMODYNE;TRANDATE) injection 10 mg, 10 mg, IntraVENous, Q10 Min PRN  DULoxetine (CYMBALTA) extended release capsule 30 mg, 30 mg, Oral, Daily  pregabalin (LYRICA) capsule 200 mg, 200 mg, Oral, TID  enoxaparin Sodium (LOVENOX) injection 30 mg, 30 mg, SubCUTAneous, BID  glucose chewable tablet 16 g, 4 tablet, Oral, PRN  dextrose bolus 10% 125 mL, 125 mL, IntraVENous, PRN **OR** dextrose bolus 10% 250 mL, 250 mL, IntraVENous, PRN  glucagon injection 1 mg, 1 mg, SubCUTAneous, PRN  dextrose 10 % infusion, , IntraVENous, Continuous PRN  insulin lispro (HUMALOG,ADMELOG) injection vial 0-8 Units, 0-8 Units, SubCUTAneous, 4x Daily AC & HS  acetaminophen (TYLENOL) tablet 650 mg, 650 mg, Oral, Q4H PRN  oxyCODONE (ROXICODONE) immediate release tablet 5 mg, 5 mg, Oral, Q6H PRN      Assessment//Plan           Hospital Problems           Last Modified POA    * (Principal) Aphasia 5/20/2025 Yes    Class 1 obesity in adult (Chronic) 5/21/2025 Yes    Overview Signed 5/21/2025  2:53 AM by Phil Taylor, DO   BMI 32.08 (104.3 kg) as of 5/20/2025.         Controlled type 2 diabetes mellitus without complication, with long-term current use of insulin (HCC) (Chronic) 5/21/2025 Yes    Hypertensive encephalopathy 5/20/2025 Yes    Moderate protein-calorie malnutrition 5/21/2025 Yes    Suspected cerebrovascular accident (CVA) 5/21/2025 Yes    Hypertension (Chronic) 5/21/2025 Yes    Hyperlipidemia (Chronic) 5/21/2025 Yes    History of pulmonary embolism (Chronic) 5/21/2025 Yes     Assessment:    Condition: In stable condition.  Unchanged.   (    #Concern for acute CVA vs neftali's paralysis: Aphasia, LLE weakness. MRI brain neg for storke  #Hx right cerebellar CVA  #Left median nerve palsy/neuropathy: 2/2 LUE hematoma after thrombectomy in March  #Concern for seizure. EEG previously normal, unclear why keppra started in March during

## 2025-05-24 VITALS
BODY MASS INDEX: 34.41 KG/M2 | OXYGEN SATURATION: 99 % | SYSTOLIC BLOOD PRESSURE: 157 MMHG | TEMPERATURE: 97.9 F | WEIGHT: 245.81 LBS | HEIGHT: 71 IN | RESPIRATION RATE: 16 BRPM | HEART RATE: 103 BPM | DIASTOLIC BLOOD PRESSURE: 97 MMHG

## 2025-05-24 LAB
GLUCOSE BLD STRIP.AUTO-MCNC: 121 MG/DL (ref 70–110)
GLUCOSE BLD STRIP.AUTO-MCNC: 147 MG/DL (ref 70–110)

## 2025-05-24 PROCEDURE — 94761 N-INVAS EAR/PLS OXIMETRY MLT: CPT

## 2025-05-24 PROCEDURE — 6360000002 HC RX W HCPCS: Performed by: PHYSICIAN ASSISTANT

## 2025-05-24 PROCEDURE — 82962 GLUCOSE BLOOD TEST: CPT

## 2025-05-24 PROCEDURE — 6370000000 HC RX 637 (ALT 250 FOR IP): Performed by: STUDENT IN AN ORGANIZED HEALTH CARE EDUCATION/TRAINING PROGRAM

## 2025-05-24 PROCEDURE — 99239 HOSP IP/OBS DSCHRG MGMT >30: CPT | Performed by: STUDENT IN AN ORGANIZED HEALTH CARE EDUCATION/TRAINING PROGRAM

## 2025-05-24 PROCEDURE — 6370000000 HC RX 637 (ALT 250 FOR IP): Performed by: PHYSICIAN ASSISTANT

## 2025-05-24 PROCEDURE — 6370000000 HC RX 637 (ALT 250 FOR IP): Performed by: PSYCHIATRY & NEUROLOGY

## 2025-05-24 RX ORDER — TOPIRAMATE 25 MG/1
25 TABLET, FILM COATED ORAL 2 TIMES DAILY
Qty: 60 TABLET | Refills: 0 | Status: SHIPPED | OUTPATIENT
Start: 2025-05-24 | End: 2025-06-23

## 2025-05-24 RX ORDER — OXYCODONE HYDROCHLORIDE 10 MG/1
10 TABLET ORAL EVERY 6 HOURS PRN
Qty: 12 TABLET | Refills: 0 | Status: SHIPPED | OUTPATIENT
Start: 2025-05-24 | End: 2025-05-27

## 2025-05-24 RX ADMIN — ENOXAPARIN SODIUM 30 MG: 100 INJECTION SUBCUTANEOUS at 09:03

## 2025-05-24 RX ADMIN — DULOXETINE HYDROCHLORIDE 30 MG: 30 CAPSULE, DELAYED RELEASE ORAL at 09:03

## 2025-05-24 RX ADMIN — ASPIRIN 81 MG CHEWABLE TABLET 81 MG: 81 TABLET CHEWABLE at 09:03

## 2025-05-24 RX ADMIN — OXYCODONE HYDROCHLORIDE 10 MG: 5 TABLET ORAL at 09:03

## 2025-05-24 RX ADMIN — THERA TABS 1 TABLET: TAB at 09:03

## 2025-05-24 RX ADMIN — OXYCODONE HYDROCHLORIDE 10 MG: 5 TABLET ORAL at 01:58

## 2025-05-24 RX ADMIN — PREGABALIN 200 MG: 75 CAPSULE ORAL at 09:03

## 2025-05-24 RX ADMIN — TOPIRAMATE 25 MG: 25 TABLET, FILM COATED ORAL at 09:03

## 2025-05-24 ASSESSMENT — PAIN DESCRIPTION - DESCRIPTORS
DESCRIPTORS: ACHING;NUMBNESS
DESCRIPTORS: DISCOMFORT

## 2025-05-24 ASSESSMENT — PAIN - FUNCTIONAL ASSESSMENT
PAIN_FUNCTIONAL_ASSESSMENT: ACTIVITIES ARE NOT PREVENTED
PAIN_FUNCTIONAL_ASSESSMENT: PREVENTS OR INTERFERES SOME ACTIVE ACTIVITIES AND ADLS

## 2025-05-24 ASSESSMENT — PAIN DESCRIPTION - PAIN TYPE: TYPE: CHRONIC PAIN

## 2025-05-24 ASSESSMENT — PAIN SCALES - GENERAL
PAINLEVEL_OUTOF10: 7
PAINLEVEL_OUTOF10: 7
PAINLEVEL_OUTOF10: 0
PAINLEVEL_OUTOF10: 0

## 2025-05-24 ASSESSMENT — PAIN DESCRIPTION - FREQUENCY: FREQUENCY: INTERMITTENT

## 2025-05-24 ASSESSMENT — PAIN DESCRIPTION - LOCATION
LOCATION: GENERALIZED
LOCATION: GENERALIZED

## 2025-05-24 ASSESSMENT — PAIN DESCRIPTION - ORIENTATION: ORIENTATION: ANTERIOR

## 2025-05-24 ASSESSMENT — PAIN DESCRIPTION - ONSET: ONSET: ON-GOING

## 2025-05-24 NOTE — PLAN OF CARE
Problem: Chronic Conditions and Co-morbidities  Goal: Patient's chronic conditions and co-morbidity symptoms are monitored and maintained or improved  5/23/2025 2211 by Jaz Galindo RN  Outcome: Progressing  Flowsheets (Taken 5/23/2025 1950)  Care Plan - Patient's Chronic Conditions and Co-Morbidity Symptoms are Monitored and Maintained or Improved:   Monitor and assess patient's chronic conditions and comorbid symptoms for stability, deterioration, or improvement   Collaborate with multidisciplinary team to address chronic and comorbid conditions and prevent exacerbation or deterioration   Update acute care plan with appropriate goals if chronic or comorbid symptoms are exacerbated and prevent overall improvement and discharge  5/23/2025 0813 by Raiza Recio RN  Outcome: Progressing  Flowsheets (Taken 5/23/2025 0813)  Care Plan - Patient's Chronic Conditions and Co-Morbidity Symptoms are Monitored and Maintained or Improved:   Monitor and assess patient's chronic conditions and comorbid symptoms for stability, deterioration, or improvement   Update acute care plan with appropriate goals if chronic or comorbid symptoms are exacerbated and prevent overall improvement and discharge  Note: Monitor blood pressure     Problem: Discharge Planning  Goal: Discharge to home or other facility with appropriate resources  5/23/2025 2211 by Jaz Galindo RN  Outcome: Progressing  Flowsheets (Taken 5/23/2025 1950)  Discharge to home or other facility with appropriate resources:   Identify barriers to discharge with patient and caregiver   Arrange for needed discharge resources and transportation as appropriate   Identify discharge learning needs (meds, wound care, etc)   Refer to discharge planning if patient needs post-hospital services based on physician order or complex needs related to functional status, cognitive ability or social support system  5/23/2025 0813 by Raiza Recio, MICHELLE  Outcome:  Pressure redistribution bed/mattress (bed type)  Note: Check bony area daily  Check pressure areas daily     Problem: Nutrition Deficit:  Goal: Optimize nutritional status  Outcome: Progressing     Problem: Neurosensory - Adult  Goal: Achieves stable or improved neurological status  5/23/2025 2211 by Jaz Galindo, RN  Outcome: Progressing  Flowsheets (Taken 5/23/2025 1950)  Achieves stable or improved neurological status:   Assess for and report changes in neurological status   Initiate measures to prevent increased intracranial pressure   Maintain blood pressure and fluid volume within ordered parameters to optimize cerebral perfusion and minimize risk of hemorrhage   Monitor temperature, glucose, and sodium. Initiate appropriate interventions as ordered  5/23/2025 0822 by Raiza Recio RN  Outcome: Progressing  Flowsheets (Taken 5/23/2025 0822)  Achieves stable or improved neurological status:   Assess for and report changes in neurological status   Monitor temperature, glucose, and sodium. Initiate appropriate interventions as ordered  Note: Continue with NIH every shift

## 2025-05-24 NOTE — DISCHARGE SUMMARY
Discharge Summary    Patient: Priscilla Dean MRN: [id number]  CSN: [id number]    YOB: 1970  Age: 54 y.o.  Sex: female    DOA: 5/20/2025 LOS:  LOS: 4 days        Disposition: Home with [facility]    Discharge Date: 05/24/2025    Admission Diagnosis: Aphasia [R47.01]  Suspected cerebrovascular accident (CVA) [R09.89]    Discharge Diagnosis:    #Hypertensive encephalopathy  #Hx right cerebellar CVA  #Left median nerve palsy/neuropathy: 2/2 LUE hematoma after thrombectomy in March    Discharge Condition: Stable      PHYSICAL EXAM  Visit Vitals  BP (!) 147/75   Pulse 93   Temp 97.9 °F (36.6 °C) (Oral)   Resp 16   Ht 1.803 m (5' 10.98\")   Wt 111.5 kg (245 lb 13 oz)   SpO2 99%   BMI 34.30 kg/m²       General: Alert, cooperative, no acute distress    HEENT: PERRLA, EOMI. Anicteric sclerae.  Lungs:  CTA Bilaterally. No Wheezing/Rales.  Heart:             Regular rate and Rhythm.  Abdomen: Soft, Non distended, Non tender. + Bowel sounds.  Extremities: No edema.  Psych:   Good insight. Not anxious or agitated.  Neurologic:  AA, oriented X 3. Moves all ext                                 Hospital Course:   Priscilla Dean is a 54 y.o. female with a PMHx of crohns, DM, HTN, HLD, PE, CVA, type B Aortic Dissection repair s/p TEVAR on 2/12/2025, Cdiff, severe renal artery stenosis who presented to the ED with c/o aphasia. Patient is only able to answer yes/no questions. Per ER provider, \"Last known normal at 2 AM. When she woke up at a at 8 AM she was confused not able to speak answer questions with puffing of right side of face tremulous there as well.\" Patient denies any cp, sob, abd pain, nvd, leg pain, headache.     Patient was admitted for stroke workup. Neurology was consulted. There was concern for possible seizure. Patient had EEG that showed no signs of seizure. Keppra was discontinued. Aphasia improved. Patient was found to have hypertensive encephalopathy. Patient was also started on Topimax for headaches. Patient

## 2025-05-24 NOTE — CARE COORDINATION
CM met patient at the bedside, initial assessment completed. Patient demographics and HIPAA verified. [facility] currently providing HH services. PT/OT recommending ARU. Will continue to follow.        05/21/25 5999   Service Assessment   Patient Orientation Alert and Oriented   Cognition Alert   History Provided By Patient   Primary Caregiver Self   Support Systems Family Members;Children   Patient's Healthcare Decision Maker is: Legal Next of Kin   PCP Verified by CM Yes   Last Visit to PCP Within last 3 months   Prior Functional Level Independent in ADLs/IADLs   Current Functional Level Independent in ADLs/IADLs   Can patient return to prior living arrangement Yes   Ability to make needs known: Good   Family able to assist with home care needs: Yes   Would you like for me to discuss the discharge plan with any other family members/significant others, and if so, who? No   Financial Resources Other (Comment)  ([facility] [employer])   CM/SONIA Referral Other (see comment)  (discharge planning)   Social/Functional History   Lives With Parent   Type of Home House   Home Layout One level   Home Access Level entry   Bathroom Shower/Tub Tub/Shower unit;Shower chair with back   Bathroom Equipment Toilet raiser;Shower chair   Home Equipment Walker - Rolling;Cane   Receives Help From Family   Prior Level of Assist for ADLs Independent   Prior Level of Assist for Transfers Independent   Active  No   Patient's  Info Significant other   Mode of Transportation Car   Occupation Unemployed   Discharge Planning   Type of Residence House   Living Arrangements Children;Family Members   Current Services Prior To Admission Other (Comment)  ([facility] )   Potential Assistance Needed Outpatient PT/OT;Home Care;Skilled Nursing Facility   DME Ordered? Walker   Type of Home Care Services Nursing Services   Patient expects to be discharged to: House   History of falls? 1   Services At/After Discharge   Transition of Care Consult 
Dr Tran  let CM know that patient is agreeable to Home with Home Health Services, and is Active with [facility], and that patient is a possible discharge for tomorrow.        CM sent out Home Health referral to [facility] in [facility].           Per CM notes, patient's boyfriend to transport patient home at time of discharge.               Otilia Samano RN Case Manager  [phone]   
Dr Tran let CM know that Peer 2 Peer was denied by insurance for ARU.         CM called and spoke with eYlena with ARU # [id number], and updated with above information.   Yelena said she has not received the letter from [facility] with appeal information for the denial yet, if patient wants to appeal.   Yelena said she will keep CM updated.             Otilia Samano  RN Case Manager  [phone]   
PT/OT rec ARU. Perfect Serve sent to ARU Liaison requesting case review.   
Patient is discharged with father to transport. Patient want ed file an appeal for ARU. Perfect served message to Yelena O[place]iedo in ARU.   also informed patient that she will need to call her insurance copy to request for an appeal.    Patient is agreeable to go hoe with home health instead.    Discharge order noted for today. Orders received. No needs identified at this time. Case management remains available as needed.      Leti LONDON   Case Management  [address] [address]  O:[phone]        
Applied

## 2025-05-24 NOTE — DISCHARGE INSTRUCTIONS
Patient armband removed and shredded    Thank you for enrolling in [employer]. Please follow the instructions below to securely access your online medical record. [employer] allows you to send messages to your doctor, view your test results, renew your prescriptions, schedule appointments, and more.     How Do I Sign Up?  In your Internet browser, go to [email or web address]  Click on the Sign Up Now link in the Sign In box. You will see the New Member Sign Up page.  Enter your [employer] Access Code exactly as it appears below. You will not need to use this code after you’ve completed the sign-up process. If you do not sign up before the expiration date, you must request a new code.  [employer] Access Code: Activation code not generated  Current [employer] Status: Active    Enter your Social Security Number ([id number]) and Date of Birth (mm/dd/yyyy) as indicated and click Submit. You will be taken to the next sign-up page.  Create a [employer] ID. This will be your [employer] login ID and cannot be changed, so think of one that is secure and easy to remember.  Create a [employer] password. You can change your password at any time.  Enter your Password Reset Question and Answer. This can be used at a later time if you forget your password.   Enter your e-mail address. You will receive e-mail notification when new information is available in [employer].  Click Sign Up. You can now view your medical record.     Additional Information  If you have questions, please contact your physician practice where you receive care. Remember, [employer] is NOT to be used for urgent needs. For medical emergencies, dial 911.  Discharge medications reviewed with the patient and appropriate educational materials and side effects teaching were provided.    DISCHARGE SUMMARY from Nurse    PATIENT INSTRUCTIONS:    After general anesthesia or intravenous sedation, for 24 hours or while taking prescription Narcotics:  Limit your

## 2025-05-24 NOTE — PLAN OF CARE
Problem: Chronic Conditions and Co-morbidities  Goal: Patient's chronic conditions and co-morbidity symptoms are monitored and maintained or improved  5/24/2025 1208 by Raiza Recio RN  Outcome: Progressing  Note: Continue to monitor blood pressure and blood sugar     Problem: Discharge Planning  Goal: Discharge to home or other facility with appropriate resources  5/24/2025 1208 by Raiza Recio RN  Outcome: Progressing  Note: Possible discharge today     Problem: Pain  Goal: Verbalizes/displays adequate comfort level or baseline comfort level  5/23/2025 2211 by Jaz Galindo RN  Outcome: Progressing     Problem: Safety - Adult  Goal: Free from fall injury  5/24/2025 1208 by Raiza Recio RN  Outcome: Progressing  Note: Hourly rounds  Fall precautions in place     Problem: Skin/Tissue Integrity  Goal: Skin integrity remains intact  5/24/2025 1208 by Raiza Recio RN  Outcome: Progressing  Note: Check bony areas daily     Problem: Neurosensory - Adult  Goal: Achieves stable or improved neurological status  5/24/2025 1208 by Raiza Recio RN  Outcome: Progressing  Note: Continue with NIH and report abnormal finding

## 2025-05-24 NOTE — PROGRESS NOTES
Stroke Education provided to patient and the following topics were discussed    1. Patients personal risk factors for stroke are diabetes mellitus, hyperlipidemia, hypertension, and previous stroke, Pul Emb    2. Warning signs of Stroke:        * Sudden numbness or weakness of the face, arm or leg, especially on one side of          The body            * Sudden confusion, trouble speaking or understanding        * Sudden trouble seeing in one or both eyes        * Sudden trouble walking, dizziness, loss of balance or coordination        * Sudden severe headache with no known cause      3. Importance of activation Emergency Medical Services ( 9-1-1 ) immediately if experience any warning signs of stroke.    4. Be sure and schedule a follow-up appointment with your primary care doctor or any specialists as instructed.     5. You must take medicine every day to treat your risk factors for stroke.  Be sure to take your medicines exactly as your doctor tells you: no more, no less.  Know what your medicines are for , what they do.  Anti-thrombotics /anticoagulants can help prevent strokes.  You are taking the following medicine(s)  see mar     6.  Smoking and second-hand smoke greatly increase your risk of stroke, cardiovascular disease and death. Smoking history never    7. Information provided was BS Stroke Education Binder    8. Documentation of teaching completed in Patient Education Activity and on Care Plan with teaching response noted?  yes

## 2025-06-10 ENCOUNTER — OFFICE VISIT (OUTPATIENT)
Age: 55
End: 2025-06-10
Payer: COMMERCIAL

## 2025-06-10 VITALS
BODY MASS INDEX: 33.5 KG/M2 | SYSTOLIC BLOOD PRESSURE: 119 MMHG | HEIGHT: 70 IN | HEART RATE: 68 BPM | DIASTOLIC BLOOD PRESSURE: 65 MMHG | OXYGEN SATURATION: 100 % | WEIGHT: 234 LBS

## 2025-06-10 DIAGNOSIS — I10 HYPERTENSION, UNSPECIFIED TYPE: ICD-10-CM

## 2025-06-10 DIAGNOSIS — I77.810 MILD ASCENDING AORTA DILATATION: Primary | ICD-10-CM

## 2025-06-10 DIAGNOSIS — I71.02 DISSECTION OF ABDOMINAL AORTA (HCC): ICD-10-CM

## 2025-06-10 PROCEDURE — 99214 OFFICE O/P EST MOD 30 MIN: CPT | Performed by: INTERNAL MEDICINE

## 2025-06-10 PROCEDURE — 3078F DIAST BP <80 MM HG: CPT | Performed by: INTERNAL MEDICINE

## 2025-06-10 PROCEDURE — 3074F SYST BP LT 130 MM HG: CPT | Performed by: INTERNAL MEDICINE

## 2025-06-10 NOTE — PROGRESS NOTES
Have you had Fatigue?  No     2.   Have you had have you had Chest Pain? No     3.   Have you had Dyspnea (SOB) ? Yes   can you walk 2 blocks or a flight of stairs without SOB no    4.   Have you had Orthopnea? No     5.   Have you had PND? No   6.   Have you had leg swelling? Yes   7.    Have you had any weight gain? No     8. Have you had any palpitations? No      9. Have you had any syncope? No  10. Do you have any wounds on legs?no  
status is at baseline.      Motor: No weakness.      Gait: Gait normal.   Psychiatric:         Mood and Affect: Mood normal.         Behavior: Behavior normal.         Thought Content: Thought content normal.         ASSESSMENT and PLAN  Ms. Dean has a reminder for a \"due or due soon\" health maintenance. I have asked that she contact her primary care provider for follow-up on this health maintenance.      Mild aortic dilatation - 2d echo 2/2025 with preserved ejection fraction.      Coronary risk with diabetes mellitus - continue Lipitor 80 mg p.o. at bedtime, recommended aspirin 81 mg p.o. daily, lipid panel 5/2025 with LDL 159mg/dL and HDL 54mg/dL.  Monitor for improvement, had been increasing, making dietary modifications.      Hypertension - Normotensive,  hydralazine 25 mg p.o. 3 times daily, Continue Norvasc 10 mg p.o. daily, Continue metoprolol 25 mg p.o. 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      History of type B aortic dissection sp left common carotid to left subclavian bypass with PTFE.  Peripheral vascular referral.

## 2025-06-16 ENCOUNTER — APPOINTMENT (OUTPATIENT)
Facility: HOSPITAL | Age: 55
End: 2025-06-16
Payer: COMMERCIAL

## 2025-06-16 ENCOUNTER — HOSPITAL ENCOUNTER (EMERGENCY)
Facility: HOSPITAL | Age: 55
Discharge: HOME | End: 2025-06-16
Attending: STUDENT IN AN ORGANIZED HEALTH CARE EDUCATION/TRAINING PROGRAM
Payer: COMMERCIAL

## 2025-06-16 VITALS
WEIGHT: 233 LBS | RESPIRATION RATE: 16 BRPM | BODY MASS INDEX: 31.56 KG/M2 | OXYGEN SATURATION: 99 % | DIASTOLIC BLOOD PRESSURE: 83 MMHG | SYSTOLIC BLOOD PRESSURE: 150 MMHG | TEMPERATURE: 98.1 F | HEIGHT: 72 IN | HEART RATE: 64 BPM

## 2025-06-16 DIAGNOSIS — R19.7 DIARRHEA, UNSPECIFIED TYPE: ICD-10-CM

## 2025-06-16 DIAGNOSIS — Z87.19 HISTORY OF CROHN'S DISEASE: ICD-10-CM

## 2025-06-16 DIAGNOSIS — K57.32 DIVERTICULITIS OF COLON: Primary | ICD-10-CM

## 2025-06-16 DIAGNOSIS — J18.9 PNEUMONIA OF LEFT LOWER LOBE DUE TO INFECTIOUS ORGANISM: ICD-10-CM

## 2025-06-16 DIAGNOSIS — R10.84 GENERALIZED ABDOMINAL PAIN: ICD-10-CM

## 2025-06-16 LAB
ALBUMIN SERPL-MCNC: 3.7 G/DL (ref 3.4–5)
ALBUMIN/GLOB SERPL: 0.8 (ref 0.8–1.7)
ALP SERPL-CCNC: 96 U/L (ref 45–117)
ALT SERPL-CCNC: 7 U/L (ref 10–35)
ANION GAP SERPL CALC-SCNC: 12 MMOL/L (ref 3–18)
AST SERPL-CCNC: 16 U/L (ref 10–38)
BASOPHILS # BLD: 0.02 K/UL (ref 0–0.1)
BASOPHILS NFR BLD: 0.4 % (ref 0–2)
BILIRUB SERPL-MCNC: 0.3 MG/DL (ref 0.2–1)
BUN SERPL-MCNC: 21 MG/DL (ref 6–23)
BUN/CREAT SERPL: 17 (ref 12–20)
CALCIUM SERPL-MCNC: 9.9 MG/DL (ref 8.5–10.1)
CHLORIDE SERPL-SCNC: 106 MMOL/L (ref 98–107)
CO2 SERPL-SCNC: 25 MMOL/L (ref 21–32)
CREAT SERPL-MCNC: 1.24 MG/DL (ref 0.6–1.3)
CRP SERPL-MCNC: 0.6 MG/DL (ref 0–5)
DIFFERENTIAL METHOD BLD: ABNORMAL
EOSINOPHIL # BLD: 0.12 K/UL (ref 0–0.4)
EOSINOPHIL NFR BLD: 2.5 % (ref 0–5)
ERYTHROCYTE [DISTWIDTH] IN BLOOD BY AUTOMATED COUNT: 19 % (ref 11.6–14.5)
ERYTHROCYTE [SEDIMENTATION RATE] IN BLOOD: >130 MM/HR (ref 0–30)
GLOBULIN SER CALC-MCNC: 4.7 G/DL (ref 2–4)
GLUCOSE SERPL-MCNC: 98 MG/DL (ref 74–108)
HCT VFR BLD AUTO: 40.6 % (ref 35–45)
HGB BLD-MCNC: 12.7 G/DL (ref 12–16)
IMM GRANULOCYTES # BLD AUTO: 0.01 K/UL (ref 0–0.04)
IMM GRANULOCYTES NFR BLD AUTO: 0.2 % (ref 0–0.5)
LYMPHOCYTES # BLD: 1.12 K/UL (ref 0.9–3.6)
LYMPHOCYTES NFR BLD: 23.2 % (ref 21–52)
MAGNESIUM SERPL-MCNC: 2.3 MG/DL (ref 1.6–2.6)
MCH RBC QN AUTO: 25.9 PG (ref 24–34)
MCHC RBC AUTO-ENTMCNC: 31.3 G/DL (ref 31–37)
MCV RBC AUTO: 82.7 FL (ref 78–100)
MONOCYTES # BLD: 0.32 K/UL (ref 0.05–1.2)
MONOCYTES NFR BLD: 6.6 % (ref 3–10)
NEUTS SEG # BLD: 3.23 K/UL (ref 1.8–8)
NEUTS SEG NFR BLD: 67.1 % (ref 40–73)
NRBC # BLD: 0 K/UL (ref 0–0.01)
NRBC BLD-RTO: 0 PER 100 WBC
PLATELET # BLD AUTO: 219 K/UL (ref 135–420)
PMV BLD AUTO: 12.7 FL (ref 9.2–11.8)
POTASSIUM SERPL-SCNC: 4.1 MMOL/L (ref 3.5–5.5)
PROT SERPL-MCNC: 8.4 G/DL (ref 6.4–8.2)
RBC # BLD AUTO: 4.91 M/UL (ref 4.2–5.3)
SODIUM SERPL-SCNC: 143 MMOL/L (ref 136–145)
WBC # BLD AUTO: 4.8 K/UL (ref 4.6–13.2)

## 2025-06-16 PROCEDURE — 99285 EMERGENCY DEPT VISIT HI MDM: CPT

## 2025-06-16 PROCEDURE — 96375 TX/PRO/DX INJ NEW DRUG ADDON: CPT

## 2025-06-16 PROCEDURE — 87449 NOS EACH ORGANISM AG IA: CPT

## 2025-06-16 PROCEDURE — 6370000000 HC RX 637 (ALT 250 FOR IP): Performed by: EMERGENCY MEDICINE

## 2025-06-16 PROCEDURE — 83735 ASSAY OF MAGNESIUM: CPT

## 2025-06-16 PROCEDURE — 83993 ASSAY FOR CALPROTECTIN FECAL: CPT

## 2025-06-16 PROCEDURE — 96374 THER/PROPH/DIAG INJ IV PUSH: CPT

## 2025-06-16 PROCEDURE — 2580000003 HC RX 258: Performed by: STUDENT IN AN ORGANIZED HEALTH CARE EDUCATION/TRAINING PROGRAM

## 2025-06-16 PROCEDURE — 6360000004 HC RX CONTRAST MEDICATION: Performed by: STUDENT IN AN ORGANIZED HEALTH CARE EDUCATION/TRAINING PROGRAM

## 2025-06-16 PROCEDURE — 6370000000 HC RX 637 (ALT 250 FOR IP): Performed by: STUDENT IN AN ORGANIZED HEALTH CARE EDUCATION/TRAINING PROGRAM

## 2025-06-16 PROCEDURE — 85652 RBC SED RATE AUTOMATED: CPT

## 2025-06-16 PROCEDURE — 87506 IADNA-DNA/RNA PROBE TQ 6-11: CPT

## 2025-06-16 PROCEDURE — 6360000002 HC RX W HCPCS: Performed by: STUDENT IN AN ORGANIZED HEALTH CARE EDUCATION/TRAINING PROGRAM

## 2025-06-16 PROCEDURE — 86140 C-REACTIVE PROTEIN: CPT

## 2025-06-16 PROCEDURE — 87324 CLOSTRIDIUM AG IA: CPT

## 2025-06-16 PROCEDURE — 74177 CT ABD & PELVIS W/CONTRAST: CPT

## 2025-06-16 PROCEDURE — 85025 COMPLETE CBC W/AUTO DIFF WBC: CPT

## 2025-06-16 PROCEDURE — 80053 COMPREHEN METABOLIC PANEL: CPT

## 2025-06-16 RX ORDER — IOPAMIDOL 612 MG/ML
80 INJECTION, SOLUTION INTRAVASCULAR
Status: COMPLETED | OUTPATIENT
Start: 2025-06-16 | End: 2025-06-16

## 2025-06-16 RX ORDER — OXYCODONE AND ACETAMINOPHEN 5; 325 MG/1; MG/1
1 TABLET ORAL EVERY 6 HOURS PRN
Qty: 12 TABLET | Refills: 0 | Status: SHIPPED | OUTPATIENT
Start: 2025-06-16 | End: 2025-06-23

## 2025-06-16 RX ORDER — FENTANYL CITRATE 50 UG/ML
50 INJECTION, SOLUTION INTRAMUSCULAR; INTRAVENOUS ONCE
Refills: 0 | Status: COMPLETED | OUTPATIENT
Start: 2025-06-16 | End: 2025-06-16

## 2025-06-16 RX ORDER — AZITHROMYCIN 250 MG/1
250 TABLET, FILM COATED ORAL DAILY
Qty: 1 PACKET | Refills: 0 | Status: SHIPPED | OUTPATIENT
Start: 2025-06-16 | End: 2025-06-20

## 2025-06-16 RX ORDER — SODIUM CHLORIDE, SODIUM LACTATE, POTASSIUM CHLORIDE, AND CALCIUM CHLORIDE .6; .31; .03; .02 G/100ML; G/100ML; G/100ML; G/100ML
1000 INJECTION, SOLUTION INTRAVENOUS ONCE
Status: COMPLETED | OUTPATIENT
Start: 2025-06-16 | End: 2025-06-16

## 2025-06-16 RX ORDER — DICYCLOMINE HYDROCHLORIDE 10 MG/1
20 CAPSULE ORAL
Status: COMPLETED | OUTPATIENT
Start: 2025-06-16 | End: 2025-06-16

## 2025-06-16 RX ORDER — AZITHROMYCIN 250 MG/1
500 TABLET, FILM COATED ORAL
Status: COMPLETED | OUTPATIENT
Start: 2025-06-16 | End: 2025-06-16

## 2025-06-16 RX ORDER — MORPHINE SULFATE 4 MG/ML
4 INJECTION, SOLUTION INTRAMUSCULAR; INTRAVENOUS
Status: COMPLETED | OUTPATIENT
Start: 2025-06-16 | End: 2025-06-16

## 2025-06-16 RX ADMIN — IOPAMIDOL 80 ML: 612 INJECTION, SOLUTION INTRAVENOUS at 16:34

## 2025-06-16 RX ADMIN — AMOXICILLIN AND CLAVULANATE POTASSIUM 1 TABLET: 875; 125 TABLET, FILM COATED ORAL at 18:56

## 2025-06-16 RX ADMIN — FENTANYL CITRATE 50 MCG: 50 INJECTION INTRAMUSCULAR; INTRAVENOUS at 15:23

## 2025-06-16 RX ADMIN — AZITHROMYCIN DIHYDRATE 500 MG: 250 TABLET, FILM COATED ORAL at 18:56

## 2025-06-16 RX ADMIN — DICYCLOMINE HYDROCHLORIDE 20 MG: 10 CAPSULE ORAL at 14:16

## 2025-06-16 RX ADMIN — MORPHINE SULFATE 4 MG: 4 INJECTION, SOLUTION INTRAMUSCULAR; INTRAVENOUS at 14:18

## 2025-06-16 RX ADMIN — ALUMINUM HYDROXIDE AND MAGNESIUM HYDROXIDE 30 ML: 200; 200 SUSPENSION ORAL at 14:17

## 2025-06-16 RX ADMIN — SODIUM CHLORIDE, SODIUM LACTATE, POTASSIUM CHLORIDE, AND CALCIUM CHLORIDE 1000 ML: .6; .31; .03; .02 INJECTION, SOLUTION INTRAVENOUS at 13:33

## 2025-06-16 ASSESSMENT — PAIN SCALES - GENERAL
PAINLEVEL_OUTOF10: 8
PAINLEVEL_OUTOF10: 7
PAINLEVEL_OUTOF10: 7

## 2025-06-16 ASSESSMENT — PAIN DESCRIPTION - DESCRIPTORS: DESCRIPTORS: CRAMPING

## 2025-06-16 ASSESSMENT — PAIN DESCRIPTION - LOCATION
LOCATION: ABDOMEN

## 2025-06-16 ASSESSMENT — PAIN DESCRIPTION - ORIENTATION: ORIENTATION: RIGHT

## 2025-06-16 ASSESSMENT — PAIN DESCRIPTION - PAIN TYPE: TYPE: ACUTE PAIN

## 2025-06-16 ASSESSMENT — PAIN - FUNCTIONAL ASSESSMENT
PAIN_FUNCTIONAL_ASSESSMENT: 0-10
PAIN_FUNCTIONAL_ASSESSMENT: ACTIVITIES ARE NOT PREVENTED

## 2025-06-16 NOTE — ED TRIAGE NOTES
Pt in ED with c/o abdominal pain with diarrhea onset 4 days. Pt has a hx of Crohn's    Active Ambulatory Problems     Diagnosis Date Noted    BMI 50.0-59.9, adult ([facility]) 05/16/2019    Accelerated hypertension 01/12/2019    Dehiscence of incision, initial encounter 01/14/2019    Type 2 diabetes mellitus with diabetic neuropathy ([facility]) 06/27/2019    Status post open reduction with internal fixation (ORIF) of fracture of ankle 01/12/2019    Class 1 obesity in adult 03/11/2019    Controlled type 2 diabetes mellitus without complication, with long-term current use of insulin ([facility]) 05/16/2019    Pulmonary emboli ([facility]) 01/12/2019    Crohn's colitis ([facility]) 01/12/2019    Wound of left ankle 01/12/2019    Closed left ankle fracture, with delayed healing, subsequent encounter 05/16/2022    Arthritis of knee, left 10/23/2023    Instability of knee joint, left 10/23/2023    Varus deformity, not elsewhere classified, left knee 10/23/2023    Class 3 severe obesity due to excess calories with serious comorbidity and body mass index (BMI) of 45.0 to 49.9 in adult ([facility]) 10/23/2023    Closed Colles' fracture of right radius with routine healing 12/11/2023    Closed displaced fracture of shaft of third metacarpal bone of right hand with routine healing 12/11/2023    Colitis 05/17/2024    Hypokalemia 05/17/2024    Pyelonephritis 05/17/2024    ESBL E. 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

## 2025-06-16 NOTE — ED PROVIDER NOTES
EMERGENCY DEPARTMENT HISTORY AND PHYSICAL EXAM    11:59 PM      Date: 6/16/2025  Patient Name: Priscilla Dean    History of Presenting Illness     Chief Complaint   Patient presents with    Abdominal Pain    Diarrhea       History From: Patient    Priscilla Dean is a 54 y.o. female   HPI  54-year-old female with history of chron's disease, obesity, type 2 diabetes, hypertension, hyperlipidemia, PE, stroke who presents with abdominal pain and diarrhea.  He said the symptoms been going on for the past 4 days.  Having about 9 stools daily.  Having diffuse abdominal cramping.  Especially in the lower abdomen.  No aggravating or alleviating factors.  When assessing ROS she denies any fevers, chest pain, dysuria, vaginal bleeding, or any other changes.      Nursing Notes were all reviewed and agreed with or any disagreements were addressed in the HPI.    PCP: Zachery Lucio MD    No current facility-administered medications for this encounter.     Current Outpatient Medications   Medication Sig Dispense Refill    amoxicillin-clavulanate (AUGMENTIN) 875-125 MG per tablet Take 1 tablet by mouth in the morning, at noon, and at bedtime for 10 days 30 tablet 0    azithromycin (ZITHROMAX) 250 MG tablet Take 1 tablet by mouth daily for 4 days You have already had 1 dose while in the ER, please start this medication the day after you were discharged from the ER 1 packet 0    oxyCODONE-acetaminophen (PERCOCET) 5-325 MG per tablet Take 1 tablet by mouth every 6 hours as needed for Pain for up to 7 days. Max Daily Amount: 4 tablets 12 tablet 0    ondansetron (ZOFRAN) 4 MG tablet TAKE 1 TABLET BY MOUTH EVERY 8 HOURS AS NEEDED FOR NAUSEA AND VOMITING 30 tablet 2    traZODone (DESYREL) 50 MG tablet Take 1 tablet by mouth nightly      hydrALAZINE (APRESOLINE) 25 MG tablet Take 1 tablet by mouth every 8 (eight) hours      hydrOXYzine HCl (ATARAX) 25 MG tablet Take 1 tablet by mouth every 12 hours as needed for Anxiety      
mouth every 6 hours as needed for Pain for up to 7 days. Max Daily Amount: 4 tablets, Disp-12 tablet, R-0Normal             Patient had scripts modified or refilled for the following medications. I counseled patient how to take these medications.  Discharge Medication List as of 6/16/2025  7:00 PM          Patient had scripts discontinued for the following medications. I counseled patient to stop taking these medications.  Discharge Medication List as of 6/16/2025  7:00 PM          FOLLOW-UP:  Zachery Lucio MD  [address] [facility] [address]  [phone]      As needed    [facility]  [address] [place] [address]  [phone]  Schedule an appointment as soon as possible for a visit   Please call for follow-up appointment with gastroenterology    [facility] Emergency Department  [address] [place] [address]  [phone]    As needed, If symptoms worsen         Final Impression  1. Diverticulitis of colon    2. Generalized abdominal pain    3. Diarrhea, unspecified type    4. History of Crohn's disease    5. Pneumonia of left lower lobe due to infectious organism        Blood pressure (!) 150/83, pulse 64, temperature 98.1 °F (36.7 °C), temperature source Oral, resp. rate 16, height 1.829 m (6'), weight 105.7 kg (233 lb), SpO2 99%.    Disposition  Pt is in stable condition upon Discharge to home.    Appropriate PPE worn by this writer during patient encounters. Pt seen during a time with constrained hospital bed capacity and other potential inpatient and outpatient resources were constrained.     The note was completed using Dragon voice recognition transcription. Every effort was made to ensure accuracy; however, inadvertent transcription errors may be present despite my best efforts to edit errors.    Sulma Begum MD   [employer]        Sulma Begum MD  06/16/25 2033

## 2025-06-16 NOTE — PROGRESS NOTES
HENT: No headaches, nosebleeds, sinus pressure, rhinorrhea, sore throat.   Eyes: No visual changes, blurred vision, eye pain, photophobia, jaundice.   Cardiovascular: No chest pain, heart palpitations.   Respiratory: No cough, SOB, wheezing, chest discomfort, orthopnea.   Gastrointestinal: See HPI   Genitourinary: No dysuria, bleeding, discharge, pyuria.   Musculoskeletal: No weakness, arthralgias, wasting.   Endo: No sweats.   Heme: No bruising, easy bleeding.   Allergies: As noted.   Neurological: Cranial nerves intact.  Alert and oriented. Gait not assessed.   Psychiatric:  No anxiety, depression, hallucinations.          BP (!) 150/83   Pulse 64   Temp 98.1 °F (36.7 °C) (Oral)   Resp 16   Ht 1.829 m (6')   Wt 105.7 kg (233 lb)   SpO2 99%   BMI 31.60 kg/m²     Physical Assessment:     constitutional: well developed, well nourished, normal habitus, no deformities, in no acute distress.   skin: no rashes, ulcers, icterus or other lesions  eyes: normal conjunctivae and lids; no jaundice pupils: normal  HEENT: normocephalic, atraumatic  neck: supple, normal ROM   respiratory: normal chest excursion; no intercostal retraction or accessory muscle use  cardiovascular: regular rate and rhythm   abdominal: declined exam, currently in pain, exam recently completed by ED physician and CT pending  rectal: hemoccult/guaiac: not performed.   extremities: LUE palsy/, no edema. Gait not assessed   neurologic: grossly non focal, detailed CN exam not performed  psychiatric: judgement/insight: within normal limits. memory: within normal limits for recent and remote events. mood and affect: no evidence of depression, anxiety or agitation. orientation: oriented to time, space and person.        Basic Metabolic Profile   Recent Labs     06/16/25  1255      K 4.1      CO2 25   BUN 21   MG 2.3         CBC w/Diff    Recent Labs     06/16/25  1255   WBC 4.8   RBC 4.91   HGB 12.7   HCT 40.6   MCV 82.7   MCH 25.9   MCHC

## 2025-06-17 LAB
C COLI+JEJUNI TUF STL QL NAA+PROBE: NEGATIVE
C DIFF GDH STL QL: NEGATIVE
C DIFF TOX A+B STL QL IA: NEGATIVE
C DIFF TOXIN INTERPRETATION: NORMAL
EC STX1+STX2 GENES STL QL NAA+PROBE: NEGATIVE
ETEC ELTA+ESTB GENES STL QL NAA+PROBE: NEGATIVE
P SHIGELLOIDES DNA STL QL NAA+PROBE: NEGATIVE
SALMONELLA SP SPAO STL QL NAA+PROBE: NEGATIVE
SHIGELLA SP+EIEC IPAH STL QL NAA+PROBE: NEGATIVE
V CHOL+PARA+VUL DNA STL QL NAA+NON-PROBE: NEGATIVE
Y ENTEROCOL DNA STL QL NAA+NON-PROBE: NEGATIVE

## 2025-06-17 RX ORDER — ONDANSETRON 4 MG/1
4 TABLET, FILM COATED ORAL EVERY 8 HOURS PRN
Qty: 30 TABLET | Refills: 2 | Status: SHIPPED | OUTPATIENT
Start: 2025-06-17

## 2025-06-18 LAB — CALPROTECTIN STL-MCNT: 418 UG/G (ref 0–120)

## 2025-06-19 ENCOUNTER — TELEPHONE (OUTPATIENT)
Age: 55
End: 2025-06-19

## 2025-07-16 ENCOUNTER — HOSPITAL ENCOUNTER (OUTPATIENT)
Facility: HOSPITAL | Age: 55
Setting detail: RECURRING SERIES
End: 2025-07-16
Payer: COMMERCIAL

## 2025-07-21 ENCOUNTER — HOSPITAL ENCOUNTER (OUTPATIENT)
Facility: HOSPITAL | Age: 55
Setting detail: RECURRING SERIES
Discharge: HOME | End: 2025-07-24
Payer: COMMERCIAL

## 2025-07-21 PROCEDURE — 97162 PT EVAL MOD COMPLEX 30 MIN: CPT

## 2025-07-21 NOTE — THERAPY EVALUATION
TAMI CALDERON [facility]  [address] [place] [address] Ph:[phone] Fx: [phone]  Plan of Care / Statement of Necessity for Physical Therapy Services     Patient Name: Priscilla Dean : 1970   Medical   Diagnosis: Personal history of transient ischemic attack (TIA), and cerebral infarction without residual deficits Treatment Diagnosis: R26.89  Abnormalities of gait and mobility      Onset Date: 2025 Payor :  Payor: PAWAN PLUMMER / Plan: [employer] [place] / Product Type: *No Product type* /    Referral Source: Harjeet Kim PA-C Start of Care (SOC): 2025   Prior Hospitalization: See medical history Provider #: [id number]   Prior Level of Function: lives alone in 1 story home with step over tub; working as nurse educator (RN); enjoys reading, watching tv, spending time with [employer]   Comorbidities:  Diabetes mellitus, Musculoskeletal disorders, and Complications related to surgeryhistory of left TKA, peripheral neuropathy, arthritis, fibromyalgia, osteoporosis; per chart review crohns, DM, HTN, HLD, PE, CVA, type B Aortic Dissection repair s/p TEVAR on 2025, Cdiff, severe renal artery stenosis     Assessment / key information:  Patient is a 54-year-old ambidextrous female who presents to therapy following CVA in Feb (diagnosed in March). She had significant left sided weakness following. a PMHx of crohns, DM, HTN, HLD, PE, CVA, type B Aortic Dissection repair s/p TEVAR on 2025, Cdiff, severe renal artery stenosis who presented to the ED with c/o aphasia who presents to therapy following CVA in  Patient reports using RW for work and cane at other times. She has to lift her left leg to enter/exit bed and car. Her endurance with walking is limited (5 min). Patient stands with B genu valgus with B UE support. Exam reveals patient with decreased right hip flex strength and decreased left LE strength (grossly 2+/5). Patient sits with forward

## 2025-07-21 NOTE — PROGRESS NOTES
PT DAILY TREATMENT NOTE/NEURO EVAL     Patient Name: Priscilla Dean    Date: 2025    : 1970  Insurance: Payor: PAWAN PLUMMER / Plan: [employer] [place] / Product Type: *No Product type* /      Patient  verified yes     Visit #   Current / Total 1 24   Time   In / Out 2:47P 3:21P   Pain   In / Out 3/10 left hand 0/10   Subjective Functional Status/Changes: See poc     Treatment Area: Personal history of transient ischemic attack (TIA), and cerebral infarction without residual deficits    If an interpreting service is utilized for treatment of this patient, the contents of this document represent the material reviewed with the patient via the .     SUBJECTIVE    Prior Level of Function: lives alone in 1 story home with step over tub; working as nurse educator (RN); enjoys reading, watching tv, spending time with [place]ki[place]   Comorbidities:  Diabetes mellitus, Musculoskeletal disorders, and Complications related to surgeryhistory of left TKA, peripheral neuropathy, arthritis, fibromyalgia, osteoporosis; per chart review crohns, DM, HTN, HLD, PE, CVA, type B Aortic Dissection repair s/p TEVAR on 2025, Cdiff, severe renal artery stenosis     Assessment / key information:  Patient is a 54-year-old ambidextrous female who presents to therapy following CVA in Feb (diagnosed in March). She had significant left sided weakness following. a PMHx of crohns, DM, HTN, HLD, PE, CVA, type B Aortic Dissection repair s/p TEVAR on 2025, Cdiff, severe renal artery stenosis who presented to the ED with c/o aphasia who presents to therapy following CVA in  Patient reports using RW for work and cane at other times. She has to lift her left leg to enter/exit bed and car. Her endurance with walking is limited (5 min). Patient stands with B genu valgus with B UE support. Exam reveals patient with decreased right hip flex strength and decreased left LE strength (grossly 2+/5). Patient sits with forward

## 2025-07-31 ENCOUNTER — HOSPITAL ENCOUNTER (INPATIENT)
Facility: HOSPITAL | Age: 55
LOS: 4 days | Discharge: HOME | DRG: 091 | End: 2025-08-04
Attending: STUDENT IN AN ORGANIZED HEALTH CARE EDUCATION/TRAINING PROGRAM | Admitting: STUDENT IN AN ORGANIZED HEALTH CARE EDUCATION/TRAINING PROGRAM
Payer: COMMERCIAL

## 2025-07-31 ENCOUNTER — HOSPITAL ENCOUNTER (EMERGENCY)
Facility: HOSPITAL | Age: 55
Discharge: HOME | DRG: 091 | End: 2025-08-03
Payer: COMMERCIAL

## 2025-07-31 DIAGNOSIS — R55 SYNCOPE AND COLLAPSE: Primary | ICD-10-CM

## 2025-07-31 DIAGNOSIS — R55 SYNCOPE, UNSPECIFIED SYNCOPE TYPE: ICD-10-CM

## 2025-07-31 LAB
ALBUMIN SERPL-MCNC: 2.7 G/DL (ref 3.4–5)
ALBUMIN/GLOB SERPL: 0.6 (ref 0.8–1.7)
ALP SERPL-CCNC: 90 U/L (ref 45–117)
ALT SERPL-CCNC: 10 U/L (ref 10–35)
ANION GAP SERPL CALC-SCNC: 10 MMOL/L (ref 3–18)
AST SERPL-CCNC: 32 U/L (ref 10–38)
BILIRUB SERPL-MCNC: 0.8 MG/DL (ref 0.2–1)
BUN SERPL-MCNC: 10 MG/DL (ref 6–23)
BUN/CREAT SERPL: 12 (ref 12–20)
CALCIUM SERPL-MCNC: 8.3 MG/DL (ref 8.5–10.1)
CHLORIDE SERPL-SCNC: 104 MMOL/L (ref 98–107)
CO2 SERPL-SCNC: 23 MMOL/L (ref 21–32)
CREAT SERPL-MCNC: 0.86 MG/DL (ref 0.6–1.3)
ERYTHROCYTE [DISTWIDTH] IN BLOOD BY AUTOMATED COUNT: 21.1 % (ref 11.6–14.5)
ETHANOL SERPL-MCNC: <11 MG/DL (ref 0–0.08)
GLOBULIN SER CALC-MCNC: 4.5 G/DL (ref 2–4)
GLUCOSE BLD STRIP.AUTO-MCNC: 183 MG/DL (ref 70–110)
GLUCOSE BLD-MCNC: 183 MG/DL
GLUCOSE SERPL-MCNC: 182 MG/DL (ref 74–108)
HCT VFR BLD AUTO: 35 % (ref 35–45)
HGB BLD-MCNC: 10.8 G/DL (ref 12–16)
MCH RBC QN AUTO: 25.7 PG (ref 24–34)
MCHC RBC AUTO-ENTMCNC: 30.9 G/DL (ref 31–37)
MCV RBC AUTO: 83.1 FL (ref 78–100)
NRBC # BLD: 0 K/UL (ref 0–0.01)
NRBC BLD-RTO: 0 PER 100 WBC
PLATELET # BLD AUTO: 77 K/UL (ref 135–420)
POTASSIUM SERPL-SCNC: 4 MMOL/L (ref 3.5–5.5)
PROT SERPL-MCNC: 7.2 G/DL (ref 6.4–8.2)
RBC # BLD AUTO: 4.21 M/UL (ref 4.2–5.3)
SODIUM SERPL-SCNC: 137 MMOL/L (ref 136–145)
T4 FREE SERPL-MCNC: 0.8 NG/DL (ref 0.9–1.7)
TROPONIN T SERPL HS-MCNC: 8.4 NG/L (ref 0–14)
TROPONIN T SERPL HS-MCNC: <6 NG/L (ref 0–14)
TSH W FREE THYROID IF ABNORMAL: 0.17 UIU/ML (ref 0.27–4.2)
WBC # BLD AUTO: 13 K/UL (ref 4.6–13.2)

## 2025-07-31 PROCEDURE — 72125 CT NECK SPINE W/O DYE: CPT

## 2025-07-31 PROCEDURE — 71275 CT ANGIOGRAPHY CHEST: CPT

## 2025-07-31 PROCEDURE — 6360000002 HC RX W HCPCS: Performed by: STUDENT IN AN ORGANIZED HEALTH CARE EDUCATION/TRAINING PROGRAM

## 2025-07-31 PROCEDURE — 70450 CT HEAD/BRAIN W/O DYE: CPT

## 2025-07-31 PROCEDURE — 96374 THER/PROPH/DIAG INJ IV PUSH: CPT

## 2025-07-31 PROCEDURE — 99285 EMERGENCY DEPT VISIT HI MDM: CPT

## 2025-07-31 PROCEDURE — 6360000004 HC RX CONTRAST MEDICATION: Performed by: STUDENT IN AN ORGANIZED HEALTH CARE EDUCATION/TRAINING PROGRAM

## 2025-07-31 PROCEDURE — 82077 ASSAY SPEC XCP UR&BREATH IA: CPT

## 2025-07-31 PROCEDURE — 6360000002 HC RX W HCPCS: Performed by: FAMILY MEDICINE

## 2025-07-31 PROCEDURE — 82962 GLUCOSE BLOOD TEST: CPT

## 2025-07-31 PROCEDURE — 94761 N-INVAS EAR/PLS OXIMETRY MLT: CPT

## 2025-07-31 PROCEDURE — 84484 ASSAY OF TROPONIN QUANT: CPT

## 2025-07-31 PROCEDURE — 84443 ASSAY THYROID STIM HORMONE: CPT

## 2025-07-31 PROCEDURE — 2500000003 HC RX 250 WO HCPCS: Performed by: STUDENT IN AN ORGANIZED HEALTH CARE EDUCATION/TRAINING PROGRAM

## 2025-07-31 PROCEDURE — 96375 TX/PRO/DX INJ NEW DRUG ADDON: CPT

## 2025-07-31 PROCEDURE — 6370000000 HC RX 637 (ALT 250 FOR IP): Performed by: STUDENT IN AN ORGANIZED HEALTH CARE EDUCATION/TRAINING PROGRAM

## 2025-07-31 PROCEDURE — 6370000000 HC RX 637 (ALT 250 FOR IP): Performed by: FAMILY MEDICINE

## 2025-07-31 PROCEDURE — 84439 ASSAY OF FREE THYROXINE: CPT

## 2025-07-31 PROCEDURE — 93005 ELECTROCARDIOGRAM TRACING: CPT | Performed by: STUDENT IN AN ORGANIZED HEALTH CARE EDUCATION/TRAINING PROGRAM

## 2025-07-31 PROCEDURE — 85027 COMPLETE CBC AUTOMATED: CPT

## 2025-07-31 PROCEDURE — 2580000003 HC RX 258: Performed by: STUDENT IN AN ORGANIZED HEALTH CARE EDUCATION/TRAINING PROGRAM

## 2025-07-31 PROCEDURE — 1100000003 HC PRIVATE W/ TELEMETRY

## 2025-07-31 PROCEDURE — 80053 COMPREHEN METABOLIC PANEL: CPT

## 2025-07-31 RX ORDER — ENOXAPARIN SODIUM 100 MG/ML
30 INJECTION SUBCUTANEOUS 2 TIMES DAILY
Status: DISCONTINUED | OUTPATIENT
Start: 2025-07-31 | End: 2025-08-04 | Stop reason: HOSPADM

## 2025-07-31 RX ORDER — MAGNESIUM SULFATE IN WATER 40 MG/ML
2000 INJECTION, SOLUTION INTRAVENOUS PRN
Status: DISCONTINUED | OUTPATIENT
Start: 2025-07-31 | End: 2025-08-04 | Stop reason: HOSPADM

## 2025-07-31 RX ORDER — ONDANSETRON 4 MG/1
4 TABLET, ORALLY DISINTEGRATING ORAL EVERY 8 HOURS PRN
Status: DISCONTINUED | OUTPATIENT
Start: 2025-07-31 | End: 2025-08-04 | Stop reason: HOSPADM

## 2025-07-31 RX ORDER — ATORVASTATIN CALCIUM 40 MG/1
80 TABLET, FILM COATED ORAL DAILY
Status: DISCONTINUED | OUTPATIENT
Start: 2025-08-01 | End: 2025-08-04 | Stop reason: HOSPADM

## 2025-07-31 RX ORDER — KETOROLAC TROMETHAMINE 15 MG/ML
15 INJECTION, SOLUTION INTRAMUSCULAR; INTRAVENOUS EVERY 6 HOURS PRN
Status: DISCONTINUED | OUTPATIENT
Start: 2025-07-31 | End: 2025-08-02

## 2025-07-31 RX ORDER — POTASSIUM CHLORIDE 7.45 MG/ML
10 INJECTION INTRAVENOUS PRN
Status: DISCONTINUED | OUTPATIENT
Start: 2025-07-31 | End: 2025-08-04 | Stop reason: HOSPADM

## 2025-07-31 RX ORDER — ACETAMINOPHEN 325 MG/1
650 TABLET ORAL
Status: COMPLETED | OUTPATIENT
Start: 2025-07-31 | End: 2025-07-31

## 2025-07-31 RX ORDER — BACLOFEN 10 MG/1
10 TABLET ORAL 3 TIMES DAILY PRN
Status: DISCONTINUED | OUTPATIENT
Start: 2025-07-31 | End: 2025-08-04 | Stop reason: HOSPADM

## 2025-07-31 RX ORDER — SODIUM CHLORIDE, SODIUM LACTATE, POTASSIUM CHLORIDE, AND CALCIUM CHLORIDE .6; .31; .03; .02 G/100ML; G/100ML; G/100ML; G/100ML
1000 INJECTION, SOLUTION INTRAVENOUS ONCE
Status: COMPLETED | OUTPATIENT
Start: 2025-07-31 | End: 2025-07-31

## 2025-07-31 RX ORDER — PROCHLORPERAZINE EDISYLATE 5 MG/ML
10 INJECTION INTRAMUSCULAR; INTRAVENOUS
Status: COMPLETED | OUTPATIENT
Start: 2025-07-31 | End: 2025-07-31

## 2025-07-31 RX ORDER — HYDRALAZINE HYDROCHLORIDE 20 MG/ML
10 INJECTION INTRAMUSCULAR; INTRAVENOUS EVERY 6 HOURS PRN
Status: DISCONTINUED | OUTPATIENT
Start: 2025-07-31 | End: 2025-08-04 | Stop reason: HOSPADM

## 2025-07-31 RX ORDER — ONDANSETRON 2 MG/ML
4 INJECTION INTRAMUSCULAR; INTRAVENOUS EVERY 6 HOURS PRN
Status: DISCONTINUED | OUTPATIENT
Start: 2025-07-31 | End: 2025-08-04 | Stop reason: HOSPADM

## 2025-07-31 RX ORDER — SODIUM CHLORIDE 9 MG/ML
INJECTION, SOLUTION INTRAVENOUS PRN
Status: DISCONTINUED | OUTPATIENT
Start: 2025-07-31 | End: 2025-08-04 | Stop reason: HOSPADM

## 2025-07-31 RX ORDER — IOPAMIDOL 755 MG/ML
100 INJECTION, SOLUTION INTRAVASCULAR
Status: COMPLETED | OUTPATIENT
Start: 2025-07-31 | End: 2025-07-31

## 2025-07-31 RX ORDER — AMLODIPINE BESYLATE 10 MG/1
10 TABLET ORAL DAILY
Status: DISCONTINUED | OUTPATIENT
Start: 2025-08-01 | End: 2025-08-02

## 2025-07-31 RX ORDER — POTASSIUM CHLORIDE 1500 MG/1
40 TABLET, EXTENDED RELEASE ORAL PRN
Status: DISCONTINUED | OUTPATIENT
Start: 2025-07-31 | End: 2025-08-04 | Stop reason: HOSPADM

## 2025-07-31 RX ORDER — POLYETHYLENE GLYCOL 3350 17 G/17G
17 POWDER, FOR SOLUTION ORAL DAILY PRN
Status: DISCONTINUED | OUTPATIENT
Start: 2025-07-31 | End: 2025-08-04 | Stop reason: HOSPADM

## 2025-07-31 RX ORDER — FENTANYL CITRATE 50 UG/ML
75 INJECTION, SOLUTION INTRAMUSCULAR; INTRAVENOUS
Refills: 0 | Status: COMPLETED | OUTPATIENT
Start: 2025-07-31 | End: 2025-07-31

## 2025-07-31 RX ORDER — SODIUM CHLORIDE 0.9 % (FLUSH) 0.9 %
5-40 SYRINGE (ML) INJECTION PRN
Status: DISCONTINUED | OUTPATIENT
Start: 2025-07-31 | End: 2025-08-04 | Stop reason: HOSPADM

## 2025-07-31 RX ORDER — SODIUM CHLORIDE 0.9 % (FLUSH) 0.9 %
5-40 SYRINGE (ML) INJECTION EVERY 12 HOURS SCHEDULED
Status: DISCONTINUED | OUTPATIENT
Start: 2025-07-31 | End: 2025-08-04 | Stop reason: HOSPADM

## 2025-07-31 RX ORDER — ACETAMINOPHEN 325 MG/1
650 TABLET ORAL EVERY 6 HOURS PRN
Status: DISCONTINUED | OUTPATIENT
Start: 2025-07-31 | End: 2025-08-04 | Stop reason: HOSPADM

## 2025-07-31 RX ORDER — ACETAMINOPHEN 650 MG/1
650 SUPPOSITORY RECTAL EVERY 6 HOURS PRN
Status: DISCONTINUED | OUTPATIENT
Start: 2025-07-31 | End: 2025-08-04 | Stop reason: HOSPADM

## 2025-07-31 RX ORDER — HYDRALAZINE HYDROCHLORIDE 25 MG/1
25 TABLET, FILM COATED ORAL EVERY 8 HOURS
Status: DISCONTINUED | OUTPATIENT
Start: 2025-07-31 | End: 2025-08-02

## 2025-07-31 RX ADMIN — SODIUM CHLORIDE, PRESERVATIVE FREE 10 ML: 5 INJECTION INTRAVENOUS at 22:30

## 2025-07-31 RX ADMIN — PROCHLORPERAZINE EDISYLATE 10 MG: 5 INJECTION INTRAMUSCULAR; INTRAVENOUS at 15:53

## 2025-07-31 RX ADMIN — PREGABALIN 200 MG: 75 CAPSULE ORAL at 22:26

## 2025-07-31 RX ADMIN — ACETAMINOPHEN 650 MG: 325 TABLET ORAL at 21:39

## 2025-07-31 RX ADMIN — FENTANYL CITRATE 75 MCG: 50 INJECTION INTRAMUSCULAR; INTRAVENOUS at 15:49

## 2025-07-31 RX ADMIN — ACETAMINOPHEN 650 MG: 325 TABLET ORAL at 12:06

## 2025-07-31 RX ADMIN — HYDRALAZINE HYDROCHLORIDE 25 MG: 25 TABLET ORAL at 22:26

## 2025-07-31 RX ADMIN — KETOROLAC TROMETHAMINE 15 MG: 15 INJECTION, SOLUTION INTRAMUSCULAR; INTRAVENOUS at 22:27

## 2025-07-31 RX ADMIN — SODIUM CHLORIDE, SODIUM LACTATE, POTASSIUM CHLORIDE, AND CALCIUM CHLORIDE 1000 ML: .6; .31; .03; .02 INJECTION, SOLUTION INTRAVENOUS at 12:02

## 2025-07-31 RX ADMIN — IOPAMIDOL 100 ML: 755 INJECTION, SOLUTION INTRAVENOUS at 14:12

## 2025-07-31 ASSESSMENT — PAIN SCALES - GENERAL
PAINLEVEL_OUTOF10: 7
PAINLEVEL_OUTOF10: 6
PAINLEVEL_OUTOF10: 7
PAINLEVEL_OUTOF10: 6
PAINLEVEL_OUTOF10: 9
PAINLEVEL_OUTOF10: 7
PAINLEVEL_OUTOF10: 2
PAINLEVEL_OUTOF10: 9
PAINLEVEL_OUTOF10: 8

## 2025-07-31 ASSESSMENT — PAIN DESCRIPTION - DESCRIPTORS
DESCRIPTORS: ACHING;STABBING
DESCRIPTORS: ACHING
DESCRIPTORS: ACHING;STABBING

## 2025-07-31 ASSESSMENT — PAIN SCALES - WONG BAKER: WONGBAKER_NUMERICALRESPONSE: HURTS A LITTLE BIT

## 2025-07-31 ASSESSMENT — PAIN DESCRIPTION - LOCATION
LOCATION: BACK;BUTTOCKS;NECK
LOCATION: BUTTOCKS
LOCATION: BACK;BUTTOCKS;NECK
LOCATION: COCCYX;BACK
LOCATION: BACK;COCCYX

## 2025-07-31 ASSESSMENT — PAIN DESCRIPTION - ORIENTATION
ORIENTATION: RIGHT;LEFT;MID
ORIENTATION: RIGHT;LEFT;MID

## 2025-07-31 ASSESSMENT — PAIN - FUNCTIONAL ASSESSMENT
PAIN_FUNCTIONAL_ASSESSMENT: 0-10
PAIN_FUNCTIONAL_ASSESSMENT: PREVENTS OR INTERFERES SOME ACTIVE ACTIVITIES AND ADLS
PAIN_FUNCTIONAL_ASSESSMENT: PREVENTS OR INTERFERES SOME ACTIVE ACTIVITIES AND ADLS
PAIN_FUNCTIONAL_ASSESSMENT: 0-10

## 2025-08-01 ENCOUNTER — APPOINTMENT (OUTPATIENT)
Facility: HOSPITAL | Age: 55
DRG: 091 | End: 2025-08-01
Attending: STUDENT IN AN ORGANIZED HEALTH CARE EDUCATION/TRAINING PROGRAM
Payer: COMMERCIAL

## 2025-08-01 ENCOUNTER — APPOINTMENT (OUTPATIENT)
Facility: HOSPITAL | Age: 55
DRG: 091 | End: 2025-08-01
Payer: COMMERCIAL

## 2025-08-01 PROBLEM — E43 SEVERE PROTEIN-CALORIE MALNUTRITION: Status: ACTIVE | Noted: 2024-12-31

## 2025-08-01 PROBLEM — I95.1 ORTHOSTATIC HYPOTENSION: Status: ACTIVE | Noted: 2025-08-01

## 2025-08-01 LAB
AMPHET UR QL SCN: NEGATIVE
ANION GAP SERPL CALC-SCNC: 11 MMOL/L (ref 3–18)
APPEARANCE UR: ABNORMAL
BACTERIA URNS QL MICRO: ABNORMAL /HPF
BARBITURATES UR QL SCN: NEGATIVE
BASOPHILS # BLD: 0.03 K/UL (ref 0–0.1)
BASOPHILS NFR BLD: 0.3 % (ref 0–2)
BENZODIAZ UR QL: NEGATIVE
BILIRUB UR QL: NEGATIVE
BUN SERPL-MCNC: 9 MG/DL (ref 6–23)
BUN/CREAT SERPL: 11 (ref 12–20)
CALCIUM SERPL-MCNC: 8.3 MG/DL (ref 8.5–10.1)
CANNABINOIDS UR QL SCN: POSITIVE
CHLORIDE SERPL-SCNC: 101 MMOL/L (ref 98–107)
CO2 SERPL-SCNC: 23 MMOL/L (ref 21–32)
COCAINE UR QL SCN: NEGATIVE
COLOR UR: YELLOW
CREAT SERPL-MCNC: 0.78 MG/DL (ref 0.6–1.3)
DIFFERENTIAL METHOD BLD: ABNORMAL
EKG ATRIAL RATE: 51 BPM
EKG ATRIAL RATE: 68 BPM
EKG DIAGNOSIS: NORMAL
EKG DIAGNOSIS: NORMAL
EKG P AXIS: 39 DEGREES
EKG P AXIS: 47 DEGREES
EKG P-R INTERVAL: 160 MS
EKG P-R INTERVAL: 162 MS
EKG Q-T INTERVAL: 440 MS
EKG Q-T INTERVAL: 486 MS
EKG QRS DURATION: 104 MS
EKG QRS DURATION: 110 MS
EKG QTC CALCULATION (BAZETT): 447 MS
EKG QTC CALCULATION (BAZETT): 467 MS
EKG R AXIS: -12 DEGREES
EKG R AXIS: 18 DEGREES
EKG T AXIS: 42 DEGREES
EKG T AXIS: 60 DEGREES
EKG VENTRICULAR RATE: 51 BPM
EKG VENTRICULAR RATE: 68 BPM
EOSINOPHIL # BLD: 0.14 K/UL (ref 0–0.4)
EOSINOPHIL NFR BLD: 1.5 % (ref 0–5)
EPITH CASTS URNS QL MICRO: NEGATIVE /LPF (ref 0–5)
ERYTHROCYTE [DISTWIDTH] IN BLOOD BY AUTOMATED COUNT: 20.7 % (ref 11.6–14.5)
FENTANYL: POSITIVE
GLUCOSE BLD STRIP.AUTO-MCNC: 155 MG/DL (ref 70–110)
GLUCOSE SERPL-MCNC: 113 MG/DL (ref 74–108)
GLUCOSE UR STRIP.AUTO-MCNC: NEGATIVE MG/DL
HCT VFR BLD AUTO: 31.6 % (ref 35–45)
HGB BLD-MCNC: 10.1 G/DL (ref 12–16)
HGB UR QL STRIP: ABNORMAL
IMM GRANULOCYTES # BLD AUTO: 0.04 K/UL (ref 0–0.04)
IMM GRANULOCYTES NFR BLD AUTO: 0.4 % (ref 0–0.5)
KETONES UR QL STRIP.AUTO: NEGATIVE MG/DL
LEUKOCYTE ESTERASE UR QL STRIP.AUTO: ABNORMAL
LYMPHOCYTES # BLD: 0.57 K/UL (ref 0.9–3.6)
LYMPHOCYTES NFR BLD: 6 % (ref 21–52)
Lab: ABNORMAL
MCH RBC QN AUTO: 26.7 PG (ref 24–34)
MCHC RBC AUTO-ENTMCNC: 32 G/DL (ref 31–37)
MCV RBC AUTO: 83.6 FL (ref 78–100)
METHADONE UR QL: NEGATIVE
MONOCYTES # BLD: 0.5 K/UL (ref 0.05–1.2)
MONOCYTES NFR BLD: 5.2 % (ref 3–10)
NEUTS SEG # BLD: 8.28 K/UL (ref 1.8–8)
NEUTS SEG NFR BLD: 86.6 % (ref 40–73)
NITRITE UR QL STRIP.AUTO: NEGATIVE
NRBC # BLD: 0 K/UL (ref 0–0.01)
NRBC BLD-RTO: 0 PER 100 WBC
OPIATES UR QL: NEGATIVE
OXYCODONE UR QL SCN: NEGATIVE
PH UR STRIP: 5.5 (ref 5–8)
PLATELET # BLD AUTO: 125 K/UL (ref 135–420)
POTASSIUM SERPL-SCNC: 2.9 MMOL/L (ref 3.5–5.5)
PROT UR STRIP-MCNC: 30 MG/DL
RBC # BLD AUTO: 3.78 M/UL (ref 4.2–5.3)
RBC #/AREA URNS HPF: ABNORMAL /HPF (ref 0–5)
SODIUM SERPL-SCNC: 136 MMOL/L (ref 136–145)
SP GR UR REFRACTOMETRY: 1.02 (ref 1–1.03)
TROPONIN T SERPL HS-MCNC: 6.7 NG/L (ref 0–14)
TROPONIN T SERPL HS-MCNC: 7.3 NG/L (ref 0–14)
TROPONIN T SERPL HS-MCNC: 8.3 NG/L (ref 0–14)
UROBILINOGEN UR QL STRIP.AUTO: 0.2 EU/DL (ref 0.2–1)
WBC # BLD AUTO: 9.6 K/UL (ref 4.6–13.2)
WBC URNS QL MICRO: ABNORMAL /HPF (ref 0–5)

## 2025-08-01 PROCEDURE — 6370000000 HC RX 637 (ALT 250 FOR IP): Performed by: FAMILY MEDICINE

## 2025-08-01 PROCEDURE — 6360000002 HC RX W HCPCS: Performed by: STUDENT IN AN ORGANIZED HEALTH CARE EDUCATION/TRAINING PROGRAM

## 2025-08-01 PROCEDURE — 6370000000 HC RX 637 (ALT 250 FOR IP): Performed by: STUDENT IN AN ORGANIZED HEALTH CARE EDUCATION/TRAINING PROGRAM

## 2025-08-01 PROCEDURE — 87040 BLOOD CULTURE FOR BACTERIA: CPT

## 2025-08-01 PROCEDURE — 36415 COLL VENOUS BLD VENIPUNCTURE: CPT

## 2025-08-01 PROCEDURE — 93010 ELECTROCARDIOGRAM REPORT: CPT | Performed by: INTERNAL MEDICINE

## 2025-08-01 PROCEDURE — 99223 1ST HOSP IP/OBS HIGH 75: CPT | Performed by: INTERNAL MEDICINE

## 2025-08-01 PROCEDURE — 84484 ASSAY OF TROPONIN QUANT: CPT

## 2025-08-01 PROCEDURE — 94761 N-INVAS EAR/PLS OXIMETRY MLT: CPT

## 2025-08-01 PROCEDURE — 99232 SBSQ HOSP IP/OBS MODERATE 35: CPT | Performed by: STUDENT IN AN ORGANIZED HEALTH CARE EDUCATION/TRAINING PROGRAM

## 2025-08-01 PROCEDURE — 85025 COMPLETE CBC W/AUTO DIFF WBC: CPT

## 2025-08-01 PROCEDURE — 81001 URINALYSIS AUTO W/SCOPE: CPT

## 2025-08-01 PROCEDURE — 6360000002 HC RX W HCPCS: Performed by: FAMILY MEDICINE

## 2025-08-01 PROCEDURE — 80048 BASIC METABOLIC PNL TOTAL CA: CPT

## 2025-08-01 PROCEDURE — 2500000003 HC RX 250 WO HCPCS: Performed by: STUDENT IN AN ORGANIZED HEALTH CARE EDUCATION/TRAINING PROGRAM

## 2025-08-01 PROCEDURE — 71045 X-RAY EXAM CHEST 1 VIEW: CPT

## 2025-08-01 PROCEDURE — 80307 DRUG TEST PRSMV CHEM ANLYZR: CPT

## 2025-08-01 PROCEDURE — 1100000003 HC PRIVATE W/ TELEMETRY

## 2025-08-01 PROCEDURE — 93880 EXTRACRANIAL BILAT STUDY: CPT

## 2025-08-01 PROCEDURE — 82962 GLUCOSE BLOOD TEST: CPT

## 2025-08-01 RX ORDER — MULTIVITAMIN WITH IRON
1 TABLET ORAL DAILY
Status: DISCONTINUED | OUTPATIENT
Start: 2025-08-01 | End: 2025-08-04 | Stop reason: HOSPADM

## 2025-08-01 RX ORDER — OXYCODONE HYDROCHLORIDE 5 MG/1
5 TABLET ORAL EVERY 6 HOURS PRN
Refills: 0 | Status: DISCONTINUED | OUTPATIENT
Start: 2025-08-01 | End: 2025-08-04 | Stop reason: HOSPADM

## 2025-08-01 RX ADMIN — ENOXAPARIN SODIUM 30 MG: 100 INJECTION SUBCUTANEOUS at 09:07

## 2025-08-01 RX ADMIN — OXYCODONE HYDROCHLORIDE 5 MG: 5 TABLET ORAL at 14:47

## 2025-08-01 RX ADMIN — ATORVASTATIN CALCIUM 80 MG: 40 TABLET, FILM COATED ORAL at 09:07

## 2025-08-01 RX ADMIN — OXYCODONE HYDROCHLORIDE 5 MG: 5 TABLET ORAL at 21:35

## 2025-08-01 RX ADMIN — HYDRALAZINE HYDROCHLORIDE 25 MG: 25 TABLET ORAL at 06:31

## 2025-08-01 RX ADMIN — PREGABALIN 200 MG: 75 CAPSULE ORAL at 09:07

## 2025-08-01 RX ADMIN — ACETAMINOPHEN 650 MG: 325 TABLET ORAL at 14:52

## 2025-08-01 RX ADMIN — PREGABALIN 200 MG: 75 CAPSULE ORAL at 21:34

## 2025-08-01 RX ADMIN — SODIUM CHLORIDE, PRESERVATIVE FREE 10 ML: 5 INJECTION INTRAVENOUS at 09:13

## 2025-08-01 RX ADMIN — ENOXAPARIN SODIUM 30 MG: 100 INJECTION SUBCUTANEOUS at 21:33

## 2025-08-01 RX ADMIN — POTASSIUM BICARBONATE 40 MEQ: 782 TABLET, EFFERVESCENT ORAL at 03:46

## 2025-08-01 RX ADMIN — AMLODIPINE BESYLATE 10 MG: 10 TABLET ORAL at 09:08

## 2025-08-01 RX ADMIN — SODIUM CHLORIDE, PRESERVATIVE FREE 10 ML: 5 INJECTION INTRAVENOUS at 21:45

## 2025-08-01 RX ADMIN — KETOROLAC TROMETHAMINE 15 MG: 15 INJECTION, SOLUTION INTRAMUSCULAR; INTRAVENOUS at 09:26

## 2025-08-01 RX ADMIN — THERA TABS 1 TABLET: TAB at 14:52

## 2025-08-01 RX ADMIN — PREGABALIN 200 MG: 75 CAPSULE ORAL at 14:46

## 2025-08-01 ASSESSMENT — PAIN SCALES - GENERAL
PAINLEVEL_OUTOF10: 7
PAINLEVEL_OUTOF10: 6
PAINLEVEL_OUTOF10: 7
PAINLEVEL_OUTOF10: 0
PAINLEVEL_OUTOF10: 0
PAINLEVEL_OUTOF10: 5
PAINLEVEL_OUTOF10: 0
PAINLEVEL_OUTOF10: 5
PAINLEVEL_OUTOF10: 2

## 2025-08-01 ASSESSMENT — PAIN DESCRIPTION - LOCATION
LOCATION: BACK
LOCATION: BACK
LOCATION: GENERALIZED

## 2025-08-01 ASSESSMENT — PAIN - FUNCTIONAL ASSESSMENT
PAIN_FUNCTIONAL_ASSESSMENT: PREVENTS OR INTERFERES SOME ACTIVE ACTIVITIES AND ADLS
PAIN_FUNCTIONAL_ASSESSMENT: ACTIVITIES ARE NOT PREVENTED
PAIN_FUNCTIONAL_ASSESSMENT: ACTIVITIES ARE NOT PREVENTED

## 2025-08-01 ASSESSMENT — PAIN DESCRIPTION - FREQUENCY
FREQUENCY: CONTINUOUS
FREQUENCY: CONTINUOUS
FREQUENCY: INTERMITTENT

## 2025-08-01 ASSESSMENT — PAIN DESCRIPTION - DESCRIPTORS
DESCRIPTORS: ACHING
DESCRIPTORS: ACHING;CRAMPING
DESCRIPTORS: ACHING

## 2025-08-01 ASSESSMENT — PAIN DESCRIPTION - ONSET
ONSET: GRADUAL

## 2025-08-01 ASSESSMENT — PAIN SCALES - WONG BAKER
WONGBAKER_NUMERICALRESPONSE: NO HURT
WONGBAKER_NUMERICALRESPONSE: NO HURT

## 2025-08-01 ASSESSMENT — PAIN DESCRIPTION - PAIN TYPE
TYPE: CHRONIC PAIN

## 2025-08-01 ASSESSMENT — PAIN DESCRIPTION - ORIENTATION
ORIENTATION: MID
ORIENTATION: MID

## 2025-08-02 ENCOUNTER — APPOINTMENT (OUTPATIENT)
Facility: HOSPITAL | Age: 55
DRG: 091 | End: 2025-08-02
Attending: STUDENT IN AN ORGANIZED HEALTH CARE EDUCATION/TRAINING PROGRAM
Payer: COMMERCIAL

## 2025-08-02 PROBLEM — Z86.73 HISTORY OF CVA (CEREBROVASCULAR ACCIDENT): Status: ACTIVE | Noted: 2025-08-02

## 2025-08-02 PROBLEM — E78.49 OTHER HYPERLIPIDEMIA: Status: ACTIVE | Noted: 2025-08-02

## 2025-08-02 LAB
ALBUMIN SERPL-MCNC: 2.6 G/DL (ref 3.4–5)
ALBUMIN/GLOB SERPL: 0.6 (ref 0.8–1.7)
ALP SERPL-CCNC: 104 U/L (ref 45–117)
ALT SERPL-CCNC: 13 U/L (ref 10–35)
ANION GAP SERPL CALC-SCNC: 12 MMOL/L (ref 3–18)
AST SERPL-CCNC: 24 U/L (ref 10–38)
BASOPHILS # BLD: 0.02 K/UL (ref 0–0.1)
BASOPHILS NFR BLD: 0.4 % (ref 0–2)
BILIRUB SERPL-MCNC: 0.5 MG/DL (ref 0.2–1)
BUN SERPL-MCNC: 11 MG/DL (ref 6–23)
BUN/CREAT SERPL: 13 (ref 12–20)
CALCIUM SERPL-MCNC: 8.6 MG/DL (ref 8.5–10.1)
CHLORIDE SERPL-SCNC: 101 MMOL/L (ref 98–107)
CO2 SERPL-SCNC: 26 MMOL/L (ref 21–32)
CREAT SERPL-MCNC: 0.87 MG/DL (ref 0.6–1.3)
D DIMER PPP FEU-MCNC: 1.69 UG/ML(FEU)
DIFFERENTIAL METHOD BLD: ABNORMAL
EOSINOPHIL # BLD: 0.15 K/UL (ref 0–0.4)
EOSINOPHIL NFR BLD: 2.7 % (ref 0–5)
ERYTHROCYTE [DISTWIDTH] IN BLOOD BY AUTOMATED COUNT: 20.3 % (ref 11.6–14.5)
GLOBULIN SER CALC-MCNC: 4.3 G/DL (ref 2–4)
GLUCOSE BLD STRIP.AUTO-MCNC: 114 MG/DL (ref 70–110)
GLUCOSE BLD STRIP.AUTO-MCNC: 126 MG/DL (ref 70–110)
GLUCOSE SERPL-MCNC: 147 MG/DL (ref 74–108)
HCT VFR BLD AUTO: 33.7 % (ref 35–45)
HGB BLD-MCNC: 10.6 G/DL (ref 12–16)
IMM GRANULOCYTES # BLD AUTO: 0.02 K/UL (ref 0–0.04)
IMM GRANULOCYTES NFR BLD AUTO: 0.4 % (ref 0–0.5)
LYMPHOCYTES # BLD: 0.51 K/UL (ref 0.9–3.6)
LYMPHOCYTES NFR BLD: 9.2 % (ref 21–52)
MAGNESIUM SERPL-MCNC: 2 MG/DL (ref 1.6–2.6)
MCH RBC QN AUTO: 26 PG (ref 24–34)
MCHC RBC AUTO-ENTMCNC: 31.5 G/DL (ref 31–37)
MCV RBC AUTO: 82.8 FL (ref 78–100)
MONOCYTES # BLD: 0.35 K/UL (ref 0.05–1.2)
MONOCYTES NFR BLD: 6.3 % (ref 3–10)
NEUTS SEG # BLD: 4.45 K/UL (ref 1.8–8)
NEUTS SEG NFR BLD: 81 % (ref 40–73)
NRBC # BLD: 0 K/UL (ref 0–0.01)
NRBC BLD-RTO: 0 PER 100 WBC
PLATELET # BLD AUTO: 104 K/UL (ref 135–420)
PLATELET COMMENT: ABNORMAL
POTASSIUM SERPL-SCNC: 3.2 MMOL/L (ref 3.5–5.5)
PROCALCITONIN SERPL-MCNC: 3.29 NG/ML
PROT SERPL-MCNC: 6.9 G/DL (ref 6.4–8.2)
RBC # BLD AUTO: 4.07 M/UL (ref 4.2–5.3)
RBC MORPH BLD: ABNORMAL
SODIUM SERPL-SCNC: 139 MMOL/L (ref 136–145)
TROPONIN T SERPL HS-MCNC: 6.4 NG/L (ref 0–14)
TROPONIN T SERPL HS-MCNC: 7.5 NG/L (ref 0–14)
TROPONIN T SERPL HS-MCNC: 7.7 NG/L (ref 0–14)
TROPONIN T SERPL HS-MCNC: 7.9 NG/L (ref 0–14)
WBC # BLD AUTO: 5.5 K/UL (ref 4.6–13.2)

## 2025-08-02 PROCEDURE — 85025 COMPLETE CBC W/AUTO DIFF WBC: CPT

## 2025-08-02 PROCEDURE — 94761 N-INVAS EAR/PLS OXIMETRY MLT: CPT

## 2025-08-02 PROCEDURE — 84145 PROCALCITONIN (PCT): CPT

## 2025-08-02 PROCEDURE — 93306 TTE W/DOPPLER COMPLETE: CPT

## 2025-08-02 PROCEDURE — 6360000002 HC RX W HCPCS: Performed by: FAMILY MEDICINE

## 2025-08-02 PROCEDURE — 36415 COLL VENOUS BLD VENIPUNCTURE: CPT

## 2025-08-02 PROCEDURE — 6370000000 HC RX 637 (ALT 250 FOR IP): Performed by: FAMILY MEDICINE

## 2025-08-02 PROCEDURE — 99232 SBSQ HOSP IP/OBS MODERATE 35: CPT | Performed by: STUDENT IN AN ORGANIZED HEALTH CARE EDUCATION/TRAINING PROGRAM

## 2025-08-02 PROCEDURE — 6370000000 HC RX 637 (ALT 250 FOR IP): Performed by: STUDENT IN AN ORGANIZED HEALTH CARE EDUCATION/TRAINING PROGRAM

## 2025-08-02 PROCEDURE — 82962 GLUCOSE BLOOD TEST: CPT

## 2025-08-02 PROCEDURE — 80053 COMPREHEN METABOLIC PANEL: CPT

## 2025-08-02 PROCEDURE — 1100000003 HC PRIVATE W/ TELEMETRY

## 2025-08-02 PROCEDURE — 85379 FIBRIN DEGRADATION QUANT: CPT

## 2025-08-02 PROCEDURE — 83735 ASSAY OF MAGNESIUM: CPT

## 2025-08-02 PROCEDURE — 84484 ASSAY OF TROPONIN QUANT: CPT

## 2025-08-02 PROCEDURE — 6360000002 HC RX W HCPCS: Performed by: STUDENT IN AN ORGANIZED HEALTH CARE EDUCATION/TRAINING PROGRAM

## 2025-08-02 PROCEDURE — 99233 SBSQ HOSP IP/OBS HIGH 50: CPT | Performed by: INTERNAL MEDICINE

## 2025-08-02 PROCEDURE — 2500000003 HC RX 250 WO HCPCS: Performed by: STUDENT IN AN ORGANIZED HEALTH CARE EDUCATION/TRAINING PROGRAM

## 2025-08-02 RX ORDER — NIFEDIPINE 30 MG/1
30 TABLET, EXTENDED RELEASE ORAL DAILY
Status: DISCONTINUED | OUTPATIENT
Start: 2025-08-02 | End: 2025-08-04 | Stop reason: HOSPADM

## 2025-08-02 RX ORDER — SODIUM CHLORIDE 9 MG/ML
INJECTION, SOLUTION INTRAVENOUS CONTINUOUS
Status: CANCELLED | OUTPATIENT
Start: 2025-08-02 | End: 2025-08-03

## 2025-08-02 RX ADMIN — PREGABALIN 200 MG: 75 CAPSULE ORAL at 08:23

## 2025-08-02 RX ADMIN — SODIUM CHLORIDE, PRESERVATIVE FREE 10 ML: 5 INJECTION INTRAVENOUS at 08:25

## 2025-08-02 RX ADMIN — ACETAMINOPHEN 650 MG: 325 TABLET ORAL at 02:03

## 2025-08-02 RX ADMIN — OXYCODONE HYDROCHLORIDE 5 MG: 5 TABLET ORAL at 22:40

## 2025-08-02 RX ADMIN — BACLOFEN 10 MG: 10 TABLET ORAL at 02:03

## 2025-08-02 RX ADMIN — ENOXAPARIN SODIUM 30 MG: 100 INJECTION SUBCUTANEOUS at 08:24

## 2025-08-02 RX ADMIN — SODIUM CHLORIDE, PRESERVATIVE FREE 10 ML: 5 INJECTION INTRAVENOUS at 22:41

## 2025-08-02 RX ADMIN — ACETAMINOPHEN, ASPIRIN, CAFFEINE 1 TABLET: 250; 65; 250 TABLET, FILM COATED ORAL at 13:55

## 2025-08-02 RX ADMIN — ENOXAPARIN SODIUM 30 MG: 100 INJECTION SUBCUTANEOUS at 22:39

## 2025-08-02 RX ADMIN — AMLODIPINE BESYLATE 10 MG: 10 TABLET ORAL at 15:49

## 2025-08-02 RX ADMIN — ACETAMINOPHEN 650 MG: 325 TABLET ORAL at 17:31

## 2025-08-02 RX ADMIN — THERA TABS 1 TABLET: TAB at 08:24

## 2025-08-02 RX ADMIN — PREGABALIN 200 MG: 75 CAPSULE ORAL at 13:53

## 2025-08-02 RX ADMIN — OXYCODONE HYDROCHLORIDE 5 MG: 5 TABLET ORAL at 17:31

## 2025-08-02 RX ADMIN — OXYCODONE HYDROCHLORIDE 5 MG: 5 TABLET ORAL at 08:23

## 2025-08-02 RX ADMIN — PREGABALIN 200 MG: 75 CAPSULE ORAL at 22:40

## 2025-08-02 RX ADMIN — ACETAMINOPHEN 650 MG: 325 TABLET ORAL at 08:24

## 2025-08-02 RX ADMIN — ATORVASTATIN CALCIUM 80 MG: 40 TABLET, FILM COATED ORAL at 08:24

## 2025-08-02 RX ADMIN — POTASSIUM CHLORIDE 40 MEQ: 1500 TABLET, EXTENDED RELEASE ORAL at 08:24

## 2025-08-02 RX ADMIN — HYDRALAZINE HYDROCHLORIDE 10 MG: 20 INJECTION INTRAMUSCULAR; INTRAVENOUS at 23:36

## 2025-08-02 ASSESSMENT — PAIN DESCRIPTION - LOCATION
LOCATION: GENERALIZED
LOCATION: HEAD
LOCATION: HEAD;BACK

## 2025-08-02 ASSESSMENT — PAIN DESCRIPTION - DESCRIPTORS
DESCRIPTORS: ACHING

## 2025-08-02 ASSESSMENT — PAIN SCALES - GENERAL
PAINLEVEL_OUTOF10: 2
PAINLEVEL_OUTOF10: 4
PAINLEVEL_OUTOF10: 6
PAINLEVEL_OUTOF10: 4
PAINLEVEL_OUTOF10: 8
PAINLEVEL_OUTOF10: 6
PAINLEVEL_OUTOF10: 5
PAINLEVEL_OUTOF10: 4
PAINLEVEL_OUTOF10: 2
PAINLEVEL_OUTOF10: 0
PAINLEVEL_OUTOF10: 3

## 2025-08-02 ASSESSMENT — PAIN DESCRIPTION - ONSET
ONSET: AWAKENED FROM SLEEP
ONSET: ON-GOING
ONSET: AWAKENED FROM SLEEP

## 2025-08-02 ASSESSMENT — PAIN DESCRIPTION - ORIENTATION
ORIENTATION: ANTERIOR;MID
ORIENTATION: ANTERIOR;MID
ORIENTATION: ANTERIOR

## 2025-08-02 ASSESSMENT — PAIN DESCRIPTION - PAIN TYPE
TYPE: ACUTE PAIN
TYPE: CHRONIC PAIN
TYPE: ACUTE PAIN

## 2025-08-02 ASSESSMENT — PAIN SCALES - WONG BAKER
WONGBAKER_NUMERICALRESPONSE: NO HURT
WONGBAKER_NUMERICALRESPONSE: NO HURT

## 2025-08-02 ASSESSMENT — PAIN DESCRIPTION - FREQUENCY
FREQUENCY: INTERMITTENT
FREQUENCY: CONTINUOUS
FREQUENCY: INTERMITTENT

## 2025-08-03 LAB
ANION GAP SERPL CALC-SCNC: 11 MMOL/L (ref 3–18)
B PERT DNA SPEC QL NAA+PROBE: NOT DETECTED
BASOPHILS # BLD: 0.01 K/UL (ref 0–0.1)
BASOPHILS NFR BLD: 0.2 % (ref 0–2)
BORDETELLA PARAPERTUSSIS BY PCR: NOT DETECTED
BUN SERPL-MCNC: 10 MG/DL (ref 6–23)
BUN/CREAT SERPL: 13 (ref 12–20)
C PNEUM DNA SPEC QL NAA+PROBE: NOT DETECTED
CALCIUM SERPL-MCNC: 8.9 MG/DL (ref 8.5–10.1)
CHLORIDE SERPL-SCNC: 101 MMOL/L (ref 98–107)
CO2 SERPL-SCNC: 27 MMOL/L (ref 21–32)
CREAT SERPL-MCNC: 0.77 MG/DL (ref 0.6–1.3)
DIFFERENTIAL METHOD BLD: ABNORMAL
ECHO AO ASC DIAM: 3.3 CM
ECHO AO ASCENDING AORTA INDEX: 1.47 CM/M2
ECHO AO ROOT DIAM: 3.5 CM
ECHO AO ROOT INDEX: 1.56 CM/M2
ECHO AV AREA PEAK VELOCITY: 2.7 CM2
ECHO AV AREA VTI: 2.9 CM2
ECHO AV AREA/BSA PEAK VELOCITY: 1.2 CM2/M2
ECHO AV AREA/BSA VTI: 1.3 CM2/M2
ECHO AV MEAN GRADIENT: 7 MMHG
ECHO AV MEAN VELOCITY: 1.2 M/S
ECHO AV PEAK GRADIENT: 12 MMHG
ECHO AV PEAK VELOCITY: 1.8 M/S
ECHO AV VELOCITY RATIO: 0.78
ECHO AV VTI: 33.7 CM
ECHO BSA: 2.29 M2
ECHO EST RA PRESSURE: 3 MMHG
ECHO LA VOL A-L A2C: 59 ML (ref 22–52)
ECHO LA VOL A-L A4C: 52 ML (ref 22–52)
ECHO LA VOL BP: 52 ML (ref 22–52)
ECHO LA VOL MOD A2C: 56 ML (ref 22–52)
ECHO LA VOL MOD A4C: 46 ML (ref 22–52)
ECHO LA VOL/BSA BIPLANE: 23 ML/M2 (ref 16–34)
ECHO LA VOLUME AREA LENGTH: 57 ML
ECHO LA VOLUME INDEX A-L A2C: 26 ML/M2 (ref 16–34)
ECHO LA VOLUME INDEX A-L A4C: 23 ML/M2 (ref 16–34)
ECHO LA VOLUME INDEX AREA LENGTH: 25 ML/M2 (ref 16–34)
ECHO LA VOLUME INDEX MOD A2C: 25 ML/M2 (ref 16–34)
ECHO LA VOLUME INDEX MOD A4C: 21 ML/M2 (ref 16–34)
ECHO LV E' LATERAL VELOCITY: 9.66 CM/S
ECHO LV E' SEPTAL VELOCITY: 10.64 CM/S
ECHO LV EDV A2C: 86 ML
ECHO LV EDV A4C: 118 ML
ECHO LV EDV BP: 103 ML (ref 56–104)
ECHO LV EDV INDEX A4C: 53 ML/M2
ECHO LV EDV INDEX BP: 46 ML/M2
ECHO LV EDV NDEX A2C: 38 ML/M2
ECHO LV EF PHYSICIAN: 60 %
ECHO LV EJECTION FRACTION A2C: 58 %
ECHO LV EJECTION FRACTION A4C: 59 %
ECHO LV EJECTION FRACTION BIPLANE: 59 % (ref 55–100)
ECHO LV ESV A2C: 36 ML
ECHO LV ESV A4C: 48 ML
ECHO LV ESV BP: 43 ML (ref 19–49)
ECHO LV ESV INDEX A2C: 16 ML/M2
ECHO LV ESV INDEX A4C: 21 ML/M2
ECHO LV ESV INDEX BP: 19 ML/M2
ECHO LV FRACTIONAL SHORTENING: 35 % (ref 28–44)
ECHO LV INTERNAL DIMENSION DIASTOLE INDEX: 2.28 CM/M2
ECHO LV INTERNAL DIMENSION DIASTOLIC: 5.1 CM (ref 3.9–5.3)
ECHO LV INTERNAL DIMENSION SYSTOLIC INDEX: 1.47 CM/M2
ECHO LV INTERNAL DIMENSION SYSTOLIC: 3.3 CM
ECHO LV IVSD: 0.7 CM (ref 0.6–0.9)
ECHO LV MASS 2D: 118.7 G (ref 67–162)
ECHO LV MASS INDEX 2D: 53 G/M2 (ref 43–95)
ECHO LV POSTERIOR WALL DIASTOLIC: 0.7 CM (ref 0.6–0.9)
ECHO LV RELATIVE WALL THICKNESS RATIO: 0.27
ECHO LVOT AREA: 3.5 CM2
ECHO LVOT AV VTI INDEX: 0.84
ECHO LVOT DIAM: 2.1 CM
ECHO LVOT MEAN GRADIENT: 4 MMHG
ECHO LVOT PEAK GRADIENT: 8 MMHG
ECHO LVOT PEAK VELOCITY: 1.4 M/S
ECHO LVOT STROKE VOLUME INDEX: 43.9 ML/M2
ECHO LVOT SV: 98.3 ML
ECHO LVOT VTI: 28.4 CM
ECHO MV A VELOCITY: 0.81 M/S
ECHO MV AREA VTI: 3.8 CM2
ECHO MV E DECELERATION TIME (DT): 188.4 MS
ECHO MV E VELOCITY: 0.92 M/S
ECHO MV E/A RATIO: 1.14
ECHO MV E/E' LATERAL: 9.52
ECHO MV E/E' RATIO (AVERAGED): 9.09
ECHO MV E/E' SEPTAL: 8.65
ECHO MV LVOT VTI INDEX: 0.92
ECHO MV MAX VELOCITY: 0.9 M/S
ECHO MV MEAN GRADIENT: 2 MMHG
ECHO MV MEAN VELOCITY: 0.6 M/S
ECHO MV PEAK GRADIENT: 3 MMHG
ECHO MV VTI: 26 CM
ECHO PV MAX VELOCITY: 1.3 M/S
ECHO PV PEAK GRADIENT: 7 MMHG
ECHO RA AREA 4C: 15.1 CM2
ECHO RA END SYSTOLIC VOLUME APICAL 4 CHAMBER INDEX BSA: 18 ML/M2
ECHO RA VOLUME AREA LENGTH APICAL 4 CHAMBER: 40 ML
ECHO RA VOLUME: 40 ML
ECHO RIGHT VENTRICULAR SYSTOLIC PRESSURE (RVSP): 23 MMHG
ECHO RV BASAL DIMENSION: 4.4 CM
ECHO RV TAPSE: 2 CM (ref 1.7–?)
ECHO RVOT PEAK GRADIENT: 4 MMHG
ECHO RVOT PEAK VELOCITY: 0.9 M/S
ECHO TV REGURGITANT MAX VELOCITY: 2.25 M/S
ECHO TV REGURGITANT PEAK GRADIENT: 20 MMHG
EOSINOPHIL # BLD: 0.12 K/UL (ref 0–0.4)
EOSINOPHIL NFR BLD: 2.5 % (ref 0–5)
ERYTHROCYTE [DISTWIDTH] IN BLOOD BY AUTOMATED COUNT: 20.4 % (ref 11.6–14.5)
FLUAV SUBTYP SPEC NAA+PROBE: NOT DETECTED
FLUBV RNA SPEC QL NAA+PROBE: NOT DETECTED
GLUCOSE BLD STRIP.AUTO-MCNC: 113 MG/DL (ref 70–110)
GLUCOSE BLD STRIP.AUTO-MCNC: 134 MG/DL (ref 70–110)
GLUCOSE SERPL-MCNC: 111 MG/DL (ref 74–108)
HADV DNA SPEC QL NAA+PROBE: NOT DETECTED
HCOV 229E RNA SPEC QL NAA+PROBE: NOT DETECTED
HCOV HKU1 RNA SPEC QL NAA+PROBE: NOT DETECTED
HCOV NL63 RNA SPEC QL NAA+PROBE: NOT DETECTED
HCOV OC43 RNA SPEC QL NAA+PROBE: NOT DETECTED
HCT VFR BLD AUTO: 35.5 % (ref 35–45)
HGB BLD-MCNC: 11.2 G/DL (ref 12–16)
HMPV RNA SPEC QL NAA+PROBE: NOT DETECTED
HPIV1 RNA SPEC QL NAA+PROBE: NOT DETECTED
HPIV2 RNA SPEC QL NAA+PROBE: NOT DETECTED
HPIV3 RNA SPEC QL NAA+PROBE: NOT DETECTED
HPIV4 RNA SPEC QL NAA+PROBE: NOT DETECTED
IMM GRANULOCYTES # BLD AUTO: 0.01 K/UL (ref 0–0.04)
IMM GRANULOCYTES NFR BLD AUTO: 0.2 % (ref 0–0.5)
LYMPHOCYTES # BLD: 0.67 K/UL (ref 0.9–3.6)
LYMPHOCYTES NFR BLD: 14 % (ref 21–52)
M PNEUMO DNA SPEC QL NAA+PROBE: NOT DETECTED
MCH RBC QN AUTO: 26.4 PG (ref 24–34)
MCHC RBC AUTO-ENTMCNC: 31.5 G/DL (ref 31–37)
MCV RBC AUTO: 83.7 FL (ref 78–100)
MONOCYTES # BLD: 0.38 K/UL (ref 0.05–1.2)
MONOCYTES NFR BLD: 8 % (ref 3–10)
NEUTS SEG # BLD: 3.58 K/UL (ref 1.8–8)
NEUTS SEG NFR BLD: 75.1 % (ref 40–73)
NRBC # BLD: 0 K/UL (ref 0–0.01)
NRBC BLD-RTO: 0 PER 100 WBC
PLATELET # BLD AUTO: 143 K/UL (ref 135–420)
PMV BLD AUTO: 12 FL (ref 9.2–11.8)
POTASSIUM SERPL-SCNC: 3.1 MMOL/L (ref 3.5–5.5)
RBC # BLD AUTO: 4.24 M/UL (ref 4.2–5.3)
RSV RNA SPEC QL NAA+PROBE: NOT DETECTED
RV+EV RNA SPEC QL NAA+PROBE: NOT DETECTED
SARS-COV-2 RNA RESP QL NAA+PROBE: NOT DETECTED
SODIUM SERPL-SCNC: 138 MMOL/L (ref 136–145)
T3FREE SERPL-MCNC: 2.6 PG/ML (ref 3.1–6.8)
T4 FREE SERPL-MCNC: 1.2 NG/DL (ref 0.9–1.7)
TSH, 3RD GENERATION: 1.33 UIU/ML (ref 0.27–4.2)
WBC # BLD AUTO: 4.8 K/UL (ref 4.6–13.2)

## 2025-08-03 PROCEDURE — 80048 BASIC METABOLIC PNL TOTAL CA: CPT

## 2025-08-03 PROCEDURE — 0202U NFCT DS 22 TRGT SARS-COV-2: CPT

## 2025-08-03 PROCEDURE — 6360000002 HC RX W HCPCS: Performed by: STUDENT IN AN ORGANIZED HEALTH CARE EDUCATION/TRAINING PROGRAM

## 2025-08-03 PROCEDURE — 36415 COLL VENOUS BLD VENIPUNCTURE: CPT

## 2025-08-03 PROCEDURE — 93306 TTE W/DOPPLER COMPLETE: CPT | Performed by: INTERNAL MEDICINE

## 2025-08-03 PROCEDURE — 97165 OT EVAL LOW COMPLEX 30 MIN: CPT

## 2025-08-03 PROCEDURE — 97161 PT EVAL LOW COMPLEX 20 MIN: CPT

## 2025-08-03 PROCEDURE — 84443 ASSAY THYROID STIM HORMONE: CPT

## 2025-08-03 PROCEDURE — 99233 SBSQ HOSP IP/OBS HIGH 50: CPT | Performed by: STUDENT IN AN ORGANIZED HEALTH CARE EDUCATION/TRAINING PROGRAM

## 2025-08-03 PROCEDURE — 2500000003 HC RX 250 WO HCPCS: Performed by: STUDENT IN AN ORGANIZED HEALTH CARE EDUCATION/TRAINING PROGRAM

## 2025-08-03 PROCEDURE — 6370000000 HC RX 637 (ALT 250 FOR IP): Performed by: INTERNAL MEDICINE

## 2025-08-03 PROCEDURE — 6370000000 HC RX 637 (ALT 250 FOR IP): Performed by: FAMILY MEDICINE

## 2025-08-03 PROCEDURE — 82962 GLUCOSE BLOOD TEST: CPT

## 2025-08-03 PROCEDURE — 6370000000 HC RX 637 (ALT 250 FOR IP): Performed by: STUDENT IN AN ORGANIZED HEALTH CARE EDUCATION/TRAINING PROGRAM

## 2025-08-03 PROCEDURE — 85025 COMPLETE CBC W/AUTO DIFF WBC: CPT

## 2025-08-03 PROCEDURE — 94761 N-INVAS EAR/PLS OXIMETRY MLT: CPT

## 2025-08-03 PROCEDURE — 1100000003 HC PRIVATE W/ TELEMETRY

## 2025-08-03 PROCEDURE — 84439 ASSAY OF FREE THYROXINE: CPT

## 2025-08-03 PROCEDURE — 84481 FREE ASSAY (FT-3): CPT

## 2025-08-03 RX ORDER — AMOXICILLIN 875 MG/1
875 TABLET, COATED ORAL EVERY 12 HOURS SCHEDULED
Status: DISCONTINUED | OUTPATIENT
Start: 2025-08-03 | End: 2025-08-04 | Stop reason: HOSPADM

## 2025-08-03 RX ORDER — BUTALBITAL, ACETAMINOPHEN AND CAFFEINE 300; 40; 50 MG/1; MG/1; MG/1
1 CAPSULE ORAL EVERY 4 HOURS PRN
Status: DISCONTINUED | OUTPATIENT
Start: 2025-08-03 | End: 2025-08-04 | Stop reason: HOSPADM

## 2025-08-03 RX ORDER — MIDODRINE HYDROCHLORIDE 5 MG/1
5 TABLET ORAL
Status: DISCONTINUED | OUTPATIENT
Start: 2025-08-03 | End: 2025-08-04 | Stop reason: HOSPADM

## 2025-08-03 RX ADMIN — MIDODRINE HYDROCHLORIDE 5 MG: 5 TABLET ORAL at 17:15

## 2025-08-03 RX ADMIN — ATORVASTATIN CALCIUM 80 MG: 40 TABLET, FILM COATED ORAL at 09:08

## 2025-08-03 RX ADMIN — OXYCODONE HYDROCHLORIDE 5 MG: 5 TABLET ORAL at 12:31

## 2025-08-03 RX ADMIN — THERA TABS 1 TABLET: TAB at 09:08

## 2025-08-03 RX ADMIN — ENOXAPARIN SODIUM 30 MG: 100 INJECTION SUBCUTANEOUS at 09:07

## 2025-08-03 RX ADMIN — OXYCODONE HYDROCHLORIDE 5 MG: 5 TABLET ORAL at 06:27

## 2025-08-03 RX ADMIN — NIFEDIPINE 30 MG: 30 TABLET, FILM COATED, EXTENDED RELEASE ORAL at 09:08

## 2025-08-03 RX ADMIN — PREGABALIN 200 MG: 75 CAPSULE ORAL at 20:46

## 2025-08-03 RX ADMIN — ACETAMINOPHEN, ASPIRIN, CAFFEINE 1 TABLET: 250; 65; 250 TABLET, FILM COATED ORAL at 14:31

## 2025-08-03 RX ADMIN — PREGABALIN 200 MG: 75 CAPSULE ORAL at 14:20

## 2025-08-03 RX ADMIN — SODIUM CHLORIDE, PRESERVATIVE FREE 10 ML: 5 INJECTION INTRAVENOUS at 20:49

## 2025-08-03 RX ADMIN — AMOXICILLIN 875 MG: 875 TABLET, COATED ORAL at 20:47

## 2025-08-03 RX ADMIN — ACETAMINOPHEN 650 MG: 325 TABLET ORAL at 12:31

## 2025-08-03 RX ADMIN — POTASSIUM CHLORIDE 40 MEQ: 1500 TABLET, EXTENDED RELEASE ORAL at 20:47

## 2025-08-03 RX ADMIN — ENOXAPARIN SODIUM 30 MG: 100 INJECTION SUBCUTANEOUS at 20:46

## 2025-08-03 RX ADMIN — PREGABALIN 200 MG: 75 CAPSULE ORAL at 09:07

## 2025-08-03 RX ADMIN — SODIUM CHLORIDE, PRESERVATIVE FREE 10 ML: 5 INJECTION INTRAVENOUS at 09:08

## 2025-08-03 ASSESSMENT — PAIN DESCRIPTION - LOCATION
LOCATION: HEAD

## 2025-08-03 ASSESSMENT — PAIN SCALES - WONG BAKER
WONGBAKER_NUMERICALRESPONSE: NO HURT
WONGBAKER_NUMERICALRESPONSE: NO HURT

## 2025-08-03 ASSESSMENT — PAIN DESCRIPTION - DESCRIPTORS
DESCRIPTORS: ACHING
DESCRIPTORS: THROBBING
DESCRIPTORS: ACHING

## 2025-08-03 ASSESSMENT — PAIN - FUNCTIONAL ASSESSMENT
PAIN_FUNCTIONAL_ASSESSMENT: ACTIVITIES ARE NOT PREVENTED

## 2025-08-03 ASSESSMENT — PAIN SCALES - GENERAL
PAINLEVEL_OUTOF10: 6
PAINLEVEL_OUTOF10: 0
PAINLEVEL_OUTOF10: 0
PAINLEVEL_OUTOF10: 6
PAINLEVEL_OUTOF10: 7
PAINLEVEL_OUTOF10: 2

## 2025-08-03 ASSESSMENT — PAIN DESCRIPTION - PAIN TYPE
TYPE: ACUTE PAIN
TYPE: CHRONIC PAIN
TYPE: ACUTE PAIN

## 2025-08-03 ASSESSMENT — PAIN DESCRIPTION - FREQUENCY
FREQUENCY: INTERMITTENT

## 2025-08-03 ASSESSMENT — PAIN DESCRIPTION - ONSET
ONSET: ON-GOING
ONSET: AWAKENED FROM SLEEP
ONSET: ON-GOING

## 2025-08-03 ASSESSMENT — PAIN DESCRIPTION - ORIENTATION
ORIENTATION: ANTERIOR
ORIENTATION: ANTERIOR

## 2025-08-04 VITALS
HEART RATE: 78 BPM | WEIGHT: 230 LBS | SYSTOLIC BLOOD PRESSURE: 148 MMHG | OXYGEN SATURATION: 100 % | HEIGHT: 71 IN | BODY MASS INDEX: 32.2 KG/M2 | DIASTOLIC BLOOD PRESSURE: 85 MMHG | RESPIRATION RATE: 18 BRPM | TEMPERATURE: 98.1 F

## 2025-08-04 LAB
ALBUMIN SERPL-MCNC: 2.6 G/DL (ref 3.4–5)
ALBUMIN/GLOB SERPL: 0.6 (ref 0.8–1.7)
ALP SERPL-CCNC: 104 U/L (ref 45–117)
ALT SERPL-CCNC: 16 U/L (ref 10–35)
ANION GAP SERPL CALC-SCNC: 10 MMOL/L (ref 3–18)
AST SERPL-CCNC: 17 U/L (ref 10–38)
BASOPHILS # BLD: 0.02 K/UL (ref 0–0.1)
BASOPHILS NFR BLD: 0.4 % (ref 0–2)
BILIRUB SERPL-MCNC: 0.4 MG/DL (ref 0.2–1)
BUN SERPL-MCNC: 11 MG/DL (ref 6–23)
BUN/CREAT SERPL: 16 (ref 12–20)
CALCIUM SERPL-MCNC: 9.1 MG/DL (ref 8.5–10.1)
CHLORIDE SERPL-SCNC: 104 MMOL/L (ref 98–107)
CO2 SERPL-SCNC: 25 MMOL/L (ref 21–32)
CREAT SERPL-MCNC: 0.65 MG/DL (ref 0.6–1.3)
DIFFERENTIAL METHOD BLD: ABNORMAL
EOSINOPHIL # BLD: 0.14 K/UL (ref 0–0.4)
EOSINOPHIL NFR BLD: 3.1 % (ref 0–5)
ERYTHROCYTE [DISTWIDTH] IN BLOOD BY AUTOMATED COUNT: 20.1 % (ref 11.6–14.5)
GLOBULIN SER CALC-MCNC: 4.5 G/DL (ref 2–4)
GLUCOSE BLD STRIP.AUTO-MCNC: 110 MG/DL (ref 70–110)
GLUCOSE SERPL-MCNC: 108 MG/DL (ref 74–108)
HCT VFR BLD AUTO: 33.6 % (ref 35–45)
HGB BLD-MCNC: 10.5 G/DL (ref 12–16)
IMM GRANULOCYTES # BLD AUTO: 0.04 K/UL (ref 0–0.04)
IMM GRANULOCYTES NFR BLD AUTO: 0.9 % (ref 0–0.5)
LYMPHOCYTES # BLD: 1.13 K/UL (ref 0.9–3.6)
LYMPHOCYTES NFR BLD: 25.4 % (ref 21–52)
MCH RBC QN AUTO: 25.8 PG (ref 24–34)
MCHC RBC AUTO-ENTMCNC: 31.3 G/DL (ref 31–37)
MCV RBC AUTO: 82.6 FL (ref 78–100)
MONOCYTES # BLD: 0.56 K/UL (ref 0.05–1.2)
MONOCYTES NFR BLD: 12.6 % (ref 3–10)
NEUTS SEG # BLD: 2.56 K/UL (ref 1.8–8)
NEUTS SEG NFR BLD: 57.6 % (ref 40–73)
NRBC # BLD: 0 K/UL (ref 0–0.01)
NRBC BLD-RTO: 0 PER 100 WBC
PERIPHERAL SMEAR, MD REVIEW: NORMAL
PLATELET # BLD AUTO: 170 K/UL (ref 135–420)
PMV BLD AUTO: 11.8 FL (ref 9.2–11.8)
POTASSIUM SERPL-SCNC: 3.7 MMOL/L (ref 3.5–5.5)
PROT SERPL-MCNC: 7.1 G/DL (ref 6.4–8.2)
RBC # BLD AUTO: 4.07 M/UL (ref 4.2–5.3)
SODIUM SERPL-SCNC: 138 MMOL/L (ref 136–145)
WBC # BLD AUTO: 4.5 K/UL (ref 4.6–13.2)

## 2025-08-04 PROCEDURE — 82962 GLUCOSE BLOOD TEST: CPT

## 2025-08-04 PROCEDURE — 6370000000 HC RX 637 (ALT 250 FOR IP): Performed by: INTERNAL MEDICINE

## 2025-08-04 PROCEDURE — 6360000002 HC RX W HCPCS: Performed by: STUDENT IN AN ORGANIZED HEALTH CARE EDUCATION/TRAINING PROGRAM

## 2025-08-04 PROCEDURE — 2500000003 HC RX 250 WO HCPCS: Performed by: STUDENT IN AN ORGANIZED HEALTH CARE EDUCATION/TRAINING PROGRAM

## 2025-08-04 PROCEDURE — 85025 COMPLETE CBC W/AUTO DIFF WBC: CPT

## 2025-08-04 PROCEDURE — 6370000000 HC RX 637 (ALT 250 FOR IP): Performed by: STUDENT IN AN ORGANIZED HEALTH CARE EDUCATION/TRAINING PROGRAM

## 2025-08-04 PROCEDURE — 36415 COLL VENOUS BLD VENIPUNCTURE: CPT

## 2025-08-04 PROCEDURE — 99239 HOSP IP/OBS DSCHRG MGMT >30: CPT | Performed by: STUDENT IN AN ORGANIZED HEALTH CARE EDUCATION/TRAINING PROGRAM

## 2025-08-04 PROCEDURE — 80053 COMPREHEN METABOLIC PANEL: CPT

## 2025-08-04 PROCEDURE — 94761 N-INVAS EAR/PLS OXIMETRY MLT: CPT

## 2025-08-04 PROCEDURE — 6370000000 HC RX 637 (ALT 250 FOR IP): Performed by: FAMILY MEDICINE

## 2025-08-04 RX ORDER — BUTALBITAL, ACETAMINOPHEN AND CAFFEINE 300; 40; 50 MG/1; MG/1; MG/1
1 CAPSULE ORAL EVERY 4 HOURS PRN
Qty: 24 CAPSULE | Refills: 0 | Status: SHIPPED | OUTPATIENT
Start: 2025-08-04

## 2025-08-04 RX ORDER — NIFEDIPINE 30 MG/1
30 TABLET, EXTENDED RELEASE ORAL DAILY
Qty: 30 TABLET | Refills: 3 | Status: SHIPPED | OUTPATIENT
Start: 2025-08-05

## 2025-08-04 RX ORDER — MIDODRINE HYDROCHLORIDE 5 MG/1
5 TABLET ORAL
Qty: 90 TABLET | Refills: 3 | Status: SHIPPED | OUTPATIENT
Start: 2025-08-04

## 2025-08-04 RX ORDER — AMOXICILLIN 875 MG/1
875 TABLET, COATED ORAL EVERY 12 HOURS SCHEDULED
Qty: 16 TABLET | Refills: 0 | Status: SHIPPED | OUTPATIENT
Start: 2025-08-04 | End: 2025-08-12

## 2025-08-04 RX ORDER — BUTALBITAL, ACETAMINOPHEN AND CAFFEINE 300; 40; 50 MG/1; MG/1; MG/1
1 CAPSULE ORAL EVERY 4 HOURS PRN
Qty: 48 CAPSULE | Refills: 0 | Status: SHIPPED | OUTPATIENT
Start: 2025-08-04 | End: 2025-08-04

## 2025-08-04 RX ADMIN — ACETAMINOPHEN 650 MG: 325 TABLET ORAL at 04:10

## 2025-08-04 RX ADMIN — ONDANSETRON 4 MG: 2 INJECTION, SOLUTION INTRAMUSCULAR; INTRAVENOUS at 00:15

## 2025-08-04 RX ADMIN — NIFEDIPINE 30 MG: 30 TABLET, FILM COATED, EXTENDED RELEASE ORAL at 08:44

## 2025-08-04 RX ADMIN — AMOXICILLIN 875 MG: 875 TABLET, COATED ORAL at 08:45

## 2025-08-04 RX ADMIN — OXYCODONE HYDROCHLORIDE 5 MG: 5 TABLET ORAL at 12:22

## 2025-08-04 RX ADMIN — THERA TABS 1 TABLET: TAB at 08:45

## 2025-08-04 RX ADMIN — ONDANSETRON 4 MG: 2 INJECTION, SOLUTION INTRAMUSCULAR; INTRAVENOUS at 08:45

## 2025-08-04 RX ADMIN — BUTALBITA,ACETAMINOPHEN AND CAFFEINE 1 CAPSULE: 50; 300; 40 CAPSULE ORAL at 00:18

## 2025-08-04 RX ADMIN — ENOXAPARIN SODIUM 30 MG: 100 INJECTION SUBCUTANEOUS at 08:42

## 2025-08-04 RX ADMIN — BUTALBITA,ACETAMINOPHEN AND CAFFEINE 1 CAPSULE: 50; 300; 40 CAPSULE ORAL at 08:47

## 2025-08-04 RX ADMIN — PREGABALIN 200 MG: 75 CAPSULE ORAL at 08:44

## 2025-08-04 RX ADMIN — ATORVASTATIN CALCIUM 80 MG: 40 TABLET, FILM COATED ORAL at 08:44

## 2025-08-04 RX ADMIN — SODIUM CHLORIDE, PRESERVATIVE FREE 10 ML: 5 INJECTION INTRAVENOUS at 08:51

## 2025-08-04 RX ADMIN — OXYCODONE HYDROCHLORIDE 5 MG: 5 TABLET ORAL at 04:10

## 2025-08-04 ASSESSMENT — PAIN DESCRIPTION - LOCATION
LOCATION: HEAD
LOCATION: HEAD
LOCATION: BACK
LOCATION: HEAD
LOCATION: HEAD

## 2025-08-04 ASSESSMENT — PAIN DESCRIPTION - ORIENTATION
ORIENTATION: MID
ORIENTATION: LOWER

## 2025-08-04 ASSESSMENT — PAIN SCALES - GENERAL
PAINLEVEL_OUTOF10: 6
PAINLEVEL_OUTOF10: 7
PAINLEVEL_OUTOF10: 3
PAINLEVEL_OUTOF10: 5
PAINLEVEL_OUTOF10: 5
PAINLEVEL_OUTOF10: 2
PAINLEVEL_OUTOF10: 0
PAINLEVEL_OUTOF10: 8

## 2025-08-04 ASSESSMENT — PAIN - FUNCTIONAL ASSESSMENT
PAIN_FUNCTIONAL_ASSESSMENT: ACTIVITIES ARE NOT PREVENTED

## 2025-08-04 ASSESSMENT — PAIN DESCRIPTION - DESCRIPTORS
DESCRIPTORS: ACHING
DESCRIPTORS: DISCOMFORT
DESCRIPTORS: DISCOMFORT;ACHING
DESCRIPTORS: ACHING

## 2025-08-04 ASSESSMENT — PAIN DESCRIPTION - PAIN TYPE
TYPE: ACUTE PAIN
TYPE: ACUTE PAIN
TYPE: CHRONIC PAIN
TYPE: ACUTE PAIN

## 2025-08-04 ASSESSMENT — PAIN SCALES - WONG BAKER
WONGBAKER_NUMERICALRESPONSE: NO HURT
WONGBAKER_NUMERICALRESPONSE: NO HURT

## 2025-08-04 ASSESSMENT — PAIN DESCRIPTION - ONSET
ONSET: GRADUAL

## 2025-08-04 ASSESSMENT — PAIN DESCRIPTION - FREQUENCY
FREQUENCY: INTERMITTENT

## 2025-08-05 LAB
ECHO BSA: 2.29 M2
VAS LEFT CCA DIST EDV: 7.9 CM/S
VAS LEFT CCA DIST PSV: 67.7 CM/S
VAS LEFT CCA MID EDV: 18.34 CM/S
VAS LEFT CCA MID PSV: 97.28 CM/S
VAS LEFT CCA PROX EDV: 23 CM/S
VAS LEFT CCA PROX PSV: 125.2 CM/S
VAS LEFT ECA EDV: 7.91 CM/S
VAS LEFT ECA PSV: 96.7 CM/S
VAS LEFT ICA DIST EDV: 30.8 CM/S
VAS LEFT ICA DIST PSV: 125.4 CM/S
VAS LEFT ICA MID EDV: 29.4 CM/S
VAS LEFT ICA MID PSV: 104.3 CM/S
VAS LEFT ICA PROX EDV: 13.2 CM/S
VAS LEFT ICA PROX PSV: 58.5 CM/S
VAS LEFT ICA/CCA PSV: 1.85 NO UNITS
VAS LEFT SUBCLAVIAN PROX EDV: 0 CM/S
VAS LEFT SUBCLAVIAN PROX PSV: 162.4 CM/S
VAS LEFT VERTEBRAL EDV: 13.08 CM/S
VAS LEFT VERTEBRAL PSV: 40.6 CM/S
VAS RIGHT CCA DIST EDV: 8.9 CM/S
VAS RIGHT CCA DIST PSV: 54.2 CM/S
VAS RIGHT CCA MID EDV: 11.14 CM/S
VAS RIGHT CCA MID PSV: 83.82 CM/S
VAS RIGHT CCA PROX EDV: 17.1 CM/S
VAS RIGHT CCA PROX PSV: 128.6 CM/S
VAS RIGHT ECA EDV: 11.14 CM/S
VAS RIGHT ECA PSV: 112.9 CM/S
VAS RIGHT ICA DIST EDV: 22.5 CM/S
VAS RIGHT ICA DIST PSV: 114.5 CM/S
VAS RIGHT ICA MID EDV: 19.2 CM/S
VAS RIGHT ICA MID PSV: 90.3 CM/S
VAS RIGHT ICA PROX EDV: 11.2 CM/S
VAS RIGHT ICA PROX PSV: 50.8 CM/S
VAS RIGHT ICA/CCA PSV: 2.1 NO UNITS
VAS RIGHT SUBCLAVIAN PROX EDV: 0 CM/S
VAS RIGHT SUBCLAVIAN PROX PSV: 155.4 CM/S
VAS RIGHT VERTEBRAL EDV: 17.6 CM/S
VAS RIGHT VERTEBRAL PSV: 82.2 CM/S

## 2025-08-05 PROCEDURE — 93880 EXTRACRANIAL BILAT STUDY: CPT | Performed by: SURGERY

## 2025-08-14 ENCOUNTER — TELEPHONE (OUTPATIENT)
Facility: HOSPITAL | Age: 55
End: 2025-08-14

## 2025-08-19 ENCOUNTER — TELEPHONE (OUTPATIENT)
Facility: HOSPITAL | Age: 55
End: 2025-08-19

## 2025-08-21 ENCOUNTER — TELEPHONE (OUTPATIENT)
Facility: HOSPITAL | Age: 55
End: 2025-08-21

## 2025-09-02 ENCOUNTER — OFFICE VISIT (OUTPATIENT)
Age: 55
End: 2025-09-02
Payer: COMMERCIAL

## 2025-09-02 VITALS
BODY MASS INDEX: 32.35 KG/M2 | SYSTOLIC BLOOD PRESSURE: 131 MMHG | OXYGEN SATURATION: 98 % | DIASTOLIC BLOOD PRESSURE: 84 MMHG | HEIGHT: 70 IN | WEIGHT: 226 LBS | HEART RATE: 86 BPM

## 2025-09-02 DIAGNOSIS — R07.2 PRECORDIAL PAIN: ICD-10-CM

## 2025-09-02 DIAGNOSIS — I72.9 ANEURYSM: ICD-10-CM

## 2025-09-02 DIAGNOSIS — I77.810 MILD ASCENDING AORTA DILATION: Primary | ICD-10-CM

## 2025-09-02 DIAGNOSIS — I10 HYPERTENSION, UNSPECIFIED TYPE: ICD-10-CM

## 2025-09-02 PROCEDURE — 99214 OFFICE O/P EST MOD 30 MIN: CPT | Performed by: INTERNAL MEDICINE

## 2025-09-02 PROCEDURE — 93000 ELECTROCARDIOGRAM COMPLETE: CPT | Performed by: INTERNAL MEDICINE

## 2025-09-02 PROCEDURE — 3075F SYST BP GE 130 - 139MM HG: CPT | Performed by: INTERNAL MEDICINE

## 2025-09-02 PROCEDURE — 3079F DIAST BP 80-89 MM HG: CPT | Performed by: INTERNAL MEDICINE

## 2025-09-02 ASSESSMENT — PATIENT HEALTH QUESTIONNAIRE - PHQ9
SUM OF ALL RESPONSES TO PHQ QUESTIONS 1-9: 2
SUM OF ALL RESPONSES TO PHQ QUESTIONS 1-9: 0
2. FEELING DOWN, DEPRESSED OR HOPELESS: SEVERAL DAYS
SUM OF ALL RESPONSES TO PHQ QUESTIONS 1-9: 2
2. FEELING DOWN, DEPRESSED OR HOPELESS: NOT AT ALL
1. LITTLE INTEREST OR PLEASURE IN DOING THINGS: NOT AT ALL
SUM OF ALL RESPONSES TO PHQ QUESTIONS 1-9: 2
SUM OF ALL RESPONSES TO PHQ QUESTIONS 1-9: 0
1. LITTLE INTEREST OR PLEASURE IN DOING THINGS: SEVERAL DAYS
SUM OF ALL RESPONSES TO PHQ QUESTIONS 1-9: 0
SUM OF ALL RESPONSES TO PHQ QUESTIONS 1-9: 2
SUM OF ALL RESPONSES TO PHQ QUESTIONS 1-9: 0

## 2025-09-02 ASSESSMENT — ENCOUNTER SYMPTOMS: CHEST TIGHTNESS: 1

## (undated) DEVICE — BASIN EMESIS 500CC ROSE 250/CS 60/PLT: Brand: MEDEGEN MEDICAL PRODUCTS, LLC

## (undated) DEVICE — KIT OR TURNOVER

## (undated) DEVICE — GOWN ,SIRUS ,NONREINFORCED 4XL: Brand: MEDLINE

## (undated) DEVICE — CANNULA ORIG TL CLR W FOAM CUSHIONS AND 14FT SUPL TB 3 CHN

## (undated) DEVICE — KIT CLN UP BON SECOURS MARYV

## (undated) DEVICE — WEREWOLF FASTSEAL 6.0 HEMOSTASIS WAND: Brand: FASTSEAL 6.0 HEMOSTASIS WAND

## (undated) DEVICE — SYR 50ML SLIP TIP NSAF LF STRL --

## (undated) DEVICE — SOLUTION IRRIG 1000ML H2O STRL BLT

## (undated) DEVICE — GAUZE,SPONGE,4"X4",16PLY,STRL,LF,10/TRAY: Brand: MEDLINE

## (undated) DEVICE — SUTURE MCRYL + SZ 4-0 L18IN ABSRB UD L19MM PS-2 3/8 CIR MCP496G

## (undated) DEVICE — HANDPIECE SET WITH HIGH FLOW TIP AND SUCTION TUBE: Brand: INTERPULSE

## (undated) DEVICE — CULTURETTE SGL EVAC TUBE PALL -- 100/CA

## (undated) DEVICE — KENDALL SCD EXPRESS SLEEVES, KNEE LENGTH, MEDIUM: Brand: KENDALL SCD

## (undated) DEVICE — SOLUTION IRRIG 3000ML 0.9% SOD CHL FLX CONT 0797208] ICU MEDICAL INC]

## (undated) DEVICE — INTENDED FOR TISSUE SEPARATION, AND OTHER PROCEDURES THAT REQUIRE A SHARP SURGICAL BLADE TO PUNCTURE OR CUT.: Brand: BARD-PARKER SAFETY BLADES SIZE 15, STERILE

## (undated) DEVICE — Device: Brand: JELCO

## (undated) DEVICE — APPLICATOR MEDICATED 26 CC SOLUTION HI LT ORNG CHLORAPREP

## (undated) DEVICE — CANNULA NSL AD TBNG L14FT STD PVC O2 CRV CONN NONFLARED NSL

## (undated) DEVICE — STRETCH BANDAGE ROLL: Brand: DERMACEA

## (undated) DEVICE — ELECTRODE PT RET AD L9FT HI MOIST COND ADH HYDRGEL CORDED

## (undated) DEVICE — BLADE OPHTH GRN ROUNDED TIP 1 SIDE SHRP GRINDLESS MINI-BLDE

## (undated) DEVICE — CATHETER SUCT TR FL TIP 14FR W/ O CTRL

## (undated) DEVICE — HOOK LOCK LATEX FREE ELASTIC BANDAGE 3INX5YD

## (undated) DEVICE — GOWN,REINFORCED,POLY,AURORA,XXLARGE,STR: Brand: MEDLINE

## (undated) DEVICE — CORD ES L12FT BPLR FRCP

## (undated) DEVICE — SYR 10ML LUER LOK 1/5ML GRAD --

## (undated) DEVICE — BITE BLOCK ENDOSCP UNIV AD 6 TO 9.4 MM

## (undated) DEVICE — SYRINGE MED 25GA 3ML L5/8IN SUBQ PLAS W/ DETACH NDL SFTY

## (undated) DEVICE — 3 ML SYRINGE WITH HYPODERMIC SAFETY NEEDLE: Brand: MAGELLAN

## (undated) DEVICE — SYR 20ML LL STRL LF --

## (undated) DEVICE — YANKAUER,SMOOTH HANDLE,HIGH CAPACITY: Brand: MEDLINE INDUSTRIES, INC.

## (undated) DEVICE — BANDAGE COMPR W3INXL5YD WHT BGE POLY COT M E WRP WV HK AND

## (undated) DEVICE — 3M™ STERI-DRAPE™ INSTRUMENT POUCH 1018: Brand: STERI-DRAPE™

## (undated) DEVICE — TRAY PREP DRY W/ PREM GLV 2 APPL 6 SPNG 2 UNDPD 1 OVERWRAP

## (undated) DEVICE — MEDI-VAC SUCTION HIGH CAPACITY: Brand: CARDINAL HEALTH

## (undated) DEVICE — BANDAGE COMPR EXSANGUATION SGL LAYERED NO CLSR 9FT LEN 4IN W

## (undated) DEVICE — DRESSING HYDROFIBER AQUACEL AG ADVANTAGE 3.5X14 IN

## (undated) DEVICE — FLUFF AND POLYMER UNDERPAD,EXTRA HEAVY: Brand: WINGS

## (undated) DEVICE — SUTURE VCRL SZ 0 L27IN ABSRB UD L36MM CT-1 1/2 CIR J260H

## (undated) DEVICE — FLEX ADVANTAGE 3000CC: Brand: FLEX ADVANTAGE

## (undated) DEVICE — STOCKINETTE,IMPERVIOUS,12X48,STERILE: Brand: MEDLINE

## (undated) DEVICE — SUTURE ABSORBABLE ANTIBACT 1-0 CT-1 24 IN STRATAFIX PDS + SXPP1A443

## (undated) DEVICE — AIRLIFE™ NASAL OXYGEN CANNULA CURVED, FLARED TIP WITH 14 FOOT (4.3 M) CRUSH-RESISTANT TUBING, OVER-THE-EAR STYLE: Brand: AIRLIFE™

## (undated) DEVICE — PACK SURG BSHR TOT KNEE LF

## (undated) DEVICE — SUTURE MONOCRYL + SZ 2-0 L36IN ABSRB UD CT-1 L36MM 1/2 CIR MCP945H

## (undated) DEVICE — SKIN MARKER,REGULAR TIP WITH RULER AND LABELS: Brand: DEVON

## (undated) DEVICE — DECANTER BAG 9": Brand: MEDLINE INDUSTRIES, INC.

## (undated) DEVICE — SUTURE VICRYL + SZ 0 L27IN ABSRB UD CT-1 L36MM 1/2 CIR TAPR VCP260H

## (undated) DEVICE — BANDAGE,GAUZE,BULKEE LITE,4"X4.1YD,STRL: Brand: MEDLINE

## (undated) DEVICE — FORCEPS BX L240CM JAW DIA2.8MM L CAP W/ NDL MIC MESH TOOTH

## (undated) DEVICE — INNATE™ INSTRUMENT KIT FOR 4.5MM: Brand: ACUMED

## (undated) DEVICE — SOLUTION IV 1000ML 0.9% SOD CHL

## (undated) DEVICE — FCPS RAD JAW 4LC 240CM W/NDL -- BX/20 RADIAL JAW 4

## (undated) DEVICE — BASIN EMSIS 16OZ GRAPHITE PLAS KID SHP MOLD GRAD FOR ORAL

## (undated) DEVICE — PADDING CAST COHESIVE 4 YDX3 IN HND TEARABLE COTTON SPEC 100

## (undated) DEVICE — SUTURE MCRYL SZ 3-0 L27IN ABSRB UD L26MM SH 1/2 CIR Y416H

## (undated) DEVICE — SOLUTION IRRIG 1000ML 0.9% SOD CHL USP POUR PLAS BTL

## (undated) DEVICE — TAPE,CLOTH/SILK,CURAD,3"X10YD,LF,40/CS: Brand: CURAD

## (undated) DEVICE — DISPOSABLE MULTI BAG ADAPTERS Y                                    TUBING, STERILE, 2 TO A SET 6 SETS                                    PER BOX

## (undated) DEVICE — INTENDED FOR TISSUE SEPARATION, AND OTHER PROCEDURES THAT REQUIRE A SHARP SURGICAL BLADE TO PUNCTURE OR CUT.: Brand: BARD-PARKER ® STAINLESS STEEL BLADES

## (undated) DEVICE — LINER SUCT CANSTR 3000CC PLAS SFT PRE ASSEMB W/OUT TBNG W/

## (undated) DEVICE — ZIMMER® STERILE DISPOSABLE TOURNIQUET CUFF WITH PLC, DUAL PORT, SINGLE BLADDER, 34 IN. (86 CM)

## (undated) DEVICE — GOWN ISOL IMPERV UNIV, DISP, OPEN BACK, BLUE --

## (undated) DEVICE — SYRINGE NDL 23GA 3ML L1IN TURQ PLAS NDL S STL SHLD HYPO BVL

## (undated) DEVICE — INTENDED FOR TISSUE SEPARATION, AND OTHER PROCEDURES THAT REQUIRE A SHARP SURGICAL BLADE TO PUNCTURE OR CUT.: Brand: BARD-PARKER SAFETY BLADES SIZE 10, STERILE

## (undated) DEVICE — PADDING CAST W4INXL4YD ST COT COHESIVE HND TEARABLE SPEC

## (undated) DEVICE — SUTURE VICRYL + SZ 2 L27IN ABSRB UD TP-1 L65MM 1/2 CIR TAPR VCP849G

## (undated) DEVICE — MEDI-VAC NON-CONDUCTIVE SUCTION TUBING: Brand: CARDINAL HEALTH

## (undated) DEVICE — BNDG,ELSTC,MATRIX,STRL,6"X5YD,LF,HOOK&LP: Brand: MEDLINE

## (undated) DEVICE — PREMIUM DRY TRAY LF: Brand: MEDLINE INDUSTRIES, INC.

## (undated) DEVICE — SUTURE FIBERWIRE SZ 2 W/ TAPERED NEEDLE BLUE L38IN NONABSORB BLU L26.5MM 1/2 CIRCLE AR7200

## (undated) DEVICE — Device

## (undated) DEVICE — DRAPE,HAND,STERILE: Brand: MEDLINE

## (undated) DEVICE — ENDOSCOPY PUMP TUBING/ CAP SET: Brand: ERBE

## (undated) DEVICE — AIRLIFE™ NASAL OXYGEN CANNULA CURVED, NONFLARED TIP WITH 14 FOOT (4.3 M) CRUSH-RESISTANT TUBING, OVER-THE-EAR STYLE: Brand: AIRLIFE™

## (undated) DEVICE — BANDAGE COMPR SELF ADH 5 YDX4 IN TAN STRL PREMIERPRO LF

## (undated) DEVICE — SUT ETHLN 3-0 18IN PS1 BLK --

## (undated) DEVICE — PAD,ABDOMINAL,8"X10",ST,LF: Brand: MEDLINE

## (undated) DEVICE — GAUZE SPONGES,16 PLY: Brand: CURITY

## (undated) DEVICE — STRYKER PERFORMANCE SERIES SAGITTAL BLADE: Brand: STRYKER PERFORMANCE SERIES

## (undated) DEVICE — STERILE POLYISOPRENE POWDER-FREE SURGICAL GLOVES: Brand: PROTEXIS

## (undated) DEVICE — BOWL AND CEMENT CARTRIDGE WITH BREAKAWAY FEMORAL NOZZLE: Brand: ACM

## (undated) DEVICE — IMMOBILIZER SHLDR L L18IN D9IN UNIV POLY COT W/ FOAM STRP

## (undated) DEVICE — SUTURE VCRL SZ 2 L27IN ABSRB VLT L65MM TP-1 1/2 CIR J649G

## (undated) DEVICE — 450 ML BOTTLE OF 0.05% CHLORHEXIDINE GLUCONATE IN 99.95% STERILE WATER FOR IRRIGATION, USP AND APPLICATOR.: Brand: IRRISEPT ANTIMICROBIAL WOUND LAVAGE

## (undated) DEVICE — SUTURE MCRYL SZ 2-0 L36IN ABSRB UD L36MM CT-1 1/2 CIR Y945H

## (undated) DEVICE — SET PIN L76MM DIA3.2MM S STL FLUT 127X3.2MM DRL DISP GEN

## (undated) DEVICE — 2.3MM X 16MM LKG VARIABLE ANGLE SCREW
Type: IMPLANTABLE DEVICE | Site: WRIST | Status: NON-FUNCTIONAL
Brand: ACUMED
Removed: 2023-12-12

## (undated) DEVICE — 2.8MM QR DRILL, W/ DEPTH MARKS: Brand: ACUMED

## (undated) DEVICE — 2.3MM X 26MM NON-TOGGLING CORTICAL SCREW
Type: IMPLANTABLE DEVICE | Site: WRIST | Status: NON-FUNCTIONAL
Brand: ACUMED
Removed: 2023-12-12

## (undated) DEVICE — THREE-QUARTER SHEET: Brand: CONVERTORS

## (undated) DEVICE — 4-PORT MANIFOLD: Brand: NEPTUNE 2

## (undated) DEVICE — .054" X 6" GUIDE WIRE: Brand: ACUMED

## (undated) DEVICE — INTENDED FOR TISSUE SEPARATION, AND OTHER PROCEDURES THAT REQUIRE A SHARP SURGICAL BLADE TO PUNCTURE OR CUT.: Brand: BARD-PARKER ®  SAFETY SCALPED

## (undated) DEVICE — DRAPE EQUIP CARM MINI STRL

## (undated) DEVICE — IMMOBILIZER KNEE PREMIER PRO TRI PNL 24INCH FOAM TIETEX PAT

## (undated) DEVICE — HOOD WITH PEEL AWAY FACE SHIELD: Brand: T7PLUS

## (undated) DEVICE — SYRINGE MED 30ML STD CLR PLAS LUERLOCK TIP N CTRL DISP

## (undated) DEVICE — BLADE SAW W11XL77.5MM THK1.23MM CUT THK1.17MM S STL RECIP

## (undated) DEVICE — STERILE LATEX POWDER-FREE SURGICAL GLOVESWITH NITRILE COATING: Brand: PROTEXIS

## (undated) DEVICE — BLADE,STAINLESS-STEEL,15,STRL,DISPOSABLE: Brand: MEDLINE

## (undated) DEVICE — OCCLUSIVE GAUZE STRIP,3% BISMUTH TRIBROMOPHENATE IN PETROLATUM BLEND: Brand: XEROFORM

## (undated) DEVICE — BIPOLAR SEALER 23-112-1 AQM 6.0: Brand: AQUAMANTYS ®

## (undated) DEVICE — KERLIX BANDAGE ROLL: Brand: KERLIX

## (undated) DEVICE — 1.7MM QUICK COUPLER SEMI-FLUTED DRILL: Brand: ACUMED

## (undated) DEVICE — SWAB CULT LIQ STUART AGR AERB MOD IN BRK SGL RAYON TIP PLAS 220099] BECTON DICKINSON MICRO]

## (undated) DEVICE — 3M™ BAIR PAWS FLEX™ WARMING GOWN, STANDARD, 20 PER CASE 81003: Brand: BAIR PAWS™

## (undated) DEVICE — INTENDED FOR TISSUE SEPARATION, AND OTHER PROCEDURES THAT REQUIRE A SHARP SURGICAL BLADE TO PUNCTURE OR CUT.: Brand: BARD-PARKER ® CARBON RIB-BACK BLADES

## (undated) DEVICE — PADDING CAST W4INXL4YD NONSTERILE COT COHESIVE HND TEARABLE

## (undated) DEVICE — REM POLYHESIVE ADULT PATIENT RETURN ELECTRODE: Brand: VALLEYLAB

## (undated) DEVICE — SNARE POLYP M W27MMXL240CM OVL STIFF DISP CAPTIVATOR

## (undated) DEVICE — 1.5MM HEX DRIVER TIP, LOCKING GROOVE: Brand: ACUMED

## (undated) DEVICE — BLADE REPROC SAW RECIP DBL SIDED FIXED PT 3.56MM 70X12.5X0.64MM

## (undated) DEVICE — T15 STICK FIT HEXALOBE DRIVER: Brand: ACUMED

## (undated) DEVICE — 2.0MM QUICK RELEASE DRILL: Brand: ACUMED

## (undated) DEVICE — COVER LT HNDL BLU STRL -- MEDICHOICE

## (undated) DEVICE — (D)GLOVE EXAM LG NITRL NS -- DISC BY MFR NO SUB

## (undated) DEVICE — SUTURE MONOCRYL SZ 4-0 L27IN ABSRB UD L24MM PS-1 3/8 CIR PRIM Y935H

## (undated) DEVICE — BANDAGE,ELASTIC,ESMARK,STERILE,4"X9',LF: Brand: MEDLINE

## (undated) DEVICE — 3.5MM X 12MM NON-LOCKING HEXALOBE SCREW
Type: IMPLANTABLE DEVICE | Site: WRIST | Status: NON-FUNCTIONAL
Brand: ACUMED
Removed: 2023-12-12

## (undated) DEVICE — SYRINGE MED 3ML NDL 22GA L1 1/2IN REG BVL SFGLDE

## (undated) DEVICE — GOWN,PREVENTION PLUS,XLN/XL,ST,24/CS: Brand: MEDLINE

## (undated) DEVICE — BLADE,STAINLESS-STEEL,10,STRL,DISPOSABLE: Brand: MEDLINE

## (undated) DEVICE — SYRINGE MED 20ML STD CLR PLAS LUERLOCK TIP N CTRL DISP

## (undated) DEVICE — BANDAGE COMPR W6INXL5YD WHT BGE POLY COT M E WRP WV HK AND

## (undated) DEVICE — PACK PROCEDURE SURG MAJ W/ BASIN LF